# Patient Record
Sex: MALE | Race: WHITE | NOT HISPANIC OR LATINO | Employment: OTHER | URBAN - METROPOLITAN AREA
[De-identification: names, ages, dates, MRNs, and addresses within clinical notes are randomized per-mention and may not be internally consistent; named-entity substitution may affect disease eponyms.]

---

## 2017-01-12 ENCOUNTER — TELEPHONE (OUTPATIENT)
Dept: CARDIOLOGY | Facility: MEDICAL CENTER | Age: 67
End: 2017-01-12

## 2017-01-12 NOTE — TELEPHONE ENCOUNTER
Completed Zio Patch from 12/22/16 to 01/05/17 to Dr. Swan for review and advise.  Pt does have a follow up with Dr. Swan scheduled 3/21/7

## 2017-01-19 NOTE — TELEPHONE ENCOUNTER
Pt notified of Zio Patch results as reviewed by Dr. Swan.  +PAC, no sinus pauses no AV Block.  To discuss in follow up as planned 3/21/17.  Report to scan.

## 2017-01-24 ENCOUNTER — TELEPHONE (OUTPATIENT)
Dept: CARDIOLOGY | Facility: MEDICAL CENTER | Age: 67
End: 2017-01-24

## 2017-01-24 NOTE — TELEPHONE ENCOUNTER
----- Message from Milagros Encarnacion sent at 1/24/2017  1:50 PM PST -----  Regarding: patient returning call  RIRI/Aurea        Patient is returning your call from earlier. He can be reached at [phone]

## 2017-01-24 NOTE — TELEPHONE ENCOUNTER
Pt was called on 1/18 regarding his Zio Patch reviewed by Dr. Swan, no further messages have been left by me for patient.  Message left on his  to call

## 2017-01-24 NOTE — TELEPHONE ENCOUNTER
Spoke with patient and discussed Zio Patch results.  Informed of non-concerning results.  He is very much bothered by palpitations and would like to come in sooner than his scheduled 3/21 appointment with Dr. Swan.  Message to schedulers to put him on cancellation list as there are not sooner appointments outside of heart failure.  If he does not get in sooner and cannot tolerate the palpitations he will call back.

## 2017-03-21 ENCOUNTER — OFFICE VISIT (OUTPATIENT)
Dept: CARDIOLOGY | Facility: MEDICAL CENTER | Age: 67
End: 2017-03-21
Payer: MEDICARE

## 2017-03-21 VITALS
WEIGHT: 152 LBS | BODY MASS INDEX: 21.76 KG/M2 | HEART RATE: 80 BPM | HEIGHT: 70 IN | OXYGEN SATURATION: 92 % | SYSTOLIC BLOOD PRESSURE: 110 MMHG | DIASTOLIC BLOOD PRESSURE: 80 MMHG

## 2017-03-21 DIAGNOSIS — R00.2 PALPITATIONS: ICD-10-CM

## 2017-03-21 DIAGNOSIS — I49.3 PREMATURE VENTRICULAR COMPLEX: ICD-10-CM

## 2017-03-21 DIAGNOSIS — I49.1 ATRIAL CONTRACTIONS, PREMATURE: ICD-10-CM

## 2017-03-21 DIAGNOSIS — I10 ESSENTIAL HYPERTENSION: ICD-10-CM

## 2017-03-21 PROCEDURE — G8432 DEP SCR NOT DOC, RNG: HCPCS | Performed by: INTERNAL MEDICINE

## 2017-03-21 PROCEDURE — 4040F PNEUMOC VAC/ADMIN/RCVD: CPT | Mod: 8P | Performed by: INTERNAL MEDICINE

## 2017-03-21 PROCEDURE — 1036F TOBACCO NON-USER: CPT | Performed by: INTERNAL MEDICINE

## 2017-03-21 PROCEDURE — 3017F COLORECTAL CA SCREEN DOC REV: CPT | Performed by: INTERNAL MEDICINE

## 2017-03-21 PROCEDURE — 99214 OFFICE O/P EST MOD 30 MIN: CPT | Performed by: INTERNAL MEDICINE

## 2017-03-21 PROCEDURE — G8484 FLU IMMUNIZE NO ADMIN: HCPCS | Performed by: INTERNAL MEDICINE

## 2017-03-21 PROCEDURE — G8419 CALC BMI OUT NRM PARAM NOF/U: HCPCS | Performed by: INTERNAL MEDICINE

## 2017-03-21 PROCEDURE — 1101F PT FALLS ASSESS-DOCD LE1/YR: CPT | Mod: 8P | Performed by: INTERNAL MEDICINE

## 2017-03-21 ASSESSMENT — ENCOUNTER SYMPTOMS
SENSORY CHANGE: 0
FEVER: 0
ORTHOPNEA: 0
HEADACHES: 0
BLOOD IN STOOL: 0
VOMITING: 0
PND: 0
MYALGIAS: 0
PALPITATIONS: 1
HALLUCINATIONS: 0
COUGH: 0
BRUISES/BLEEDS EASILY: 0
SPEECH CHANGE: 0
BLURRED VISION: 0
WEIGHT LOSS: 0
CLAUDICATION: 0
EYE PAIN: 0
ABDOMINAL PAIN: 0
SHORTNESS OF BREATH: 0
DIZZINESS: 0
DEPRESSION: 0
FALLS: 0
EYE DISCHARGE: 0
NAUSEA: 0
DOUBLE VISION: 0
CHILLS: 0
LOSS OF CONSCIOUSNESS: 0

## 2017-03-21 NOTE — MR AVS SNAPSHOT
"        Zachary Mitch Moore   3/21/2017 1:15 PM   Office Visit   MRN: [id number]    Department:  [facility]   Dept Phone:  [phone]    Description:  Male : 1950   Provider:  Ericka Swan M.D.           Reason for Visit     Follow-Up           Allergies as of 3/21/2017     No Known Allergies      You were diagnosed with     Palpitations   [785.1.ICD-9-CM]       Premature ventricular complex   [255359]       Essential hypertension   [3395475]       Atrial contractions, premature   [867357]         Vital Signs     Blood Pressure Pulse Height Weight Body Mass Index Oxygen Saturation    110/80 mmHg 80 1.778 m (5' 10\") 68.947 kg (152 lb) 21.81 kg/m2 92%    Smoking Status                   Never Smoker            Basic Information     Date Of Birth Sex Race Ethnicity Preferred Language    1950 Male White Non- English      Problem List              ICD-10-CM Priority Class Noted - Resolved    Hypertension I10   2012 - Present    Bilateral knee pain M25.561, M25.562   2012 - Present    Arthritis M19.90   2012 - Present    Foraminal stenosis 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   2013 - Present    Vitamin D deficiency disease E55.9   10/22/2013 - Present    Elevated liver enzymes R74.8   10/22/2013 - Present    Spinal stenosis of lumbar region M48.06   2013 - Present    Degeneration of lumbar or lumbosacral intervertebral disc M51.37   2014 - Present      Health Maintenance        Date Due Completion Dates    IMM ZOSTER VACCINE 3/15/2009 ---    IMM PNEUMOCOCCAL 65+ (ADULT) LOW/MEDIUM RISK SERIES (1 of 2 - PCV13) 3/15/2014 ---    IMM INFLUENZA (1) 2016    IMM DTaP/Tdap/Td Vaccine (2 - Td) 2022    COLONOSCOPY 3/2/2026 3/2/2016            Current Immunizations     Influenza TIV (IM) 2014  5:49 AM    Tdap Vaccine 2012      Below and/or attached are the medications your provider expects you to take. Review all of your home medications and " newly ordered medications with your provider and/or pharmacist. Follow medication instructions as directed by your provider and/or pharmacist. Please keep your medication list with you and share with your provider. Update the information when medications are discontinued, doses are changed, or new medications (including over-the-counter products) are added; and carry medication information at all times in the event of emergency situations     Allergies:  No Known Allergies          Medications  Valid as of: March 21, 2017 -  1:16 PM    Generic Name Brand Name Tablet Size Instructions for use    Buprenorphine HCl (SL Tab) SUBUTEX 2 MG TAKE 1 TABLET BY MOUTH SUBLINGUALLY 3 TIMES A DAY.        Diclofenac Sodium (Tablet Delayed Response) VOLTAREN 75 MG 1 (ONE) TABLET DR, ORAL, TWO TIMES DAILY        Etodolac (Tab) LODINE 400 MG Take 1 Tab by mouth 2 times a day. Take with food        Hydrocodone-Acetaminophen (Tab) NORCO 5-325 MG Take 1 Tab by mouth every four hours as needed. Indications: not taking not taking        Hydrocodone-Acetaminophen (Tab) NORCO 5-325 MG Take 1-2 Tabs by mouth every four hours as needed (Pain).        Lisinopril (Tab) PRINIVIL 20 MG TAKE 1 TABLET BY MOUTH ONCE DAILY.        Methocarbamol (Tab) ROBAXIN 750 MG Take 1 Tab by mouth 3 times a day as needed (As needed for spasm).        Multiple Vitamin   Take  by mouth.        Oxycodone-Acetaminophen (Tab) PERCOCET 5-325 MG Take 1-2 Tabs by mouth every four hours as needed ((Pain Scale 4-6)).        TiZANidine HCl (Tab) ZANAFLEX 4 MG TAKE 1 TO 2 TABLETS BY MOUTH AT BEDTIME AS NEEDED        .                 Medicines prescribed today were sent to:     [facility] [address] [address] [address]    Phone: [phone] Fax: [phone]    Open 24 Hours?: No      Medication refill instructions:       If your prescription bottle indicates you have medication refills left, it is not necessary to call  your provider’s office. Please contact your pharmacy and they will refill your medication.    If your prescription bottle indicates you do not have any refills left, you may request refills at any time through one of the following ways: The online [employer] system (except Urgent Care), by calling your provider’s office, or by asking your pharmacy to contact your provider’s office with a refill request. Medication refills are processed only during regular business hours and may not be available until the next business day. Your provider may request additional information or to have a follow-up visit with you prior to refilling your medication.   *Please Note: Medication refills are assigned a new Rx number when refilled electronically. Your pharmacy may indicate that no refills were authorized even though a new prescription for the same medication is available at the pharmacy. Please request the medicine by name with the pharmacy before contacting your provider for a refill.        Your To Do List     Future Labs/Procedures Complete By Expires    COMP METABOLIC PANEL  As directed 3/22/2018         [employer] Access Code: Activation code not generated  Current [employer] Status: Active

## 2017-03-21 NOTE — Clinical Note
[facility]   [address], [address]  [place] [address]  Phone: [phone]  Fax: [phone]              Zachary Moore  1950    Encounter Date: 3/21/2017    Ericka Swan M.D.          PROGRESS NOTE:  Subjective:   Zachary Moore is a 67 y.o. male who presents today for cardiac care and evaluation of palpitations. At the last visit, I obtained a long-term event monitor which shows evidence of PACs. During prior stress test, patient also was found to have PVCs. His palpitation has improved. He feels well overall. No presyncope or syncope.    Past Medical History   Diagnosis Date   • Hypertension    • Arthritis      knees and spine   • Cancer (CMS-HCC)      skin cancer ongoing     Past Surgical History   Procedure Laterality Date   • Shoulder surgery       right-sided   • Knee arthroplasty total  2009     left knee--Dr. Snider   • Knee arthroplasty total  2013     Right knee-Dr. Boggs   • Lumbar fusion posterior  1/28/2014     Performed by Elder Chopra M.D. at SURGERY [facility] ORS     History reviewed. No pertinent family history.  History   Smoking status   • Never Smoker    Smokeless tobacco   • Never Used     No Known Allergies  Outpatient Encounter Prescriptions as of 3/21/2017   Medication Sig Dispense Refill   • buprenorphine (SUBUTEX) 2 MG SL Tab TAKE 1 TABLET BY MOUTH SUBLINGUALLY 3 TIMES A DAY.  2   • diclofenac EC (VOLTAREN) 75 MG Tablet Delayed Response 1 (ONE) TABLET DR, ORAL, TWO TIMES DAILY  2   • tizanidine (ZANAFLEX) 4 MG Tab TAKE 1 TO 2 TABLETS BY MOUTH AT BEDTIME AS NEEDED  1   • Multiple Vitamin (MULTI VITAMIN DAILY PO) Take  by mouth.     • lisinopril (PRINIVIL) 20 MG Tab TAKE 1 TABLET BY MOUTH ONCE DAILY. 30 Tab 0   • hydrocodone-acetaminophen (NORCO) 5-325 MG TABS per tablet Take 1 Tab by mouth every four hours as needed. Indications: not taking not taking     • hydrocodone-acetaminophen (NORCO) 5-325 MG TABS per tablet Take  "1-2 Tabs by mouth every four hours as needed (Pain). (Patient not taking: Reported on 3/21/2017) 60 Tab 0   • etodolac (LODINE) 400 MG tablet Take 1 Tab by mouth 2 times a day. Take with food (Patient not taking: Reported on 3/21/2017) 30 Tab 3   • methocarbamol (ROBAXIN) 750 MG TABS Take 1 Tab by mouth 3 times a day as needed (As needed for spasm). 90 Tab 0   • oxycodone-acetaminophen (PERCOCET) 5-325 MG TABS Take 1-2 Tabs by mouth every four hours as needed ((Pain Scale 4-6)). 120 Tab 0     No facility-administered encounter medications on file as of 3/21/2017.     Review of Systems   Constitutional: Negative for fever, chills, weight loss and malaise/fatigue.   HENT: Negative for ear discharge, ear pain, hearing loss and nosebleeds.    Eyes: Negative for blurred vision, double vision, pain and discharge.   Respiratory: Negative for cough and shortness of breath.    Cardiovascular: Positive for palpitations. Negative for chest pain, orthopnea, claudication, leg swelling and PND.   Gastrointestinal: Negative for nausea, vomiting, abdominal pain, blood in stool and melena.   Genitourinary: Negative for dysuria and hematuria.   Musculoskeletal: Negative for myalgias, joint pain and falls.   Skin: Negative for itching and rash.   Neurological: Negative for dizziness, sensory change, speech change, loss of consciousness and headaches.   Endo/Heme/Allergies: Negative for environmental allergies. Does not bruise/bleed easily.   Psychiatric/Behavioral: Negative for depression, suicidal ideas and hallucinations.        Objective:   /80 mmHg  Pulse 80  Ht 1.778 m (5' 10\")  Wt 68.947 kg (152 lb)  BMI 21.81 kg/m2  SpO2 92%    Physical Exam   Constitutional: He is oriented to person, place, and time. He appears well-developed and well-nourished.   HENT:   Head: Normocephalic and atraumatic.   Eyes: EOM are normal.   Neck: Normal range of motion. No JVD present.   Cardiovascular: Normal rate, regular rhythm, normal " heart sounds and intact distal pulses.  Exam reveals no gallop and no friction rub.    No murmur heard.  Bilateral femoral pulses are 2+, bilateral dorsalis pedis pulses are 2+, bilateral posterior tibialis pulses are 2+.   Pulmonary/Chest: No respiratory distress. He has no wheezes. He has no rales. He exhibits no tenderness.   Abdominal: Soft. Bowel sounds are normal. There is no tenderness. There is no rebound and no guarding.   The is no presence of abdominal bruits   Musculoskeletal: Normal range of motion.   Neurological: He is alert and oriented to person, place, and time.   Skin: Skin is warm and dry.   Psychiatric: He has a normal mood and affect.   Nursing note and vitals reviewed.      Assessment:     1. Palpitations  COMP METABOLIC PANEL    LIPID PANEL   2. Premature ventricular complex  COMP METABOLIC PANEL    LIPID PANEL   3. Essential hypertension  COMP METABOLIC PANEL    LIPID PANEL   4. Atrial contractions, premature  COMP METABOLIC PANEL    LIPID PANEL       Medical Decision Making:  Today's Assessment / Status / Plan:     No medical therapy at this time. Patient is doing relatively well. Blood pressure is well controlled.  Continue current medical Therapy without change.    I will see patient back in clinic with lab tests and studies results in 12 months.    I thank you Dr. Carrillo for referring patient to our Cardiology Clinic today.        Gerhard Carrillo M.D.  [address] [place] [id number]  VIA Facsimile: [phone]

## 2017-03-22 NOTE — PROGRESS NOTES
Subjective:   Zachary Moore is a 67 y.o. male who presents today for cardiac care and evaluation of palpitations. At the last visit, I obtained a long-term event monitor which shows evidence of PACs. During prior stress test, patient also was found to have PVCs. His palpitation has improved. He feels well overall. No presyncope or syncope.    Past Medical History   Diagnosis Date   • Hypertension    • Arthritis      knees and spine   • Cancer (CMS-[facility])      skin cancer ongoing     Past Surgical History   Procedure Laterality Date   • Shoulder surgery       right-sided   • Knee arthroplasty total  2009     left knee--Dr. Snider   • Knee arthroplasty total  2013     Right knee-Dr. Boggs   • Lumbar fusion posterior  1/28/2014     Performed by Elder Chopra M.D. at SURGERY [facility] ORS     History reviewed. No pertinent family history.  History   Smoking status   • Never Smoker    Smokeless tobacco   • Never Used     No Known Allergies  Outpatient Encounter Prescriptions as of 3/21/2017   Medication Sig Dispense Refill   • buprenorphine (SUBUTEX) 2 MG SL Tab TAKE 1 TABLET BY MOUTH SUBLINGUALLY 3 TIMES A DAY.  2   • diclofenac EC (VOLTAREN) 75 MG Tablet Delayed Response 1 (ONE) TABLET DR, ORAL, TWO TIMES DAILY  2   • tizanidine (ZANAFLEX) 4 MG Tab TAKE 1 TO 2 TABLETS BY MOUTH AT BEDTIME AS NEEDED  1   • Multiple Vitamin (MULTI VITAMIN DAILY PO) Take  by mouth.     • lisinopril (PRINIVIL) 20 MG Tab TAKE 1 TABLET BY MOUTH ONCE DAILY. 30 Tab 0   • hydrocodone-acetaminophen (NORCO) 5-325 MG TABS per tablet Take 1 Tab by mouth every four hours as needed. Indications: not taking not taking     • hydrocodone-acetaminophen (NORCO) 5-325 MG TABS per tablet Take 1-2 Tabs by mouth every four hours as needed (Pain). (Patient not taking: Reported on 3/21/2017) 60 Tab 0   • etodolac (LODINE) 400 MG tablet Take 1 Tab by mouth 2 times a day. Take with food (Patient not taking: Reported on 3/21/2017) 30 Tab 3   • methocarbamol  "(ROBAXIN) 750 MG TABS Take 1 Tab by mouth 3 times a day as needed (As needed for spasm). 90 Tab 0   • oxycodone-acetaminophen (PERCOCET) 5-325 MG TABS Take 1-2 Tabs by mouth every four hours as needed ((Pain Scale 4-6)). 120 Tab 0     No facility-administered encounter medications on file as of 3/21/2017.     Review of Systems   Constitutional: Negative for fever, chills, weight loss and malaise/fatigue.   HENT: Negative for ear discharge, ear pain, hearing loss and nosebleeds.    Eyes: Negative for blurred vision, double vision, pain and discharge.   Respiratory: Negative for cough and shortness of breath.    Cardiovascular: Positive for palpitations. Negative for chest pain, orthopnea, claudication, leg swelling and PND.   Gastrointestinal: Negative for nausea, vomiting, abdominal pain, blood in stool and melena.   Genitourinary: Negative for dysuria and hematuria.   Musculoskeletal: Negative for myalgias, joint pain and falls.   Skin: Negative for itching and rash.   Neurological: Negative for dizziness, sensory change, speech change, loss of consciousness and headaches.   Endo/Heme/Allergies: Negative for environmental allergies. Does not bruise/bleed easily.   Psychiatric/Behavioral: Negative for depression, suicidal ideas and hallucinations.        Objective:   /80 mmHg  Pulse 80  Ht 1.778 m (5' 10\")  Wt 68.947 kg (152 lb)  BMI 21.81 kg/m2  SpO2 92%    Physical Exam   Constitutional: He is oriented to person, place, and time. He appears well-developed and well-nourished.   HENT:   Head: Normocephalic and atraumatic.   Eyes: EOM are normal.   Neck: Normal range of motion. No JVD present.   Cardiovascular: Normal rate, regular rhythm, normal heart sounds and intact distal pulses.  Exam reveals no gallop and no friction rub.    No murmur heard.  Bilateral femoral pulses are 2+, bilateral dorsalis pedis pulses are 2+, bilateral posterior tibialis pulses are 2+.   Pulmonary/Chest: No respiratory " distress. He has no wheezes. He has no rales. He exhibits no tenderness.   Abdominal: Soft. Bowel sounds are normal. There is no tenderness. There is no rebound and no guarding.   The is no presence of abdominal bruits   Musculoskeletal: Normal range of motion.   Neurological: He is alert and oriented to person, place, and time.   Skin: Skin is warm and dry.   Psychiatric: He has a normal mood and affect.   Nursing note and vitals reviewed.      Assessment:     1. Palpitations  COMP METABOLIC PANEL    LIPID PANEL   2. Premature ventricular complex  COMP METABOLIC PANEL    LIPID PANEL   3. Essential hypertension  COMP METABOLIC PANEL    LIPID PANEL   4. Atrial contractions, premature  COMP METABOLIC PANEL    LIPID PANEL       Medical Decision Making:  Today's Assessment / Status / Plan:     No medical therapy at this time. Patient is doing relatively well. Blood pressure is well controlled.  Continue current medical Therapy without change.    I will see patient back in clinic with lab tests and studies results in 12 months.    I thank you Dr. Carrillo for referring patient to our Cardiology Clinic today.

## 2017-10-18 ENCOUNTER — APPOINTMENT (RX ONLY)
Dept: URBAN - METROPOLITAN AREA CLINIC 4 | Facility: CLINIC | Age: 67
Setting detail: DERMATOLOGY
End: 2017-10-18

## 2017-10-18 DIAGNOSIS — L57.0 ACTINIC KERATOSIS: ICD-10-CM

## 2017-10-18 DIAGNOSIS — Z85.828 PERSONAL HISTORY OF OTHER MALIGNANT NEOPLASM OF SKIN: ICD-10-CM

## 2017-10-18 DIAGNOSIS — L81.4 OTHER MELANIN HYPERPIGMENTATION: ICD-10-CM

## 2017-10-18 DIAGNOSIS — L82.1 OTHER SEBORRHEIC KERATOSIS: ICD-10-CM

## 2017-10-18 PROBLEM — D48.5 NEOPLASM OF UNCERTAIN BEHAVIOR OF SKIN: Status: ACTIVE | Noted: 2017-10-18

## 2017-10-18 PROCEDURE — ? LIQUID NITROGEN

## 2017-10-18 PROCEDURE — 99212 OFFICE O/P EST SF 10 MIN: CPT | Mod: 25

## 2017-10-18 PROCEDURE — 17000 DESTRUCT PREMALG LESION: CPT

## 2017-10-18 PROCEDURE — ? COUNSELING

## 2017-10-18 PROCEDURE — ? OBSERVATION

## 2017-10-18 PROCEDURE — 17003 DESTRUCT PREMALG LES 2-14: CPT

## 2017-10-18 PROCEDURE — ? BIOPSY BY SHAVE METHOD AND DESTRUCTION

## 2017-10-18 PROCEDURE — 11100: CPT | Mod: 59

## 2017-10-18 ASSESSMENT — LOCATION SIMPLE DESCRIPTION DERM
LOCATION SIMPLE: RIGHT SHOULDER
LOCATION SIMPLE: RIGHT ANTERIOR NECK
LOCATION SIMPLE: RIGHT UPPER BACK
LOCATION SIMPLE: RIGHT FOREHEAD
LOCATION SIMPLE: LEFT EAR
LOCATION SIMPLE: LEFT CHEEK
LOCATION SIMPLE: LEFT UPPER BACK
LOCATION SIMPLE: RIGHT FOREARM
LOCATION SIMPLE: CHEST
LOCATION SIMPLE: LEFT PRETIBIAL REGION
LOCATION SIMPLE: RIGHT CHEEK
LOCATION SIMPLE: RIGHT EYEBROW
LOCATION SIMPLE: LEFT SHOULDER
LOCATION SIMPLE: LEFT FOREARM
LOCATION SIMPLE: LEFT FOREHEAD
LOCATION SIMPLE: UPPER BACK
LOCATION SIMPLE: RIGHT CLAVICULAR SKIN

## 2017-10-18 ASSESSMENT — LOCATION DETAILED DESCRIPTION DERM
LOCATION DETAILED: RIGHT LATERAL UPPER BACK
LOCATION DETAILED: RIGHT CLAVICULAR SKIN
LOCATION DETAILED: RIGHT LATERAL SUPERIOR CHEST
LOCATION DETAILED: LEFT ANTERIOR SHOULDER
LOCATION DETAILED: LEFT LATERAL SUPERIOR CHEST
LOCATION DETAILED: SUPERIOR THORACIC SPINE
LOCATION DETAILED: LEFT SUPERIOR CRUS OF ANTIHELIX
LOCATION DETAILED: RIGHT POSTERIOR SHOULDER
LOCATION DETAILED: LEFT PROXIMAL DORSAL FOREARM
LOCATION DETAILED: LEFT SUPERIOR HELIX
LOCATION DETAILED: INFERIOR THORACIC SPINE
LOCATION DETAILED: LEFT INFERIOR UPPER BACK
LOCATION DETAILED: LEFT PROXIMAL PRETIBIAL REGION
LOCATION DETAILED: RIGHT DISTAL DORSAL FOREARM
LOCATION DETAILED: RIGHT INFERIOR LATERAL MALAR CHEEK
LOCATION DETAILED: LEFT INFERIOR CENTRAL MALAR CHEEK
LOCATION DETAILED: RIGHT SUPERIOR LATERAL NECK
LOCATION DETAILED: RIGHT CENTRAL EYEBROW
LOCATION DETAILED: LEFT LATERAL INFERIOR CHEST
LOCATION DETAILED: LEFT MEDIAL SUPERIOR CHEST
LOCATION DETAILED: RIGHT FOREHEAD
LOCATION DETAILED: LEFT FOREHEAD

## 2017-10-18 ASSESSMENT — LOCATION ZONE DERM
LOCATION ZONE: NECK
LOCATION ZONE: LEG
LOCATION ZONE: TRUNK
LOCATION ZONE: ARM
LOCATION ZONE: EAR
LOCATION ZONE: FACE

## 2017-10-18 NOTE — PROCEDURE: BIOPSY BY SHAVE METHOD AND DESTRUCTION
Biopsy Type: H and E
Anesthesia Volume In Cc: 3
Render Post-Care Instructions In Note?: no
Size Of Lesion After Curettage: 0
Hemostasis: Drysol
Billing Type: Third-Party Bill
Consent: Written consent was obtained and risks were reviewed including but not limited to scarring, infection, bleeding, scabbing, incomplete removal, nerve damage and allergy to anesthesia.
Bill As?: Biopsy by Shave Method
Post-Care Instructions: I reviewed with the patient in detail post-care instructions. Patient is to keep the biopsy site dry overnight, and then apply bacitracin twice daily until healed. Patient may apply hydrogen peroxide soaks to remove any crusting.\\n\\n\\nPt to treat lesion with 5fu if positive which he has at home. Will recheck in 3 months.
Lab Facility: 
Destruction Type: electrodesiccation
Lab: [id number]
Notification Instructions: Patient will be notified of biopsy results. However, patient instructed to call the office if not contacted within 2 weeks.
Detail Level: Detailed
Wound Care: Petrolatum
Anesthesia Type: 1% lidocaine with epinephrine and a 1:10 solution of 8.4% sodium bicarbonate

## 2017-10-18 NOTE — PROCEDURE: LIQUID NITROGEN
Render Post-Care Instructions In Note?: no
Post-Care Instructions: I reviewed with the patient in detail post-care instructions. Patient is to wear sunprotection, and avoid picking at any of the treated lesions. Pt may apply Vaseline to crusted or scabbing areas.
Consent: The patient's consent was obtained including but not limited to risks of crusting, scabbing, blistering, scarring, darker or lighter pigmentary change, recurrence, incomplete removal and infection.
Detail Level: Detailed
Duration Of Freeze Thaw-Cycle (Seconds): 5
Number Of Freeze-Thaw Cycles: 1 freeze-thaw cycle

## 2018-02-02 ENCOUNTER — TELEPHONE (OUTPATIENT)
Dept: CARDIOLOGY | Facility: MEDICAL CENTER | Age: 68
End: 2018-02-02

## 2018-02-02 NOTE — TELEPHONE ENCOUNTER
Called and spoke with patient to remind to have labs done prior to appt. Per patient will not have time to have done prior to appt.

## 2018-02-15 ENCOUNTER — APPOINTMENT (RX ONLY)
Dept: URBAN - METROPOLITAN AREA CLINIC 4 | Facility: CLINIC | Age: 68
Setting detail: DERMATOLOGY
End: 2018-02-15

## 2018-02-15 DIAGNOSIS — H61.03 CHONDRITIS OF EXTERNAL EAR: ICD-10-CM

## 2018-02-15 DIAGNOSIS — D18.0 HEMANGIOMA: ICD-10-CM

## 2018-02-15 DIAGNOSIS — Z85.828 PERSONAL HISTORY OF OTHER MALIGNANT NEOPLASM OF SKIN: ICD-10-CM

## 2018-02-15 DIAGNOSIS — L81.4 OTHER MELANIN HYPERPIGMENTATION: ICD-10-CM

## 2018-02-15 DIAGNOSIS — L57.0 ACTINIC KERATOSIS: ICD-10-CM

## 2018-02-15 DIAGNOSIS — L82.1 OTHER SEBORRHEIC KERATOSIS: ICD-10-CM

## 2018-02-15 PROBLEM — D18.01 HEMANGIOMA OF SKIN AND SUBCUTANEOUS TISSUE: Status: ACTIVE | Noted: 2018-02-15

## 2018-02-15 PROBLEM — D48.5 NEOPLASM OF UNCERTAIN BEHAVIOR OF SKIN: Status: ACTIVE | Noted: 2018-02-15

## 2018-02-15 PROCEDURE — 11100: CPT | Mod: 59

## 2018-02-15 PROCEDURE — ? COUNSELING

## 2018-02-15 PROCEDURE — ? BIOPSY BY SHAVE METHOD

## 2018-02-15 PROCEDURE — 99212 OFFICE O/P EST SF 10 MIN: CPT | Mod: 25

## 2018-02-15 PROCEDURE — 69100 BIOPSY OF EXTERNAL EAR: CPT | Mod: 59

## 2018-02-15 PROCEDURE — 17000 DESTRUCT PREMALG LESION: CPT

## 2018-02-15 PROCEDURE — ? LIQUID NITROGEN

## 2018-02-15 ASSESSMENT — LOCATION DETAILED DESCRIPTION DERM
LOCATION DETAILED: LEFT DISTAL DORSAL FOREARM
LOCATION DETAILED: LEFT CENTRAL TEMPLE
LOCATION DETAILED: LEFT INFERIOR FOREHEAD
LOCATION DETAILED: RIGHT INFERIOR MEDIAL UPPER BACK
LOCATION DETAILED: LEFT SUPERIOR CENTRAL MALAR CHEEK
LOCATION DETAILED: RIGHT SUPERIOR MEDIAL UPPER BACK
LOCATION DETAILED: LEFT SUPERIOR CRUS OF ANTIHELIX
LOCATION DETAILED: EPIGASTRIC SKIN
LOCATION DETAILED: LEFT CENTRAL MALAR CHEEK
LOCATION DETAILED: POSTERIOR MID-PARIETAL SCALP
LOCATION DETAILED: INFERIOR MID FOREHEAD
LOCATION DETAILED: RIGHT DISTAL DORSAL FOREARM
LOCATION DETAILED: PHILTRUM
LOCATION DETAILED: LEFT SUPERIOR PARIETAL SCALP

## 2018-02-15 ASSESSMENT — LOCATION SIMPLE DESCRIPTION DERM
LOCATION SIMPLE: LEFT CHEEK
LOCATION SIMPLE: ABDOMEN
LOCATION SIMPLE: UPPER LIP
LOCATION SIMPLE: RIGHT FOREARM
LOCATION SIMPLE: LEFT TEMPLE
LOCATION SIMPLE: SCALP
LOCATION SIMPLE: LEFT FOREHEAD
LOCATION SIMPLE: POSTERIOR SCALP
LOCATION SIMPLE: INFERIOR FOREHEAD
LOCATION SIMPLE: LEFT EAR
LOCATION SIMPLE: RIGHT UPPER BACK
LOCATION SIMPLE: LEFT FOREARM

## 2018-02-15 ASSESSMENT — LOCATION ZONE DERM
LOCATION ZONE: ARM
LOCATION ZONE: LIP
LOCATION ZONE: EAR
LOCATION ZONE: FACE
LOCATION ZONE: TRUNK
LOCATION ZONE: SCALP

## 2018-02-15 NOTE — PROCEDURE: BIOPSY BY SHAVE METHOD
X Size Of Lesion In Cm: 0
Type Of Destruction Used: Curettage
Notification Instructions: Patient will be notified of biopsy results. However, patient instructed to call the office if not contacted within 2 weeks.
Curettage Text: The wound bed was treated with curettage after the biopsy was performed.
Biopsy Type: H and E
Path Notes (To The Dermatopathologist): Rechk in 2 mos if +
Dressing: Band-Aid
Biopsy Method: Personna blade
Bill For Surgical Tray: no
Consent: Written consent was obtained and risks were reviewed including but not limited to scarring, infection, bleeding, scabbing, incomplete removal, nerve damage and allergy to anesthesia.
Hemostasis: Drysol and Electrocautery
Billing Type: Third-Party Bill
Electrodesiccation And Curettage Text: The wound bed was treated with electrodesiccation and curettage after the biopsy was performed.
Anesthesia Type: 1% lidocaine with epinephrine and a 1:10 solution of 8.4% sodium bicarbonate
Electrodesiccation Text: The wound bed was treated with electrodesiccation after the biopsy was performed.
Detail Level: Detailed
Lab: [id number]
Post-Care Instructions: I reviewed with the patient in detail post-care instructions. Patient is to keep the biopsy site dry overnight, and then apply vasaline twice daily until healed.
Wound Care: Vaseline
Lab Facility: 
Anesthesia Volume In Cc: 0.5
Render Post-Care Instructions In Note?: yes
Anticipated Plan (Based On Presumed Biopsy Results): Mohs if +

## 2018-02-27 ENCOUNTER — OFFICE VISIT (OUTPATIENT)
Dept: MEDICAL GROUP | Facility: PHYSICIAN GROUP | Age: 68
End: 2018-02-27
Payer: MEDICARE

## 2018-02-27 VITALS
RESPIRATION RATE: 16 BRPM | DIASTOLIC BLOOD PRESSURE: 72 MMHG | HEART RATE: 104 BPM | BODY MASS INDEX: 23.05 KG/M2 | TEMPERATURE: 98.2 F | WEIGHT: 161 LBS | SYSTOLIC BLOOD PRESSURE: 142 MMHG | OXYGEN SATURATION: 95 % | HEIGHT: 70 IN

## 2018-02-27 DIAGNOSIS — I10 ESSENTIAL HYPERTENSION: ICD-10-CM

## 2018-02-27 DIAGNOSIS — R11.2 NON-INTRACTABLE VOMITING WITH NAUSEA, UNSPECIFIED VOMITING TYPE: ICD-10-CM

## 2018-02-27 DIAGNOSIS — M51.37 DEGENERATION OF LUMBAR OR LUMBOSACRAL INTERVERTEBRAL DISC: ICD-10-CM

## 2018-02-27 DIAGNOSIS — M48.061 SPINAL STENOSIS OF LUMBAR REGION, UNSPECIFIED WHETHER NEUROGENIC CLAUDICATION PRESENT: ICD-10-CM

## 2018-02-27 PROCEDURE — 99213 OFFICE O/P EST LOW 20 MIN: CPT | Performed by: FAMILY MEDICINE

## 2018-02-27 RX ORDER — ONDANSETRON 4 MG/1
4 TABLET, FILM COATED ORAL EVERY 6 HOURS PRN
Qty: 30 TAB | Refills: 1 | Status: SHIPPED | OUTPATIENT
Start: 2018-02-27 | End: 2018-08-14 | Stop reason: SDUPTHER

## 2018-02-27 RX ORDER — BUPRENORPHINE 8 MG/1
TABLET SUBLINGUAL DAILY
COMMUNITY

## 2018-02-27 ASSESSMENT — PATIENT HEALTH QUESTIONNAIRE - PHQ9: CLINICAL INTERPRETATION OF PHQ2 SCORE: 0

## 2018-02-27 NOTE — PATIENT INSTRUCTIONS
Patient given written instructions regarding labs, imaging, medications, referrals, dietary and lifestyle management, and return visit.    Geo Woodruff MD

## 2018-02-27 NOTE — PROGRESS NOTES
Patient comes in stating that. Buprenorphine which he gets from his pain specialist Dr. Oneill sometimes causes him to get nauseated and vomit. It doesn't happen every day so it's unpredictable. He has no blood in his stool or black tarry stool. He does have some history of diarrhea as well. No one else is sick at home.  He has ongoing issues with low back pain and spinal stenosis.    I reviewed the following    Past Medical History:   Diagnosis Date   • Arthritis     knees and spine   • Cancer (CMS-[facility])     skin cancer ongoing   • Hypertension         Past Surgical History:   Procedure Laterality Date   • LUMBAR FUSION POSTERIOR  1/28/2014    Performed by Elder Chopra M.D. at SURGERY [facility]   • KNEE ARTHROPLASTY TOTAL  2013    Right knee-Dr. Boggs   • KNEE ARTHROPLASTY TOTAL  2009    left knee--Dr. Snider   • SHOULDER SURGERY      right-sided       No Known Allergies    Current Outpatient Prescriptions   Medication Sig Dispense Refill   • buprenorphine (SUBUTEX) 8 MG SL Tab Place  under tongue every day.     • ondansetron (ZOFRAN) 4 MG Tab tablet Take 1 Tab by mouth every 6 hours as needed for Nausea/Vomiting. 30 Tab 1   • diclofenac EC (VOLTAREN) 75 MG Tablet Delayed Response 1 (ONE) TABLET DR, ORAL, TWO TIMES DAILY  2   • tizanidine (ZANAFLEX) 4 MG Tab TAKE 1 TO 2 TABLETS BY MOUTH AT BEDTIME AS NEEDED  1   • Multiple Vitamin (MULTI VITAMIN DAILY PO) Take  by mouth.     • lisinopril (PRINIVIL) 20 MG Tab TAKE 1 TABLET BY MOUTH ONCE DAILY. 30 Tab 0   • buprenorphine (SUBUTEX) 2 MG SL Tab TAKE 1 TABLET BY MOUTH SUBLINGUALLY 3 TIMES A DAY.  2   • hydrocodone-acetaminophen (NORCO) 5-325 MG TABS per tablet Take 1-2 Tabs by mouth every four hours as needed (Pain). 60 Tab 0   • hydrocodone-acetaminophen (NORCO) 5-325 MG TABS per tablet Take 1 Tab by mouth every four hours as needed. Indications: not taking not taking     • etodolac (LODINE) 400 MG tablet Take 1 Tab by mouth 2 times a day. Take with food  (Patient not taking: Reported on 2/27/2018) 30 Tab 3   • methocarbamol (ROBAXIN) 750 MG TABS Take 1 Tab by mouth 3 times a day as needed (As needed for spasm). (Patient not taking: Reported on 2/27/2018) 90 Tab 0   • oxycodone-acetaminophen (PERCOCET) 5-325 MG TABS Take 1-2 Tabs by mouth every four hours as needed ((Pain Scale 4-6)). (Patient not taking: Reported on 2/27/2018) 120 Tab 0     No current facility-administered medications for this visit.         History reviewed. No pertinent family history.    Social History     Social History   • Marital status:      Spouse name: N/A   • Number of children: N/A   • Years of education: N/A     Occupational History   • Not on file.     Social History Main Topics   • Smoking status: Never Smoker   • Smokeless tobacco: Never Used   • Alcohol use Yes      Comment: daily   • Drug use: No   • Sexual activity: Yes     Partners: Female     Birth control/ protection: Post-Menopausal     Other Topics Concern   • Not on file     Social History Narrative   • No narrative on file      Physical Exam   Constitutional: He is oriented. He appears well-developed and well-nourished. No distress. He has a florid face   HENT:   Head: Normocephalic and atraumatic.   Right Ear: External ear normal. Ear canal and TM normal   Left Ear: External ear normal. Ear canal and TM normal   Nose: Nose normal.   Mouth/Throat: Oropharynx is clear and moist.   Eyes: Conjunctivae and extraocular motions are normal. Pupils are equal, round, and reactive to light.        Fundi benign bilaterally   Neck: No thyromegaly present.   Cardiovascular: Normal rate, regular rhythm, normal heart sounds and intact distal pulses.  Exam reveals no gallop.    No murmur heard.  Pulmonary/Chest: Effort normal and breath sounds normal. No respiratory distress. He has no wheezes. He has no rales.   Abdominal: Soft. Bowel sounds are normal. No hepatosplenomegaly. He exhibits no distension. No tenderness. He has no  rebound and no guarding.   Musculoskeletal: Normal range of motion. He exhibits no edema and no tenderness.   Lymphadenopathy:     He has no cervical adenopathy.  No supraclavicular adenopathy.   Neurological: He is alert and oriented. He has normal reflexes.        Babinskis downgoing bilaterally   Skin: Skin is warm and dry. No rash noted. No erythema.   Psychiatric: He has a normal mood and appropriate affect. His behavior is normal. Judgment and thought content normal.     1. Non-intractable vomiting with nausea, unspecified vomiting type  ondansetron (ZOFRAN) 4 MG Tab tablet--one by mouth every 6-8 hours when necessary    2. Degeneration of lumbar or lumbosacral intervertebral disc   Continue to see Dr. Oneill    3. Spinal stenosis of lumbar region, unspecified whether neurogenic claudication present   Continue to see Dr. Oneill    4. Essential hypertension   Controlled      Recheck when necessary    Please note that this dictation was created using voice recognition software. I have worked with consultants from the vendor as well as technical experts from [facility] to optimize the interface. I have made every reasonable attempt to correct obvious errors, but I expect that there are errors of grammar and possibly content that I did not discover before finalizing the note.

## 2018-03-28 ENCOUNTER — APPOINTMENT (RX ONLY)
Dept: URBAN - METROPOLITAN AREA CLINIC 4 | Facility: CLINIC | Age: 68
Setting detail: DERMATOLOGY
End: 2018-03-28

## 2018-03-28 DIAGNOSIS — H61.03 CHONDRITIS OF EXTERNAL EAR: ICD-10-CM

## 2018-03-28 PROBLEM — H61.032 CHONDRITIS OF LEFT EXTERNAL EAR: Status: ACTIVE | Noted: 2018-03-28

## 2018-03-28 PROCEDURE — ? PRESCRIPTION

## 2018-03-28 PROCEDURE — 99212 OFFICE O/P EST SF 10 MIN: CPT

## 2018-03-28 PROCEDURE — ? COUNSELING

## 2018-03-28 RX ORDER — TACROLIMUS 1 MG/G
OINTMENT TOPICAL
Qty: 1 | Refills: 3 | Status: ERX

## 2018-03-28 ASSESSMENT — LOCATION ZONE DERM: LOCATION ZONE: EAR

## 2018-03-28 ASSESSMENT — LOCATION DETAILED DESCRIPTION DERM: LOCATION DETAILED: LEFT SUPERIOR CRUS OF ANTIHELIX

## 2018-03-28 ASSESSMENT — LOCATION SIMPLE DESCRIPTION DERM: LOCATION SIMPLE: LEFT EAR

## 2018-03-28 NOTE — PROCEDURE: COUNSELING
Detail Level: Detailed
Patient Specific Counseling (Will Not Stick From Patient To Patient): Pt states slow to heal. RX sent in for Tacrolimus ointment to use BID.

## 2018-07-10 ENCOUNTER — APPOINTMENT (RX ONLY)
Dept: URBAN - METROPOLITAN AREA CLINIC 4 | Facility: CLINIC | Age: 68
Setting detail: DERMATOLOGY
End: 2018-07-10

## 2018-07-10 DIAGNOSIS — L57.0 ACTINIC KERATOSIS: ICD-10-CM

## 2018-07-10 PROBLEM — C44.629 SQUAMOUS CELL CARCINOMA OF SKIN OF LEFT UPPER LIMB, INCLUDING SHOULDER: Status: ACTIVE | Noted: 2018-07-10

## 2018-07-10 PROBLEM — D48.5 NEOPLASM OF UNCERTAIN BEHAVIOR OF SKIN: Status: ACTIVE | Noted: 2018-07-10

## 2018-07-10 PROCEDURE — ? OBSERVATION

## 2018-07-10 PROCEDURE — 99212 OFFICE O/P EST SF 10 MIN: CPT | Mod: 25

## 2018-07-10 PROCEDURE — 17000 DESTRUCT PREMALG LESION: CPT

## 2018-07-10 PROCEDURE — ? LIQUID NITROGEN

## 2018-07-10 PROCEDURE — ? BIOPSY BY SHAVE METHOD

## 2018-07-10 PROCEDURE — 11100: CPT | Mod: 59

## 2018-07-10 PROCEDURE — 17003 DESTRUCT PREMALG LES 2-14: CPT

## 2018-07-10 ASSESSMENT — LOCATION ZONE DERM
LOCATION ZONE: TRUNK
LOCATION ZONE: ARM

## 2018-07-10 ASSESSMENT — LOCATION DETAILED DESCRIPTION DERM
LOCATION DETAILED: LEFT SUPERIOR MEDIAL LOWER BACK
LOCATION DETAILED: LEFT POSTERIOR SHOULDER
LOCATION DETAILED: RIGHT CLAVICULAR SKIN
LOCATION DETAILED: RIGHT LATERAL SUPERIOR CHEST

## 2018-07-10 ASSESSMENT — LOCATION SIMPLE DESCRIPTION DERM
LOCATION SIMPLE: LEFT SHOULDER
LOCATION SIMPLE: CHEST
LOCATION SIMPLE: RIGHT CLAVICULAR SKIN
LOCATION SIMPLE: LEFT LOWER BACK

## 2018-07-10 NOTE — PROCEDURE: LIQUID NITROGEN
Render Post-Care Instructions In Note?: no
Duration Of Freeze Thaw-Cycle (Seconds): 5
Number Of Freeze-Thaw Cycles: 1 freeze-thaw cycle
Consent: The patient's consent was obtained including but not limited to risks of crusting, scabbing, blistering, scarring, darker or lighter pigmentary change, recurrence, incomplete removal and infection.
Post-Care Instructions: I reviewed with the patient in detail post-care instructions. Patient is to wear sunprotection, and avoid picking at any of the treated lesions. Pt may apply Vaseline to crusted or scabbing areas.
Detail Level: Detailed

## 2018-07-10 NOTE — PROCEDURE: BIOPSY BY SHAVE METHOD
Size Of Lesion In Cm: 0
Anticipated Plan (Based On Presumed Biopsy Results): C&D if +
Lab: [id number]
Notification Instructions: Patient will be notified of biopsy results. However, patient instructed to call the office if not contacted within 2 weeks.
Dressing: Band-Aid
Electrodesiccation Text: The wound bed was treated with electrodesiccation after the biopsy was performed.
Was A Bandage Applied: Yes
Lab Facility: 
Electrodesiccation And Curettage Text: The wound bed was treated with electrodesiccation and curettage after the biopsy was performed.
Detail Level: Detailed
Hemostasis: Drysol and Electrocautery
Wound Care: Vaseline
Bill For Surgical Tray: no
Billing Type: Third-Party Bill
Type Of Destruction Used: Curettage
Consent: Written consent was obtained and risks were reviewed including but not limited to scarring, infection, bleeding, scabbing, incomplete removal, nerve damage and allergy to anesthesia.
Depth Of Biopsy: dermis
Biopsy Method: Personna blade
Anesthesia Volume In Cc: 0.5
Biopsy Type: H and E
Post-Care Instructions: I reviewed with the patient in detail post-care instructions. Patient is to keep the biopsy site dry overnight, and then apply vasaline twice daily until healed.
Curettage Text: The wound bed was treated with curettage after the biopsy was performed.
Anesthesia Type: 1% lidocaine with epinephrine and a 1:10 solution of 8.4% sodium bicarbonate

## 2018-07-31 ENCOUNTER — APPOINTMENT (RX ONLY)
Dept: URBAN - METROPOLITAN AREA CLINIC 4 | Facility: CLINIC | Age: 68
Setting detail: DERMATOLOGY
End: 2018-07-31

## 2018-07-31 PROBLEM — C44.519 BASAL CELL CARCINOMA OF SKIN OF OTHER PART OF TRUNK: Status: ACTIVE | Noted: 2018-07-31

## 2018-07-31 PROCEDURE — ? CURETTAGE AND DESTRUCTION

## 2018-07-31 PROCEDURE — 17262 DSTRJ MAL LES T/A/L 1.1-2.0: CPT

## 2018-07-31 NOTE — PROCEDURE: CURETTAGE AND DESTRUCTION
Bill As A Line Item Or As Units: Line Item
Add Ability To Document Additional Intralesional Injection: No
What Was Performed First?: Curettage
Total Volume (Ccs): 1
Cautery Type: electrodesiccation
Additional Information: (Optional): The wound was cleaned, and a bandage was applied.  The patient received detailed post-op instructions.
Detail Level: Detailed
Size Of Lesion After Curettage: 1.1
Concentration (Mg/Ml Or Millions Of Plaque Forming Units/Cc): 0.01
Consent was obtained from the patient. The risks, benefits and alternatives to therapy were discussed in detail. Specifically, the risks of infection, scarring, bleeding, prolonged wound healing, nerve injury, incomplete removal, allergy to anesthesia and recurrence were addressed. Alternatives to ED&C, such as: surgical removal and XRT were also discussed.  Prior to the procedure, the treatment site was clearly identified and confirmed by the patient. All components of Universal Protocol/PAUSE Rule completed.
Anesthesia Type: 1% lidocaine with epinephrine and a 1:10 solution of 8.4% sodium bicarbonate
Post-Care Instructions: I reviewed with the patient in detail post-care instructions. Patient is to keep the area dry for 48 hours, and not to engage in any swimming until the area is healed. Should the patient develop any fevers, chills, bleeding, severe pain patient will contact the office immediately.
Number Of Curettages: 3
Size Of Lesion In Cm: 0.7

## 2018-08-14 DIAGNOSIS — R11.2 NON-INTRACTABLE VOMITING WITH NAUSEA, UNSPECIFIED VOMITING TYPE: ICD-10-CM

## 2018-08-14 RX ORDER — ONDANSETRON 4 MG/1
TABLET, FILM COATED ORAL
Qty: 30 TAB | Refills: 1 | Status: SHIPPED | OUTPATIENT
Start: 2018-08-14 | End: 2018-12-03 | Stop reason: SDUPTHER

## 2018-11-13 ENCOUNTER — APPOINTMENT (RX ONLY)
Dept: URBAN - METROPOLITAN AREA CLINIC 4 | Facility: CLINIC | Age: 68
Setting detail: DERMATOLOGY
End: 2018-11-13

## 2018-11-13 DIAGNOSIS — L81.4 OTHER MELANIN HYPERPIGMENTATION: ICD-10-CM

## 2018-11-13 DIAGNOSIS — Z85.828 PERSONAL HISTORY OF OTHER MALIGNANT NEOPLASM OF SKIN: ICD-10-CM

## 2018-11-13 DIAGNOSIS — L20.89 OTHER ATOPIC DERMATITIS: ICD-10-CM

## 2018-11-13 DIAGNOSIS — L57.0 ACTINIC KERATOSIS: ICD-10-CM

## 2018-11-13 DIAGNOSIS — L82.0 INFLAMED SEBORRHEIC KERATOSIS: ICD-10-CM

## 2018-11-13 DIAGNOSIS — L82.1 OTHER SEBORRHEIC KERATOSIS: ICD-10-CM

## 2018-11-13 DIAGNOSIS — D18.0 HEMANGIOMA: ICD-10-CM

## 2018-11-13 PROBLEM — D18.01 HEMANGIOMA OF SKIN AND SUBCUTANEOUS TISSUE: Status: ACTIVE | Noted: 2018-11-13

## 2018-11-13 PROCEDURE — ? LIQUID NITROGEN

## 2018-11-13 PROCEDURE — ? COUNSELING

## 2018-11-13 PROCEDURE — 99213 OFFICE O/P EST LOW 20 MIN: CPT | Mod: 25

## 2018-11-13 PROCEDURE — ? OBSERVATION

## 2018-11-13 PROCEDURE — 17000 DESTRUCT PREMALG LESION: CPT

## 2018-11-13 PROCEDURE — 17003 DESTRUCT PREMALG LES 2-14: CPT

## 2018-11-13 ASSESSMENT — LOCATION SIMPLE DESCRIPTION DERM
LOCATION SIMPLE: RIGHT CLAVICULAR SKIN
LOCATION SIMPLE: CHEST
LOCATION SIMPLE: RIGHT UPPER BACK
LOCATION SIMPLE: RIGHT PRETIBIAL REGION
LOCATION SIMPLE: LEFT ANKLE
LOCATION SIMPLE: LEFT UPPER BACK
LOCATION SIMPLE: LEFT ANTERIOR NECK
LOCATION SIMPLE: LEFT CHEEK
LOCATION SIMPLE: LEFT UPPER ARM
LOCATION SIMPLE: LEFT FOREARM
LOCATION SIMPLE: LEFT LOWER BACK
LOCATION SIMPLE: LEFT PRETIBIAL REGION
LOCATION SIMPLE: RIGHT UPPER ARM
LOCATION SIMPLE: RIGHT FOREARM

## 2018-11-13 ASSESSMENT — LOCATION DETAILED DESCRIPTION DERM
LOCATION DETAILED: RIGHT LATERAL SUPERIOR CHEST
LOCATION DETAILED: LEFT CLAVICULAR NECK
LOCATION DETAILED: LEFT SUPERIOR UPPER BACK
LOCATION DETAILED: LEFT SUPERIOR LATERAL MALAR CHEEK
LOCATION DETAILED: RIGHT CLAVICULAR SKIN
LOCATION DETAILED: RIGHT INFERIOR UPPER BACK
LOCATION DETAILED: RIGHT PROXIMAL DORSAL FOREARM
LOCATION DETAILED: RIGHT DISTAL POSTERIOR UPPER ARM
LOCATION DETAILED: LEFT PROXIMAL PRETIBIAL REGION
LOCATION DETAILED: LEFT ANKLE
LOCATION DETAILED: RIGHT MEDIAL DISTAL PRETIBIAL REGION
LOCATION DETAILED: LEFT SUPERIOR MEDIAL MIDBACK
LOCATION DETAILED: LEFT DISTAL POSTERIOR UPPER ARM
LOCATION DETAILED: RIGHT MID-UPPER BACK
LOCATION DETAILED: RIGHT PROXIMAL RADIAL DORSAL FOREARM
LOCATION DETAILED: LEFT PROXIMAL DORSAL FOREARM

## 2018-11-13 ASSESSMENT — LOCATION ZONE DERM
LOCATION ZONE: FACE
LOCATION ZONE: ARM
LOCATION ZONE: LEG
LOCATION ZONE: NECK
LOCATION ZONE: TRUNK

## 2018-12-03 DIAGNOSIS — R11.2 NON-INTRACTABLE VOMITING WITH NAUSEA, UNSPECIFIED VOMITING TYPE: ICD-10-CM

## 2018-12-03 RX ORDER — ONDANSETRON 4 MG/1
TABLET, FILM COATED ORAL
Qty: 30 TAB | Refills: 0 | Status: SHIPPED | OUTPATIENT
Start: 2018-12-03 | End: 2024-01-24

## 2019-02-28 ENCOUNTER — APPOINTMENT (RX ONLY)
Dept: URBAN - METROPOLITAN AREA CLINIC 4 | Facility: CLINIC | Age: 69
Setting detail: DERMATOLOGY
End: 2019-02-28

## 2019-02-28 DIAGNOSIS — L30.9 DERMATITIS, UNSPECIFIED: ICD-10-CM

## 2019-02-28 DIAGNOSIS — D485 NEOPLASM OF UNCERTAIN BEHAVIOR OF SKIN: ICD-10-CM

## 2019-02-28 PROBLEM — D48.5 NEOPLASM OF UNCERTAIN BEHAVIOR OF SKIN: Status: ACTIVE | Noted: 2019-02-28

## 2019-02-28 PROCEDURE — ? BIOPSY BY SHAVE METHOD

## 2019-02-28 PROCEDURE — 11102 TANGNTL BX SKIN SINGLE LES: CPT

## 2019-02-28 PROCEDURE — 99213 OFFICE O/P EST LOW 20 MIN: CPT | Mod: 25

## 2019-02-28 PROCEDURE — ? ADDITIONAL NOTES

## 2019-02-28 PROCEDURE — ? MEDICATION COUNSELING

## 2019-02-28 PROCEDURE — ? COUNSELING

## 2019-02-28 PROCEDURE — ? PRESCRIPTION

## 2019-02-28 PROCEDURE — 11103 TANGNTL BX SKIN EA SEP/ADDL: CPT

## 2019-02-28 RX ORDER — TRIAMCINOLONE ACETONIDE 1 MG/G
OINTMENT TOPICAL
Qty: 1 | Refills: 0 | Status: ERX

## 2019-02-28 ASSESSMENT — LOCATION DETAILED DESCRIPTION DERM
LOCATION DETAILED: RIGHT INFERIOR MEDIAL MIDBACK
LOCATION DETAILED: LEFT INFERIOR MEDIAL MIDBACK
LOCATION DETAILED: SUPERIOR THORACIC SPINE
LOCATION DETAILED: LEFT ANTERIOR PROXIMAL THIGH
LOCATION DETAILED: RIGHT SUPERIOR UPPER BACK
LOCATION DETAILED: STERNUM

## 2019-02-28 ASSESSMENT — LOCATION SIMPLE DESCRIPTION DERM
LOCATION SIMPLE: LEFT LOWER BACK
LOCATION SIMPLE: RIGHT LOWER BACK
LOCATION SIMPLE: CHEST
LOCATION SIMPLE: LEFT THIGH
LOCATION SIMPLE: UPPER BACK
LOCATION SIMPLE: RIGHT UPPER BACK

## 2019-02-28 ASSESSMENT — LOCATION ZONE DERM
LOCATION ZONE: TRUNK
LOCATION ZONE: LEG

## 2019-02-28 NOTE — PROCEDURE: ADDITIONAL NOTES
Detail Level: Simple
Additional Notes: Will do bx today to determine diagnosis. \\nDiscussed treatment and risks in detail with patient.  \\nRecommend gentle cleansers and moisturizers daily, like Cetaphil or CeraVe. Moisturize within 3 minutes of showering.  \\nWill prescribe TAC and will send Rx to pharmacy. \\nPatient will f/u in 3 weeks for the rash.

## 2019-02-28 NOTE — PROCEDURE: BIOPSY BY SHAVE METHOD
Hemostasis: Aluminum Chloride and Electrocautery
Silver Nitrate Text: The wound bed was treated with silver nitrate after the biopsy was performed.
Biopsy Method: 15 blade
Detail Level: Detailed
Dressing: Band-Aid
Was A Bandage Applied: Yes
Post-Care Instructions: I reviewed with the patient in detail post-care instructions. Patient is to keep the biopsy site dry overnight, and then apply bacitracin twice daily until healed. Patient may apply hydrogen peroxide soaks to remove any crusting.
Size Of Lesion In Cm: 1
Lab: [id number]
Destruction After The Procedure: No
Consent: Written consent was obtained and risks were reviewed including but not limited to scarring, infection, bleeding, scabbing, incomplete removal, nerve damage and allergy to anesthesia.
Curettage Text: The wound bed was treated with curettage after the biopsy was performed.
Notification Instructions: Patient will be notified of biopsy results. However, patient instructed to call the office if not contacted within 2 weeks.
Biopsy Type: H and E
X Size Of Lesion In Cm: 0
Cryotherapy Text: The wound bed was treated with cryotherapy after the biopsy was performed.
Wound Care: Aquaphor
Lab Facility: 
Anesthesia Type: 1% lidocaine with epinephrine
Depth Of Biopsy: dermis
Electrodesiccation And Curettage Text: The wound bed was treated with electrodesiccation and curettage after the biopsy was performed.
Billing Type: Third-Party Bill
Type Of Destruction Used: Curettage
Electrodesiccation Text: The wound bed was treated with electrodesiccation after the biopsy was performed.
Size Of Lesion In Cm: 1.5

## 2019-02-28 NOTE — PROCEDURE: MEDICATION COUNSELING
Azathioprine Pregnancy And Lactation Text: This medication is Pregnancy Category D and isn't considered safe during pregnancy. It is unknown if this medication is excreted in breast milk.
Doxycycline Pregnancy And Lactation Text: This medication is Pregnancy Category D and not consider safe during pregnancy. It is also excreted in breast milk but is considered safe for shorter treatment courses.
Itraconazole Pregnancy And Lactation Text: This medication is Pregnancy Category C and it isn't know if it is safe during pregnancy. It is also excreted in breast milk.
Zyclara Pregnancy And Lactation Text: This medication is Pregnancy Category C. It is unknown if this medication is excreted in breast milk.
Azithromycin Counseling:  I discussed with the patient the risks of azithromycin including but not limited to GI upset, allergic reaction, drug rash, diarrhea, and yeast infections.
Simponi Counseling:  I discussed with the patient the risks of golimumab including but not limited to myelosuppression, immunosuppression, autoimmune hepatitis, demyelinating diseases, lymphoma, and serious infections.  The patient understands that monitoring is required including a PPD at baseline and must alert us or the primary physician if symptoms of infection or other concerning signs are noted.
Dapsone Pregnancy And Lactation Text: This medication is Pregnancy Category C and is not considered safe during pregnancy or breast feeding.
Rifampin Pregnancy And Lactation Text: This medication is Pregnancy Category C and it isn't know if it is safe during pregnancy. It is also excreted in breast milk and should not be used if you are breast feeding.
Use Enhanced Medication Counseling?: No
Hydroxyzine Counseling: Patient advised that the medication is sedating and not to drive a car after taking this medication.  Patient informed of potential adverse effects including but not limited to dry mouth, urinary retention, and blurry vision.  The patient verbalized understanding of the proper use and possible adverse effects of hydroxyzine.  All of the patient's questions and concerns were addressed.
Prednisone Pregnancy And Lactation Text: This medication is Pregnancy Category C and it isn't know if it is safe during pregnancy. This medication is excreted in breast milk.
Minoxidil Counseling: Minoxidil is a topical medication which can increase blood flow where it is applied. It is uncertain how this medication increases hair growth. Side effects are uncommon and include stinging and allergic reactions.
High Dose Vitamin A Pregnancy And Lactation Text: High dose vitamin A therapy is contraindicated during pregnancy and breast feeding.
Niacinamide Pregnancy And Lactation Text: These medications are considered safe during pregnancy.
Tazorac Counseling:  Patient advised that medication is irritating and drying.  Patient may need to apply sparingly and wash off after an hour before eventually leaving it on overnight.  The patient verbalized understanding of the proper use and possible adverse effects of tazorac.  All of the patient's questions and concerns were addressed.
Xolair Counseling:  Patient informed of potential adverse effects including but not limited to fever, muscle aches, rash and allergic reactions.  The patient verbalized understanding of the proper use and possible adverse effects of Xolair.  All of the patient's questions and concerns were addressed.
Drysol Pregnancy And Lactation Text: This medication is considered safe during pregnancy and breast feeding.
Humira Pregnancy And Lactation Text: This medication is Pregnancy Category B and is considered safe during pregnancy. It is unknown if this medication is excreted in breast milk.
Simponi Pregnancy And Lactation Text: The risk during pregnancy and breastfeeding is uncertain with this medication.
Birth Control Pills Pregnancy And Lactation Text: This medication should be avoided if pregnant and for the first 30 days post-partum.
Erivedge Counseling- I discussed with the patient the risks of Erivedge including but not limited to nausea, vomiting, diarrhea, constipation, weight loss, changes in the sense of taste, decreased appetite, muscle spasms, and hair loss.  The patient verbalized understanding of the proper use and possible adverse effects of Erivedge.  All of the patient's questions and concerns were addressed.
Erythromycin Counseling:  I discussed with the patient the risks of erythromycin including but not limited to GI upset, allergic reaction, drug rash, diarrhea, increase in liver enzymes, and yeast infections.
Cimzia Counseling:  I discussed with the patient the risks of Cimzia including but not limited to immunosuppression, allergic reactions and infections.  The patient understands that monitoring is required including a PPD at baseline and must alert us or the primary physician if symptoms of infection or other concerning signs are noted.
Azithromycin Pregnancy And Lactation Text: This medication is considered safe during pregnancy and is also secreted in breast milk.
Ketoconazole Counseling:   Patient counseled regarding improving absorption with orange juice.  Adverse effects include but are not limited to breast enlargement, headache, diarrhea, nausea, upset stomach, liver function test abnormalities, taste disturbance, and stomach pain.  There is a rare possibility of liver failure that can occur when taking ketoconazole. The patient understands that monitoring of LFTs may be required, especially at baseline. The patient verbalized understanding of the proper use and possible adverse effects of ketoconazole.  All of the patient's questions and concerns were addressed.
Nsaids Counseling: NSAID Counseling: I discussed with the patient that NSAIDs should be taken with food. Prolonged use of NSAIDs can result in the development of stomach ulcers.  Patient advised to stop taking NSAIDs if abdominal pain occurs.  The patient verbalized understanding of the proper use and possible adverse effects of NSAIDs.  All of the patient's questions and concerns were addressed.
Cellcept Counseling:  I discussed with the patient the risks of mycophenolate mofetil including but not limited to infection/immunosuppression, GI upset, hypokalemia, hypercholesterolemia, bone marrow suppression, lymphoproliferative disorders, malignancy, GI ulceration/bleed/perforation, colitis, interstitial lung disease, kidney failure, progressive multifocal leukoencephalopathy, and birth defects.  The patient understands that monitoring is required including a baseline creatinine and regular CBC testing. In addition, patient must alert us immediately if symptoms of infection or other concerning signs are noted.
Tetracycline Counseling: Patient counseled regarding possible photosensitivity and increased risk for sunburn.  Patient instructed to avoid sunlight, if possible.  When exposed to sunlight, patients should wear protective clothing, sunglasses, and sunscreen.  The patient was instructed to call the office immediately if the following severe adverse effects occur:  hearing changes, easy bruising/bleeding, severe headache, or vision changes.  The patient verbalized understanding of the proper use and possible adverse effects of tetracycline.  All of the patient's questions and concerns were addressed. Patient understands to avoid pregnancy while on therapy due to potential birth defects.
Ilumya Counseling: I discussed with the patient the risks of tildrakizumab including but not limited to immunosuppression, malignancy, posterior leukoencephalopathy syndrome, and serious infections.  The patient understands that monitoring is required including a PPD at baseline and must alert us or the primary physician if symptoms of infection or other concerning signs are noted.
Elidel Counseling: Patient may experience a mild burning sensation during topical application. Elidel is not approved in children less than 2 years of age. There have been case reports of hematologic and skin malignancies in patients using topical calcineurin inhibitors although causality is questionable.
Hydroxyzine Pregnancy And Lactation Text: This medication is not safe during pregnancy and should not be taken. It is also excreted in breast milk and breast feeding isn't recommended.
Minoxidil Pregnancy And Lactation Text: This medication has not been assigned a Pregnancy Risk Category but animal studies failed to show danger with the topical medication. It is unknown if the medication is excreted in breast milk.
Stelara Counseling:  I discussed with the patient the risks of ustekinumab including but not limited to immunosuppression, malignancy, posterior leukoencephalopathy syndrome, and serious infections.  The patient understands that monitoring is required including a PPD at baseline and must alert us or the primary physician if symptoms of infection or other concerning signs are noted.
Erivedge Pregnancy And Lactation Text: This medication is Pregnancy Category X and is absolutely contraindicated during pregnancy. It is unknown if it is excreted in breast milk.
Erythromycin Pregnancy And Lactation Text: This medication is Pregnancy Category B and is considered safe during pregnancy. It is also excreted in breast milk.
Tazorac Pregnancy And Lactation Text: This medication is not safe during pregnancy. It is unknown if this medication is excreted in breast milk.
Nsaids Pregnancy And Lactation Text: These medications are considered safe up to 30 weeks gestation. It is excreted in breast milk.
Cimzia Pregnancy And Lactation Text: This medication crosses the placenta but can be considered safe in certain situations. Cimzia may be excreted in breast milk.
Arava Counseling:  Patient counseled regarding adverse effects of Arava including but not limited to nausea, vomiting, abnormalities in liver function tests. Patients may develop mouth sores, rash, diarrhea, and abnormalities in blood counts. The patient understands that monitoring is required including LFTs and blood counts.  There is a rare possibility of scarring of the liver and lung problems that can occur when taking methotrexate. Persistent nausea, loss of appetite, pale stools, dark urine, cough, and shortness of breath should be reported immediately. Patient advised to discontinue Arava treatment and consult with a physician prior to attempting conception. The patient will have to undergo a treatment to eliminate Arava from the body prior to conception.
Spironolactone Counseling: Patient advised regarding risks of diarrhea, abdominal pain, hyperkalemia, birth defects (for female patients), liver toxicity and renal toxicity. The patient may need blood work to monitor liver and kidney function and potassium levels while on therapy. The patient verbalized understanding of the proper use and possible adverse effects of spironolactone.  All of the patient's questions and concerns were addressed.
Bactrim Counseling:  I discussed with the patient the risks of sulfa antibiotics including but not limited to GI upset, allergic reaction, drug rash, diarrhea, dizziness, photosensitivity, and yeast infections.  Rarely, more serious reactions can occur including but not limited to aplastic anemia, agranulocytosis, methemoglobinemia, blood dyscrasias, liver or kidney failure, lung infiltrates or desquamative/blistering drug rashes.
Ketoconazole Pregnancy And Lactation Text: This medication is Pregnancy Category C and it isn't know if it is safe during pregnancy. It is also excreted in breast milk and breast feeding isn't recommended.
Tetracycline Pregnancy And Lactation Text: This medication is Pregnancy Category D and not consider safe during pregnancy. It is also excreted in breast milk.
Picato Counseling:  I discussed with the patient the risks of Picato including but not limited to erythema, scaling, itching, weeping, crusting, and pain.
Acitretin Counseling:  I discussed with the patient the risks of acitretin including but not limited to hair loss, dry lips/skin/eyes, liver damage, hyperlipidemia, depression/suicidal ideation, photosensitivity.  Serious rare side effects can include but are not limited to pancreatitis, pseudotumor cerebri, bony changes, clot formation/stroke/heart attack.  Patient understands that alcohol is contraindicated since it can result in liver toxicity and significantly prolong the elimination of the drug by many years.
Gabapentin Counseling: I discussed with the patient the risks of gabapentin including but not limited to dizziness, somnolence, fatigue and ataxia.
Topical Clindamycin Counseling: Patient counseled that this medication may cause skin irritation or allergic reactions.  In the event of skin irritation, the patient was advised to reduce the amount of the drug applied or use it less frequently.   The patient verbalized understanding of the proper use and possible adverse effects of clindamycin.  All of the patient's questions and concerns were addressed.
Cyclophosphamide Counseling:  I discussed with the patient the risks of cyclophosphamide including but not limited to hair loss, hormonal abnormalities, decreased fertility, abdominal pain, diarrhea, nausea and vomiting, bone marrow suppression and infection. The patient understands that monitoring is required while taking this medication.
Terbinafine Counseling: Patient counseling regarding adverse effects of terbinafine including but not limited to headache, diarrhea, rash, upset stomach, liver function test abnormalities, itching, taste/smell disturbance, nausea, abdominal pain, and flatulence.  There is a rare possibility of liver failure that can occur when taking terbinafine.  The patient understands that a baseline LFT and kidney function test may be required. The patient verbalized understanding of the proper use and possible adverse effects of terbinafine.  All of the patient's questions and concerns were addressed.
Metronidazole Counseling:  I discussed with the patient the risks of metronidazole including but not limited to seizures, nausea/vomiting, a metallic taste in the mouth, nausea/vomiting and severe allergy.
Spironolactone Pregnancy And Lactation Text: This medication can cause feminization of the male fetus and should be avoided during pregnancy. The active metabolite is also found in breast milk.
Bactrim Pregnancy And Lactation Text: This medication is Pregnancy Category D and is known to cause fetal risk.  It is also excreted in breast milk.
Albendazole Counseling:  I discussed with the patient the risks of albendazole including but not limited to cytopenia, kidney damage, nausea/vomiting and severe allergy.  The patient understands that this medication is being used in an off-label manner.
Acitretin Pregnancy And Lactation Text: This medication is Pregnancy Category X and should not be given to women who are pregnant or may become pregnant in the future. This medication is excreted in breast milk.
Benzoyl Peroxide Counseling: Patient counseled that medicine may cause skin irritation and bleach clothing.  In the event of skin irritation, the patient was advised to reduce the amount of the drug applied or use it less frequently.   The patient verbalized understanding of the proper use and possible adverse effects of benzoyl peroxide.  All of the patient's questions and concerns were addressed.
Cosentyx Counseling:  I discussed with the patient the risks of Cosentyx including but not limited to worsening of Crohn's disease, immunosuppression, allergic reactions and infections.  The patient understands that monitoring is required including a PPD at baseline and must alert us or the primary physician if symptoms of infection or other concerning signs are noted.
Odomzo Counseling- I discussed with the patient the risks of Odomzo including but not limited to nausea, vomiting, diarrhea, constipation, weight loss, changes in the sense of taste, decreased appetite, muscle spasms, and hair loss.  The patient verbalized understanding of the proper use and possible adverse effects of Odomzo.  All of the patient's questions and concerns were addressed.
Topical Clindamycin Pregnancy And Lactation Text: This medication is Pregnancy Category B and is considered safe during pregnancy. It is unknown if it is excreted in breast milk.
Eucrisa Counseling: Patient may experience a mild burning sensation during topical application. Eucrisa is not approved in children less than 2 years of age.
Infliximab Counseling:  I discussed with the patient the risks of infliximab including but not limited to myelosuppression, immunosuppression, autoimmune hepatitis, demyelinating diseases, lymphoma, and serious infections.  The patient understands that monitoring is required including a PPD at baseline and must alert us or the primary physician if symptoms of infection or other concerning signs are noted.
Clofazimine Counseling:  I discussed with the patient the risks of clofazimine including but not limited to skin and eye pigmentation, liver damage, nausea/vomiting, gastrointestinal bleeding and allergy.
SSKI Counseling:  I discussed with the patient the risks of SSKI including but not limited to thyroid abnormalities, metallic taste, GI upset, fever, headache, acne, arthralgias, paraesthesias, lymphadenopathy, easy bleeding, arrhythmias, and allergic reaction.
Cyclophosphamide Pregnancy And Lactation Text: This medication is Pregnancy Category D and it isn't considered safe during pregnancy. This medication is excreted in breast milk.
Detail Level: Zone
Gabapentin Pregnancy And Lactation Text: This medication is Pregnancy Category C and isn't considered safe during pregnancy. It is excreted in breast milk.
Taltz Counseling: I discussed with the patient the risks of ixekizumab including but not limited to immunosuppression, serious infections, worsening of inflammatory bowel disease and drug reactions.  The patient understands that monitoring is required including a PPD at baseline and must alert us or the primary physician if symptoms of infection or other concerning signs are noted.
Metronidazole Pregnancy And Lactation Text: This medication is Pregnancy Category B and considered safe during pregnancy.  It is also excreted in breast milk.
Protopic Counseling: Patient may experience a mild burning sensation during topical application. Protopic is not approved in children less than 2 years of age. There have been case reports of hematologic and skin malignancies in patients using topical calcineurin inhibitors although causality is questionable.
Cephalexin Counseling: I counseled the patient regarding use of cephalexin as an antibiotic for prophylactic and/or therapeutic purposes. Cephalexin (commonly prescribed under brand name Keflex) is a cephalosporin antibiotic which is active against numerous classes of bacteria, including most skin bacteria. Side effects may include nausea, diarrhea, gastrointestinal upset, rash, hives, yeast infections, and in rare cases, hepatitis, kidney disease, seizures, fever, confusion, neurologic symptoms, and others. Patients with severe allergies to penicillin medications are cautioned that there is about a 10% incidence of cross-reactivity with cephalosporins. When possible, patients with penicillin allergies should use alternatives to cephalosporins for antibiotic therapy.
Terbinafine Pregnancy And Lactation Text: This medication is Pregnancy Category B and is considered safe during pregnancy. It is also excreted in breast milk and breast feeding isn't recommended.
Benzoyl Peroxide Pregnancy And Lactation Text: This medication is Pregnancy Category C. It is unknown if benzoyl peroxide is excreted in breast milk.
Albendazole Pregnancy And Lactation Text: This medication is Pregnancy Category C and it isn't known if it is safe during pregnancy. It is also excreted in breast milk.
Bexarotene Counseling:  I discussed with the patient the risks of bexarotene including but not limited to hair loss, dry lips/skin/eyes, liver abnormalities, hyperlipidemia, pancreatitis, depression/suicidal ideation, photosensitivity, drug rash/allergic reactions, hypothyroidism, anemia, leukopenia, infection, cataracts, and teratogenicity.  Patient understands that they will need regular blood tests to check lipid profile, liver function tests, white blood cell count, thyroid function tests and pregnancy test if applicable.
Fluconazole Counseling:  Patient counseled regarding adverse effects of fluconazole including but not limited to headache, diarrhea, nausea, upset stomach, liver function test abnormalities, taste disturbance, and stomach pain.  There is a rare possibility of liver failure that can occur when taking fluconazole.  The patient understands that monitoring of LFTs and kidney function test may be required, especially at baseline. The patient verbalized understanding of the proper use and possible adverse effects of fluconazole.  All of the patient's questions and concerns were addressed.
Glycopyrrolate Counseling:  I discussed with the patient the risks of glycopyrrolate including but not limited to skin rash, drowsiness, dry mouth, difficulty urinating, and blurred vision.
Minocycline Counseling: Patient advised regarding possible photosensitivity and discoloration of the teeth, skin, lips, tongue and gums.  Patient instructed to avoid sunlight, if possible.  When exposed to sunlight, patients should wear protective clothing, sunglasses, and sunscreen.  The patient was instructed to call the office immediately if the following severe adverse effects occur:  hearing changes, easy bruising/bleeding, severe headache, or vision changes.  The patient verbalized understanding of the proper use and possible adverse effects of minocycline.  All of the patient's questions and concerns were addressed.
Topical Sulfur Applications Counseling: Topical Sulfur Counseling: Patient counseled that this medication may cause skin irritation or allergic reactions.  In the event of skin irritation, the patient was advised to reduce the amount of the drug applied or use it less frequently.   The patient verbalized understanding of the proper use and possible adverse effects of topical sulfur application.  All of the patient's questions and concerns were addressed.
Dupixent Counseling: I discussed with the patient the risks of dupilumab including but not limited to eye infection and irritation, cold sores, injection site reactions, worsening of asthma, allergic reactions and increased risk of parasitic infection.  Live vaccines should be avoided while taking dupilumab. Dupilumab will also interact with certain medications such as warfarin and cyclosporine. The patient understands that monitoring is required and they must alert us or the primary physician if symptoms of infection or other concerning signs are noted.
Otezla Counseling: The side effects of Otezla were discussed with the patient, including but not limited to worsening or new depression, weight loss, diarrhea, nausea, upper respiratory tract infection, and headache. Patient instructed to call the office should any adverse effect occur.  The patient verbalized understanding of the proper use and possible adverse effects of Otezla.  All the patient's questions and concerns were addressed.
Carac Counseling:  I discussed with the patient the risks of Carac including but not limited to erythema, scaling, itching, weeping, crusting, and pain.
Sski Pregnancy And Lactation Text: This medication is Pregnancy Category D and isn't considered safe during pregnancy. It is excreted in breast milk.
Cyclosporine Counseling:  I discussed with the patient the risks of cyclosporine including but not limited to hypertension, gingival hyperplasia,myelosuppression, immunosuppression, liver damage, kidney damage, neurotoxicity, lymphoma, and serious infections. The patient understands that monitoring is required including baseline blood pressure, CBC, CMP, lipid panel and uric acid, and then 1-2 times monthly CMP and blood pressure.
Hydroquinone Counseling:  Patient advised that medication may result in skin irritation, lightening (hypopigmentation), dryness, and burning.  In the event of skin irritation, the patient was advised to reduce the amount of the drug applied or use it less frequently.  Rarely, spots that are treated with hydroquinone can become darker (pseudoochronosis).  Should this occur, patient instructed to stop medication and call the office. The patient verbalized understanding of the proper use and possible adverse effects of hydroquinone.  All of the patient's questions and concerns were addressed.
Rituxan Counseling:  I discussed with the patient the risks of Rituxan infusions. Side effects can include infusion reactions, severe drug rashes including mucocutaneous reactions, reactivation of latent hepatitis and other infections and rarely progressive multifocal leukoencephalopathy.  All of the patient's questions and concerns were addressed.
Protopic Pregnancy And Lactation Text: This medication is Pregnancy Category C. It is unknown if this medication is excreted in breast milk when applied topically.
Ivermectin Counseling:  Patient instructed to take medication on an empty stomach with a full glass of water.  Patient informed of potential adverse effects including but not limited to nausea, diarrhea, dizziness, itching, and swelling of the extremities or lymph nodes.  The patient verbalized understanding of the proper use and possible adverse effects of ivermectin.  All of the patient's questions and concerns were addressed.
Glycopyrrolate Pregnancy And Lactation Text: This medication is Pregnancy Category B and is considered safe during pregnancy. It is unknown if it is excreted breast milk.
Bexarotene Pregnancy And Lactation Text: This medication is Pregnancy Category X and should not be given to women who are pregnant or may become pregnant. This medication should not be used if you are breast feeding.
Cephalexin Pregnancy And Lactation Text: This medication is Pregnancy Category B and considered safe during pregnancy.  It is also excreted in breast milk but can be used safely for shorter doses.
Topical Sulfur Applications Pregnancy And Lactation Text: This medication is Pregnancy Category C and has an unknown safety profile during pregnancy. It is unknown if this topical medication is excreted in breast milk.
Cimetidine Counseling:  I discussed with the patient the risks of Cimetidine including but not limited to gynecomastia, headache, diarrhea, nausea, drowsiness, arrhythmias, pancreatitis, skin rashes, psychosis, bone marrow suppression and kidney toxicity.
Colchicine Counseling:  Patient counseled regarding adverse effects including but not limited to stomach upset (nausea, vomiting, stomach pain, or diarrhea).  Patient instructed to limit alcohol consumption while taking this medication.  Colchicine may reduce blood counts especially with prolonged use.  The patient understands that monitoring of kidney function and blood counts may be required, especially at baseline. The patient verbalized understanding of the proper use and possible adverse effects of colchicine.  All of the patient's questions and concerns were addressed.
Thalidomide Counseling: I discussed with the patient the risks of thalidomide including but not limited to birth defects, anxiety, weakness, chest pain, dizziness, cough and severe allergy.
Tremfya Counseling: I discussed with the patient the risks of guselkumab including but not limited to immunosuppression, serious infections, worsening of inflammatory bowel disease and drug reactions.  The patient understands that monitoring is required including a PPD at baseline and must alert us or the primary physician if symptoms of infection or other concerning signs are noted.
Solaraze Counseling:  I discussed with the patient the risks of Solaraze including but not limited to erythema, scaling, itching, weeping, crusting, and pain.
Carac Pregnancy And Lactation Text: This medication is Pregnancy Category X and contraindicated in pregnancy and in women who may become pregnant. It is unknown if this medication is excreted in breast milk.
Dupixent Pregnancy And Lactation Text: This medication likely crosses the placenta but the risk for the fetus is uncertain. This medication is excreted in breast milk.
Otezla Pregnancy And Lactation Text: This medication is Pregnancy Category C and it isn't known if it is safe during pregnancy. It is unknown if it is excreted in breast milk.
Clindamycin Counseling: I counseled the patient regarding use of clindamycin as an antibiotic for prophylactic and/or therapeutic purposes. Clindamycin is active against numerous classes of bacteria, including skin bacteria. Side effects may include nausea, diarrhea, gastrointestinal upset, rash, hives, yeast infections, and in rare cases, colitis.
Xolair Pregnancy And Lactation Text: This medication is Pregnancy Category B and is considered safe during pregnancy. This medication is excreted in breast milk.
Wartpeel Counseling:  I discussed with the patient the risks of Wartpeel including but not limited to erythema, scaling, itching, weeping, crusting, and pain.
Griseofulvin Counseling:  I discussed with the patient the risks of griseofulvin including but not limited to photosensitivity, cytopenia, liver damage, nausea/vomiting and severe allergy.  The patient understands that this medication is best absorbed when taken with a fatty meal (e.g., ice cream or french fries).
Rituxan Pregnancy And Lactation Text: This medication is Pregnancy Category C and it isn't know if it is safe during pregnancy. It is unknown if this medication is excreted in breast milk but similar antibodies are known to be excreted.
Quinolones Counseling:  I discussed with the patient the risks of fluoroquinolones including but not limited to GI upset, allergic reaction, drug rash, diarrhea, dizziness, photosensitivity, yeast infections, liver function test abnormalities, tendonitis/tendon rupture.
Methotrexate Counseling:  Patient counseled regarding adverse effects of methotrexate including but not limited to nausea, vomiting, abnormalities in liver function tests. Patients may develop mouth sores, rash, diarrhea, and abnormalities in blood counts. The patient understands that monitoring is required including LFT's and blood counts.  There is a rare possibility of scarring of the liver and lung problems that can occur when taking methotrexate. Persistent nausea, loss of appetite, pale stools, dark urine, cough, and shortness of breath should be reported immediately. Patient advised to discontinue methotrexate treatment at least three months before attempting to become pregnant.  I discussed the need for folate supplements while taking methotrexate.  These supplements can decrease side effects during methotrexate treatment. The patient verbalized understanding of the proper use and possible adverse effects of methotrexate.  All of the patient's questions and concerns were addressed.
Hydroxychloroquine Counseling:  I discussed with the patient that a baseline ophthalmologic exam is needed at the start of therapy and every year thereafter while on therapy. A CBC may also be warranted for monitoring.  The side effects of this medication were discussed with the patient, including but not limited to agranulocytosis, aplastic anemia, seizures, rashes, retinopathy, and liver toxicity. Patient instructed to call the office should any adverse effect occur.  The patient verbalized understanding of the proper use and possible adverse effects of Plaquenil.  All the patient's questions and concerns were addressed.
Isotretinoin Counseling: Patient should get monthly blood tests, not donate blood, not drive at night if vision affected, not share medication, and not undergo elective surgery for 6 months after tx completed. Side effects reviewed, pt to contact office should one occur.
Solaraze Pregnancy And Lactation Text: This medication is Pregnancy Category B and is considered safe. There is some data to suggest avoiding during the third trimester. It is unknown if this medication is excreted in breast milk.
5-Fu Counseling: 5-Fluorouracil Counseling:  I discussed with the patient the risks of 5-fluorouracil including but not limited to erythema, scaling, itching, weeping, crusting, and pain.
Oxybutynin Counseling:  I discussed with the patient the risks of oxybutynin including but not limited to skin rash, drowsiness, dry mouth, difficulty urinating, and blurred vision.
Enbrel Counseling:  I discussed with the patient the risks of etanercept including but not limited to myelosuppression, immunosuppression, autoimmune hepatitis, demyelinating diseases, lymphoma, and infections.  The patient understands that monitoring is required including a PPD at baseline and must alert us or the primary physician if symptoms of infection or other concerning signs are noted.
Siliq Counseling:  I discussed with the patient the risks of Siliq including but not limited to new or worsening depression, suicidal thoughts and behavior, immunosuppression, malignancy, posterior leukoencephalopathy syndrome, and serious infections.  The patient understands that monitoring is required including a PPD at baseline and must alert us or the primary physician if symptoms of infection or other concerning signs are noted. There is also a special program designed to monitor depression which is required with Siliq.
Imiquimod Counseling:  I discussed with the patient the risks of imiquimod including but not limited to erythema, scaling, itching, weeping, crusting, and pain.  Patient understands that the inflammatory response to imiquimod is variable from person to person and was educated regarded proper titration schedule.  If flu-like symptoms develop, patient knows to discontinue the medication and contact us.
Clindamycin Pregnancy And Lactation Text: This medication can be used in pregnancy if certain situations. Clindamycin is also present in breast milk.
Isotretinoin Pregnancy And Lactation Text: This medication is Pregnancy Category X and is considered extremely dangerous during pregnancy. It is unknown if it is excreted in breast milk.
Griseofulvin Pregnancy And Lactation Text: This medication is Pregnancy Category X and is known to cause serious birth defects. It is unknown if this medication is excreted in breast milk but breast feeding should be avoided.
Hydroxychloroquine Pregnancy And Lactation Text: This medication has been shown to cause fetal harm but it isn't assigned a Pregnancy Risk Category. There are small amounts excreted in breast milk.
Methotrexate Pregnancy And Lactation Text: This medication is Pregnancy Category X and is known to cause fetal harm. This medication is excreted in breast milk.
Doxepin Counseling:  Patient advised that the medication is sedating and not to drive a car after taking this medication. Patient informed of potential adverse effects including but not limited to dry mouth, urinary retention, and blurry vision.  The patient verbalized understanding of the proper use and possible adverse effects of doxepin.  All of the patient's questions and concerns were addressed.
Valtrex Counseling: I discussed with the patient the risks of valacyclovir including but not limited to kidney damage, nausea, vomiting and severe allergy.  The patient understands that if the infection seems to be worsening or is not improving, they are to call.
Dapsone Counseling: I discussed with the patient the risks of dapsone including but not limited to hemolytic anemia, agranulocytosis, rashes, methemoglobinemia, kidney failure, peripheral neuropathy, headaches, GI upset, and liver toxicity.  Patients who start dapsone require monitoring including baseline LFTs and weekly CBCs for the first month, then every month thereafter.  The patient verbalized understanding of the proper use and possible adverse effects of dapsone.  All of the patient's questions and concerns were addressed.
Doxycycline Counseling:  Patient counseled regarding possible photosensitivity and increased risk for sunburn.  Patient instructed to avoid sunlight, if possible.  When exposed to sunlight, patients should wear protective clothing, sunglasses, and sunscreen.  The patient was instructed to call the office immediately if the following severe adverse effects occur:  hearing changes, easy bruising/bleeding, severe headache, or vision changes.  The patient verbalized understanding of the proper use and possible adverse effects of doxycycline.  All of the patient's questions and concerns were addressed.
Xeljanz Counseling: I discussed with the patient the risks of Xeljanz therapy including increased risk of infection, liver issues, headache, diarrhea, or cold symptoms. Live vaccines should be avoided. They were instructed to call if they have any problems.
Topical Retinoid counseling:  Patient advised to apply a pea-sized amount only at bedtime and wait 30 minutes after washing their face before applying.  If too drying, patient may add a non-comedogenic moisturizer. The patient verbalized understanding of the proper use and possible adverse effects of retinoids.  All of the patient's questions and concerns were addressed.
Niacinamide Counseling: I recommended taking niacin or niacinamide, also know as vitamin B3, twice daily. Recent evidence suggests that taking vitamin B3 (500 mg twice daily) can reduce the risk of actinic keratoses and non-melanoma skin cancers. Side effects of vitamin B3 include flushing and headache.
High Dose Vitamin A Counseling: Side effects reviewed, pt to contact office should one occur.
Zyclara Counseling:  I discussed with the patient the risks of imiquimod including but not limited to erythema, scaling, itching, weeping, crusting, and pain.  Patient understands that the inflammatory response to imiquimod is variable from person to person and was educated regarded proper titration schedule.  If flu-like symptoms develop, patient knows to discontinue the medication and contact us.
Azathioprine Counseling:  I discussed with the patient the risks of azathioprine including but not limited to myelosuppression, immunosuppression, hepatotoxicity, lymphoma, and infections.  The patient understands that monitoring is required including baseline LFTs, Creatinine, possible TPMP genotyping and weekly CBCs for the first month and then every 2 weeks thereafter.  The patient verbalized understanding of the proper use and possible adverse effects of azathioprine.  All of the patient's questions and concerns were addressed.
Itraconazole Counseling:  I discussed with the patient the risks of itraconazole including but not limited to liver damage, nausea/vomiting, neuropathy, and severe allergy.  The patient understands that this medication is best absorbed when taken with acidic beverages such as non-diet cola or ginger ale.  The patient understands that monitoring is required including baseline LFTs and repeat LFTs at intervals.  The patient understands that they are to contact us or the primary physician if concerning signs are noted.
Doxepin Pregnancy And Lactation Text: This medication is Pregnancy Category C and it isn't known if it is safe during pregnancy. It is also excreted in breast milk and breast feeding isn't recommended.
Birth Control Pills Counseling: Birth Control Pill Counseling: I discussed with the patient the potential side effects of OCPs including but not limited to increased risk of stroke, heart attack, thrombophlebitis, deep venous thrombosis, hepatic adenomas, breast changes, GI upset, headaches, and depression.  The patient verbalized understanding of the proper use and possible adverse effects of OCPs. All of the patient's questions and concerns were addressed.
Prednisone Counseling:  I discussed with the patient the risks of prolonged use of prednisone including but not limited to weight gain, insomnia, osteoporosis, mood changes, diabetes, susceptibility to infection, glaucoma and high blood pressure.  In cases where prednisone use is prolonged, patients should be monitored with blood pressure checks, serum glucose levels and an eye exam.  Additionally, the patient may need to be placed on GI prophylaxis, PCP prophylaxis, and calcium and vitamin D supplementation and/or a bisphosphonate.  The patient verbalized understanding of the proper use and the possible adverse effects of prednisone.  All of the patient's questions and concerns were addressed.
Rifampin Counseling: I discussed with the patient the risks of rifampin including but not limited to liver damage, kidney damage, red-orange body fluids, nausea/vomiting and severe allergy.
Valtrex Pregnancy And Lactation Text: this medication is Pregnancy Category B and is considered safe during pregnancy. This medication is not directly found in breast milk but it's metabolite acyclovir is present.
Xeltyz Pregnancy And Lactation Text: This medication is Pregnancy Category D and is not considered safe during pregnancy.  The risk during breast feeding is also uncertain.
Humira Counseling:  I discussed with the patient the risks of adalimumab including but not limited to myelosuppression, immunosuppression, autoimmune hepatitis, demyelinating diseases, lymphoma, and serious infections.  The patient understands that monitoring is required including a PPD at baseline and must alert us or the primary physician if symptoms of infection or other concerning signs are noted.
Drysol Counseling:  I discussed with the patient the risks of drysol/aluminum chloride including but not limited to skin rash, itching, irritation, burning.

## 2019-03-21 ENCOUNTER — APPOINTMENT (RX ONLY)
Dept: URBAN - METROPOLITAN AREA CLINIC 4 | Facility: CLINIC | Age: 69
Setting detail: DERMATOLOGY
End: 2019-03-21

## 2019-03-21 DIAGNOSIS — L30.9 DERMATITIS, UNSPECIFIED: ICD-10-CM

## 2019-03-21 PROCEDURE — ? ADDITIONAL NOTES

## 2019-03-21 PROCEDURE — ? COUNSELING

## 2019-03-21 PROCEDURE — 99213 OFFICE O/P EST LOW 20 MIN: CPT

## 2019-03-21 ASSESSMENT — LOCATION DETAILED DESCRIPTION DERM
LOCATION DETAILED: SUPERIOR THORACIC SPINE
LOCATION DETAILED: STERNUM
LOCATION DETAILED: LEFT ANTERIOR PROXIMAL THIGH
LOCATION DETAILED: RIGHT INFERIOR MEDIAL MIDBACK

## 2019-03-21 ASSESSMENT — LOCATION ZONE DERM
LOCATION ZONE: LEG
LOCATION ZONE: TRUNK

## 2019-03-21 ASSESSMENT — LOCATION SIMPLE DESCRIPTION DERM
LOCATION SIMPLE: LEFT THIGH
LOCATION SIMPLE: UPPER BACK
LOCATION SIMPLE: RIGHT LOWER BACK
LOCATION SIMPLE: CHEST

## 2019-03-21 NOTE — PROCEDURE: ADDITIONAL NOTES
Additional Notes: biopsy results said to consider dermatitis superimposed on psoriasis; medication eruption or pre-bullous stage of pemphigus vulgaris. Rec DIF if not completely cleared.  Consulted with Dr. Franks who said that patient needs to be off topical steroids for a few weeks before determining if DIF bx is needed. \\nDiscussed in detail with patient to not apply any ointment on left thigh  ( I will be doing  two punch  bx in this area for path and one for DIF), but can still use ointment on the rest of the body. \\nPatient will return in 6 weeks for evaluation and if DIF is needed will do 2 punch bx next visit (1 regular and 1 DIF) Lt. Thigh.
Detail Level: Simple

## 2019-06-17 ENCOUNTER — APPOINTMENT (RX ONLY)
Dept: URBAN - METROPOLITAN AREA CLINIC 4 | Facility: CLINIC | Age: 69
Setting detail: DERMATOLOGY
End: 2019-06-17

## 2019-06-17 DIAGNOSIS — L57.0 ACTINIC KERATOSIS: ICD-10-CM

## 2019-06-17 DIAGNOSIS — L82.0 INFLAMED SEBORRHEIC KERATOSIS: ICD-10-CM

## 2019-06-17 DIAGNOSIS — L82.1 OTHER SEBORRHEIC KERATOSIS: ICD-10-CM

## 2019-06-17 DIAGNOSIS — L81.4 OTHER MELANIN HYPERPIGMENTATION: ICD-10-CM

## 2019-06-17 DIAGNOSIS — Z85.828 PERSONAL HISTORY OF OTHER MALIGNANT NEOPLASM OF SKIN: ICD-10-CM

## 2019-06-17 DIAGNOSIS — L30.9 DERMATITIS, UNSPECIFIED: ICD-10-CM

## 2019-06-17 DIAGNOSIS — D18.0 HEMANGIOMA: ICD-10-CM

## 2019-06-17 PROBLEM — D18.01 HEMANGIOMA OF SKIN AND SUBCUTANEOUS TISSUE: Status: ACTIVE | Noted: 2019-06-17

## 2019-06-17 PROCEDURE — 17003 DESTRUCT PREMALG LES 2-14: CPT

## 2019-06-17 PROCEDURE — 17000 DESTRUCT PREMALG LESION: CPT

## 2019-06-17 PROCEDURE — ? COUNSELING

## 2019-06-17 PROCEDURE — ? PRESCRIPTION

## 2019-06-17 PROCEDURE — ? LIQUID NITROGEN (COSMETIC)

## 2019-06-17 PROCEDURE — ? TREATMENT REGIMEN

## 2019-06-17 PROCEDURE — ? LIQUID NITROGEN

## 2019-06-17 PROCEDURE — 99213 OFFICE O/P EST LOW 20 MIN: CPT | Mod: 25

## 2019-06-17 PROCEDURE — 96372 THER/PROPH/DIAG INJ SC/IM: CPT | Mod: 59

## 2019-06-17 PROCEDURE — ? INTRAMUSCULAR KENALOG

## 2019-06-17 RX ORDER — TRIAMCINOLONE ACETONIDE 1 MG/G
CREAM TOPICAL BID
Qty: 1 | Refills: 6 | Status: ERX | COMMUNITY
Start: 2019-06-17

## 2019-06-17 RX ADMIN — TRIAMCINOLONE ACETONIDE 1: 1 CREAM TOPICAL at 00:00

## 2019-06-17 ASSESSMENT — LOCATION SIMPLE DESCRIPTION DERM
LOCATION SIMPLE: LEFT UPPER BACK
LOCATION SIMPLE: LEFT FOREARM
LOCATION SIMPLE: LEFT CHEEK
LOCATION SIMPLE: RIGHT LOWER BACK
LOCATION SIMPLE: RIGHT UPPER BACK
LOCATION SIMPLE: RIGHT FOREARM
LOCATION SIMPLE: RIGHT BUTTOCK

## 2019-06-17 ASSESSMENT — LOCATION DETAILED DESCRIPTION DERM
LOCATION DETAILED: LEFT CENTRAL MALAR CHEEK
LOCATION DETAILED: RIGHT MID-UPPER BACK
LOCATION DETAILED: LEFT SUPERIOR UPPER BACK
LOCATION DETAILED: RIGHT BUTTOCK
LOCATION DETAILED: LEFT PROXIMAL DORSAL FOREARM
LOCATION DETAILED: RIGHT SUPERIOR MEDIAL MIDBACK
LOCATION DETAILED: RIGHT MEDIAL UPPER BACK
LOCATION DETAILED: LEFT INFERIOR CENTRAL MALAR CHEEK
LOCATION DETAILED: LEFT INFERIOR MEDIAL MALAR CHEEK
LOCATION DETAILED: LEFT DISTAL DORSAL FOREARM
LOCATION DETAILED: RIGHT PROXIMAL RADIAL DORSAL FOREARM

## 2019-06-17 ASSESSMENT — LOCATION ZONE DERM
LOCATION ZONE: TRUNK
LOCATION ZONE: FACE
LOCATION ZONE: ARM

## 2019-06-17 NOTE — PROCEDURE: INTRAMUSCULAR KENALOG
Lot # (Optional): [id number]
Treatment Number (Optional): 1
Concentration (Mg/Ml): 40.0
Detail Level: Detailed
Total Volume (Ccs): 1.5
Add Option For Additional Mediation: No
Concentration (Mg/Ml) Of Additional Medication: 2.5
Kenalog Preparation: kenalog
Expiration Date (Optional): 9/2020
Administered By (Optional): DANK Nazario
Consent: The risks of atrophy were reviewed with the patient.

## 2019-06-17 NOTE — PROCEDURE: TREATMENT REGIMEN
Detail Level: Detailed
Modify Regimen: TAC PRN for stubborn stops
Plan: It was discussed at the previous visit that a DIF bx is a possibility. \\nPt has only tried TAC and is using it once daily every day\\nPt denies having any blisters so aggressive tx is not required. We could try IM TAC first to see if there is any improvement with that.\\nPt does not feel strongly either way so we will try IM TAC first.\\nPt has high BP but is controlled with Lisinopril. Pt was advised to monitor BP at home.\\nPt denies any other contraindicated medical issues.\\nWe will FU in 1 mo to rechk unless rash is resolved, then pt will cxl appt and we will just see him at his next regularly scheduled OV.

## 2019-07-23 ENCOUNTER — APPOINTMENT (RX ONLY)
Dept: URBAN - METROPOLITAN AREA CLINIC 4 | Facility: CLINIC | Age: 69
Setting detail: DERMATOLOGY
End: 2019-07-23

## 2019-07-23 DIAGNOSIS — L82.1 OTHER SEBORRHEIC KERATOSIS: ICD-10-CM

## 2019-07-23 DIAGNOSIS — L30.9 DERMATITIS, UNSPECIFIED: ICD-10-CM

## 2019-07-23 DIAGNOSIS — L57.0 ACTINIC KERATOSIS: ICD-10-CM

## 2019-07-23 DIAGNOSIS — L81.4 OTHER MELANIN HYPERPIGMENTATION: ICD-10-CM

## 2019-07-23 DIAGNOSIS — D18.0 HEMANGIOMA: ICD-10-CM

## 2019-07-23 DIAGNOSIS — I87.2 VENOUS INSUFFICIENCY (CHRONIC) (PERIPHERAL): ICD-10-CM

## 2019-07-23 DIAGNOSIS — Z85.828 PERSONAL HISTORY OF OTHER MALIGNANT NEOPLASM OF SKIN: ICD-10-CM

## 2019-07-23 PROBLEM — D18.01 HEMANGIOMA OF SKIN AND SUBCUTANEOUS TISSUE: Status: ACTIVE | Noted: 2019-07-23

## 2019-07-23 PROBLEM — D48.5 NEOPLASM OF UNCERTAIN BEHAVIOR OF SKIN: Status: ACTIVE | Noted: 2019-07-23

## 2019-07-23 PROCEDURE — 17000 DESTRUCT PREMALG LESION: CPT | Mod: 59

## 2019-07-23 PROCEDURE — 99212 OFFICE O/P EST SF 10 MIN: CPT | Mod: 25

## 2019-07-23 PROCEDURE — ? COUNSELING

## 2019-07-23 PROCEDURE — ? BIOPSY BY SHAVE METHOD

## 2019-07-23 PROCEDURE — 17003 DESTRUCT PREMALG LES 2-14: CPT

## 2019-07-23 PROCEDURE — 11102 TANGNTL BX SKIN SINGLE LES: CPT

## 2019-07-23 PROCEDURE — ? LIQUID NITROGEN

## 2019-07-23 ASSESSMENT — LOCATION DETAILED DESCRIPTION DERM
LOCATION DETAILED: RIGHT MID-UPPER BACK
LOCATION DETAILED: LEFT PROXIMAL DORSAL FOREARM
LOCATION DETAILED: RIGHT DISTAL PRETIBIAL REGION
LOCATION DETAILED: RIGHT MEDIAL UPPER BACK
LOCATION DETAILED: RIGHT SUPERIOR MEDIAL MIDBACK
LOCATION DETAILED: LEFT SUPERIOR UPPER BACK
LOCATION DETAILED: LEFT SUPERIOR LATERAL MALAR CHEEK
LOCATION DETAILED: LEFT MEDIAL SUPERIOR CHEST
LOCATION DETAILED: LEFT PROXIMAL PRETIBIAL REGION
LOCATION DETAILED: LEFT CLAVICULAR SKIN
LOCATION DETAILED: LEFT MEDIAL INFERIOR CHEST
LOCATION DETAILED: RIGHT PROXIMAL RADIAL DORSAL FOREARM

## 2019-07-23 ASSESSMENT — LOCATION SIMPLE DESCRIPTION DERM
LOCATION SIMPLE: LEFT CLAVICULAR SKIN
LOCATION SIMPLE: RIGHT UPPER BACK
LOCATION SIMPLE: RIGHT LOWER BACK
LOCATION SIMPLE: LEFT FOREARM
LOCATION SIMPLE: CHEST
LOCATION SIMPLE: RIGHT FOREARM
LOCATION SIMPLE: LEFT UPPER BACK
LOCATION SIMPLE: RIGHT PRETIBIAL REGION
LOCATION SIMPLE: LEFT CHEEK
LOCATION SIMPLE: LEFT PRETIBIAL REGION

## 2019-07-23 ASSESSMENT — LOCATION ZONE DERM
LOCATION ZONE: TRUNK
LOCATION ZONE: FACE
LOCATION ZONE: LEG
LOCATION ZONE: ARM

## 2019-07-23 NOTE — PROCEDURE: BIOPSY BY SHAVE METHOD
Anesthesia Volume In Cc: 3
Dressing: Band-Aid
Type Of Destruction Used: Curettage
Consent: Written consent was obtained and risks were reviewed including but not limited to scarring, infection, bleeding, scabbing, incomplete removal, nerve damage and allergy to anesthesia.
Path Notes (To The Dermatopathologist): Follow up in 2 months.
X Size Of Lesion In Cm: 0
Depth Of Biopsy: dermis
Electrodesiccation And Curettage Text: The wound bed was treated with electrodesiccation and curettage after the biopsy was performed.
Biopsy Type: H and E
Was A Bandage Applied: Yes
Bill 59510 For Specimen Handling/Conveyance To Laboratory?: no
Hemostasis: Drysol and Electrocautery
Anticipated Plan (Based On Presumed Biopsy Results): Follow up in 2 months
Lab: [id number]
Curettage Text: The wound bed was treated with curettage after the biopsy was performed.
Anesthesia Type: 0.5% lidocaine with 1:200,000 epinephrine and a 1:10 solution of 8.4% sodium bicarbonate
Billing Type: Third-Party Bill
Biopsy Method: Personna blade
Detail Level: Detailed
Post-Care Instructions: I reviewed with the patient in detail post-care instructions. Patient is to keep the biopsy site dry overnight, and then apply vasaline twice daily until healed.
Lab Facility: 
Electrodesiccation Text: The wound bed was treated with electrodesiccation after the biopsy was performed.
Wound Care: Vaseline
Notification Instructions: Patient will be notified of biopsy results. However, patient instructed to call the office if not contacted within 2 weeks.

## 2019-07-23 NOTE — PROCEDURE: REASSURANCE
Detail Level: Detailed
Additional Note: Advised that he can try otc Compound W to areas that are bothersome on the legs.
Include Location In Plan?: No
Detail Level: Generalized
Detail Level: Zone

## 2019-07-23 NOTE — PROCEDURE: COUNSELING
Patient Specific Counseling (Will Not Stick From Patient To Patient): As previously discussed will hold doing a DIF biopsy as he has mostly cleared with the IM TAC. He will continue using the Triamcinolone as needed.
Detail Level: Generalized
Detail Level: Zone

## 2019-10-01 ENCOUNTER — APPOINTMENT (RX ONLY)
Dept: URBAN - METROPOLITAN AREA CLINIC 4 | Facility: CLINIC | Age: 69
Setting detail: DERMATOLOGY
End: 2019-10-01

## 2019-10-01 ENCOUNTER — RX ONLY (OUTPATIENT)
Age: 69
Setting detail: RX ONLY
End: 2019-10-01

## 2019-10-01 DIAGNOSIS — L30.9 DERMATITIS, UNSPECIFIED: ICD-10-CM

## 2019-10-01 DIAGNOSIS — L57.0 ACTINIC KERATOSIS: ICD-10-CM

## 2019-10-01 DIAGNOSIS — Z85.828 PERSONAL HISTORY OF OTHER MALIGNANT NEOPLASM OF SKIN: ICD-10-CM

## 2019-10-01 DIAGNOSIS — I87.2 VENOUS INSUFFICIENCY (CHRONIC) (PERIPHERAL): ICD-10-CM

## 2019-10-01 DIAGNOSIS — L81.4 OTHER MELANIN HYPERPIGMENTATION: ICD-10-CM

## 2019-10-01 DIAGNOSIS — D18.0 HEMANGIOMA: ICD-10-CM

## 2019-10-01 DIAGNOSIS — L82.1 OTHER SEBORRHEIC KERATOSIS: ICD-10-CM

## 2019-10-01 PROBLEM — D18.01 HEMANGIOMA OF SKIN AND SUBCUTANEOUS TISSUE: Status: ACTIVE | Noted: 2019-10-01

## 2019-10-01 PROBLEM — C44.619 BASAL CELL CARCINOMA OF SKIN OF LEFT UPPER LIMB, INCLUDING SHOULDER: Status: ACTIVE | Noted: 2019-10-01

## 2019-10-01 PROCEDURE — 99213 OFFICE O/P EST LOW 20 MIN: CPT | Mod: 25

## 2019-10-01 PROCEDURE — ? LIQUID NITROGEN

## 2019-10-01 PROCEDURE — 17003 DESTRUCT PREMALG LES 2-14: CPT

## 2019-10-01 PROCEDURE — ? COUNSELING

## 2019-10-01 PROCEDURE — ? ADDITIONAL NOTES

## 2019-10-01 PROCEDURE — 17000 DESTRUCT PREMALG LESION: CPT

## 2019-10-01 PROCEDURE — ? OBSERVATION

## 2019-10-01 RX ORDER — TRIAMCINOLONE ACETONIDE 1 MG/G
CREAM TOPICAL BID
Qty: 1 | Refills: 6 | Status: ERX

## 2019-10-01 ASSESSMENT — LOCATION SIMPLE DESCRIPTION DERM
LOCATION SIMPLE: NOSE
LOCATION SIMPLE: LEFT CHEEK
LOCATION SIMPLE: RIGHT LIP
LOCATION SIMPLE: LEFT UPPER BACK
LOCATION SIMPLE: RIGHT LOWER BACK
LOCATION SIMPLE: RIGHT PRETIBIAL REGION
LOCATION SIMPLE: LEFT UPPER ARM
LOCATION SIMPLE: RIGHT UPPER BACK
LOCATION SIMPLE: LEFT FOREARM
LOCATION SIMPLE: LEFT SHOULDER
LOCATION SIMPLE: RIGHT FOREARM
LOCATION SIMPLE: LEFT PRETIBIAL REGION

## 2019-10-01 ASSESSMENT — LOCATION DETAILED DESCRIPTION DERM
LOCATION DETAILED: RIGHT PROXIMAL RADIAL DORSAL FOREARM
LOCATION DETAILED: RIGHT MEDIAL UPPER BACK
LOCATION DETAILED: RIGHT MID-UPPER BACK
LOCATION DETAILED: NASAL DORSUM
LOCATION DETAILED: LEFT PROXIMAL DORSAL FOREARM
LOCATION DETAILED: LEFT SUPERIOR UPPER BACK
LOCATION DETAILED: RIGHT DISTAL PRETIBIAL REGION
LOCATION DETAILED: LEFT ANTERIOR SHOULDER
LOCATION DETAILED: LEFT PROXIMAL PRETIBIAL REGION
LOCATION DETAILED: LEFT ANTERIOR PROXIMAL UPPER ARM
LOCATION DETAILED: RIGHT UPPER CUTANEOUS LIP
LOCATION DETAILED: RIGHT SUPERIOR MEDIAL MIDBACK
LOCATION DETAILED: LEFT INFERIOR MEDIAL MALAR CHEEK

## 2019-10-01 ASSESSMENT — LOCATION ZONE DERM
LOCATION ZONE: LEG
LOCATION ZONE: TRUNK
LOCATION ZONE: FACE
LOCATION ZONE: ARM
LOCATION ZONE: LIP
LOCATION ZONE: NOSE

## 2019-10-01 NOTE — HPI: RASH
What Type Of Note Output Would You Prefer (Optional)?: Bullet Format
Is This A New Presentation, Or A Follow-Up?: Rash
Additional History: He was given a tetanus shot and also a prescription topical.

## 2019-10-01 NOTE — PROCEDURE: LIQUID NITROGEN
Render Note In Bullet Format When Appropriate: No
Detail Level: Detailed
Post-Care Instructions: I reviewed with the patient in detail post-care instructions. Patient is to wear sunprotection, and avoid picking at any of the treated lesions. Pt may apply Vaseline to crusted or scabbing areas.
Duration Of Freeze Thaw-Cycle (Seconds): 5
Consent: The patient's consent was obtained including but not limited to risks of crusting, scabbing, blistering, scarring, darker or lighter pigmentary change, recurrence, incomplete removal and infection.
Number Of Freeze-Thaw Cycles: 1 freeze-thaw cycle

## 2019-10-01 NOTE — PROCEDURE: REASSURANCE
Detail Level: Detailed
Hide Include Location In Plan Question?: No
Detail Level: Generalized
Additional Note: Advised that he can try otc Compound W to areas that are bothersome on the legs.
Detail Level: Zone

## 2019-10-01 NOTE — PROCEDURE: ADDITIONAL NOTES
Additional Notes: Advised to use compression stocking and will refill his triamcinolone today.
Detail Level: Detailed
Additional Notes: Will continue triamcinolone as needed for active areas on the thighs.

## 2020-03-22 NOTE — PROCEDURE: LIQUID NITROGEN
Number Of Freeze-Thaw Cycles: 1 freeze-thaw cycle
Duration Of Freeze Thaw-Cycle (Seconds): 5
Detail Level: Detailed
Post-Care Instructions: I reviewed with the patient in detail post-care instructions. Patient is to wear sunprotection, and avoid picking at any of the treated lesions. Pt may apply Vaseline to crusted or scabbing areas.
Consent: The patient's consent was obtained including but not limited to risks of crusting, scabbing, blistering, scarring, darker or lighter pigmentary change, recurrence, incomplete removal and infection.
Patient is currently asymptomatic
Render Post-Care Instructions In Note?: no

## 2020-10-21 ENCOUNTER — APPOINTMENT (RX ONLY)
Dept: URBAN - METROPOLITAN AREA CLINIC 4 | Facility: CLINIC | Age: 70
Setting detail: DERMATOLOGY
End: 2020-10-21

## 2020-10-21 DIAGNOSIS — Z85.828 PERSONAL HISTORY OF OTHER MALIGNANT NEOPLASM OF SKIN: ICD-10-CM

## 2020-10-21 DIAGNOSIS — L81.4 OTHER MELANIN HYPERPIGMENTATION: ICD-10-CM

## 2020-10-21 DIAGNOSIS — L57.0 ACTINIC KERATOSIS: ICD-10-CM

## 2020-10-21 DIAGNOSIS — D18.0 HEMANGIOMA: ICD-10-CM

## 2020-10-21 DIAGNOSIS — L82.1 OTHER SEBORRHEIC KERATOSIS: ICD-10-CM

## 2020-10-21 PROBLEM — D48.5 NEOPLASM OF UNCERTAIN BEHAVIOR OF SKIN: Status: ACTIVE | Noted: 2020-10-21

## 2020-10-21 PROBLEM — D18.01 HEMANGIOMA OF SKIN AND SUBCUTANEOUS TISSUE: Status: ACTIVE | Noted: 2020-10-21

## 2020-10-21 PROCEDURE — ? DIAGNOSIS COMMENT

## 2020-10-21 PROCEDURE — 17003 DESTRUCT PREMALG LES 2-14: CPT

## 2020-10-21 PROCEDURE — 17000 DESTRUCT PREMALG LESION: CPT | Mod: 59

## 2020-10-21 PROCEDURE — ? LIQUID NITROGEN

## 2020-10-21 PROCEDURE — 11102 TANGNTL BX SKIN SINGLE LES: CPT

## 2020-10-21 PROCEDURE — ? BIOPSY BY SHAVE METHOD

## 2020-10-21 PROCEDURE — 99213 OFFICE O/P EST LOW 20 MIN: CPT | Mod: 25

## 2020-10-21 ASSESSMENT — LOCATION SIMPLE DESCRIPTION DERM
LOCATION SIMPLE: LEFT UPPER BACK
LOCATION SIMPLE: LEFT FOREHEAD
LOCATION SIMPLE: LEFT TEMPLE
LOCATION SIMPLE: LEFT CHEEK
LOCATION SIMPLE: LEFT FOREARM
LOCATION SIMPLE: RIGHT UPPER BACK
LOCATION SIMPLE: RIGHT FOREARM
LOCATION SIMPLE: RIGHT LOWER BACK

## 2020-10-21 ASSESSMENT — LOCATION DETAILED DESCRIPTION DERM
LOCATION DETAILED: LEFT PROXIMAL DORSAL FOREARM
LOCATION DETAILED: LEFT FOREHEAD
LOCATION DETAILED: RIGHT MID-UPPER BACK
LOCATION DETAILED: LEFT SUPERIOR UPPER BACK
LOCATION DETAILED: LEFT CENTRAL TEMPLE
LOCATION DETAILED: LEFT VENTRAL PROXIMAL FOREARM
LOCATION DETAILED: LEFT INFERIOR CENTRAL MALAR CHEEK
LOCATION DETAILED: RIGHT SUPERIOR MEDIAL MIDBACK
LOCATION DETAILED: LEFT INFERIOR PREAURICULAR CHEEK
LOCATION DETAILED: RIGHT PROXIMAL RADIAL DORSAL FOREARM
LOCATION DETAILED: LEFT SUPERIOR LATERAL MALAR CHEEK

## 2020-10-21 ASSESSMENT — LOCATION ZONE DERM
LOCATION ZONE: FACE
LOCATION ZONE: ARM
LOCATION ZONE: TRUNK

## 2020-10-21 NOTE — PROCEDURE: BIOPSY BY SHAVE METHOD
Detail Level: Detailed
Depth Of Biopsy: dermis
Was A Bandage Applied: Yes
Size Of Lesion In Cm: 0
Anticipated Plan (Based On Presumed Biopsy Results): 2 months and scheduled today for 12/23/20
Biopsy Type: H and E
Biopsy Method: Personna blade
Anesthesia Type: 1% lidocaine with epinephrine and a 1:10 solution of 8.4% sodium bicarbonate
Anesthesia Volume In Cc: 2
Hemostasis: Drysol and Electrocautery
Wound Care: Vaseline
Dressing: Band-Aid
Type Of Destruction Used: Curettage
Curettage Text: The wound bed was treated with curettage after the biopsy was performed.
Electrodesiccation Text: The wound bed was treated with electrodesiccation after the biopsy was performed.
Electrodesiccation And Curettage Text: The wound bed was treated with electrodesiccation and curettage after the biopsy was performed.
Lab: [id number]
Lab Facility: 
Render Path Notes In Note?: No
Consent: Verbal consent was obtained and risks were reviewed including but not limited to scarring, infection, bleeding, scabbing, incomplete removal, nerve damage and allergy to anesthesia.
Post-Care Instructions: I reviewed with the patient in detail post-care instructions. Patient is to keep the biopsy site dry overnight, and then apply vasaline twice daily until healed.
Notification Instructions: Patient will be notified of biopsy results. However, patient instructed to call the office if not contacted within 2 weeks.
Billing Type: Third-Party Bill
Information: Selecting Yes will display possible errors in your note based on the variables you have selected. This validation is only offered as a suggestion for you. PLEASE NOTE THAT THE VALIDATION TEXT WILL BE REMOVED WHEN YOU FINALIZE YOUR NOTE. IF YOU WANT TO FAX A PRELIMINARY NOTE YOU WILL NEED TO TOGGLE THIS TO 'NO' IF YOU DO NOT WANT IT IN YOUR FAXED NOTE.

## 2020-12-01 ENCOUNTER — APPOINTMENT (RX ONLY)
Dept: URBAN - METROPOLITAN AREA CLINIC 4 | Facility: CLINIC | Age: 70
Setting detail: DERMATOLOGY
End: 2020-12-01

## 2020-12-01 DIAGNOSIS — D18.0 HEMANGIOMA: ICD-10-CM

## 2020-12-01 DIAGNOSIS — L81.4 OTHER MELANIN HYPERPIGMENTATION: ICD-10-CM

## 2020-12-01 DIAGNOSIS — L82.1 OTHER SEBORRHEIC KERATOSIS: ICD-10-CM

## 2020-12-01 DIAGNOSIS — L20.89 OTHER ATOPIC DERMATITIS: ICD-10-CM

## 2020-12-01 DIAGNOSIS — L57.0 ACTINIC KERATOSIS: ICD-10-CM

## 2020-12-01 DIAGNOSIS — Z85.828 PERSONAL HISTORY OF OTHER MALIGNANT NEOPLASM OF SKIN: ICD-10-CM

## 2020-12-01 PROBLEM — C44.622 SQUAMOUS CELL CARCINOMA OF SKIN OF RIGHT UPPER LIMB, INCLUDING SHOULDER: Status: ACTIVE | Noted: 2020-12-01

## 2020-12-01 PROBLEM — D18.01 HEMANGIOMA OF SKIN AND SUBCUTANEOUS TISSUE: Status: ACTIVE | Noted: 2020-12-01

## 2020-12-01 PROBLEM — L20.84 INTRINSIC (ALLERGIC) ECZEMA: Status: ACTIVE | Noted: 2020-12-01

## 2020-12-01 PROCEDURE — 17000 DESTRUCT PREMALG LESION: CPT

## 2020-12-01 PROCEDURE — ? OBSERVATION

## 2020-12-01 PROCEDURE — ? COUNSELING

## 2020-12-01 PROCEDURE — ? MEDICATION COUNSELING

## 2020-12-01 PROCEDURE — ? PRESCRIPTION

## 2020-12-01 PROCEDURE — 99213 OFFICE O/P EST LOW 20 MIN: CPT | Mod: 25

## 2020-12-01 PROCEDURE — ? LIQUID NITROGEN

## 2020-12-01 RX ORDER — FLUOROURACIL 5 MG/G
1 CREAM TOPICAL QD
Qty: 1 | Refills: 1 | Status: ERX | COMMUNITY
Start: 2020-12-01

## 2020-12-01 RX ORDER — TRIAMCINOLONE ACETONIDE 1 MG/G
1 CREAM TOPICAL BID
Qty: 1 | Refills: 6 | Status: ERX

## 2020-12-01 RX ADMIN — FLUOROURACIL 1: 5 CREAM TOPICAL at 00:00

## 2020-12-01 ASSESSMENT — LOCATION ZONE DERM
LOCATION ZONE: TRUNK
LOCATION ZONE: ARM
LOCATION ZONE: LEG

## 2020-12-01 ASSESSMENT — LOCATION DETAILED DESCRIPTION DERM
LOCATION DETAILED: PERIUMBILICAL SKIN
LOCATION DETAILED: RIGHT ANTERIOR PROXIMAL THIGH
LOCATION DETAILED: LEFT DISTAL POSTERIOR UPPER ARM
LOCATION DETAILED: LEFT PROXIMAL DORSAL FOREARM
LOCATION DETAILED: RIGHT PROXIMAL RADIAL DORSAL FOREARM
LOCATION DETAILED: RIGHT SUPERIOR MEDIAL MIDBACK
LOCATION DETAILED: LEFT ANTERIOR PROXIMAL THIGH
LOCATION DETAILED: LEFT SUPERIOR UPPER BACK
LOCATION DETAILED: RIGHT SUPERIOR MEDIAL UPPER BACK
LOCATION DETAILED: RIGHT MID-UPPER BACK

## 2020-12-01 ASSESSMENT — LOCATION SIMPLE DESCRIPTION DERM
LOCATION SIMPLE: LEFT UPPER ARM
LOCATION SIMPLE: RIGHT FOREARM
LOCATION SIMPLE: RIGHT LOWER BACK
LOCATION SIMPLE: LEFT FOREARM
LOCATION SIMPLE: LEFT UPPER BACK
LOCATION SIMPLE: RIGHT THIGH
LOCATION SIMPLE: ABDOMEN
LOCATION SIMPLE: LEFT THIGH
LOCATION SIMPLE: RIGHT UPPER BACK

## 2020-12-01 NOTE — PROCEDURE: LIQUID NITROGEN
Render Note In Bullet Format When Appropriate: No
Number Of Freeze-Thaw Cycles: 1 freeze-thaw cycle
Detail Level: Detailed
Post-Care Instructions: I reviewed with the patient in detail post-care instructions. Patient is to wear sunprotection, and avoid picking at any of the treated lesions. Pt may apply Vaseline to crusted or scabbing areas.
Duration Of Freeze Thaw-Cycle (Seconds): 5
Consent: The patient's consent was obtained including but not limited to risks of crusting, scabbing, blistering, scarring, darker or lighter pigmentary change, recurrence, incomplete removal and infection.

## 2020-12-01 NOTE — PROCEDURE: MEDICATION COUNSELING
SSKI Counseling:  I discussed with the patient the risks of SSKI including but not limited to thyroid abnormalities, metallic taste, GI upset, fever, headache, acne, arthralgias, paraesthesias, lymphadenopathy, easy bleeding, arrhythmias, and allergic reaction.
Ketoconazole Pregnancy And Lactation Text: This medication is Pregnancy Category C and it isn't know if it is safe during pregnancy. It is also excreted in breast milk and breast feeding isn't recommended.
Cimzia Counseling:  I discussed with the patient the risks of Cimzia including but not limited to immunosuppression, allergic reactions and infections.  The patient understands that monitoring is required including a PPD at baseline and must alert us or the primary physician if symptoms of infection or other concerning signs are noted.
Carac Pregnancy And Lactation Text: This medication is Pregnancy Category X and contraindicated in pregnancy and in women who may become pregnant. It is unknown if this medication is excreted in breast milk.
Methotrexate Counseling:  Patient counseled regarding adverse effects of methotrexate including but not limited to nausea, vomiting, abnormalities in liver function tests. Patients may develop mouth sores, rash, diarrhea, and abnormalities in blood counts. The patient understands that monitoring is required including LFT's and blood counts.  There is a rare possibility of scarring of the liver and lung problems that can occur when taking methotrexate. Persistent nausea, loss of appetite, pale stools, dark urine, cough, and shortness of breath should be reported immediately. Patient advised to discontinue methotrexate treatment at least three months before attempting to become pregnant.  I discussed the need for folate supplements while taking methotrexate.  These supplements can decrease side effects during methotrexate treatment. The patient verbalized understanding of the proper use and possible adverse effects of methotrexate.  All of the patient's questions and concerns were addressed.
Simponi Counseling:  I discussed with the patient the risks of golimumab including but not limited to myelosuppression, immunosuppression, autoimmune hepatitis, demyelinating diseases, lymphoma, and serious infections.  The patient understands that monitoring is required including a PPD at baseline and must alert us or the primary physician if symptoms of infection or other concerning signs are noted.
Zyclara Counseling:  I discussed with the patient the risks of imiquimod including but not limited to erythema, scaling, itching, weeping, crusting, and pain.  Patient understands that the inflammatory response to imiquimod is variable from person to person and was educated regarded proper titration schedule.  If flu-like symptoms develop, patient knows to discontinue the medication and contact us.
Sski Pregnancy And Lactation Text: This medication is Pregnancy Category D and isn't considered safe during pregnancy. It is excreted in breast milk.
Calcipotriene Counseling:  I discussed with the patient the risks of calcipotriene including but not limited to erythema, scaling, itching, and irritation.
Picato Counseling:  I discussed with the patient the risks of Picato including but not limited to erythema, scaling, itching, weeping, crusting, and pain.
Minocycline Counseling: Patient advised regarding possible photosensitivity and discoloration of the teeth, skin, lips, tongue and gums.  Patient instructed to avoid sunlight, if possible.  When exposed to sunlight, patients should wear protective clothing, sunglasses, and sunscreen.  The patient was instructed to call the office immediately if the following severe adverse effects occur:  hearing changes, easy bruising/bleeding, severe headache, or vision changes.  The patient verbalized understanding of the proper use and possible adverse effects of minocycline.  All of the patient's questions and concerns were addressed.
Gabapentin Counseling: I discussed with the patient the risks of gabapentin including but not limited to dizziness, somnolence, fatigue and ataxia.
Cimzia Pregnancy And Lactation Text: This medication crosses the placenta but can be considered safe in certain situations. Cimzia may be excreted in breast milk.
Methotrexate Pregnancy And Lactation Text: This medication is Pregnancy Category X and is known to cause fetal harm. This medication is excreted in breast milk.
Include Pregnancy/Lactation Warning?: No
Cyclophosphamide Counseling:  I discussed with the patient the risks of cyclophosphamide including but not limited to hair loss, hormonal abnormalities, decreased fertility, abdominal pain, diarrhea, nausea and vomiting, bone marrow suppression and infection. The patient understands that monitoring is required while taking this medication.
Simponi Pregnancy And Lactation Text: The risk during pregnancy and breastfeeding is uncertain with this medication.
Calcipotriene Pregnancy And Lactation Text: This medication has not been proven safe during pregnancy. It is unknown if this medication is excreted in breast milk.
Gabapentin Pregnancy And Lactation Text: This medication is Pregnancy Category C and isn't considered safe during pregnancy. It is excreted in breast milk.
Zyclara Pregnancy And Lactation Text: This medication is Pregnancy Category C. It is unknown if this medication is excreted in breast milk.
Minocycline Pregnancy And Lactation Text: This medication is Pregnancy Category D and not consider safe during pregnancy. It is also excreted in breast milk.
Terbinafine Counseling: Patient counseling regarding adverse effects of terbinafine including but not limited to headache, diarrhea, rash, upset stomach, liver function test abnormalities, itching, taste/smell disturbance, nausea, abdominal pain, and flatulence.  There is a rare possibility of liver failure that can occur when taking terbinafine.  The patient understands that a baseline LFT and kidney function test may be required. The patient verbalized understanding of the proper use and possible adverse effects of terbinafine.  All of the patient's questions and concerns were addressed.
Thalidomide Counseling: I discussed with the patient the risks of thalidomide including but not limited to birth defects, anxiety, weakness, chest pain, dizziness, cough and severe allergy.
Prednisone Counseling:  I discussed with the patient the risks of prolonged use of prednisone including but not limited to weight gain, insomnia, osteoporosis, mood changes, diabetes, susceptibility to infection, glaucoma and high blood pressure.  In cases where prednisone use is prolonged, patients should be monitored with blood pressure checks, serum glucose levels and an eye exam.  Additionally, the patient may need to be placed on GI prophylaxis, PCP prophylaxis, and calcium and vitamin D supplementation and/or a bisphosphonate.  The patient verbalized understanding of the proper use and the possible adverse effects of prednisone.  All of the patient's questions and concerns were addressed.
Quinolones Counseling:  I discussed with the patient the risks of fluoroquinolones including but not limited to GI upset, allergic reaction, drug rash, diarrhea, dizziness, photosensitivity, yeast infections, liver function test abnormalities, tendonitis/tendon rupture.
Terbinafine Pregnancy And Lactation Text: This medication is Pregnancy Category B and is considered safe during pregnancy. It is also excreted in breast milk and breast feeding isn't recommended.
Glycopyrrolate Counseling:  I discussed with the patient the risks of glycopyrrolate including but not limited to skin rash, drowsiness, dry mouth, difficulty urinating, and blurred vision.
Skyrizi Counseling: I discussed with the patient the risks of risankizumab-rzaa including but not limited to immunosuppression, and serious infections.  The patient understands that monitoring is required including a PPD at baseline and must alert us or the primary physician if symptoms of infection or other concerning signs are noted.
Cosentyx Counseling:  I discussed with the patient the risks of Cosentyx including but not limited to worsening of Crohn's disease, immunosuppression, allergic reactions and infections.  The patient understands that monitoring is required including a PPD at baseline and must alert us or the primary physician if symptoms of infection or other concerning signs are noted.
Protopic Counseling: Patient may experience a mild burning sensation during topical application. Protopic is not approved in children less than 2 years of age. There have been case reports of hematologic and skin malignancies in patients using topical calcineurin inhibitors although causality is questionable.
Thalidomide Pregnancy And Lactation Text: This medication is Pregnancy Category X and is absolutely contraindicated during pregnancy. It is unknown if it is excreted in breast milk.
Cosentyx Pregnancy And Lactation Text: This medication is Pregnancy Category B and is considered safe during pregnancy. It is unknown if this medication is excreted in breast milk.
Prednisone Pregnancy And Lactation Text: This medication is Pregnancy Category C and it isn't know if it is safe during pregnancy. This medication is excreted in breast milk.
5-Fu Counseling: 5-Fluorouracil Counseling:  I discussed with the patient the risks of 5-fluorouracil including but not limited to erythema, scaling, itching, weeping, crusting, and pain.
Glycopyrrolate Pregnancy And Lactation Text: This medication is Pregnancy Category B and is considered safe during pregnancy. It is unknown if it is excreted breast milk.
Opioid Counseling: I discussed with the patient the potential side effects of opioids including but not limited to addiction, altered mental status, and depression. I stressed avoiding alcohol, benzodiazepines, muscle relaxants and sleep aids unless specifically okayed by a physician. The patient verbalized understanding of the proper use and possible adverse effects of opioids. All of the patient's questions and concerns were addressed. They were instructed to flush the remaining pills down the toilet if they did not need them for pain.
Protopic Pregnancy And Lactation Text: This medication is Pregnancy Category C. It is unknown if this medication is excreted in breast milk when applied topically.
Tranexamic Acid Counseling:  Patient advised of the small risk of bleeding problems with tranexamic acid. They were also instructed to call if they developed any nausea, vomiting or diarrhea. All of the patient's questions and concerns were addressed.
Dupixent Counseling: I discussed with the patient the risks of dupilumab including but not limited to eye infection and irritation, cold sores, injection site reactions, worsening of asthma, allergic reactions and increased risk of parasitic infection.  Live vaccines should be avoided while taking dupilumab. Dupilumab will also interact with certain medications such as warfarin and cyclosporine. The patient understands that monitoring is required and they must alert us or the primary physician if symptoms of infection or other concerning signs are noted.
Stelara Counseling:  I discussed with the patient the risks of ustekinumab including but not limited to immunosuppression, malignancy, posterior leukoencephalopathy syndrome, and serious infections.  The patient understands that monitoring is required including a PPD at baseline and must alert us or the primary physician if symptoms of infection or other concerning signs are noted.
Quinolones Pregnancy And Lactation Text: This medication is Pregnancy Category C and it isn't know if it is safe during pregnancy. It is also excreted in breast milk.
Azithromycin Counseling:  I discussed with the patient the risks of azithromycin including but not limited to GI upset, allergic reaction, drug rash, diarrhea, and yeast infections.
Rifampin Counseling: I discussed with the patient the risks of rifampin including but not limited to liver damage, kidney damage, red-orange body fluids, nausea/vomiting and severe allergy.
Hydroxychloroquine Counseling:  I discussed with the patient that a baseline ophthalmologic exam is needed at the start of therapy and every year thereafter while on therapy. A CBC may also be warranted for monitoring.  The side effects of this medication were discussed with the patient, including but not limited to agranulocytosis, aplastic anemia, seizures, rashes, retinopathy, and liver toxicity. Patient instructed to call the office should any adverse effect occur.  The patient verbalized understanding of the proper use and possible adverse effects of Plaquenil.  All the patient's questions and concerns were addressed.
Cimetidine Counseling:  I discussed with the patient the risks of Cimetidine including but not limited to gynecomastia, headache, diarrhea, nausea, drowsiness, arrhythmias, pancreatitis, skin rashes, psychosis, bone marrow suppression and kidney toxicity.
Opioid Pregnancy And Lactation Text: These medications can lead to premature delivery and should be avoided during pregnancy. These medications are also present in breast milk in small amounts.
Rhofade Counseling: Rhofade is a topical medication which can decrease superficial blood flow where applied. Side effects are uncommon and include stinging, redness and allergic reactions.
Tranexamic Acid Pregnancy And Lactation Text: It is unknown if this medication is safe during pregnancy or breast feeding.
Nsaids Pregnancy And Lactation Text: These medications are considered safe up to 30 weeks gestation. It is excreted in breast milk.
Drysol Counseling:  I discussed with the patient the risks of drysol/aluminum chloride including but not limited to skin rash, itching, irritation, burning.
Acitretin Counseling:  I discussed with the patient the risks of acitretin including but not limited to hair loss, dry lips/skin/eyes, liver damage, hyperlipidemia, depression/suicidal ideation, photosensitivity.  Serious rare side effects can include but are not limited to pancreatitis, pseudotumor cerebri, bony changes, clot formation/stroke/heart attack.  Patient understands that alcohol is contraindicated since it can result in liver toxicity and significantly prolong the elimination of the drug by many years.
Hydroxychloroquine Pregnancy And Lactation Text: This medication has been shown to cause fetal harm but it isn't assigned a Pregnancy Risk Category. There are small amounts excreted in breast milk.
Dupixent Pregnancy And Lactation Text: This medication likely crosses the placenta but the risk for the fetus is uncertain. This medication is excreted in breast milk.
Rifampin Pregnancy And Lactation Text: This medication is Pregnancy Category C and it isn't know if it is safe during pregnancy. It is also excreted in breast milk and should not be used if you are breast feeding.
Rhofade Pregnancy And Lactation Text: This medication has not been assigned a Pregnancy Risk Category. It is unknown if the medication is excreted in breast milk.
Odomzo Counseling- I discussed with the patient the risks of Odomzo including but not limited to nausea, vomiting, diarrhea, constipation, weight loss, changes in the sense of taste, decreased appetite, muscle spasms, and hair loss.  The patient verbalized understanding of the proper use and possible adverse effects of Odomzo.  All of the patient's questions and concerns were addressed.
Drysol Pregnancy And Lactation Text: This medication is considered safe during pregnancy and breast feeding.
Doxepin Counseling:  Patient advised that the medication is sedating and not to drive a car after taking this medication. Patient informed of potential adverse effects including but not limited to dry mouth, urinary retention, and blurry vision.  The patient verbalized understanding of the proper use and possible adverse effects of doxepin.  All of the patient's questions and concerns were addressed.
Enbrel Counseling:  I discussed with the patient the risks of etanercept including but not limited to myelosuppression, immunosuppression, autoimmune hepatitis, demyelinating diseases, lymphoma, and infections.  The patient understands that monitoring is required including a PPD at baseline and must alert us or the primary physician if symptoms of infection or other concerning signs are noted.
Niacinamide Counseling: I recommended taking niacin or niacinamide, also know as vitamin B3, twice daily. Recent evidence suggests that taking vitamin B3 (500 mg twice daily) can reduce the risk of actinic keratoses and non-melanoma skin cancers. Side effects of vitamin B3 include flushing and headache.
Valtrex Counseling: I discussed with the patient the risks of valacyclovir including but not limited to kidney damage, nausea, vomiting and severe allergy.  The patient understands that if the infection seems to be worsening or is not improving, they are to call.
Solaraze Counseling:  I discussed with the patient the risks of Solaraze including but not limited to erythema, scaling, itching, weeping, crusting, and pain.
Azithromycin Pregnancy And Lactation Text: This medication is considered safe during pregnancy and is also secreted in breast milk.
Taltz Counseling: I discussed with the patient the risks of ixekizumab including but not limited to immunosuppression, serious infections, worsening of inflammatory bowel disease and drug reactions.  The patient understands that monitoring is required including a PPD at baseline and must alert us or the primary physician if symptoms of infection or other concerning signs are noted.
Acitretin Pregnancy And Lactation Text: This medication is Pregnancy Category X and should not be given to women who are pregnant or may become pregnant in the future. This medication is excreted in breast milk.
Bactrim Counseling:  I discussed with the patient the risks of sulfa antibiotics including but not limited to GI upset, allergic reaction, drug rash, diarrhea, dizziness, photosensitivity, and yeast infections.  Rarely, more serious reactions can occur including but not limited to aplastic anemia, agranulocytosis, methemoglobinemia, blood dyscrasias, liver or kidney failure, lung infiltrates or desquamative/blistering drug rashes.
Arava Counseling:  Patient counseled regarding adverse effects of Arava including but not limited to nausea, vomiting, abnormalities in liver function tests. Patients may develop mouth sores, rash, diarrhea, and abnormalities in blood counts. The patient understands that monitoring is required including LFTs and blood counts.  There is a rare possibility of scarring of the liver and lung problems that can occur when taking methotrexate. Persistent nausea, loss of appetite, pale stools, dark urine, cough, and shortness of breath should be reported immediately. Patient advised to discontinue Arava treatment and consult with a physician prior to attempting conception. The patient will have to undergo a treatment to eliminate Arava from the body prior to conception.
Sarecycline Counseling: Patient advised regarding possible photosensitivity and discoloration of the teeth, skin, lips, tongue and gums.  Patient instructed to avoid sunlight, if possible.  When exposed to sunlight, patients should wear protective clothing, sunglasses, and sunscreen.  The patient was instructed to call the office immediately if the following severe adverse effects occur:  hearing changes, easy bruising/bleeding, severe headache, or vision changes.  The patient verbalized understanding of the proper use and possible adverse effects of sarecycline.  All of the patient's questions and concerns were addressed.
Elidel Counseling: Patient may experience a mild burning sensation during topical application. Elidel is not approved in children less than 2 years of age. There have been case reports of hematologic and skin malignancies in patients using topical calcineurin inhibitors although causality is questionable.
Valtrex Pregnancy And Lactation Text: this medication is Pregnancy Category B and is considered safe during pregnancy. This medication is not directly found in breast milk but it's metabolite acyclovir is present.
Niacinamide Pregnancy And Lactation Text: These medications are considered safe during pregnancy.
Bexarotene Counseling:  I discussed with the patient the risks of bexarotene including but not limited to hair loss, dry lips/skin/eyes, liver abnormalities, hyperlipidemia, pancreatitis, depression/suicidal ideation, photosensitivity, drug rash/allergic reactions, hypothyroidism, anemia, leukopenia, infection, cataracts, and teratogenicity.  Patient understands that they will need regular blood tests to check lipid profile, liver function tests, white blood cell count, thyroid function tests and pregnancy test if applicable.
Doxepin Pregnancy And Lactation Text: This medication is Pregnancy Category C and it isn't known if it is safe during pregnancy. It is also excreted in breast milk and breast feeding isn't recommended.
Solaraze Pregnancy And Lactation Text: This medication is Pregnancy Category B and is considered safe. There is some data to suggest avoiding during the third trimester. It is unknown if this medication is excreted in breast milk.
Hydroxyzine Counseling: Patient advised that the medication is sedating and not to drive a car after taking this medication.  Patient informed of potential adverse effects including but not limited to dry mouth, urinary retention, and blurry vision.  The patient verbalized understanding of the proper use and possible adverse effects of hydroxyzine.  All of the patient's questions and concerns were addressed.
Otezla Counseling: The side effects of Otezla were discussed with the patient, including but not limited to worsening or new depression, weight loss, diarrhea, nausea, upper respiratory tract infection, and headache. Patient instructed to call the office should any adverse effect occur.  The patient verbalized understanding of the proper use and possible adverse effects of Otezla.  All the patient's questions and concerns were addressed.
Bexarotene Pregnancy And Lactation Text: This medication is Pregnancy Category X and should not be given to women who are pregnant or may become pregnant. This medication should not be used if you are breast feeding.
Nsaids Counseling: NSAID Counseling: I discussed with the patient that NSAIDs should be taken with food. Prolonged use of NSAIDs can result in the development of stomach ulcers.  Patient advised to stop taking NSAIDs if abdominal pain occurs.  The patient verbalized understanding of the proper use and possible adverse effects of NSAIDs.  All of the patient's questions and concerns were addressed.
Tremfya Counseling: I discussed with the patient the risks of guselkumab including but not limited to immunosuppression, serious infections, worsening of inflammatory bowel disease and drug reactions.  The patient understands that monitoring is required including a PPD at baseline and must alert us or the primary physician if symptoms of infection or other concerning signs are noted.
Bactrim Pregnancy And Lactation Text: This medication is Pregnancy Category D and is known to cause fetal risk.  It is also excreted in breast milk.
Clofazimine Counseling:  I discussed with the patient the risks of clofazimine including but not limited to skin and eye pigmentation, liver damage, nausea/vomiting, gastrointestinal bleeding and allergy.
Eucrisa Counseling: Patient may experience a mild burning sensation during topical application. Eucrisa is not approved in children less than 2 years of age.
Humira Counseling:  I discussed with the patient the risks of adalimumab including but not limited to myelosuppression, immunosuppression, autoimmune hepatitis, demyelinating diseases, lymphoma, and serious infections.  The patient understands that monitoring is required including a PPD at baseline and must alert us or the primary physician if symptoms of infection or other concerning signs are noted.
Otezla Pregnancy And Lactation Text: This medication is Pregnancy Category C and it isn't known if it is safe during pregnancy. It is unknown if it is excreted in breast milk.
Tetracycline Counseling: Patient counseled regarding possible photosensitivity and increased risk for sunburn.  Patient instructed to avoid sunlight, if possible.  When exposed to sunlight, patients should wear protective clothing, sunglasses, and sunscreen.  The patient was instructed to call the office immediately if the following severe adverse effects occur:  hearing changes, easy bruising/bleeding, severe headache, or vision changes.  The patient verbalized understanding of the proper use and possible adverse effects of tetracycline.  All of the patient's questions and concerns were addressed. Patient understands to avoid pregnancy while on therapy due to potential birth defects.
Hydroxyzine Pregnancy And Lactation Text: This medication is not safe during pregnancy and should not be taken. It is also excreted in breast milk and breast feeding isn't recommended.
Isotretinoin Counseling: Patient should get monthly blood tests, not donate blood, not drive at night if vision affected, not share medication, and not undergo elective surgery for 6 months after tx completed. Side effects reviewed, pt to contact office should one occur.
Topical Retinoid counseling:  Patient advised to apply a pea-sized amount only at bedtime and wait 30 minutes after washing their face before applying.  If too drying, patient may add a non-comedogenic moisturizer. The patient verbalized understanding of the proper use and possible adverse effects of retinoids.  All of the patient's questions and concerns were addressed.
Cephalexin Counseling: I counseled the patient regarding use of cephalexin as an antibiotic for prophylactic and/or therapeutic purposes. Cephalexin (commonly prescribed under brand name Keflex) is a cephalosporin antibiotic which is active against numerous classes of bacteria, including most skin bacteria. Side effects may include nausea, diarrhea, gastrointestinal upset, rash, hives, yeast infections, and in rare cases, hepatitis, kidney disease, seizures, fever, confusion, neurologic symptoms, and others. Patients with severe allergies to penicillin medications are cautioned that there is about a 10% incidence of cross-reactivity with cephalosporins. When possible, patients with penicillin allergies should use alternatives to cephalosporins for antibiotic therapy.
Cephalexin Pregnancy And Lactation Text: This medication is Pregnancy Category B and considered safe during pregnancy.  It is also excreted in breast milk but can be used safely for shorter doses.
Eucrisa Pregnancy And Lactation Text: This medication has not been assigned a Pregnancy Risk Category but animal studies failed to show danger with the topical medication. It is unknown if the medication is excreted in breast milk.
Oxybutynin Counseling:  I discussed with the patient the risks of oxybutynin including but not limited to skin rash, drowsiness, dry mouth, difficulty urinating, and blurred vision.
Isotretinoin Pregnancy And Lactation Text: This medication is Pregnancy Category X and is considered extremely dangerous during pregnancy. It is unknown if it is excreted in breast milk.
Xeljanz Counseling: I discussed with the patient the risks of Xeljanz therapy including increased risk of infection, liver issues, headache, diarrhea, or cold symptoms. Live vaccines should be avoided. They were instructed to call if they have any problems.
Ilumya Counseling: I discussed with the patient the risks of tildrakizumab including but not limited to immunosuppression, malignancy, posterior leukoencephalopathy syndrome, and serious infections.  The patient understands that monitoring is required including a PPD at baseline and must alert us or the primary physician if symptoms of infection or other concerning signs are noted.
Colchicine Counseling:  Patient counseled regarding adverse effects including but not limited to stomach upset (nausea, vomiting, stomach pain, or diarrhea).  Patient instructed to limit alcohol consumption while taking this medication.  Colchicine may reduce blood counts especially with prolonged use.  The patient understands that monitoring of kidney function and blood counts may be required, especially at baseline. The patient verbalized understanding of the proper use and possible adverse effects of colchicine.  All of the patient's questions and concerns were addressed.
Tazorac Counseling:  Patient advised that medication is irritating and drying.  Patient may need to apply sparingly and wash off after an hour before eventually leaving it on overnight.  The patient verbalized understanding of the proper use and possible adverse effects of tazorac.  All of the patient's questions and concerns were addressed.
High Dose Vitamin A Counseling: Side effects reviewed, pt to contact office should one occur.
Oxybutynin Pregnancy And Lactation Text: This medication is Pregnancy Category B and is considered safe during pregnancy. It is unknown if it is excreted in breast milk.
Hydroquinone Counseling:  Patient advised that medication may result in skin irritation, lightening (hypopigmentation), dryness, and burning.  In the event of skin irritation, the patient was advised to reduce the amount of the drug applied or use it less frequently.  Rarely, spots that are treated with hydroquinone can become darker (pseudoochronosis).  Should this occur, patient instructed to stop medication and call the office. The patient verbalized understanding of the proper use and possible adverse effects of hydroquinone.  All of the patient's questions and concerns were addressed.
Clindamycin Counseling: I counseled the patient regarding use of clindamycin as an antibiotic for prophylactic and/or therapeutic purposes. Clindamycin is active against numerous classes of bacteria, including skin bacteria. Side effects may include nausea, diarrhea, gastrointestinal upset, rash, hives, yeast infections, and in rare cases, colitis.
Albendazole Counseling:  I discussed with the patient the risks of albendazole including but not limited to cytopenia, kidney damage, nausea/vomiting and severe allergy.  The patient understands that this medication is being used in an off-label manner.
Xeltyz Pregnancy And Lactation Text: This medication is Pregnancy Category D and is not considered safe during pregnancy.  The risk during breast feeding is also uncertain.
Tazorac Pregnancy And Lactation Text: This medication is not safe during pregnancy. It is unknown if this medication is excreted in breast milk.
Xolair Counseling:  Patient informed of potential adverse effects including but not limited to fever, muscle aches, rash and allergic reactions.  The patient verbalized understanding of the proper use and possible adverse effects of Xolair.  All of the patient's questions and concerns were addressed.
Clindamycin Pregnancy And Lactation Text: This medication can be used in pregnancy if certain situations. Clindamycin is also present in breast milk.
Fluconazole Counseling:  Patient counseled regarding adverse effects of fluconazole including but not limited to headache, diarrhea, nausea, upset stomach, liver function test abnormalities, taste disturbance, and stomach pain.  There is a rare possibility of liver failure that can occur when taking fluconazole.  The patient understands that monitoring of LFTs and kidney function test may be required, especially at baseline. The patient verbalized understanding of the proper use and possible adverse effects of fluconazole.  All of the patient's questions and concerns were addressed.
Propranolol Counseling:  I discussed with the patient the risks of propranolol including but not limited to low heart rate, low blood pressure, low blood sugar, restlessness and increased cold sensitivity. They should call the office if they experience any of these side effects.
Doxycycline Counseling:  Patient counseled regarding possible photosensitivity and increased risk for sunburn.  Patient instructed to avoid sunlight, if possible.  When exposed to sunlight, patients should wear protective clothing, sunglasses, and sunscreen.  The patient was instructed to call the office immediately if the following severe adverse effects occur:  hearing changes, easy bruising/bleeding, severe headache, or vision changes.  The patient verbalized understanding of the proper use and possible adverse effects of doxycycline.  All of the patient's questions and concerns were addressed.
High Dose Vitamin A Pregnancy And Lactation Text: High dose vitamin A therapy is contraindicated during pregnancy and breast feeding.
Dapsone Counseling: I discussed with the patient the risks of dapsone including but not limited to hemolytic anemia, agranulocytosis, rashes, methemoglobinemia, kidney failure, peripheral neuropathy, headaches, GI upset, and liver toxicity.  Patients who start dapsone require monitoring including baseline LFTs and weekly CBCs for the first month, then every month thereafter.  The patient verbalized understanding of the proper use and possible adverse effects of dapsone.  All of the patient's questions and concerns were addressed.
Infliximab Counseling:  I discussed with the patient the risks of infliximab including but not limited to myelosuppression, immunosuppression, autoimmune hepatitis, demyelinating diseases, lymphoma, and serious infections.  The patient understands that monitoring is required including a PPD at baseline and must alert us or the primary physician if symptoms of infection or other concerning signs are noted.
Topical Clindamycin Counseling: Patient counseled that this medication may cause skin irritation or allergic reactions.  In the event of skin irritation, the patient was advised to reduce the amount of the drug applied or use it less frequently.   The patient verbalized understanding of the proper use and possible adverse effects of clindamycin.  All of the patient's questions and concerns were addressed.
Albendazole Pregnancy And Lactation Text: This medication is Pregnancy Category C and it isn't known if it is safe during pregnancy. It is also excreted in breast milk.
Imiquimod Counseling:  I discussed with the patient the risks of imiquimod including but not limited to erythema, scaling, itching, weeping, crusting, and pain.  Patient understands that the inflammatory response to imiquimod is variable from person to person and was educated regarded proper titration schedule.  If flu-like symptoms develop, patient knows to discontinue the medication and contact us.
Ivermectin Counseling:  Patient instructed to take medication on an empty stomach with a full glass of water.  Patient informed of potential adverse effects including but not limited to nausea, diarrhea, dizziness, itching, and swelling of the extremities or lymph nodes.  The patient verbalized understanding of the proper use and possible adverse effects of ivermectin.  All of the patient's questions and concerns were addressed.
Propranolol Pregnancy And Lactation Text: This medication is Pregnancy Category C and it isn't known if it is safe during pregnancy. It is excreted in breast milk.
Xolair Pregnancy And Lactation Text: This medication is Pregnancy Category B and is considered safe during pregnancy. This medication is excreted in breast milk.
Griseofulvin Counseling:  I discussed with the patient the risks of griseofulvin including but not limited to photosensitivity, cytopenia, liver damage, nausea/vomiting and severe allergy.  The patient understands that this medication is best absorbed when taken with a fatty meal (e.g., ice cream or french fries).
Dapsone Pregnancy And Lactation Text: This medication is Pregnancy Category C and is not considered safe during pregnancy or breast feeding.
Birth Control Pills Counseling: Birth Control Pill Counseling: I discussed with the patient the potential side effects of OCPs including but not limited to increased risk of stroke, heart attack, thrombophlebitis, deep venous thrombosis, hepatic adenomas, breast changes, GI upset, headaches, and depression.  The patient verbalized understanding of the proper use and possible adverse effects of OCPs. All of the patient's questions and concerns were addressed.
Rituxan Counseling:  I discussed with the patient the risks of Rituxan infusions. Side effects can include infusion reactions, severe drug rashes including mucocutaneous reactions, reactivation of latent hepatitis and other infections and rarely progressive multifocal leukoencephalopathy.  All of the patient's questions and concerns were addressed.
Topical Sulfur Applications Counseling: Topical Sulfur Counseling: Patient counseled that this medication may cause skin irritation or allergic reactions.  In the event of skin irritation, the patient was advised to reduce the amount of the drug applied or use it less frequently.   The patient verbalized understanding of the proper use and possible adverse effects of topical sulfur application.  All of the patient's questions and concerns were addressed.
Doxycycline Pregnancy And Lactation Text: This medication is Pregnancy Category D and not consider safe during pregnancy. It is also excreted in breast milk but is considered safe for shorter treatment courses.
Erythromycin Counseling:  I discussed with the patient the risks of erythromycin including but not limited to GI upset, allergic reaction, drug rash, diarrhea, increase in liver enzymes, and yeast infections.
Azathioprine Counseling:  I discussed with the patient the risks of azathioprine including but not limited to myelosuppression, immunosuppression, hepatotoxicity, lymphoma, and infections.  The patient understands that monitoring is required including baseline LFTs, Creatinine, possible TPMP genotyping and weekly CBCs for the first month and then every 2 weeks thereafter.  The patient verbalized understanding of the proper use and possible adverse effects of azathioprine.  All of the patient's questions and concerns were addressed.
Erivedge Counseling- I discussed with the patient the risks of Erivedge including but not limited to nausea, vomiting, diarrhea, constipation, weight loss, changes in the sense of taste, decreased appetite, muscle spasms, and hair loss.  The patient verbalized understanding of the proper use and possible adverse effects of Erivedge.  All of the patient's questions and concerns were addressed.
Griseofulvin Pregnancy And Lactation Text: This medication is Pregnancy Category X and is known to cause serious birth defects. It is unknown if this medication is excreted in breast milk but breast feeding should be avoided.
Minoxidil Counseling: Minoxidil is a topical medication which can increase blood flow where it is applied. It is uncertain how this medication increases hair growth. Side effects are uncommon and include stinging and allergic reactions.
Birth Control Pills Pregnancy And Lactation Text: This medication should be avoided if pregnant and for the first 30 days post-partum.
Itraconazole Counseling:  I discussed with the patient the risks of itraconazole including but not limited to liver damage, nausea/vomiting, neuropathy, and severe allergy.  The patient understands that this medication is best absorbed when taken with acidic beverages such as non-diet cola or ginger ale.  The patient understands that monitoring is required including baseline LFTs and repeat LFTs at intervals.  The patient understands that they are to contact us or the primary physician if concerning signs are noted.
Benzoyl Peroxide Counseling: Patient counseled that medicine may cause skin irritation and bleach clothing.  In the event of skin irritation, the patient was advised to reduce the amount of the drug applied or use it less frequently.   The patient verbalized understanding of the proper use and possible adverse effects of benzoyl peroxide.  All of the patient's questions and concerns were addressed.
Cyclophosphamide Pregnancy And Lactation Text: This medication is Pregnancy Category D and it isn't considered safe during pregnancy. This medication is excreted in breast milk.
Rituxan Pregnancy And Lactation Text: This medication is Pregnancy Category C and it isn't know if it is safe during pregnancy. It is unknown if this medication is excreted in breast milk but similar antibodies are known to be excreted.
Topical Sulfur Applications Pregnancy And Lactation Text: This medication is Pregnancy Category C and has an unknown safety profile during pregnancy. It is unknown if this topical medication is excreted in breast milk.
Spironolactone Counseling: Patient advised regarding risks of diarrhea, abdominal pain, hyperkalemia, birth defects (for female patients), liver toxicity and renal toxicity. The patient may need blood work to monitor liver and kidney function and potassium levels while on therapy. The patient verbalized understanding of the proper use and possible adverse effects of spironolactone.  All of the patient's questions and concerns were addressed.
Benzoyl Peroxide Pregnancy And Lactation Text: This medication is Pregnancy Category C. It is unknown if benzoyl peroxide is excreted in breast milk.
Azathioprine Pregnancy And Lactation Text: This medication is Pregnancy Category D and isn't considered safe during pregnancy. It is unknown if this medication is excreted in breast milk.
Erythromycin Pregnancy And Lactation Text: This medication is Pregnancy Category B and is considered safe during pregnancy. It is also excreted in breast milk.
Finasteride Male Counseling: Finasteride Counseling:  I discussed with the patient the risks of use of finasteride including but not limited to decreased libido, decreased ejaculate volume, gynecomastia, and depression. Women should not handle medication.  All of the patient's questions and concerns were addressed.
Cyclosporine Counseling:  I discussed with the patient the risks of cyclosporine including but not limited to hypertension, gingival hyperplasia,myelosuppression, immunosuppression, liver damage, kidney damage, neurotoxicity, lymphoma, and serious infections. The patient understands that monitoring is required including baseline blood pressure, CBC, CMP, lipid panel and uric acid, and then 1-2 times monthly CMP and blood pressure.
Siliq Counseling:  I discussed with the patient the risks of Siliq including but not limited to new or worsening depression, suicidal thoughts and behavior, immunosuppression, malignancy, posterior leukoencephalopathy syndrome, and serious infections.  The patient understands that monitoring is required including a PPD at baseline and must alert us or the primary physician if symptoms of infection or other concerning signs are noted. There is also a special program designed to monitor depression which is required with Siliq.
Mirvaso Counseling: Mirvaso is a topical medication which can decrease superficial blood flow where applied. Side effects are uncommon and include stinging, redness and allergic reactions.
Detail Level: Simple
Wartpeel Counseling:  I discussed with the patient the risks of Wartpeel including but not limited to erythema, scaling, itching, weeping, crusting, and pain.
Cellcept Counseling:  I discussed with the patient the risks of mycophenolate mofetil including but not limited to infection/immunosuppression, GI upset, hypokalemia, hypercholesterolemia, bone marrow suppression, lymphoproliferative disorders, malignancy, GI ulceration/bleed/perforation, colitis, interstitial lung disease, kidney failure, progressive multifocal leukoencephalopathy, and birth defects.  The patient understands that monitoring is required including a baseline creatinine and regular CBC testing. In addition, patient must alert us immediately if symptoms of infection or other concerning signs are noted.
Metronidazole Counseling:  I discussed with the patient the risks of metronidazole including but not limited to seizures, nausea/vomiting, a metallic taste in the mouth, nausea/vomiting and severe allergy.
Spironolactone Pregnancy And Lactation Text: This medication can cause feminization of the male fetus and should be avoided during pregnancy. The active metabolite is also found in breast milk.
Carac Counseling:  I discussed with the patient the risks of Carac including but not limited to erythema, scaling, itching, weeping, crusting, and pain.
Metronidazole Pregnancy And Lactation Text: This medication is Pregnancy Category B and considered safe during pregnancy.  It is also excreted in breast milk.
Finasteride Pregnancy And Lactation Text: This medication is absolutely contraindicated during pregnancy. It is unknown if it is excreted in breast milk.
Ketoconazole Counseling:   Patient counseled regarding improving absorption with orange juice.  Adverse effects include but are not limited to breast enlargement, headache, diarrhea, nausea, upset stomach, liver function test abnormalities, taste disturbance, and stomach pain.  There is a rare possibility of liver failure that can occur when taking ketoconazole. The patient understands that monitoring of LFTs may be required, especially at baseline. The patient verbalized understanding of the proper use and possible adverse effects of ketoconazole.  All of the patient's questions and concerns were addressed.

## 2020-12-01 NOTE — PROCEDURE: REASSURANCE
Detail Level: Detailed
Hide Include Location In Plan Question?: No
Detail Level: Generalized
Additional Note: Advised that he can try otc Compound W to areas that are bothersome on the legs.
Detail Level: Zone
Detail Level: Simple

## 2020-12-15 ENCOUNTER — APPOINTMENT (RX ONLY)
Dept: URBAN - METROPOLITAN AREA CLINIC 4 | Facility: CLINIC | Age: 70
Setting detail: DERMATOLOGY
End: 2020-12-15

## 2020-12-15 PROCEDURE — ? EXCISION

## 2020-12-15 NOTE — PROCEDURE: EXCISION
Surgeon Performing The Repair (Optional): Tien
Biopsy Photograph Reviewed: Yes
Previous Accession (Optional): [id number]
Size Of Lesion In Cm: 0.1
X Size Of Lesion In Cm (Optional): 0
Size Of Margin In Cm: 0.3
Excision Method: Fusiform
Anesthesia Volume In Cc: 6
Did You Provide Opioid Counseling: No
Repair Type: Intermediate
Suturegard Retention Suture: 2-0 Nylon
Retention Suture Bite Size: 3 mm
Length To Time In Minutes Device Was In Place: 10
Number Of Hemigard Strips Per Side: 1
Undermining Type: Entire Wound
Debridement Text: The wound edges were debrided prior to proceeding with the closure to facilitate wound healing.
Helical Rim Text: The closure involved the helical rim.
Vermilion Border Text: The closure involved the vermilion border.
Nostril Rim Text: The closure involved the nostril rim.
Retention Suture Text: Retention sutures were placed to support the closure and prevent dehiscence.
Suture Removal: 10 days
Lab: [id number]
Lab Facility: 
Graft Donor Site Bandage (Optional-Leave Blank If You Don't Want In Note): Steri-strips and a pressure bandage were applied to the donor site.
Epidermal Closure Graft Donor Site (Optional): simple interrupted
Billing Type: Third-Party Bill
Excision Depth: adipose tissue
Scalpel Size: 15 blade
Anesthesia Type: 1% lidocaine with 1:100,000 epinephrine and 408mcg clindamycin/ml and a 1:10 solution of 8.4% sodium bicarbonate
Hemostasis: Electrocautery
Estimated Blood Loss (Cc): minimal
Detail Level: Detailed
Anesthesia Type: 1% lidocaine with 1:100,000 epinephrine and a 1:10 solution of 8.4% sodium bicarbonate
Deep Sutures: 4-0 Polysorb
Epidermal Sutures: 4-0 Nylon
Epidermal Closure: running cuticular
Wound Care: Vaseline
Dressing: pressure dressing
Suturegard Intro: Intraoperative tissue expansion was performed, utilizing the SUTUREGARD device, in order to reduce wound tension.
Suturegard Body: The suture ends were repeatedly re-tightened and re-clamped to achieve the desired tissue expansion.
Hemigard Intro: Due to skin fragility and wound tension, it was decided to use HEMIGARD adhesive retention suture devices to permit a linear closure. The skin was cleaned and dried for a 6cm distance away from the wound. Excessive hair, if present, was removed to allow for adhesion.
Hemigard Postcare Instructions: The HEMIGARD strips are to remain completely dry for at least 5-7 days.
Positioning (Leave Blank If You Do Not Want): The patient was placed in a comfortable position exposing the surgical site.
Pre-Excision Curettage Text (Leave Blank If You Do Not Want): Prior to drawing the surgical margin the visible lesion was removed with electrodesiccation and curettage to clearly define the lesion size.
Complex Repair Preamble Text (Leave Blank If You Do Not Want): Extensive wide undermining was performed.
Intermediate Repair Preamble Text (Leave Blank If You Do Not Want): Undermining was performed with blunt dissection.
Curvilinear Excision Additional Text (Leave Blank If You Do Not Want): The margin was drawn around the clinically apparent lesion.  A curvilinear shape was then drawn on the skin incorporating the lesion and margins.  Incisions were then made along these lines to the appropriate tissue plane and the lesion was extirpated.
Fusiform Excision Additional Text (Leave Blank If You Do Not Want): The margin was drawn around the clinically apparent lesion.  A fusiform shape was then drawn on the skin incorporating the lesion and margins.  Incisions were then made along these lines to the appropriate tissue plane and the lesion was extirpated.
Elliptical Excision Additional Text (Leave Blank If You Do Not Want): The margin was drawn around the clinically apparent lesion.  An elliptical shape was then drawn on the skin incorporating the lesion and margins.  Incisions were then made along these lines to the appropriate tissue plane and the lesion was extirpated.
Saucerization Excision Additional Text (Leave Blank If You Do Not Want): The margin was drawn around the clinically apparent lesion.  Incisions were then made along these lines, in a tangential fashion, to the appropriate tissue plane and the lesion was extirpated.
Slit Excision Additional Text (Leave Blank If You Do Not Want): A linear line was drawn on the skin overlying the lesion. An incision was made slowly until the lesion was visualized.  Once visualized, the lesion was removed with blunt dissection.
Excisional Biopsy Additional Text (Leave Blank If You Do Not Want): The margin was drawn around the clinically apparent lesion. An elliptical shape was then drawn on the skin incorporating the lesion and margins.  Incisions were then made along these lines to the appropriate tissue plane and the lesion was extirpated.
Perilesional Excision Additional Text (Leave Blank If You Do Not Want): The margin was drawn around the clinically apparent lesion. Incisions were then made along these lines to the appropriate tissue plane and the lesion was extirpated.
Repair Performed By Another Provider Text (Leave Blank If You Do Not Want): After the tissue was excised the defect was repaired by another provider.
No Repair - Repaired With Adjacent Surgical Defect Text (Leave Blank If You Do Not Want): After the excision the defect was repaired concurrently with another surgical defect which was in close approximation.
Advancement Flap (Single) Text: The defect edges were debeveled with a #15 scalpel blade.  Given the location of the defect and the proximity to free margins a single advancement flap was deemed most appropriate.  Using a sterile surgical marker, an appropriate advancement flap was drawn incorporating the defect and placing the expected incisions within the relaxed skin tension lines where possible.    The area thus outlined was incised deep to adipose tissue with a #15 scalpel blade.  The skin margins were undermined to an appropriate distance in all directions utilizing iris scissors.
Advancement Flap (Double) Text: The defect edges were debeveled with a #15 scalpel blade.  Given the location of the defect and the proximity to free margins a double advancement flap was deemed most appropriate.  Using a sterile surgical marker, the appropriate advancement flaps were drawn incorporating the defect and placing the expected incisions within the relaxed skin tension lines where possible.    The area thus outlined was incised deep to adipose tissue with a #15 scalpel blade.  The skin margins were undermined to an appropriate distance in all directions utilizing iris scissors.
Burow's Advancement Flap Text: The defect edges were debeveled with a #15 scalpel blade.  Given the location of the defect and the proximity to free margins a Burow's advancement flap was deemed most appropriate.  Using a sterile surgical marker, the appropriate advancement flap was drawn incorporating the defect and placing the expected incisions within the relaxed skin tension lines where possible.    The area thus outlined was incised deep to adipose tissue with a #15 scalpel blade.  The skin margins were undermined to an appropriate distance in all directions utilizing iris scissors.
Chonodrocutaneous Helical Advancement Flap Text: The defect edges were debeveled with a #15 scalpel blade.  Given the location of the defect and the proximity to free margins a chondrocutaneous helical advancement flap was deemed most appropriate.  Using a sterile surgical marker, the appropriate advancement flap was drawn incorporating the defect and placing the expected incisions within the relaxed skin tension lines where possible.    The area thus outlined was incised deep to adipose tissue with a #15 scalpel blade.  The skin margins were undermined to an appropriate distance in all directions utilizing iris scissors.
Crescentic Advancement Flap Text: The defect edges were debeveled with a #15 scalpel blade.  Given the location of the defect and the proximity to free margins a crescentic advancement flap was deemed most appropriate.  Using a sterile surgical marker, the appropriate advancement flap was drawn incorporating the defect and placing the expected incisions within the relaxed skin tension lines where possible.    The area thus outlined was incised deep to adipose tissue with a #15 scalpel blade.  The skin margins were undermined to an appropriate distance in all directions utilizing iris scissors.
A-T Advancement Flap Text: The defect edges were debeveled with a #15 scalpel blade.  Given the location of the defect, shape of the defect and the proximity to free margins an A-T advancement flap was deemed most appropriate.  Using a sterile surgical marker, an appropriate advancement flap was drawn incorporating the defect and placing the expected incisions within the relaxed skin tension lines where possible.    The area thus outlined was incised deep to adipose tissue with a #15 scalpel blade.  The skin margins were undermined to an appropriate distance in all directions utilizing iris scissors.
O-T Advancement Flap Text: The defect edges were debeveled with a #15 scalpel blade.  Given the location of the defect, shape of the defect and the proximity to free margins an O-T advancement flap was deemed most appropriate.  Using a sterile surgical marker, an appropriate advancement flap was drawn incorporating the defect and placing the expected incisions within the relaxed skin tension lines where possible.    The area thus outlined was incised deep to adipose tissue with a #15 scalpel blade.  The skin margins were undermined to an appropriate distance in all directions utilizing iris scissors.
O-L Flap Text: The defect edges were debeveled with a #15 scalpel blade.  Given the location of the defect, shape of the defect and the proximity to free margins an O-L flap was deemed most appropriate.  Using a sterile surgical marker, an appropriate advancement flap was drawn incorporating the defect and placing the expected incisions within the relaxed skin tension lines where possible.    The area thus outlined was incised deep to adipose tissue with a #15 scalpel blade.  The skin margins were undermined to an appropriate distance in all directions utilizing iris scissors.
O-Z Flap Text: The defect edges were debeveled with a #15 scalpel blade.  Given the location of the defect, shape of the defect and the proximity to free margins an O-Z flap was deemed most appropriate.  Using a sterile surgical marker, an appropriate transposition flap was drawn incorporating the defect and placing the expected incisions within the relaxed skin tension lines where possible. The area thus outlined was incised deep to adipose tissue with a #15 scalpel blade.  The skin margins were undermined to an appropriate distance in all directions utilizing iris scissors.
Double O-Z Flap Text: The defect edges were debeveled with a #15 scalpel blade.  Given the location of the defect, shape of the defect and the proximity to free margins a Double O-Z flap was deemed most appropriate.  Using a sterile surgical marker, an appropriate transposition flap was drawn incorporating the defect and placing the expected incisions within the relaxed skin tension lines where possible. The area thus outlined was incised deep to adipose tissue with a #15 scalpel blade.  The skin margins were undermined to an appropriate distance in all directions utilizing iris scissors.
V-Y Flap Text: The defect edges were debeveled with a #15 scalpel blade.  Given the location of the defect, shape of the defect and the proximity to free margins a V-Y flap was deemed most appropriate.  Using a sterile surgical marker, an appropriate advancement flap was drawn incorporating the defect and placing the expected incisions within the relaxed skin tension lines where possible.    The area thus outlined was incised deep to adipose tissue with a #15 scalpel blade.  The skin margins were undermined to an appropriate distance in all directions utilizing iris scissors.
Advancement-Rotation Flap Text: The defect edges were debeveled with a #15 scalpel blade.  Given the location of the defect, shape of the defect and the proximity to free margins an advancement-rotation flap was deemed most appropriate.  Using a sterile surgical marker, an appropriate flap was drawn incorporating the defect and placing the expected incisions within the relaxed skin tension lines where possible. The area thus outlined was incised deep to adipose tissue with a #15 scalpel blade.  The skin margins were undermined to an appropriate distance in all directions utilizing iris scissors.
Mercedes Flap Text: The defect edges were debeveled with a #15 scalpel blade.  Given the location of the defect, shape of the defect and the proximity to free margins a Mercedes flap was deemed most appropriate.  Using a sterile surgical marker, an appropriate advancement flap was drawn incorporating the defect and placing the expected incisions within the relaxed skin tension lines where possible. The area thus outlined was incised deep to adipose tissue with a #15 scalpel blade.  The skin margins were undermined to an appropriate distance in all directions utilizing iris scissors.
Modified Advancement Flap Text: The defect edges were debeveled with a #15 scalpel blade.  Given the location of the defect, shape of the defect and the proximity to free margins a modified advancement flap was deemed most appropriate.  Using a sterile surgical marker, an appropriate advancement flap was drawn incorporating the defect and placing the expected incisions within the relaxed skin tension lines where possible.    The area thus outlined was incised deep to adipose tissue with a #15 scalpel blade.  The skin margins were undermined to an appropriate distance in all directions utilizing iris scissors.
Mucosal Advancement Flap Text: Given the location of the defect, shape of the defect and the proximity to free margins a mucosal advancement flap was deemed most appropriate. Incisions were made with a 15 blade scalpel in the appropriate fashion along the cutaneous vermilion border and the mucosal lip. The remaining actinically damaged mucosal tissue was excised.  The mucosal advancement flap was then elevated to the gingival sulcus with care taken to preserve the neurovascular structures and advanced into the primary defect. Care was taken to ensure that precise realignment of the vermilion border was achieved.
Peng Advancement Flap Text: The defect edges were debeveled with a #15 scalpel blade.  Given the location of the defect, shape of the defect and the proximity to free margins a Peng advancement flap was deemed most appropriate.  Using a sterile surgical marker, an appropriate advancement flap was drawn incorporating the defect and placing the expected incisions within the relaxed skin tension lines where possible. The area thus outlined was incised deep to adipose tissue with a #15 scalpel blade.  The skin margins were undermined to an appropriate distance in all directions utilizing iris scissors.
Hatchet Flap Text: The defect edges were debeveled with a #15 scalpel blade.  Given the location of the defect, shape of the defect and the proximity to free margins a hatchet flap was deemed most appropriate.  Using a sterile surgical marker, an appropriate hatchet flap was drawn incorporating the defect and placing the expected incisions within the relaxed skin tension lines where possible.    The area thus outlined was incised deep to adipose tissue with a #15 scalpel blade.  The skin margins were undermined to an appropriate distance in all directions utilizing iris scissors.
Rotation Flap Text: The defect edges were debeveled with a #15 scalpel blade.  Given the location of the defect, shape of the defect and the proximity to free margins a rotation flap was deemed most appropriate.  Using a sterile surgical marker, an appropriate rotation flap was drawn incorporating the defect and placing the expected incisions within the relaxed skin tension lines where possible.    The area thus outlined was incised deep to adipose tissue with a #15 scalpel blade.  The skin margins were undermined to an appropriate distance in all directions utilizing iris scissors.
Spiral Flap Text: The defect edges were debeveled with a #15 scalpel blade.  Given the location of the defect, shape of the defect and the proximity to free margins a spiral flap was deemed most appropriate.  Using a sterile surgical marker, an appropriate rotation flap was drawn incorporating the defect and placing the expected incisions within the relaxed skin tension lines where possible. The area thus outlined was incised deep to adipose tissue with a #15 scalpel blade.  The skin margins were undermined to an appropriate distance in all directions utilizing iris scissors.
Star Wedge Flap Text: The defect edges were debeveled with a #15 scalpel blade.  Given the location of the defect, shape of the defect and the proximity to free margins a star wedge flap was deemed most appropriate.  Using a sterile surgical marker, an appropriate rotation flap was drawn incorporating the defect and placing the expected incisions within the relaxed skin tension lines where possible. The area thus outlined was incised deep to adipose tissue with a #15 scalpel blade.  The skin margins were undermined to an appropriate distance in all directions utilizing iris scissors.
Transposition Flap Text: The defect edges were debeveled with a #15 scalpel blade.  Given the location of the defect and the proximity to free margins a transposition flap was deemed most appropriate.  Using a sterile surgical marker, an appropriate transposition flap was drawn incorporating the defect.    The area thus outlined was incised deep to adipose tissue with a #15 scalpel blade.  The skin margins were undermined to an appropriate distance in all directions utilizing iris scissors.
Muscle Hinge Flap Text: The defect edges were debeveled with a #15 scalpel blade.  Given the size, depth and location of the defect and the proximity to free margins a muscle hinge flap was deemed most appropriate.  Using a sterile surgical marker, an appropriate hinge flap was drawn incorporating the defect. The area thus outlined was incised with a #15 scalpel blade.  The skin margins were undermined to an appropriate distance in all directions utilizing iris scissors.
Nasal Turnover Hinge Flap Text: The defect edges were debeveled with a #15 scalpel blade.  Given the size, depth, location of the defect and the defect being full thickness a nasal turnover hinge flap was deemed most appropriate.  Using a sterile surgical marker, an appropriate hinge flap was drawn incorporating the defect. The area thus outlined was incised with a #15 scalpel blade. The flap was designed to recreate the nasal mucosal lining and the alar rim. The skin margins were undermined to an appropriate distance in all directions utilizing iris scissors.
Nasalis-Muscle-Based Myocutaneous Island Pedicle Flap Text: Using a #15 blade, an incision was made around the donor flap to the level of the nasalis muscle. Wide lateral undermining was then performed in both the subcutaneous plane above the nasalis muscle, and in a submuscular plane just above periosteum. This allowed the formation of a free nasalis muscle axial pedicle (based on the angular artery) which was still attached to the actual cutaneous flap, increasing its mobility and vascular viability. Hemostasis was obtained with pinpoint electrocoagulation. The flap was mobilized into position and the pivotal anchor points positioned and stabilized with buried interrupted sutures. Subcutaneous and dermal tissues were closed in a multilayered fashion with sutures. Tissue redundancies were excised, and the epidermal edges were apposed without significant tension and sutured with sutures.
Orbicularis Oris Muscle Flap Text: The defect edges were debeveled with a #15 scalpel blade.  Given that the defect affected the competency of the oral sphincter an obicularis oris muscle flap was deemed most appropriate to restore this competency and normal muscle function.  Using a sterile surgical marker, an appropriate flap was drawn incorporating the defect. The area thus outlined was incised with a #15 scalpel blade.
Melolabial Transposition Flap Text: The defect edges were debeveled with a #15 scalpel blade.  Given the location of the defect and the proximity to free margins a melolabial flap was deemed most appropriate.  Using a sterile surgical marker, an appropriate melolabial transposition flap was drawn incorporating the defect.    The area thus outlined was incised deep to adipose tissue with a #15 scalpel blade.  The skin margins were undermined to an appropriate distance in all directions utilizing iris scissors.
Rhombic Flap Text: The defect edges were debeveled with a #15 scalpel blade.  Given the location of the defect and the proximity to free margins a rhombic flap was deemed most appropriate.  Using a sterile surgical marker, an appropriate rhombic flap was drawn incorporating the defect.    The area thus outlined was incised deep to adipose tissue with a #15 scalpel blade.  The skin margins were undermined to an appropriate distance in all directions utilizing iris scissors.
Rhomboid Transposition Flap Text: The defect edges were debeveled with a #15 scalpel blade.  Given the location of the defect and the proximity to free margins a rhomboid transposition flap was deemed most appropriate.  Using a sterile surgical marker, an appropriate rhomboid flap was drawn incorporating the defect.    The area thus outlined was incised deep to adipose tissue with a #15 scalpel blade.  The skin margins were undermined to an appropriate distance in all directions utilizing iris scissors.
Bi-Rhombic Flap Text: The defect edges were debeveled with a #15 scalpel blade.  Given the location of the defect and the proximity to free margins a bi-rhombic flap was deemed most appropriate.  Using a sterile surgical marker, an appropriate rhombic flap was drawn incorporating the defect. The area thus outlined was incised deep to adipose tissue with a #15 scalpel blade.  The skin margins were undermined to an appropriate distance in all directions utilizing iris scissors.
Helical Rim Advancement Flap Text: The defect edges were debeveled with a #15 blade scalpel.  Given the location of the defect and the proximity to free margins (helical rim) a double helical rim advancement flap was deemed most appropriate.  Using a sterile surgical marker, the appropriate advancement flaps were drawn incorporating the defect and placing the expected incisions between the helical rim and antihelix where possible.  The area thus outlined was incised through and through with a #15 scalpel blade.  With a skin hook and iris scissors, the flaps were gently and sharply undermined and freed up.
Bilateral Helical Rim Advancement Flap Text: The defect edges were debeveled with a #15 blade scalpel.  Given the location of the defect and the proximity to free margins (helical rim) a bilateral helical rim advancement flap was deemed most appropriate.  Using a sterile surgical marker, the appropriate advancement flaps were drawn incorporating the defect and placing the expected incisions between the helical rim and antihelix where possible.  The area thus outlined was incised through and through with a #15 scalpel blade.  With a skin hook and iris scissors, the flaps were gently and sharply undermined and freed up.
Ear Star Wedge Flap Text: The defect edges were debeveled with a #15 blade scalpel.  Given the location of the defect and the proximity to free margins (helical rim) an ear star wedge flap was deemed most appropriate.  Using a sterile surgical marker, the appropriate flap was drawn incorporating the defect and placing the expected incisions between the helical rim and antihelix where possible.  The area thus outlined was incised through and through with a #15 scalpel blade.
Banner Transposition Flap Text: The defect edges were debeveled with a #15 scalpel blade.  Given the location of the defect and the proximity to free margins a Banner transposition flap was deemed most appropriate.  Using a sterile surgical marker, an appropriate flap drawn around the defect. The area thus outlined was incised deep to adipose tissue with a #15 scalpel blade.  The skin margins were undermined to an appropriate distance in all directions utilizing iris scissors.
Bilobed Flap Text: The defect edges were debeveled with a #15 scalpel blade.  Given the location of the defect and the proximity to free margins a bilobe flap was deemed most appropriate.  Using a sterile surgical marker, an appropriate bilobe flap drawn around the defect.    The area thus outlined was incised deep to adipose tissue with a #15 scalpel blade.  The skin margins were undermined to an appropriate distance in all directions utilizing iris scissors.
Bilobed Transposition Flap Text: The defect edges were debeveled with a #15 scalpel blade.  Given the location of the defect and the proximity to free margins a bilobed transposition flap was deemed most appropriate.  Using a sterile surgical marker, an appropriate bilobe flap drawn around the defect.    The area thus outlined was incised deep to adipose tissue with a #15 scalpel blade.  The skin margins were undermined to an appropriate distance in all directions utilizing iris scissors.
Trilobed Flap Text: The defect edges were debeveled with a #15 scalpel blade.  Given the location of the defect and the proximity to free margins a trilobed flap was deemed most appropriate.  Using a sterile surgical marker, an appropriate trilobed flap drawn around the defect.    The area thus outlined was incised deep to adipose tissue with a #15 scalpel blade.  The skin margins were undermined to an appropriate distance in all directions utilizing iris scissors.
Dorsal Nasal Flap Text: The defect edges were debeveled with a #15 scalpel blade.  Given the location of the defect and the proximity to free margins a dorsal nasal flap was deemed most appropriate.  Using a sterile surgical marker, an appropriate dorsal nasal flap was drawn around the defect.    The area thus outlined was incised deep to adipose tissue with a #15 scalpel blade.  The skin margins were undermined to an appropriate distance in all directions utilizing iris scissors.
Island Pedicle Flap Text: The defect edges were debeveled with a #15 scalpel blade.  Given the location of the defect, shape of the defect and the proximity to free margins an island pedicle advancement flap was deemed most appropriate.  Using a sterile surgical marker, an appropriate advancement flap was drawn incorporating the defect, outlining the appropriate donor tissue and placing the expected incisions within the relaxed skin tension lines where possible.    The area thus outlined was incised deep to adipose tissue with a #15 scalpel blade.  The skin margins were undermined to an appropriate distance in all directions around the primary defect and laterally outward around the island pedicle utilizing iris scissors.  There was minimal undermining beneath the pedicle flap.
Island Pedicle Flap With Canthal Suspension Text: The defect edges were debeveled with a #15 scalpel blade.  Given the location of the defect, shape of the defect and the proximity to free margins an island pedicle advancement flap was deemed most appropriate.  Using a sterile surgical marker, an appropriate advancement flap was drawn incorporating the defect, outlining the appropriate donor tissue and placing the expected incisions within the relaxed skin tension lines where possible. The area thus outlined was incised deep to adipose tissue with a #15 scalpel blade.  The skin margins were undermined to an appropriate distance in all directions around the primary defect and laterally outward around the island pedicle utilizing iris scissors.  There was minimal undermining beneath the pedicle flap. A suspension suture was placed in the canthal tendon to prevent tension and prevent ectropion.
Alar Island Pedicle Flap Text: The defect edges were debeveled with a #15 scalpel blade.  Given the location of the defect, shape of the defect and the proximity to the alar rim an island pedicle advancement flap was deemed most appropriate.  Using a sterile surgical marker, an appropriate advancement flap was drawn incorporating the defect, outlining the appropriate donor tissue and placing the expected incisions within the nasal ala running parallel to the alar rim. The area thus outlined was incised with a #15 scalpel blade.  The skin margins were undermined minimally to an appropriate distance in all directions around the primary defect and laterally outward around the island pedicle utilizing iris scissors.  There was minimal undermining beneath the pedicle flap.
Double Island Pedicle Flap Text: The defect edges were debeveled with a #15 scalpel blade.  Given the location of the defect, shape of the defect and the proximity to free margins a double island pedicle advancement flap was deemed most appropriate.  Using a sterile surgical marker, an appropriate advancement flap was drawn incorporating the defect, outlining the appropriate donor tissue and placing the expected incisions within the relaxed skin tension lines where possible.    The area thus outlined was incised deep to adipose tissue with a #15 scalpel blade.  The skin margins were undermined to an appropriate distance in all directions around the primary defect and laterally outward around the island pedicle utilizing iris scissors.  There was minimal undermining beneath the pedicle flap.
Island Pedicle Flap-Requiring Vessel Identification Text: The defect edges were debeveled with a #15 scalpel blade.  Given the location of the defect, shape of the defect and the proximity to free margins an island pedicle advancement flap was deemed most appropriate.  Using a sterile surgical marker, an appropriate advancement flap was drawn, based on the axial vessel mentioned above, incorporating the defect, outlining the appropriate donor tissue and placing the expected incisions within the relaxed skin tension lines where possible.    The area thus outlined was incised deep to adipose tissue with a #15 scalpel blade.  The skin margins were undermined to an appropriate distance in all directions around the primary defect and laterally outward around the island pedicle utilizing iris scissors.  There was minimal undermining beneath the pedicle flap.
Keystone Flap Text: The defect edges were debeveled with a #15 scalpel blade.  Given the location of the defect, shape of the defect a keystone flap was deemed most appropriate.  Using a sterile surgical marker, an appropriate keystone flap was drawn incorporating the defect, outlining the appropriate donor tissue and placing the expected incisions within the relaxed skin tension lines where possible. The area thus outlined was incised deep to adipose tissue with a #15 scalpel blade.  The skin margins were undermined to an appropriate distance in all directions around the primary defect and laterally outward around the flap utilizing iris scissors.
O-T Plasty Text: The defect edges were debeveled with a #15 scalpel blade.  Given the location of the defect, shape of the defect and the proximity to free margins an O-T plasty was deemed most appropriate.  Using a sterile surgical marker, an appropriate O-T plasty was drawn incorporating the defect and placing the expected incisions within the relaxed skin tension lines where possible.    The area thus outlined was incised deep to adipose tissue with a #15 scalpel blade.  The skin margins were undermined to an appropriate distance in all directions utilizing iris scissors.
O-Z Plasty Text: The defect edges were debeveled with a #15 scalpel blade.  Given the location of the defect, shape of the defect and the proximity to free margins an O-Z plasty (double transposition flap) was deemed most appropriate.  Using a sterile surgical marker, the appropriate transposition flaps were drawn incorporating the defect and placing the expected incisions within the relaxed skin tension lines where possible.    The area thus outlined was incised deep to adipose tissue with a #15 scalpel blade.  The skin margins were undermined to an appropriate distance in all directions utilizing iris scissors.  Hemostasis was achieved with electrocautery.  The flaps were then transposed into place, one clockwise and the other counterclockwise, and anchored with interrupted buried subcutaneous sutures.
Double O-Z Plasty Text: The defect edges were debeveled with a #15 scalpel blade.  Given the location of the defect, shape of the defect and the proximity to free margins a Double O-Z plasty (double transposition flap) was deemed most appropriate.  Using a sterile surgical marker, the appropriate transposition flaps were drawn incorporating the defect and placing the expected incisions within the relaxed skin tension lines where possible. The area thus outlined was incised deep to adipose tissue with a #15 scalpel blade.  The skin margins were undermined to an appropriate distance in all directions utilizing iris scissors.  Hemostasis was achieved with electrocautery.  The flaps were then transposed into place, one clockwise and the other counterclockwise, and anchored with interrupted buried subcutaneous sutures.
V-Y Plasty Text: The defect edges were debeveled with a #15 scalpel blade.  Given the location of the defect, shape of the defect and the proximity to free margins an V-Y advancement flap was deemed most appropriate.  Using a sterile surgical marker, an appropriate advancement flap was drawn incorporating the defect and placing the expected incisions within the relaxed skin tension lines where possible.    The area thus outlined was incised deep to adipose tissue with a #15 scalpel blade.  The skin margins were undermined to an appropriate distance in all directions utilizing iris scissors.
H Plasty Text: Given the location of the defect, shape of the defect and the proximity to free margins a H-plasty was deemed most appropriate for repair.  Using a sterile surgical marker, the appropriate advancement arms of the H-plasty were drawn incorporating the defect and placing the expected incisions within the relaxed skin tension lines where possible. The area thus outlined was incised deep to adipose tissue with a #15 scalpel blade. The skin margins were undermined to an appropriate distance in all directions utilizing iris scissors.  The opposing advancement arms were then advanced into place in opposite direction and anchored with interrupted buried subcutaneous sutures.
W Plasty Text: The lesion was extirpated to the level of the fat with a #15 scalpel blade.  Given the location of the defect, shape of the defect and the proximity to free margins a W-plasty was deemed most appropriate for repair.  Using a sterile surgical marker, the appropriate transposition arms of the W-plasty were drawn incorporating the defect and placing the expected incisions within the relaxed skin tension lines where possible.    The area thus outlined was incised deep to adipose tissue with a #15 scalpel blade.  The skin margins were undermined to an appropriate distance in all directions utilizing iris scissors.  The opposing transposition arms were then transposed into place in opposite direction and anchored with interrupted buried subcutaneous sutures.
Z Plasty Text: The lesion was extirpated to the level of the fat with a #15 scalpel blade.  Given the location of the defect, shape of the defect and the proximity to free margins a Z-plasty was deemed most appropriate for repair.  Using a sterile surgical marker, the appropriate transposition arms of the Z-plasty were drawn incorporating the defect and placing the expected incisions within the relaxed skin tension lines where possible.    The area thus outlined was incised deep to adipose tissue with a #15 scalpel blade.  The skin margins were undermined to an appropriate distance in all directions utilizing iris scissors.  The opposing transposition arms were then transposed into place in opposite direction and anchored with interrupted buried subcutaneous sutures.
Zygomaticofacial Flap Text: Given the location of the defect, shape of the defect and the proximity to free margins a zygomaticofacial flap was deemed most appropriate for repair.  Using a sterile surgical marker, the appropriate flap was drawn incorporating the defect and placing the expected incisions within the relaxed skin tension lines where possible. The area thus outlined was incised deep to adipose tissue with a #15 scalpel blade with preservation of a vascular pedicle.  The skin margins were undermined to an appropriate distance in all directions utilizing iris scissors.  The flap was then placed into the defect and anchored with interrupted buried subcutaneous sutures.
Cheek Interpolation Flap Text: A decision was made to reconstruct the defect utilizing an interpolation axial flap and a staged reconstruction.  A telfa template was made of the defect.  This telfa template was then used to outline the Cheek Interpolation flap.  The donor area for the pedicle flap was then injected with anesthesia.  The flap was excised through the skin and subcutaneous tissue down to the layer of the underlying musculature.  The interpolation flap was carefully excised within this deep plane to maintain its blood supply.  The edges of the donor site were undermined.   The donor site was closed in a primary fashion.  The pedicle was then rotated into position and sutured.  Once the tube was sutured into place, adequate blood supply was confirmed with blanching and refill.  The pedicle was then wrapped with xeroform gauze and dressed appropriately with a telfa and gauze bandage to ensure continued blood supply and protect the attached pedicle.
Cheek-To-Nose Interpolation Flap Text: A decision was made to reconstruct the defect utilizing an interpolation axial flap and a staged reconstruction.  A telfa template was made of the defect.  This telfa template was then used to outline the Cheek-To-Nose Interpolation flap.  The donor area for the pedicle flap was then injected with anesthesia.  The flap was excised through the skin and subcutaneous tissue down to the layer of the underlying musculature.  The interpolation flap was carefully excised within this deep plane to maintain its blood supply.  The edges of the donor site were undermined.   The donor site was closed in a primary fashion.  The pedicle was then rotated into position and sutured.  Once the tube was sutured into place, adequate blood supply was confirmed with blanching and refill.  The pedicle was then wrapped with xeroform gauze and dressed appropriately with a telfa and gauze bandage to ensure continued blood supply and protect the attached pedicle.
Interpolation Flap Text: A decision was made to reconstruct the defect utilizing an interpolation axial flap and a staged reconstruction.  A telfa template was made of the defect.  This telfa template was then used to outline the interpolation flap.  The donor area for the pedicle flap was then injected with anesthesia.  The flap was excised through the skin and subcutaneous tissue down to the layer of the underlying musculature.  The interpolation flap was carefully excised within this deep plane to maintain its blood supply.  The edges of the donor site were undermined.   The donor site was closed in a primary fashion.  The pedicle was then rotated into position and sutured.  Once the tube was sutured into place, adequate blood supply was confirmed with blanching and refill.  The pedicle was then wrapped with xeroform gauze and dressed appropriately with a telfa and gauze bandage to ensure continued blood supply and protect the attached pedicle.
Melolabial Interpolation Flap Text: A decision was made to reconstruct the defect utilizing an interpolation axial flap and a staged reconstruction.  A telfa template was made of the defect.  This telfa template was then used to outline the melolabial interpolation flap.  The donor area for the pedicle flap was then injected with anesthesia.  The flap was excised through the skin and subcutaneous tissue down to the layer of the underlying musculature.  The pedicle flap was carefully excised within this deep plane to maintain its blood supply.  The edges of the donor site were undermined.   The donor site was closed in a primary fashion.  The pedicle was then rotated into position and sutured.  Once the tube was sutured into place, adequate blood supply was confirmed with blanching and refill.  The pedicle was then wrapped with xeroform gauze and dressed appropriately with a telfa and gauze bandage to ensure continued blood supply and protect the attached pedicle.
Mastoid Interpolation Flap Text: A decision was made to reconstruct the defect utilizing an interpolation axial flap and a staged reconstruction.  A telfa template was made of the defect.  This telfa template was then used to outline the mastoid interpolation flap.  The donor area for the pedicle flap was then injected with anesthesia.  The flap was excised through the skin and subcutaneous tissue down to the layer of the underlying musculature.  The pedicle flap was carefully excised within this deep plane to maintain its blood supply.  The edges of the donor site were undermined.   The donor site was closed in a primary fashion.  The pedicle was then rotated into position and sutured.  Once the tube was sutured into place, adequate blood supply was confirmed with blanching and refill.  The pedicle was then wrapped with xeroform gauze and dressed appropriately with a telfa and gauze bandage to ensure continued blood supply and protect the attached pedicle.
Posterior Auricular Interpolation Flap Text: A decision was made to reconstruct the defect utilizing an interpolation axial flap and a staged reconstruction.  A telfa template was made of the defect.  This telfa template was then used to outline the posterior auricular interpolation flap.  The donor area for the pedicle flap was then injected with anesthesia.  The flap was excised through the skin and subcutaneous tissue down to the layer of the underlying musculature.  The pedicle flap was carefully excised within this deep plane to maintain its blood supply.  The edges of the donor site were undermined.   The donor site was closed in a primary fashion.  The pedicle was then rotated into position and sutured.  Once the tube was sutured into place, adequate blood supply was confirmed with blanching and refill.  The pedicle was then wrapped with xeroform gauze and dressed appropriately with a telfa and gauze bandage to ensure continued blood supply and protect the attached pedicle.
Paramedian Forehead Flap Text: A decision was made to reconstruct the defect utilizing an interpolation axial flap and a staged reconstruction.  A telfa template was made of the defect.  This telfa template was then used to outline the paramedian forehead pedicle flap.  The donor area for the pedicle flap was then injected with anesthesia.  The flap was excised through the skin and subcutaneous tissue down to the layer of the underlying musculature.  The pedicle flap was carefully excised within this deep plane to maintain its blood supply.  The edges of the donor site were undermined.   The donor site was closed in a primary fashion.  The pedicle was then rotated into position and sutured.  Once the tube was sutured into place, adequate blood supply was confirmed with blanching and refill.  The pedicle was then wrapped with xeroform gauze and dressed appropriately with a telfa and gauze bandage to ensure continued blood supply and protect the attached pedicle.
Lip Wedge Excision Repair Text: Given the location of the defect and the proximity to free margins a full thickness wedge repair was deemed most appropriate.  Using a sterile surgical marker, the appropriate repair was drawn incorporating the defect and placing the expected incisions perpendicular to the vermilion border.  The vermilion border was also meticulously outlined to ensure appropriate reapproximation during the repair.  The area thus outlined was incised through and through with a #15 scalpel blade.  The muscularis and dermis were reaproximated with deep sutures following hemostasis. Care was taken to realign the vermilion border before proceeding with the superficial closure.  Once the vermilion was realigned the superfical and mucosal closure was finished.
Ftsg Text: The defect edges were debeveled with a #15 scalpel blade.  Given the location of the defect, shape of the defect and the proximity to free margins a full thickness skin graft was deemed most appropriate.  Using a sterile surgical marker, the primary defect shape was transferred to the donor site. The area thus outlined was incised deep to adipose tissue with a #15 scalpel blade.  The harvested graft was then trimmed of adipose tissue until only dermis and epidermis was left.  The skin margins of the secondary defect were undermined to an appropriate distance in all directions utilizing iris scissors.  The secondary defect was closed with interrupted buried subcutaneous sutures.  The skin edges were then re-apposed with running  sutures.  The skin graft was then placed in the primary defect and oriented appropriately.
Split-Thickness Skin Graft Text: The defect edges were debeveled with a #15 scalpel blade.  Given the location of the defect, shape of the defect and the proximity to free margins a split thickness skin graft was deemed most appropriate.  Using a sterile surgical marker, the primary defect shape was transferred to the donor site. The split thickness graft was then harvested.  The skin graft was then placed in the primary defect and oriented appropriately.
Burow's Graft Text: The defect edges were debeveled with a #15 scalpel blade.  Given the location of the defect, shape of the defect, the proximity to free margins and the presence of a standing cone deformity a Burow's skin graft was deemed most appropriate. The standing cone was removed and this tissue was then trimmed to the shape of the primary defect. The adipose tissue was also removed until only dermis and epidermis were left.  The skin margins of the secondary defect were undermined to an appropriate distance in all directions utilizing iris scissors.  The secondary defect was closed with interrupted buried subcutaneous sutures.  The skin edges were then re-apposed with running  sutures.  The skin graft was then placed in the primary defect and oriented appropriately.
Cartilage Graft Text: The defect edges were debeveled with a #15 scalpel blade.  Given the location of the defect, shape of the defect, the fact the defect involved a full thickness cartilage defect a cartilage graft was deemed most appropriate.  An appropriate donor site was identified, cleansed, and anesthetized. The cartilage graft was then harvested and transferred to the recipient site, oriented appropriately and then sutured into place.  The secondary defect was then repaired using a primary closure.
Composite Graft Text: The defect edges were debeveled with a #15 scalpel blade.  Given the location of the defect, shape of the defect, the proximity to free margins and the fact the defect was full thickness a composite graft was deemed most appropriate.  The defect was outline and then transferred to the donor site.  A full thickness graft was then excised from the donor site. The graft was then placed in the primary defect, oriented appropriately and then sutured into place.  The secondary defect was then repaired using a primary closure.
Epidermal Autograft Text: The defect edges were debeveled with a #15 scalpel blade.  Given the location of the defect, shape of the defect and the proximity to free margins an epidermal autograft was deemed most appropriate.  Using a sterile surgical marker, the primary defect shape was transferred to the donor site. The epidermal graft was then harvested.  The skin graft was then placed in the primary defect and oriented appropriately.
Dermal Autograft Text: The defect edges were debeveled with a #15 scalpel blade.  Given the location of the defect, shape of the defect and the proximity to free margins a dermal autograft was deemed most appropriate.  Using a sterile surgical marker, the primary defect shape was transferred to the donor site. The area thus outlined was incised deep to adipose tissue with a #15 scalpel blade.  The harvested graft was then trimmed of adipose and epidermal tissue until only dermis was left.  The skin graft was then placed in the primary defect and oriented appropriately.
Skin Substitute Text: The defect edges were debeveled with a #15 scalpel blade.  Given the location of the defect, shape of the defect and the proximity to free margins a skin substitute graft was deemed most appropriate.  The graft material was trimmed to fit the size of the defect. The graft was then placed in the primary defect and oriented appropriately.
Tissue Cultured Epidermal Autograft Text: The defect edges were debeveled with a #15 scalpel blade.  Given the location of the defect, shape of the defect and the proximity to free margins a tissue cultured epidermal autograft was deemed most appropriate.  The graft was then trimmed to fit the size of the defect.  The graft was then placed in the primary defect and oriented appropriately.
Xenograft Text: The defect edges were debeveled with a #15 scalpel blade.  Given the location of the defect, shape of the defect and the proximity to free margins a xenograft was deemed most appropriate.  The graft was then trimmed to fit the size of the defect.  The graft was then placed in the primary defect and oriented appropriately.
Purse String (Intermediate) Text: Given the location of the defect and the characteristics of the surrounding skin a purse string intermediate closure was deemed most appropriate.  Undermining was performed circumfirentially around the surgical defect.  A purse string suture was then placed and tightened.
Purse String (Simple) Text: Given the location of the defect and the characteristics of the surrounding skin a purse string simple closure was deemed most appropriate.  Undermining was performed circumferentially around the surgical defect.  A purse string suture was then placed and tightened.
Partial Purse String (Intermediate) Text: Given the location of the defect and the characteristics of the surrounding skin an intermediate purse string closure was deemed most appropriate.  Undermining was performed circumferentially around the surgical defect.  A purse string suture was then placed and tightened. Wound tension of the circular defect prevented complete closure of the wound.
Partial Purse String (Simple) Text: Given the location of the defect and the characteristics of the surrounding skin a simple purse string closure was deemed most appropriate.  Undermining was performed circumferentially around the surgical defect.  A purse string suture was then placed and tightened. Wound tension of the circular defect prevented complete closure of the wound.
Complex Repair And Single Advancement Flap Text: The defect edges were debeveled with a #15 scalpel blade.  The primary defect was closed partially with a complex linear closure.  Given the location of the remaining defect, shape of the defect and the proximity to free margins a single advancement flap was deemed most appropriate for complete closure of the defect.  Using a sterile surgical marker, an appropriate advancement flap was drawn incorporating the defect and placing the expected incisions within the relaxed skin tension lines where possible.    The area thus outlined was incised deep to adipose tissue with a #15 scalpel blade.  The skin margins were undermined to an appropriate distance in all directions utilizing iris scissors.
Complex Repair And Double Advancement Flap Text: The defect edges were debeveled with a #15 scalpel blade.  The primary defect was closed partially with a complex linear closure.  Given the location of the remaining defect, shape of the defect and the proximity to free margins a double advancement flap was deemed most appropriate for complete closure of the defect.  Using a sterile surgical marker, an appropriate advancement flap was drawn incorporating the defect and placing the expected incisions within the relaxed skin tension lines where possible.    The area thus outlined was incised deep to adipose tissue with a #15 scalpel blade.  The skin margins were undermined to an appropriate distance in all directions utilizing iris scissors.
Complex Repair And Modified Advancement Flap Text: The defect edges were debeveled with a #15 scalpel blade.  The primary defect was closed partially with a complex linear closure.  Given the location of the remaining defect, shape of the defect and the proximity to free margins a modified advancement flap was deemed most appropriate for complete closure of the defect.  Using a sterile surgical marker, an appropriate advancement flap was drawn incorporating the defect and placing the expected incisions within the relaxed skin tension lines where possible.    The area thus outlined was incised deep to adipose tissue with a #15 scalpel blade.  The skin margins were undermined to an appropriate distance in all directions utilizing iris scissors.
Complex Repair And A-T Advancement Flap Text: The defect edges were debeveled with a #15 scalpel blade.  The primary defect was closed partially with a complex linear closure.  Given the location of the remaining defect, shape of the defect and the proximity to free margins an A-T advancement flap was deemed most appropriate for complete closure of the defect.  Using a sterile surgical marker, an appropriate advancement flap was drawn incorporating the defect and placing the expected incisions within the relaxed skin tension lines where possible.    The area thus outlined was incised deep to adipose tissue with a #15 scalpel blade.  The skin margins were undermined to an appropriate distance in all directions utilizing iris scissors.
Complex Repair And O-T Advancement Flap Text: The defect edges were debeveled with a #15 scalpel blade.  The primary defect was closed partially with a complex linear closure.  Given the location of the remaining defect, shape of the defect and the proximity to free margins an O-T advancement flap was deemed most appropriate for complete closure of the defect.  Using a sterile surgical marker, an appropriate advancement flap was drawn incorporating the defect and placing the expected incisions within the relaxed skin tension lines where possible.    The area thus outlined was incised deep to adipose tissue with a #15 scalpel blade.  The skin margins were undermined to an appropriate distance in all directions utilizing iris scissors.
Complex Repair And O-L Flap Text: The defect edges were debeveled with a #15 scalpel blade.  The primary defect was closed partially with a complex linear closure.  Given the location of the remaining defect, shape of the defect and the proximity to free margins an O-L flap was deemed most appropriate for complete closure of the defect.  Using a sterile surgical marker, an appropriate flap was drawn incorporating the defect and placing the expected incisions within the relaxed skin tension lines where possible.    The area thus outlined was incised deep to adipose tissue with a #15 scalpel blade.  The skin margins were undermined to an appropriate distance in all directions utilizing iris scissors.
Complex Repair And Bilobe Flap Text: The defect edges were debeveled with a #15 scalpel blade.  The primary defect was closed partially with a complex linear closure.  Given the location of the remaining defect, shape of the defect and the proximity to free margins a bilobe flap was deemed most appropriate for complete closure of the defect.  Using a sterile surgical marker, an appropriate advancement flap was drawn incorporating the defect and placing the expected incisions within the relaxed skin tension lines where possible.    The area thus outlined was incised deep to adipose tissue with a #15 scalpel blade.  The skin margins were undermined to an appropriate distance in all directions utilizing iris scissors.
Complex Repair And Melolabial Flap Text: The defect edges were debeveled with a #15 scalpel blade.  The primary defect was closed partially with a complex linear closure.  Given the location of the remaining defect, shape of the defect and the proximity to free margins a melolabial flap was deemed most appropriate for complete closure of the defect.  Using a sterile surgical marker, an appropriate advancement flap was drawn incorporating the defect and placing the expected incisions within the relaxed skin tension lines where possible.    The area thus outlined was incised deep to adipose tissue with a #15 scalpel blade.  The skin margins were undermined to an appropriate distance in all directions utilizing iris scissors.
Complex Repair And Rotation Flap Text: The defect edges were debeveled with a #15 scalpel blade.  The primary defect was closed partially with a complex linear closure.  Given the location of the remaining defect, shape of the defect and the proximity to free margins a rotation flap was deemed most appropriate for complete closure of the defect.  Using a sterile surgical marker, an appropriate advancement flap was drawn incorporating the defect and placing the expected incisions within the relaxed skin tension lines where possible.    The area thus outlined was incised deep to adipose tissue with a #15 scalpel blade.  The skin margins were undermined to an appropriate distance in all directions utilizing iris scissors.
Complex Repair And Rhombic Flap Text: The defect edges were debeveled with a #15 scalpel blade.  The primary defect was closed partially with a complex linear closure.  Given the location of the remaining defect, shape of the defect and the proximity to free margins a rhombic flap was deemed most appropriate for complete closure of the defect.  Using a sterile surgical marker, an appropriate advancement flap was drawn incorporating the defect and placing the expected incisions within the relaxed skin tension lines where possible.    The area thus outlined was incised deep to adipose tissue with a #15 scalpel blade.  The skin margins were undermined to an appropriate distance in all directions utilizing iris scissors.
Complex Repair And Transposition Flap Text: The defect edges were debeveled with a #15 scalpel blade.  The primary defect was closed partially with a complex linear closure.  Given the location of the remaining defect, shape of the defect and the proximity to free margins a transposition flap was deemed most appropriate for complete closure of the defect.  Using a sterile surgical marker, an appropriate advancement flap was drawn incorporating the defect and placing the expected incisions within the relaxed skin tension lines where possible.    The area thus outlined was incised deep to adipose tissue with a #15 scalpel blade.  The skin margins were undermined to an appropriate distance in all directions utilizing iris scissors.
Complex Repair And V-Y Plasty Text: The defect edges were debeveled with a #15 scalpel blade.  The primary defect was closed partially with a complex linear closure.  Given the location of the remaining defect, shape of the defect and the proximity to free margins a V-Y plasty was deemed most appropriate for complete closure of the defect.  Using a sterile surgical marker, an appropriate advancement flap was drawn incorporating the defect and placing the expected incisions within the relaxed skin tension lines where possible.    The area thus outlined was incised deep to adipose tissue with a #15 scalpel blade.  The skin margins were undermined to an appropriate distance in all directions utilizing iris scissors.
Complex Repair And M Plasty Text: The defect edges were debeveled with a #15 scalpel blade.  The primary defect was closed partially with a complex linear closure.  Given the location of the remaining defect, shape of the defect and the proximity to free margins an M plasty was deemed most appropriate for complete closure of the defect.  Using a sterile surgical marker, an appropriate advancement flap was drawn incorporating the defect and placing the expected incisions within the relaxed skin tension lines where possible.    The area thus outlined was incised deep to adipose tissue with a #15 scalpel blade.  The skin margins were undermined to an appropriate distance in all directions utilizing iris scissors.
Complex Repair And Double M Plasty Text: The defect edges were debeveled with a #15 scalpel blade.  The primary defect was closed partially with a complex linear closure.  Given the location of the remaining defect, shape of the defect and the proximity to free margins a double M plasty was deemed most appropriate for complete closure of the defect.  Using a sterile surgical marker, an appropriate advancement flap was drawn incorporating the defect and placing the expected incisions within the relaxed skin tension lines where possible.    The area thus outlined was incised deep to adipose tissue with a #15 scalpel blade.  The skin margins were undermined to an appropriate distance in all directions utilizing iris scissors.
Complex Repair And W Plasty Text: The defect edges were debeveled with a #15 scalpel blade.  The primary defect was closed partially with a complex linear closure.  Given the location of the remaining defect, shape of the defect and the proximity to free margins a W plasty was deemed most appropriate for complete closure of the defect.  Using a sterile surgical marker, an appropriate advancement flap was drawn incorporating the defect and placing the expected incisions within the relaxed skin tension lines where possible.    The area thus outlined was incised deep to adipose tissue with a #15 scalpel blade.  The skin margins were undermined to an appropriate distance in all directions utilizing iris scissors.
Complex Repair And Z Plasty Text: The defect edges were debeveled with a #15 scalpel blade.  The primary defect was closed partially with a complex linear closure.  Given the location of the remaining defect, shape of the defect and the proximity to free margins a Z plasty was deemed most appropriate for complete closure of the defect.  Using a sterile surgical marker, an appropriate advancement flap was drawn incorporating the defect and placing the expected incisions within the relaxed skin tension lines where possible.    The area thus outlined was incised deep to adipose tissue with a #15 scalpel blade.  The skin margins were undermined to an appropriate distance in all directions utilizing iris scissors.
Complex Repair And Dorsal Nasal Flap Text: The defect edges were debeveled with a #15 scalpel blade.  The primary defect was closed partially with a complex linear closure.  Given the location of the remaining defect, shape of the defect and the proximity to free margins a dorsal nasal flap was deemed most appropriate for complete closure of the defect.  Using a sterile surgical marker, an appropriate flap was drawn incorporating the defect and placing the expected incisions within the relaxed skin tension lines where possible.    The area thus outlined was incised deep to adipose tissue with a #15 scalpel blade.  The skin margins were undermined to an appropriate distance in all directions utilizing iris scissors.
Complex Repair And Ftsg Text: The defect edges were debeveled with a #15 scalpel blade.  The primary defect was closed partially with a complex linear closure.  Given the location of the defect, shape of the defect and the proximity to free margins a full thickness skin graft was deemed most appropriate to repair the remaining defect.  The graft was trimmed to fit the size of the remaining defect.  The graft was then placed in the primary defect, oriented appropriately, and sutured into place.
Complex Repair And Burow's Graft Text: The defect edges were debeveled with a #15 scalpel blade.  The primary defect was closed partially with a complex linear closure.  Given the location of the defect, shape of the defect, the proximity to free margins and the presence of a standing cone deformity a Burow's graft was deemed most appropriate to repair the remaining defect.  The graft was trimmed to fit the size of the remaining defect.  The graft was then placed in the primary defect, oriented appropriately, and sutured into place.
Complex Repair And Split-Thickness Skin Graft Text: The defect edges were debeveled with a #15 scalpel blade.  The primary defect was closed partially with a complex linear closure.  Given the location of the defect, shape of the defect and the proximity to free margins a split thickness skin graft was deemed most appropriate to repair the remaining defect.  The graft was trimmed to fit the size of the remaining defect.  The graft was then placed in the primary defect, oriented appropriately, and sutured into place.
Complex Repair And Epidermal Autograft Text: The defect edges were debeveled with a #15 scalpel blade.  The primary defect was closed partially with a complex linear closure.  Given the location of the defect, shape of the defect and the proximity to free margins an epidermal autograft was deemed most appropriate to repair the remaining defect.  The graft was trimmed to fit the size of the remaining defect.  The graft was then placed in the primary defect, oriented appropriately, and sutured into place.
Complex Repair And Dermal Autograft Text: The defect edges were debeveled with a #15 scalpel blade.  The primary defect was closed partially with a complex linear closure.  Given the location of the defect, shape of the defect and the proximity to free margins an dermal autograft was deemed most appropriate to repair the remaining defect.  The graft was trimmed to fit the size of the remaining defect.  The graft was then placed in the primary defect, oriented appropriately, and sutured into place.
Complex Repair And Tissue Cultured Epidermal Autograft Text: The defect edges were debeveled with a #15 scalpel blade.  The primary defect was closed partially with a complex linear closure.  Given the location of the defect, shape of the defect and the proximity to free margins an tissue cultured epidermal autograft was deemed most appropriate to repair the remaining defect.  The graft was trimmed to fit the size of the remaining defect.  The graft was then placed in the primary defect, oriented appropriately, and sutured into place.
Complex Repair And Xenograft Text: The defect edges were debeveled with a #15 scalpel blade.  The primary defect was closed partially with a complex linear closure.  Given the location of the defect, shape of the defect and the proximity to free margins a xenograft was deemed most appropriate to repair the remaining defect.  The graft was trimmed to fit the size of the remaining defect.  The graft was then placed in the primary defect, oriented appropriately, and sutured into place.
Complex Repair And Skin Substitute Graft Text: The defect edges were debeveled with a #15 scalpel blade.  The primary defect was closed partially with a complex linear closure.  Given the location of the remaining defect, shape of the defect and the proximity to free margins a skin substitute graft was deemed most appropriate to repair the remaining defect.  The graft was trimmed to fit the size of the remaining defect.  The graft was then placed in the primary defect, oriented appropriately, and sutured into place.
Path Notes (To The Dermatopathologist): Please check margins.
Consent: Verbal consent was obtained from the patient. The risks and benefits to therapy were discussed in detail. Specifically, the risks of infection, scarring, bleeding, prolonged wound healing, incomplete removal, allergy to anesthesia, nerve injury and recurrence were addressed. Prior to the procedure, the treatment site was clearly identified and confirmed by the patient. All components of Universal Protocol/PAUSE Rule completed.
Post-Care Instructions: I reviewed with the patient in detail post-care instructions. Patient is not to engage in any heavy lifting, exercise, or swimming for the next 14 days. Should the patient develop any fevers, chills, bleeding, severe pain patient will contact the office immediately.
Where Do You Want The Question To Include Opioid Counseling Located?: Case Summary Tab
Information: Selecting Yes will display possible errors in your note based on the variables you have selected. This validation is only offered as a suggestion for you. PLEASE NOTE THAT THE VALIDATION TEXT WILL BE REMOVED WHEN YOU FINALIZE YOUR NOTE. IF YOU WANT TO FAX A PRELIMINARY NOTE YOU WILL NEED TO TOGGLE THIS TO 'NO' IF YOU DO NOT WANT IT IN YOUR FAXED NOTE.

## 2021-01-11 ENCOUNTER — APPOINTMENT (RX ONLY)
Dept: URBAN - METROPOLITAN AREA CLINIC 4 | Facility: CLINIC | Age: 71
Setting detail: DERMATOLOGY
End: 2021-01-11

## 2021-01-11 PROBLEM — C44.629 SQUAMOUS CELL CARCINOMA OF SKIN OF LEFT UPPER LIMB, INCLUDING SHOULDER: Status: ACTIVE | Noted: 2021-01-11

## 2021-01-11 PROCEDURE — 12032 INTMD RPR S/A/T/EXT 2.6-7.5: CPT

## 2021-01-11 PROCEDURE — 11602 EXC TR-EXT MAL+MARG 1.1-2 CM: CPT

## 2021-01-11 PROCEDURE — ? EXCISION

## 2021-01-11 NOTE — PROCEDURE: EXCISION
Surgeon Performing The Repair (Optional): Tien
Biopsy Photograph Reviewed: Yes
Previous Accession (Optional): [id number]
Size Of Lesion In Cm: 0.6
X Size Of Lesion In Cm (Optional): 0
Size Of Margin In Cm: 0.3
Excision Method: Fusiform
Anesthesia Volume In Cc: 6
Did You Provide Opioid Counseling: No
Repair Type: Intermediate
Suturegard Retention Suture: 2-0 Nylon
Retention Suture Bite Size: 3 mm
Length To Time In Minutes Device Was In Place: 10
Number Of Hemigard Strips Per Side: 1
Intermediate / Complex Repair - Final Wound Length In Cm: 3.8
Undermining Type: Entire Wound
Debridement Text: The wound edges were debrided prior to proceeding with the closure to facilitate wound healing.
Helical Rim Text: The closure involved the helical rim.
Vermilion Border Text: The closure involved the vermilion border.
Nostril Rim Text: The closure involved the nostril rim.
Retention Suture Text: Retention sutures were placed to support the closure and prevent dehiscence.
Suture Removal: 10 days
Lab: [id number]
Lab Facility: 
Graft Donor Site Bandage (Optional-Leave Blank If You Don't Want In Note): Steri-strips and a pressure bandage were applied to the donor site.
Epidermal Closure Graft Donor Site (Optional): simple interrupted
Billing Type: Third-Party Bill
Excision Depth: adipose tissue
Scalpel Size: 15 blade
Anesthesia Type: 1% lidocaine with 1:100,000 epinephrine and a 1:10 solution of 8.4% sodium bicarbonate
Hemostasis: Electrocautery
Estimated Blood Loss (Cc): minimal
Detail Level: Detailed
Deep Sutures: 4-0 Polysorb
Number Of Deep Sutures (Optional): 2
Epidermal Sutures: 5-0 Nylon
Epidermal Closure: running cuticular
Wound Care: Vaseline
Dressing: pressure dressing
Suturegard Intro: Intraoperative tissue expansion was performed, utilizing the SUTUREGARD device, in order to reduce wound tension.
Suturegard Body: The suture ends were repeatedly re-tightened and re-clamped to achieve the desired tissue expansion.
Hemigard Intro: Due to skin fragility and wound tension, it was decided to use HEMIGARD adhesive retention suture devices to permit a linear closure. The skin was cleaned and dried for a 6cm distance away from the wound. Excessive hair, if present, was removed to allow for adhesion.
Hemigard Postcare Instructions: The HEMIGARD strips are to remain completely dry for at least 5-7 days.
Positioning (Leave Blank If You Do Not Want): The patient was placed in a comfortable position exposing the surgical site.
Pre-Excision Curettage Text (Leave Blank If You Do Not Want): Prior to drawing the surgical margin the visible lesion was removed with electrodesiccation and curettage to clearly define the lesion size.
Complex Repair Preamble Text (Leave Blank If You Do Not Want): Extensive wide undermining was performed.
Intermediate Repair Preamble Text (Leave Blank If You Do Not Want): Undermining was performed with blunt dissection.
Curvilinear Excision Additional Text (Leave Blank If You Do Not Want): The margin was drawn around the clinically apparent lesion.  A curvilinear shape was then drawn on the skin incorporating the lesion and margins.  Incisions were then made along these lines to the appropriate tissue plane and the lesion was extirpated.
Fusiform Excision Additional Text (Leave Blank If You Do Not Want): The margin was drawn around the clinically apparent lesion.  A fusiform shape was then drawn on the skin incorporating the lesion and margins.  Incisions were then made along these lines to the appropriate tissue plane and the lesion was extirpated.
Elliptical Excision Additional Text (Leave Blank If You Do Not Want): The margin was drawn around the clinically apparent lesion.  An elliptical shape was then drawn on the skin incorporating the lesion and margins.  Incisions were then made along these lines to the appropriate tissue plane and the lesion was extirpated.
Saucerization Excision Additional Text (Leave Blank If You Do Not Want): The margin was drawn around the clinically apparent lesion.  Incisions were then made along these lines, in a tangential fashion, to the appropriate tissue plane and the lesion was extirpated.
Slit Excision Additional Text (Leave Blank If You Do Not Want): A linear line was drawn on the skin overlying the lesion. An incision was made slowly until the lesion was visualized.  Once visualized, the lesion was removed with blunt dissection.
Excisional Biopsy Additional Text (Leave Blank If You Do Not Want): The margin was drawn around the clinically apparent lesion. An elliptical shape was then drawn on the skin incorporating the lesion and margins.  Incisions were then made along these lines to the appropriate tissue plane and the lesion was extirpated.
Perilesional Excision Additional Text (Leave Blank If You Do Not Want): The margin was drawn around the clinically apparent lesion. Incisions were then made along these lines to the appropriate tissue plane and the lesion was extirpated.
Repair Performed By Another Provider Text (Leave Blank If You Do Not Want): After the tissue was excised the defect was repaired by another provider.
No Repair - Repaired With Adjacent Surgical Defect Text (Leave Blank If You Do Not Want): After the excision the defect was repaired concurrently with another surgical defect which was in close approximation.
Advancement Flap (Single) Text: The defect edges were debeveled with a #15 scalpel blade.  Given the location of the defect and the proximity to free margins a single advancement flap was deemed most appropriate.  Using a sterile surgical marker, an appropriate advancement flap was drawn incorporating the defect and placing the expected incisions within the relaxed skin tension lines where possible.    The area thus outlined was incised deep to adipose tissue with a #15 scalpel blade.  The skin margins were undermined to an appropriate distance in all directions utilizing iris scissors.
Advancement Flap (Double) Text: The defect edges were debeveled with a #15 scalpel blade.  Given the location of the defect and the proximity to free margins a double advancement flap was deemed most appropriate.  Using a sterile surgical marker, the appropriate advancement flaps were drawn incorporating the defect and placing the expected incisions within the relaxed skin tension lines where possible.    The area thus outlined was incised deep to adipose tissue with a #15 scalpel blade.  The skin margins were undermined to an appropriate distance in all directions utilizing iris scissors.
Burow's Advancement Flap Text: The defect edges were debeveled with a #15 scalpel blade.  Given the location of the defect and the proximity to free margins a Burow's advancement flap was deemed most appropriate.  Using a sterile surgical marker, the appropriate advancement flap was drawn incorporating the defect and placing the expected incisions within the relaxed skin tension lines where possible.    The area thus outlined was incised deep to adipose tissue with a #15 scalpel blade.  The skin margins were undermined to an appropriate distance in all directions utilizing iris scissors.
Chonodrocutaneous Helical Advancement Flap Text: The defect edges were debeveled with a #15 scalpel blade.  Given the location of the defect and the proximity to free margins a chondrocutaneous helical advancement flap was deemed most appropriate.  Using a sterile surgical marker, the appropriate advancement flap was drawn incorporating the defect and placing the expected incisions within the relaxed skin tension lines where possible.    The area thus outlined was incised deep to adipose tissue with a #15 scalpel blade.  The skin margins were undermined to an appropriate distance in all directions utilizing iris scissors.
Crescentic Advancement Flap Text: The defect edges were debeveled with a #15 scalpel blade.  Given the location of the defect and the proximity to free margins a crescentic advancement flap was deemed most appropriate.  Using a sterile surgical marker, the appropriate advancement flap was drawn incorporating the defect and placing the expected incisions within the relaxed skin tension lines where possible.    The area thus outlined was incised deep to adipose tissue with a #15 scalpel blade.  The skin margins were undermined to an appropriate distance in all directions utilizing iris scissors.
A-T Advancement Flap Text: The defect edges were debeveled with a #15 scalpel blade.  Given the location of the defect, shape of the defect and the proximity to free margins an A-T advancement flap was deemed most appropriate.  Using a sterile surgical marker, an appropriate advancement flap was drawn incorporating the defect and placing the expected incisions within the relaxed skin tension lines where possible.    The area thus outlined was incised deep to adipose tissue with a #15 scalpel blade.  The skin margins were undermined to an appropriate distance in all directions utilizing iris scissors.
O-T Advancement Flap Text: The defect edges were debeveled with a #15 scalpel blade.  Given the location of the defect, shape of the defect and the proximity to free margins an O-T advancement flap was deemed most appropriate.  Using a sterile surgical marker, an appropriate advancement flap was drawn incorporating the defect and placing the expected incisions within the relaxed skin tension lines where possible.    The area thus outlined was incised deep to adipose tissue with a #15 scalpel blade.  The skin margins were undermined to an appropriate distance in all directions utilizing iris scissors.
O-L Flap Text: The defect edges were debeveled with a #15 scalpel blade.  Given the location of the defect, shape of the defect and the proximity to free margins an O-L flap was deemed most appropriate.  Using a sterile surgical marker, an appropriate advancement flap was drawn incorporating the defect and placing the expected incisions within the relaxed skin tension lines where possible.    The area thus outlined was incised deep to adipose tissue with a #15 scalpel blade.  The skin margins were undermined to an appropriate distance in all directions utilizing iris scissors.
O-Z Flap Text: The defect edges were debeveled with a #15 scalpel blade.  Given the location of the defect, shape of the defect and the proximity to free margins an O-Z flap was deemed most appropriate.  Using a sterile surgical marker, an appropriate transposition flap was drawn incorporating the defect and placing the expected incisions within the relaxed skin tension lines where possible. The area thus outlined was incised deep to adipose tissue with a #15 scalpel blade.  The skin margins were undermined to an appropriate distance in all directions utilizing iris scissors.
Double O-Z Flap Text: The defect edges were debeveled with a #15 scalpel blade.  Given the location of the defect, shape of the defect and the proximity to free margins a Double O-Z flap was deemed most appropriate.  Using a sterile surgical marker, an appropriate transposition flap was drawn incorporating the defect and placing the expected incisions within the relaxed skin tension lines where possible. The area thus outlined was incised deep to adipose tissue with a #15 scalpel blade.  The skin margins were undermined to an appropriate distance in all directions utilizing iris scissors.
V-Y Flap Text: The defect edges were debeveled with a #15 scalpel blade.  Given the location of the defect, shape of the defect and the proximity to free margins a V-Y flap was deemed most appropriate.  Using a sterile surgical marker, an appropriate advancement flap was drawn incorporating the defect and placing the expected incisions within the relaxed skin tension lines where possible.    The area thus outlined was incised deep to adipose tissue with a #15 scalpel blade.  The skin margins were undermined to an appropriate distance in all directions utilizing iris scissors.
Advancement-Rotation Flap Text: The defect edges were debeveled with a #15 scalpel blade.  Given the location of the defect, shape of the defect and the proximity to free margins an advancement-rotation flap was deemed most appropriate.  Using a sterile surgical marker, an appropriate flap was drawn incorporating the defect and placing the expected incisions within the relaxed skin tension lines where possible. The area thus outlined was incised deep to adipose tissue with a #15 scalpel blade.  The skin margins were undermined to an appropriate distance in all directions utilizing iris scissors.
Mercedes Flap Text: The defect edges were debeveled with a #15 scalpel blade.  Given the location of the defect, shape of the defect and the proximity to free margins a Mercedes flap was deemed most appropriate.  Using a sterile surgical marker, an appropriate advancement flap was drawn incorporating the defect and placing the expected incisions within the relaxed skin tension lines where possible. The area thus outlined was incised deep to adipose tissue with a #15 scalpel blade.  The skin margins were undermined to an appropriate distance in all directions utilizing iris scissors.
Modified Advancement Flap Text: The defect edges were debeveled with a #15 scalpel blade.  Given the location of the defect, shape of the defect and the proximity to free margins a modified advancement flap was deemed most appropriate.  Using a sterile surgical marker, an appropriate advancement flap was drawn incorporating the defect and placing the expected incisions within the relaxed skin tension lines where possible.    The area thus outlined was incised deep to adipose tissue with a #15 scalpel blade.  The skin margins were undermined to an appropriate distance in all directions utilizing iris scissors.
Mucosal Advancement Flap Text: Given the location of the defect, shape of the defect and the proximity to free margins a mucosal advancement flap was deemed most appropriate. Incisions were made with a 15 blade scalpel in the appropriate fashion along the cutaneous vermilion border and the mucosal lip. The remaining actinically damaged mucosal tissue was excised.  The mucosal advancement flap was then elevated to the gingival sulcus with care taken to preserve the neurovascular structures and advanced into the primary defect. Care was taken to ensure that precise realignment of the vermilion border was achieved.
Peng Advancement Flap Text: The defect edges were debeveled with a #15 scalpel blade.  Given the location of the defect, shape of the defect and the proximity to free margins a Peng advancement flap was deemed most appropriate.  Using a sterile surgical marker, an appropriate advancement flap was drawn incorporating the defect and placing the expected incisions within the relaxed skin tension lines where possible. The area thus outlined was incised deep to adipose tissue with a #15 scalpel blade.  The skin margins were undermined to an appropriate distance in all directions utilizing iris scissors.
Hatchet Flap Text: The defect edges were debeveled with a #15 scalpel blade.  Given the location of the defect, shape of the defect and the proximity to free margins a hatchet flap was deemed most appropriate.  Using a sterile surgical marker, an appropriate hatchet flap was drawn incorporating the defect and placing the expected incisions within the relaxed skin tension lines where possible.    The area thus outlined was incised deep to adipose tissue with a #15 scalpel blade.  The skin margins were undermined to an appropriate distance in all directions utilizing iris scissors.
Rotation Flap Text: The defect edges were debeveled with a #15 scalpel blade.  Given the location of the defect, shape of the defect and the proximity to free margins a rotation flap was deemed most appropriate.  Using a sterile surgical marker, an appropriate rotation flap was drawn incorporating the defect and placing the expected incisions within the relaxed skin tension lines where possible.    The area thus outlined was incised deep to adipose tissue with a #15 scalpel blade.  The skin margins were undermined to an appropriate distance in all directions utilizing iris scissors.
Spiral Flap Text: The defect edges were debeveled with a #15 scalpel blade.  Given the location of the defect, shape of the defect and the proximity to free margins a spiral flap was deemed most appropriate.  Using a sterile surgical marker, an appropriate rotation flap was drawn incorporating the defect and placing the expected incisions within the relaxed skin tension lines where possible. The area thus outlined was incised deep to adipose tissue with a #15 scalpel blade.  The skin margins were undermined to an appropriate distance in all directions utilizing iris scissors.
Star Wedge Flap Text: The defect edges were debeveled with a #15 scalpel blade.  Given the location of the defect, shape of the defect and the proximity to free margins a star wedge flap was deemed most appropriate.  Using a sterile surgical marker, an appropriate rotation flap was drawn incorporating the defect and placing the expected incisions within the relaxed skin tension lines where possible. The area thus outlined was incised deep to adipose tissue with a #15 scalpel blade.  The skin margins were undermined to an appropriate distance in all directions utilizing iris scissors.
Transposition Flap Text: The defect edges were debeveled with a #15 scalpel blade.  Given the location of the defect and the proximity to free margins a transposition flap was deemed most appropriate.  Using a sterile surgical marker, an appropriate transposition flap was drawn incorporating the defect.    The area thus outlined was incised deep to adipose tissue with a #15 scalpel blade.  The skin margins were undermined to an appropriate distance in all directions utilizing iris scissors.
Muscle Hinge Flap Text: The defect edges were debeveled with a #15 scalpel blade.  Given the size, depth and location of the defect and the proximity to free margins a muscle hinge flap was deemed most appropriate.  Using a sterile surgical marker, an appropriate hinge flap was drawn incorporating the defect. The area thus outlined was incised with a #15 scalpel blade.  The skin margins were undermined to an appropriate distance in all directions utilizing iris scissors.
Nasal Turnover Hinge Flap Text: The defect edges were debeveled with a #15 scalpel blade.  Given the size, depth, location of the defect and the defect being full thickness a nasal turnover hinge flap was deemed most appropriate.  Using a sterile surgical marker, an appropriate hinge flap was drawn incorporating the defect. The area thus outlined was incised with a #15 scalpel blade. The flap was designed to recreate the nasal mucosal lining and the alar rim. The skin margins were undermined to an appropriate distance in all directions utilizing iris scissors.
Nasalis-Muscle-Based Myocutaneous Island Pedicle Flap Text: Using a #15 blade, an incision was made around the donor flap to the level of the nasalis muscle. Wide lateral undermining was then performed in both the subcutaneous plane above the nasalis muscle, and in a submuscular plane just above periosteum. This allowed the formation of a free nasalis muscle axial pedicle (based on the angular artery) which was still attached to the actual cutaneous flap, increasing its mobility and vascular viability. Hemostasis was obtained with pinpoint electrocoagulation. The flap was mobilized into position and the pivotal anchor points positioned and stabilized with buried interrupted sutures. Subcutaneous and dermal tissues were closed in a multilayered fashion with sutures. Tissue redundancies were excised, and the epidermal edges were apposed without significant tension and sutured with sutures.
Orbicularis Oris Muscle Flap Text: The defect edges were debeveled with a #15 scalpel blade.  Given that the defect affected the competency of the oral sphincter an obicularis oris muscle flap was deemed most appropriate to restore this competency and normal muscle function.  Using a sterile surgical marker, an appropriate flap was drawn incorporating the defect. The area thus outlined was incised with a #15 scalpel blade.
Melolabial Transposition Flap Text: The defect edges were debeveled with a #15 scalpel blade.  Given the location of the defect and the proximity to free margins a melolabial flap was deemed most appropriate.  Using a sterile surgical marker, an appropriate melolabial transposition flap was drawn incorporating the defect.    The area thus outlined was incised deep to adipose tissue with a #15 scalpel blade.  The skin margins were undermined to an appropriate distance in all directions utilizing iris scissors.
Rhombic Flap Text: The defect edges were debeveled with a #15 scalpel blade.  Given the location of the defect and the proximity to free margins a rhombic flap was deemed most appropriate.  Using a sterile surgical marker, an appropriate rhombic flap was drawn incorporating the defect.    The area thus outlined was incised deep to adipose tissue with a #15 scalpel blade.  The skin margins were undermined to an appropriate distance in all directions utilizing iris scissors.
Rhomboid Transposition Flap Text: The defect edges were debeveled with a #15 scalpel blade.  Given the location of the defect and the proximity to free margins a rhomboid transposition flap was deemed most appropriate.  Using a sterile surgical marker, an appropriate rhomboid flap was drawn incorporating the defect.    The area thus outlined was incised deep to adipose tissue with a #15 scalpel blade.  The skin margins were undermined to an appropriate distance in all directions utilizing iris scissors.
Bi-Rhombic Flap Text: The defect edges were debeveled with a #15 scalpel blade.  Given the location of the defect and the proximity to free margins a bi-rhombic flap was deemed most appropriate.  Using a sterile surgical marker, an appropriate rhombic flap was drawn incorporating the defect. The area thus outlined was incised deep to adipose tissue with a #15 scalpel blade.  The skin margins were undermined to an appropriate distance in all directions utilizing iris scissors.
Helical Rim Advancement Flap Text: The defect edges were debeveled with a #15 blade scalpel.  Given the location of the defect and the proximity to free margins (helical rim) a double helical rim advancement flap was deemed most appropriate.  Using a sterile surgical marker, the appropriate advancement flaps were drawn incorporating the defect and placing the expected incisions between the helical rim and antihelix where possible.  The area thus outlined was incised through and through with a #15 scalpel blade.  With a skin hook and iris scissors, the flaps were gently and sharply undermined and freed up.
Bilateral Helical Rim Advancement Flap Text: The defect edges were debeveled with a #15 blade scalpel.  Given the location of the defect and the proximity to free margins (helical rim) a bilateral helical rim advancement flap was deemed most appropriate.  Using a sterile surgical marker, the appropriate advancement flaps were drawn incorporating the defect and placing the expected incisions between the helical rim and antihelix where possible.  The area thus outlined was incised through and through with a #15 scalpel blade.  With a skin hook and iris scissors, the flaps were gently and sharply undermined and freed up.
Ear Star Wedge Flap Text: The defect edges were debeveled with a #15 blade scalpel.  Given the location of the defect and the proximity to free margins (helical rim) an ear star wedge flap was deemed most appropriate.  Using a sterile surgical marker, the appropriate flap was drawn incorporating the defect and placing the expected incisions between the helical rim and antihelix where possible.  The area thus outlined was incised through and through with a #15 scalpel blade.
Banner Transposition Flap Text: The defect edges were debeveled with a #15 scalpel blade.  Given the location of the defect and the proximity to free margins a Banner transposition flap was deemed most appropriate.  Using a sterile surgical marker, an appropriate flap drawn around the defect. The area thus outlined was incised deep to adipose tissue with a #15 scalpel blade.  The skin margins were undermined to an appropriate distance in all directions utilizing iris scissors.
Bilobed Flap Text: The defect edges were debeveled with a #15 scalpel blade.  Given the location of the defect and the proximity to free margins a bilobe flap was deemed most appropriate.  Using a sterile surgical marker, an appropriate bilobe flap drawn around the defect.    The area thus outlined was incised deep to adipose tissue with a #15 scalpel blade.  The skin margins were undermined to an appropriate distance in all directions utilizing iris scissors.
Bilobed Transposition Flap Text: The defect edges were debeveled with a #15 scalpel blade.  Given the location of the defect and the proximity to free margins a bilobed transposition flap was deemed most appropriate.  Using a sterile surgical marker, an appropriate bilobe flap drawn around the defect.    The area thus outlined was incised deep to adipose tissue with a #15 scalpel blade.  The skin margins were undermined to an appropriate distance in all directions utilizing iris scissors.
Trilobed Flap Text: The defect edges were debeveled with a #15 scalpel blade.  Given the location of the defect and the proximity to free margins a trilobed flap was deemed most appropriate.  Using a sterile surgical marker, an appropriate trilobed flap drawn around the defect.    The area thus outlined was incised deep to adipose tissue with a #15 scalpel blade.  The skin margins were undermined to an appropriate distance in all directions utilizing iris scissors.
Dorsal Nasal Flap Text: The defect edges were debeveled with a #15 scalpel blade.  Given the location of the defect and the proximity to free margins a dorsal nasal flap was deemed most appropriate.  Using a sterile surgical marker, an appropriate dorsal nasal flap was drawn around the defect.    The area thus outlined was incised deep to adipose tissue with a #15 scalpel blade.  The skin margins were undermined to an appropriate distance in all directions utilizing iris scissors.
Island Pedicle Flap Text: The defect edges were debeveled with a #15 scalpel blade.  Given the location of the defect, shape of the defect and the proximity to free margins an island pedicle advancement flap was deemed most appropriate.  Using a sterile surgical marker, an appropriate advancement flap was drawn incorporating the defect, outlining the appropriate donor tissue and placing the expected incisions within the relaxed skin tension lines where possible.    The area thus outlined was incised deep to adipose tissue with a #15 scalpel blade.  The skin margins were undermined to an appropriate distance in all directions around the primary defect and laterally outward around the island pedicle utilizing iris scissors.  There was minimal undermining beneath the pedicle flap.
Island Pedicle Flap With Canthal Suspension Text: The defect edges were debeveled with a #15 scalpel blade.  Given the location of the defect, shape of the defect and the proximity to free margins an island pedicle advancement flap was deemed most appropriate.  Using a sterile surgical marker, an appropriate advancement flap was drawn incorporating the defect, outlining the appropriate donor tissue and placing the expected incisions within the relaxed skin tension lines where possible. The area thus outlined was incised deep to adipose tissue with a #15 scalpel blade.  The skin margins were undermined to an appropriate distance in all directions around the primary defect and laterally outward around the island pedicle utilizing iris scissors.  There was minimal undermining beneath the pedicle flap. A suspension suture was placed in the canthal tendon to prevent tension and prevent ectropion.
Alar Island Pedicle Flap Text: The defect edges were debeveled with a #15 scalpel blade.  Given the location of the defect, shape of the defect and the proximity to the alar rim an island pedicle advancement flap was deemed most appropriate.  Using a sterile surgical marker, an appropriate advancement flap was drawn incorporating the defect, outlining the appropriate donor tissue and placing the expected incisions within the nasal ala running parallel to the alar rim. The area thus outlined was incised with a #15 scalpel blade.  The skin margins were undermined minimally to an appropriate distance in all directions around the primary defect and laterally outward around the island pedicle utilizing iris scissors.  There was minimal undermining beneath the pedicle flap.
Double Island Pedicle Flap Text: The defect edges were debeveled with a #15 scalpel blade.  Given the location of the defect, shape of the defect and the proximity to free margins a double island pedicle advancement flap was deemed most appropriate.  Using a sterile surgical marker, an appropriate advancement flap was drawn incorporating the defect, outlining the appropriate donor tissue and placing the expected incisions within the relaxed skin tension lines where possible.    The area thus outlined was incised deep to adipose tissue with a #15 scalpel blade.  The skin margins were undermined to an appropriate distance in all directions around the primary defect and laterally outward around the island pedicle utilizing iris scissors.  There was minimal undermining beneath the pedicle flap.
Island Pedicle Flap-Requiring Vessel Identification Text: The defect edges were debeveled with a #15 scalpel blade.  Given the location of the defect, shape of the defect and the proximity to free margins an island pedicle advancement flap was deemed most appropriate.  Using a sterile surgical marker, an appropriate advancement flap was drawn, based on the axial vessel mentioned above, incorporating the defect, outlining the appropriate donor tissue and placing the expected incisions within the relaxed skin tension lines where possible.    The area thus outlined was incised deep to adipose tissue with a #15 scalpel blade.  The skin margins were undermined to an appropriate distance in all directions around the primary defect and laterally outward around the island pedicle utilizing iris scissors.  There was minimal undermining beneath the pedicle flap.
Keystone Flap Text: The defect edges were debeveled with a #15 scalpel blade.  Given the location of the defect, shape of the defect a keystone flap was deemed most appropriate.  Using a sterile surgical marker, an appropriate keystone flap was drawn incorporating the defect, outlining the appropriate donor tissue and placing the expected incisions within the relaxed skin tension lines where possible. The area thus outlined was incised deep to adipose tissue with a #15 scalpel blade.  The skin margins were undermined to an appropriate distance in all directions around the primary defect and laterally outward around the flap utilizing iris scissors.
O-T Plasty Text: The defect edges were debeveled with a #15 scalpel blade.  Given the location of the defect, shape of the defect and the proximity to free margins an O-T plasty was deemed most appropriate.  Using a sterile surgical marker, an appropriate O-T plasty was drawn incorporating the defect and placing the expected incisions within the relaxed skin tension lines where possible.    The area thus outlined was incised deep to adipose tissue with a #15 scalpel blade.  The skin margins were undermined to an appropriate distance in all directions utilizing iris scissors.
O-Z Plasty Text: The defect edges were debeveled with a #15 scalpel blade.  Given the location of the defect, shape of the defect and the proximity to free margins an O-Z plasty (double transposition flap) was deemed most appropriate.  Using a sterile surgical marker, the appropriate transposition flaps were drawn incorporating the defect and placing the expected incisions within the relaxed skin tension lines where possible.    The area thus outlined was incised deep to adipose tissue with a #15 scalpel blade.  The skin margins were undermined to an appropriate distance in all directions utilizing iris scissors.  Hemostasis was achieved with electrocautery.  The flaps were then transposed into place, one clockwise and the other counterclockwise, and anchored with interrupted buried subcutaneous sutures.
Double O-Z Plasty Text: The defect edges were debeveled with a #15 scalpel blade.  Given the location of the defect, shape of the defect and the proximity to free margins a Double O-Z plasty (double transposition flap) was deemed most appropriate.  Using a sterile surgical marker, the appropriate transposition flaps were drawn incorporating the defect and placing the expected incisions within the relaxed skin tension lines where possible. The area thus outlined was incised deep to adipose tissue with a #15 scalpel blade.  The skin margins were undermined to an appropriate distance in all directions utilizing iris scissors.  Hemostasis was achieved with electrocautery.  The flaps were then transposed into place, one clockwise and the other counterclockwise, and anchored with interrupted buried subcutaneous sutures.
V-Y Plasty Text: The defect edges were debeveled with a #15 scalpel blade.  Given the location of the defect, shape of the defect and the proximity to free margins an V-Y advancement flap was deemed most appropriate.  Using a sterile surgical marker, an appropriate advancement flap was drawn incorporating the defect and placing the expected incisions within the relaxed skin tension lines where possible.    The area thus outlined was incised deep to adipose tissue with a #15 scalpel blade.  The skin margins were undermined to an appropriate distance in all directions utilizing iris scissors.
H Plasty Text: Given the location of the defect, shape of the defect and the proximity to free margins a H-plasty was deemed most appropriate for repair.  Using a sterile surgical marker, the appropriate advancement arms of the H-plasty were drawn incorporating the defect and placing the expected incisions within the relaxed skin tension lines where possible. The area thus outlined was incised deep to adipose tissue with a #15 scalpel blade. The skin margins were undermined to an appropriate distance in all directions utilizing iris scissors.  The opposing advancement arms were then advanced into place in opposite direction and anchored with interrupted buried subcutaneous sutures.
W Plasty Text: The lesion was extirpated to the level of the fat with a #15 scalpel blade.  Given the location of the defect, shape of the defect and the proximity to free margins a W-plasty was deemed most appropriate for repair.  Using a sterile surgical marker, the appropriate transposition arms of the W-plasty were drawn incorporating the defect and placing the expected incisions within the relaxed skin tension lines where possible.    The area thus outlined was incised deep to adipose tissue with a #15 scalpel blade.  The skin margins were undermined to an appropriate distance in all directions utilizing iris scissors.  The opposing transposition arms were then transposed into place in opposite direction and anchored with interrupted buried subcutaneous sutures.
Z Plasty Text: The lesion was extirpated to the level of the fat with a #15 scalpel blade.  Given the location of the defect, shape of the defect and the proximity to free margins a Z-plasty was deemed most appropriate for repair.  Using a sterile surgical marker, the appropriate transposition arms of the Z-plasty were drawn incorporating the defect and placing the expected incisions within the relaxed skin tension lines where possible.    The area thus outlined was incised deep to adipose tissue with a #15 scalpel blade.  The skin margins were undermined to an appropriate distance in all directions utilizing iris scissors.  The opposing transposition arms were then transposed into place in opposite direction and anchored with interrupted buried subcutaneous sutures.
Zygomaticofacial Flap Text: Given the location of the defect, shape of the defect and the proximity to free margins a zygomaticofacial flap was deemed most appropriate for repair.  Using a sterile surgical marker, the appropriate flap was drawn incorporating the defect and placing the expected incisions within the relaxed skin tension lines where possible. The area thus outlined was incised deep to adipose tissue with a #15 scalpel blade with preservation of a vascular pedicle.  The skin margins were undermined to an appropriate distance in all directions utilizing iris scissors.  The flap was then placed into the defect and anchored with interrupted buried subcutaneous sutures.
Cheek Interpolation Flap Text: A decision was made to reconstruct the defect utilizing an interpolation axial flap and a staged reconstruction.  A telfa template was made of the defect.  This telfa template was then used to outline the Cheek Interpolation flap.  The donor area for the pedicle flap was then injected with anesthesia.  The flap was excised through the skin and subcutaneous tissue down to the layer of the underlying musculature.  The interpolation flap was carefully excised within this deep plane to maintain its blood supply.  The edges of the donor site were undermined.   The donor site was closed in a primary fashion.  The pedicle was then rotated into position and sutured.  Once the tube was sutured into place, adequate blood supply was confirmed with blanching and refill.  The pedicle was then wrapped with xeroform gauze and dressed appropriately with a telfa and gauze bandage to ensure continued blood supply and protect the attached pedicle.
Cheek-To-Nose Interpolation Flap Text: A decision was made to reconstruct the defect utilizing an interpolation axial flap and a staged reconstruction.  A telfa template was made of the defect.  This telfa template was then used to outline the Cheek-To-Nose Interpolation flap.  The donor area for the pedicle flap was then injected with anesthesia.  The flap was excised through the skin and subcutaneous tissue down to the layer of the underlying musculature.  The interpolation flap was carefully excised within this deep plane to maintain its blood supply.  The edges of the donor site were undermined.   The donor site was closed in a primary fashion.  The pedicle was then rotated into position and sutured.  Once the tube was sutured into place, adequate blood supply was confirmed with blanching and refill.  The pedicle was then wrapped with xeroform gauze and dressed appropriately with a telfa and gauze bandage to ensure continued blood supply and protect the attached pedicle.
Interpolation Flap Text: A decision was made to reconstruct the defect utilizing an interpolation axial flap and a staged reconstruction.  A telfa template was made of the defect.  This telfa template was then used to outline the interpolation flap.  The donor area for the pedicle flap was then injected with anesthesia.  The flap was excised through the skin and subcutaneous tissue down to the layer of the underlying musculature.  The interpolation flap was carefully excised within this deep plane to maintain its blood supply.  The edges of the donor site were undermined.   The donor site was closed in a primary fashion.  The pedicle was then rotated into position and sutured.  Once the tube was sutured into place, adequate blood supply was confirmed with blanching and refill.  The pedicle was then wrapped with xeroform gauze and dressed appropriately with a telfa and gauze bandage to ensure continued blood supply and protect the attached pedicle.
Melolabial Interpolation Flap Text: A decision was made to reconstruct the defect utilizing an interpolation axial flap and a staged reconstruction.  A telfa template was made of the defect.  This telfa template was then used to outline the melolabial interpolation flap.  The donor area for the pedicle flap was then injected with anesthesia.  The flap was excised through the skin and subcutaneous tissue down to the layer of the underlying musculature.  The pedicle flap was carefully excised within this deep plane to maintain its blood supply.  The edges of the donor site were undermined.   The donor site was closed in a primary fashion.  The pedicle was then rotated into position and sutured.  Once the tube was sutured into place, adequate blood supply was confirmed with blanching and refill.  The pedicle was then wrapped with xeroform gauze and dressed appropriately with a telfa and gauze bandage to ensure continued blood supply and protect the attached pedicle.
Mastoid Interpolation Flap Text: A decision was made to reconstruct the defect utilizing an interpolation axial flap and a staged reconstruction.  A telfa template was made of the defect.  This telfa template was then used to outline the mastoid interpolation flap.  The donor area for the pedicle flap was then injected with anesthesia.  The flap was excised through the skin and subcutaneous tissue down to the layer of the underlying musculature.  The pedicle flap was carefully excised within this deep plane to maintain its blood supply.  The edges of the donor site were undermined.   The donor site was closed in a primary fashion.  The pedicle was then rotated into position and sutured.  Once the tube was sutured into place, adequate blood supply was confirmed with blanching and refill.  The pedicle was then wrapped with xeroform gauze and dressed appropriately with a telfa and gauze bandage to ensure continued blood supply and protect the attached pedicle.
Posterior Auricular Interpolation Flap Text: A decision was made to reconstruct the defect utilizing an interpolation axial flap and a staged reconstruction.  A telfa template was made of the defect.  This telfa template was then used to outline the posterior auricular interpolation flap.  The donor area for the pedicle flap was then injected with anesthesia.  The flap was excised through the skin and subcutaneous tissue down to the layer of the underlying musculature.  The pedicle flap was carefully excised within this deep plane to maintain its blood supply.  The edges of the donor site were undermined.   The donor site was closed in a primary fashion.  The pedicle was then rotated into position and sutured.  Once the tube was sutured into place, adequate blood supply was confirmed with blanching and refill.  The pedicle was then wrapped with xeroform gauze and dressed appropriately with a telfa and gauze bandage to ensure continued blood supply and protect the attached pedicle.
Paramedian Forehead Flap Text: A decision was made to reconstruct the defect utilizing an interpolation axial flap and a staged reconstruction.  A telfa template was made of the defect.  This telfa template was then used to outline the paramedian forehead pedicle flap.  The donor area for the pedicle flap was then injected with anesthesia.  The flap was excised through the skin and subcutaneous tissue down to the layer of the underlying musculature.  The pedicle flap was carefully excised within this deep plane to maintain its blood supply.  The edges of the donor site were undermined.   The donor site was closed in a primary fashion.  The pedicle was then rotated into position and sutured.  Once the tube was sutured into place, adequate blood supply was confirmed with blanching and refill.  The pedicle was then wrapped with xeroform gauze and dressed appropriately with a telfa and gauze bandage to ensure continued blood supply and protect the attached pedicle.
Lip Wedge Excision Repair Text: Given the location of the defect and the proximity to free margins a full thickness wedge repair was deemed most appropriate.  Using a sterile surgical marker, the appropriate repair was drawn incorporating the defect and placing the expected incisions perpendicular to the vermilion border.  The vermilion border was also meticulously outlined to ensure appropriate reapproximation during the repair.  The area thus outlined was incised through and through with a #15 scalpel blade.  The muscularis and dermis were reaproximated with deep sutures following hemostasis. Care was taken to realign the vermilion border before proceeding with the superficial closure.  Once the vermilion was realigned the superfical and mucosal closure was finished.
Ftsg Text: The defect edges were debeveled with a #15 scalpel blade.  Given the location of the defect, shape of the defect and the proximity to free margins a full thickness skin graft was deemed most appropriate.  Using a sterile surgical marker, the primary defect shape was transferred to the donor site. The area thus outlined was incised deep to adipose tissue with a #15 scalpel blade.  The harvested graft was then trimmed of adipose tissue until only dermis and epidermis was left.  The skin margins of the secondary defect were undermined to an appropriate distance in all directions utilizing iris scissors.  The secondary defect was closed with interrupted buried subcutaneous sutures.  The skin edges were then re-apposed with running  sutures.  The skin graft was then placed in the primary defect and oriented appropriately.
Split-Thickness Skin Graft Text: The defect edges were debeveled with a #15 scalpel blade.  Given the location of the defect, shape of the defect and the proximity to free margins a split thickness skin graft was deemed most appropriate.  Using a sterile surgical marker, the primary defect shape was transferred to the donor site. The split thickness graft was then harvested.  The skin graft was then placed in the primary defect and oriented appropriately.
Burow's Graft Text: The defect edges were debeveled with a #15 scalpel blade.  Given the location of the defect, shape of the defect, the proximity to free margins and the presence of a standing cone deformity a Burow's skin graft was deemed most appropriate. The standing cone was removed and this tissue was then trimmed to the shape of the primary defect. The adipose tissue was also removed until only dermis and epidermis were left.  The skin margins of the secondary defect were undermined to an appropriate distance in all directions utilizing iris scissors.  The secondary defect was closed with interrupted buried subcutaneous sutures.  The skin edges were then re-apposed with running  sutures.  The skin graft was then placed in the primary defect and oriented appropriately.
Cartilage Graft Text: The defect edges were debeveled with a #15 scalpel blade.  Given the location of the defect, shape of the defect, the fact the defect involved a full thickness cartilage defect a cartilage graft was deemed most appropriate.  An appropriate donor site was identified, cleansed, and anesthetized. The cartilage graft was then harvested and transferred to the recipient site, oriented appropriately and then sutured into place.  The secondary defect was then repaired using a primary closure.
Composite Graft Text: The defect edges were debeveled with a #15 scalpel blade.  Given the location of the defect, shape of the defect, the proximity to free margins and the fact the defect was full thickness a composite graft was deemed most appropriate.  The defect was outline and then transferred to the donor site.  A full thickness graft was then excised from the donor site. The graft was then placed in the primary defect, oriented appropriately and then sutured into place.  The secondary defect was then repaired using a primary closure.
Epidermal Autograft Text: The defect edges were debeveled with a #15 scalpel blade.  Given the location of the defect, shape of the defect and the proximity to free margins an epidermal autograft was deemed most appropriate.  Using a sterile surgical marker, the primary defect shape was transferred to the donor site. The epidermal graft was then harvested.  The skin graft was then placed in the primary defect and oriented appropriately.
Dermal Autograft Text: The defect edges were debeveled with a #15 scalpel blade.  Given the location of the defect, shape of the defect and the proximity to free margins a dermal autograft was deemed most appropriate.  Using a sterile surgical marker, the primary defect shape was transferred to the donor site. The area thus outlined was incised deep to adipose tissue with a #15 scalpel blade.  The harvested graft was then trimmed of adipose and epidermal tissue until only dermis was left.  The skin graft was then placed in the primary defect and oriented appropriately.
Skin Substitute Text: The defect edges were debeveled with a #15 scalpel blade.  Given the location of the defect, shape of the defect and the proximity to free margins a skin substitute graft was deemed most appropriate.  The graft material was trimmed to fit the size of the defect. The graft was then placed in the primary defect and oriented appropriately.
Tissue Cultured Epidermal Autograft Text: The defect edges were debeveled with a #15 scalpel blade.  Given the location of the defect, shape of the defect and the proximity to free margins a tissue cultured epidermal autograft was deemed most appropriate.  The graft was then trimmed to fit the size of the defect.  The graft was then placed in the primary defect and oriented appropriately.
Xenograft Text: The defect edges were debeveled with a #15 scalpel blade.  Given the location of the defect, shape of the defect and the proximity to free margins a xenograft was deemed most appropriate.  The graft was then trimmed to fit the size of the defect.  The graft was then placed in the primary defect and oriented appropriately.
Purse String (Intermediate) Text: Given the location of the defect and the characteristics of the surrounding skin a purse string intermediate closure was deemed most appropriate.  Undermining was performed circumfirentially around the surgical defect.  A purse string suture was then placed and tightened.
Purse String (Simple) Text: Given the location of the defect and the characteristics of the surrounding skin a purse string simple closure was deemed most appropriate.  Undermining was performed circumferentially around the surgical defect.  A purse string suture was then placed and tightened.
Partial Purse String (Intermediate) Text: Given the location of the defect and the characteristics of the surrounding skin an intermediate purse string closure was deemed most appropriate.  Undermining was performed circumferentially around the surgical defect.  A purse string suture was then placed and tightened. Wound tension of the circular defect prevented complete closure of the wound.
Partial Purse String (Simple) Text: Given the location of the defect and the characteristics of the surrounding skin a simple purse string closure was deemed most appropriate.  Undermining was performed circumferentially around the surgical defect.  A purse string suture was then placed and tightened. Wound tension of the circular defect prevented complete closure of the wound.
Complex Repair And Single Advancement Flap Text: The defect edges were debeveled with a #15 scalpel blade.  The primary defect was closed partially with a complex linear closure.  Given the location of the remaining defect, shape of the defect and the proximity to free margins a single advancement flap was deemed most appropriate for complete closure of the defect.  Using a sterile surgical marker, an appropriate advancement flap was drawn incorporating the defect and placing the expected incisions within the relaxed skin tension lines where possible.    The area thus outlined was incised deep to adipose tissue with a #15 scalpel blade.  The skin margins were undermined to an appropriate distance in all directions utilizing iris scissors.
Complex Repair And Double Advancement Flap Text: The defect edges were debeveled with a #15 scalpel blade.  The primary defect was closed partially with a complex linear closure.  Given the location of the remaining defect, shape of the defect and the proximity to free margins a double advancement flap was deemed most appropriate for complete closure of the defect.  Using a sterile surgical marker, an appropriate advancement flap was drawn incorporating the defect and placing the expected incisions within the relaxed skin tension lines where possible.    The area thus outlined was incised deep to adipose tissue with a #15 scalpel blade.  The skin margins were undermined to an appropriate distance in all directions utilizing iris scissors.
Complex Repair And Modified Advancement Flap Text: The defect edges were debeveled with a #15 scalpel blade.  The primary defect was closed partially with a complex linear closure.  Given the location of the remaining defect, shape of the defect and the proximity to free margins a modified advancement flap was deemed most appropriate for complete closure of the defect.  Using a sterile surgical marker, an appropriate advancement flap was drawn incorporating the defect and placing the expected incisions within the relaxed skin tension lines where possible.    The area thus outlined was incised deep to adipose tissue with a #15 scalpel blade.  The skin margins were undermined to an appropriate distance in all directions utilizing iris scissors.
Complex Repair And A-T Advancement Flap Text: The defect edges were debeveled with a #15 scalpel blade.  The primary defect was closed partially with a complex linear closure.  Given the location of the remaining defect, shape of the defect and the proximity to free margins an A-T advancement flap was deemed most appropriate for complete closure of the defect.  Using a sterile surgical marker, an appropriate advancement flap was drawn incorporating the defect and placing the expected incisions within the relaxed skin tension lines where possible.    The area thus outlined was incised deep to adipose tissue with a #15 scalpel blade.  The skin margins were undermined to an appropriate distance in all directions utilizing iris scissors.
Complex Repair And O-T Advancement Flap Text: The defect edges were debeveled with a #15 scalpel blade.  The primary defect was closed partially with a complex linear closure.  Given the location of the remaining defect, shape of the defect and the proximity to free margins an O-T advancement flap was deemed most appropriate for complete closure of the defect.  Using a sterile surgical marker, an appropriate advancement flap was drawn incorporating the defect and placing the expected incisions within the relaxed skin tension lines where possible.    The area thus outlined was incised deep to adipose tissue with a #15 scalpel blade.  The skin margins were undermined to an appropriate distance in all directions utilizing iris scissors.
Complex Repair And O-L Flap Text: The defect edges were debeveled with a #15 scalpel blade.  The primary defect was closed partially with a complex linear closure.  Given the location of the remaining defect, shape of the defect and the proximity to free margins an O-L flap was deemed most appropriate for complete closure of the defect.  Using a sterile surgical marker, an appropriate flap was drawn incorporating the defect and placing the expected incisions within the relaxed skin tension lines where possible.    The area thus outlined was incised deep to adipose tissue with a #15 scalpel blade.  The skin margins were undermined to an appropriate distance in all directions utilizing iris scissors.
Complex Repair And Bilobe Flap Text: The defect edges were debeveled with a #15 scalpel blade.  The primary defect was closed partially with a complex linear closure.  Given the location of the remaining defect, shape of the defect and the proximity to free margins a bilobe flap was deemed most appropriate for complete closure of the defect.  Using a sterile surgical marker, an appropriate advancement flap was drawn incorporating the defect and placing the expected incisions within the relaxed skin tension lines where possible.    The area thus outlined was incised deep to adipose tissue with a #15 scalpel blade.  The skin margins were undermined to an appropriate distance in all directions utilizing iris scissors.
Complex Repair And Melolabial Flap Text: The defect edges were debeveled with a #15 scalpel blade.  The primary defect was closed partially with a complex linear closure.  Given the location of the remaining defect, shape of the defect and the proximity to free margins a melolabial flap was deemed most appropriate for complete closure of the defect.  Using a sterile surgical marker, an appropriate advancement flap was drawn incorporating the defect and placing the expected incisions within the relaxed skin tension lines where possible.    The area thus outlined was incised deep to adipose tissue with a #15 scalpel blade.  The skin margins were undermined to an appropriate distance in all directions utilizing iris scissors.
Complex Repair And Rotation Flap Text: The defect edges were debeveled with a #15 scalpel blade.  The primary defect was closed partially with a complex linear closure.  Given the location of the remaining defect, shape of the defect and the proximity to free margins a rotation flap was deemed most appropriate for complete closure of the defect.  Using a sterile surgical marker, an appropriate advancement flap was drawn incorporating the defect and placing the expected incisions within the relaxed skin tension lines where possible.    The area thus outlined was incised deep to adipose tissue with a #15 scalpel blade.  The skin margins were undermined to an appropriate distance in all directions utilizing iris scissors.
Complex Repair And Rhombic Flap Text: The defect edges were debeveled with a #15 scalpel blade.  The primary defect was closed partially with a complex linear closure.  Given the location of the remaining defect, shape of the defect and the proximity to free margins a rhombic flap was deemed most appropriate for complete closure of the defect.  Using a sterile surgical marker, an appropriate advancement flap was drawn incorporating the defect and placing the expected incisions within the relaxed skin tension lines where possible.    The area thus outlined was incised deep to adipose tissue with a #15 scalpel blade.  The skin margins were undermined to an appropriate distance in all directions utilizing iris scissors.
Complex Repair And Transposition Flap Text: The defect edges were debeveled with a #15 scalpel blade.  The primary defect was closed partially with a complex linear closure.  Given the location of the remaining defect, shape of the defect and the proximity to free margins a transposition flap was deemed most appropriate for complete closure of the defect.  Using a sterile surgical marker, an appropriate advancement flap was drawn incorporating the defect and placing the expected incisions within the relaxed skin tension lines where possible.    The area thus outlined was incised deep to adipose tissue with a #15 scalpel blade.  The skin margins were undermined to an appropriate distance in all directions utilizing iris scissors.
Complex Repair And V-Y Plasty Text: The defect edges were debeveled with a #15 scalpel blade.  The primary defect was closed partially with a complex linear closure.  Given the location of the remaining defect, shape of the defect and the proximity to free margins a V-Y plasty was deemed most appropriate for complete closure of the defect.  Using a sterile surgical marker, an appropriate advancement flap was drawn incorporating the defect and placing the expected incisions within the relaxed skin tension lines where possible.    The area thus outlined was incised deep to adipose tissue with a #15 scalpel blade.  The skin margins were undermined to an appropriate distance in all directions utilizing iris scissors.
Complex Repair And M Plasty Text: The defect edges were debeveled with a #15 scalpel blade.  The primary defect was closed partially with a complex linear closure.  Given the location of the remaining defect, shape of the defect and the proximity to free margins an M plasty was deemed most appropriate for complete closure of the defect.  Using a sterile surgical marker, an appropriate advancement flap was drawn incorporating the defect and placing the expected incisions within the relaxed skin tension lines where possible.    The area thus outlined was incised deep to adipose tissue with a #15 scalpel blade.  The skin margins were undermined to an appropriate distance in all directions utilizing iris scissors.
Complex Repair And Double M Plasty Text: The defect edges were debeveled with a #15 scalpel blade.  The primary defect was closed partially with a complex linear closure.  Given the location of the remaining defect, shape of the defect and the proximity to free margins a double M plasty was deemed most appropriate for complete closure of the defect.  Using a sterile surgical marker, an appropriate advancement flap was drawn incorporating the defect and placing the expected incisions within the relaxed skin tension lines where possible.    The area thus outlined was incised deep to adipose tissue with a #15 scalpel blade.  The skin margins were undermined to an appropriate distance in all directions utilizing iris scissors.
Complex Repair And W Plasty Text: The defect edges were debeveled with a #15 scalpel blade.  The primary defect was closed partially with a complex linear closure.  Given the location of the remaining defect, shape of the defect and the proximity to free margins a W plasty was deemed most appropriate for complete closure of the defect.  Using a sterile surgical marker, an appropriate advancement flap was drawn incorporating the defect and placing the expected incisions within the relaxed skin tension lines where possible.    The area thus outlined was incised deep to adipose tissue with a #15 scalpel blade.  The skin margins were undermined to an appropriate distance in all directions utilizing iris scissors.
Complex Repair And Z Plasty Text: The defect edges were debeveled with a #15 scalpel blade.  The primary defect was closed partially with a complex linear closure.  Given the location of the remaining defect, shape of the defect and the proximity to free margins a Z plasty was deemed most appropriate for complete closure of the defect.  Using a sterile surgical marker, an appropriate advancement flap was drawn incorporating the defect and placing the expected incisions within the relaxed skin tension lines where possible.    The area thus outlined was incised deep to adipose tissue with a #15 scalpel blade.  The skin margins were undermined to an appropriate distance in all directions utilizing iris scissors.
Complex Repair And Dorsal Nasal Flap Text: The defect edges were debeveled with a #15 scalpel blade.  The primary defect was closed partially with a complex linear closure.  Given the location of the remaining defect, shape of the defect and the proximity to free margins a dorsal nasal flap was deemed most appropriate for complete closure of the defect.  Using a sterile surgical marker, an appropriate flap was drawn incorporating the defect and placing the expected incisions within the relaxed skin tension lines where possible.    The area thus outlined was incised deep to adipose tissue with a #15 scalpel blade.  The skin margins were undermined to an appropriate distance in all directions utilizing iris scissors.
Complex Repair And Ftsg Text: The defect edges were debeveled with a #15 scalpel blade.  The primary defect was closed partially with a complex linear closure.  Given the location of the defect, shape of the defect and the proximity to free margins a full thickness skin graft was deemed most appropriate to repair the remaining defect.  The graft was trimmed to fit the size of the remaining defect.  The graft was then placed in the primary defect, oriented appropriately, and sutured into place.
Complex Repair And Burow's Graft Text: The defect edges were debeveled with a #15 scalpel blade.  The primary defect was closed partially with a complex linear closure.  Given the location of the defect, shape of the defect, the proximity to free margins and the presence of a standing cone deformity a Burow's graft was deemed most appropriate to repair the remaining defect.  The graft was trimmed to fit the size of the remaining defect.  The graft was then placed in the primary defect, oriented appropriately, and sutured into place.
Complex Repair And Split-Thickness Skin Graft Text: The defect edges were debeveled with a #15 scalpel blade.  The primary defect was closed partially with a complex linear closure.  Given the location of the defect, shape of the defect and the proximity to free margins a split thickness skin graft was deemed most appropriate to repair the remaining defect.  The graft was trimmed to fit the size of the remaining defect.  The graft was then placed in the primary defect, oriented appropriately, and sutured into place.
Complex Repair And Epidermal Autograft Text: The defect edges were debeveled with a #15 scalpel blade.  The primary defect was closed partially with a complex linear closure.  Given the location of the defect, shape of the defect and the proximity to free margins an epidermal autograft was deemed most appropriate to repair the remaining defect.  The graft was trimmed to fit the size of the remaining defect.  The graft was then placed in the primary defect, oriented appropriately, and sutured into place.
Complex Repair And Dermal Autograft Text: The defect edges were debeveled with a #15 scalpel blade.  The primary defect was closed partially with a complex linear closure.  Given the location of the defect, shape of the defect and the proximity to free margins an dermal autograft was deemed most appropriate to repair the remaining defect.  The graft was trimmed to fit the size of the remaining defect.  The graft was then placed in the primary defect, oriented appropriately, and sutured into place.
Complex Repair And Tissue Cultured Epidermal Autograft Text: The defect edges were debeveled with a #15 scalpel blade.  The primary defect was closed partially with a complex linear closure.  Given the location of the defect, shape of the defect and the proximity to free margins an tissue cultured epidermal autograft was deemed most appropriate to repair the remaining defect.  The graft was trimmed to fit the size of the remaining defect.  The graft was then placed in the primary defect, oriented appropriately, and sutured into place.
Complex Repair And Xenograft Text: The defect edges were debeveled with a #15 scalpel blade.  The primary defect was closed partially with a complex linear closure.  Given the location of the defect, shape of the defect and the proximity to free margins a xenograft was deemed most appropriate to repair the remaining defect.  The graft was trimmed to fit the size of the remaining defect.  The graft was then placed in the primary defect, oriented appropriately, and sutured into place.
Complex Repair And Skin Substitute Graft Text: The defect edges were debeveled with a #15 scalpel blade.  The primary defect was closed partially with a complex linear closure.  Given the location of the remaining defect, shape of the defect and the proximity to free margins a skin substitute graft was deemed most appropriate to repair the remaining defect.  The graft was trimmed to fit the size of the remaining defect.  The graft was then placed in the primary defect, oriented appropriately, and sutured into place.
Path Notes (To The Dermatopathologist): Please check margins.
Consent: Verbal consent was obtained from the patient. The risks and benefits to therapy were discussed in detail. Specifically, the risks of infection, scarring, bleeding, prolonged wound healing, incomplete removal, allergy to anesthesia, nerve injury and recurrence were addressed. Prior to the procedure, the treatment site was clearly identified and confirmed by the patient. All components of Universal Protocol/PAUSE Rule completed.
Post-Care Instructions: I reviewed with the patient in detail post-care instructions. Patient is not to engage in any heavy lifting, exercise, or swimming for the next 14 days. Should the patient develop any fevers, chills, bleeding, severe pain patient will contact the office immediately.
Where Do You Want The Question To Include Opioid Counseling Located?: Case Summary Tab
Information: Selecting Yes will display possible errors in your note based on the variables you have selected. This validation is only offered as a suggestion for you. PLEASE NOTE THAT THE VALIDATION TEXT WILL BE REMOVED WHEN YOU FINALIZE YOUR NOTE. IF YOU WANT TO FAX A PRELIMINARY NOTE YOU WILL NEED TO TOGGLE THIS TO 'NO' IF YOU DO NOT WANT IT IN YOUR FAXED NOTE.

## 2021-01-20 ENCOUNTER — APPOINTMENT (RX ONLY)
Dept: URBAN - METROPOLITAN AREA CLINIC 4 | Facility: CLINIC | Age: 71
Setting detail: DERMATOLOGY
End: 2021-01-20

## 2021-01-20 DIAGNOSIS — Z48.02 ENCOUNTER FOR REMOVAL OF SUTURES: ICD-10-CM

## 2021-01-20 PROCEDURE — 99024 POSTOP FOLLOW-UP VISIT: CPT

## 2021-01-20 PROCEDURE — ? SUTURE REMOVAL (GLOBAL PERIOD)

## 2021-01-20 ASSESSMENT — LOCATION SIMPLE DESCRIPTION DERM: LOCATION SIMPLE: LEFT FOREARM

## 2021-01-20 ASSESSMENT — LOCATION ZONE DERM: LOCATION ZONE: ARM

## 2021-01-20 ASSESSMENT — LOCATION DETAILED DESCRIPTION DERM: LOCATION DETAILED: LEFT DISTAL RADIAL DORSAL FOREARM

## 2021-01-20 NOTE — PROCEDURE: SUTURE REMOVAL (GLOBAL PERIOD)
Detail Level: Detailed
Add 15658 Cpt? (Important Note: In 2017 The Use Of 98217 Is Being Tracked By Cms To Determine Future Global Period Reimbursement For Global Periods): yes

## 2021-03-09 ENCOUNTER — APPOINTMENT (RX ONLY)
Dept: URBAN - METROPOLITAN AREA CLINIC 4 | Facility: CLINIC | Age: 71
Setting detail: DERMATOLOGY
End: 2021-03-09

## 2021-03-09 DIAGNOSIS — L82.1 OTHER SEBORRHEIC KERATOSIS: ICD-10-CM

## 2021-03-09 PROBLEM — D48.5 NEOPLASM OF UNCERTAIN BEHAVIOR OF SKIN: Status: ACTIVE | Noted: 2021-03-09

## 2021-03-09 PROCEDURE — ? BIOPSY BY SHAVE METHOD

## 2021-03-09 PROCEDURE — 11102 TANGNTL BX SKIN SINGLE LES: CPT

## 2021-03-09 PROCEDURE — 99212 OFFICE O/P EST SF 10 MIN: CPT | Mod: 25

## 2021-03-09 ASSESSMENT — LOCATION ZONE DERM: LOCATION ZONE: ARM

## 2021-03-09 ASSESSMENT — LOCATION SIMPLE DESCRIPTION DERM: LOCATION SIMPLE: LEFT FOREARM

## 2021-03-09 ASSESSMENT — LOCATION DETAILED DESCRIPTION DERM: LOCATION DETAILED: LEFT PROXIMAL DORSAL FOREARM

## 2021-03-11 ENCOUNTER — APPOINTMENT (RX ONLY)
Dept: URBAN - METROPOLITAN AREA CLINIC 4 | Facility: CLINIC | Age: 71
Setting detail: DERMATOLOGY
End: 2021-03-11

## 2021-03-11 DIAGNOSIS — Z48.817 ENCOUNTER FOR SURGICAL AFTERCARE FOLLOWING SURGERY ON THE SKIN AND SUBCUTANEOUS TISSUE: ICD-10-CM

## 2021-03-11 PROCEDURE — 99024 POSTOP FOLLOW-UP VISIT: CPT

## 2021-03-11 PROCEDURE — ? POST-OP WOUND CHECK

## 2021-03-11 ASSESSMENT — LOCATION DETAILED DESCRIPTION DERM: LOCATION DETAILED: LEFT PROXIMAL DORSAL FOREARM

## 2021-03-11 ASSESSMENT — LOCATION SIMPLE DESCRIPTION DERM: LOCATION SIMPLE: LEFT FOREARM

## 2021-03-11 ASSESSMENT — LOCATION ZONE DERM: LOCATION ZONE: ARM

## 2021-03-11 NOTE — PROCEDURE: POST-OP WOUND CHECK
Detail Level: Detailed
Add 29139 Cpt? (Important Note: In 2017 The Use Of 73189 Is Being Tracked By Cms To Determine Future Global Period Reimbursement For Global Periods): yes
Wound Evaluated By: Gerri Myles PA-C
Additional Comments: Wound cleaned with chlorahexadine. Area was anesthetized .  Electrocautery, drysol and wound seal applied.  A pressure bandage applied and we had him wait 10 minutes.  Hemostasis achieved. Clean pressure bandage applied.

## 2021-05-17 ENCOUNTER — APPOINTMENT (RX ONLY)
Dept: URBAN - METROPOLITAN AREA CLINIC 4 | Facility: CLINIC | Age: 71
Setting detail: DERMATOLOGY
End: 2021-05-17

## 2021-05-17 DIAGNOSIS — L82.1 OTHER SEBORRHEIC KERATOSIS: ICD-10-CM

## 2021-05-17 DIAGNOSIS — L81.4 OTHER MELANIN HYPERPIGMENTATION: ICD-10-CM

## 2021-05-17 DIAGNOSIS — Z85.828 PERSONAL HISTORY OF OTHER MALIGNANT NEOPLASM OF SKIN: ICD-10-CM

## 2021-05-17 DIAGNOSIS — D18.0 HEMANGIOMA: ICD-10-CM

## 2021-05-17 DIAGNOSIS — L57.0 ACTINIC KERATOSIS: ICD-10-CM

## 2021-05-17 PROBLEM — C44.629 SQUAMOUS CELL CARCINOMA OF SKIN OF LEFT UPPER LIMB, INCLUDING SHOULDER: Status: ACTIVE | Noted: 2021-05-17

## 2021-05-17 PROBLEM — D18.01 HEMANGIOMA OF SKIN AND SUBCUTANEOUS TISSUE: Status: ACTIVE | Noted: 2021-05-17

## 2021-05-17 PROCEDURE — ? COUNSELING

## 2021-05-17 PROCEDURE — 99213 OFFICE O/P EST LOW 20 MIN: CPT | Mod: 25

## 2021-05-17 PROCEDURE — 17000 DESTRUCT PREMALG LESION: CPT

## 2021-05-17 PROCEDURE — ? LIQUID NITROGEN

## 2021-05-17 PROCEDURE — ? OBSERVATION

## 2021-05-17 PROCEDURE — 17003 DESTRUCT PREMALG LES 2-14: CPT

## 2021-05-17 ASSESSMENT — LOCATION DETAILED DESCRIPTION DERM
LOCATION DETAILED: STERNUM
LOCATION DETAILED: LEFT CENTRAL BUCCAL CHEEK
LOCATION DETAILED: LEFT PROXIMAL POSTERIOR UPPER ARM
LOCATION DETAILED: RIGHT SUPERIOR MEDIAL UPPER BACK
LOCATION DETAILED: RIGHT SUPERIOR UPPER BACK
LOCATION DETAILED: LEFT INFERIOR FOREHEAD
LOCATION DETAILED: EPIGASTRIC SKIN
LOCATION DETAILED: LEFT CENTRAL MALAR CHEEK
LOCATION DETAILED: LEFT INFERIOR MEDIAL MALAR CHEEK
LOCATION DETAILED: RIGHT ANTERIOR PROXIMAL UPPER ARM
LOCATION DETAILED: RIGHT CENTRAL EYEBROW
LOCATION DETAILED: LEFT DISTAL DORSAL FOREARM
LOCATION DETAILED: RIGHT FOREHEAD
LOCATION DETAILED: LEFT SUPERIOR LATERAL NECK
LOCATION DETAILED: RIGHT PROXIMAL POSTERIOR UPPER ARM
LOCATION DETAILED: LEFT PROXIMAL RADIAL DORSAL FOREARM
LOCATION DETAILED: LEFT ANTERIOR PROXIMAL UPPER ARM

## 2021-05-17 ASSESSMENT — LOCATION SIMPLE DESCRIPTION DERM
LOCATION SIMPLE: RIGHT EYEBROW
LOCATION SIMPLE: LEFT ANTERIOR NECK
LOCATION SIMPLE: RIGHT UPPER ARM
LOCATION SIMPLE: RIGHT FOREHEAD
LOCATION SIMPLE: RIGHT UPPER BACK
LOCATION SIMPLE: LEFT FOREHEAD
LOCATION SIMPLE: LEFT FOREARM
LOCATION SIMPLE: LEFT UPPER ARM
LOCATION SIMPLE: CHEST
LOCATION SIMPLE: LEFT CHEEK
LOCATION SIMPLE: ABDOMEN

## 2021-05-17 ASSESSMENT — LOCATION ZONE DERM
LOCATION ZONE: ARM
LOCATION ZONE: TRUNK
LOCATION ZONE: FACE
LOCATION ZONE: NECK

## 2021-05-17 NOTE — PROCEDURE: LIQUID NITROGEN
Render Note In Bullet Format When Appropriate: No
Detail Level: Detailed
Number Of Freeze-Thaw Cycles: 1 freeze-thaw cycle
Post-Care Instructions: I reviewed with the patient in detail post-care instructions. Patient is to wear sunprotection, and avoid picking at any of the treated lesions. Pt may apply Vaseline to crusted or scabbing areas.
Consent: The patient's consent was obtained including but not limited to risks of crusting, scabbing, blistering, scarring, darker or lighter pigmentary change, recurrence, incomplete removal and infection.
Duration Of Freeze Thaw-Cycle (Seconds): 5

## 2021-05-17 NOTE — PROCEDURE: REASSURANCE
Detail Level: Zone
Hide Include Location In Plan Question?: No
Include Location In Plan?: Yes
Detail Level: Detailed
Detail Level: Simple

## 2021-06-09 ENCOUNTER — APPOINTMENT (RX ONLY)
Dept: URBAN - METROPOLITAN AREA CLINIC 4 | Facility: CLINIC | Age: 71
Setting detail: DERMATOLOGY
End: 2021-06-09

## 2021-06-09 DIAGNOSIS — L259 CONTACT DERMATITIS AND OTHER ECZEMA, UNSPECIFIED CAUSE: ICD-10-CM

## 2021-06-09 DIAGNOSIS — Z85.828 PERSONAL HISTORY OF OTHER MALIGNANT NEOPLASM OF SKIN: ICD-10-CM

## 2021-06-09 PROBLEM — L23.9 ALLERGIC CONTACT DERMATITIS, UNSPECIFIED CAUSE: Status: ACTIVE | Noted: 2021-06-09

## 2021-06-09 PROBLEM — D48.5 NEOPLASM OF UNCERTAIN BEHAVIOR OF SKIN: Status: ACTIVE | Noted: 2021-06-09

## 2021-06-09 PROCEDURE — ? PRESCRIPTION

## 2021-06-09 PROCEDURE — ? BIOPSY BY SHAVE METHOD

## 2021-06-09 PROCEDURE — 11102 TANGNTL BX SKIN SINGLE LES: CPT

## 2021-06-09 PROCEDURE — ? MEDICATION COUNSELING

## 2021-06-09 PROCEDURE — 99213 OFFICE O/P EST LOW 20 MIN: CPT | Mod: 25

## 2021-06-09 PROCEDURE — ? OBSERVATION

## 2021-06-09 PROCEDURE — ? COUNSELING

## 2021-06-09 RX ORDER — BETAMETHASONE DIPROPIONATE 0.5 MG/G
1 CREAM, AUGMENTED TOPICAL BID
Qty: 1 | Refills: 2 | Status: ERX | COMMUNITY
Start: 2021-06-09

## 2021-06-09 RX ADMIN — BETAMETHASONE DIPROPIONATE 1: 0.5 CREAM, AUGMENTED TOPICAL at 00:00

## 2021-06-09 ASSESSMENT — LOCATION SIMPLE DESCRIPTION DERM
LOCATION SIMPLE: LEFT GREAT TOE
LOCATION SIMPLE: LEFT FOREARM
LOCATION SIMPLE: RIGHT FOOT
LOCATION SIMPLE: RIGHT UPPER BACK

## 2021-06-09 ASSESSMENT — LOCATION DETAILED DESCRIPTION DERM
LOCATION DETAILED: RIGHT SUPERIOR MEDIAL UPPER BACK
LOCATION DETAILED: LEFT PROXIMAL DORSAL FOREARM
LOCATION DETAILED: LEFT DORSAL GREAT TOE
LOCATION DETAILED: RIGHT DORSAL FOOT

## 2021-06-09 ASSESSMENT — LOCATION ZONE DERM
LOCATION ZONE: FEET
LOCATION ZONE: TRUNK
LOCATION ZONE: TOE
LOCATION ZONE: ARM

## 2021-06-09 NOTE — PROCEDURE: BIOPSY BY SHAVE METHOD
Detail Level: Detailed
Depth Of Biopsy: dermis
Was A Bandage Applied: Yes
Size Of Lesion In Cm: 0
Anticipated Plan (Based On Presumed Biopsy Results): Excision
Biopsy Type: H and E
Biopsy Method: Personna blade
Anesthesia Type: 1% lidocaine with epinephrine and a 1:10 solution of 8.4% sodium bicarbonate
Anesthesia Volume In Cc: 0.5
Hemostasis: Drysol and Electrocautery
Wound Care: Vaseline
Dressing: Band-Aid
Destruction After The Procedure: No
Type Of Destruction Used: Curettage
Curettage Text: The wound bed was treated with curettage after the biopsy was performed.
Electrodesiccation Text: The wound bed was treated with electrodesiccation after the biopsy was performed.
Electrodesiccation And Curettage Text: The wound bed was treated with electrodesiccation and curettage after the biopsy was performed.
Lab: [id number]
Lab Facility: 
Consent: Verbal consent was obtained and risks were reviewed including but not limited to scarring, infection, bleeding, scabbing, incomplete removal, nerve damage and allergy to anesthesia.
Post-Care Instructions: I reviewed with the patient in detail post-care instructions. Patient is to keep the biopsy site dry overnight, and then apply vasaline twice daily until healed.
Notification Instructions: Patient will be notified of biopsy results. However, patient instructed to call the office if not contacted within 2 weeks.
Billing Type: Third-Party Bill
Information: Selecting Yes will display possible errors in your note based on the variables you have selected. This validation is only offered as a suggestion for you. PLEASE NOTE THAT THE VALIDATION TEXT WILL BE REMOVED WHEN YOU FINALIZE YOUR NOTE. IF YOU WANT TO FAX A PRELIMINARY NOTE YOU WILL NEED TO TOGGLE THIS TO 'NO' IF YOU DO NOT WANT IT IN YOUR FAXED NOTE.

## 2021-06-09 NOTE — PROCEDURE: MEDICATION COUNSELING
Eucrisa Pregnancy And Lactation Text: This medication has not been assigned a Pregnancy Risk Category but animal studies failed to show danger with the topical medication. It is unknown if the medication is excreted in breast milk.
Opioid Pregnancy And Lactation Text: These medications can lead to premature delivery and should be avoided during pregnancy. These medications are also present in breast milk in small amounts.
Azathioprine Pregnancy And Lactation Text: This medication is Pregnancy Category D and isn't considered safe during pregnancy. It is unknown if this medication is excreted in breast milk.
Birth Control Pills Pregnancy And Lactation Text: This medication should be avoided if pregnant and for the first 30 days post-partum.
Hydroxychloroquine Counseling:  I discussed with the patient that a baseline ophthalmologic exam is needed at the start of therapy and every year thereafter while on therapy. A CBC may also be warranted for monitoring.  The side effects of this medication were discussed with the patient, including but not limited to agranulocytosis, aplastic anemia, seizures, rashes, retinopathy, and liver toxicity. Patient instructed to call the office should any adverse effect occur.  The patient verbalized understanding of the proper use and possible adverse effects of Plaquenil.  All the patient's questions and concerns were addressed.
Topical Retinoid Pregnancy And Lactation Text: This medication is Pregnancy Category C. It is unknown if this medication is excreted in breast milk.
Erythromycin Counseling:  I discussed with the patient the risks of erythromycin including but not limited to GI upset, allergic reaction, drug rash, diarrhea, increase in liver enzymes, and yeast infections.
Topical Retinoid counseling:  Patient advised to apply a pea-sized amount only at bedtime and wait 30 minutes after washing their face before applying.  If too drying, patient may add a non-comedogenic moisturizer. The patient verbalized understanding of the proper use and possible adverse effects of retinoids.  All of the patient's questions and concerns were addressed.
Siliq Counseling:  I discussed with the patient the risks of Siliq including but not limited to new or worsening depression, suicidal thoughts and behavior, immunosuppression, malignancy, posterior leukoencephalopathy syndrome, and serious infections.  The patient understands that monitoring is required including a PPD at baseline and must alert us or the primary physician if symptoms of infection or other concerning signs are noted. There is also a special program designed to monitor depression which is required with Siliq.
Minocycline Pregnancy And Lactation Text: This medication is Pregnancy Category D and not consider safe during pregnancy. It is also excreted in breast milk.
Isotretinoin Pregnancy And Lactation Text: This medication is Pregnancy Category X and is considered extremely dangerous during pregnancy. It is unknown if it is excreted in breast milk.
Cimzia Counseling:  I discussed with the patient the risks of Cimzia including but not limited to immunosuppression, allergic reactions and infections.  The patient understands that monitoring is required including a PPD at baseline and must alert us or the primary physician if symptoms of infection or other concerning signs are noted.
Spironolactone Counseling: Patient advised regarding risks of diarrhea, abdominal pain, hyperkalemia, birth defects (for female patients), liver toxicity and renal toxicity. The patient may need blood work to monitor liver and kidney function and potassium levels while on therapy. The patient verbalized understanding of the proper use and possible adverse effects of spironolactone.  All of the patient's questions and concerns were addressed.
Hydroxychloroquine Pregnancy And Lactation Text: This medication has been shown to cause fetal harm but it isn't assigned a Pregnancy Risk Category. There are small amounts excreted in breast milk.
Cellcept Counseling:  I discussed with the patient the risks of mycophenolate mofetil including but not limited to infection/immunosuppression, GI upset, hypokalemia, hypercholesterolemia, bone marrow suppression, lymphoproliferative disorders, malignancy, GI ulceration/bleed/perforation, colitis, interstitial lung disease, kidney failure, progressive multifocal leukoencephalopathy, and birth defects.  The patient understands that monitoring is required including a baseline creatinine and regular CBC testing. In addition, patient must alert us immediately if symptoms of infection or other concerning signs are noted.
Hydroquinone Counseling:  Patient advised that medication may result in skin irritation, lightening (hypopigmentation), dryness, and burning.  In the event of skin irritation, the patient was advised to reduce the amount of the drug applied or use it less frequently.  Rarely, spots that are treated with hydroquinone can become darker (pseudoochronosis).  Should this occur, patient instructed to stop medication and call the office. The patient verbalized understanding of the proper use and possible adverse effects of hydroquinone.  All of the patient's questions and concerns were addressed.
Tazorac Counseling:  Patient advised that medication is irritating and drying.  Patient may need to apply sparingly and wash off after an hour before eventually leaving it on overnight.  The patient verbalized understanding of the proper use and possible adverse effects of tazorac.  All of the patient's questions and concerns were addressed.
Ketoconazole Pregnancy And Lactation Text: This medication is Pregnancy Category C and it isn't know if it is safe during pregnancy. It is also excreted in breast milk and breast feeding isn't recommended.
Terbinafine Counseling: Patient counseling regarding adverse effects of terbinafine including but not limited to headache, diarrhea, rash, upset stomach, liver function test abnormalities, itching, taste/smell disturbance, nausea, abdominal pain, and flatulence.  There is a rare possibility of liver failure that can occur when taking terbinafine.  The patient understands that a baseline LFT and kidney function test may be required. The patient verbalized understanding of the proper use and possible adverse effects of terbinafine.  All of the patient's questions and concerns were addressed.
Quinolones Counseling:  I discussed with the patient the risks of fluoroquinolones including but not limited to GI upset, allergic reaction, drug rash, diarrhea, dizziness, photosensitivity, yeast infections, liver function test abnormalities, tendonitis/tendon rupture.
Siliq Pregnancy And Lactation Text: The risk during pregnancy and breastfeeding is uncertain with this medication.
High Dose Vitamin A Counseling: Side effects reviewed, pt to contact office should one occur.
Detail Level: Simple
Cimzia Pregnancy And Lactation Text: This medication crosses the placenta but can be considered safe in certain situations. Cimzia may be excreted in breast milk.
Libtayo Counseling- I discussed with the patient the risks of Libtayo including but not limited to nausea, vomiting, diarrhea, and bone or muscle pain.  The patient verbalized understanding of the proper use and possible adverse effects of Libtayo.  All of the patient's questions and concerns were addressed.
High Dose Vitamin A Pregnancy And Lactation Text: High dose vitamin A therapy is contraindicated during pregnancy and breast feeding.
Arava Counseling:  Patient counseled regarding adverse effects of Arava including but not limited to nausea, vomiting, abnormalities in liver function tests. Patients may develop mouth sores, rash, diarrhea, and abnormalities in blood counts. The patient understands that monitoring is required including LFTs and blood counts.  There is a rare possibility of scarring of the liver and lung problems that can occur when taking methotrexate. Persistent nausea, loss of appetite, pale stools, dark urine, cough, and shortness of breath should be reported immediately. Patient advised to discontinue Arava treatment and consult with a physician prior to attempting conception. The patient will have to undergo a treatment to eliminate Arava from the body prior to conception.
Terbinafine Pregnancy And Lactation Text: This medication is Pregnancy Category B and is considered safe during pregnancy. It is also excreted in breast milk and breast feeding isn't recommended.
Quinolones Pregnancy And Lactation Text: This medication is Pregnancy Category C and it isn't know if it is safe during pregnancy. It is also excreted in breast milk.
Simponi Counseling:  I discussed with the patient the risks of golimumab including but not limited to myelosuppression, immunosuppression, autoimmune hepatitis, demyelinating diseases, lymphoma, and serious infections.  The patient understands that monitoring is required including a PPD at baseline and must alert us or the primary physician if symptoms of infection or other concerning signs are noted.
Spironolactone Pregnancy And Lactation Text: This medication can cause feminization of the male fetus and should be avoided during pregnancy. The active metabolite is also found in breast milk.
Cosentyx Counseling:  I discussed with the patient the risks of Cosentyx including but not limited to worsening of Crohn's disease, immunosuppression, allergic reactions and infections.  The patient understands that monitoring is required including a PPD at baseline and must alert us or the primary physician if symptoms of infection or other concerning signs are noted.
Libtayo Pregnancy And Lactation Text: This medication is contraindicated in pregnancy and when breast feeding.
Clofazimine Counseling:  I discussed with the patient the risks of clofazimine including but not limited to skin and eye pigmentation, liver damage, nausea/vomiting, gastrointestinal bleeding and allergy.
Arava Pregnancy And Lactation Text: This medication is Pregnancy Category X and is absolutely contraindicated during pregnancy. It is unknown if it is excreted in breast milk.
Cyclophosphamide Counseling:  I discussed with the patient the risks of cyclophosphamide including but not limited to hair loss, hormonal abnormalities, decreased fertility, abdominal pain, diarrhea, nausea and vomiting, bone marrow suppression and infection. The patient understands that monitoring is required while taking this medication.
SSKI Counseling:  I discussed with the patient the risks of SSKI including but not limited to thyroid abnormalities, metallic taste, GI upset, fever, headache, acne, arthralgias, paraesthesias, lymphadenopathy, easy bleeding, arrhythmias, and allergic reaction.
Imiquimod Counseling:  I discussed with the patient the risks of imiquimod including but not limited to erythema, scaling, itching, weeping, crusting, and pain.  Patient understands that the inflammatory response to imiquimod is variable from person to person and was educated regarded proper titration schedule.  If flu-like symptoms develop, patient knows to discontinue the medication and contact us.
Include Pregnancy/Lactation Warning?: No
Rifampin Counseling: I discussed with the patient the risks of rifampin including but not limited to liver damage, kidney damage, red-orange body fluids, nausea/vomiting and severe allergy.
Cyclophosphamide Pregnancy And Lactation Text: This medication is Pregnancy Category D and it isn't considered safe during pregnancy. This medication is excreted in breast milk.
Rifampin Pregnancy And Lactation Text: This medication is Pregnancy Category C and it isn't know if it is safe during pregnancy. It is also excreted in breast milk and should not be used if you are breast feeding.
Benzoyl Peroxide Counseling: Patient counseled that medicine may cause skin irritation and bleach clothing.  In the event of skin irritation, the patient was advised to reduce the amount of the drug applied or use it less frequently.   The patient verbalized understanding of the proper use and possible adverse effects of benzoyl peroxide.  All of the patient's questions and concerns were addressed.
Skyrizi Counseling: I discussed with the patient the risks of risankizumab-rzaa including but not limited to immunosuppression, and serious infections.  The patient understands that monitoring is required including a PPD at baseline and must alert us or the primary physician if symptoms of infection or other concerning signs are noted.
Cimetidine Counseling:  I discussed with the patient the risks of Cimetidine including but not limited to gynecomastia, headache, diarrhea, nausea, drowsiness, arrhythmias, pancreatitis, skin rashes, psychosis, bone marrow suppression and kidney toxicity.
Sski Pregnancy And Lactation Text: This medication is Pregnancy Category D and isn't considered safe during pregnancy. It is excreted in breast milk.
Cosentyx Pregnancy And Lactation Text: This medication is Pregnancy Category B and is considered safe during pregnancy. It is unknown if this medication is excreted in breast milk.
Niacinamide Counseling: I recommended taking niacin or niacinamide, also know as vitamin B3, twice daily. Recent evidence suggests that taking vitamin B3 (500 mg twice daily) can reduce the risk of actinic keratoses and non-melanoma skin cancers. Side effects of vitamin B3 include flushing and headache.
Benzoyl Peroxide Pregnancy And Lactation Text: This medication is Pregnancy Category C. It is unknown if benzoyl peroxide is excreted in breast milk.
Minoxidil Counseling: Minoxidil is a topical medication which can increase blood flow where it is applied. It is uncertain how this medication increases hair growth. Side effects are uncommon and include stinging and allergic reactions.
Sarecycline Counseling: Patient advised regarding possible photosensitivity and discoloration of the teeth, skin, lips, tongue and gums.  Patient instructed to avoid sunlight, if possible.  When exposed to sunlight, patients should wear protective clothing, sunglasses, and sunscreen.  The patient was instructed to call the office immediately if the following severe adverse effects occur:  hearing changes, easy bruising/bleeding, severe headache, or vision changes.  The patient verbalized understanding of the proper use and possible adverse effects of sarecycline.  All of the patient's questions and concerns were addressed.
Clofazimine Pregnancy And Lactation Text: This medication is Pregnancy Category C and isn't considered safe during pregnancy. It is excreted in breast milk.
Dupixent Counseling: I discussed with the patient the risks of dupilumab including but not limited to eye infection and irritation, cold sores, injection site reactions, worsening of asthma, allergic reactions and increased risk of parasitic infection.  Live vaccines should be avoided while taking dupilumab. Dupilumab will also interact with certain medications such as warfarin and cyclosporine. The patient understands that monitoring is required and they must alert us or the primary physician if symptoms of infection or other concerning signs are noted.
Thalidomide Counseling: I discussed with the patient the risks of thalidomide including but not limited to birth defects, anxiety, weakness, chest pain, dizziness, cough and severe allergy.
Niacinamide Pregnancy And Lactation Text: These medications are considered safe during pregnancy.
Cyclosporine Counseling:  I discussed with the patient the risks of cyclosporine including but not limited to hypertension, gingival hyperplasia,myelosuppression, immunosuppression, liver damage, kidney damage, neurotoxicity, lymphoma, and serious infections. The patient understands that monitoring is required including baseline blood pressure, CBC, CMP, lipid panel and uric acid, and then 1-2 times monthly CMP and blood pressure.
Doxepin Counseling:  Patient advised that the medication is sedating and not to drive a car after taking this medication. Patient informed of potential adverse effects including but not limited to dry mouth, urinary retention, and blurry vision.  The patient verbalized understanding of the proper use and possible adverse effects of doxepin.  All of the patient's questions and concerns were addressed.
Nsaids Counseling: NSAID Counseling: I discussed with the patient that NSAIDs should be taken with food. Prolonged use of NSAIDs can result in the development of stomach ulcers.  Patient advised to stop taking NSAIDs if abdominal pain occurs.  The patient verbalized understanding of the proper use and possible adverse effects of NSAIDs.  All of the patient's questions and concerns were addressed.
Carac Counseling:  I discussed with the patient the risks of Carac including but not limited to erythema, scaling, itching, weeping, crusting, and pain.
Tazorac Pregnancy And Lactation Text: This medication is not safe during pregnancy. It is unknown if this medication is excreted in breast milk.
Methotrexate Counseling:  Patient counseled regarding adverse effects of methotrexate including but not limited to nausea, vomiting, abnormalities in liver function tests. Patients may develop mouth sores, rash, diarrhea, and abnormalities in blood counts. The patient understands that monitoring is required including LFT's and blood counts.  There is a rare possibility of scarring of the liver and lung problems that can occur when taking methotrexate. Persistent nausea, loss of appetite, pale stools, dark urine, cough, and shortness of breath should be reported immediately. Patient advised to discontinue methotrexate treatment at least three months before attempting to become pregnant.  I discussed the need for folate supplements while taking methotrexate.  These supplements can decrease side effects during methotrexate treatment. The patient verbalized understanding of the proper use and possible adverse effects of methotrexate.  All of the patient's questions and concerns were addressed.
Stelara Counseling:  I discussed with the patient the risks of ustekinumab including but not limited to immunosuppression, malignancy, posterior leukoencephalopathy syndrome, and serious infections.  The patient understands that monitoring is required including a PPD at baseline and must alert us or the primary physician if symptoms of infection or other concerning signs are noted.
Colchicine Counseling:  Patient counseled regarding adverse effects including but not limited to stomach upset (nausea, vomiting, stomach pain, or diarrhea).  Patient instructed to limit alcohol consumption while taking this medication.  Colchicine may reduce blood counts especially with prolonged use.  The patient understands that monitoring of kidney function and blood counts may be required, especially at baseline. The patient verbalized understanding of the proper use and possible adverse effects of colchicine.  All of the patient's questions and concerns were addressed.
Cyclosporine Pregnancy And Lactation Text: This medication is Pregnancy Category C and it isn't know if it is safe during pregnancy. This medication is excreted in breast milk.
Dupixent Pregnancy And Lactation Text: This medication likely crosses the placenta but the risk for the fetus is uncertain. This medication is excreted in breast milk.
Dapsone Counseling: I discussed with the patient the risks of dapsone including but not limited to hemolytic anemia, agranulocytosis, rashes, methemoglobinemia, kidney failure, peripheral neuropathy, headaches, GI upset, and liver toxicity.  Patients who start dapsone require monitoring including baseline LFTs and weekly CBCs for the first month, then every month thereafter.  The patient verbalized understanding of the proper use and possible adverse effects of dapsone.  All of the patient's questions and concerns were addressed.
Taltz Counseling: I discussed with the patient the risks of ixekizumab including but not limited to immunosuppression, serious infections, worsening of inflammatory bowel disease and drug reactions.  The patient understands that monitoring is required including a PPD at baseline and must alert us or the primary physician if symptoms of infection or other concerning signs are noted.
Doxepin Pregnancy And Lactation Text: This medication is Pregnancy Category C and it isn't known if it is safe during pregnancy. It is also excreted in breast milk and breast feeding isn't recommended.
Mirvaso Pregnancy And Lactation Text: This medication has not been assigned a Pregnancy Risk Category. It is unknown if the medication is excreted in breast milk.
Azithromycin Pregnancy And Lactation Text: This medication is considered safe during pregnancy and is also secreted in breast milk.
Tetracycline Counseling: Patient counseled regarding possible photosensitivity and increased risk for sunburn.  Patient instructed to avoid sunlight, if possible.  When exposed to sunlight, patients should wear protective clothing, sunglasses, and sunscreen.  The patient was instructed to call the office immediately if the following severe adverse effects occur:  hearing changes, easy bruising/bleeding, severe headache, or vision changes.  The patient verbalized understanding of the proper use and possible adverse effects of tetracycline.  All of the patient's questions and concerns were addressed. Patient understands to avoid pregnancy while on therapy due to potential birth defects.
Mirvaso Counseling: Mirvaso is a topical medication which can decrease superficial blood flow where applied. Side effects are uncommon and include stinging, redness and allergic reactions.
Azithromycin Counseling:  I discussed with the patient the risks of azithromycin including but not limited to GI upset, allergic reaction, drug rash, diarrhea, and yeast infections.
Tranexamic Acid Counseling:  Patient advised of the small risk of bleeding problems with tranexamic acid. They were also instructed to call if they developed any nausea, vomiting or diarrhea. All of the patient's questions and concerns were addressed.
Enbrel Counseling:  I discussed with the patient the risks of etanercept including but not limited to myelosuppression, immunosuppression, autoimmune hepatitis, demyelinating diseases, lymphoma, and infections.  The patient understands that monitoring is required including a PPD at baseline and must alert us or the primary physician if symptoms of infection or other concerning signs are noted.
Nsaids Pregnancy And Lactation Text: These medications are considered safe up to 30 weeks gestation. It is excreted in breast milk.
Carac Pregnancy And Lactation Text: This medication is Pregnancy Category X and contraindicated in pregnancy and in women who may become pregnant. It is unknown if this medication is excreted in breast milk.
Hydroxyzine Counseling: Patient advised that the medication is sedating and not to drive a car after taking this medication.  Patient informed of potential adverse effects including but not limited to dry mouth, urinary retention, and blurry vision.  The patient verbalized understanding of the proper use and possible adverse effects of hydroxyzine.  All of the patient's questions and concerns were addressed.
Dapsone Pregnancy And Lactation Text: This medication is Pregnancy Category C and is not considered safe during pregnancy or breast feeding.
Bactrim Counseling:  I discussed with the patient the risks of sulfa antibiotics including but not limited to GI upset, allergic reaction, drug rash, diarrhea, dizziness, photosensitivity, and yeast infections.  Rarely, more serious reactions can occur including but not limited to aplastic anemia, agranulocytosis, methemoglobinemia, blood dyscrasias, liver or kidney failure, lung infiltrates or desquamative/blistering drug rashes.
Calcipotriene Counseling:  I discussed with the patient the risks of calcipotriene including but not limited to erythema, scaling, itching, and irritation.
Topical Clindamycin Pregnancy And Lactation Text: This medication is Pregnancy Category B and is considered safe during pregnancy. It is unknown if it is excreted in breast milk.
Picato Counseling:  I discussed with the patient the risks of Picato including but not limited to erythema, scaling, itching, weeping, crusting, and pain.
Humira Counseling:  I discussed with the patient the risks of adalimumab including but not limited to myelosuppression, immunosuppression, autoimmune hepatitis, demyelinating diseases, lymphoma, and serious infections.  The patient understands that monitoring is required including a PPD at baseline and must alert us or the primary physician if symptoms of infection or other concerning signs are noted.
Topical Clindamycin Counseling: Patient counseled that this medication may cause skin irritation or allergic reactions.  In the event of skin irritation, the patient was advised to reduce the amount of the drug applied or use it less frequently.   The patient verbalized understanding of the proper use and possible adverse effects of clindamycin.  All of the patient's questions and concerns were addressed.
Tranexamic Acid Pregnancy And Lactation Text: It is unknown if this medication is safe during pregnancy or breast feeding.
Odomzo Counseling- I discussed with the patient the risks of Odomzo including but not limited to nausea, vomiting, diarrhea, constipation, weight loss, changes in the sense of taste, decreased appetite, muscle spasms, and hair loss.  The patient verbalized understanding of the proper use and possible adverse effects of Odomzo.  All of the patient's questions and concerns were addressed.
Methotrexate Pregnancy And Lactation Text: This medication is Pregnancy Category X and is known to cause fetal harm. This medication is excreted in breast milk.
Calcipotriene Pregnancy And Lactation Text: This medication has not been proven safe during pregnancy. It is unknown if this medication is excreted in breast milk.
Erivedge Counseling- I discussed with the patient the risks of Erivedge including but not limited to nausea, vomiting, diarrhea, constipation, weight loss, changes in the sense of taste, decreased appetite, muscle spasms, and hair loss.  The patient verbalized understanding of the proper use and possible adverse effects of Erivedge.  All of the patient's questions and concerns were addressed.
Bactrim Pregnancy And Lactation Text: This medication is Pregnancy Category D and is known to cause fetal risk.  It is also excreted in breast milk.
Hydroxyzine Pregnancy And Lactation Text: This medication is not safe during pregnancy and should not be taken. It is also excreted in breast milk and breast feeding isn't recommended.
Topical Sulfur Applications Counseling: Topical Sulfur Counseling: Patient counseled that this medication may cause skin irritation or allergic reactions.  In the event of skin irritation, the patient was advised to reduce the amount of the drug applied or use it less frequently.   The patient verbalized understanding of the proper use and possible adverse effects of topical sulfur application.  All of the patient's questions and concerns were addressed.
Valtrex Pregnancy And Lactation Text: this medication is Pregnancy Category B and is considered safe during pregnancy. This medication is not directly found in breast milk but it's metabolite acyclovir is present.
Prednisone Counseling:  I discussed with the patient the risks of prolonged use of prednisone including but not limited to weight gain, insomnia, osteoporosis, mood changes, diabetes, susceptibility to infection, glaucoma and high blood pressure.  In cases where prednisone use is prolonged, patients should be monitored with blood pressure checks, serum glucose levels and an eye exam.  Additionally, the patient may need to be placed on GI prophylaxis, PCP prophylaxis, and calcium and vitamin D supplementation and/or a bisphosphonate.  The patient verbalized understanding of the proper use and the possible adverse effects of prednisone.  All of the patient's questions and concerns were addressed.
Valtrex Counseling: I discussed with the patient the risks of valacyclovir including but not limited to kidney damage, nausea, vomiting and severe allergy.  The patient understands that if the infection seems to be worsening or is not improving, they are to call.
Tremfya Counseling: I discussed with the patient the risks of guselkumab including but not limited to immunosuppression, serious infections, worsening of inflammatory bowel disease and drug reactions.  The patient understands that monitoring is required including a PPD at baseline and must alert us or the primary physician if symptoms of infection or other concerning signs are noted.
Fluconazole Counseling:  Patient counseled regarding adverse effects of fluconazole including but not limited to headache, diarrhea, nausea, upset stomach, liver function test abnormalities, taste disturbance, and stomach pain.  There is a rare possibility of liver failure that can occur when taking fluconazole.  The patient understands that monitoring of LFTs and kidney function test may be required, especially at baseline. The patient verbalized understanding of the proper use and possible adverse effects of fluconazole.  All of the patient's questions and concerns were addressed.
Cephalexin Counseling: I counseled the patient regarding use of cephalexin as an antibiotic for prophylactic and/or therapeutic purposes. Cephalexin (commonly prescribed under brand name Keflex) is a cephalosporin antibiotic which is active against numerous classes of bacteria, including most skin bacteria. Side effects may include nausea, diarrhea, gastrointestinal upset, rash, hives, yeast infections, and in rare cases, hepatitis, kidney disease, seizures, fever, confusion, neurologic symptoms, and others. Patients with severe allergies to penicillin medications are cautioned that there is about a 10% incidence of cross-reactivity with cephalosporins. When possible, patients with penicillin allergies should use alternatives to cephalosporins for antibiotic therapy.
Ilumya Counseling: I discussed with the patient the risks of tildrakizumab including but not limited to immunosuppression, malignancy, posterior leukoencephalopathy syndrome, and serious infections.  The patient understands that monitoring is required including a PPD at baseline and must alert us or the primary physician if symptoms of infection or other concerning signs are noted.
Albendazole Counseling:  I discussed with the patient the risks of albendazole including but not limited to cytopenia, kidney damage, nausea/vomiting and severe allergy.  The patient understands that this medication is being used in an off-label manner.
Otezla Counseling: The side effects of Otezla were discussed with the patient, including but not limited to worsening or new depression, weight loss, diarrhea, nausea, upper respiratory tract infection, and headache. Patient instructed to call the office should any adverse effect occur.  The patient verbalized understanding of the proper use and possible adverse effects of Otezla.  All the patient's questions and concerns were addressed.
5-Fu Counseling: 5-Fluorouracil Counseling:  I discussed with the patient the risks of 5-fluorouracil including but not limited to erythema, scaling, itching, weeping, crusting, and pain.
Protopic Counseling: Patient may experience a mild burning sensation during topical application. Protopic is not approved in children less than 2 years of age. There have been case reports of hematologic and skin malignancies in patients using topical calcineurin inhibitors although causality is questionable.
Topical Sulfur Applications Pregnancy And Lactation Text: This medication is Pregnancy Category C and has an unknown safety profile during pregnancy. It is unknown if this topical medication is excreted in breast milk.
Protopic Pregnancy And Lactation Text: This medication is Pregnancy Category C. It is unknown if this medication is excreted in breast milk when applied topically.
Cephalexin Pregnancy And Lactation Text: This medication is Pregnancy Category B and considered safe during pregnancy.  It is also excreted in breast milk but can be used safely for shorter doses.
Oxybutynin Counseling:  I discussed with the patient the risks of oxybutynin including but not limited to skin rash, drowsiness, dry mouth, difficulty urinating, and blurred vision.
Acitretin Counseling:  I discussed with the patient the risks of acitretin including but not limited to hair loss, dry lips/skin/eyes, liver damage, hyperlipidemia, depression/suicidal ideation, photosensitivity.  Serious rare side effects can include but are not limited to pancreatitis, pseudotumor cerebri, bony changes, clot formation/stroke/heart attack.  Patient understands that alcohol is contraindicated since it can result in liver toxicity and significantly prolong the elimination of the drug by many years.
Drysol Counseling:  I discussed with the patient the risks of drysol/aluminum chloride including but not limited to skin rash, itching, irritation, burning.
Otezla Pregnancy And Lactation Text: This medication is Pregnancy Category C and it isn't known if it is safe during pregnancy. It is unknown if it is excreted in breast milk.
Wartpeel Counseling:  I discussed with the patient the risks of Wartpeel including but not limited to erythema, scaling, itching, weeping, crusting, and pain.
Finasteride Male Counseling: Finasteride Counseling:  I discussed with the patient the risks of use of finasteride including but not limited to decreased libido, decreased ejaculate volume, gynecomastia, and depression. Women should not handle medication.  All of the patient's questions and concerns were addressed.
Xeljanz Counseling: I discussed with the patient the risks of Xeljanz therapy including increased risk of infection, liver issues, headache, diarrhea, or cold symptoms. Live vaccines should be avoided. They were instructed to call if they have any problems.
Infliximab Counseling:  I discussed with the patient the risks of infliximab including but not limited to myelosuppression, immunosuppression, autoimmune hepatitis, demyelinating diseases, lymphoma, and serious infections.  The patient understands that monitoring is required including a PPD at baseline and must alert us or the primary physician if symptoms of infection or other concerning signs are noted.
Acitretin Pregnancy And Lactation Text: This medication is Pregnancy Category X and should not be given to women who are pregnant or may become pregnant in the future. This medication is excreted in breast milk.
Albendazole Pregnancy And Lactation Text: This medication is Pregnancy Category C and it isn't known if it is safe during pregnancy. It is also excreted in breast milk.
Zyclara Counseling:  I discussed with the patient the risks of imiquimod including but not limited to erythema, scaling, itching, weeping, crusting, and pain.  Patient understands that the inflammatory response to imiquimod is variable from person to person and was educated regarded proper titration schedule.  If flu-like symptoms develop, patient knows to discontinue the medication and contact us.
Ivermectin Counseling:  Patient instructed to take medication on an empty stomach with a full glass of water.  Patient informed of potential adverse effects including but not limited to nausea, diarrhea, dizziness, itching, and swelling of the extremities or lymph nodes.  The patient verbalized understanding of the proper use and possible adverse effects of ivermectin.  All of the patient's questions and concerns were addressed.
Erythromycin Pregnancy And Lactation Text: This medication is Pregnancy Category B and is considered safe during pregnancy. It is also excreted in breast milk.
Finasteride Pregnancy And Lactation Text: This medication is absolutely contraindicated during pregnancy. It is unknown if it is excreted in breast milk.
Xeltyz Pregnancy And Lactation Text: This medication is Pregnancy Category D and is not considered safe during pregnancy.  The risk during breast feeding is also uncertain.
Clindamycin Counseling: I counseled the patient regarding use of clindamycin as an antibiotic for prophylactic and/or therapeutic purposes. Clindamycin is active against numerous classes of bacteria, including skin bacteria. Side effects may include nausea, diarrhea, gastrointestinal upset, rash, hives, yeast infections, and in rare cases, colitis.
Rhofade Counseling: Rhofade is a topical medication which can decrease superficial blood flow where applied. Side effects are uncommon and include stinging, redness and allergic reactions.
Griseofulvin Counseling:  I discussed with the patient the risks of griseofulvin including but not limited to photosensitivity, cytopenia, liver damage, nausea/vomiting and severe allergy.  The patient understands that this medication is best absorbed when taken with a fatty meal (e.g., ice cream or french fries).
Bexarotene Counseling:  I discussed with the patient the risks of bexarotene including but not limited to hair loss, dry lips/skin/eyes, liver abnormalities, hyperlipidemia, pancreatitis, depression/suicidal ideation, photosensitivity, drug rash/allergic reactions, hypothyroidism, anemia, leukopenia, infection, cataracts, and teratogenicity.  Patient understands that they will need regular blood tests to check lipid profile, liver function tests, white blood cell count, thyroid function tests and pregnancy test if applicable.
Griseofulvin Pregnancy And Lactation Text: This medication is Pregnancy Category X and is known to cause serious birth defects. It is unknown if this medication is excreted in breast milk but breast feeding should be avoided.
Xolair Pregnancy And Lactation Text: This medication is Pregnancy Category B and is considered safe during pregnancy. This medication is excreted in breast milk.
Propranolol Counseling:  I discussed with the patient the risks of propranolol including but not limited to low heart rate, low blood pressure, low blood sugar, restlessness and increased cold sensitivity. They should call the office if they experience any of these side effects.
Clindamycin Pregnancy And Lactation Text: This medication can be used in pregnancy if certain situations. Clindamycin is also present in breast milk.
Gabapentin Counseling: I discussed with the patient the risks of gabapentin including but not limited to dizziness, somnolence, fatigue and ataxia.
Xolair Counseling:  Patient informed of potential adverse effects including but not limited to fever, muscle aches, rash and allergic reactions.  The patient verbalized understanding of the proper use and possible adverse effects of Xolair.  All of the patient's questions and concerns were addressed.
Drysol Pregnancy And Lactation Text: This medication is considered safe during pregnancy and breast feeding.
Propranolol Pregnancy And Lactation Text: This medication is Pregnancy Category C and it isn't known if it is safe during pregnancy. It is excreted in breast milk.
Rituxan Counseling:  I discussed with the patient the risks of Rituxan infusions. Side effects can include infusion reactions, severe drug rashes including mucocutaneous reactions, reactivation of latent hepatitis and other infections and rarely progressive multifocal leukoencephalopathy.  All of the patient's questions and concerns were addressed.
Itraconazole Counseling:  I discussed with the patient the risks of itraconazole including but not limited to liver damage, nausea/vomiting, neuropathy, and severe allergy.  The patient understands that this medication is best absorbed when taken with acidic beverages such as non-diet cola or ginger ale.  The patient understands that monitoring is required including baseline LFTs and repeat LFTs at intervals.  The patient understands that they are to contact us or the primary physician if concerning signs are noted.
Metronidazole Counseling:  I discussed with the patient the risks of metronidazole including but not limited to seizures, nausea/vomiting, a metallic taste in the mouth, nausea/vomiting and severe allergy.
Solaraze Pregnancy And Lactation Text: This medication is Pregnancy Category B and is considered safe. There is some data to suggest avoiding during the third trimester. It is unknown if this medication is excreted in breast milk.
Metronidazole Pregnancy And Lactation Text: This medication is Pregnancy Category B and considered safe during pregnancy.  It is also excreted in breast milk.
Elidel Counseling: Patient may experience a mild burning sensation during topical application. Elidel is not approved in children less than 2 years of age. There have been case reports of hematologic and skin malignancies in patients using topical calcineurin inhibitors although causality is questionable.
Solaraze Counseling:  I discussed with the patient the risks of Solaraze including but not limited to erythema, scaling, itching, weeping, crusting, and pain.
Doxycycline Counseling:  Patient counseled regarding possible photosensitivity and increased risk for sunburn.  Patient instructed to avoid sunlight, if possible.  When exposed to sunlight, patients should wear protective clothing, sunglasses, and sunscreen.  The patient was instructed to call the office immediately if the following severe adverse effects occur:  hearing changes, easy bruising/bleeding, severe headache, or vision changes.  The patient verbalized understanding of the proper use and possible adverse effects of doxycycline.  All of the patient's questions and concerns were addressed.
Bexarotene Pregnancy And Lactation Text: This medication is Pregnancy Category X and should not be given to women who are pregnant or may become pregnant. This medication should not be used if you are breast feeding.
Rituxan Pregnancy And Lactation Text: This medication is Pregnancy Category C and it isn't know if it is safe during pregnancy. It is unknown if this medication is excreted in breast milk but similar antibodies are known to be excreted.
Birth Control Pills Counseling: Birth Control Pill Counseling: I discussed with the patient the potential side effects of OCPs including but not limited to increased risk of stroke, heart attack, thrombophlebitis, deep venous thrombosis, hepatic adenomas, breast changes, GI upset, headaches, and depression.  The patient verbalized understanding of the proper use and possible adverse effects of OCPs. All of the patient's questions and concerns were addressed.
Eucrisa Counseling: Patient may experience a mild burning sensation during topical application. Eucrisa is not approved in children less than 2 years of age.
Opioid Counseling: I discussed with the patient the potential side effects of opioids including but not limited to addiction, altered mental status, and depression. I stressed avoiding alcohol, benzodiazepines, muscle relaxants and sleep aids unless specifically okayed by a physician. The patient verbalized understanding of the proper use and possible adverse effects of opioids. All of the patient's questions and concerns were addressed. They were instructed to flush the remaining pills down the toilet if they did not need them for pain.
Glycopyrrolate Pregnancy And Lactation Text: This medication is Pregnancy Category B and is considered safe during pregnancy. It is unknown if it is excreted breast milk.
Azathioprine Counseling:  I discussed with the patient the risks of azathioprine including but not limited to myelosuppression, immunosuppression, hepatotoxicity, lymphoma, and infections.  The patient understands that monitoring is required including baseline LFTs, Creatinine, possible TPMP genotyping and weekly CBCs for the first month and then every 2 weeks thereafter.  The patient verbalized understanding of the proper use and possible adverse effects of azathioprine.  All of the patient's questions and concerns were addressed.
Glycopyrrolate Counseling:  I discussed with the patient the risks of glycopyrrolate including but not limited to skin rash, drowsiness, dry mouth, difficulty urinating, and blurred vision.
Ketoconazole Counseling:   Patient counseled regarding improving absorption with orange juice.  Adverse effects include but are not limited to breast enlargement, headache, diarrhea, nausea, upset stomach, liver function test abnormalities, taste disturbance, and stomach pain.  There is a rare possibility of liver failure that can occur when taking ketoconazole. The patient understands that monitoring of LFTs may be required, especially at baseline. The patient verbalized understanding of the proper use and possible adverse effects of ketoconazole.  All of the patient's questions and concerns were addressed.
Isotretinoin Counseling: Patient should get monthly blood tests, not donate blood, not drive at night if vision affected, not share medication, and not undergo elective surgery for 6 months after tx completed. Side effects reviewed, pt to contact office should one occur.
Minocycline Counseling: Patient advised regarding possible photosensitivity and discoloration of the teeth, skin, lips, tongue and gums.  Patient instructed to avoid sunlight, if possible.  When exposed to sunlight, patients should wear protective clothing, sunglasses, and sunscreen.  The patient was instructed to call the office immediately if the following severe adverse effects occur:  hearing changes, easy bruising/bleeding, severe headache, or vision changes.  The patient verbalized understanding of the proper use and possible adverse effects of minocycline.  All of the patient's questions and concerns were addressed.
Doxycycline Pregnancy And Lactation Text: This medication is Pregnancy Category D and not consider safe during pregnancy. It is also excreted in breast milk but is considered safe for shorter treatment courses.

## 2021-07-07 ENCOUNTER — APPOINTMENT (RX ONLY)
Dept: URBAN - METROPOLITAN AREA CLINIC 4 | Facility: CLINIC | Age: 71
Setting detail: DERMATOLOGY
End: 2021-07-07

## 2021-07-07 PROBLEM — D48.5 NEOPLASM OF UNCERTAIN BEHAVIOR OF SKIN: Status: ACTIVE | Noted: 2021-07-07

## 2021-07-07 PROBLEM — C44.41 BASAL CELL CARCINOMA OF SKIN OF SCALP AND NECK: Status: ACTIVE | Noted: 2021-07-07

## 2021-07-07 PROCEDURE — 12042 INTMD RPR N-HF/GENIT2.6-7.5: CPT | Mod: 59

## 2021-07-07 PROCEDURE — ? EXCISION

## 2021-07-07 PROCEDURE — 11102 TANGNTL BX SKIN SINGLE LES: CPT | Mod: 59

## 2021-07-07 PROCEDURE — 11622 EXC S/N/H/F/G MAL+MRG 1.1-2: CPT

## 2021-07-07 PROCEDURE — ? BIOPSY BY SHAVE METHOD

## 2021-07-07 NOTE — PROCEDURE: EXCISION
Surgeon Performing The Repair (Optional): Tien
Biopsy Photograph Reviewed: Yes
Previous Accession (Optional): [id number]
Size Of Lesion In Cm: 1.1
X Size Of Lesion In Cm (Optional): 0
Size Of Margin In Cm: 0.3
Anesthesia Volume In Cc: 6
Was An Eye Clamp Used?: No
Eye Clamp Note Details: An eye clamp was used during the procedure.
Excision Method: Fusiform
Repair Type: Intermediate
Suturegard Retention Suture: 2-0 Nylon
Retention Suture Bite Size: 3 mm
Length To Time In Minutes Device Was In Place: 10
Number Of Hemigard Strips Per Side: 1
Intermediate / Complex Repair - Final Wound Length In Cm: 5.2
Undermining Type: Entire Wound
Debridement Text: The wound edges were debrided prior to proceeding with the closure to facilitate wound healing.
Helical Rim Text: The closure involved the helical rim.
Vermilion Border Text: The closure involved the vermilion border.
Nostril Rim Text: The closure involved the nostril rim.
Retention Suture Text: Retention sutures were placed to support the closure and prevent dehiscence.
Suture Removal: 14 days
Lab: [id number]
Lab Facility: 
Graft Donor Site Bandage (Optional-Leave Blank If You Don't Want In Note): Steri-strips and a pressure bandage were applied to the donor site.
Epidermal Closure Graft Donor Site (Optional): simple interrupted
Billing Type: Third-Party Bill
Excision Depth: adipose tissue
Scalpel Size: 15 blade
Anesthesia Type: 1% lidocaine with 1:100,000 epinephrine and a 1:12 solution of 8.4% sodium bicarbonate
Hemostasis: Electrocautery
Estimated Blood Loss (Cc): minimal
Detail Level: Detailed
Anesthesia Type: 1% lidocaine with 1:100,000 epinephrine and a 1:10 solution of 8.4% sodium bicarbonate
Deep Sutures: 4-0 Polysorb
Number Of Deep Sutures (Optional): 3
Epidermal Sutures: 4-0 Nylon
Epidermal Closure: running cuticular
Wound Care: Vaseline
Dressing: pressure dressing
Suturegard Intro: Intraoperative tissue expansion was performed, utilizing the SUTUREGARD device, in order to reduce wound tension.
Suturegard Body: The suture ends were repeatedly re-tightened and re-clamped to achieve the desired tissue expansion.
Hemigard Intro: Due to skin fragility and wound tension, it was decided to use HEMIGARD adhesive retention suture devices to permit a linear closure. The skin was cleaned and dried for a 6cm distance away from the wound. Excessive hair, if present, was removed to allow for adhesion.
Hemigard Postcare Instructions: The HEMIGARD strips are to remain completely dry for at least 5-7 days.
Positioning (Leave Blank If You Do Not Want): The patient was placed in a comfortable position exposing the surgical site.
Pre-Excision Curettage Text (Leave Blank If You Do Not Want): Prior to drawing the surgical margin the visible lesion was removed with electrodesiccation and curettage to clearly define the lesion size.
Complex Repair Preamble Text (Leave Blank If You Do Not Want): Extensive wide undermining was performed.
Intermediate Repair Preamble Text (Leave Blank If You Do Not Want): Undermining was performed with blunt dissection.
Curvilinear Excision Additional Text (Leave Blank If You Do Not Want): The margin was drawn around the clinically apparent lesion.  A curvilinear shape was then drawn on the skin incorporating the lesion and margins.  Incisions were then made along these lines to the appropriate tissue plane and the lesion was extirpated.
Fusiform Excision Additional Text (Leave Blank If You Do Not Want): The margin was drawn around the clinically apparent lesion.  A fusiform shape was then drawn on the skin incorporating the lesion and margins.  Incisions were then made along these lines to the appropriate tissue plane and the lesion was extirpated.
Elliptical Excision Additional Text (Leave Blank If You Do Not Want): The margin was drawn around the clinically apparent lesion.  An elliptical shape was then drawn on the skin incorporating the lesion and margins.  Incisions were then made along these lines to the appropriate tissue plane and the lesion was extirpated.
Saucerization Excision Additional Text (Leave Blank If You Do Not Want): The margin was drawn around the clinically apparent lesion.  Incisions were then made along these lines, in a tangential fashion, to the appropriate tissue plane and the lesion was extirpated.
Slit Excision Additional Text (Leave Blank If You Do Not Want): A linear line was drawn on the skin overlying the lesion. An incision was made slowly until the lesion was visualized.  Once visualized, the lesion was removed with blunt dissection.
Excisional Biopsy Additional Text (Leave Blank If You Do Not Want): The margin was drawn around the clinically apparent lesion. An elliptical shape was then drawn on the skin incorporating the lesion and margins.  Incisions were then made along these lines to the appropriate tissue plane and the lesion was extirpated.
Perilesional Excision Additional Text (Leave Blank If You Do Not Want): The margin was drawn around the clinically apparent lesion. Incisions were then made along these lines to the appropriate tissue plane and the lesion was extirpated.
Repair Performed By Another Provider Text (Leave Blank If You Do Not Want): After the tissue was excised the defect was repaired by another provider.
No Repair - Repaired With Adjacent Surgical Defect Text (Leave Blank If You Do Not Want): After the excision the defect was repaired concurrently with another surgical defect which was in close approximation.
Advancement Flap (Single) Text: The defect edges were debeveled with a #15 scalpel blade.  Given the location of the defect and the proximity to free margins a single advancement flap was deemed most appropriate.  Using a sterile surgical marker, an appropriate advancement flap was drawn incorporating the defect and placing the expected incisions within the relaxed skin tension lines where possible.    The area thus outlined was incised deep to adipose tissue with a #15 scalpel blade.  The skin margins were undermined to an appropriate distance in all directions utilizing iris scissors.
Advancement Flap (Double) Text: The defect edges were debeveled with a #15 scalpel blade.  Given the location of the defect and the proximity to free margins a double advancement flap was deemed most appropriate.  Using a sterile surgical marker, the appropriate advancement flaps were drawn incorporating the defect and placing the expected incisions within the relaxed skin tension lines where possible.    The area thus outlined was incised deep to adipose tissue with a #15 scalpel blade.  The skin margins were undermined to an appropriate distance in all directions utilizing iris scissors.
Burow's Advancement Flap Text: The defect edges were debeveled with a #15 scalpel blade.  Given the location of the defect and the proximity to free margins a Burow's advancement flap was deemed most appropriate.  Using a sterile surgical marker, the appropriate advancement flap was drawn incorporating the defect and placing the expected incisions within the relaxed skin tension lines where possible.    The area thus outlined was incised deep to adipose tissue with a #15 scalpel blade.  The skin margins were undermined to an appropriate distance in all directions utilizing iris scissors.
Chonodrocutaneous Helical Advancement Flap Text: The defect edges were debeveled with a #15 scalpel blade.  Given the location of the defect and the proximity to free margins a chondrocutaneous helical advancement flap was deemed most appropriate.  Using a sterile surgical marker, the appropriate advancement flap was drawn incorporating the defect and placing the expected incisions within the relaxed skin tension lines where possible.    The area thus outlined was incised deep to adipose tissue with a #15 scalpel blade.  The skin margins were undermined to an appropriate distance in all directions utilizing iris scissors.
Crescentic Advancement Flap Text: The defect edges were debeveled with a #15 scalpel blade.  Given the location of the defect and the proximity to free margins a crescentic advancement flap was deemed most appropriate.  Using a sterile surgical marker, the appropriate advancement flap was drawn incorporating the defect and placing the expected incisions within the relaxed skin tension lines where possible.    The area thus outlined was incised deep to adipose tissue with a #15 scalpel blade.  The skin margins were undermined to an appropriate distance in all directions utilizing iris scissors.
A-T Advancement Flap Text: The defect edges were debeveled with a #15 scalpel blade.  Given the location of the defect, shape of the defect and the proximity to free margins an A-T advancement flap was deemed most appropriate.  Using a sterile surgical marker, an appropriate advancement flap was drawn incorporating the defect and placing the expected incisions within the relaxed skin tension lines where possible.    The area thus outlined was incised deep to adipose tissue with a #15 scalpel blade.  The skin margins were undermined to an appropriate distance in all directions utilizing iris scissors.
O-T Advancement Flap Text: The defect edges were debeveled with a #15 scalpel blade.  Given the location of the defect, shape of the defect and the proximity to free margins an O-T advancement flap was deemed most appropriate.  Using a sterile surgical marker, an appropriate advancement flap was drawn incorporating the defect and placing the expected incisions within the relaxed skin tension lines where possible.    The area thus outlined was incised deep to adipose tissue with a #15 scalpel blade.  The skin margins were undermined to an appropriate distance in all directions utilizing iris scissors.
O-L Flap Text: The defect edges were debeveled with a #15 scalpel blade.  Given the location of the defect, shape of the defect and the proximity to free margins an O-L flap was deemed most appropriate.  Using a sterile surgical marker, an appropriate advancement flap was drawn incorporating the defect and placing the expected incisions within the relaxed skin tension lines where possible.    The area thus outlined was incised deep to adipose tissue with a #15 scalpel blade.  The skin margins were undermined to an appropriate distance in all directions utilizing iris scissors.
O-Z Flap Text: The defect edges were debeveled with a #15 scalpel blade.  Given the location of the defect, shape of the defect and the proximity to free margins an O-Z flap was deemed most appropriate.  Using a sterile surgical marker, an appropriate transposition flap was drawn incorporating the defect and placing the expected incisions within the relaxed skin tension lines where possible. The area thus outlined was incised deep to adipose tissue with a #15 scalpel blade.  The skin margins were undermined to an appropriate distance in all directions utilizing iris scissors.
Double O-Z Flap Text: The defect edges were debeveled with a #15 scalpel blade.  Given the location of the defect, shape of the defect and the proximity to free margins a Double O-Z flap was deemed most appropriate.  Using a sterile surgical marker, an appropriate transposition flap was drawn incorporating the defect and placing the expected incisions within the relaxed skin tension lines where possible. The area thus outlined was incised deep to adipose tissue with a #15 scalpel blade.  The skin margins were undermined to an appropriate distance in all directions utilizing iris scissors.
V-Y Flap Text: The defect edges were debeveled with a #15 scalpel blade.  Given the location of the defect, shape of the defect and the proximity to free margins a V-Y flap was deemed most appropriate.  Using a sterile surgical marker, an appropriate advancement flap was drawn incorporating the defect and placing the expected incisions within the relaxed skin tension lines where possible.    The area thus outlined was incised deep to adipose tissue with a #15 scalpel blade.  The skin margins were undermined to an appropriate distance in all directions utilizing iris scissors.
Advancement-Rotation Flap Text: The defect edges were debeveled with a #15 scalpel blade.  Given the location of the defect, shape of the defect and the proximity to free margins an advancement-rotation flap was deemed most appropriate.  Using a sterile surgical marker, an appropriate flap was drawn incorporating the defect and placing the expected incisions within the relaxed skin tension lines where possible. The area thus outlined was incised deep to adipose tissue with a #15 scalpel blade.  The skin margins were undermined to an appropriate distance in all directions utilizing iris scissors.
Mercedes Flap Text: The defect edges were debeveled with a #15 scalpel blade.  Given the location of the defect, shape of the defect and the proximity to free margins a Mercedes flap was deemed most appropriate.  Using a sterile surgical marker, an appropriate advancement flap was drawn incorporating the defect and placing the expected incisions within the relaxed skin tension lines where possible. The area thus outlined was incised deep to adipose tissue with a #15 scalpel blade.  The skin margins were undermined to an appropriate distance in all directions utilizing iris scissors.
Modified Advancement Flap Text: The defect edges were debeveled with a #15 scalpel blade.  Given the location of the defect, shape of the defect and the proximity to free margins a modified advancement flap was deemed most appropriate.  Using a sterile surgical marker, an appropriate advancement flap was drawn incorporating the defect and placing the expected incisions within the relaxed skin tension lines where possible.    The area thus outlined was incised deep to adipose tissue with a #15 scalpel blade.  The skin margins were undermined to an appropriate distance in all directions utilizing iris scissors.
Mucosal Advancement Flap Text: Given the location of the defect, shape of the defect and the proximity to free margins a mucosal advancement flap was deemed most appropriate. Incisions were made with a 15 blade scalpel in the appropriate fashion along the cutaneous vermilion border and the mucosal lip. The remaining actinically damaged mucosal tissue was excised.  The mucosal advancement flap was then elevated to the gingival sulcus with care taken to preserve the neurovascular structures and advanced into the primary defect. Care was taken to ensure that precise realignment of the vermilion border was achieved.
Peng Advancement Flap Text: The defect edges were debeveled with a #15 scalpel blade.  Given the location of the defect, shape of the defect and the proximity to free margins a Peng advancement flap was deemed most appropriate.  Using a sterile surgical marker, an appropriate advancement flap was drawn incorporating the defect and placing the expected incisions within the relaxed skin tension lines where possible. The area thus outlined was incised deep to adipose tissue with a #15 scalpel blade.  The skin margins were undermined to an appropriate distance in all directions utilizing iris scissors.
Hatchet Flap Text: The defect edges were debeveled with a #15 scalpel blade.  Given the location of the defect, shape of the defect and the proximity to free margins a hatchet flap was deemed most appropriate.  Using a sterile surgical marker, an appropriate hatchet flap was drawn incorporating the defect and placing the expected incisions within the relaxed skin tension lines where possible.    The area thus outlined was incised deep to adipose tissue with a #15 scalpel blade.  The skin margins were undermined to an appropriate distance in all directions utilizing iris scissors.
Rotation Flap Text: The defect edges were debeveled with a #15 scalpel blade.  Given the location of the defect, shape of the defect and the proximity to free margins a rotation flap was deemed most appropriate.  Using a sterile surgical marker, an appropriate rotation flap was drawn incorporating the defect and placing the expected incisions within the relaxed skin tension lines where possible.    The area thus outlined was incised deep to adipose tissue with a #15 scalpel blade.  The skin margins were undermined to an appropriate distance in all directions utilizing iris scissors.
Spiral Flap Text: The defect edges were debeveled with a #15 scalpel blade.  Given the location of the defect, shape of the defect and the proximity to free margins a spiral flap was deemed most appropriate.  Using a sterile surgical marker, an appropriate rotation flap was drawn incorporating the defect and placing the expected incisions within the relaxed skin tension lines where possible. The area thus outlined was incised deep to adipose tissue with a #15 scalpel blade.  The skin margins were undermined to an appropriate distance in all directions utilizing iris scissors.
Star Wedge Flap Text: The defect edges were debeveled with a #15 scalpel blade.  Given the location of the defect, shape of the defect and the proximity to free margins a star wedge flap was deemed most appropriate.  Using a sterile surgical marker, an appropriate rotation flap was drawn incorporating the defect and placing the expected incisions within the relaxed skin tension lines where possible. The area thus outlined was incised deep to adipose tissue with a #15 scalpel blade.  The skin margins were undermined to an appropriate distance in all directions utilizing iris scissors.
Transposition Flap Text: The defect edges were debeveled with a #15 scalpel blade.  Given the location of the defect and the proximity to free margins a transposition flap was deemed most appropriate.  Using a sterile surgical marker, an appropriate transposition flap was drawn incorporating the defect.    The area thus outlined was incised deep to adipose tissue with a #15 scalpel blade.  The skin margins were undermined to an appropriate distance in all directions utilizing iris scissors.
Muscle Hinge Flap Text: The defect edges were debeveled with a #15 scalpel blade.  Given the size, depth and location of the defect and the proximity to free margins a muscle hinge flap was deemed most appropriate.  Using a sterile surgical marker, an appropriate hinge flap was drawn incorporating the defect. The area thus outlined was incised with a #15 scalpel blade.  The skin margins were undermined to an appropriate distance in all directions utilizing iris scissors.
Nasal Turnover Hinge Flap Text: The defect edges were debeveled with a #15 scalpel blade.  Given the size, depth, location of the defect and the defect being full thickness a nasal turnover hinge flap was deemed most appropriate.  Using a sterile surgical marker, an appropriate hinge flap was drawn incorporating the defect. The area thus outlined was incised with a #15 scalpel blade. The flap was designed to recreate the nasal mucosal lining and the alar rim. The skin margins were undermined to an appropriate distance in all directions utilizing iris scissors.
Nasalis-Muscle-Based Myocutaneous Island Pedicle Flap Text: Using a #15 blade, an incision was made around the donor flap to the level of the nasalis muscle. Wide lateral undermining was then performed in both the subcutaneous plane above the nasalis muscle, and in a submuscular plane just above periosteum. This allowed the formation of a free nasalis muscle axial pedicle (based on the angular artery) which was still attached to the actual cutaneous flap, increasing its mobility and vascular viability. Hemostasis was obtained with pinpoint electrocoagulation. The flap was mobilized into position and the pivotal anchor points positioned and stabilized with buried interrupted sutures. Subcutaneous and dermal tissues were closed in a multilayered fashion with sutures. Tissue redundancies were excised, and the epidermal edges were apposed without significant tension and sutured with sutures.
Orbicularis Oris Muscle Flap Text: The defect edges were debeveled with a #15 scalpel blade.  Given that the defect affected the competency of the oral sphincter an orbicularis oris muscle flap was deemed most appropriate to restore this competency and normal muscle function.  Using a sterile surgical marker, an appropriate flap was drawn incorporating the defect. The area thus outlined was incised with a #15 scalpel blade.
Melolabial Transposition Flap Text: The defect edges were debeveled with a #15 scalpel blade.  Given the location of the defect and the proximity to free margins a melolabial flap was deemed most appropriate.  Using a sterile surgical marker, an appropriate melolabial transposition flap was drawn incorporating the defect.    The area thus outlined was incised deep to adipose tissue with a #15 scalpel blade.  The skin margins were undermined to an appropriate distance in all directions utilizing iris scissors.
Rhombic Flap Text: The defect edges were debeveled with a #15 scalpel blade.  Given the location of the defect and the proximity to free margins a rhombic flap was deemed most appropriate.  Using a sterile surgical marker, an appropriate rhombic flap was drawn incorporating the defect.    The area thus outlined was incised deep to adipose tissue with a #15 scalpel blade.  The skin margins were undermined to an appropriate distance in all directions utilizing iris scissors.
Rhomboid Transposition Flap Text: The defect edges were debeveled with a #15 scalpel blade.  Given the location of the defect and the proximity to free margins a rhomboid transposition flap was deemed most appropriate.  Using a sterile surgical marker, an appropriate rhomboid flap was drawn incorporating the defect.    The area thus outlined was incised deep to adipose tissue with a #15 scalpel blade.  The skin margins were undermined to an appropriate distance in all directions utilizing iris scissors.
Bi-Rhombic Flap Text: The defect edges were debeveled with a #15 scalpel blade.  Given the location of the defect and the proximity to free margins a bi-rhombic flap was deemed most appropriate.  Using a sterile surgical marker, an appropriate rhombic flap was drawn incorporating the defect. The area thus outlined was incised deep to adipose tissue with a #15 scalpel blade.  The skin margins were undermined to an appropriate distance in all directions utilizing iris scissors.
Helical Rim Advancement Flap Text: The defect edges were debeveled with a #15 blade scalpel.  Given the location of the defect and the proximity to free margins (helical rim) a double helical rim advancement flap was deemed most appropriate.  Using a sterile surgical marker, the appropriate advancement flaps were drawn incorporating the defect and placing the expected incisions between the helical rim and antihelix where possible.  The area thus outlined was incised through and through with a #15 scalpel blade.  With a skin hook and iris scissors, the flaps were gently and sharply undermined and freed up.
Bilateral Helical Rim Advancement Flap Text: The defect edges were debeveled with a #15 blade scalpel.  Given the location of the defect and the proximity to free margins (helical rim) a bilateral helical rim advancement flap was deemed most appropriate.  Using a sterile surgical marker, the appropriate advancement flaps were drawn incorporating the defect and placing the expected incisions between the helical rim and antihelix where possible.  The area thus outlined was incised through and through with a #15 scalpel blade.  With a skin hook and iris scissors, the flaps were gently and sharply undermined and freed up.
Ear Star Wedge Flap Text: The defect edges were debeveled with a #15 blade scalpel.  Given the location of the defect and the proximity to free margins (helical rim) an ear star wedge flap was deemed most appropriate.  Using a sterile surgical marker, the appropriate flap was drawn incorporating the defect and placing the expected incisions between the helical rim and antihelix where possible.  The area thus outlined was incised through and through with a #15 scalpel blade.
Banner Transposition Flap Text: The defect edges were debeveled with a #15 scalpel blade.  Given the location of the defect and the proximity to free margins a Banner transposition flap was deemed most appropriate.  Using a sterile surgical marker, an appropriate flap drawn around the defect. The area thus outlined was incised deep to adipose tissue with a #15 scalpel blade.  The skin margins were undermined to an appropriate distance in all directions utilizing iris scissors.
Bilobed Flap Text: The defect edges were debeveled with a #15 scalpel blade.  Given the location of the defect and the proximity to free margins a bilobe flap was deemed most appropriate.  Using a sterile surgical marker, an appropriate bilobe flap drawn around the defect.    The area thus outlined was incised deep to adipose tissue with a #15 scalpel blade.  The skin margins were undermined to an appropriate distance in all directions utilizing iris scissors.
Bilobed Transposition Flap Text: The defect edges were debeveled with a #15 scalpel blade.  Given the location of the defect and the proximity to free margins a bilobed transposition flap was deemed most appropriate.  Using a sterile surgical marker, an appropriate bilobe flap drawn around the defect.    The area thus outlined was incised deep to adipose tissue with a #15 scalpel blade.  The skin margins were undermined to an appropriate distance in all directions utilizing iris scissors.
Trilobed Flap Text: The defect edges were debeveled with a #15 scalpel blade.  Given the location of the defect and the proximity to free margins a trilobed flap was deemed most appropriate.  Using a sterile surgical marker, an appropriate trilobed flap drawn around the defect.    The area thus outlined was incised deep to adipose tissue with a #15 scalpel blade.  The skin margins were undermined to an appropriate distance in all directions utilizing iris scissors.
Dorsal Nasal Flap Text: The defect edges were debeveled with a #15 scalpel blade.  Given the location of the defect and the proximity to free margins a dorsal nasal flap was deemed most appropriate.  Using a sterile surgical marker, an appropriate dorsal nasal flap was drawn around the defect.    The area thus outlined was incised deep to adipose tissue with a #15 scalpel blade.  The skin margins were undermined to an appropriate distance in all directions utilizing iris scissors.
Island Pedicle Flap Text: The defect edges were debeveled with a #15 scalpel blade.  Given the location of the defect, shape of the defect and the proximity to free margins an island pedicle advancement flap was deemed most appropriate.  Using a sterile surgical marker, an appropriate advancement flap was drawn incorporating the defect, outlining the appropriate donor tissue and placing the expected incisions within the relaxed skin tension lines where possible.    The area thus outlined was incised deep to adipose tissue with a #15 scalpel blade.  The skin margins were undermined to an appropriate distance in all directions around the primary defect and laterally outward around the island pedicle utilizing iris scissors.  There was minimal undermining beneath the pedicle flap.
Island Pedicle Flap With Canthal Suspension Text: The defect edges were debeveled with a #15 scalpel blade.  Given the location of the defect, shape of the defect and the proximity to free margins an island pedicle advancement flap was deemed most appropriate.  Using a sterile surgical marker, an appropriate advancement flap was drawn incorporating the defect, outlining the appropriate donor tissue and placing the expected incisions within the relaxed skin tension lines where possible. The area thus outlined was incised deep to adipose tissue with a #15 scalpel blade.  The skin margins were undermined to an appropriate distance in all directions around the primary defect and laterally outward around the island pedicle utilizing iris scissors.  There was minimal undermining beneath the pedicle flap. A suspension suture was placed in the canthal tendon to prevent tension and prevent ectropion.
Alar Island Pedicle Flap Text: The defect edges were debeveled with a #15 scalpel blade.  Given the location of the defect, shape of the defect and the proximity to the alar rim an island pedicle advancement flap was deemed most appropriate.  Using a sterile surgical marker, an appropriate advancement flap was drawn incorporating the defect, outlining the appropriate donor tissue and placing the expected incisions within the nasal ala running parallel to the alar rim. The area thus outlined was incised with a #15 scalpel blade.  The skin margins were undermined minimally to an appropriate distance in all directions around the primary defect and laterally outward around the island pedicle utilizing iris scissors.  There was minimal undermining beneath the pedicle flap.
Double Island Pedicle Flap Text: The defect edges were debeveled with a #15 scalpel blade.  Given the location of the defect, shape of the defect and the proximity to free margins a double island pedicle advancement flap was deemed most appropriate.  Using a sterile surgical marker, an appropriate advancement flap was drawn incorporating the defect, outlining the appropriate donor tissue and placing the expected incisions within the relaxed skin tension lines where possible.    The area thus outlined was incised deep to adipose tissue with a #15 scalpel blade.  The skin margins were undermined to an appropriate distance in all directions around the primary defect and laterally outward around the island pedicle utilizing iris scissors.  There was minimal undermining beneath the pedicle flap.
Island Pedicle Flap-Requiring Vessel Identification Text: The defect edges were debeveled with a #15 scalpel blade.  Given the location of the defect, shape of the defect and the proximity to free margins an island pedicle advancement flap was deemed most appropriate.  Using a sterile surgical marker, an appropriate advancement flap was drawn, based on the axial vessel mentioned above, incorporating the defect, outlining the appropriate donor tissue and placing the expected incisions within the relaxed skin tension lines where possible.    The area thus outlined was incised deep to adipose tissue with a #15 scalpel blade.  The skin margins were undermined to an appropriate distance in all directions around the primary defect and laterally outward around the island pedicle utilizing iris scissors.  There was minimal undermining beneath the pedicle flap.
Keystone Flap Text: The defect edges were debeveled with a #15 scalpel blade.  Given the location of the defect, shape of the defect a keystone flap was deemed most appropriate.  Using a sterile surgical marker, an appropriate keystone flap was drawn incorporating the defect, outlining the appropriate donor tissue and placing the expected incisions within the relaxed skin tension lines where possible. The area thus outlined was incised deep to adipose tissue with a #15 scalpel blade.  The skin margins were undermined to an appropriate distance in all directions around the primary defect and laterally outward around the flap utilizing iris scissors.
O-T Plasty Text: The defect edges were debeveled with a #15 scalpel blade.  Given the location of the defect, shape of the defect and the proximity to free margins an O-T plasty was deemed most appropriate.  Using a sterile surgical marker, an appropriate O-T plasty was drawn incorporating the defect and placing the expected incisions within the relaxed skin tension lines where possible.    The area thus outlined was incised deep to adipose tissue with a #15 scalpel blade.  The skin margins were undermined to an appropriate distance in all directions utilizing iris scissors.
O-Z Plasty Text: The defect edges were debeveled with a #15 scalpel blade.  Given the location of the defect, shape of the defect and the proximity to free margins an O-Z plasty (double transposition flap) was deemed most appropriate.  Using a sterile surgical marker, the appropriate transposition flaps were drawn incorporating the defect and placing the expected incisions within the relaxed skin tension lines where possible.    The area thus outlined was incised deep to adipose tissue with a #15 scalpel blade.  The skin margins were undermined to an appropriate distance in all directions utilizing iris scissors.  Hemostasis was achieved with electrocautery.  The flaps were then transposed into place, one clockwise and the other counterclockwise, and anchored with interrupted buried subcutaneous sutures.
Double O-Z Plasty Text: The defect edges were debeveled with a #15 scalpel blade.  Given the location of the defect, shape of the defect and the proximity to free margins a Double O-Z plasty (double transposition flap) was deemed most appropriate.  Using a sterile surgical marker, the appropriate transposition flaps were drawn incorporating the defect and placing the expected incisions within the relaxed skin tension lines where possible. The area thus outlined was incised deep to adipose tissue with a #15 scalpel blade.  The skin margins were undermined to an appropriate distance in all directions utilizing iris scissors.  Hemostasis was achieved with electrocautery.  The flaps were then transposed into place, one clockwise and the other counterclockwise, and anchored with interrupted buried subcutaneous sutures.
V-Y Plasty Text: The defect edges were debeveled with a #15 scalpel blade.  Given the location of the defect, shape of the defect and the proximity to free margins an V-Y advancement flap was deemed most appropriate.  Using a sterile surgical marker, an appropriate advancement flap was drawn incorporating the defect and placing the expected incisions within the relaxed skin tension lines where possible.    The area thus outlined was incised deep to adipose tissue with a #15 scalpel blade.  The skin margins were undermined to an appropriate distance in all directions utilizing iris scissors.
H Plasty Text: Given the location of the defect, shape of the defect and the proximity to free margins a H-plasty was deemed most appropriate for repair.  Using a sterile surgical marker, the appropriate advancement arms of the H-plasty were drawn incorporating the defect and placing the expected incisions within the relaxed skin tension lines where possible. The area thus outlined was incised deep to adipose tissue with a #15 scalpel blade. The skin margins were undermined to an appropriate distance in all directions utilizing iris scissors.  The opposing advancement arms were then advanced into place in opposite direction and anchored with interrupted buried subcutaneous sutures.
W Plasty Text: The lesion was extirpated to the level of the fat with a #15 scalpel blade.  Given the location of the defect, shape of the defect and the proximity to free margins a W-plasty was deemed most appropriate for repair.  Using a sterile surgical marker, the appropriate transposition arms of the W-plasty were drawn incorporating the defect and placing the expected incisions within the relaxed skin tension lines where possible.    The area thus outlined was incised deep to adipose tissue with a #15 scalpel blade.  The skin margins were undermined to an appropriate distance in all directions utilizing iris scissors.  The opposing transposition arms were then transposed into place in opposite direction and anchored with interrupted buried subcutaneous sutures.
Z Plasty Text: The lesion was extirpated to the level of the fat with a #15 scalpel blade.  Given the location of the defect, shape of the defect and the proximity to free margins a Z-plasty was deemed most appropriate for repair.  Using a sterile surgical marker, the appropriate transposition arms of the Z-plasty were drawn incorporating the defect and placing the expected incisions within the relaxed skin tension lines where possible.    The area thus outlined was incised deep to adipose tissue with a #15 scalpel blade.  The skin margins were undermined to an appropriate distance in all directions utilizing iris scissors.  The opposing transposition arms were then transposed into place in opposite direction and anchored with interrupted buried subcutaneous sutures.
Zygomaticofacial Flap Text: Given the location of the defect, shape of the defect and the proximity to free margins a zygomaticofacial flap was deemed most appropriate for repair.  Using a sterile surgical marker, the appropriate flap was drawn incorporating the defect and placing the expected incisions within the relaxed skin tension lines where possible. The area thus outlined was incised deep to adipose tissue with a #15 scalpel blade with preservation of a vascular pedicle.  The skin margins were undermined to an appropriate distance in all directions utilizing iris scissors.  The flap was then placed into the defect and anchored with interrupted buried subcutaneous sutures.
Cheek Interpolation Flap Text: A decision was made to reconstruct the defect utilizing an interpolation axial flap and a staged reconstruction.  A telfa template was made of the defect.  This telfa template was then used to outline the Cheek Interpolation flap.  The donor area for the pedicle flap was then injected with anesthesia.  The flap was excised through the skin and subcutaneous tissue down to the layer of the underlying musculature.  The interpolation flap was carefully excised within this deep plane to maintain its blood supply.  The edges of the donor site were undermined.   The donor site was closed in a primary fashion.  The pedicle was then rotated into position and sutured.  Once the tube was sutured into place, adequate blood supply was confirmed with blanching and refill.  The pedicle was then wrapped with xeroform gauze and dressed appropriately with a telfa and gauze bandage to ensure continued blood supply and protect the attached pedicle.
Cheek-To-Nose Interpolation Flap Text: A decision was made to reconstruct the defect utilizing an interpolation axial flap and a staged reconstruction.  A telfa template was made of the defect.  This telfa template was then used to outline the Cheek-To-Nose Interpolation flap.  The donor area for the pedicle flap was then injected with anesthesia.  The flap was excised through the skin and subcutaneous tissue down to the layer of the underlying musculature.  The interpolation flap was carefully excised within this deep plane to maintain its blood supply.  The edges of the donor site were undermined.   The donor site was closed in a primary fashion.  The pedicle was then rotated into position and sutured.  Once the tube was sutured into place, adequate blood supply was confirmed with blanching and refill.  The pedicle was then wrapped with xeroform gauze and dressed appropriately with a telfa and gauze bandage to ensure continued blood supply and protect the attached pedicle.
Interpolation Flap Text: A decision was made to reconstruct the defect utilizing an interpolation axial flap and a staged reconstruction.  A telfa template was made of the defect.  This telfa template was then used to outline the interpolation flap.  The donor area for the pedicle flap was then injected with anesthesia.  The flap was excised through the skin and subcutaneous tissue down to the layer of the underlying musculature.  The interpolation flap was carefully excised within this deep plane to maintain its blood supply.  The edges of the donor site were undermined.   The donor site was closed in a primary fashion.  The pedicle was then rotated into position and sutured.  Once the tube was sutured into place, adequate blood supply was confirmed with blanching and refill.  The pedicle was then wrapped with xeroform gauze and dressed appropriately with a telfa and gauze bandage to ensure continued blood supply and protect the attached pedicle.
Melolabial Interpolation Flap Text: A decision was made to reconstruct the defect utilizing an interpolation axial flap and a staged reconstruction.  A telfa template was made of the defect.  This telfa template was then used to outline the melolabial interpolation flap.  The donor area for the pedicle flap was then injected with anesthesia.  The flap was excised through the skin and subcutaneous tissue down to the layer of the underlying musculature.  The pedicle flap was carefully excised within this deep plane to maintain its blood supply.  The edges of the donor site were undermined.   The donor site was closed in a primary fashion.  The pedicle was then rotated into position and sutured.  Once the tube was sutured into place, adequate blood supply was confirmed with blanching and refill.  The pedicle was then wrapped with xeroform gauze and dressed appropriately with a telfa and gauze bandage to ensure continued blood supply and protect the attached pedicle.
Mastoid Interpolation Flap Text: A decision was made to reconstruct the defect utilizing an interpolation axial flap and a staged reconstruction.  A telfa template was made of the defect.  This telfa template was then used to outline the mastoid interpolation flap.  The donor area for the pedicle flap was then injected with anesthesia.  The flap was excised through the skin and subcutaneous tissue down to the layer of the underlying musculature.  The pedicle flap was carefully excised within this deep plane to maintain its blood supply.  The edges of the donor site were undermined.   The donor site was closed in a primary fashion.  The pedicle was then rotated into position and sutured.  Once the tube was sutured into place, adequate blood supply was confirmed with blanching and refill.  The pedicle was then wrapped with xeroform gauze and dressed appropriately with a telfa and gauze bandage to ensure continued blood supply and protect the attached pedicle.
Posterior Auricular Interpolation Flap Text: A decision was made to reconstruct the defect utilizing an interpolation axial flap and a staged reconstruction.  A telfa template was made of the defect.  This telfa template was then used to outline the posterior auricular interpolation flap.  The donor area for the pedicle flap was then injected with anesthesia.  The flap was excised through the skin and subcutaneous tissue down to the layer of the underlying musculature.  The pedicle flap was carefully excised within this deep plane to maintain its blood supply.  The edges of the donor site were undermined.   The donor site was closed in a primary fashion.  The pedicle was then rotated into position and sutured.  Once the tube was sutured into place, adequate blood supply was confirmed with blanching and refill.  The pedicle was then wrapped with xeroform gauze and dressed appropriately with a telfa and gauze bandage to ensure continued blood supply and protect the attached pedicle.
Paramedian Forehead Flap Text: A decision was made to reconstruct the defect utilizing an interpolation axial flap and a staged reconstruction.  A telfa template was made of the defect.  This telfa template was then used to outline the paramedian forehead pedicle flap.  The donor area for the pedicle flap was then injected with anesthesia.  The flap was excised through the skin and subcutaneous tissue down to the layer of the underlying musculature.  The pedicle flap was carefully excised within this deep plane to maintain its blood supply.  The edges of the donor site were undermined.   The donor site was closed in a primary fashion.  The pedicle was then rotated into position and sutured.  Once the tube was sutured into place, adequate blood supply was confirmed with blanching and refill.  The pedicle was then wrapped with xeroform gauze and dressed appropriately with a telfa and gauze bandage to ensure continued blood supply and protect the attached pedicle.
Lip Wedge Excision Repair Text: Given the location of the defect and the proximity to free margins a full thickness wedge repair was deemed most appropriate.  Using a sterile surgical marker, the appropriate repair was drawn incorporating the defect and placing the expected incisions perpendicular to the vermilion border.  The vermilion border was also meticulously outlined to ensure appropriate reapproximation during the repair.  The area thus outlined was incised through and through with a #15 scalpel blade.  The muscularis and dermis were reaproximated with deep sutures following hemostasis. Care was taken to realign the vermilion border before proceeding with the superficial closure.  Once the vermilion was realigned the superfical and mucosal closure was finished.
Ftsg Text: The defect edges were debeveled with a #15 scalpel blade.  Given the location of the defect, shape of the defect and the proximity to free margins a full thickness skin graft was deemed most appropriate.  Using a sterile surgical marker, the primary defect shape was transferred to the donor site. The area thus outlined was incised deep to adipose tissue with a #15 scalpel blade.  The harvested graft was then trimmed of adipose tissue until only dermis and epidermis was left.  The skin margins of the secondary defect were undermined to an appropriate distance in all directions utilizing iris scissors.  The secondary defect was closed with interrupted buried subcutaneous sutures.  The skin edges were then re-apposed with running  sutures.  The skin graft was then placed in the primary defect and oriented appropriately.
Split-Thickness Skin Graft Text: The defect edges were debeveled with a #15 scalpel blade.  Given the location of the defect, shape of the defect and the proximity to free margins a split thickness skin graft was deemed most appropriate.  Using a sterile surgical marker, the primary defect shape was transferred to the donor site. The split thickness graft was then harvested.  The skin graft was then placed in the primary defect and oriented appropriately.
Burow's Graft Text: The defect edges were debeveled with a #15 scalpel blade.  Given the location of the defect, shape of the defect, the proximity to free margins and the presence of a standing cone deformity a Burow's skin graft was deemed most appropriate. The standing cone was removed and this tissue was then trimmed to the shape of the primary defect. The adipose tissue was also removed until only dermis and epidermis were left.  The skin margins of the secondary defect were undermined to an appropriate distance in all directions utilizing iris scissors.  The secondary defect was closed with interrupted buried subcutaneous sutures.  The skin edges were then re-apposed with running  sutures.  The skin graft was then placed in the primary defect and oriented appropriately.
Cartilage Graft Text: The defect edges were debeveled with a #15 scalpel blade.  Given the location of the defect, shape of the defect, the fact the defect involved a full thickness cartilage defect a cartilage graft was deemed most appropriate.  An appropriate donor site was identified, cleansed, and anesthetized. The cartilage graft was then harvested and transferred to the recipient site, oriented appropriately and then sutured into place.  The secondary defect was then repaired using a primary closure.
Composite Graft Text: The defect edges were debeveled with a #15 scalpel blade.  Given the location of the defect, shape of the defect, the proximity to free margins and the fact the defect was full thickness a composite graft was deemed most appropriate.  The defect was outline and then transferred to the donor site.  A full thickness graft was then excised from the donor site. The graft was then placed in the primary defect, oriented appropriately and then sutured into place.  The secondary defect was then repaired using a primary closure.
Epidermal Autograft Text: The defect edges were debeveled with a #15 scalpel blade.  Given the location of the defect, shape of the defect and the proximity to free margins an epidermal autograft was deemed most appropriate.  Using a sterile surgical marker, the primary defect shape was transferred to the donor site. The epidermal graft was then harvested.  The skin graft was then placed in the primary defect and oriented appropriately.
Dermal Autograft Text: The defect edges were debeveled with a #15 scalpel blade.  Given the location of the defect, shape of the defect and the proximity to free margins a dermal autograft was deemed most appropriate.  Using a sterile surgical marker, the primary defect shape was transferred to the donor site. The area thus outlined was incised deep to adipose tissue with a #15 scalpel blade.  The harvested graft was then trimmed of adipose and epidermal tissue until only dermis was left.  The skin graft was then placed in the primary defect and oriented appropriately.
Skin Substitute Text: The defect edges were debeveled with a #15 scalpel blade.  Given the location of the defect, shape of the defect and the proximity to free margins a skin substitute graft was deemed most appropriate.  The graft material was trimmed to fit the size of the defect. The graft was then placed in the primary defect and oriented appropriately.
Tissue Cultured Epidermal Autograft Text: The defect edges were debeveled with a #15 scalpel blade.  Given the location of the defect, shape of the defect and the proximity to free margins a tissue cultured epidermal autograft was deemed most appropriate.  The graft was then trimmed to fit the size of the defect.  The graft was then placed in the primary defect and oriented appropriately.
Xenograft Text: The defect edges were debeveled with a #15 scalpel blade.  Given the location of the defect, shape of the defect and the proximity to free margins a xenograft was deemed most appropriate.  The graft was then trimmed to fit the size of the defect.  The graft was then placed in the primary defect and oriented appropriately.
Purse String (Intermediate) Text: Given the location of the defect and the characteristics of the surrounding skin a purse string intermediate closure was deemed most appropriate.  Undermining was performed circumfirentially around the surgical defect.  A purse string suture was then placed and tightened.
Purse String (Simple) Text: Given the location of the defect and the characteristics of the surrounding skin a purse string simple closure was deemed most appropriate.  Undermining was performed circumferentially around the surgical defect.  A purse string suture was then placed and tightened.
Partial Purse String (Intermediate) Text: Given the location of the defect and the characteristics of the surrounding skin an intermediate purse string closure was deemed most appropriate.  Undermining was performed circumferentially around the surgical defect.  A purse string suture was then placed and tightened. Wound tension of the circular defect prevented complete closure of the wound.
Partial Purse String (Simple) Text: Given the location of the defect and the characteristics of the surrounding skin a simple purse string closure was deemed most appropriate.  Undermining was performed circumferentially around the surgical defect.  A purse string suture was then placed and tightened. Wound tension of the circular defect prevented complete closure of the wound.
Complex Repair And Single Advancement Flap Text: The defect edges were debeveled with a #15 scalpel blade.  The primary defect was closed partially with a complex linear closure.  Given the location of the remaining defect, shape of the defect and the proximity to free margins a single advancement flap was deemed most appropriate for complete closure of the defect.  Using a sterile surgical marker, an appropriate advancement flap was drawn incorporating the defect and placing the expected incisions within the relaxed skin tension lines where possible.    The area thus outlined was incised deep to adipose tissue with a #15 scalpel blade.  The skin margins were undermined to an appropriate distance in all directions utilizing iris scissors.
Complex Repair And Double Advancement Flap Text: The defect edges were debeveled with a #15 scalpel blade.  The primary defect was closed partially with a complex linear closure.  Given the location of the remaining defect, shape of the defect and the proximity to free margins a double advancement flap was deemed most appropriate for complete closure of the defect.  Using a sterile surgical marker, an appropriate advancement flap was drawn incorporating the defect and placing the expected incisions within the relaxed skin tension lines where possible.    The area thus outlined was incised deep to adipose tissue with a #15 scalpel blade.  The skin margins were undermined to an appropriate distance in all directions utilizing iris scissors.
Complex Repair And Modified Advancement Flap Text: The defect edges were debeveled with a #15 scalpel blade.  The primary defect was closed partially with a complex linear closure.  Given the location of the remaining defect, shape of the defect and the proximity to free margins a modified advancement flap was deemed most appropriate for complete closure of the defect.  Using a sterile surgical marker, an appropriate advancement flap was drawn incorporating the defect and placing the expected incisions within the relaxed skin tension lines where possible.    The area thus outlined was incised deep to adipose tissue with a #15 scalpel blade.  The skin margins were undermined to an appropriate distance in all directions utilizing iris scissors.
Complex Repair And A-T Advancement Flap Text: The defect edges were debeveled with a #15 scalpel blade.  The primary defect was closed partially with a complex linear closure.  Given the location of the remaining defect, shape of the defect and the proximity to free margins an A-T advancement flap was deemed most appropriate for complete closure of the defect.  Using a sterile surgical marker, an appropriate advancement flap was drawn incorporating the defect and placing the expected incisions within the relaxed skin tension lines where possible.    The area thus outlined was incised deep to adipose tissue with a #15 scalpel blade.  The skin margins were undermined to an appropriate distance in all directions utilizing iris scissors.
Complex Repair And O-T Advancement Flap Text: The defect edges were debeveled with a #15 scalpel blade.  The primary defect was closed partially with a complex linear closure.  Given the location of the remaining defect, shape of the defect and the proximity to free margins an O-T advancement flap was deemed most appropriate for complete closure of the defect.  Using a sterile surgical marker, an appropriate advancement flap was drawn incorporating the defect and placing the expected incisions within the relaxed skin tension lines where possible.    The area thus outlined was incised deep to adipose tissue with a #15 scalpel blade.  The skin margins were undermined to an appropriate distance in all directions utilizing iris scissors.
Complex Repair And O-L Flap Text: The defect edges were debeveled with a #15 scalpel blade.  The primary defect was closed partially with a complex linear closure.  Given the location of the remaining defect, shape of the defect and the proximity to free margins an O-L flap was deemed most appropriate for complete closure of the defect.  Using a sterile surgical marker, an appropriate flap was drawn incorporating the defect and placing the expected incisions within the relaxed skin tension lines where possible.    The area thus outlined was incised deep to adipose tissue with a #15 scalpel blade.  The skin margins were undermined to an appropriate distance in all directions utilizing iris scissors.
Complex Repair And Bilobe Flap Text: The defect edges were debeveled with a #15 scalpel blade.  The primary defect was closed partially with a complex linear closure.  Given the location of the remaining defect, shape of the defect and the proximity to free margins a bilobe flap was deemed most appropriate for complete closure of the defect.  Using a sterile surgical marker, an appropriate advancement flap was drawn incorporating the defect and placing the expected incisions within the relaxed skin tension lines where possible.    The area thus outlined was incised deep to adipose tissue with a #15 scalpel blade.  The skin margins were undermined to an appropriate distance in all directions utilizing iris scissors.
Complex Repair And Melolabial Flap Text: The defect edges were debeveled with a #15 scalpel blade.  The primary defect was closed partially with a complex linear closure.  Given the location of the remaining defect, shape of the defect and the proximity to free margins a melolabial flap was deemed most appropriate for complete closure of the defect.  Using a sterile surgical marker, an appropriate advancement flap was drawn incorporating the defect and placing the expected incisions within the relaxed skin tension lines where possible.    The area thus outlined was incised deep to adipose tissue with a #15 scalpel blade.  The skin margins were undermined to an appropriate distance in all directions utilizing iris scissors.
Complex Repair And Rotation Flap Text: The defect edges were debeveled with a #15 scalpel blade.  The primary defect was closed partially with a complex linear closure.  Given the location of the remaining defect, shape of the defect and the proximity to free margins a rotation flap was deemed most appropriate for complete closure of the defect.  Using a sterile surgical marker, an appropriate advancement flap was drawn incorporating the defect and placing the expected incisions within the relaxed skin tension lines where possible.    The area thus outlined was incised deep to adipose tissue with a #15 scalpel blade.  The skin margins were undermined to an appropriate distance in all directions utilizing iris scissors.
Complex Repair And Rhombic Flap Text: The defect edges were debeveled with a #15 scalpel blade.  The primary defect was closed partially with a complex linear closure.  Given the location of the remaining defect, shape of the defect and the proximity to free margins a rhombic flap was deemed most appropriate for complete closure of the defect.  Using a sterile surgical marker, an appropriate advancement flap was drawn incorporating the defect and placing the expected incisions within the relaxed skin tension lines where possible.    The area thus outlined was incised deep to adipose tissue with a #15 scalpel blade.  The skin margins were undermined to an appropriate distance in all directions utilizing iris scissors.
Complex Repair And Transposition Flap Text: The defect edges were debeveled with a #15 scalpel blade.  The primary defect was closed partially with a complex linear closure.  Given the location of the remaining defect, shape of the defect and the proximity to free margins a transposition flap was deemed most appropriate for complete closure of the defect.  Using a sterile surgical marker, an appropriate advancement flap was drawn incorporating the defect and placing the expected incisions within the relaxed skin tension lines where possible.    The area thus outlined was incised deep to adipose tissue with a #15 scalpel blade.  The skin margins were undermined to an appropriate distance in all directions utilizing iris scissors.
Complex Repair And V-Y Plasty Text: The defect edges were debeveled with a #15 scalpel blade.  The primary defect was closed partially with a complex linear closure.  Given the location of the remaining defect, shape of the defect and the proximity to free margins a V-Y plasty was deemed most appropriate for complete closure of the defect.  Using a sterile surgical marker, an appropriate advancement flap was drawn incorporating the defect and placing the expected incisions within the relaxed skin tension lines where possible.    The area thus outlined was incised deep to adipose tissue with a #15 scalpel blade.  The skin margins were undermined to an appropriate distance in all directions utilizing iris scissors.
Complex Repair And M Plasty Text: The defect edges were debeveled with a #15 scalpel blade.  The primary defect was closed partially with a complex linear closure.  Given the location of the remaining defect, shape of the defect and the proximity to free margins an M plasty was deemed most appropriate for complete closure of the defect.  Using a sterile surgical marker, an appropriate advancement flap was drawn incorporating the defect and placing the expected incisions within the relaxed skin tension lines where possible.    The area thus outlined was incised deep to adipose tissue with a #15 scalpel blade.  The skin margins were undermined to an appropriate distance in all directions utilizing iris scissors.
Complex Repair And Double M Plasty Text: The defect edges were debeveled with a #15 scalpel blade.  The primary defect was closed partially with a complex linear closure.  Given the location of the remaining defect, shape of the defect and the proximity to free margins a double M plasty was deemed most appropriate for complete closure of the defect.  Using a sterile surgical marker, an appropriate advancement flap was drawn incorporating the defect and placing the expected incisions within the relaxed skin tension lines where possible.    The area thus outlined was incised deep to adipose tissue with a #15 scalpel blade.  The skin margins were undermined to an appropriate distance in all directions utilizing iris scissors.
Complex Repair And W Plasty Text: The defect edges were debeveled with a #15 scalpel blade.  The primary defect was closed partially with a complex linear closure.  Given the location of the remaining defect, shape of the defect and the proximity to free margins a W plasty was deemed most appropriate for complete closure of the defect.  Using a sterile surgical marker, an appropriate advancement flap was drawn incorporating the defect and placing the expected incisions within the relaxed skin tension lines where possible.    The area thus outlined was incised deep to adipose tissue with a #15 scalpel blade.  The skin margins were undermined to an appropriate distance in all directions utilizing iris scissors.
Complex Repair And Z Plasty Text: The defect edges were debeveled with a #15 scalpel blade.  The primary defect was closed partially with a complex linear closure.  Given the location of the remaining defect, shape of the defect and the proximity to free margins a Z plasty was deemed most appropriate for complete closure of the defect.  Using a sterile surgical marker, an appropriate advancement flap was drawn incorporating the defect and placing the expected incisions within the relaxed skin tension lines where possible.    The area thus outlined was incised deep to adipose tissue with a #15 scalpel blade.  The skin margins were undermined to an appropriate distance in all directions utilizing iris scissors.
Complex Repair And Dorsal Nasal Flap Text: The defect edges were debeveled with a #15 scalpel blade.  The primary defect was closed partially with a complex linear closure.  Given the location of the remaining defect, shape of the defect and the proximity to free margins a dorsal nasal flap was deemed most appropriate for complete closure of the defect.  Using a sterile surgical marker, an appropriate flap was drawn incorporating the defect and placing the expected incisions within the relaxed skin tension lines where possible.    The area thus outlined was incised deep to adipose tissue with a #15 scalpel blade.  The skin margins were undermined to an appropriate distance in all directions utilizing iris scissors.
Complex Repair And Ftsg Text: The defect edges were debeveled with a #15 scalpel blade.  The primary defect was closed partially with a complex linear closure.  Given the location of the defect, shape of the defect and the proximity to free margins a full thickness skin graft was deemed most appropriate to repair the remaining defect.  The graft was trimmed to fit the size of the remaining defect.  The graft was then placed in the primary defect, oriented appropriately, and sutured into place.
Complex Repair And Burow's Graft Text: The defect edges were debeveled with a #15 scalpel blade.  The primary defect was closed partially with a complex linear closure.  Given the location of the defect, shape of the defect, the proximity to free margins and the presence of a standing cone deformity a Burow's graft was deemed most appropriate to repair the remaining defect.  The graft was trimmed to fit the size of the remaining defect.  The graft was then placed in the primary defect, oriented appropriately, and sutured into place.
Complex Repair And Split-Thickness Skin Graft Text: The defect edges were debeveled with a #15 scalpel blade.  The primary defect was closed partially with a complex linear closure.  Given the location of the defect, shape of the defect and the proximity to free margins a split thickness skin graft was deemed most appropriate to repair the remaining defect.  The graft was trimmed to fit the size of the remaining defect.  The graft was then placed in the primary defect, oriented appropriately, and sutured into place.
Complex Repair And Epidermal Autograft Text: The defect edges were debeveled with a #15 scalpel blade.  The primary defect was closed partially with a complex linear closure.  Given the location of the defect, shape of the defect and the proximity to free margins an epidermal autograft was deemed most appropriate to repair the remaining defect.  The graft was trimmed to fit the size of the remaining defect.  The graft was then placed in the primary defect, oriented appropriately, and sutured into place.
Complex Repair And Dermal Autograft Text: The defect edges were debeveled with a #15 scalpel blade.  The primary defect was closed partially with a complex linear closure.  Given the location of the defect, shape of the defect and the proximity to free margins an dermal autograft was deemed most appropriate to repair the remaining defect.  The graft was trimmed to fit the size of the remaining defect.  The graft was then placed in the primary defect, oriented appropriately, and sutured into place.
Complex Repair And Tissue Cultured Epidermal Autograft Text: The defect edges were debeveled with a #15 scalpel blade.  The primary defect was closed partially with a complex linear closure.  Given the location of the defect, shape of the defect and the proximity to free margins an tissue cultured epidermal autograft was deemed most appropriate to repair the remaining defect.  The graft was trimmed to fit the size of the remaining defect.  The graft was then placed in the primary defect, oriented appropriately, and sutured into place.
Complex Repair And Xenograft Text: The defect edges were debeveled with a #15 scalpel blade.  The primary defect was closed partially with a complex linear closure.  Given the location of the defect, shape of the defect and the proximity to free margins a xenograft was deemed most appropriate to repair the remaining defect.  The graft was trimmed to fit the size of the remaining defect.  The graft was then placed in the primary defect, oriented appropriately, and sutured into place.
Complex Repair And Skin Substitute Graft Text: The defect edges were debeveled with a #15 scalpel blade.  The primary defect was closed partially with a complex linear closure.  Given the location of the remaining defect, shape of the defect and the proximity to free margins a skin substitute graft was deemed most appropriate to repair the remaining defect.  The graft was trimmed to fit the size of the remaining defect.  The graft was then placed in the primary defect, oriented appropriately, and sutured into place.
Path Notes (To The Dermatopathologist): Please check margins.
Consent: Verbal consent was obtained from the patient. The risks and benefits to therapy were discussed in detail. Specifically, the risks of infection, scarring, bleeding, prolonged wound healing, incomplete removal, allergy to anesthesia, nerve injury and recurrence were addressed. Prior to the procedure, the treatment site was clearly identified and confirmed by the patient. All components of Universal Protocol/PAUSE Rule completed.
Post-Care Instructions: I reviewed with the patient in detail post-care instructions. Patient is not to engage in any heavy lifting, exercise, or swimming for the next 14 days. Should the patient develop any fevers, chills, bleeding, severe pain patient will contact the office immediately.
Where Do You Want The Question To Include Opioid Counseling Located?: Case Summary Tab
Information: Selecting Yes will display possible errors in your note based on the variables you have selected. This validation is only offered as a suggestion for you. PLEASE NOTE THAT THE VALIDATION TEXT WILL BE REMOVED WHEN YOU FINALIZE YOUR NOTE. IF YOU WANT TO FAX A PRELIMINARY NOTE YOU WILL NEED TO TOGGLE THIS TO 'NO' IF YOU DO NOT WANT IT IN YOUR FAXED NOTE.

## 2021-07-13 ENCOUNTER — RX ONLY (OUTPATIENT)
Age: 71
Setting detail: RX ONLY
End: 2021-07-13

## 2021-07-13 RX ORDER — DOXYCYCLINE HYCLATE 100 MG/1
TABLET, COATED ORAL
Qty: 14 | Refills: 0 | Status: ERX

## 2021-07-13 RX ORDER — DOXYCYCLINE HYCLATE 100 MG/1
TABLET, COATED ORAL
Qty: 14 | Refills: 0 | Status: ERX | COMMUNITY
Start: 2021-07-13

## 2021-07-21 ENCOUNTER — APPOINTMENT (RX ONLY)
Dept: URBAN - METROPOLITAN AREA CLINIC 4 | Facility: CLINIC | Age: 71
Setting detail: DERMATOLOGY
End: 2021-07-21

## 2021-07-21 DIAGNOSIS — Z48.02 ENCOUNTER FOR REMOVAL OF SUTURES: ICD-10-CM

## 2021-07-21 PROCEDURE — ? SUTURE REMOVAL (NO GLOBAL PERIOD)

## 2021-07-21 ASSESSMENT — LOCATION ZONE DERM: LOCATION ZONE: NECK

## 2021-07-21 ASSESSMENT — LOCATION SIMPLE DESCRIPTION DERM: LOCATION SIMPLE: LEFT ANTERIOR NECK

## 2021-07-21 ASSESSMENT — LOCATION DETAILED DESCRIPTION DERM: LOCATION DETAILED: LEFT CLAVICULAR NECK

## 2021-08-09 ENCOUNTER — APPOINTMENT (RX ONLY)
Dept: URBAN - METROPOLITAN AREA CLINIC 36 | Facility: CLINIC | Age: 71
Setting detail: DERMATOLOGY
End: 2021-08-09

## 2021-08-09 PROBLEM — C44.41 BASAL CELL CARCINOMA OF SKIN OF SCALP AND NECK: Status: ACTIVE | Noted: 2021-08-09

## 2021-08-09 PROCEDURE — 13132 CMPLX RPR F/C/C/M/N/AX/G/H/F: CPT

## 2021-08-09 PROCEDURE — 17311 MOHS 1 STAGE H/N/HF/G: CPT

## 2021-08-09 PROCEDURE — ? MOHS SURGERY

## 2021-08-09 NOTE — PROCEDURE: MOHS SURGERY
Mohs Case Number: 
Previous Accession (Optional): [id number]
Biopsy Photograph Reviewed: Yes
Referring Physician (Optional): Tien
Consent Type: Consent 1 (Standard)
Eye Shield Used: No
Surgeon Performing Repair (Optional): Jose
Initial Size Of Lesion: 4
X Size Of Lesion In Cm (Optional): 1
Primary Defect Length In Cm (Final Defect Size - Required For Flaps/Grafts): 5.2
Primary Defect Width In Cm (Final Defect Size - Required For Flaps/Grafts): 2
Repair Type: Complex Repair
Oculoplastic Surgeon (A): Sravan
Oculoplastic Surgeon Procedure Text (A): After obtaining clear surgical margins the patient was sent to oculoplastics for surgical repair.  The patient understands they will receive post-surgical care and follow-up from the referring physician's office.
Otolaryngologist Procedure Text (A): After obtaining clear surgical margins the patient was sent to otolaryngology for surgical repair.  The patient understands they will receive post-surgical care and follow-up from the referring physician's office.
Plastic Surgeon Procedure Text (A): After obtaining clear surgical margins the patient was sent to plastics for surgical repair.  The patient understands they will receive post-surgical care and follow-up from the referring physician's office.
Mid-Level Procedure Text (A): After obtaining clear surgical margins the patient was sent to a mid-level provider for surgical repair.  The patient understands they will receive post-surgical care and follow-up from the mid-level provider.
Provider Procedure Text (A): After obtaining clear surgical margins the defect was repaired by another provider.
Asc Procedure Text (A): After obtaining clear surgical margins the patient was sent to an ASC for surgical repair.  The patient understands they will receive post-surgical care and follow-up from the ASC physician.
Suturegard Retention Suture: 2-0 Nylon
Retention Suture Bite Size: 3 mm
Length To Time In Minutes Device Was In Place: 10
Simple / Intermediate / Complex Repair - Final Wound Length In Cm: 6
Distance Of Undermining In Cm (Required): 2.1
Undermining Type: Entire Wound
Debridement Text: The wound edges were debrided prior to proceeding with the closure to facilitate wound healing.
Helical Rim Text: The closure involved the helical rim.
Vermilion Border Text: The closure involved the vermilion border.
Nostril Rim Text: The closure involved the nostril rim.
Retention Suture Text: Retention sutures were placed to support the closure and prevent dehiscence.
Secondary Defect Length In Cm (Required For Flaps): 0
Area H Indication Text: Tumors in this location are included in Area H (eyelids, eyebrows, nose, lips, chin, ear, pre-auricular, post-auricular, temple, genitalia, hands, feet, ankles and areola).  Tissue conservation is critical in these anatomic locations.
Area M Indication Text: Tumors in this location are included in Area M (cheek, forehead, scalp, neck, jawline and pretibial skin).  Mohs surgery is indicated for tumors in these anatomic locations.
Area L Indication Text: Tumors in this location are included in Area L (trunk and extremities).  Mohs surgery is indicated for larger tumors, or tumors with aggressive histologic features, in these anatomic locations.
Surgical Defect Width In Cm (Optional): 2.0
Special Stains Stage 1 - Results: Base On Clearance Noted Above
Stage 2: Additional Anesthesia Type: 1% lidocaine with 1:100,000 epinephrine and 408mcg clindamycin/ml and a 1:10 solution of 8.4% sodium bicarbonate
Stage 4: Additional Anesthesia Type: 1% lidocaine with epinephrine
Include Tumor Staging In Mohs Note?: Please Select the Appropriate Response
Staging Info: By selecting yes to the question above you will include information on AJCC 8 tumor staging in your Mohs note. Information on tumor staging will be automatically added for SCCs on the head and neck. AJCC 8 includes tumor size, tumor depth, perineural involvement and bone invasion.
Tumor Depth: Less than 6mm from granular layer and no invasion beyond the subcutaneous fat
Medical Necessity Statement: Based on my medical judgement, Mohs surgery is the most appropriate treatment for this cancer compared to other treatments.
Alternatives Discussed Intro (Do Not Add Period): I discussed alternative treatments to Mohs surgery and specifically discussed the risks and benefits of
Consent 1/Introductory Paragraph: The rationale for Mohs was explained to the patient and consent was obtained. The risks, benefits and alternatives to therapy were discussed in detail. Specifically, the risks of infection, scarring, bleeding, prolonged wound healing, incomplete removal, allergy to anesthesia, nerve injury and recurrence were addressed. Prior to the procedure, the treatment site was clearly identified and confirmed by the patient. All components of Universal Protocol/PAUSE Rule completed.
Consent 2/Introductory Paragraph: Mohs surgery was explained to the patient and consent was obtained. The risks, benefits and alternatives to therapy were discussed in detail. Specifically, the risks of infection, scarring, bleeding, prolonged wound healing, incomplete removal, allergy to anesthesia, nerve injury and recurrence were addressed. Prior to the procedure, the treatment site was clearly identified and confirmed by the patient. All components of Universal Protocol/PAUSE Rule completed.
Consent 3/Introductory Paragraph: I gave the patient a chance to ask questions they had about the procedure.  Following this I explained the Mohs procedure and consent was obtained. The risks, benefits and alternatives to therapy were discussed in detail. Specifically, the risks of infection, scarring, bleeding, prolonged wound healing, incomplete removal, allergy to anesthesia, nerve injury and recurrence were addressed. Prior to the procedure, the treatment site was clearly identified and confirmed by the patient. All components of Universal Protocol/PAUSE Rule completed.
Consent (Temporal Branch)/Introductory Paragraph: The rationale for Mohs was explained to the patient and consent was obtained. The risks, benefits and alternatives to therapy were discussed in detail. Specifically, the risks of damage to the temporal branch of the facial nerve, infection, scarring, bleeding, prolonged wound healing, incomplete removal, allergy to anesthesia, and recurrence were addressed. Prior to the procedure, the treatment site was clearly identified and confirmed by the patient. All components of Universal Protocol/PAUSE Rule completed.
Consent (Marginal Mandibular)/Introductory Paragraph: The rationale for Mohs was explained to the patient and consent was obtained. The risks, benefits and alternatives to therapy were discussed in detail. Specifically, the risks of damage to the marginal mandibular branch of the facial nerve, infection, scarring, bleeding, prolonged wound healing, incomplete removal, allergy to anesthesia, and recurrence were addressed. Prior to the procedure, the treatment site was clearly identified and confirmed by the patient. All components of Universal Protocol/PAUSE Rule completed.
Consent (Spinal Accessory)/Introductory Paragraph: The rationale for Mohs was explained to the patient and consent was obtained. The risks, benefits and alternatives to therapy were discussed in detail. Specifically, the risks of damage to the spinal accessory nerve, infection, scarring, bleeding, prolonged wound healing, incomplete removal, allergy to anesthesia, and recurrence were addressed. Prior to the procedure, the treatment site was clearly identified and confirmed by the patient. All components of Universal Protocol/PAUSE Rule completed.
Consent (Near Eyelid Margin)/Introductory Paragraph: The rationale for Mohs was explained to the patient and consent was obtained. The risks, benefits and alternatives to therapy were discussed in detail. Specifically, the risks of ectropion or eyelid deformity, infection, scarring, bleeding, prolonged wound healing, incomplete removal, allergy to anesthesia, nerve injury and recurrence were addressed. Prior to the procedure, the treatment site was clearly identified and confirmed by the patient. All components of Universal Protocol/PAUSE Rule completed.
Consent (Ear)/Introductory Paragraph: The rationale for Mohs was explained to the patient and consent was obtained. The risks, benefits and alternatives to therapy were discussed in detail. Specifically, the risks of ear deformity, infection, scarring, bleeding, prolonged wound healing, incomplete removal, allergy to anesthesia, nerve injury and recurrence were addressed. Prior to the procedure, the treatment site was clearly identified and confirmed by the patient. All components of Universal Protocol/PAUSE Rule completed.
Consent (Nose)/Introductory Paragraph: The rationale for Mohs was explained to the patient and consent was obtained. The risks, benefits and alternatives to therapy were discussed in detail. Specifically, the risks of nasal deformity, changes in the flow of air through the nose, infection, scarring, bleeding, prolonged wound healing, incomplete removal, allergy to anesthesia, nerve injury and recurrence were addressed. Prior to the procedure, the treatment site was clearly identified and confirmed by the patient. All components of Universal Protocol/PAUSE Rule completed.
Consent (Lip)/Introductory Paragraph: The rationale for Mohs was explained to the patient and consent was obtained. The risks, benefits and alternatives to therapy were discussed in detail. Specifically, the risks of lip deformity, changes in the oral aperture, infection, scarring, bleeding, prolonged wound healing, incomplete removal, allergy to anesthesia, nerve injury and recurrence were addressed. Prior to the procedure, the treatment site was clearly identified and confirmed by the patient. All components of Universal Protocol/PAUSE Rule completed.
Consent (Scalp)/Introductory Paragraph: The rationale for Mohs was explained to the patient and consent was obtained. The risks, benefits and alternatives to therapy were discussed in detail. Specifically, the risks of changes in hair growth pattern secondary to repair, infection, scarring, bleeding, prolonged wound healing, incomplete removal, allergy to anesthesia, nerve injury and recurrence were addressed. Prior to the procedure, the treatment site was clearly identified and confirmed by the patient. All components of Universal Protocol/PAUSE Rule completed.
Detail Level: Detailed
Postop Diagnosis: same
Anesthesia Type: 0.5% lidocaine with 1:200,000 epinephrine and a 1:10 solution of 8.4% sodium bicarbonate and 408mcg clindamycin/ml
Hemostasis: Electrocautery
Estimated Blood Loss (Cc): less than 5 cc
Repair Anesthesia Method: local infiltration
Brow Lift Text: A midfrontal incision was made medially to the defect to allow access to the tissues just superior to the left eyebrow. Following careful dissection inferiorly in a supraperiosteal plane to the level of the left eyebrow, several 3-0 monocryl sutures were used to resuspend the eyebrow orbicularis oculi muscular unit to the superior frontal bone periosteum. This resulted in an appropriate reapproximation of static eyebrow symmetry and correction of the left brow ptosis.
Deep Sutures: 4-0 Maxon
Epidermal Closure: running subcuticular
Suturegard Intro: Intraoperative tissue expansion was performed, utilizing the SUTUREGARD device, in order to reduce wound tension.
Suturegard Body: The suture ends were repeatedly re-tightened and re-clamped to achieve the desired tissue expansion.
Hemigard Intro: Due to skin fragility and wound tension, it was decided to use HEMIGARD adhesive retention suture devices to permit a linear closure. The skin was cleaned and dried for a 6cm distance away from the wound. Excessive hair, if present, was removed to allow for adhesion.
Hemigard Postcare Instructions: The HEMIGARD strips are to remain completely dry for at least 5-7 days.
Donor Site Anesthesia Type: same as repair anesthesia
Graft Basting Suture (Optional): 5-0 Fast Absorbing Gut
Graft Donor Site Epidermal Sutures (Optional): 5-0 Ethibond
Epidermal Closure Graft Donor Site (Optional): simple interrupted
Graft Donor Site Bandage (Optional-Leave Blank If You Don't Want In Note): Aquaphor and telefa placed on wound. Pressure dressing applied to donor site
Closure 2 Information: This tab is for additional flaps and grafts, including complex repair and grafts and complex repair and flaps. You can also specify a different location for the additional defect, if the location is the same you do not need to select a new one. We will insert the automated text for the repair you select below just as we do for solitary flaps and grafts. Please note that at this time if you select a location with a different insurance zone you will need to override the ICD10 and CPT if appropriate.
Closure 3 Information: This tab is for additional flaps and grafts above and beyond our usual structured repairs.  Please note if you enter information here it will not currently bill and you will need to add the billing information manually.
Wound Care: No ointment
Dressing: steri-strips
Wound Care (No Sutures): Petrolatum
Dressing (No Sutures): dry sterile dressing
Suture Removal: 7 days
Unna Boot Text: An Unna boot was placed to help immobilize the limb and facilitate more rapid healing.
Home Suture Removal Text: Patient was provided instructions on removing sutures and will remove their sutures at home.  If they have any questions or difficulties they will call the office.
Post-Care Instructions: I reviewed with the patient in detail post-care instructions. Patient is not to engage in any heavy lifting, exercise, or swimming for the next 14 days. Should the patient develop any fevers, chills, bleeding, severe pain patient will contact the office immediately.
Pain Refusal Text: I offered to prescribe pain medication but the patient refused to take this medication.
Mauc Instructions: By selecting yes to the question below the MAUC number will be added into the note.  This will be calculated automatically based on the diagnosis chosen, the size entered, the body zone selected (H,M,L) and the specific indications you chose. You will also have the option to override the Mohs AUC if you disagree with the automatically calculated number and this option is found in the Case Summary tab.
Where Do You Want The Question To Include Opioid Counseling Located?: Case Summary Tab
Eye Protection Verbiage: Before proceeding with the stage, a plastic scleral shield was inserted. The globe was anesthetized with a few drops of 1% lidocaine with 1:100,000 epinephrine. Then, an appropriate sized scleral shield was chosen and coated with lacrilube ointment. The shield was gently inserted and left in place for the duration of each stage. After the stage was completed, the shield was gently removed.
Mohs Method Verbiage: An incision at a 45 degree angle following the standard Mohs approach was done and the specimen was harvested as a microscopic controlled layer.
Surgeon/Pathologist Verbiage (Will Incorporate Name Of Surgeon From Intro If Not Blank): operated in two distinct and integrated capacities as the surgeon and pathologist.
Mohs Histo Method Verbiage: Each section was then chromacoded and processed in the Mohs lab using the Mohs protocol and submitted for frozen section.
Subsequent Stages Histo Method Verbiage: Using a similar technique to that described above, a thin layer of tissue was removed from all areas where tumor was visible on the previous stage.  The tissue was again oriented, mapped, dyed, and processed as above.
Mohs Rapid Report Verbiage: The area of clinically evident tumor was marked with skin marking ink and appropriately hatched.  The initial incision was made following the Mohs approach through the skin.  The specimen was taken to the lab, divided into the necessary number of pieces, chromacoded and processed according to the Mohs protocol.  This was repeated in successive stages until a tumor free defect was achieved.
Complex Repair Preamble Text (Leave Blank If You Do Not Want): Extensive wide undermining was performed at least 2 cm in all directions.
Intermediate Repair Preamble Text (Leave Blank If You Do Not Want): Undermining was performed with blunt dissection.
M-Plasty Complex Repair Preamble Text (Leave Blank If You Do Not Want): Extensive wide undermining was performed.
Non-Graft Cartilage Fenestration Text: The cartilage was fenestrated with a 2mm punch biopsy to help facilitate healing.
Graft Cartilage Fenestration Text: The cartilage was fenestrated with a 2mm punch biopsy to help facilitate graft survival and healing.
Secondary Intention Text (Leave Blank If You Do Not Want): The defect will heal with secondary intention.
No Repair - Repaired With Adjacent Surgical Defect Text (Leave Blank If You Do Not Want): After obtaining clear surgical margins the defect was repaired concurrently with another surgical defect which was in close approximation.
Advancement Flap (Single) Text: The defect edges were debeveled with a #15 scalpel blade.  Given the location of the defect and the proximity to free margins a single advancement flap was deemed most appropriate.  Using a sterile surgical marker, an appropriate advancement flap was drawn incorporating the defect and placing the expected incisions within the relaxed skin tension lines where possible.    The area thus outlined was incised deep to adipose tissue with a #15 scalpel blade.  The skin margins were undermined to an appropriate distance in all directions utilizing iris scissors.
Advancement Flap (Double) Text: The defect edges were debeveled with a #15 scalpel blade.  Given the location of the defect and the proximity to free margins a double advancement flap was deemed most appropriate.  Using a sterile surgical marker, the appropriate advancement flaps were drawn incorporating the defect and placing the expected incisions within the relaxed skin tension lines where possible.    The area thus outlined was incised deep to adipose tissue with a #15 scalpel blade.  The skin margins were undermined to an appropriate distance in all directions utilizing iris scissors.
Burow's Advancement Flap Text: The defect edges were debeveled with a #15 scalpel blade.  Given the location of the defect and the proximity to free margins a Burow's advancement flap was deemed most appropriate.  Using a sterile surgical marker, the appropriate advancement flap was drawn incorporating the defect and placing the expected incisions within the relaxed skin tension lines where possible.    The area thus outlined was incised deep to adipose tissue with a #15 scalpel blade.  The skin margins were undermined to an appropriate distance in all directions utilizing iris scissors.
Chonodrocutaneous Helical Advancement Flap Text: The defect edges were debeveled with a #15 scalpel blade.  Given the location of the defect and the proximity to free margins a chondrocutaneous helical advancement flap was deemed most appropriate.  Using a sterile surgical marker, the appropriate advancement flap was drawn incorporating the defect and placing the expected incisions within the relaxed skin tension lines where possible.    The area thus outlined was incised deep to adipose tissue with a #15 scalpel blade.  The skin margins were undermined to an appropriate distance in all directions utilizing iris scissors.
Crescentic Advancement Flap Text: The defect edges were debeveled with a #15 scalpel blade.  Given the location of the defect and the proximity to free margins a crescentic advancement flap was deemed most appropriate.  Using a sterile surgical marker, the appropriate advancement flap was drawn incorporating the defect and placing the expected incisions within the relaxed skin tension lines where possible.    The area thus outlined was incised deep to adipose tissue with a #15 scalpel blade.  The skin margins were undermined to an appropriate distance in all directions utilizing iris scissors.
A-T Advancement Flap Text: The defect edges were debeveled with a #15 scalpel blade.  Given the location of the defect, shape of the defect and the proximity to free margins an A-T advancement flap was deemed most appropriate.  Using a sterile surgical marker, an appropriate advancement flap was drawn incorporating the defect and placing the expected incisions within the relaxed skin tension lines where possible.    The area thus outlined was incised deep to adipose tissue with a #15 scalpel blade.  The skin margins were undermined to an appropriate distance in all directions utilizing iris scissors.
O-T Advancement Flap Text: The defect edges were debeveled with a #15 scalpel blade.  Given the location of the defect, shape of the defect and the proximity to free margins an O-T advancement flap was deemed most appropriate.  Using a sterile surgical marker, an appropriate advancement flap was drawn incorporating the defect and placing the expected incisions within the relaxed skin tension lines where possible.    The area thus outlined was incised deep to adipose tissue with a #15 scalpel blade.  The skin margins were undermined to an appropriate distance in all directions utilizing iris scissors.
O-L Flap Text: The defect edges were debeveled with a #15 scalpel blade.  Given the location of the defect, shape of the defect and the proximity to free margins an O-L flap was deemed most appropriate.  Using a sterile surgical marker, an appropriate advancement flap was drawn incorporating the defect and placing the expected incisions within the relaxed skin tension lines where possible.    The area thus outlined was incised deep to adipose tissue with a #15 scalpel blade.  The skin margins were undermined to an appropriate distance in all directions utilizing iris scissors.
O-Z Flap Text: The defect edges were debeveled with a #15 scalpel blade.  Given the location of the defect, shape of the defect and the proximity to free margins an O-Z flap was deemed most appropriate.  Using a sterile surgical marker, an appropriate transposition flap was drawn incorporating the defect and placing the expected incisions within the relaxed skin tension lines where possible. The area thus outlined was incised deep to adipose tissue with a #15 scalpel blade.  The skin margins were undermined to an appropriate distance in all directions utilizing iris scissors.
Double O-Z Flap Text: The defect edges were debeveled with a #15 scalpel blade.  Given the location of the defect, shape of the defect and the proximity to free margins a Double O-Z flap was deemed most appropriate.  Using a sterile surgical marker, an appropriate transposition flap was drawn incorporating the defect and placing the expected incisions within the relaxed skin tension lines where possible. The area thus outlined was incised deep to adipose tissue with a #15 scalpel blade.  The skin margins were undermined to an appropriate distance in all directions utilizing iris scissors.
V-Y Flap Text: The defect edges were debeveled with a #15 scalpel blade.  Given the location of the defect, shape of the defect and the proximity to free margins a V-Y flap was deemed most appropriate.  Using a sterile surgical marker, an appropriate advancement flap was drawn incorporating the defect and placing the expected incisions within the relaxed skin tension lines where possible.    The area thus outlined was incised deep to adipose tissue with a #15 scalpel blade.  The skin margins were undermined to an appropriate distance in all directions utilizing iris scissors.
Advancement-Rotation Flap Text: The defect edges were debeveled with a #15 scalpel blade.  Given the location of the defect, shape of the defect and the proximity to free margins an advancement-rotation flap was deemed most appropriate.  Using a sterile surgical marker, an appropriate flap was drawn incorporating the defect and placing the expected incisions within the relaxed skin tension lines where possible. The area thus outlined was incised deep to adipose tissue with a #15 scalpel blade.  The skin margins were undermined to an appropriate distance in all directions utilizing iris scissors.
Mercedes Flap Text: The defect edges were debeveled with a #15 scalpel blade.  Given the location of the defect, shape of the defect and the proximity to free margins a Mercedes flap was deemed most appropriate.  Using a sterile surgical marker, an appropriate advancement flap was drawn incorporating the defect and placing the expected incisions within the relaxed skin tension lines where possible. The area thus outlined was incised deep to adipose tissue with a #15 scalpel blade.  The skin margins were undermined to an appropriate distance in all directions utilizing iris scissors.
Modified Advancement Flap Text: The defect edges were debeveled with a #15 scalpel blade.  Given the location of the defect, shape of the defect and the proximity to free margins a modified advancement flap was deemed most appropriate.  Using a sterile surgical marker, an appropriate advancement flap was drawn incorporating the defect and placing the expected incisions within the relaxed skin tension lines where possible.    The area thus outlined was incised deep to adipose tissue with a #15 scalpel blade.  The skin margins were undermined to an appropriate distance in all directions utilizing iris scissors.
Mucosal Advancement Flap Text: Given the location of the defect, shape of the defect and the proximity to free margins a mucosal advancement flap was deemed most appropriate. Incisions were made with a 15 blade scalpel in the appropriate fashion along the cutaneous vermilion border and the mucosal lip. The remaining actinically damaged mucosal tissue was excised.  The mucosal advancement flap was then elevated to the gingival sulcus with care taken to preserve the neurovascular structures and advanced into the primary defect. Care was taken to ensure that precise realignment of the vermilion border was achieved.
Peng Advancement Flap Text: The defect edges were debeveled with a #15 scalpel blade.  Given the location of the defect, shape of the defect and the proximity to free margins a Peng advancement flap was deemed most appropriate.  Using a sterile surgical marker, an appropriate advancement flap was drawn incorporating the defect and placing the expected incisions within the relaxed skin tension lines where possible. The area thus outlined was incised deep to adipose tissue with a #15 scalpel blade.  The skin margins were undermined to an appropriate distance in all directions utilizing iris scissors.
Hatchet Flap Text: The defect edges were debeveled with a #15 scalpel blade.  Given the location of the defect, shape of the defect and the proximity to free margins a hatchet flap based from the glabella was deemed most appropriate.  Using a sterile surgical marker, an appropriate glabellar hatchet flap was drawn incorporating the defect and placing the expected incisions within the relaxed skin tension lines where possible.    The area thus outlined was incised deep to adipose tissue with a #15 scalpel blade.  The skin margins were undermined to an appropriate distance in all directions utilizing iris scissors.
Rotation Flap Text: The defect edges were debeveled with a #15 scalpel blade.  Given the location of the defect, shape of the defect and the proximity to free margins a rotation flap was deemed most appropriate.  Using a sterile surgical marker, an appropriate rotation flap was drawn incorporating the defect and placing the expected incisions within the relaxed skin tension lines where possible.    The area thus outlined was incised deep to adipose tissue with a #15 scalpel blade.  The skin margins were undermined to an appropriate distance in all directions utilizing iris scissors.
Spiral Flap Text: The defect edges were debeveled with a #15 scalpel blade.  Given the location of the defect, shape of the defect and the proximity to free margins a spiral flap was deemed most appropriate.  Using a sterile surgical marker, an appropriate rotation flap was drawn incorporating the defect and placing the expected incisions within the relaxed skin tension lines where possible. The area thus outlined was incised deep to adipose tissue with a #15 scalpel blade.  The skin margins were undermined to an appropriate distance in all directions utilizing iris scissors.
Staged Advancement Flap Text: The defect edges were debeveled with a #15 scalpel blade.  Given the location of the defect, shape of the defect and the proximity to free margins a staged advancement flap was deemed most appropriate.  Using a sterile surgical marker, an appropriate advancement flap was drawn incorporating the defect and placing the expected incisions within the relaxed skin tension lines where possible. The area thus outlined was incised deep to adipose tissue with a #15 scalpel blade.  The skin margins were undermined to an appropriate distance in all directions utilizing iris scissors.
Star Wedge Flap Text: The defect edges were debeveled with a #15 scalpel blade.  Given the location of the defect, shape of the defect and the proximity to free margins a star wedge flap was deemed most appropriate.  Using a sterile surgical marker, an appropriate rotation flap was drawn incorporating the defect and placing the expected incisions within the relaxed skin tension lines where possible. The area thus outlined was incised deep to adipose tissue with a #15 scalpel blade.  The skin margins were undermined to an appropriate distance in all directions utilizing iris scissors.
Transposition Flap Text: The defect edges were debeveled with a #15 scalpel blade.  Given the location of the defect and the proximity to free margins a transposition flap was deemed most appropriate.  Using a sterile surgical marker, an appropriate transposition flap was drawn incorporating the defect.    The area thus outlined was incised deep to adipose tissue with a #15 scalpel blade.  The skin margins were undermined to an appropriate distance in all directions utilizing iris scissors.
Muscle Hinge Flap Text: The defect edges were debeveled with a #15 scalpel blade.  Given the size, depth and location of the defect and the proximity to free margins a muscle hinge flap was deemed most appropriate.  Using a sterile surgical marker, an appropriate hinge flap was drawn incorporating the defect. The area thus outlined was incised with a #15 scalpel blade.  The skin margins were undermined to an appropriate distance in all directions utilizing iris scissors.
Nasal Turnover Hinge Flap Text: The defect edges were debeveled with a #15 scalpel blade.  Given the size, depth, location of the defect and the defect being full thickness a nasal turnover hinge flap was deemed most appropriate.  Using a sterile surgical marker, an appropriate hinge flap was drawn incorporating the defect. The area thus outlined was incised with a #15 scalpel blade. The flap was designed to recreate the nasal mucosal lining and the alar rim. The skin margins were undermined to an appropriate distance in all directions utilizing iris scissors.
Nasalis-Muscle-Based Myocutaneous Island Pedicle Flap Text: Using a #15 blade, an incision was made around the donor flap to the level of the nasalis muscle. Wide lateral undermining was then performed in both the subcutaneous plane above the nasalis muscle, and in a submuscular plane just above periosteum. This allowed the formation of a free nasalis muscle axial pedicle (based on the angular artery) which was still attached to the actual cutaneous flap, increasing its mobility and vascular viability. Hemostasis was obtained with pinpoint electrocoagulation. The flap was mobilized into position and the pivotal anchor points positioned and stabilized with buried interrupted sutures. Subcutaneous and dermal tissues were closed in a multilayered fashion with sutures. Tissue redundancies were excised, and the epidermal edges were apposed without significant tension and sutured with sutures.
Orbicularis Oris Muscle Flap Text: The defect edges were debeveled with a #15 scalpel blade.  Given that the defect affected the competency of the oral sphincter an orbicularis oris muscle flap was deemed most appropriate to restore this competency and normal muscle function.  Using a sterile surgical marker, an appropriate flap was drawn incorporating the defect. The area thus outlined was incised with a #15 scalpel blade.
Melolabial Transposition Flap Text: The defect edges were debeveled with a #15 scalpel blade.  Given the location of the defect and the proximity to free margins a melolabial flap was deemed most appropriate.  Using a sterile surgical marker, an appropriate melolabial transposition flap was drawn incorporating the defect.    The area thus outlined was incised deep to adipose tissue with a #15 scalpel blade.  The skin margins were undermined to an appropriate distance in all directions utilizing iris scissors.
Rhombic Flap Text: The defect edges were debeveled with a #15 scalpel blade.  Given the location of the defect and the proximity to free margins a rhombic flap was deemed most appropriate.  Using a sterile surgical marker, an appropriate rhombic flap was drawn incorporating the defect.    The area thus outlined was incised deep to adipose tissue with a #15 scalpel blade.  The skin margins were undermined to an appropriate distance in all directions utilizing iris scissors.
Rhomboid Transposition Flap Text: The defect edges were debeveled with a #15 scalpel blade.  Given the location of the defect and the proximity to free margins a rhomboid transposition flap was deemed most appropriate.  Using a sterile surgical marker, an appropriate rhomboid flap was drawn incorporating the defect.    The area thus outlined was incised deep to adipose tissue with a #15 scalpel blade.  The skin margins were undermined to an appropriate distance in all directions utilizing iris scissors.
Bi-Rhombic Flap Text: The defect edges were debeveled with a #15 scalpel blade.  Given the location of the defect and the proximity to free margins a bi-rhombic flap was deemed most appropriate.  Using a sterile surgical marker, an appropriate rhombic flap was drawn incorporating the defect. The area thus outlined was incised deep to adipose tissue with a #15 scalpel blade.  The skin margins were undermined to an appropriate distance in all directions utilizing iris scissors.
Helical Rim Advancement Flap Text: The defect edges were debeveled with a #15 blade scalpel.  Given the location of the defect and the proximity to free margins (helical rim) a double helical rim advancement flap was deemed most appropriate.  Using a sterile surgical marker, the appropriate advancement flaps were drawn incorporating the defect and placing the expected incisions between the helical rim and antihelix where possible.  The area thus outlined was incised through and through with a #15 scalpel blade.  With a skin hook and iris scissors, the flaps were gently and sharply undermined and freed up.
Bilateral Helical Rim Advancement Flap Text: The defect edges were debeveled with a #15 blade scalpel.  Given the location of the defect and the proximity to free margins (helical rim) a bilateral helical rim advancement flap was deemed most appropriate.  Using a sterile surgical marker, the appropriate advancement flaps were drawn incorporating the defect and placing the expected incisions between the helical rim and antihelix where possible.  The area thus outlined was incised through and through with a #15 scalpel blade.  With a skin hook and iris scissors, the flaps were gently and sharply undermined and freed up.
Ear Star Wedge Flap Text: The defect edges were debeveled with a #15 blade scalpel.  Given the location of the defect and the proximity to free margins (helical rim) an ear star wedge flap was deemed most appropriate.  Using a sterile surgical marker, the appropriate flap was drawn incorporating the defect and placing the expected incisions between the helical rim and antihelix where possible.  The area thus outlined was incised through and through with a #15 scalpel blade.
Banner Transposition Flap Text: The defect edges were debeveled with a #15 scalpel blade.  Given the location of the defect and the proximity to free margins a Banner transposition flap was deemed most appropriate.  Using a sterile surgical marker, an appropriate flap drawn around the defect. The area thus outlined was incised deep to adipose tissue with a #15 scalpel blade.  The skin margins were undermined to an appropriate distance in all directions utilizing iris scissors.
Bilobed Flap Text: The defect edges were debeveled with a #15 scalpel blade.  Given the location of the defect and the proximity to free margins a bilobe flap was deemed most appropriate.  Using a sterile surgical marker, an appropriate bilobe flap drawn around the defect.    The area thus outlined was incised deep to adipose tissue with a #15 scalpel blade.  The skin margins were undermined to an appropriate distance in all directions utilizing iris scissors.
Bilobed Transposition Flap Text: The defect edges were debeveled with a #15 scalpel blade.  Given the location of the defect and the proximity to free margins a bilobed transposition flap was deemed most appropriate.  Using a sterile surgical marker, an appropriate bilobe flap drawn around the defect.    The area thus outlined was incised deep to adipose tissue with a #15 scalpel blade.  The skin margins were undermined to an appropriate distance in all directions utilizing iris scissors.
Trilobed Flap Text: The defect edges were debeveled with a #15 scalpel blade.  Given the location of the defect and the proximity to free margins a trilobed flap was deemed most appropriate.  Using a sterile surgical marker, an appropriate trilobed flap drawn around the defect.    The area thus outlined was incised deep to adipose tissue with a #15 scalpel blade.  The skin margins were undermined to an appropriate distance in all directions utilizing iris scissors.
Dorsal Nasal Flap Text: The defect edges were debeveled with a #15 scalpel blade.  Given the location of the defect and the proximity to free margins a dorsal nasal flap,based upon the glabellar folds, was deemed most appropriate.  Using a sterile surgical marker, an appropriate dorsal nasal flap was drawn around the defect.    The area thus outlined was incised deep to adipose tissue with a #15 scalpel blade.  The skin margins were undermined to an appropriate distance in all directions utilizing iris scissors.
Island Pedicle Flap Text: The defect edges were debeveled with a #15 scalpel blade.  Given the location of the defect, shape of the defect and the proximity to free margins an island pedicle advancement flap was deemed most appropriate.  Using a sterile surgical marker, an appropriate advancement flap was drawn incorporating the defect, outlining the appropriate donor tissue and placing the expected incisions within the relaxed skin tension lines where possible.    The area thus outlined was incised deep to adipose tissue with a #15 scalpel blade.  The skin margins were undermined to an appropriate distance in all directions around the primary defect and laterally outward around the island pedicle utilizing iris scissors.  There was minimal undermining beneath the pedicle flap.
Island Pedicle Flap With Canthal Suspension Text: The defect edges were debeveled with a #15 scalpel blade.  Given the location of the defect, shape of the defect and the proximity to free margins an island pedicle advancement flap was deemed most appropriate.  Using a sterile surgical marker, an appropriate advancement flap was drawn incorporating the defect, outlining the appropriate donor tissue and placing the expected incisions within the relaxed skin tension lines where possible. The area thus outlined was incised deep to adipose tissue with a #15 scalpel blade.  The skin margins were undermined to an appropriate distance in all directions around the primary defect and laterally outward around the island pedicle utilizing iris scissors.  There was minimal undermining beneath the pedicle flap. A suspension suture was placed in the canthal tendon to prevent tension and prevent ectropion.
Alar Island Pedicle Flap Text: The defect edges were debeveled with a #15 scalpel blade.  Given the location of the defect, shape of the defect and the proximity to the alar rim an island pedicle advancement flap was deemed most appropriate.  Using a sterile surgical marker, an appropriate advancement flap was drawn incorporating the defect, outlining the appropriate donor tissue and placing the expected incisions within the nasal ala running parallel to the alar rim. The area thus outlined was incised with a #15 scalpel blade.  The skin margins were undermined minimally to an appropriate distance in all directions around the primary defect and laterally outward around the island pedicle utilizing iris scissors.  There was minimal undermining beneath the pedicle flap.
Double Island Pedicle Flap Text: The defect edges were debeveled with a #15 scalpel blade.  Given the location of the defect, shape of the defect and the proximity to free margins a double island pedicle advancement flap was deemed most appropriate.  Using a sterile surgical marker, an appropriate advancement flap was drawn incorporating the defect, outlining the appropriate donor tissue and placing the expected incisions within the relaxed skin tension lines where possible.    The area thus outlined was incised deep to adipose tissue with a #15 scalpel blade.  The skin margins were undermined to an appropriate distance in all directions around the primary defect and laterally outward around the island pedicle utilizing iris scissors.  There was minimal undermining beneath the pedicle flap.
Island Pedicle Flap-Requiring Vessel Identification Text: The defect edges were debeveled with a #15 scalpel blade.  Given the location of the defect, shape of the defect and the proximity to free margins an island pedicle advancement flap was deemed most appropriate.  Using a sterile surgical marker, an appropriate advancement flap was drawn, based on the axial vessel mentioned above, incorporating the defect, outlining the appropriate donor tissue and placing the expected incisions within the relaxed skin tension lines where possible.    The area thus outlined was incised deep to adipose tissue with a #15 scalpel blade.  The skin margins were undermined to an appropriate distance in all directions around the primary defect and laterally outward around the island pedicle utilizing iris scissors.  There was minimal undermining beneath the pedicle flap.
Keystone Flap Text: The defect edges were debeveled with a #15 scalpel blade.  Given the location of the defect, shape of the defect a keystone flap was deemed most appropriate.  Using a sterile surgical marker, an appropriate keystone flap was drawn incorporating the defect, outlining the appropriate donor tissue and placing the expected incisions within the relaxed skin tension lines where possible. The area thus outlined was incised deep to adipose tissue with a #15 scalpel blade.  The skin margins were undermined to an appropriate distance in all directions around the primary defect and laterally outward around the flap utilizing iris scissors.
O-T Plasty Text: The defect edges were debeveled with a #15 scalpel blade.  Given the location of the defect, shape of the defect and the proximity to free margins an O-T plasty was deemed most appropriate.  Using a sterile surgical marker, an appropriate O-T plasty was drawn incorporating the defect and placing the expected incisions within the relaxed skin tension lines where possible.    The area thus outlined was incised deep to adipose tissue with a #15 scalpel blade.  The skin margins were undermined to an appropriate distance in all directions utilizing iris scissors.
O-Z Plasty Text: The defect edges were debeveled with a #15 scalpel blade.  Given the location of the defect, shape of the defect and the proximity to free margins an O-Z plasty (double transposition flap) was deemed most appropriate.  Using a sterile surgical marker, the appropriate transposition flaps were drawn incorporating the defect and placing the expected incisions within the relaxed skin tension lines where possible.    The area thus outlined was incised deep to adipose tissue with a #15 scalpel blade.  The skin margins were undermined to an appropriate distance in all directions utilizing iris scissors.  Hemostasis was achieved with electrocautery.  The flaps were then transposed into place, one clockwise and the other counterclockwise, and anchored with interrupted buried subcutaneous sutures.
Double O-Z Plasty Text: The defect edges were debeveled with a #15 scalpel blade.  Given the location of the defect, shape of the defect and the proximity to free margins a Double O-Z plasty (double transposition flap) was deemed most appropriate.  Using a sterile surgical marker, the appropriate transposition flaps were drawn incorporating the defect and placing the expected incisions within the relaxed skin tension lines where possible. The area thus outlined was incised deep to adipose tissue with a #15 scalpel blade.  The skin margins were undermined to an appropriate distance in all directions utilizing iris scissors.  Hemostasis was achieved with electrocautery.  The flaps were then transposed into place, one clockwise and the other counterclockwise, and anchored with interrupted buried subcutaneous sutures.
V-Y Plasty Text: The defect edges were debeveled with a #15 scalpel blade.  Given the location of the defect, shape of the defect and the proximity to free margins an V-Y advancement flap was deemed most appropriate.  Using a sterile surgical marker, an appropriate advancement flap was drawn incorporating the defect and placing the expected incisions within the relaxed skin tension lines where possible.    The area thus outlined was incised deep to adipose tissue with a #15 scalpel blade.  The skin margins were undermined to an appropriate distance in all directions utilizing iris scissors.
H Plasty Text: Given the location of the defect, shape of the defect and the proximity to free margins a H-plasty was deemed most appropriate for repair.  Using a sterile surgical marker, the appropriate advancement arms of the H-plasty were drawn incorporating the defect and placing the expected incisions within the relaxed skin tension lines where possible. The area thus outlined was incised deep to adipose tissue with a #15 scalpel blade. The skin margins were undermined to an appropriate distance in all directions utilizing iris scissors.  The opposing advancement arms were then advanced into place in opposite direction and anchored with interrupted buried subcutaneous sutures.
W Plasty Text: The lesion was extirpated to the level of the fat with a #15 scalpel blade.  Given the location of the defect, shape of the defect and the proximity to free margins a W-plasty was deemed most appropriate for repair.  Using a sterile surgical marker, the appropriate transposition arms of the W-plasty were drawn incorporating the defect and placing the expected incisions within the relaxed skin tension lines where possible.    The area thus outlined was incised deep to adipose tissue with a #15 scalpel blade.  The skin margins were undermined to an appropriate distance in all directions utilizing iris scissors.  The opposing transposition arms were then transposed into place in opposite direction and anchored with interrupted buried subcutaneous sutures.
Z Plasty Text: The lesion was extirpated to the level of the fat with a #15 scalpel blade.  Given the location of the defect, shape of the defect and the proximity to free margins a Z-plasty was deemed most appropriate for repair.  Using a sterile surgical marker, the appropriate transposition arms of the Z-plasty were drawn incorporating the defect and placing the expected incisions within the relaxed skin tension lines where possible.    The area thus outlined was incised deep to adipose tissue with a #15 scalpel blade.  The skin margins were undermined to an appropriate distance in all directions utilizing iris scissors.  The opposing transposition arms were then transposed into place in opposite direction and anchored with interrupted buried subcutaneous sutures.
Zygomaticofacial Flap Text: Given the location of the defect, shape of the defect and the proximity to free margins a zygomaticofacial flap was deemed most appropriate for repair.  Using a sterile surgical marker, the appropriate flap was drawn incorporating the defect and placing the expected incisions within the relaxed skin tension lines where possible. The area thus outlined was incised deep to adipose tissue with a #15 scalpel blade with preservation of a vascular pedicle.  The skin margins were undermined to an appropriate distance in all directions utilizing iris scissors.  The flap was then placed into the defect and anchored with interrupted buried subcutaneous sutures.
Cheek Interpolation Flap Text: A decision was made to reconstruct the defect utilizing an interpolation axial flap and a staged reconstruction.  A telfa template was made of the defect.  This telfa template was then used to outline the Cheek Interpolation flap.  The donor area for the pedicle flap was then injected with anesthesia.  The flap was excised through the skin and subcutaneous tissue down to the layer of the underlying musculature.  The interpolation flap was carefully excised within this deep plane to maintain its blood supply.  The edges of the donor site were undermined.   The donor site was closed in a primary fashion.  The pedicle was then rotated into position and sutured.  Once the tube was sutured into place, adequate blood supply was confirmed with blanching and refill.  The pedicle was then wrapped with xeroform gauze and dressed appropriately with a telfa and gauze bandage to ensure continued blood supply and protect the attached pedicle.
Cheek-To-Nose Interpolation Flap Text: A decision was made to reconstruct the defect utilizing an interpolation axial flap and a staged reconstruction.  A telfa template was made of the defect.  This telfa template was then used to outline the Cheek-To-Nose Interpolation flap.  The donor area for the pedicle flap was then injected with anesthesia.  The flap was excised through the skin and subcutaneous tissue down to the layer of the underlying musculature.  The interpolation flap was carefully excised within this deep plane to maintain its blood supply.  The edges of the donor site were undermined.   The donor site was closed in a primary fashion.  The pedicle was then rotated into position and sutured.  Once the tube was sutured into place, adequate blood supply was confirmed with blanching and refill.  The pedicle was then wrapped with xeroform gauze and dressed appropriately with a telfa and gauze bandage to ensure continued blood supply and protect the attached pedicle.
Interpolation Flap Text: A decision was made to reconstruct the defect utilizing an interpolation axial flap and a staged reconstruction.  A telfa template was made of the defect.  This telfa template was then used to outline the interpolation flap.  The donor area for the pedicle flap was then injected with anesthesia.  The flap was excised through the skin and subcutaneous tissue down to the layer of the underlying musculature.  The interpolation flap was carefully excised within this deep plane to maintain its blood supply.  The edges of the donor site were undermined.   The donor site was closed in a primary fashion.  The pedicle was then rotated into position and sutured.  Once the tube was sutured into place, adequate blood supply was confirmed with blanching and refill.  The pedicle was then wrapped with xeroform gauze and dressed appropriately with a telfa and gauze bandage to ensure continued blood supply and protect the attached pedicle.
Melolabial Interpolation Flap Text: A decision was made to reconstruct the defect utilizing an interpolation axial flap and a staged reconstruction.  A telfa template was made of the defect.  This telfa template was then used to outline the melolabial interpolation flap.  The donor area for the pedicle flap was then injected with anesthesia.  The flap was excised through the skin and subcutaneous tissue down to the layer of the underlying musculature.  The pedicle flap was carefully excised within this deep plane to maintain its blood supply.  The edges of the donor site were undermined.   The donor site was closed in a primary fashion.  The pedicle was then rotated into position and sutured.  Once the tube was sutured into place, adequate blood supply was confirmed with blanching and refill.  The pedicle was then wrapped with xeroform gauze and dressed appropriately with a telfa and gauze bandage to ensure continued blood supply and protect the attached pedicle.
Mastoid Interpolation Flap Text: A decision was made to reconstruct the defect utilizing an interpolation axial flap and a staged reconstruction.  A telfa template was made of the defect.  This telfa template was then used to outline the mastoid interpolation flap.  The donor area for the pedicle flap was then injected with anesthesia.  The flap was excised through the skin and subcutaneous tissue down to the layer of the underlying musculature.  The pedicle flap was carefully excised within this deep plane to maintain its blood supply.  The edges of the donor site were undermined.   The donor site was closed in a primary fashion.  The pedicle was then rotated into position and sutured.  Once the tube was sutured into place, adequate blood supply was confirmed with blanching and refill.  The pedicle was then wrapped with xeroform gauze and dressed appropriately with a telfa and gauze bandage to ensure continued blood supply and protect the attached pedicle.
Posterior Auricular Interpolation Flap Text: A decision was made to reconstruct the defect utilizing an interpolation axial flap and a staged reconstruction.  A telfa template was made of the defect.  This telfa template was then used to outline the posterior auricular interpolation flap.  The donor area for the pedicle flap was then injected with anesthesia.  The flap was excised through the skin and subcutaneous tissue down to the layer of the underlying musculature.  The pedicle flap was carefully excised within this deep plane to maintain its blood supply.  The edges of the donor site were undermined.   The donor site was closed in a primary fashion.  The pedicle was then rotated into position and sutured.  Once the tube was sutured into place, adequate blood supply was confirmed with blanching and refill.  The pedicle was then wrapped with xeroform gauze and dressed appropriately with a telfa and gauze bandage to ensure continued blood supply and protect the attached pedicle.
Paramedian Forehead Flap Text: A decision was made to reconstruct the defect utilizing an interpolation axial flap and a staged reconstruction.  A telfa template was made of the defect.  This telfa template was then used to outline the paramedian forehead pedicle flap.  The donor area for the pedicle flap was then injected with anesthesia.  The flap was excised through the skin and subcutaneous tissue down to the layer of the underlying musculature.  The pedicle flap was carefully excised within this deep plane to maintain its blood supply.  The edges of the donor site were undermined.   The donor site was closed in a primary fashion.  The pedicle was then rotated into position and sutured.  Once the tube was sutured into place, adequate blood supply was confirmed with blanching and refill.  The pedicle was then wrapped with xeroform gauze and dressed appropriately with a telfa and gauze bandage to ensure continued blood supply and protect the attached pedicle.
Cheiloplasty (Less Than 50%) Text: A decision was made to reconstruct the defect with a  cheiloplasty.  The defect was undermined extensively.  Additional obicularis oris muscle was excised with a 15 blade scalpel.  The defect was converted into a full thickness wedge, of less than 50% of the vertical height of the lip, to facilite a better cosmetic result.  Small vessels were then tied off with 5-0 monocyrl. The obicularis oris, superficial fascia, adipose and dermis were then reapproximated.  After the deeper layers were approximated the epidermis was reapproximated with particular care given to realign the vermilion border.
Cheiloplasty (Complex) Text: A decision was made to reconstruct the defect with a  cheiloplasty.  The defect was undermined extensively.  Additional obicularis oris muscle was excised with a 15 blade scalpel.  The defect was converted into a full thickness wedge to facilite a better cosmetic result.  Small vessels were then tied off with 5-0 monocyrl. The obicularis oris, superficial fascia, adipose and dermis were then reapproximated.  After the deeper layers were approximated the epidermis was reapproximated with particular care given to realign the vermilion border.
Ear Wedge Repair Text: A wedge excision was completed by carrying down an excision through the full thickness of the ear and cartilage with an inward facing Burow's triangle. The wound was then closed in a layered fashion.
Full Thickness Lip Wedge Repair (Flap) Text: Given the location of the defect and the proximity to free margins a full thickness wedge repair was deemed most appropriate.  Using a sterile surgical marker, the appropriate repair was drawn incorporating the defect and placing the expected incisions perpendicular to the vermilion border.  The vermilion border was also meticulously outlined to ensure appropriate reapproximation during the repair.  The area thus outlined was incised through and through with a #15 scalpel blade.  The muscularis and dermis were reaproximated with deep sutures following hemostasis. Care was taken to realign the vermilion border before proceeding with the superficial closure.  Once the vermilion was realigned the superfical and mucosal closure was finished.
Ftsg Text: The defect edges were debeveled with a #15 scalpel blade.  Given the location of the defect, shape of the defect and the proximity to free margins a full thickness skin graft was deemed most appropriate.  Using a sterile surgical marker, the primary defect shape was transferred to the donor site. The area thus outlined was incised deep to adipose tissue with a #15 scalpel blade.  The harvested graft was then trimmed of adipose tissue until only dermis and epidermis was left.  The skin margins of the secondary defect were undermined to an appropriate distance in all directions utilizing iris scissors.  The secondary defect was closed with interrupted buried subcutaneous sutures.  The skin edges were then re-apposed with running  sutures.  The skin graft was then placed in the primary defect and oriented appropriately.
Split-Thickness Skin Graft Text: The defect edges were debeveled with a #15 scalpel blade.  Given the location of the defect, shape of the defect and the proximity to free margins a split thickness skin graft was deemed most appropriate.  Using a sterile surgical marker, the primary defect shape was transferred to the donor site. The split thickness graft was then harvested.  The skin graft was then placed in the primary defect and oriented appropriately.
Burow's Graft Text: The defect edges were debeveled with a #15 scalpel blade.  Given the location of the defect, shape of the defect, the proximity to free margins and the presence of a standing cone deformity a Burow's skin graft was deemed most appropriate. The standing cone was removed and this tissue was then trimmed to the shape of the primary defect. The adipose tissue was also removed until only dermis and epidermis were left.  The skin margins of the secondary defect were undermined to an appropriate distance in all directions utilizing iris scissors.  The secondary defect was closed with interrupted buried subcutaneous sutures.  The skin edges were then re-apposed with running  sutures.  The skin graft was then placed in the primary defect and oriented appropriately.
Cartilage Graft Text: The defect edges were debeveled with a #15 scalpel blade.  Given the location of the defect, shape of the defect, the fact the defect involved a full thickness cartilage defect a cartilage graft was deemed most appropriate.  An appropriate donor site was identified, cleansed, and anesthetized. The cartilage graft was then harvested and transferred to the recipient site, oriented appropriately and then sutured into place.  The secondary defect was then repaired using a primary closure.
Composite Graft Text: The defect edges were debeveled with a #15 scalpel blade.  Given the location of the defect, shape of the defect, the proximity to free margins and the fact the defect was full thickness a composite graft was deemed most appropriate.  The defect was outline and then transferred to the donor site.  A full thickness graft was then excised from the donor site. The graft was then placed in the primary defect, oriented appropriately and then sutured into place.  The secondary defect was then repaired using a primary closure.
Epidermal Autograft Text: The defect edges were debeveled with a #15 scalpel blade.  Given the location of the defect, shape of the defect and the proximity to free margins an epidermal autograft was deemed most appropriate.  Using a sterile surgical marker, the primary defect shape was transferred to the donor site. The epidermal graft was then harvested.  The skin graft was then placed in the primary defect and oriented appropriately.
Dermal Autograft Text: The defect edges were debeveled with a #15 scalpel blade.  Given the location of the defect, shape of the defect and the proximity to free margins a dermal autograft was deemed most appropriate.  Using a sterile surgical marker, the primary defect shape was transferred to the donor site. The area thus outlined was incised deep to adipose tissue with a #15 scalpel blade.  The harvested graft was then trimmed of adipose and epidermal tissue until only dermis was left.  The skin graft was then placed in the primary defect and oriented appropriately.
Skin Substitute Text: The defect edges were debeveled with a #15 scalpel blade.  Given the location of the defect, shape of the defect and the proximity to free margins a skin substitute graft was deemed most appropriate.  The graft material was trimmed to fit the size of the defect. The graft was then placed in the primary defect and oriented appropriately.
Tissue Cultured Epidermal Autograft Text: The defect edges were debeveled with a #15 scalpel blade.  Given the location of the defect, shape of the defect and the proximity to free margins a tissue cultured epidermal autograft was deemed most appropriate.  The graft was then trimmed to fit the size of the defect.  The graft was then placed in the primary defect and oriented appropriately.
Xenograft Text: The defect edges were debeveled with a #15 scalpel blade.  Given the location of the defect, shape of the defect and the proximity to free margins a xenograft was deemed most appropriate.  The graft was then trimmed to fit the size of the defect.  The graft was then placed in the primary defect and oriented appropriately.
Purse String (Simple) Text: Given the location of the defect and the characteristics of the surrounding skin a purse string closure was deemed most appropriate.  Undermining was performed circumfirentially around the surgical defect.  A purse string suture was then placed and tightened.
Purse String (Intermediate) Text: Given the location of the defect and the characteristics of the surrounding skin a purse string intermediate closure was deemed most appropriate.  Undermining was performed circumfirentially around the surgical defect.  A purse string suture was then placed and tightened.
Partial Purse String (Simple) Text: Given the location of the defect and the characteristics of the surrounding skin a simple purse string closure was deemed most appropriate.  Undermining was performed circumfirentially around the surgical defect.  A purse string suture was then placed and tightened. Wound tension only allowed a partial closure of the circular defect.
Partial Purse String (Intermediate) Text: Given the location of the defect and the characteristics of the surrounding skin an intermediate purse string closure was deemed most appropriate.  Undermining was performed circumfirentially around the surgical defect.  A purse string suture was then placed and tightened. Wound tension only allowed a partial closure of the circular defect.
Localized Dermabrasion With Wire Brush Text: The patient was draped in routine manner.  Localized dermabrasion using 3 x 17 mm wire brush was performed in routine manner to papillary dermis. This spot dermabrasion is being performed to complete skin cancer reconstruction. It also will eliminate the other sun damaged precancerous cells that are known to be part of the regional effect of a lifetime's worth of sun exposure. This localized dermabrasion is therapeutic and should not be considered cosmetic in any regard.
Tarsorrhaphy Text: A tarsorrhaphy was performed using Frost sutures.
Complex Repair And Flap Additional Text (Will Appearing After The Standard Complex Repair Text): The complex repair was not sufficient to completely close the primary defect. The remaining additional defect was repaired with the flap mentioned below.
Complex Repair And Graft Additional Text (Will Appearing After The Standard Complex Repair Text): The complex repair was not sufficient to completely close the primary defect. The remaining additional defect was repaired with the graft mentioned below.
Unique Flap 1 Name: Myocutaneous Island pedicle Flap
Unique Flap 2 Name: Peng Flap
Unique Flap 3 Name: Mercedes Flap
Unique Flap 4 Name: Banner Flap
Unique Flap 5 Name: tunneled myocutaneous flap
Unique Flap 6 Name: Cordelia-B?ch flap
Unique Flap 7 Name: Mustarde flap
Unique Flap 8 Name: East to West Flap
Unique Flap 1 Text: A decision was made to reconstruct the defect utilizing a myocutaneous Island pedicle Flap based on the levator labii superioris muscle.  A telfa template was made of the defect.  This telfa template was then used to outline the myocutaneous flap, based along the meilolabial fold.  The donor area for the pedicle flap was then injected with anesthesia.  The flap was excised through the skin and subcutaneous tissue down to the layer of the underlying musculature.  The myocutaneous flap was carefully excised within this deep plane to maintain its blood supply. Based on the muscle. The edges of the donor site were undermined.   The donor site was closed in a primary fashion to the point of transposition.  The pedicle was then transposed into position and sutured.  Once the flap was sutured into place, adequate blood supply was confirmed with blanching and refill.
Unique Flap 2 Text: A decision was made to reconstruct the defect utilizing a Peng Flap (Bilateral Advancement Rotation Flap). Given the location of the defect and the proximity to free margins, this flap was deemed most appropriate.  Using a sterile surgical marker, the appropriate rotation flaps were drawn incorporating the defect and placing the expected incisions within the relaxed skin tension lines where possible.    The area thus outlined was incised deep to adipose tissue with a #15 scalpel blade.  The skin margins were undermined to an appropriate distance in all directions utilizing iris scissors.
Unique Flap 3 Text: The defect edges were debeveled with a #15 scalpel blade.  Given the location of the defect, shape of the defect and the proximity to free margins a Mercedes (double advancement flap) was deemed most appropriate.  Using a sterile surgical marker, the appropriate transposition flaps were drawn incorporating the defect and placing the expected incisions within the relaxed skin tension lines where possible.    The area thus outlined was incised deep to adipose tissue with a #15 scalpel blade.  The skin margins were undermined to an appropriate distance in all directions utilizing iris scissors.  Hemostasis was achieved with electrocautery.  The flaps were then advanced into the defect and anchored with interrupted buried subcutaneous sutures.
Unique Flap 4 Text: The defect edges were debeveled with a #15 scalpel blade.  Given the location of the defect and the proximity to free margins a Banner transposition flap was deemed most appropriate.  Using a sterile surgical marker, an appropriate Banner transposition flap was drawn incorporating the defect.    The area thus outlined was incised deep to adipose tissue with a #15 scalpel blade.  The skin margins were undermined to an appropriate distance in all directions utilizing iris scissors.
Unique Flap 5 Text: A decision was made to reconstruct the defect utilizing a tunneled myocutaneous Island pedicle Flap based on the anterior auricularis muscle.  A telfa template was made of the defect.  This telfa template was then used to outline the myocutaneous flap, based along the preauricular fold.  The donor area for the pedicle flap was then injected with anesthesia.  The flap was excised through the skin and subcutaneous tissue down to the layer of the underlying musculature.  The myocutaneous flap was carefully excised within this deep plane to maintain its blood supply based on the muscle. The edges of the donor site were undermined.   The donor site was closed in a primary fashion to the point of transposition.  The pedicle was then transposed through a tunnel into position and sutured.  Once the flap was sutured into place, adequate blood supply was confirmed with blanching and refill.
Unique Flap 6 Text: A decision was made to reconstruct the defect utilizing an Anti-aging-B?ch Flap (Bilateral helical Advancement Rotation Flap). Given the location of the defect and the proximity to free margins, this flap was deemed most appropriate.  Using a sterile surgical marker, the appropriate flaps were drawn incorporating the defect and placing the expected incisions within the relaxed skin tension lines where possible.  The area thus outlined was incised deep to adipose tissue with a #15 scalpel blade.  The skin margins were undermined to an appropriate distance in all directions utilizing iris scissors. Cartilage was incorporated into the flap arms to maintain helical anatomy.
Unique Flap 7 Text: A decision was made to reconstruct the defect utilizing a Mustarde Flap (Advancement Rotation Flap). Given the location of the defect and the proximity to free margins, this flap was deemed most appropriate.  Using a sterile surgical marker, the appropriate rotation flap was drawn incorporating the defect and placing the expected incisions within the relaxed skin tension lines where possible.    The area thus outlined was incised deep to adipose tissue with a #15 scalpel blade.  The skin margins were undermined to an appropriate distance in all directions utilizing iris scissors. The flap was advanced and rotated under the eyelid with a sling created laterally to keep ectropion minimal.
Unique Flap 8 Text: A decision was made to reconstruct the defect utilizing an East to West Flap (Modified Burows Advancement Flap). Given the location of the defect and the proximity to free margins, this flap was deemed most appropriate.  Using a sterile surgical marker, the appropriate advancement flaps were drawn incorporating the defect and placing the expected incisions within the relaxed skin tension lines where possible.    The area thus outlined was incised deep to adipose tissue with a #15 scalpel blade.  The skin margins were undermined to an appropriate distance in all directions utilizing iris scissors. Minimal alar distortion was created with flap approximation.
Manual Repair Warning Statement: We plan on removing the manually selected variable below in favor of our much easier automatic structured text blocks found in the previous tab. We decided to do this to help make the flow better and give you the full power of structured data. Manual selection is never going to be ideal in our platform and I would encourage you to avoid using manual selection from this point on, especially since I will be sunsetting this feature. It is important that you do one of two things with the customized text below. First, you can save all of the text in a word file so you can have it for future reference. Second, transfer the text to the appropriate area in the Library tab. Lastly, if there is a flap or graft type which we do not have you need to let us know right away so I can add it in before the variable is hidden. No need to panic, we plan to give you roughly 6 months to make the change.
Same Histology In Subsequent Stages Text: The pattern and morphology of the tumor is as described in the first stage.
No Residual Tumor Seen Histology Text: There were no malignant cells seen in the sections examined.
Inflammation Suggestive Of Cancer Camouflage Histology Text: There was a dense lymphocytic infiltrate which prevented adequate histologic evaluation of adjacent structures.
Bcc Histology Text: There were numerous aggregates of basaloid cells.
Bcc Infiltrative Histology Text: There were numerous aggregates of basaloid cells demonstrating an infiltrative pattern.
Mart-1 - Positive Histology Text: MART-1 staining demonstrates areas of higher density and clustering of melanocytes with Pagetoid spread upwards within the epidermis. The surgical margins are positive for tumor cells.
Mart-1 - Negative Histology Text: MART-1 staining demonstrates a normal density and pattern of melanocytes along the dermal-epidermal junction. The surgical margins are negative for tumor cells.
Information: Selecting Yes will display possible errors in your note based on the variables you have selected. This validation is only offered as a suggestion for you. PLEASE NOTE THAT THE VALIDATION TEXT WILL BE REMOVED WHEN YOU FINALIZE YOUR NOTE. IF YOU WANT TO FAX A PRELIMINARY NOTE YOU WILL NEED TO TOGGLE THIS TO 'NO' IF YOU DO NOT WANT IT IN YOUR FAXED NOTE.

## 2021-09-29 ENCOUNTER — APPOINTMENT (RX ONLY)
Dept: URBAN - METROPOLITAN AREA CLINIC 4 | Facility: CLINIC | Age: 71
Setting detail: DERMATOLOGY
End: 2021-09-29

## 2021-09-29 DIAGNOSIS — L82.1 OTHER SEBORRHEIC KERATOSIS: ICD-10-CM

## 2021-09-29 DIAGNOSIS — D18.0 HEMANGIOMA: ICD-10-CM

## 2021-09-29 DIAGNOSIS — L81.4 OTHER MELANIN HYPERPIGMENTATION: ICD-10-CM

## 2021-09-29 PROBLEM — C44.41 BASAL CELL CARCINOMA OF SKIN OF SCALP AND NECK: Status: ACTIVE | Noted: 2021-09-29

## 2021-09-29 PROBLEM — C44.629 SQUAMOUS CELL CARCINOMA OF SKIN OF LEFT UPPER LIMB, INCLUDING SHOULDER: Status: ACTIVE | Noted: 2021-09-29

## 2021-09-29 PROBLEM — D18.01 HEMANGIOMA OF SKIN AND SUBCUTANEOUS TISSUE: Status: ACTIVE | Noted: 2021-09-29

## 2021-09-29 PROBLEM — D48.5 NEOPLASM OF UNCERTAIN BEHAVIOR OF SKIN: Status: ACTIVE | Noted: 2021-09-29

## 2021-09-29 PROCEDURE — ? DIAGNOSIS COMMENT

## 2021-09-29 PROCEDURE — ? OBSERVATION

## 2021-09-29 PROCEDURE — 11103 TANGNTL BX SKIN EA SEP/ADDL: CPT

## 2021-09-29 PROCEDURE — 99213 OFFICE O/P EST LOW 20 MIN: CPT | Mod: 25

## 2021-09-29 PROCEDURE — 11102 TANGNTL BX SKIN SINGLE LES: CPT

## 2021-09-29 PROCEDURE — ? COUNSELING

## 2021-09-29 PROCEDURE — ? BIOPSY BY SHAVE METHOD

## 2021-09-29 ASSESSMENT — LOCATION DETAILED DESCRIPTION DERM
LOCATION DETAILED: LEFT CENTRAL MALAR CHEEK
LOCATION DETAILED: STERNUM
LOCATION DETAILED: LEFT PROXIMAL POSTERIOR UPPER ARM
LOCATION DETAILED: RIGHT SUPERIOR UPPER BACK
LOCATION DETAILED: RIGHT PROXIMAL POSTERIOR UPPER ARM
LOCATION DETAILED: LEFT ANTERIOR PROXIMAL UPPER ARM
LOCATION DETAILED: EPIGASTRIC SKIN
LOCATION DETAILED: RIGHT ANTERIOR PROXIMAL UPPER ARM

## 2021-09-29 ASSESSMENT — LOCATION ZONE DERM
LOCATION ZONE: FACE
LOCATION ZONE: ARM
LOCATION ZONE: TRUNK

## 2021-09-29 ASSESSMENT — LOCATION SIMPLE DESCRIPTION DERM
LOCATION SIMPLE: ABDOMEN
LOCATION SIMPLE: LEFT CHEEK
LOCATION SIMPLE: RIGHT UPPER ARM
LOCATION SIMPLE: RIGHT UPPER BACK
LOCATION SIMPLE: LEFT UPPER ARM
LOCATION SIMPLE: CHEST

## 2021-09-29 NOTE — PROCEDURE: BIOPSY BY SHAVE METHOD
Detail Level: Detailed
Depth Of Biopsy: dermis
Was A Bandage Applied: Yes
Size Of Lesion In Cm: 0
Anticipated Plan (Based On Presumed Biopsy Results): Follow up in 2 months.  Scheduled for 11/29/21.
Biopsy Type: H and E
Biopsy Method: Personna blade
Anesthesia Type: 1% lidocaine with epinephrine and a 1:10 solution of 8.4% sodium bicarbonate
Anesthesia Volume In Cc: 3
Hemostasis: Drysol and Electrocautery
Wound Care: Vaseline
Dressing: Band-Aid
Type Of Destruction Used: Curettage
Curettage Text: The wound bed was treated with curettage after the biopsy was performed.
Electrodesiccation Text: The wound bed was treated with electrodesiccation after the biopsy was performed.
Electrodesiccation And Curettage Text: The wound bed was treated with electrodesiccation and curettage after the biopsy was performed.
Lab: [id number]
Lab Facility: 
Render Path Notes In Note?: No
Consent: Verbal consent was obtained and risks were reviewed including but not limited to scarring, infection, bleeding, scabbing, incomplete removal, nerve damage and allergy to anesthesia.
Post-Care Instructions: I reviewed with the patient in detail post-care instructions. Patient is to keep the biopsy site dry overnight, and then apply vasaline twice daily until healed.
Notification Instructions: Patient will be notified of biopsy results. However, patient instructed to call the office if not contacted within 2 weeks.
Billing Type: Third-Party Bill
Information: Selecting Yes will display possible errors in your note based on the variables you have selected. This validation is only offered as a suggestion for you. PLEASE NOTE THAT THE VALIDATION TEXT WILL BE REMOVED WHEN YOU FINALIZE YOUR NOTE. IF YOU WANT TO FAX A PRELIMINARY NOTE YOU WILL NEED TO TOGGLE THIS TO 'NO' IF YOU DO NOT WANT IT IN YOUR FAXED NOTE.

## 2021-11-02 ENCOUNTER — APPOINTMENT (OUTPATIENT)
Dept: RADIOLOGY | Facility: MEDICAL CENTER | Age: 71
DRG: 682 | End: 2021-11-02
Attending: STUDENT IN AN ORGANIZED HEALTH CARE EDUCATION/TRAINING PROGRAM
Payer: MEDICARE

## 2021-11-02 ENCOUNTER — HOSPITAL ENCOUNTER (INPATIENT)
Facility: MEDICAL CENTER | Age: 71
LOS: 15 days | DRG: 682 | End: 2021-11-17
Attending: STUDENT IN AN ORGANIZED HEALTH CARE EDUCATION/TRAINING PROGRAM | Admitting: STUDENT IN AN ORGANIZED HEALTH CARE EDUCATION/TRAINING PROGRAM
Payer: MEDICARE

## 2021-11-02 DIAGNOSIS — N17.0 SEPSIS DUE TO ESCHERICHIA COLI WITH ACUTE RENAL FAILURE AND TUBULAR NECROSIS WITHOUT SEPTIC SHOCK (HCC): ICD-10-CM

## 2021-11-02 DIAGNOSIS — G93.40 ENCEPHALOPATHY ACUTE: ICD-10-CM

## 2021-11-02 DIAGNOSIS — I10 PRIMARY HYPERTENSION: ICD-10-CM

## 2021-11-02 DIAGNOSIS — R65.20 SEPSIS DUE TO ESCHERICHIA COLI WITH ACUTE RENAL FAILURE AND TUBULAR NECROSIS WITHOUT SEPTIC SHOCK (HCC): ICD-10-CM

## 2021-11-02 DIAGNOSIS — A41.51 SEPSIS DUE TO ESCHERICHIA COLI WITH ACUTE RENAL FAILURE AND TUBULAR NECROSIS WITHOUT SEPTIC SHOCK (HCC): ICD-10-CM

## 2021-11-02 DIAGNOSIS — K30 INDIGESTION: ICD-10-CM

## 2021-11-02 DIAGNOSIS — R05.9 COUGH: ICD-10-CM

## 2021-11-02 DIAGNOSIS — F10.20 ALCOHOL USE DISORDER, MODERATE, DEPENDENCE (HCC): ICD-10-CM

## 2021-11-02 DIAGNOSIS — Z96.649 PERI-PROSTHETIC FEMORAL SHAFT FRACTURE: ICD-10-CM

## 2021-11-02 DIAGNOSIS — M97.8XXA PERI-PROSTHETIC FEMORAL SHAFT FRACTURE: ICD-10-CM

## 2021-11-02 DIAGNOSIS — K59.00 CONSTIPATION, UNSPECIFIED CONSTIPATION TYPE: ICD-10-CM

## 2021-11-02 PROBLEM — N17.9 SEPSIS DUE TO ESCHERICHIA COLI WITH ACUTE RENAL FAILURE WITHOUT SEPTIC SHOCK (HCC): Status: ACTIVE | Noted: 2021-11-02

## 2021-11-02 PROBLEM — N39.0 URINARY TRACT INFECTION ASSOCIATED WITH INDWELLING URETHRAL CATHETER (HCC): Status: ACTIVE | Noted: 2021-10-27

## 2021-11-02 PROBLEM — N17.9 AKI (ACUTE KIDNEY INJURY) (HCC): Status: ACTIVE | Noted: 2021-11-02

## 2021-11-02 PROBLEM — E87.1 ACUTE HYPONATREMIA: Status: ACTIVE | Noted: 2021-11-02

## 2021-11-02 PROBLEM — G62.9 PERIPHERAL NEUROPATHY: Status: ACTIVE | Noted: 2021-10-12

## 2021-11-02 PROBLEM — N32.89 MASS OF URINARY BLADDER: Status: ACTIVE | Noted: 2021-11-02

## 2021-11-02 PROBLEM — G89.29 CHRONIC PAIN: Status: ACTIVE | Noted: 2021-10-12

## 2021-11-02 PROBLEM — N30.01 ACUTE CYSTITIS WITH HEMATURIA: Status: ACTIVE | Noted: 2021-11-02

## 2021-11-02 PROBLEM — T83.511A URINARY TRACT INFECTION ASSOCIATED WITH INDWELLING URETHRAL CATHETER (HCC): Status: ACTIVE | Noted: 2021-10-27

## 2021-11-02 LAB — ETHANOL BLD-MCNC: <10.1 MG/DL (ref 0–10)

## 2021-11-02 PROCEDURE — 84145 PROCALCITONIN (PCT): CPT

## 2021-11-02 PROCEDURE — 86140 C-REACTIVE PROTEIN: CPT

## 2021-11-02 PROCEDURE — 770020 HCHG ROOM/CARE - TELE (206)

## 2021-11-02 PROCEDURE — 83605 ASSAY OF LACTIC ACID: CPT

## 2021-11-02 PROCEDURE — 82077 ASSAY SPEC XCP UR&BREATH IA: CPT

## 2021-11-02 PROCEDURE — 84100 ASSAY OF PHOSPHORUS: CPT

## 2021-11-02 PROCEDURE — 83735 ASSAY OF MAGNESIUM: CPT

## 2021-11-02 PROCEDURE — 99223 1ST HOSP IP/OBS HIGH 75: CPT | Mod: AI | Performed by: STUDENT IN AN ORGANIZED HEALTH CARE EDUCATION/TRAINING PROGRAM

## 2021-11-02 PROCEDURE — 84550 ASSAY OF BLOOD/URIC ACID: CPT

## 2021-11-02 PROCEDURE — 85610 PROTHROMBIN TIME: CPT

## 2021-11-02 PROCEDURE — 74176 CT ABD & PELVIS W/O CONTRAST: CPT | Mod: MG

## 2021-11-02 PROCEDURE — 70450 CT HEAD/BRAIN W/O DYE: CPT | Mod: ME

## 2021-11-02 PROCEDURE — 80053 COMPREHEN METABOLIC PANEL: CPT

## 2021-11-02 RX ORDER — BUPRENORPHINE 2 MG/1
2 TABLET SUBLINGUAL 3 TIMES DAILY
Status: DISCONTINUED | OUTPATIENT
Start: 2021-11-02 | End: 2021-11-17 | Stop reason: HOSPADM

## 2021-11-02 RX ORDER — ONDANSETRON 4 MG/1
4 TABLET, ORALLY DISINTEGRATING ORAL EVERY 4 HOURS PRN
Status: DISCONTINUED | OUTPATIENT
Start: 2021-11-02 | End: 2021-11-17 | Stop reason: HOSPADM

## 2021-11-02 RX ORDER — BISACODYL 10 MG
10 SUPPOSITORY, RECTAL RECTAL
Status: DISCONTINUED | OUTPATIENT
Start: 2021-11-02 | End: 2021-11-03

## 2021-11-02 RX ORDER — HYDROCODONE BITARTRATE AND ACETAMINOPHEN 5; 325 MG/1; MG/1
1 TABLET ORAL EVERY 4 HOURS PRN
Status: DISCONTINUED | OUTPATIENT
Start: 2021-11-02 | End: 2021-11-03

## 2021-11-02 RX ORDER — LISINOPRIL 20 MG/1
20 TABLET ORAL
Status: DISCONTINUED | OUTPATIENT
Start: 2021-11-03 | End: 2021-11-02

## 2021-11-02 RX ORDER — POLYETHYLENE GLYCOL 3350 17 G/17G
1 POWDER, FOR SOLUTION ORAL
Status: DISCONTINUED | OUTPATIENT
Start: 2021-11-02 | End: 2021-11-03

## 2021-11-02 RX ORDER — ONDANSETRON 2 MG/ML
4 INJECTION INTRAMUSCULAR; INTRAVENOUS EVERY 4 HOURS PRN
Status: DISCONTINUED | OUTPATIENT
Start: 2021-11-02 | End: 2021-11-17 | Stop reason: HOSPADM

## 2021-11-02 RX ORDER — SODIUM CHLORIDE 9 MG/ML
INJECTION, SOLUTION INTRAVENOUS CONTINUOUS
Status: ACTIVE | OUTPATIENT
Start: 2021-11-02 | End: 2021-11-03

## 2021-11-02 RX ORDER — AMOXICILLIN 250 MG
2 CAPSULE ORAL 2 TIMES DAILY
Status: DISCONTINUED | OUTPATIENT
Start: 2021-11-02 | End: 2021-11-03

## 2021-11-02 RX ORDER — HEPARIN SODIUM 5000 [USP'U]/ML
5000 INJECTION, SOLUTION INTRAVENOUS; SUBCUTANEOUS EVERY 8 HOURS
Status: DISCONTINUED | OUTPATIENT
Start: 2021-11-03 | End: 2021-11-03

## 2021-11-02 RX ORDER — LABETALOL HYDROCHLORIDE 5 MG/ML
10 INJECTION, SOLUTION INTRAVENOUS EVERY 4 HOURS PRN
Status: DISCONTINUED | OUTPATIENT
Start: 2021-11-02 | End: 2021-11-17 | Stop reason: HOSPADM

## 2021-11-02 RX ORDER — ACETAMINOPHEN 325 MG/1
650 TABLET ORAL EVERY 6 HOURS PRN
Status: DISCONTINUED | OUTPATIENT
Start: 2021-11-02 | End: 2021-11-03

## 2021-11-02 ASSESSMENT — ENCOUNTER SYMPTOMS
PALPITATIONS: 0
EYE PAIN: 0
WEIGHT LOSS: 0
SHORTNESS OF BREATH: 0
TINGLING: 0
MYALGIAS: 0
TREMORS: 0
COUGH: 0
HEARTBURN: 0
NERVOUS/ANXIOUS: 0
DOUBLE VISION: 0
FEVER: 0
SPUTUM PRODUCTION: 0
VOMITING: 0
BLURRED VISION: 0
HEMOPTYSIS: 0
DIAPHORESIS: 0
SEIZURES: 0
ABDOMINAL PAIN: 0
CHILLS: 0
NAUSEA: 0
SORE THROAT: 0

## 2021-11-02 ASSESSMENT — LIFESTYLE VARIABLES: SUBSTANCE_ABUSE: 0

## 2021-11-02 NOTE — PROGRESS NOTES
[facility]  Transferring facility: [facility]  Transferring physician: Dr. Leroy Heller   Transferring facility/physician contact number: [phone]  Chief complaint: acute renal failure; poor UO  Pertinent history & patient course:     72 y/o M admitted to this outside facility for acute renal failure. Pt had a surgery for femur fracture at [facility]. Hospital course at that time was complicated by sepsis and EtOH withdrawal. He was eventually DC to rehab. Pt was noted to have acute renal failure with elevated BUN/Cr, declining eGFR (85>16>12) and hyponatremia (Na 115 > 118). Pt was provided 3L of IVF since admission. AOx3 but can be confused to situation. US Renal done grossly unremarkable except questionable clot or tumor at the base of bladder. His urine output with penn catheter continued to be poor (~300cc in last 48hrs). Given worsening renal function, transfer request was made for higher level of care.     Pertinent imaging & lab results:  Elevated BUN/Cr, declining eGFR (85>16>12) and hyponatremia (Na 115 > 118)  Covid (-)  US Renal done grossly unremarkable except questionable clot or tumor at the base of bladder  Code Status: FULL per transferring provider, I personally verified with the transferring provider patient's code status and the transferring provider has confirmed this with the patient.  Further work up or recommendations per triage officer prior to transfer: N/A  Consultants called prior to transfer and pertinent input from consultants: N/A  Patient accepted for transfer: Yes  Consultants to be called upon arrival: Nephrology and get CT abd/pelvis     Admission status: Inpatient.   Floor requested: Telemetry   If ICU transfer, name of intensivist case discussed with and pertinent input from critical care: N/A    Please inform the triage officer upon arrival of the patient to [facility] for  assignment of a hospitalist to perform admission.     For any question or concerns regarding the care of this patient, please reach out to the assigned hospitalist.

## 2021-11-03 ENCOUNTER — APPOINTMENT (OUTPATIENT)
Dept: RADIOLOGY | Facility: MEDICAL CENTER | Age: 71
DRG: 682 | End: 2021-11-03
Attending: STUDENT IN AN ORGANIZED HEALTH CARE EDUCATION/TRAINING PROGRAM
Payer: MEDICARE

## 2021-11-03 ENCOUNTER — APPOINTMENT (OUTPATIENT)
Dept: CARDIOLOGY | Facility: MEDICAL CENTER | Age: 71
DRG: 682 | End: 2021-11-03
Attending: STUDENT IN AN ORGANIZED HEALTH CARE EDUCATION/TRAINING PROGRAM
Payer: MEDICARE

## 2021-11-03 ENCOUNTER — APPOINTMENT (OUTPATIENT)
Dept: RADIOLOGY | Facility: MEDICAL CENTER | Age: 71
DRG: 682 | End: 2021-11-03
Attending: NURSE PRACTITIONER
Payer: MEDICARE

## 2021-11-03 PROBLEM — E86.0 DEHYDRATION: Status: ACTIVE | Noted: 2021-11-03

## 2021-11-03 PROBLEM — A41.9 SEPSIS (HCC): Status: ACTIVE | Noted: 2021-11-03

## 2021-11-03 LAB
ALBUMIN SERPL BCP-MCNC: 2.5 G/DL (ref 3.2–4.9)
ALBUMIN SERPL BCP-MCNC: 2.7 G/DL (ref 3.2–4.9)
ALBUMIN/GLOB SERPL: 0.9 G/DL
ALBUMIN/GLOB SERPL: 1 G/DL
ALP SERPL-CCNC: 110 U/L (ref 30–99)
ALP SERPL-CCNC: 110 U/L (ref 30–99)
ALT SERPL-CCNC: 26 U/L (ref 2–50)
ALT SERPL-CCNC: 28 U/L (ref 2–50)
ANION GAP SERPL CALC-SCNC: 16 MMOL/L (ref 7–16)
ANION GAP SERPL CALC-SCNC: 17 MMOL/L (ref 7–16)
ANION GAP SERPL CALC-SCNC: 18 MMOL/L (ref 7–16)
ANION GAP SERPL CALC-SCNC: 19 MMOL/L (ref 7–16)
ANION GAP SERPL CALC-SCNC: 20 MMOL/L (ref 7–16)
APPEARANCE UR: ABNORMAL
AST SERPL-CCNC: 21 U/L (ref 12–45)
AST SERPL-CCNC: 23 U/L (ref 12–45)
BACTERIA #/AREA URNS HPF: NEGATIVE /HPF
BASE EXCESS BLDA CALC-SCNC: -15 MMOL/L (ref -4–3)
BASOPHILS # BLD AUTO: 0.2 % (ref 0–1.8)
BASOPHILS # BLD: 0.02 K/UL (ref 0–0.12)
BILIRUB SERPL-MCNC: 1.1 MG/DL (ref 0.1–1.5)
BILIRUB SERPL-MCNC: 1.2 MG/DL (ref 0.1–1.5)
BILIRUB UR QL STRIP.AUTO: NEGATIVE
BODY TEMPERATURE: ABNORMAL DEGREES
BUN SERPL-MCNC: 64 MG/DL (ref 8–22)
BUN SERPL-MCNC: 66 MG/DL (ref 8–22)
BUN SERPL-MCNC: 69 MG/DL (ref 8–22)
BUN SERPL-MCNC: 70 MG/DL (ref 8–22)
BUN SERPL-MCNC: 71 MG/DL (ref 8–22)
C DIFF DNA SPEC QL NAA+PROBE: NEGATIVE
C DIFF TOX GENS STL QL NAA+PROBE: NEGATIVE
CALCIUM SERPL-MCNC: 7 MG/DL (ref 8.5–10.5)
CALCIUM SERPL-MCNC: 7.1 MG/DL (ref 8.5–10.5)
CALCIUM SERPL-MCNC: 7.1 MG/DL (ref 8.5–10.5)
CALCIUM SERPL-MCNC: 7.2 MG/DL (ref 8.5–10.5)
CALCIUM SERPL-MCNC: 7.2 MG/DL (ref 8.5–10.5)
CHLORIDE SERPL-SCNC: 85 MMOL/L (ref 96–112)
CHLORIDE SERPL-SCNC: 86 MMOL/L (ref 96–112)
CHLORIDE SERPL-SCNC: 87 MMOL/L (ref 96–112)
CHLORIDE SERPL-SCNC: 87 MMOL/L (ref 96–112)
CHLORIDE SERPL-SCNC: 88 MMOL/L (ref 96–112)
CHOLEST SERPL-MCNC: 91 MG/DL (ref 100–199)
CO2 BLDA-SCNC: <10 MMOL/L (ref 20–33)
CO2 SERPL-SCNC: 10 MMOL/L (ref 20–33)
CO2 SERPL-SCNC: 11 MMOL/L (ref 20–33)
CO2 SERPL-SCNC: 12 MMOL/L (ref 20–33)
COLOR UR: ABNORMAL
CREAT SERPL-MCNC: 6 MG/DL (ref 0.5–1.4)
CREAT SERPL-MCNC: 6.1 MG/DL (ref 0.5–1.4)
CREAT SERPL-MCNC: 6.28 MG/DL (ref 0.5–1.4)
CREAT SERPL-MCNC: 6.36 MG/DL (ref 0.5–1.4)
CREAT SERPL-MCNC: 6.47 MG/DL (ref 0.5–1.4)
CREAT UR-MCNC: 4.57 MG/DL
CRP SERPL HS-MCNC: 2.72 MG/DL (ref 0–0.75)
DELSYS IDSYS: ABNORMAL
EKG IMPRESSION: NORMAL
EKG IMPRESSION: NORMAL
EOSINOPHIL # BLD AUTO: 0.02 K/UL (ref 0–0.51)
EOSINOPHIL NFR BLD: 0.2 % (ref 0–6.9)
EPI CELLS #/AREA URNS HPF: ABNORMAL /HPF
ERYTHROCYTE [DISTWIDTH] IN BLOOD BY AUTOMATED COUNT: 45.5 FL (ref 35.9–50)
ERYTHROCYTE [DISTWIDTH] IN BLOOD BY AUTOMATED COUNT: 46.9 FL (ref 35.9–50)
ERYTHROCYTE [SEDIMENTATION RATE] IN BLOOD BY WESTERGREN METHOD: 53 MM/HOUR (ref 0–20)
GLOBULIN SER CALC-MCNC: 2.7 G/DL (ref 1.9–3.5)
GLOBULIN SER CALC-MCNC: 2.7 G/DL (ref 1.9–3.5)
GLUCOSE SERPL-MCNC: 102 MG/DL (ref 65–99)
GLUCOSE SERPL-MCNC: 143 MG/DL (ref 65–99)
GLUCOSE SERPL-MCNC: 188 MG/DL (ref 65–99)
GLUCOSE SERPL-MCNC: 97 MG/DL (ref 65–99)
GLUCOSE SERPL-MCNC: 98 MG/DL (ref 65–99)
GLUCOSE UR STRIP.AUTO-MCNC: NEGATIVE MG/DL
HCO3 BLDA-SCNC: 8.8 MMOL/L (ref 17–25)
HCT VFR BLD AUTO: 24.2 % (ref 42–52)
HCT VFR BLD AUTO: 25.3 % (ref 42–52)
HDLC SERPL-MCNC: 42 MG/DL
HGB BLD-MCNC: 8.4 G/DL (ref 14–18)
HGB BLD-MCNC: 8.7 G/DL (ref 14–18)
HYALINE CASTS #/AREA URNS LPF: ABNORMAL /LPF
IMM GRANULOCYTES # BLD AUTO: 0.14 K/UL (ref 0–0.11)
IMM GRANULOCYTES NFR BLD AUTO: 1.1 % (ref 0–0.9)
INR PPP: 1.35 (ref 0.87–1.13)
KETONES UR STRIP.AUTO-MCNC: NEGATIVE MG/DL
LACTATE BLD-SCNC: 1 MMOL/L (ref 0.5–2)
LACTATE BLD-SCNC: 1.1 MMOL/L (ref 0.5–2)
LDLC SERPL CALC-MCNC: 36 MG/DL
LEUKOCYTE ESTERASE UR QL STRIP.AUTO: ABNORMAL
LPM ILPM: 0 LPM
LV EJECT FRACT  99904: 60
LV EJECT FRACT MOD 2C 99903: 52.31
LV EJECT FRACT MOD 4C 99902: 66.48
LV EJECT FRACT MOD BP 99901: 61.23
LYMPHOCYTES # BLD AUTO: 0.98 K/UL (ref 1–4.8)
LYMPHOCYTES NFR BLD: 7.9 % (ref 22–41)
MAGNESIUM SERPL-MCNC: 1.8 MG/DL (ref 1.5–2.5)
MCH RBC QN AUTO: 30.5 PG (ref 27–33)
MCH RBC QN AUTO: 30.9 PG (ref 27–33)
MCHC RBC AUTO-ENTMCNC: 34.4 G/DL (ref 33.7–35.3)
MCHC RBC AUTO-ENTMCNC: 34.7 G/DL (ref 33.7–35.3)
MCV RBC AUTO: 88 FL (ref 81.4–97.8)
MCV RBC AUTO: 89.7 FL (ref 81.4–97.8)
MICRO URNS: ABNORMAL
MONOCYTES # BLD AUTO: 0.99 K/UL (ref 0–0.85)
MONOCYTES NFR BLD AUTO: 8 % (ref 0–13.4)
NEUTROPHILS # BLD AUTO: 10.23 K/UL (ref 1.82–7.42)
NEUTROPHILS NFR BLD: 82.6 % (ref 44–72)
NITRITE UR QL STRIP.AUTO: NEGATIVE
NRBC # BLD AUTO: 0 K/UL
NRBC BLD-RTO: 0 /100 WBC
NT-PROBNP SERPL IA-MCNC: 3268 PG/ML (ref 0–125)
OSMOLALITY UR: 274 MOSM/KG H2O (ref 300–900)
PCO2 BLDA: 16.6 MMHG (ref 26–37)
PH BLDA: 7.33 [PH] (ref 7.4–7.5)
PH UR STRIP.AUTO: 7 [PH] (ref 5–8)
PHOSPHATE SERPL-MCNC: 7.5 MG/DL (ref 2.5–4.5)
PLATELET # BLD AUTO: 391 K/UL (ref 164–446)
PLATELET # BLD AUTO: 392 K/UL (ref 164–446)
PMV BLD AUTO: 9.4 FL (ref 9–12.9)
PMV BLD AUTO: 9.6 FL (ref 9–12.9)
PO2 BLDA: 77 MMHG (ref 64–87)
POTASSIUM SERPL-SCNC: 4.5 MMOL/L (ref 3.6–5.5)
POTASSIUM SERPL-SCNC: 4.9 MMOL/L (ref 3.6–5.5)
POTASSIUM SERPL-SCNC: 4.9 MMOL/L (ref 3.6–5.5)
POTASSIUM SERPL-SCNC: 5.1 MMOL/L (ref 3.6–5.5)
POTASSIUM SERPL-SCNC: 5.2 MMOL/L (ref 3.6–5.5)
PROCALCITONIN SERPL-MCNC: 1.57 NG/ML
PROT SERPL-MCNC: 5.2 G/DL (ref 6–8.2)
PROT SERPL-MCNC: 5.4 G/DL (ref 6–8.2)
PROT UR QL STRIP: 300 MG/DL
PROTHROMBIN TIME: 16.3 SEC (ref 12–14.6)
RBC # BLD AUTO: 2.75 M/UL (ref 4.7–6.1)
RBC # BLD AUTO: 2.82 M/UL (ref 4.7–6.1)
RBC # URNS HPF: >150 /HPF
RBC UR QL AUTO: ABNORMAL
SAO2 % BLDA: 95 % (ref 93–99)
SODIUM SERPL-SCNC: 112 MMOL/L (ref 135–145)
SODIUM SERPL-SCNC: 114 MMOL/L (ref 135–145)
SODIUM SERPL-SCNC: 115 MMOL/L (ref 135–145)
SODIUM SERPL-SCNC: 117 MMOL/L (ref 135–145)
SODIUM SERPL-SCNC: 118 MMOL/L (ref 135–145)
SODIUM SERPL-SCNC: 122 MMOL/L (ref 135–145)
SODIUM UR-SCNC: 134 MMOL/L
SP GR UR STRIP.AUTO: 1.01
SPECIMEN DRAWN FROM PATIENT: ABNORMAL
TRIGL SERPL-MCNC: 64 MG/DL (ref 0–149)
TROPONIN T SERPL-MCNC: 43 NG/L (ref 6–19)
URATE SERPL-MCNC: 7.9 MG/DL (ref 2.5–8.3)
UROBILINOGEN UR STRIP.AUTO-MCNC: 0.2 MG/DL
WBC # BLD AUTO: 12.4 K/UL (ref 4.8–10.8)
WBC # BLD AUTO: 12.8 K/UL (ref 4.8–10.8)
WBC #/AREA URNS HPF: ABNORMAL /HPF

## 2021-11-03 PROCEDURE — 84295 ASSAY OF SERUM SODIUM: CPT

## 2021-11-03 PROCEDURE — 93005 ELECTROCARDIOGRAM TRACING: CPT | Performed by: STUDENT IN AN ORGANIZED HEALTH CARE EDUCATION/TRAINING PROGRAM

## 2021-11-03 PROCEDURE — 93306 TTE W/DOPPLER COMPLETE: CPT

## 2021-11-03 PROCEDURE — A9270 NON-COVERED ITEM OR SERVICE: HCPCS | Performed by: NURSE PRACTITIONER

## 2021-11-03 PROCEDURE — 93010 ELECTROCARDIOGRAM REPORT: CPT | Performed by: INTERNAL MEDICINE

## 2021-11-03 PROCEDURE — 80061 LIPID PANEL: CPT

## 2021-11-03 PROCEDURE — 700102 HCHG RX REV CODE 250 W/ 637 OVERRIDE(OP): Performed by: INTERNAL MEDICINE

## 2021-11-03 PROCEDURE — 85027 COMPLETE CBC AUTOMATED: CPT

## 2021-11-03 PROCEDURE — 770022 HCHG ROOM/CARE - ICU (200)

## 2021-11-03 PROCEDURE — 700111 HCHG RX REV CODE 636 W/ 250 OVERRIDE (IP): Performed by: STUDENT IN AN ORGANIZED HEALTH CARE EDUCATION/TRAINING PROGRAM

## 2021-11-03 PROCEDURE — 83605 ASSAY OF LACTIC ACID: CPT

## 2021-11-03 PROCEDURE — 700105 HCHG RX REV CODE 258: Performed by: STUDENT IN AN ORGANIZED HEALTH CARE EDUCATION/TRAINING PROGRAM

## 2021-11-03 PROCEDURE — 82803 BLOOD GASES ANY COMBINATION: CPT

## 2021-11-03 PROCEDURE — 93005 ELECTROCARDIOGRAM TRACING: CPT | Performed by: INTERNAL MEDICINE

## 2021-11-03 PROCEDURE — 84484 ASSAY OF TROPONIN QUANT: CPT

## 2021-11-03 PROCEDURE — 84300 ASSAY OF URINE SODIUM: CPT

## 2021-11-03 PROCEDURE — A9270 NON-COVERED ITEM OR SERVICE: HCPCS | Performed by: INTERNAL MEDICINE

## 2021-11-03 PROCEDURE — 99292 CRITICAL CARE ADDL 30 MIN: CPT | Performed by: INTERNAL MEDICINE

## 2021-11-03 PROCEDURE — 99291 CRITICAL CARE FIRST HOUR: CPT | Performed by: NURSE PRACTITIONER

## 2021-11-03 PROCEDURE — 36600 WITHDRAWAL OF ARTERIAL BLOOD: CPT

## 2021-11-03 PROCEDURE — 87086 URINE CULTURE/COLONY COUNT: CPT

## 2021-11-03 PROCEDURE — 80053 COMPREHEN METABOLIC PANEL: CPT

## 2021-11-03 PROCEDURE — 76775 US EXAM ABDO BACK WALL LIM: CPT

## 2021-11-03 PROCEDURE — 82570 ASSAY OF URINE CREATININE: CPT

## 2021-11-03 PROCEDURE — A9270 NON-COVERED ITEM OR SERVICE: HCPCS | Performed by: STUDENT IN AN ORGANIZED HEALTH CARE EDUCATION/TRAINING PROGRAM

## 2021-11-03 PROCEDURE — 83880 ASSAY OF NATRIURETIC PEPTIDE: CPT

## 2021-11-03 PROCEDURE — 83935 ASSAY OF URINE OSMOLALITY: CPT

## 2021-11-03 PROCEDURE — 81001 URINALYSIS AUTO W/SCOPE: CPT

## 2021-11-03 PROCEDURE — 85025 COMPLETE CBC W/AUTO DIFF WBC: CPT

## 2021-11-03 PROCEDURE — 700102 HCHG RX REV CODE 250 W/ 637 OVERRIDE(OP): Performed by: NURSE PRACTITIONER

## 2021-11-03 PROCEDURE — 93306 TTE W/DOPPLER COMPLETE: CPT | Mod: 26 | Performed by: INTERNAL MEDICINE

## 2021-11-03 PROCEDURE — 85652 RBC SED RATE AUTOMATED: CPT

## 2021-11-03 PROCEDURE — 700102 HCHG RX REV CODE 250 W/ 637 OVERRIDE(OP): Performed by: STUDENT IN AN ORGANIZED HEALTH CARE EDUCATION/TRAINING PROGRAM

## 2021-11-03 PROCEDURE — 700111 HCHG RX REV CODE 636 W/ 250 OVERRIDE (IP): Performed by: INTERNAL MEDICINE

## 2021-11-03 PROCEDURE — 71045 X-RAY EXAM CHEST 1 VIEW: CPT

## 2021-11-03 PROCEDURE — 80048 BASIC METABOLIC PNL TOTAL CA: CPT

## 2021-11-03 PROCEDURE — 87040 BLOOD CULTURE FOR BACTERIA: CPT

## 2021-11-03 PROCEDURE — 87493 C DIFF AMPLIFIED PROBE: CPT

## 2021-11-03 PROCEDURE — 99223 1ST HOSP IP/OBS HIGH 75: CPT | Performed by: INTERNAL MEDICINE

## 2021-11-03 RX ORDER — SODIUM CHLORIDE 9 MG/ML
1000 INJECTION, SOLUTION INTRAVENOUS ONCE
Status: COMPLETED | OUTPATIENT
Start: 2021-11-03 | End: 2021-11-03

## 2021-11-03 RX ORDER — HYDRALAZINE HYDROCHLORIDE 20 MG/ML
10 INJECTION INTRAMUSCULAR; INTRAVENOUS EVERY 6 HOURS PRN
Status: DISCONTINUED | OUTPATIENT
Start: 2021-11-03 | End: 2021-11-17 | Stop reason: HOSPADM

## 2021-11-03 RX ORDER — SODIUM BICARBONATE 650 MG/1
1300 TABLET ORAL 4 TIMES DAILY
Status: DISCONTINUED | OUTPATIENT
Start: 2021-11-03 | End: 2021-11-17 | Stop reason: HOSPADM

## 2021-11-03 RX ORDER — OMEPRAZOLE 20 MG/1
20 CAPSULE, DELAYED RELEASE ORAL 2 TIMES DAILY
Status: DISCONTINUED | OUTPATIENT
Start: 2021-11-03 | End: 2021-11-17 | Stop reason: HOSPADM

## 2021-11-03 RX ORDER — ALUMINA, MAGNESIA, AND SIMETHICONE 2400; 2400; 240 MG/30ML; MG/30ML; MG/30ML
10 SUSPENSION ORAL 4 TIMES DAILY PRN
Status: DISCONTINUED | OUTPATIENT
Start: 2021-11-03 | End: 2021-11-17 | Stop reason: HOSPADM

## 2021-11-03 RX ORDER — ACETAMINOPHEN 325 MG/1
650 TABLET ORAL EVERY 6 HOURS PRN
Status: DISCONTINUED | OUTPATIENT
Start: 2021-11-03 | End: 2021-11-09

## 2021-11-03 RX ADMIN — BUPRENORPHINE HCL 2 MG: 2 TABLET SUBLINGUAL at 14:13

## 2021-11-03 RX ADMIN — PIPERACILLIN AND TAZOBACTAM 4.5 G: 4; .5 INJECTION, POWDER, LYOPHILIZED, FOR SOLUTION INTRAVENOUS; PARENTERAL at 02:20

## 2021-11-03 RX ADMIN — GUAIFENESIN 200 MG: 100 SOLUTION ORAL at 08:21

## 2021-11-03 RX ADMIN — SODIUM BICARBONATE 1300 MG: 650 TABLET ORAL at 18:42

## 2021-11-03 RX ADMIN — GUAIFENESIN 200 MG: 100 SOLUTION ORAL at 04:17

## 2021-11-03 RX ADMIN — GUAIFENESIN 200 MG: 100 SOLUTION ORAL at 14:13

## 2021-11-03 RX ADMIN — HEPARIN SODIUM 5000 UNITS: 5000 INJECTION, SOLUTION INTRAVENOUS; SUBCUTANEOUS at 05:37

## 2021-11-03 RX ADMIN — SODIUM CHLORIDE 1000 ML: 9 INJECTION, SOLUTION INTRAVENOUS at 01:00

## 2021-11-03 RX ADMIN — ALUMINUM HYDROXIDE, MAGNESIUM HYDROXIDE, AND DIMETHICONE 10 ML: 400; 400; 40 SUSPENSION ORAL at 15:02

## 2021-11-03 RX ADMIN — OMEPRAZOLE 20 MG: 20 CAPSULE, DELAYED RELEASE ORAL at 18:42

## 2021-11-03 RX ADMIN — PIPERACILLIN AND TAZOBACTAM 4.5 G: 4; .5 INJECTION, POWDER, LYOPHILIZED, FOR SOLUTION INTRAVENOUS; PARENTERAL at 04:10

## 2021-11-03 RX ADMIN — FENTANYL CITRATE 50 MCG: 50 INJECTION, SOLUTION INTRAMUSCULAR; INTRAVENOUS at 14:31

## 2021-11-03 RX ADMIN — SODIUM BICARBONATE 1300 MG: 650 TABLET ORAL at 21:02

## 2021-11-03 RX ADMIN — BUPRENORPHINE HCL 2 MG: 2 TABLET SUBLINGUAL at 10:03

## 2021-11-03 RX ADMIN — SODIUM BICARBONATE 50 MEQ: 84 INJECTION, SOLUTION INTRAVENOUS at 05:37

## 2021-11-03 RX ADMIN — HEPARIN SODIUM 5000 UNITS: 5000 INJECTION, SOLUTION INTRAVENOUS; SUBCUTANEOUS at 14:13

## 2021-11-03 RX ADMIN — SODIUM CHLORIDE: 9 INJECTION, SOLUTION INTRAVENOUS at 02:03

## 2021-11-03 RX ADMIN — BUPRENORPHINE HCL 2 MG: 2 TABLET SUBLINGUAL at 21:02

## 2021-11-03 ASSESSMENT — ENCOUNTER SYMPTOMS
COUGH: 1
GASTROINTESTINAL NEGATIVE: 1
PSYCHIATRIC NEGATIVE: 1
ROS SKIN COMMENTS: DRY
CONSTITUTIONAL NEGATIVE: 1
NEUROLOGICAL NEGATIVE: 1
CARDIOVASCULAR NEGATIVE: 1
EYES NEGATIVE: 1

## 2021-11-03 ASSESSMENT — PAIN DESCRIPTION - PAIN TYPE
TYPE: ACUTE PAIN

## 2021-11-03 ASSESSMENT — PATIENT HEALTH QUESTIONNAIRE - PHQ9
1. LITTLE INTEREST OR PLEASURE IN DOING THINGS: NOT AT ALL
2. FEELING DOWN, DEPRESSED, IRRITABLE, OR HOPELESS: NOT AT ALL
SUM OF ALL RESPONSES TO PHQ9 QUESTIONS 1 AND 2: 0

## 2021-11-03 NOTE — ASSESSMENT & PLAN NOTE
"S/p ORIF by Dr. Tello  Pain control  Brace present  PT/OT consults\"    Weight bearing per ortho recommendation    "

## 2021-11-03 NOTE — PROGRESS NOTES
Spouse Dannie Moore notified of pts transfer to the ICU, spouse provided with room number and number to contact [facility].

## 2021-11-03 NOTE — ASSESSMENT & PLAN NOTE
"Treated for sepsis 1 week ago for Bacteremia & UTI treated with zosyn & Unasyn and d/c on cipro 750mg BID for 9 more doses and Flagyl 500mg TID x 16 days growing Resistant Ecoli.  Urine cultures here are neg  US at [facility] showing possible mass however CT Abd/Plvs and renal US both neg for mass here  Cont to monitor off Abx's  Catheter change w/in 48hrs of admission\"  Repeat UA + for UTI but he is on chronic Guy catheter  Monitor closely  We will hold off on antibiotic at this point      "

## 2021-11-03 NOTE — ASSESSMENT & PLAN NOTE
This is Sepsis Present on admission  SIRS criteria identified on my evaluation include: Leukocytosis, with WBC greater than 12,000  Source is UTI   Sepsis protocol initiated  Fluid resuscitation ordered per protocol  IV antibiotics as appropriate for source of sepsis. Currently on Zosyn   While organ dysfunction may be noted elsewhere in this problem list or in the chart, degree of organ dysfunction does not meet CMS criteria for severe sepsis  Elavated CRP  Obtain pan culture  Obtain chest xray to evaluate for pneumonia

## 2021-11-03 NOTE — PROGRESS NOTES
MD Mock at bedside, orders received for 1000 mL NS Bolus, STAT labs and EKG as pt was stating chest pain.     At 0103 ICU nurses at bedside to transfer pt to T637, report given to new RN, pt transferred with all belongings.

## 2021-11-03 NOTE — ASSESSMENT & PLAN NOTE
Monitor renal function and UO  Strict I/O  Likely dehydration/volume loss, consider gentle fluid resuscitation  1 liter NS  Obtain nephrology consult in am for possible RRT  Renal US result reviewed   Avoid nephrotoxins  Monitor electrolytes and replete as needed   Repeat renal US pending  Consider obstructive nephropathy  Obtain Urology consult   Elevated pro BNP - consider echo to evaluate LVEF

## 2021-11-03 NOTE — ASSESSMENT & PLAN NOTE
"Creatinine 1.37 today  Improving  Maybe he is in recovery phase at this point  No need for hemodialysis at this point\"    [facility] planned  Nephrology signed off.        "

## 2021-11-03 NOTE — ASSESSMENT & PLAN NOTE
History of alcohol  Use  Last alcohol drink was 3 weeks ago  Monitor for latent signs of ETOH withdrawal  Precedex as needed for agiation

## 2021-11-03 NOTE — ASSESSMENT & PLAN NOTE
Alcohol level ordered  Last drink reported 5 days prior by patient  Monitor for signs of withdrawal

## 2021-11-03 NOTE — ASSESSMENT & PLAN NOTE
Admit to ICU. status post upgrade for higher level of care  BMP/sodium series every 4 hours  Avoid over correction, should not raise more than 12 in first 24 hours  -hold diuretics, ACE ARBS,   -monitor neurological status. Seizure precaution  -obtain lipid panel and glucose ,cortisol  -obtain nephrology consult   -give 3% sodium chloride in 100 cc/hr x2 for seizure for symptomatic, do not raise > 4  -Water deficit calculation Na+ (kg x 0.6) x (desired Na-serum Na)   -Hypertonic saline (513 meq/L of Nacl,  meq/L,  meq/L  -Obtain TSG abd throid function if hypothyroidism is suspected  -DDAVP for fast sodium

## 2021-11-03 NOTE — PROGRESS NOTES
4 Eyes Skin Assessment Completed byALDEN Ding and ALDEN Tijerina.    Head WDL  Ears WDL  Nose WDL  MouthWDL  NeckWDL  Breast/ChestSjin tear on right upper chest.  Shoulder Blades WDL  Spine WDL  (R) Arm/Elbow/Hand WDL  (L) Arm/Elbow/Hand WDL  Abdomen WDL  Groin Right femoral bruise  Scrotum/Coccyx/Buttocks Stage 2 pressure ulcer  (R) Leg Right leg bruise  (R) Heel/Foot/Toe wdl  (L) Heel/Foot/Toe WDL          Devices In Places BP,PULSE OXIMETER,fULL CARDIAC MONITOR      Interventions In Place IV Fluid ongoing    Possible Skin Injury yes  Pictures Uploaded Into Epic yes  Wound Consult Placed yes  RN Wound Prevention Protocol Ordered

## 2021-11-03 NOTE — PROGRESS NOTES
Pt arrived to floor as A&Ox4 but pts status changed to A&Ox2. NIH score found to be 4 with let upper ataxia and slight tremors. Visual changes also noted on the left side. Pupils equal and reacting bilaterally, no drift on extremities. MD Bosch notified. Stat CT ordered along with labs. Waiting on results.

## 2021-11-03 NOTE — PROGRESS NOTES
Lab called with critical result of Na 114 at 0010. Critical lab result read back.   Dr. Bosch notified of critical lab result at 0012.

## 2021-11-03 NOTE — PROGRESS NOTES
Assumed care of patient at 1930. Patient arrived to floor with EMS. patient stable, A&OX4 and MD Chauhan paged

## 2021-11-03 NOTE — ASSESSMENT & PLAN NOTE
Based on imaging from [facility]  Repeat CT Abd/Plvs and renal US do not show any mass

## 2021-11-03 NOTE — PROGRESS NOTES
"Day intensivist note    Reason for admission was reviewed.     70yo male admitted for worsening UTE cr 6 and hyponatremia 115 from [facility] in the setting of recent prolonged hospitalization for right hip ORIF with complicated postop course of urosepsis at [place]     Per H&P note, there was concern of 5.7cm hypoechoic area in the kidney ultrasound at OSH    Started on piptazo, on NS 150cc/hr  Repeat sodium was 115 and quickly went up to 122 this am.   Pt also has liquid watery diarrhea.     This am, c/o chest pain. Located in epigastric area. Not radiating. No change in BP or HR. ECG stat with no change in ST-T wave changes.     /65   Pulse 76   Temp 36.6 °C (97.8 °F) (Temporal)   Resp 18   Ht 1.778 m (5' 10\")   Wt 73 kg (160 lb 15 oz)   SpO2 96%   BMI 23.09 kg/m²     Labs and imagings were reviewed  Creatinine 6.28, BUN 66  Sodium 115 hypochloremic  HCO3 10, NAGMA  Urine sodium 134  Urine osm 274  UA with very mild pyuria, +RBC  Procal 1.57    Last creatinine in 10/26/2021 was 1.0  Throughout the day,  UOP has been minimal     Assessment:  Hyponatremia - hypervolumic likely due to UTE  UTE (creatinine 6.0-6.5) with oliguric to anuric range  Chest pain - non cardiac. Suspect epigastric    Plan:   Hold fluids as sodium went up too quickly. Last sodium 122  Serial sodiums. Goal is only up to 8-10meq for 24 hours.   Monitor UOP closely, strict I/Os  Fentanyl PRN pain  Maalox prn chest pain  Nephrology consulted (d/w Dr. Hutchinson)  US kidney pending  TTE pending    The patient remains critically ill. Critical care time = 60 mins in directly providing and coordinating critical care and extensive data review. No time overlap and excludes procedures.     Diaz Olea D.O.    "

## 2021-11-03 NOTE — CONSULTS
"Critical Care Consultation    Date of consult: 11/3/2021    Referring Physician  Jacobo Bosch MD    Reason for Consultation  Hyponatremia    History of Presenting Illness  71 y.o. male who presented 11/2/2021 with past medical history of Arthritis, hypertension, skin cancer and alcohol use, was a direct admit from [facility] on 11/2/21 for worsening renal failure function and hyponatremia was initially admitted at tele unit. Patient reports he had a ground level fall (stepping on stair)  on October 12 and sustained a right RLE femur fracture. Per records patient was admitted at [facility] and underwent right ORIF for a comminuted fracture proximal to his right knee prosthesis, which was complicated later by urosepsis and alcohol withdrawal placed on empiric abx therapy with Zosyn and Unasyn.  Patient states his last drink of alcohol was 3 weeks ago. Patient was discharged to rehab about a week ago with oral antibiotics - Cipro and Flagyl and antihypertensive medications.  Dr Bosch of hospitalist medicine team was contacted by nursing staff that patient was having left upper extremity \"ataxia/tremors and seemed confused\" this evening. CT head was performed and shows no acute findings, but did show severe white matter hypodensity and moderate to severe parenchymal volume loss. Kidney US on 11/1/21 shows hypoechoic area with posterior right side of he bladder 5.7 could be malignant tumor versus adherent hematoma and no hydronephrosis. CT of abdomen is pending to be done. Per limited outside records serum sodium on 11/1/21 116, 118 om 11/02/21. K 5.0, C02 11, cl 91, Anion gap 16.2, creatinine 4.34, Bun 54, eGFR 12.1 and  calcium 7.1 WBC 10.2, HH 8.0/23.3. Latest laboratory findings via Epic are significant for Na 112, K 5.1, AG 15, C02 10, creatininw 6.0, bun 69, gfr 9 calcium 7.2. pro BNP 3268. Coags are wnl. Procal 1.57. CRp 2.72. WBC 12.4. HH 8.4/24.2. plt 394.  Patient is upgraded to ICU level of " care in managing hyponatremia, UTE and sepsis, and considering nephrology and urology consultations. Patient seen and examined at the bedside in T637. He is awake, alert and oriented. He denied headache, chest pain, nausea, vomiting, diarrhea fever and chills.       Code Status  Full Code    Review of Systems  Review of Systems   Constitutional: Negative.    HENT: Negative.    Eyes: Negative.    Respiratory: Positive for cough.    Cardiovascular: Negative.    Gastrointestinal: Negative.    Genitourinary: Negative.    Musculoskeletal: Positive for joint pain (RLE. Hx of arthritis).   Skin: Positive for rash.        dry   Neurological: Negative.    Endo/Heme/Allergies: Negative.    Psychiatric/Behavioral: Negative.        Past Medical History   has a past medical history of Arthritis, Cancer (CMS-HCC) (HCC), and Hypertension.    Surgical History   has a past surgical history that includes shoulder surgery; knee arthroplasty total (2009); knee arthroplasty total (2013); and lumbar fusion posterior (1/28/2014).    Family History  family history is not on file.    Social History   reports that he has never smoked. He has never used smokeless tobacco. He reports current alcohol use. He reports that he does not use drugs.    Medications  Home Medications    **Home medications have not yet been reviewed for this encounter**       Current Facility-Administered Medications   Medication Dose Route Frequency Provider Last Rate Last Admin   • acetaminophen (Tylenol) tablet 650 mg  650 mg Oral Q6HRS PRN Sivan Cortes, A.P.R.N.       • hydrALAZINE (APRESOLINE) injection 10 mg  10 mg Intravenous Q6HRS PRN Sivan Cortes, A.P.R.N.       • guaiFENesin (ROBITUSSIN) 100 MG/5ML solution 200 mg  10 mL Oral Q4HRS PRN Sivan Cortes, A.P.R.N.       • buprenorphine (Subutex) sublingual tablet 2 mg  2 mg Sublingual TID Jacobo Bosch M.D.       • senna-docusate (PERICOLACE or SENOKOT S) 8.6-50 MG per tablet 2 Tablet  2 Tablet Oral BID  Jacobo Bosch M.D.        And   • polyethylene glycol/lytes (MIRALAX) PACKET 1 Packet  1 Packet Oral QDAY PRN Jacobo Bosch M.D.        And   • bisacodyl (DULCOLAX) suppository 10 mg  10 mg Rectal QDAY PRN Jacobo Bosch M.D.       • Pharmacy Consult Request - to monitor for nephrotoxic agents  1 Each Other PHARMACY TO DOSE Jacobo Bosch M.D.       • NS infusion   Intravenous Continuous Caitlin Mock M.D. 150 mL/hr at 11/03/21 0224 Rate Change at 11/03/21 0224   • heparin injection 5,000 Units  5,000 Units Subcutaneous Q8HRS Jacobo Bosch M.D.       • labetalol (NORMODYNE/TRANDATE) injection 10 mg  10 mg Intravenous Q4HRS PRN Jacobo Bosch M.D.       • ondansetron (ZOFRAN) syringe/vial injection 4 mg  4 mg Intravenous Q4HRS PRN Jacobo Bosch M.D.       • ondansetron (ZOFRAN ODT) dispertab 4 mg  4 mg Oral Q4HRS PRN Jacobo Bosch M.D.       • piperacillin-tazobactam (ZOSYN) 4.5 g in  mL IVPB  4.5 g Intravenous Q12HRS Jacobo Bosch M.D. 25 mL/hr at 11/03/21 0410 4.5 g at 11/03/21 0410       Allergies  No Known Allergies    Vital Signs last 24 hours  Temp:  [36.6 °C (97.8 °F)-36.9 °C (98.4 °F)] 36.6 °C (97.8 °F)  Pulse:  [70-90] 90  Resp:  [16-21] 18  BP: (142-162)/(61-77) 157/77  SpO2:  [94 %-98 %] 96 %    Physical Exam  Physical Exam  HENT:      Head: Normocephalic and atraumatic.      Nose: Nose normal.      Mouth/Throat:      Mouth: Mucous membranes are dry.   Eyes:      Extraocular Movements: Extraocular movements intact.      Pupils: Pupils are equal, round, and reactive to light.   Cardiovascular:      Rate and Rhythm: Normal rate.      Pulses: Normal pulses.      Comments: 1st Degree avb  Pulmonary:      Breath sounds: Rales present.   Abdominal:      Palpations: Abdomen is soft.   Genitourinary:     Penis: Normal.    Musculoskeletal:      Cervical back: Normal range of motion and neck supple.      Right lower leg: Edema present.   Skin:     General: Skin is dry.       Capillary Refill: Capillary refill takes less than 2 seconds.      Findings: Erythema present.   Neurological:      General: No focal deficit present.      Mental Status: He is alert.   Psychiatric:         Mood and Affect: Mood normal.         Fluids  No intake or output data in the 24 hours ending 11/03/21 0412    Laboratory  Recent Results (from the past 48 hour(s))   CRP Quantitive (Non-Cardiac)    Collection Time: 11/02/21 10:57 PM   Result Value Ref Range    Stat C-Reactive Protein 2.72 (H) 0.00 - 0.75 mg/dL   Phosphorus    Collection Time: 11/02/21 10:57 PM   Result Value Ref Range    Phosphorus 7.5 (H) 2.5 - 4.5 mg/dL   Uric Acid    Collection Time: 11/02/21 10:57 PM   Result Value Ref Range    Uric Acid 7.9 2.5 - 8.3 mg/dL   Magnesium    Collection Time: 11/02/21 10:57 PM   Result Value Ref Range    Magnesium 1.8 1.5 - 2.5 mg/dL   Lactic Acid Every four hours after STAT order    Collection Time: 11/02/21 10:57 PM   Result Value Ref Range    Lactic Acid 1.1 0.5 - 2.0 mmol/L   PROCALCITONIN    Collection Time: 11/02/21 10:57 PM   Result Value Ref Range    Procalcitonin 1.57 (H) <0.25 ng/mL   Prothrombin Time    Collection Time: 11/02/21 10:57 PM   Result Value Ref Range    PT 16.3 (H) 12.0 - 14.6 sec    INR 1.35 (H) 0.87 - 1.13   Comp Metabolic Panel    Collection Time: 11/02/21 10:57 PM   Result Value Ref Range    Sodium 114 (LL) 135 - 145 mmol/L    Potassium 5.2 3.6 - 5.5 mmol/L    Chloride 86 (L) 96 - 112 mmol/L    Co2 12 (L) 20 - 33 mmol/L    Anion Gap 16.0 7.0 - 16.0    Glucose 97 65 - 99 mg/dL    Bun 64 (H) 8 - 22 mg/dL    Creatinine 6.10 (HH) 0.50 - 1.40 mg/dL    Calcium 7.1 (L) 8.5 - 10.5 mg/dL    AST(SGOT) 23 12 - 45 U/L    ALT(SGPT) 26 2 - 50 U/L    Alkaline Phosphatase 110 (H) 30 - 99 U/L    Total Bilirubin 1.1 0.1 - 1.5 mg/dL    Albumin 2.5 (L) 3.2 - 4.9 g/dL    Total Protein 5.2 (L) 6.0 - 8.2 g/dL    Globulin 2.7 1.9 - 3.5 g/dL    A-G Ratio 0.9 g/dL   DIAGNOSTIC ALCOHOL    Collection Time:  11/02/21 10:57 PM   Result Value Ref Range    Diagnostic Alcohol <10.1 0.0 - 10.0 mg/dL   ESTIMATED GFR    Collection Time: 11/02/21 10:57 PM   Result Value Ref Range    GFR If  11 (A) >60 mL/min/1.73 m 2    GFR If Non African American 9 (A) >60 mL/min/1.73 m 2   Sed Rate    Collection Time: 11/03/21 12:49 AM   Result Value Ref Range    Sed Rate Westergren 53 (H) 0 - 20 mm/hour   CBC WITH DIFFERENTIAL    Collection Time: 11/03/21 12:49 AM   Result Value Ref Range    WBC 12.4 (H) 4.8 - 10.8 K/uL    RBC 2.75 (L) 4.70 - 6.10 M/uL    Hemoglobin 8.4 (L) 14.0 - 18.0 g/dL    Hematocrit 24.2 (L) 42.0 - 52.0 %    MCV 88.0 81.4 - 97.8 fL    MCH 30.5 27.0 - 33.0 pg    MCHC 34.7 33.7 - 35.3 g/dL    RDW 45.5 35.9 - 50.0 fL    Platelet Count 391 164 - 446 K/uL    MPV 9.6 9.0 - 12.9 fL    Neutrophils-Polys 82.60 (H) 44.00 - 72.00 %    Lymphocytes 7.90 (L) 22.00 - 41.00 %    Monocytes 8.00 0.00 - 13.40 %    Eosinophils 0.20 0.00 - 6.90 %    Basophils 0.20 0.00 - 1.80 %    Immature Granulocytes 1.10 (H) 0.00 - 0.90 %    Nucleated RBC 0.00 /100 WBC    Neutrophils (Absolute) 10.23 (H) 1.82 - 7.42 K/uL    Lymphs (Absolute) 0.98 (L) 1.00 - 4.80 K/uL    Monos (Absolute) 0.99 (H) 0.00 - 0.85 K/uL    Eos (Absolute) 0.02 0.00 - 0.51 K/uL    Baso (Absolute) 0.02 0.00 - 0.12 K/uL    Immature Granulocytes (abs) 0.14 (H) 0.00 - 0.11 K/uL    NRBC (Absolute) 0.00 K/uL   Comp Metabolic Panel (CMP)    Collection Time: 11/03/21 12:49 AM   Result Value Ref Range    Sodium 112 (LL) 135 - 145 mmol/L    Potassium 5.1 3.6 - 5.5 mmol/L    Chloride 85 (L) 96 - 112 mmol/L    Co2 10 (L) 20 - 33 mmol/L    Anion Gap 17.0 (H) 7.0 - 16.0    Glucose 98 65 - 99 mg/dL    Bun 69 (H) 8 - 22 mg/dL    Creatinine 6.00 (HH) 0.50 - 1.40 mg/dL    Calcium 7.2 (L) 8.5 - 10.5 mg/dL    AST(SGOT) 21 12 - 45 U/L    ALT(SGPT) 28 2 - 50 U/L    Alkaline Phosphatase 110 (H) 30 - 99 U/L    Total Bilirubin 1.2 0.1 - 1.5 mg/dL    Albumin 2.7 (L) 3.2 - 4.9 g/dL    Total  Protein 5.4 (L) 6.0 - 8.2 g/dL    Globulin 2.7 1.9 - 3.5 g/dL    A-G Ratio 1.0 g/dL   Lipid Profile (Lipid Panel) Fasting    Collection Time: 21 12:49 AM   Result Value Ref Range    Cholesterol,Tot 91 (L) 100 - 199 mg/dL    Triglycerides 64 0 - 149 mg/dL    HDL 42 >=40 mg/dL    LDL 36 <100 mg/dL   TROPONIN    Collection Time: 21 12:49 AM   Result Value Ref Range    Troponin T 43 (H) 6 - 19 ng/L   proBrain Natriuretic Peptide, NT    Collection Time: 21 12:49 AM   Result Value Ref Range    NT-proBNP 3268 (H) 0 - 125 pg/mL   ESTIMATED GFR    Collection Time: 21 12:49 AM   Result Value Ref Range    GFR If  11 (A) >60 mL/min/1.73 m 2    GFR If Non African American 9 (A) >60 mL/min/1.73 m 2   EKG    Collection Time: 21  1:50 AM   Result Value Ref Range    Report       [facility]    Test Date:  2021  Pt Name:    RAMA ROUSE                   Department: 183  MRN:        [id number]                      Room:       T637  Gender:     Male                         Technician: TEJA  :        [id number]                   Requested By:ROSE MARIE MOTTA  Order #:    [id number]                    Reading MD:    Measurements  Intervals                                Axis  Rate:       76                           P:          56  WV:         236                          QRS:        -37  QRSD:       96                           T:          47  QT:         412  QTc:        464    Interpretive Statements  SINUS RHYTHM  FIRST DEGREE AV BLOCK  LEFT AXIS DEVIATION  LOW VOLTAGE IN FRONTAL LEADS  Compared to ECG 2016 10:09:25  First degree AV block now present  Left-axis deviation now present     URINE SODIUM RANDOM    Collection Time: 21  3:20 AM   Result Value Ref Range    Sodium, Urine -per volume 134 mmol/L   URINE CREATININE RANDOM    Collection Time: 21  3:20 AM   Result Value Ref Range    Creatinine, Random Urine 4.57 mg/dL   URINALYSIS    Collection Time: 21   3:23 AM    Specimen: Urine, Cath   Result Value Ref Range    Color Orange (A)     Character Cloudy (A)     Specific Gravity 1.010 <1.035    Ph 7.0 5.0 - 8.0    Glucose Negative Negative mg/dL    Ketones Negative Negative mg/dL    Protein 300 (A) Negative mg/dL    Bilirubin Negative Negative    Urobilinogen, Urine 0.2 Negative    Nitrite Negative Negative    Leukocyte Esterase Trace (A) Negative    Occult Blood Large (A) Negative    Micro Urine Req Microscopic    URINE MICROSCOPIC (W/UA)    Collection Time: 11/03/21  3:23 AM   Result Value Ref Range    WBC 5-10 (A) /hpf    RBC >150 (A) /hpf    Bacteria Negative None /hpf    Epithelial Cells Few /hpf    Hyaline Cast 0-2 /lpf       Imaging  CT-HEAD W/O   Final Result      No noncontrast CT evidence of acute intracranial hemorrhage.      Severe white matter hypodensity is present.  This is a nonspecific finding which usually is found to represent chronic microvascular disease in patient's of this demographic.  Demyelination, age indeterminant ischemia and gliosis are also common    possibilities.      Moderate to severe parenchymal volume loss         US-FOREIGN FILM ULTRASOUND   Final Result      CT-ABDOMEN-PELVIS W/O    (Results Pending)   DX-CHEST-PORTABLE (1 VIEW)    (Results Pending)   EC-ECHOCARDIOGRAM COMPLETE W/O CONT    (Results Pending)   US-RENAL    (Results Pending)       Assessment/Plan  Acute hyponatremia- (present on admission)  Assessment & Plan  Admit to ICU. status post upgrade for higher level of care  BMP/sodium series every 4 hours  Avoid over correction, should not raise more than 12 in first 24 hours  -hold diuretics, ACE ARBS,   -monitor neurological status. Seizure precaution  -obtain lipid panel and glucose ,cortisol  -obtain nephrology consult   -give 3% sodium chloride in 100 cc/hr x2 for seizure for symptomatic, do not raise > 4  -Water deficit calculation Na+ (kg x 0.6) x (desired Na-serum Na)   -Hypertonic saline (513 meq/L of Nacl, NS  154 meq/L,  meq/L  -Obtain TSG abd throid function if hypothyroidism is suspected  -DDAVP for fast sodium    Sepsis (HCC)  Assessment & Plan  This is Sepsis Present on admission  SIRS criteria identified on my evaluation include: Leukocytosis, with WBC greater than 12,000  Source is UTI   Sepsis protocol initiated  Fluid resuscitation ordered per protocol  IV antibiotics as appropriate for source of sepsis. Currently on Zosyn   While organ dysfunction may be noted elsewhere in this problem list or in the chart, degree of organ dysfunction does not meet CMS criteria for severe sepsis  Elavated CRP  Obtain pan culture  Obtain chest xray to evaluate for pneumonia          UTE (acute kidney injury) (HCC)- (present on admission)  Assessment & Plan  Monitor renal function and UO  Strict I/O  Likely dehydration/volume loss, consider gentle fluid resuscitation  1 liter NS  Obtain nephrology consult in am for possible RRT  Renal US result reviewed   Avoid nephrotoxins  Monitor electrolytes and replete as needed   Repeat renal US pending  Consider obstructive nephropathy  Obtain Urology consult   Elevated pro BNP - consider echo to evaluate LVEF     Alcohol use disorder, moderate, dependence (HCC)- (present on admission)  Assessment & Plan  History of alcohol  Use  Last alcohol drink was 3 weeks ago  Monitor for latent signs of ETOH withdrawal  Precedex as needed for agiation    Hypertension- (present on admission)  Assessment & Plan  PRN hydralazine for systolic > 165      Discussed patient condition and risk of morbidity and/or mortality with RN, Pharmacy and Patient. Discussed with CLAIRE Handy MD further recommendations to follow    The patient remains critically ill.  Critical care time = 40 minutes in directly providing and coordinating critical care and extensive data review.  No time overlap and excludes procedures.

## 2021-11-03 NOTE — H&P
Hospital Medicine History & Physical Note    Date of Service  11/3/2021    Primary Care Physician  Geo Woodruff M.D.    Consultants  Consult Nephrology in AM    Code Status  Full Code    Chief Complaint  No chief complaint on file.    History of Presenting Illness  71M tsfer from [facility] rehab for acute renal failure in setting of an episode of hypotension which resolved by discontinuing his antihypertensives initially but showed continued decline in renal function. I contacted the on-call physician at [facility] to obtain further history beyond the triage note as there was no paperwork besides labs and transfer paper bedside. According to their records, patient was in rehab at [facility] following a convoluted course at [facility] after a fall where in his RLE femur underwent an ORIF for a comminuted fracture proximal to his prosthetic which was complicated later by urosepsis and alcohol withdrawal. He was treated there with Zosyn and Unasyn allegedly, given cholestyramine and discharged on multiple antihypertensives and cipro 750mg BID for 9 more doses and Flagyl 500mg TID x 16 remaining days. 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 At bedside, patient denied pain    Patient will be admitted for UTE for nephrology consult and urology consult for possible cystoscopy.    At 10:09pm, was contacted by nursing that patient was having left upper extremity ataxia/tremors and seemed confused. Ordered CT Head stat, labs had just been drawn previously which included ammonia and alcohol but his last drink was reported as having been 5 days prior.    I discussed the plan of care with patient.    Review of Systems  Review of Systems   Constitutional: Negative for chills, diaphoresis, fever and weight loss.   HENT: Negative for ear discharge, ear pain and sore throat.    Eyes: Negative for blurred vision,  double vision and pain.   Respiratory: Negative for cough, hemoptysis, sputum production and shortness of breath.    Cardiovascular: Negative for chest pain and palpitations.   Gastrointestinal: Negative for abdominal pain, heartburn, nausea and vomiting.   Musculoskeletal: Negative for myalgias.   Skin: Negative for rash.   Neurological: Negative for tingling, tremors and seizures.   Psychiatric/Behavioral: Negative for substance abuse. The patient is not nervous/anxious.    All other systems reviewed and are negative.      Past Medical History   has a past medical history of Arthritis, Cancer (CMS-HCC) (HCC), and Hypertension.    Surgical History   has a past surgical history that includes shoulder surgery; knee arthroplasty total (); knee arthroplasty total (); and lumbar fusion posterior (2014).     Family History  Family history reviewed with patient. There is no family history that is pertinent to the chief complaint.     Social History   reports that he has never smoked. He has never used smokeless tobacco. He reports current alcohol use. He reports that he does not use drugs.    Allergies  No Known Allergies    Medications  Prior to Admission Medications   Prescriptions Last Dose Informant Patient Reported? Taking?   Multiple Vitamin (MULTI VITAMIN DAILY PO)   Yes No   Sig: Take  by mouth.   buprenorphine (SUBUTEX) 2 MG SL Tab   Yes No   Sig: TAKE 1 TABLET BY MOUTH SUBLINGUALLY 3 TIMES A DAY.   buprenorphine (SUBUTEX) 8 MG SL Tab   Yes No   Sig: Place  under tongue every day.   diclofenac EC (VOLTAREN) 75 MG Tablet Delayed Response   Yes No   Si (ONE) TABLET DR, ORAL, TWO TIMES DAILY   etodolac (LODINE) 400 MG tablet   No No   Sig: Take 1 Tab by mouth 2 times a day. Take with food   Patient not taking: Reported on 2018   hydrocodone-acetaminophen (NORCO) 5-325 MG TABS per tablet   Yes No   Sig: Take 1 Tab by mouth every four hours as needed. Indications: not taking not taking    hydrocodone-acetaminophen (NORCO) 5-325 MG TABS per tablet   No No   Sig: Take 1-2 Tabs by mouth every four hours as needed (Pain).   lisinopril (PRINIVIL) 20 MG Tab   No No   Sig: TAKE 1 TABLET BY MOUTH ONCE DAILY.   methocarbamol (ROBAXIN) 750 MG TABS   No No   Sig: Take 1 Tab by mouth 3 times a day as needed (As needed for spasm).   Patient not taking: Reported on 2/27/2018   ondansetron (ZOFRAN) 4 MG Tab tablet   No No   Sig: TAKE 1 TABLET BY MOUTH EVERY 6 HOURS IF NEEDED FOR NAUSEA AND VOMITING.   oxycodone-acetaminophen (PERCOCET) 5-325 MG TABS   No No   Sig: Take 1-2 Tabs by mouth every four hours as needed ((Pain Scale 4-6)).   Patient not taking: Reported on 2/27/2018   tizanidine (ZANAFLEX) 4 MG Tab   Yes No   Sig: TAKE 1 TO 2 TABLETS BY MOUTH AT BEDTIME AS NEEDED      Facility-Administered Medications: None       Physical Exam  Temp:  [36.9 °C (98.4 °F)] 36.9 °C (98.4 °F)  Pulse:  [70-76] 76  Resp:  [16] 16  BP: (142)/(66) 142/66  SpO2:  [95 %] 95 %  Blood Pressure : 142/66   Temperature: 36.9 °C (98.4 °F)   Pulse: 70   Respiration: 16   Pulse Oximetry: 95 %       Physical Exam  Constitutional:       Appearance: Normal appearance.   HENT:      Head: Normocephalic and atraumatic.      Right Ear: External ear normal.      Left Ear: External ear normal.      Nose: Nose normal.      Mouth/Throat:      Mouth: Mucous membranes are moist.      Pharynx: No oropharyngeal exudate or posterior oropharyngeal erythema.   Eyes:      Extraocular Movements: Extraocular movements intact.      Pupils: Pupils are equal, round, and reactive to light.   Cardiovascular:      Rate and Rhythm: Normal rate and regular rhythm.      Pulses: Normal pulses.   Pulmonary:      Effort: Pulmonary effort is normal.      Breath sounds: Normal breath sounds.   Abdominal:      General: Abdomen is flat.      Palpations: Abdomen is soft.   Genitourinary:     Comments: Guy in place from transfer facility  Musculoskeletal:         General:  Normal range of motion.      Cervical back: Normal range of motion and neck supple.      Comments: Hammertoes bilateral.    RLE: Brace in place minimal LE swelling b/l  LLE vericose changes of foot.    Skin:     General: Skin is warm and dry.      Capillary Refill: Capillary refill takes less than 2 seconds.   Neurological:      Mental Status: He is alert and oriented to person, place, and time.      Coordination: Coordination normal.      Gait: Gait normal.   Psychiatric:         Mood and Affect: Mood normal.         Behavior: Behavior normal.         Thought Content: Thought content normal.         Judgment: Judgment normal.         Laboratory:      Recent Labs     11/02/21  2257   SODIUM 114*   POTASSIUM 5.2   CHLORIDE 86*   CO2 12*   GLUCOSE 97   BUN 64*   CREATININE 6.10*   CALCIUM 7.1*     Recent Labs     11/02/21  2257   ALTSGPT 26   ASTSGOT 23   ALKPHOSPHAT 110*   TBILIRUBIN 1.1   GLUCOSE 97         No results for input(s): NTPROBNP in the last 72 hours.      No results for input(s): TROPONINT in the last 72 hours.    Imaging:  CT-HEAD W/O   Final Result      No noncontrast CT evidence of acute intracranial hemorrhage.      Severe white matter hypodensity is present.  This is a nonspecific finding which usually is found to represent chronic microvascular disease in patient's of this demographic.  Demyelination, age indeterminant ischemia and gliosis are also common    possibilities.      Moderate to severe parenchymal volume loss         US-FOREIGN FILM ULTRASOUND   Final Result      CT-ABDOMEN-PELVIS W/O    (Results Pending)       X-Ray:  I have personally reviewed the images and compared with prior images.    Assessment/Plan:  I anticipate this patient will require at least two midnights for appropriate medical management, necessitating inpatient admission.    Encephalopathy acute- (present on admission)  Assessment & Plan  2/2 UTE vs. Urosepsis vs. Acute hyponatremia  Ensure adequate pain control  Ammonia  level  Treat underlying UTE/UTI  CT Head  Consider further metabolic workup    Sepsis due to Escherichia coli with acute renal failure without septic shock (HCC)- (present on admission)  Assessment & Plan  This is Sepsis Present on admission  SIRS criteria identified on my evaluation include: Tachycardia, with heart rate greater than 90 BPM and Tachypnea, with respirations greater than 20 per minute  Source is UTI  Sepsis protocol initiated  Fluid resuscitation ordered per protocol  IV antibiotics as appropriate for source of sepsis  While organ dysfunction may be noted elsewhere in this problem list or in the chart, degree of organ dysfunction does not meet CMS criteria for severe sepsis          Mass of urinary bladder- (present on admission)  Assessment & Plan  Urology consult in AM for possible cystoscopy  Treat UTI  Gyu in place draining w/o retention  CTAP w/o contrast  UTE    Jeana-prosthetic femoral shaft fracture- (present on admission)  Assessment & Plan  S/p ORIF  Pain control  Brace present      Alcohol use disorder, moderate, dependence (HCC)- (present on admission)  Assessment & Plan  Alcohol level ordered  Last drink reported 5 days prior by patient    Acute cystitis with hematuria- (present on admission)  Assessment & Plan  Treated for sepsis 1 week ago for Bacteremia & UTI treated with zosyn & Unasyn and d/c on cipro 750mg BID for 9 more doses and Flagyl 500mg TID x 16 days growing Resistant Ecoli.  Urine cultures  Ultrasound found bladder mass which may be hosting colonization: consult urology for possible cystoscopy/washout/biopsy  Urine culture  Sepsis protocol  CTAP w/o ordered  Renal ultrasound performed previously  Zosyn  Catheter change w/in 48hrs of admission    UTE (acute kidney injury) (HCC)- (present on admission)  Assessment & Plan  - Consider NS  - F/u Urine Na and Urine Cr  - Monitor I/Os  - Renal US  - Monitor repeat labs      Acute hyponatremia- (present on admission)  Assessment &  Plan  In setting of acute renal failure s/p sepsis  Cont to treat underlying infection, maintain adequate renal perfusion  Urine/electrolyte studies  Fluid restricted diet  Consider NS    Elevated liver enzymes- (present on admission)  Assessment & Plan  Trend  Appears chronic  Monitor  Consider d/c buprenorphine      VTE prophylaxis: SCDs/TEDs and heparin ppx

## 2021-11-04 ENCOUNTER — APPOINTMENT (OUTPATIENT)
Dept: RADIOLOGY | Facility: MEDICAL CENTER | Age: 71
DRG: 682 | End: 2021-11-04
Attending: INTERNAL MEDICINE
Payer: MEDICARE

## 2021-11-04 LAB
ANION GAP SERPL CALC-SCNC: 18 MMOL/L (ref 7–16)
ANION GAP SERPL CALC-SCNC: 19 MMOL/L (ref 7–16)
BUN SERPL-MCNC: 69 MG/DL (ref 8–22)
BUN SERPL-MCNC: 74 MG/DL (ref 8–22)
BUN SERPL-MCNC: 77 MG/DL (ref 8–22)
BUN SERPL-MCNC: 78 MG/DL (ref 8–22)
C3 SERPL-MCNC: 70 MG/DL (ref 87–200)
C4 SERPL-MCNC: 14.8 MG/DL (ref 19–52)
CALCIUM SERPL-MCNC: 6.8 MG/DL (ref 8.5–10.5)
CALCIUM SERPL-MCNC: 7 MG/DL (ref 8.5–10.5)
CALCIUM SERPL-MCNC: 7.2 MG/DL (ref 8.5–10.5)
CALCIUM SERPL-MCNC: 7.2 MG/DL (ref 8.5–10.5)
CHLORIDE SERPL-SCNC: 88 MMOL/L (ref 96–112)
CHLORIDE SERPL-SCNC: 90 MMOL/L (ref 96–112)
CHLORIDE SERPL-SCNC: 91 MMOL/L (ref 96–112)
CHLORIDE SERPL-SCNC: 91 MMOL/L (ref 96–112)
CO2 SERPL-SCNC: 10 MMOL/L (ref 20–33)
CO2 SERPL-SCNC: 11 MMOL/L (ref 20–33)
CREAT SERPL-MCNC: 6.24 MG/DL (ref 0.5–1.4)
CREAT SERPL-MCNC: 6.63 MG/DL (ref 0.5–1.4)
CREAT SERPL-MCNC: 7.09 MG/DL (ref 0.5–1.4)
CREAT SERPL-MCNC: 7.24 MG/DL (ref 0.5–1.4)
ERYTHROCYTE [DISTWIDTH] IN BLOOD BY AUTOMATED COUNT: 46.6 FL (ref 35.9–50)
GLUCOSE SERPL-MCNC: 107 MG/DL (ref 65–99)
GLUCOSE SERPL-MCNC: 153 MG/DL (ref 65–99)
GLUCOSE SERPL-MCNC: 90 MG/DL (ref 65–99)
GLUCOSE SERPL-MCNC: 97 MG/DL (ref 65–99)
HAV IGM SERPL QL IA: NORMAL
HBV CORE IGM SER QL: NORMAL
HBV SURFACE AG SER QL: NORMAL
HCT VFR BLD AUTO: 24.1 % (ref 42–52)
HCV AB SER QL: NORMAL
HGB BLD-MCNC: 8.3 G/DL (ref 14–18)
INR PPP: 1.37 (ref 0.87–1.13)
MCH RBC QN AUTO: 30.6 PG (ref 27–33)
MCHC RBC AUTO-ENTMCNC: 34.4 G/DL (ref 33.7–35.3)
MCV RBC AUTO: 88.9 FL (ref 81.4–97.8)
PATHOLOGY CONSULT NOTE: NORMAL
PLATELET # BLD AUTO: 431 K/UL (ref 164–446)
PMV BLD AUTO: 9.7 FL (ref 9–12.9)
POTASSIUM SERPL-SCNC: 4.9 MMOL/L (ref 3.6–5.5)
POTASSIUM SERPL-SCNC: 5.1 MMOL/L (ref 3.6–5.5)
POTASSIUM SERPL-SCNC: 5.3 MMOL/L (ref 3.6–5.5)
PROTHROMBIN TIME: 16.5 SEC (ref 12–14.6)
RBC # BLD AUTO: 2.71 M/UL (ref 4.7–6.1)
SODIUM SERPL-SCNC: 118 MMOL/L (ref 135–145)
SODIUM SERPL-SCNC: 119 MMOL/L (ref 135–145)
SODIUM SERPL-SCNC: 120 MMOL/L (ref 135–145)
SODIUM SERPL-SCNC: 121 MMOL/L (ref 135–145)
WBC # BLD AUTO: 15.4 K/UL (ref 4.8–10.8)

## 2021-11-04 PROCEDURE — 0TB03ZX EXCISION OF RIGHT KIDNEY, PERCUTANEOUS APPROACH, DIAGNOSTIC: ICD-10-PCS | Performed by: RADIOLOGY

## 2021-11-04 PROCEDURE — 85610 PROTHROMBIN TIME: CPT

## 2021-11-04 PROCEDURE — 88313 SPECIAL STAINS GROUP 2: CPT | Mod: 91

## 2021-11-04 PROCEDURE — 83516 IMMUNOASSAY NONANTIBODY: CPT | Mod: 91

## 2021-11-04 PROCEDURE — 700111 HCHG RX REV CODE 636 W/ 250 OVERRIDE (IP)

## 2021-11-04 PROCEDURE — 700102 HCHG RX REV CODE 250 W/ 637 OVERRIDE(OP): Performed by: INTERNAL MEDICINE

## 2021-11-04 PROCEDURE — 99233 SBSQ HOSP IP/OBS HIGH 50: CPT | Performed by: HOSPITALIST

## 2021-11-04 PROCEDURE — 80048 BASIC METABOLIC PNL TOTAL CA: CPT | Mod: 91

## 2021-11-04 PROCEDURE — 88348 ELECTRON MICROSCOPY DX: CPT

## 2021-11-04 PROCEDURE — A9270 NON-COVERED ITEM OR SERVICE: HCPCS | Performed by: HOSPITALIST

## 2021-11-04 PROCEDURE — 88300 SURGICAL PATH GROSS: CPT

## 2021-11-04 PROCEDURE — 770001 HCHG ROOM/CARE - MED/SURG/GYN PRIV*

## 2021-11-04 PROCEDURE — 700102 HCHG RX REV CODE 250 W/ 637 OVERRIDE(OP): Performed by: STUDENT IN AN ORGANIZED HEALTH CARE EDUCATION/TRAINING PROGRAM

## 2021-11-04 PROCEDURE — 700102 HCHG RX REV CODE 250 W/ 637 OVERRIDE(OP): Performed by: HOSPITALIST

## 2021-11-04 PROCEDURE — 85027 COMPLETE CBC AUTOMATED: CPT

## 2021-11-04 PROCEDURE — 86038 ANTINUCLEAR ANTIBODIES: CPT

## 2021-11-04 PROCEDURE — 80074 ACUTE HEPATITIS PANEL: CPT

## 2021-11-04 PROCEDURE — A9270 NON-COVERED ITEM OR SERVICE: HCPCS | Performed by: STUDENT IN AN ORGANIZED HEALTH CARE EDUCATION/TRAINING PROGRAM

## 2021-11-04 PROCEDURE — 36415 COLL VENOUS BLD VENIPUNCTURE: CPT

## 2021-11-04 PROCEDURE — 87426 SARSCOV CORONAVIRUS AG IA: CPT

## 2021-11-04 PROCEDURE — 99152 MOD SED SAME PHYS/QHP 5/>YRS: CPT

## 2021-11-04 PROCEDURE — 700111 HCHG RX REV CODE 636 W/ 250 OVERRIDE (IP): Performed by: INTERNAL MEDICINE

## 2021-11-04 PROCEDURE — 88350 IMFLUOR EA ADDL 1ANTB STN PX: CPT | Mod: 91

## 2021-11-04 PROCEDURE — 97166 OT EVAL MOD COMPLEX 45 MIN: CPT

## 2021-11-04 PROCEDURE — A9270 NON-COVERED ITEM OR SERVICE: HCPCS | Performed by: INTERNAL MEDICINE

## 2021-11-04 PROCEDURE — 97163 PT EVAL HIGH COMPLEX 45 MIN: CPT

## 2021-11-04 PROCEDURE — 88305 TISSUE EXAM BY PATHOLOGIST: CPT

## 2021-11-04 PROCEDURE — 86160 COMPLEMENT ANTIGEN: CPT | Mod: 91

## 2021-11-04 PROCEDURE — 99233 SBSQ HOSP IP/OBS HIGH 50: CPT | Performed by: INTERNAL MEDICINE

## 2021-11-04 PROCEDURE — 88346 IMFLUOR 1ST 1ANTB STAIN PX: CPT

## 2021-11-04 RX ORDER — FUROSEMIDE 10 MG/ML
40 INJECTION INTRAMUSCULAR; INTRAVENOUS
Status: DISCONTINUED | OUTPATIENT
Start: 2021-11-04 | End: 2021-11-07

## 2021-11-04 RX ORDER — MICONAZOLE NITRATE 20 MG/G
CREAM TOPICAL 2 TIMES DAILY
Status: DISPENSED | OUTPATIENT
Start: 2021-11-04 | End: 2021-11-11

## 2021-11-04 RX ORDER — MIDAZOLAM HYDROCHLORIDE 1 MG/ML
INJECTION INTRAMUSCULAR; INTRAVENOUS
Status: COMPLETED
Start: 2021-11-04 | End: 2021-11-04

## 2021-11-04 RX ORDER — SODIUM CHLORIDE 9 MG/ML
500 INJECTION, SOLUTION INTRAVENOUS
Status: DISCONTINUED | OUTPATIENT
Start: 2021-11-04 | End: 2021-11-04 | Stop reason: HOSPADM

## 2021-11-04 RX ORDER — MIDAZOLAM HYDROCHLORIDE 1 MG/ML
.5-2 INJECTION INTRAMUSCULAR; INTRAVENOUS PRN
Status: DISCONTINUED | OUTPATIENT
Start: 2021-11-04 | End: 2021-11-04 | Stop reason: HOSPADM

## 2021-11-04 RX ORDER — ONDANSETRON 2 MG/ML
4 INJECTION INTRAMUSCULAR; INTRAVENOUS PRN
Status: DISCONTINUED | OUTPATIENT
Start: 2021-11-04 | End: 2021-11-04 | Stop reason: HOSPADM

## 2021-11-04 RX ORDER — AMLODIPINE BESYLATE 5 MG/1
5 TABLET ORAL
Status: DISCONTINUED | OUTPATIENT
Start: 2021-11-04 | End: 2021-11-07

## 2021-11-04 RX ADMIN — OMEPRAZOLE 20 MG: 20 CAPSULE, DELAYED RELEASE ORAL at 05:58

## 2021-11-04 RX ADMIN — SODIUM BICARBONATE 1300 MG: 650 TABLET ORAL at 13:47

## 2021-11-04 RX ADMIN — BUPRENORPHINE HCL 2 MG: 2 TABLET SUBLINGUAL at 21:30

## 2021-11-04 RX ADMIN — MICONAZOLE NITRATE: 20 CREAM TOPICAL at 11:54

## 2021-11-04 RX ADMIN — SODIUM BICARBONATE 1300 MG: 650 TABLET ORAL at 21:30

## 2021-11-04 RX ADMIN — MICONAZOLE NITRATE: 20 CREAM TOPICAL at 21:35

## 2021-11-04 RX ADMIN — MIDAZOLAM HYDROCHLORIDE 1 MG: 1 INJECTION INTRAMUSCULAR; INTRAVENOUS at 15:10

## 2021-11-04 RX ADMIN — OMEPRAZOLE 20 MG: 20 CAPSULE, DELAYED RELEASE ORAL at 21:30

## 2021-11-04 RX ADMIN — MIDAZOLAM HYDROCHLORIDE 1 MG: 1 INJECTION, SOLUTION INTRAMUSCULAR; INTRAVENOUS at 15:10

## 2021-11-04 RX ADMIN — FENTANYL CITRATE 50 MCG: 50 INJECTION, SOLUTION INTRAMUSCULAR; INTRAVENOUS at 15:10

## 2021-11-04 RX ADMIN — BUPRENORPHINE HCL 2 MG: 2 TABLET SUBLINGUAL at 13:47

## 2021-11-04 RX ADMIN — SODIUM BICARBONATE 1300 MG: 650 TABLET ORAL at 09:38

## 2021-11-04 RX ADMIN — FUROSEMIDE 40 MG: 10 INJECTION, SOLUTION INTRAMUSCULAR; INTRAVENOUS at 21:31

## 2021-11-04 RX ADMIN — SODIUM BICARBONATE 1300 MG: 650 TABLET ORAL at 16:38

## 2021-11-04 RX ADMIN — AMLODIPINE BESYLATE 5 MG: 10 TABLET ORAL at 11:54

## 2021-11-04 ASSESSMENT — ENCOUNTER SYMPTOMS
VOMITING: 0
NAUSEA: 0
FEVER: 0
SINUS PAIN: 0
HEMOPTYSIS: 0
COUGH: 0
ABDOMINAL PAIN: 0
DIARRHEA: 0
BACK PAIN: 0
BLURRED VISION: 0
WHEEZING: 0
WEIGHT LOSS: 0
SORE THROAT: 0
DIARRHEA: 1
DOUBLE VISION: 0
EYES NEGATIVE: 1
LOSS OF CONSCIOUSNESS: 0
PALPITATIONS: 0
CHILLS: 0
DIZZINESS: 0
SHORTNESS OF BREATH: 0
ORTHOPNEA: 0
HEADACHES: 0

## 2021-11-04 ASSESSMENT — COGNITIVE AND FUNCTIONAL STATUS - GENERAL
EATING MEALS: A LITTLE
MOVING FROM LYING ON BACK TO SITTING ON SIDE OF FLAT BED: A LOT
TURNING FROM BACK TO SIDE WHILE IN FLAT BAD: A LOT
SUGGESTED CMS G CODE MODIFIER MOBILITY: CM
DRESSING REGULAR UPPER BODY CLOTHING: A LITTLE
STANDING UP FROM CHAIR USING ARMS: TOTAL
WALKING IN HOSPITAL ROOM: TOTAL
CLIMB 3 TO 5 STEPS WITH RAILING: TOTAL
SUGGESTED CMS G CODE MODIFIER DAILY ACTIVITY: CK
MOBILITY SCORE: 9
PERSONAL GROOMING: A LITTLE
DAILY ACTIVITIY SCORE: 14
TOILETING: TOTAL
DRESSING REGULAR LOWER BODY CLOTHING: A LOT
HELP NEEDED FOR BATHING: A LOT
MOVING TO AND FROM BED TO CHAIR: A LOT

## 2021-11-04 ASSESSMENT — PAIN DESCRIPTION - PAIN TYPE
TYPE: ACUTE PAIN
TYPE: ACUTE PAIN

## 2021-11-04 ASSESSMENT — ACTIVITIES OF DAILY LIVING (ADL): TOILETING: INDEPENDENT

## 2021-11-04 ASSESSMENT — FIBROSIS 4 INDEX: FIB4 SCORE: 0.66

## 2021-11-04 ASSESSMENT — GAIT ASSESSMENTS: GAIT LEVEL OF ASSIST: UNABLE TO PARTICIPATE

## 2021-11-04 NOTE — DISCHARGE PLANNING
[facility]itional [facility] Coordination      Diagnosis unilateral hip fracture.   Ongoing medical management as well as therapy need.  Anticipate post acute services to facilitate a successful transition to community.  Discharge support spouse.  Please consider a PMR referral to assist with plan of care.  Voalte message to Dr. Magaña.

## 2021-11-04 NOTE — THERAPY
Physical Therapy   Initial Evaluation     Patient Name: Zachary Moore  Age:  71 y.o., Sex:  male  Medical Record #: [id number]  Today's Date: 11/4/2021     Precautions  Precautions: Fall Risk;Toe Touch Weight Bearing Right Lower Extremity;Other (See Comments) (Rectal tube.)  Comments: 10/13: R femur ORIF done at [facility], was in hospital or at SNF since then    Assessment  Patient is 71 y.o. male with abnormal laboratory results, acute kidney injury, severe hyponatremia,  CP .  Additional factors influencing patient status / progress: Today, pt was supervised to come to EOB, but unable to safely stand despite max assist due to bearing weight through R LE despite education about TTWB. Pt is unsafe to attempt standing at present, will need to attempt seated slide board transfers to progress. PT to follow..      Plan    Recommend Physical Therapy 3 times per week until therapy goals are met for the following treatments:  Bed Mobility, Neuro Re-Education / Balance and Therapeutic Activities    DC Equipment Recommendations: Unable to determine at this time  Discharge Recommendations: Recommend post-acute placement for additional physical therapy services prior to discharge home        Objective       11/04/21 1139   Total Time Spent   Total Time Spent (Mins) 35   Charge Group   PT Evaluation PT Evaluation High   Initial Contact Note    Initial Contact Note Order Received and Verified, Physical Therapy Evaluation in Progress with Full Report to Follow.   Precautions   Precautions Fall Risk;Toe Touch Weight Bearing Right Lower Extremity;Other (See Comments)  (Rectal tube.)   Comments 10/13: R femur ORIF done at [facility], was in hospital or at SNF since then   Vitals   O2 Delivery Device None - Room Air   Pain 0 - 10 Group   Therapist Pain Assessment During Activity;Nurse Notified  (R foot, not rated. )   Prior Living Situation   Prior Services Continuous (24 Hour) Care Giving Per Service  (was at SNF after femur ORIF)    Housing / Facility 1 Story House   Steps Into Home 2   Steps In Home 0   Rail None   Equipment Owned Front-Wheel Walker;Single Point Cane   Lives with - Patient's Self Care Capacity Spouse  (home and can help at needed. )   Comments normally lives in 1 story house, has not been home since hip surgery.    Prior Level of Functional Mobility   Bed Mobility Required Assist   Transfer Status Required Assist   Ambulation Required Assist   Distance Ambulation (Feet)   (little to none at SNF)   History of Falls   History of Falls Yes   Date of Last Fall   (fall in october, thus femur ORIF)   Cognition    Level of Consciousness Alert   Comments confused at times, then seems clear, asking about when his biopsy is.    Active ROM Lower Body    Active ROM Lower Body  X   Comments R LE limited by pain   Strength Lower Body   Lower Body Strength  X   Comments L LE not strong enough for sit to stand given TTWB R LE   Balance Assessment   Sitting Balance (Static) Fair -   Sitting Balance (Dynamic) Fair -   Standing Balance (Static) Trace +   Weight Shift Sitting Fair  (seated scoot along EOB)   Weight Shift Standing Absent   Gait Analysis   Gait Level Of Assist Unable to Participate   Bed Mobility    Supine to Sit Supervised   Sit to Supine Minimal Assist   Scooting Minimal Assist   Comments seated scoot along EOB with mod A, struggles   Functional Mobility   Sit to Stand Maximal Assist   Bed, Chair, Wheelchair Transfer Unable to Participate   Comments pt is bearing weight through R LE despite education about TTWB, pt not able to comply with TTWB    How much difficulty does the patient currently have...   Turning over in bed (including adjusting bedclothes, sheets and blankets)? 2   Sitting down on and standing up from a chair with arms (e.g., wheelchair, bedside commode, etc.) 2   Moving from lying on back to sitting on the side of the bed? 2   How much help from another person does the patient currently need...   Moving to and  from a bed to a chair (including a wheelchair)? 1   Need to walk in a hospital room? 1   Climbing 3-5 steps with a railing? 1   6 clicks Mobility Score 9   Activity Tolerance   Sitting Edge of Bed 10 min   Edema / Skin Assessment   Edema / Skin  X   Comments B LE edema   Short Term Goals    Short Term Goal # 1 Pt will perform bed mobility with mod indep in 6 vistis.    Short Term Goal # 2 pt will transfer bed to  via seated slide board with min A in 6 visits to improve functional indep.    Education Group   Education Provided Role of Physical Therapist   Role of Physical Therapist Patient Response Patient;Acceptance;Explanation;Verbal Demonstration;Reinforcement Needed   Problem List    Problems Functional Strength Deficit;Impaired Balance;Decreased Activity Tolerance;Impaired Transfers;Impaired Bed Mobility;Impaired Ambulation   Anticipated Discharge Equipment and Recommendations   DC Equipment Recommendations Unable to determine at this time   Discharge Recommendations Recommend post-acute placement for additional physical therapy services prior to discharge home   Interdisciplinary Plan of Care Collaboration   IDT Collaboration with  Nursing   Patient Position at End of Therapy In Bed;Call Light within Reach;Tray Table within Reach;Phone within Reach   Collaboration Comments nsg updated.    Session Information   Date / Session Number  11/4-1 (1/3, 11/10)

## 2021-11-04 NOTE — ASSESSMENT & PLAN NOTE
"On lisinopril 10 as an outpt which was stopped due to hypotension and UTE  Start amlodipine 5mg    Vitals:    11/16/21 1620   BP: 141/59   Pulse: 95   Resp: 17   Temp: 36.6 °C (97.9 °F)   SpO2: 96%     Add hydralazine  Continue amlodipine  ACEI held bec of elevated Cr-\"    Improved.  "

## 2021-11-04 NOTE — PROGRESS NOTES
Hospital Medicine Daily Progress Note    Date of Service  11/4/2021    Chief Complaint  Zachary Moore is a 71 y.o. male admitted 11/2/2021 with UTE    Hospital Course  This is a 71-year-old gentleman who originally suffered a hip fracture and was treated at [facility].  During that hospitalization he was also treated for alcohol withdrawal and urosepsis.  From there he was discharged to rehab facility, and has since been transferred to us.  His previous medical history is also significant for hypertension, alcohol abuse, stage II chronic kidney disease..  Patient was sent to us from rehab facility in [place] after he was found to be hyponatremic and with acute kidney injury.  On arrival here, his sodium was 114, potassium 5.2, chloride 86, CO2 12, BUN 64, creatinine 6.10.  He has a previous diagnosis of stage II chronic kidney disease with a baseline creatinine around 1.0.  Patient was admitted to the hospital with a diagnosis of likely hypovolemic hyponatremia.  He was initially treated with fluid resuscitation, and corrected rapidly, however, he remained with very poor urine output and therefore nephrology has been consulted.    Interval Problem Update  Pt c/o of being sleepy this am but no other specific complaints    AFebrile  Sinus 70-80s  -160s    I have personally seen and examined the patient at bedside. I discussed the plan of care with patient, bedside RN and pulmonary.    Consultants/Specialty  nephrology    Code Status  Full Code    Disposition  Patient is not medically cleared.   Anticipate discharge to to skilled nursing facility.  I have placed the appropriate orders for post-discharge needs.    Review of Systems  Review of Systems   Constitutional: Positive for malaise/fatigue. Negative for chills and fever.   HENT: Negative for nosebleeds and sore throat.    Eyes: Negative for blurred vision and double vision.   Respiratory: Negative for cough and shortness of breath.     Cardiovascular: Positive for leg swelling. Negative for chest pain and palpitations.   Gastrointestinal: Negative for abdominal pain, diarrhea, nausea and vomiting.   Genitourinary: Negative for dysuria and urgency.   Musculoskeletal: Negative for back pain.   Skin: Negative for rash.   Neurological: Negative for dizziness, loss of consciousness and headaches.        Physical Exam  Temp:  [36.3 °C (97.3 °F)-36.8 °C (98.3 °F)] 36.6 °C (97.9 °F)  Pulse:  [71-92] 74  Resp:  [12-24] 16  BP: (118-172)/(57-88) 150/78  SpO2:  [93 %-99 %] 95 %    Physical Exam  Constitutional:       General: He is not in acute distress.     Appearance: Normal appearance. He is well-developed. He is not diaphoretic.   HENT:      Head: Normocephalic and atraumatic.   Eyes:      Conjunctiva/sclera: Conjunctivae normal.   Neck:      Vascular: No JVD.   Cardiovascular:      Rate and Rhythm: Normal rate.      Heart sounds: No murmur heard.   No gallop.    Pulmonary:      Effort: Pulmonary effort is normal. No respiratory distress.      Breath sounds: No stridor. No wheezing or rales.   Abdominal:      Palpations: Abdomen is soft.      Tenderness: There is no abdominal tenderness. There is no guarding or rebound.   Musculoskeletal:      Right lower leg: Edema present.      Left lower leg: Edema present.      Comments: 1+ edema B slightly more on the R    Skin:     General: Skin is warm and dry.      Findings: No rash.   Neurological:      General: No focal deficit present.      Mental Status: He is alert and oriented to person, place, and time.   Psychiatric:         Mood and Affect: Mood normal.         Thought Content: Thought content normal.         Fluids    Intake/Output Summary (Last 24 hours) at 11/4/2021 0851  Last data filed at 11/4/2021 0800  Gross per 24 hour   Intake 1042.5 ml   Output 770 ml   Net 272.5 ml       Laboratory  Recent Labs     11/03/21  0049 11/03/21  0500 11/04/21  0605   WBC 12.4* 12.8* 15.4*   RBC 2.75* 2.82* 2.71*    HEMOGLOBIN 8.4* 8.7* 8.3*   HEMATOCRIT 24.2* 25.3* 24.1*   MCV 88.0 89.7 88.9   MCH 30.5 30.9 30.6   MCHC 34.7 34.4 34.4   RDW 45.5 46.9 46.6   PLATELETCT 391 392 431   MPV 9.6 9.4 9.7     Recent Labs     11/03/21  1726 11/04/21  0028 11/04/21  0605   SODIUM 117* 118* 120*   POTASSIUM 4.5 5.1 4.9   CHLORIDE 87* 88* 90*   CO2 10* 11* 11*   GLUCOSE 188* 153* 107*   BUN 71* 74* 69*   CREATININE 6.36* 6.24* 6.63*   CALCIUM 7.1* 6.8* 7.0*     Recent Labs     11/02/21  2257 11/04/21  0605   INR 1.35* 1.37*         Recent Labs     11/03/21  0049   TRIGLYCERIDE 64   HDL 42   LDL 36       Imaging  US-RENAL   Final Result      1.  BILATERAL pleural effusions   2.  Small volume of ascites   3.  Cholelithiasis and gallbladder sludge with gallbladder wall thickening incidentally noted. Cholecystitis is a consideration.      EC-ECHOCARDIOGRAM COMPLETE W/O CONT   Final Result      DX-CHEST-PORTABLE (1 VIEW)   Final Result      Mild basilar and apical opacity compatible with atelectasis or scarring although consolidation is not excluded      Effusions on recent CT are not detected radiographically      CT-ABDOMEN-PELVIS W/O   Final Result      Diffuse urinary bladder wall thickening with Guy catheter in place. This is suspicious for cystitis but the finding is nonspecific      No hydronephrosis or urolithiasis      Cholelithiasis      Impending diarrhea      Small to medium right, small left pleural effusions      5 mm mean diameter left lower lobe pulmonary nodule. Follow-up recommendations as below   Low Risk: No routine follow-up      High Risk: Optional CT at 12 months      Comments: Nodules less than 6 mm do not require routine follow-up, but certain patients at high risk with suspicious nodule morphology, upper lobe location, or both may warrant 12-month follow-up.      Low Risk - Minimal or absent history of smoking and of other known risk factors.      High Risk - History of smoking or of other known risk factors.       Note: These recommendations do not apply to lung cancer screening, patients with immunosuppression, or patients with known primary cancer.      Fleischner Society 2017 Guidelines for Management of Incidentally Detected Pulmonary Nodules in Adults         CT-HEAD W/O   Final Result      No noncontrast CT evidence of acute intracranial hemorrhage.      Severe white matter hypodensity is present.  This is a nonspecific finding which usually is found to represent chronic microvascular disease in patient's of this demographic.  Demyelination, age indeterminant ischemia and gliosis are also common    possibilities.      Moderate to severe parenchymal volume loss         US-FOREIGN FILM ULTRASOUND   Final Result      CT-NEEDLE BX-RENAL    (Results Pending)        Assessment/Plan  Dehydration  Assessment & Plan  Has been fluid resuscitated  Secondary to diarrhea      Encephalopathy acute- (present on admission)  Assessment & Plan  2/2 UTE vs. Urosepsis vs. Acute hyponatremia  Improving  CT head neg for acute findings    Sepsis due to Escherichia coli with acute renal failure without septic shock (HCC)- (present on admission)  Assessment & Plan  NOT sepsis  Cultures neg  Only given one dose of ABx's on presentation          Mass of urinary bladder- (present on admission)  Assessment & Plan  Based on imaging from [facility]  Repeat CT Abd/Plvs and renal US do not show any mass    Jeana-prosthetic femoral shaft fracture- (present on admission)  Assessment & Plan  S/p ORIF  Pain control  Brace present  PT/OT consults      Alcohol use disorder, moderate, dependence (HCC)- (present on admission)  Assessment & Plan  Alcohol level ordered  Last drink reported 5 days prior by patient  Monitor for signs of withdrawal    Acute cystitis with hematuria- (present on admission)  Assessment & Plan  Treated for sepsis 1 week ago for Bacteremia & UTI treated with zosyn & Unasyn and d/c on cipro 750mg BID for 9 more doses and Flagyl 500mg TID x 16  days growing Resistant Ecoli.  Urine cultures here are neg  US at [facility] showing possible mass however CT Abd/Plvs and renal US both neg for mass here  Cont to monitor off Abx's  Catheter change w/in 48hrs of admission    UTE (acute kidney injury) (HCC)- (present on admission)  Assessment & Plan  Was clearly dehydrated on presentation however renal function has not significantly improved with resuscitation  Nephrology following  Plan renal Bx today  UOP has improved today  Renally dose medications as appropriate  Follow daily BMP and UOP      Acute hyponatremia- (present on admission)  Assessment & Plan  A component of hypovolemia complicated by UTE  Has corrected with NS to 120  Cont q6 monitoring; goal upper 120s over next 36hrs  Nephrology following    Elevated liver enzymes- (present on admission)  Assessment & Plan  Trend  Appears chronic  Monitor      Hypertension- (present on admission)  Assessment & Plan  On lisinopril 10 as an outpt which was stopped due to hypotension and UTE  Start amlodipine 5mg       VTE prophylaxis: heparin ppx    I have performed a physical exam and reviewed and updated ROS and Plan today (11/4/2021). In review of yesterday's note (11/3/2021), there are no changes except as documented above.

## 2021-11-04 NOTE — PROGRESS NOTES
Nephrology Daily Progress Note    Date of Service  11/4/2021    Chief Complaint  Zachary Moore is a 71 y.o. male with CKD II, recently hospitalized with RLE femur fx, urosepsis, admitted 11/2/2021 with UTE, hyponatremia    Interval Problem Update  11/4 -no acute events, no new complaints  UTE -creat at 6's  oliguric  Low C3 C4 -scheduled kidney biopsy  ABDULAZIZ, ANCA -pending      Code Status  Full Code      Review of Systems  Review of Systems   Constitutional: Negative for chills, fever, malaise/fatigue and weight loss.   HENT: Negative for congestion, hearing loss and sinus pain.    Eyes: Negative.    Respiratory: Negative for cough, hemoptysis, shortness of breath and wheezing.    Cardiovascular: Negative for chest pain, palpitations, orthopnea and leg swelling.   Gastrointestinal: Positive for diarrhea. Negative for abdominal pain, nausea and vomiting.   Genitourinary:        Guy catheter   Skin: Negative.         Physical Exam  Temp:  [36.3 °C (97.3 °F)-36.9 °C (98.4 °F)] 36.9 °C (98.4 °F)  Pulse:  [71-87] 81  Resp:  [12-22] 16  BP: (118-169)/(57-88) 144/70  SpO2:  [93 %-98 %] 95 %    Physical Exam  Vitals and nursing note reviewed.   Constitutional:       General: He is not in acute distress.     Appearance: Normal appearance. He is well-developed. He is not diaphoretic.   HENT:      Head: Normocephalic and atraumatic.      Nose: Nose normal.      Mouth/Throat:      Mouth: Mucous membranes are moist.      Pharynx: Oropharynx is clear.   Eyes:      Extraocular Movements: Extraocular movements intact.      Conjunctiva/sclera: Conjunctivae normal.      Pupils: Pupils are equal, round, and reactive to light.   Cardiovascular:      Rate and Rhythm: Normal rate and regular rhythm.      Pulses: Normal pulses.      Heart sounds: Normal heart sounds.   Pulmonary:      Effort: Pulmonary effort is normal. No respiratory distress.      Breath sounds: Normal breath sounds. No wheezing, rhonchi or rales.   Abdominal:       General: Bowel sounds are normal. There is no distension.      Palpations: There is no mass.      Tenderness: There is no abdominal tenderness.   Musculoskeletal:      Cervical back: Normal range of motion and neck supple.      Right lower leg: Edema present.      Left lower leg: Edema present.   Skin:     General: Skin is warm.   Neurological:      General: No focal deficit present.      Mental Status: He is alert and oriented to person, place, and time.      Cranial Nerves: No cranial nerve deficit.      Coordination: Coordination normal.   Psychiatric:         Mood and Affect: Mood normal.         Behavior: Behavior normal.         Thought Content: Thought content normal.         Judgment: Judgment normal.         Fluids    Intake/Output Summary (Last 24 hours) at 11/4/2021 1447  Last data filed at 11/4/2021 1200  Gross per 24 hour   Intake 700 ml   Output 785 ml   Net -85 ml       Laboratory  Recent Labs     11/03/21  0049 11/03/21  0500 11/04/21  0605   WBC 12.4* 12.8* 15.4*   RBC 2.75* 2.82* 2.71*   HEMOGLOBIN 8.4* 8.7* 8.3*   HEMATOCRIT 24.2* 25.3* 24.1*   MCV 88.0 89.7 88.9   MCH 30.5 30.9 30.6   MCHC 34.7 34.4 34.4   RDW 45.5 46.9 46.6   PLATELETCT 391 392 431   MPV 9.6 9.4 9.7     Recent Labs     11/03/21  1726 11/04/21  0028 11/04/21  0605   SODIUM 117* 118* 120*   POTASSIUM 4.5 5.1 4.9   CHLORIDE 87* 88* 90*   CO2 10* 11* 11*   GLUCOSE 188* 153* 107*   BUN 71* 74* 69*   CREATININE 6.36* 6.24* 6.63*   CALCIUM 7.1* 6.8* 7.0*     Recent Labs     11/02/21  2257 11/04/21  0605   INR 1.35* 1.37*         Recent Labs     11/03/21  0049   TRIGLYCERIDE 64   HDL 42   LDL 36       Imaging  reviewed    Assessment/Plan  1.UTE/CKD II/oliguric -of unclear etiology scheduled for diagnostic kidney biopsy   2.HTN: BP well controlled  3.Electrolytes: hyponatremia -slowly improving -to monitor  4.Anemia: Hb to monitor  5.Metabolic acidosis: continue Na HCO3  6.Proteinuria/hematuria - f/u diagnostic kidney biopsy results      F/u ABDULAZIZ, ANCA    Recs: kidney biopsy             May need to start RRT soon if no improvement in kidney function/UOP             Daily labs             Will follow             D/w Dr Olea

## 2021-11-04 NOTE — THERAPY
Occupational Therapy   Initial Evaluation     Patient Name: Zachary Moore  Age:  71 y.o., Sex:  male  Medical Record #: [id number]  Today's Date: 11/4/2021     Precautions  Precautions: Fall Risk, Toe Touch Weight Bearing Right Lower Extremity, Other (See Comments) (Rectal tube.)  Comments: 10/13: R femur ORIF done at [facility], was in hospital or at SNF since then    Assessment  Patient is 71 y.o. male with a diagnosis of UTE, pt admitted from SNF where he was undergoing rehab at SNF following R femur ORIF, TTWB. Pt currently requires min a for bed mobility, supervision after s/u for UB ADLs seated, LB ADLs max a, attempted STS w/ max a of 2 unable to maintain and sequence for maintaining TTWB. Pt will benefit from acute and post acute services while in house.     Plan    Recommend Occupational Therapy 3 times per week until therapy goals are met for the following treatments:  Adaptive Equipment, Cognitive Skill Development, Manual Therapy Techniques, Neuro Re-Education / Balance, Self Care/Activities of Daily Living, Therapeutic Activities and Therapeutic Exercises.    DC Equipment Recommendations: Unable to determine at this time  Discharge Recommendations: Recommend post-acute placement for additional occupational therapy services prior to discharge home      Objective       11/04/21 1118   Prior Living Situation   Prior Services Continuous (24 Hour) Care Giving Per Service   Housing / Facility 1 Story House   Steps Into Home 2   Bathroom Set up Walk In Shower  (getting gb's installed)   Equipment Owned Single Point Cane;Front-Wheel Walker   Lives with - Patient's Self Care Capacity Spouse   Comments Pt reports was at [facility] for hip fx and undergoing rehab at SNF. Has not been home yet. Was I prior to hip fx   Prior Level of ADL Function   Self Feeding Independent   Grooming / Hygiene Independent   Bathing Independent   Dressing Independent   Toileting Independent   Comments prior to hip fx   Prior Level of  IADL Function   Medication Management Independent   Laundry Independent   Kitchen Mobility Independent   Finances Independent   Home Management Independent   Shopping Independent   Prior Level Of Mobility Independent Without Device in Community   Driving / Transportation Driving Independent   Comments prior to hip fx   Cognition    Cognition / Consciousness X   Level of Consciousness Alert   Safety Awareness Impaired   Sequencing Impaired   Comments unable to sequence and follow through w/ TTWB, intermittent confusion noted [facility]    Balance Assessment   Sitting Balance (Static) Fair -   Sitting Balance (Dynamic) Fair -   Standing Balance (Static) Trace +   Weight Shift Sitting Fair   Weight Shift Standing Absent   Comments unable to maintain TTWB   Bed Mobility    Supine to Sit Minimal Assist   Sit to Supine Minimal Assist   Scooting Minimal Assist   Rolling   (sit pivot)   Comments cues required for sequencing and techniques to maintain precautions   ADL Assessment   Eating Supervision   Grooming Supervision;Seated  (after s/u)   Upper Body Dressing Supervision   Lower Body Dressing Maximal Assist   Toileting Maximal Assist  (Rectal and penn tube in)   Functional Mobility   Sit to Stand Maximal Assist   Bed, Chair, Wheelchair Transfer Unable to Participate   Toilet Transfers Unable to Participate   Mobility bed mobility, seated lateral scotting eob, STS eob unable to maintain TTWB   Comments w/ HHA of 2 people   Short Term Goals   Short Term Goal # 1 Pt will perform functional t/f's with min a while adhering to TTWB precautions   Short Term Goal # 2 Pt will perform LB dressing with min a using AE    Short Term Goal # 3 Pt will perform toileting task with min a   Anticipated Discharge Equipment and Recommendations   DC Equipment Recommendations Unable to determine at this time

## 2021-11-04 NOTE — CARE PLAN
Problem: Nutritional:  Goal: Achieve adequate nutritional intake  Description: Patient will consume >50% of meals with >75% supplements once diet advanced.  Outcome: Not Progressing  Pt remains NPO. See RD note.

## 2021-11-04 NOTE — DISCHARGE PLANNING
Care Transition Team Discharge Planning    Anticipated Discharge Disposition: TBD     Action: Per chart review, pt no longer needs ICU level of care, and will transfer from [facility] to hospitalist then to a lower level of care unit.      Barriers to Discharge: no longer critical, but still acute level care needs    Plan: [facility] d/c team will continue to follow, and assist with d/c planning.

## 2021-11-04 NOTE — DIETARY
"Nutrition services: Day 2 of admit.  Zachary Moore is a 71 y.o. male with admitting DX of Acute renal failure.    Consult received for malnutrition admit screen score 2 for wt loss and poor PO intake; pt sleepy s/p kidney biopsy. RD met w/ pt and pt's wife at bedside. Pt's wife reports pt @ [facility] x 2 weeks and ate <50% of normal due to difficulty swallowing. Once discharged to rehab, she reports he continued to eat poorly and drank ~50% of the Boost supplements sent TID with meals. Reports UBW prior to recent admits in past month 156-157 lbs. No measured home wt PTA d/t pt unable to stand w/ recent hip surgery per pt's wife.    Assessment:  Height: 177.8 cm (5' 10\")  Weight: 78.9 kg (173 lb 15.1 oz)  Body mass index is 24.96 kg/m²., BMI classification: Normal  Diet/Intake: Strict NPO; supplement order for Boost Plus BID in place to be sent once diet advances. Prior PO doc: 0-<25% x 3 meals.    Evaluation:   1. PMHx includes arthritis, cancer, HTN.  2. Hospital problems include acute cystitis w/ hematuria, acute hyponatremia, UTE, EtOH moderate use, dehydration, acute encephalopathy, urinary bladder mass, recent femoral shaft fracture s/p ORIF at [facility], sepsis d/t E. coli with ARF w/o septic shock.  3. S/p kidney biopsy today  4. Labs: Na 119, BUN 78, Crea 7.09, GFR 8, Ca 7.2. CRP (11/2) 2.72  5. MAR: mylanta DS, versed, prilosec, NaBicarb  6. No staged wounds. Leg swelling per chart notes.  7. Admit wt 73 kg (160.9 lbs), unknown source/scale method  8. No overt signs of fat wasting or muscle wasting on physical exam.    Malnutrition Risk: Unable to meet criteria at this time. At risk w/ reported PO intake <50% of normal over past 3 weeks per pt's wife. Current admit wt elevated from reported UBW.    Recommendations/Plan:  1. Recommend SLP consult for swallow evaluation; communicated to RN.  2. Once PO diet initiated, document intake of all PO as % taken in ADL's to provide interdisciplinary communication " across all shifts.   3. Monitor weight.  4. Nutrition rep will continue to see patient for ongoing meal and snack preferences.     RD following.

## 2021-11-04 NOTE — PROGRESS NOTES
Brief intensivist note.     Pt is hemodynamically stable  No hypotension, no tachycardia, no fver.   Creatinine remains in 6 with oliguria.   Sodium improving to 120  Serial sodiums  Nephrology is planning for renal biopsy by IR.  Appreciate help.   Given no ICU needs, will hand over service to Dr. Magaña    D/w Dr. Magaña.   Please call with quesitons    Diaz Olea D.O.

## 2021-11-04 NOTE — CONSULTS
DATE OF SERVICE:  11/03/2021     NEPHROLOGY CONSULTATION     REQUESTING PHYSICIAN:  Diaz Olea DO     REASON FOR CONSULTATION:  Evaluate patient with acute kidney injury,   hyponatremia.     HISTORY OF PRESENT ILLNESS:  The patient is a 71-year-old male with chronic   kidney disease stage II, baseline creatinine level of 1.0, who was recently   hospitalized in [facility] with right lower extremity femur fracture,   status post surgical repair.  Hospital course complicated with urosepsis,   alcohol withdrawal. At that time, he was treated with Zosyn and Unasyn.    Discharged to rehabilitation center from where the patient was transferred to   [facility] with abnormal laboratory results, acute kidney injury,   severe hyponatremia.  Of note, patient was treated with nonsteroidal   medications like diclofenac and etodolac, had some hypotensive episodes in   nursing facility.  Upon admission, his potassium was 6.1, sodium 114, BUN 64,   CO2 of 12.  Over past several hours, sodium improved to 118, however, no   improvement in urine output.  The patient remains anuric with BUN now 17,   creatinine level 6.47, currently doing reasonably well, complaining of chest   discomfort. No shortness of breath.  No nausea or vomiting.     REVIEW OF SYSTEMS:    GENERAL:  Positive for fatigue.  No fever or chills.  HEENT:  No nosebleeds, no sinus pain, no sore throat.  Eyes:  No double or   blurry vision, no eye pain.  NECK:  No pain, no stiffness.  RESPIRATORY:  No shortness of breath, no cough, no hemoptysis.  CARDIOVASCULAR:  Positive for chest pain. No palpitations, no dyspnea.  GASTROINTESTINAL:  No nausea, vomiting  or diarrhea.  GENITOURINARY:  With Guy catheter. No flank pain.  MUSCULOSKELETAL:  Right lower extremity pain post surgery.     All other systems reviewed negative.     PAST MEDICAL HISTORY:  Arthritis, cancer, hypertension.     PAST SURGICAL HISTORY:  Shoulder surgery, knee arthroplasty, lumbar  fusion,   posterior recent right lower extremity femur fracture surgery.     FAMILY HISTORY:  The patient does not recall any history of kidney disease.     SOCIAL HISTORY:  No tobacco, positive for alcohol use, no drug use.     ALLERGIES:  No known drug allergies.     OUTPATIENT MEDICATIONS:  Multivitamin, Subutex, diclofenac, etodolac, Norco,   lisinopril, Robaxin, Zofran, Zanaflex.     PHYSICAL EXAMINATION:    VITAL SIGNS:  Blood pressure 159/74, heart rate 86, temperature 36.4 Celsius.  GENERAL APPEARANCE:  Well-developed, well-nourished male in no acute distress.  HEENT:  Normocephalic, atraumatic.  Pupils equal, round, reactive to light.    Extraocular movement intact.  Nares patent.  Oropharynx clear, moist mucosa,   no erythema or exudate.  NECK:  Supple.  No lymphadenopathy, no thyromegaly appreciated.  LUNGS:  Clear to auscultation bilaterally.  No rales, wheezes, no rhonchi.  HEART:  Regular rhythm, no rub or gallop.  ABDOMEN:  Soft, slightly distended, nontender.  Bowel sounds present.  No   palpable mass.  EXTREMITIES:  Trace pedal edema bilaterally.  No cyanosis.  NEUROLOGIC:  The patient is alert, oriented, following commands, moving all   extremities.  SKIN:  Warm, no rash, no pruritus, no ulcerations.     LABORATORY DATA:  White blood count 12.8, hemoglobin 8.7, platelets 392.    Sodium 118, potassium 4.9, CO2 of 11, BUN 17, creatinine level 6.47.     Urinalysis positive for protein, large white blood cells more than 150.     CT scan revealed no hydronephrosis.  Unremarkable exam.     Urine chemistry, spot sodium 134.  Osmolality 274.     Brain natriuretic peptide 3268.     ASSESSMENT AND PLAN:  The patient is a 71 years old male with chronic kidney   disease stage II, baseline creatinine level of 1.0, [place], urosepsis and alcohol withdrawal, brought   from the rehab facility with acute kidney injury and hyponatremia.  1.  Acute kidney injury of  unclear etiology with worsening urine output.  To   schedule diagnostic kidney biopsy in the morning.  Hold heparin.  Keep n.p.o.    Check INR.  Possibility to start dialysis soon if no improvement.  2.  Electrolytes.  Hyponatremia, improving, to monitor. Stop IV fluids.  Avoid   free water.  3.  Hypertension.  Hypotension improved.  Now blood pressure elevated. To   avoid Ace inhibitor or ARB at present time.  May control with calcium channel   blocker, beta blocker, hydralazine.  4.  Anemia, drop in hemoglobin level, to monitor.  5.  Volume.  Stop IV fluids.  6.  Proteinuria.  To complete serology with C3, C4 ABDULAZIZ, ANCA, protein to   creatinine ratio, kidney biopsy.    7.  Metabolic acidosis secondary to kidney failure. To start bicarbonate.     RECOMMENDATIONS:  1.  Avoid nephrotoxic agents.  2.  Sodium bicarbonate 1300 mg every 6 hours.  N.p.o. after midnight for   biopsy. To complete C3, C4, ABDULAZIZ, ANCA, protein to creatinine ratio, hepatitis   panel.  2.  Avoid nephrotoxic agents.  3.  Avoid ACE or ARB medication groups at the present time.  4.  To provide renal diet.  5.  Avoid free water.     Discussed with the patient possibility to start dialysis if no improvement.     We will follow the patient closely.        ______________________________  MD KHUSHI JENSEN/SHELTON/AYAN    DD:  11/03/2021 18:06  DT:  11/03/2021 20:26    Job#:  [id number]

## 2021-11-05 ENCOUNTER — APPOINTMENT (OUTPATIENT)
Dept: RADIOLOGY | Facility: MEDICAL CENTER | Age: 71
DRG: 682 | End: 2021-11-05
Attending: INTERNAL MEDICINE
Payer: MEDICARE

## 2021-11-05 LAB
ANION GAP SERPL CALC-SCNC: 17 MMOL/L (ref 7–16)
ANION GAP SERPL CALC-SCNC: 18 MMOL/L (ref 7–16)
ANION GAP SERPL CALC-SCNC: 18 MMOL/L (ref 7–16)
BACTERIA UR CULT: NORMAL
BUN SERPL-MCNC: 78 MG/DL (ref 8–22)
BUN SERPL-MCNC: 80 MG/DL (ref 8–22)
BUN SERPL-MCNC: 81 MG/DL (ref 8–22)
CALCIUM SERPL-MCNC: 7 MG/DL (ref 8.5–10.5)
CALCIUM SERPL-MCNC: 7 MG/DL (ref 8.5–10.5)
CALCIUM SERPL-MCNC: 7.1 MG/DL (ref 8.5–10.5)
CHLORIDE SERPL-SCNC: 92 MMOL/L (ref 96–112)
CO2 SERPL-SCNC: 12 MMOL/L (ref 20–33)
CO2 SERPL-SCNC: 12 MMOL/L (ref 20–33)
CO2 SERPL-SCNC: 13 MMOL/L (ref 20–33)
CREAT SERPL-MCNC: 7.31 MG/DL (ref 0.5–1.4)
CREAT SERPL-MCNC: 7.75 MG/DL (ref 0.5–1.4)
CREAT SERPL-MCNC: 7.87 MG/DL (ref 0.5–1.4)
GLUCOSE SERPL-MCNC: 83 MG/DL (ref 65–99)
GLUCOSE SERPL-MCNC: 83 MG/DL (ref 65–99)
GLUCOSE SERPL-MCNC: 87 MG/DL (ref 65–99)
HAV IGM SERPL QL IA: NORMAL
HBV CORE IGM SER QL: NORMAL
HBV SURFACE AB SERPL IA-ACNC: <3.5 MIU/ML (ref 0–10)
HBV SURFACE AG SER QL: NORMAL
HCV AB SER QL: NORMAL
NT-PROBNP SERPL IA-MCNC: 4498 PG/ML (ref 0–125)
PHOSPHATE SERPL-MCNC: 8.1 MG/DL (ref 2.5–4.5)
POTASSIUM SERPL-SCNC: 4.9 MMOL/L (ref 3.6–5.5)
POTASSIUM SERPL-SCNC: 5.2 MMOL/L (ref 3.6–5.5)
POTASSIUM SERPL-SCNC: 5.3 MMOL/L (ref 3.6–5.5)
SARS-COV+SARS-COV-2 AG RESP QL IA.RAPID: NOTDETECTED
SIGNIFICANT IND 70042: NORMAL
SITE SITE: NORMAL
SODIUM SERPL-SCNC: 122 MMOL/L (ref 135–145)
SOURCE SOURCE: NORMAL
SPECIMEN SOURCE: NORMAL

## 2021-11-05 PROCEDURE — 99233 SBSQ HOSP IP/OBS HIGH 50: CPT | Performed by: INTERNAL MEDICINE

## 2021-11-05 PROCEDURE — 92610 EVALUATE SWALLOWING FUNCTION: CPT

## 2021-11-05 PROCEDURE — 700111 HCHG RX REV CODE 636 W/ 250 OVERRIDE (IP): Performed by: INTERNAL MEDICINE

## 2021-11-05 PROCEDURE — 700102 HCHG RX REV CODE 250 W/ 637 OVERRIDE(OP): Performed by: HOSPITALIST

## 2021-11-05 PROCEDURE — 86706 HEP B SURFACE ANTIBODY: CPT

## 2021-11-05 PROCEDURE — 700102 HCHG RX REV CODE 250 W/ 637 OVERRIDE(OP): Performed by: STUDENT IN AN ORGANIZED HEALTH CARE EDUCATION/TRAINING PROGRAM

## 2021-11-05 PROCEDURE — 02HV33Z INSERTION OF INFUSION DEVICE INTO SUPERIOR VENA CAVA, PERCUTANEOUS APPROACH: ICD-10-PCS | Performed by: RADIOLOGY

## 2021-11-05 PROCEDURE — 5A1D70Z PERFORMANCE OF URINARY FILTRATION, INTERMITTENT, LESS THAN 6 HOURS PER DAY: ICD-10-PCS | Performed by: INTERNAL MEDICINE

## 2021-11-05 PROCEDURE — 84100 ASSAY OF PHOSPHORUS: CPT

## 2021-11-05 PROCEDURE — 92526 ORAL FUNCTION THERAPY: CPT

## 2021-11-05 PROCEDURE — B548ZZA ULTRASONOGRAPHY OF SUPERIOR VENA CAVA, GUIDANCE: ICD-10-PCS | Performed by: RADIOLOGY

## 2021-11-05 PROCEDURE — 80048 BASIC METABOLIC PNL TOTAL CA: CPT

## 2021-11-05 PROCEDURE — 36415 COLL VENOUS BLD VENIPUNCTURE: CPT

## 2021-11-05 PROCEDURE — A9270 NON-COVERED ITEM OR SERVICE: HCPCS | Performed by: STUDENT IN AN ORGANIZED HEALTH CARE EDUCATION/TRAINING PROGRAM

## 2021-11-05 PROCEDURE — 76937 US GUIDE VASCULAR ACCESS: CPT

## 2021-11-05 PROCEDURE — C1750 CATH, HEMODIALYSIS,LONG-TERM: HCPCS

## 2021-11-05 PROCEDURE — 90935 HEMODIALYSIS ONE EVALUATION: CPT

## 2021-11-05 PROCEDURE — 83880 ASSAY OF NATRIURETIC PEPTIDE: CPT

## 2021-11-05 PROCEDURE — 770001 HCHG ROOM/CARE - MED/SURG/GYN PRIV*

## 2021-11-05 PROCEDURE — 700111 HCHG RX REV CODE 636 W/ 250 OVERRIDE (IP)

## 2021-11-05 PROCEDURE — A9270 NON-COVERED ITEM OR SERVICE: HCPCS | Performed by: HOSPITALIST

## 2021-11-05 PROCEDURE — 700102 HCHG RX REV CODE 250 W/ 637 OVERRIDE(OP): Performed by: INTERNAL MEDICINE

## 2021-11-05 PROCEDURE — 90935 HEMODIALYSIS ONE EVALUATION: CPT | Performed by: INTERNAL MEDICINE

## 2021-11-05 PROCEDURE — 77001 FLUOROGUIDE FOR VEIN DEVICE: CPT

## 2021-11-05 PROCEDURE — 80074 ACUTE HEPATITIS PANEL: CPT

## 2021-11-05 PROCEDURE — 700111 HCHG RX REV CODE 636 W/ 250 OVERRIDE (IP): Performed by: RADIOLOGY

## 2021-11-05 PROCEDURE — A9270 NON-COVERED ITEM OR SERVICE: HCPCS | Performed by: INTERNAL MEDICINE

## 2021-11-05 RX ORDER — MIDAZOLAM HYDROCHLORIDE 1 MG/ML
INJECTION INTRAMUSCULAR; INTRAVENOUS
Status: COMPLETED
Start: 2021-11-05 | End: 2021-11-05

## 2021-11-05 RX ORDER — SODIUM CHLORIDE 9 MG/ML
500 INJECTION, SOLUTION INTRAVENOUS
Status: ACTIVE | OUTPATIENT
Start: 2021-11-05 | End: 2021-11-05

## 2021-11-05 RX ORDER — ONDANSETRON 2 MG/ML
4 INJECTION INTRAMUSCULAR; INTRAVENOUS PRN
Status: ACTIVE | OUTPATIENT
Start: 2021-11-05 | End: 2021-11-05

## 2021-11-05 RX ORDER — MIDAZOLAM HYDROCHLORIDE 1 MG/ML
.5-2 INJECTION INTRAMUSCULAR; INTRAVENOUS PRN
Status: ACTIVE | OUTPATIENT
Start: 2021-11-05 | End: 2021-11-05

## 2021-11-05 RX ORDER — HEPARIN SODIUM (PORCINE) LOCK FLUSH IV SOLN 100 UNIT/ML 100 UNIT/ML
300-500 SOLUTION INTRAVENOUS PRN
Status: DISCONTINUED | OUTPATIENT
Start: 2021-11-05 | End: 2021-11-17 | Stop reason: HOSPADM

## 2021-11-05 RX ORDER — HEPARIN SODIUM (PORCINE) LOCK FLUSH IV SOLN 100 UNIT/ML 100 UNIT/ML
SOLUTION INTRAVENOUS
Status: COMPLETED
Start: 2021-11-05 | End: 2021-11-05

## 2021-11-05 RX ORDER — HEPARIN SODIUM 1000 [USP'U]/ML
3700 INJECTION, SOLUTION INTRAVENOUS; SUBCUTANEOUS
Status: DISCONTINUED | OUTPATIENT
Start: 2021-11-05 | End: 2021-11-17 | Stop reason: HOSPADM

## 2021-11-05 RX ORDER — LIDOCAINE HYDROCHLORIDE AND EPINEPHRINE 10; 10 MG/ML; UG/ML
INJECTION, SOLUTION INFILTRATION; PERINEURAL
Status: DISPENSED
Start: 2021-11-05 | End: 2021-11-06

## 2021-11-05 RX ORDER — HEPARIN SODIUM 1000 [USP'U]/ML
INJECTION, SOLUTION INTRAVENOUS; SUBCUTANEOUS
Status: COMPLETED
Start: 2021-11-05 | End: 2021-11-05

## 2021-11-05 RX ADMIN — OMEPRAZOLE 20 MG: 20 CAPSULE, DELAYED RELEASE ORAL at 04:30

## 2021-11-05 RX ADMIN — HEPARIN SODIUM (PORCINE) LOCK FLUSH IV SOLN 100 UNIT/ML 500 UNITS: 100 SOLUTION at 15:57

## 2021-11-05 RX ADMIN — MICONAZOLE NITRATE: 20 CREAM TOPICAL at 22:29

## 2021-11-05 RX ADMIN — MIDAZOLAM HYDROCHLORIDE 0.5 MG: 1 INJECTION, SOLUTION INTRAMUSCULAR; INTRAVENOUS at 15:44

## 2021-11-05 RX ADMIN — FENTANYL CITRATE 50 MCG: 50 INJECTION, SOLUTION INTRAMUSCULAR; INTRAVENOUS at 15:48

## 2021-11-05 RX ADMIN — MIDAZOLAM HYDROCHLORIDE 0.5 MG: 1 INJECTION, SOLUTION INTRAMUSCULAR; INTRAVENOUS at 15:48

## 2021-11-05 RX ADMIN — FENTANYL CITRATE 50 MCG: 50 INJECTION, SOLUTION INTRAMUSCULAR; INTRAVENOUS at 15:44

## 2021-11-05 RX ADMIN — BUPRENORPHINE HCL 2 MG: 2 TABLET SUBLINGUAL at 10:12

## 2021-11-05 RX ADMIN — HEPARIN SODIUM 3700 UNITS: 1000 INJECTION, SOLUTION INTRAVENOUS; SUBCUTANEOUS at 20:34

## 2021-11-05 RX ADMIN — MICONAZOLE NITRATE: 20 CREAM TOPICAL at 04:32

## 2021-11-05 RX ADMIN — AMLODIPINE BESYLATE 5 MG: 10 TABLET ORAL at 04:30

## 2021-11-05 RX ADMIN — HEPARIN 500 UNITS: 100 SYRINGE at 15:57

## 2021-11-05 RX ADMIN — FUROSEMIDE 40 MG: 10 INJECTION, SOLUTION INTRAMUSCULAR; INTRAVENOUS at 04:22

## 2021-11-05 RX ADMIN — HEPARIN SODIUM 3700 UNITS: 1000 INJECTION INTRAVENOUS; SUBCUTANEOUS at 20:34

## 2021-11-05 ASSESSMENT — ENCOUNTER SYMPTOMS
SHORTNESS OF BREATH: 0
DIZZINESS: 0
VOMITING: 0
HEADACHES: 0
ABDOMINAL PAIN: 0
COUGH: 0
SORE THROAT: 0
BLURRED VISION: 0
BACK PAIN: 0
DOUBLE VISION: 0
LOSS OF CONSCIOUSNESS: 0
NAUSEA: 0
CHILLS: 0
DIARRHEA: 0
FEVER: 0
PALPITATIONS: 0

## 2021-11-05 ASSESSMENT — FIBROSIS 4 INDEX: FIB4 SCORE: 0.66

## 2021-11-05 ASSESSMENT — PAIN DESCRIPTION - PAIN TYPE
TYPE: ACUTE PAIN
TYPE: NEUROPATHIC PAIN

## 2021-11-05 NOTE — PROGRESS NOTES
Covid 19 surge in effect:    Pt is A&Ox2, disoriented to time and event. Pt denies any pain at this time. Scheduled Subutex given per MAR. Pt has penn and rectal tube in place. Pt NPO since 0000 for HD cath placement this afternoon. Bed alarm on for safety.

## 2021-11-05 NOTE — THERAPY
"Speech Language Pathology   Clinical Swallow Evaluation     Patient Name: Zachary Moore  AGE:  71 y.o., SEX:  male  Medical Record #: [id number]  Today's Date: 11/5/2021     Precautions  Precautions: (P) Fall Risk, Toe Touch Weight Bearing Right Lower Extremity, Other (See Comments)  Comments: (P) 10/13 R femur ORIF done [facility], was in hospital or at SNF since then.    Assessment    Patient is 71 y.o. male with a diagnosis of acute renal failure. Please see EMR for complete hist. Pt recently hospitalized at [facility] for hip fx. Pt also with hist of urosepsis and ETOH. No prior recent SLP services at [facility]. 11/3 chest xray \"mild basilar and apical opacity compatible with atelectasis or scarring.\"    Pt awakened with verbal and tactile stimulation. Pt demonstrating slow processing and requiring repetition. Pt also with possible confusion and or memory deficit with pt unable to state place of residence stating \"that is a good question.\" Pt assessed with single ice chips and 1/2 tsp of applesauce. Pt with facial grimacing, hand to chest, and rating pain at mid chest area at \"8\" or \"10\" with each ice chips and small amount of applesauce. Pt stating it is a \"fireball\" and \"burning.\" No s/s of mid chest pain at rest with pt stating the pain occurs when he eats, that it has been progressive, and that he has had pain for approx 1 year. No further trials r/t pt's significant pain and also pt to be NPO for HD placement. Nurs informed that pt was given approx 5 single ice chips and one 1/2 tsp amount of applesauce. SLP communicated with the nurs and MD regarding pt demonstrating significant mid chest pain with intake. MD requesting pt be reassessed on Saturday.     Additional factors influencing patient status/progress: pt stating hist of esophageal pain for approx 1 year and recently increasing. Pt also with confusion so ? accuracy of personal information.     Plan  NPO, NG, reassess on Saturday.   Recommend Speech " "Therapy 3 times per week until therapy goals are met for the following treatments:  Dysphagia Training.    Discharge Recommendations:  (dependent on POC and pt status)       11/05/21 0911   Oral Motor Eval    Is Patient Able to Complete Oral Motor Eval Yes, Within Normal Limits   Laryngeal Function   Voice Quality Within Functional Limits   Volutional Cough Within Functional Limits   Excursion Upon Swallow Complete   Max Phonation Time (Seconds) greater than 5 seconds   Oral Food Presentation   Ice Chips   (moderate to severe mid chest pain)   Liquidised (3)   (severe pain mid chest ? esophageal issues)   Tracheostomy   Tracheostomy  No   Dysphagia Strategies / Recommendations   Strategies / Interventions Recommended (Yes / No) No  (to be determined)   Dysphagia Rating   Nutritional Liquid Intake Rating Scale Nothing by mouth   Nutritional Food Intake Rating Scale Nothing by mouth   Patient / Family Goals   Patient / Family Goal #1 \"I can't eat. It hurts.\"   Short Term Goals   Short Term Goal # 1 Pt will consume PO trials during reassessment of swallow with min to no s/s or complaints of esophageal difficulty   Goal Outcome # 1 Goal not met         "

## 2021-11-05 NOTE — DOCUMENTATION QUERY
[facility]                                                                       Query Response Note      PATIENT:               RAMA ROUSE  ACCT #:                  [id number]  MRN:                     [id number]  :                      1950  ADMIT DATE:       2021 7:29 PM  DISCH DATE:          RESPONDING  PROVIDER #:        [id number]           QUERY TEXT:    Acute cystitis with hematuria- Urine cultures neg, Not sepsis is documented in the MD PN.  Please clarify status of this condition:    NOTE:  If an appropriate response is not listed below, please respond with a new note.       The patient's Clinical Indicators include:  21 MD PN:   -Acute cystitis with hematuria- Urine cultures neg  -Not sepsis, cultures neg, only 1 dose of abx given    Treatment: UA & cultures, Guy catheter, Hold antibiotics  Risk Factors: Advanced age, Recent UTI, Bladder mass, UTE    Thank you,  Tanna Headley RN, BSN  Clinical   Connect via [email or web address]  .  Options provided:   -- Acute cystitis with hematuria is ruled in   -- Acute cystitis with hematuria is ruled out   -- Unable to determine      Query created by: Tanna Headley on 2021 10:40 AM    RESPONSE TEXT:    Unable to determine          Electronically signed by:  DL WALTERS MD 2021 4:25 PM

## 2021-11-05 NOTE — PROGRESS NOTES
Hospital Medicine Daily Progress Note    Date of Service  11/5/2021    Chief Complaint  Zachary Moore is a 71 y.o. male admitted 11/2/2021 with UTE    Hospital Course  This is a 71-year-old gentleman who originally suffered a hip fracture and was treated at [facility].  During that hospitalization he was also treated for alcohol withdrawal and urosepsis.  From there he was discharged to rehab facility, and has since been transferred to us.  His previous medical history is also significant for hypertension, alcohol abuse, stage II chronic kidney disease..  Patient was sent to us from rehab facility in [place] after he was found to be hyponatremic and with acute kidney injury.  On arrival here, his sodium was 114, potassium 5.2, chloride 86, CO2 12, BUN 64, creatinine 6.10.  He has a previous diagnosis of stage II chronic kidney disease with a baseline creatinine around 1.0.  Patient was admitted to the hospital with a diagnosis of likely hypovolemic hyponatremia.  He was initially treated with fluid resuscitation, and corrected rapidly, however, he remained with very poor urine output and therefore nephrology has been consulted.    Interval Problem Update  Pt c/o of being sleepy this am but no other specific complaints    AFebrile  Sinus 70-80s  -160s    11/5. Sleepy but arousable. SLP couldn't do assessment because he complained of odynophagia. I clarified with him, He can still take PO and his odynophagia has been going on for 2 years on and off. I told SLP to see if we can reevaluate his swallowing again. CUrrently he is NPO anyway for catheter placement.     I have personally seen and examined the patient at bedside. I discussed the plan of care with patient, bedside RN and pulmonary.    Consultants/Specialty  nephrology    Code Status  Full Code    Disposition  Patient is not medically cleared.   Anticipate discharge to to skilled nursing facility.  I have placed the appropriate orders  "for post-discharge needs.    Review of Systems  Review of Systems   Constitutional: Positive for malaise/fatigue. Negative for chills and fever.   HENT: Negative for nosebleeds and sore throat.    Eyes: Negative for blurred vision and double vision.   Respiratory: Negative for cough and shortness of breath.    Cardiovascular: Positive for leg swelling. Negative for chest pain and palpitations.   Gastrointestinal: Negative for abdominal pain, diarrhea, nausea and vomiting.   Genitourinary: Negative for dysuria and urgency.   Musculoskeletal: Negative for back pain.   Skin: Negative for rash.   Neurological: Negative for dizziness, loss of consciousness and headaches.        Physical Exam  Temp:  [36.1 °C (96.9 °F)-37.1 °C (98.7 °F)] 37.1 °C (98.7 °F)  Pulse:  [74-92] 92  Resp:  [12-19] 16  BP: (134-167)/(50-80) 140/59  SpO2:  [94 %-99 %] 99 %    Physical Exam  Constitutional:       General: He is not in acute distress.     Appearance: Normal appearance. He is well-developed. He is not diaphoretic.   HENT:      Head: Normocephalic and atraumatic.      Mouth/Throat:      Comments: COmplains of \"odynophagia\" but seems out of proportion when I asked himto demonstrate swallowing at the bedside.  Eyes:      Conjunctiva/sclera: Conjunctivae normal.   Neck:      Vascular: No JVD.   Cardiovascular:      Rate and Rhythm: Normal rate.      Heart sounds: No murmur heard.   No gallop.    Pulmonary:      Effort: Pulmonary effort is normal. No respiratory distress.      Breath sounds: No stridor. No wheezing or rales.   Abdominal:      Palpations: Abdomen is soft.      Tenderness: There is no abdominal tenderness. There is no guarding or rebound.   Musculoskeletal:      Right lower leg: Edema present.      Left lower leg: Edema present.      Comments: 1+ edema B slightly more on the R    Skin:     General: Skin is warm and dry.      Findings: No rash.   Neurological:      General: No focal deficit present.      Mental Status: He is " alert and oriented to person, place, and time.      Comments: Sleepy   Psychiatric:         Mood and Affect: Mood normal.         Thought Content: Thought content normal.         Fluids    Intake/Output Summary (Last 24 hours) at 11/5/2021 0748  Last data filed at 11/4/2021 2130  Gross per 24 hour   Intake 240 ml   Output 60 ml   Net 180 ml       Laboratory  Recent Labs     11/03/21  0049 11/03/21  0500 11/04/21  0605   WBC 12.4* 12.8* 15.4*   RBC 2.75* 2.82* 2.71*   HEMOGLOBIN 8.4* 8.7* 8.3*   HEMATOCRIT 24.2* 25.3* 24.1*   MCV 88.0 89.7 88.9   MCH 30.5 30.9 30.6   MCHC 34.7 34.4 34.4   RDW 45.5 46.9 46.6   PLATELETCT 391 392 431   MPV 9.6 9.4 9.7     Recent Labs     11/04/21  1852 11/05/21  0028 11/05/21  0550   SODIUM 121* 122* 122*   POTASSIUM 5.3 5.3 4.9   CHLORIDE 91* 92* 92*   CO2 11* 12* 12*   GLUCOSE 90 87 83   BUN 77* 81* 80*   CREATININE 7.24* 7.31* 7.75*   CALCIUM 7.2* 7.1* 7.0*     Recent Labs     11/02/21  2257 11/04/21  0605   INR 1.35* 1.37*         Recent Labs     11/03/21  0049   TRIGLYCERIDE 64   HDL 42   LDL 36       Imaging  CT-NEEDLE BX-RENAL   Final Result      CT-guided core biopsy of the right kidney.      US-RENAL   Final Result      1.  BILATERAL pleural effusions   2.  Small volume of ascites   3.  Cholelithiasis and gallbladder sludge with gallbladder wall thickening incidentally noted. Cholecystitis is a consideration.      EC-ECHOCARDIOGRAM COMPLETE W/O CONT   Final Result      DX-CHEST-PORTABLE (1 VIEW)   Final Result      Mild basilar and apical opacity compatible with atelectasis or scarring although consolidation is not excluded      Effusions on recent CT are not detected radiographically      CT-ABDOMEN-PELVIS W/O   Final Result      Diffuse urinary bladder wall thickening with Guy catheter in place. This is suspicious for cystitis but the finding is nonspecific      No hydronephrosis or urolithiasis      Cholelithiasis      Impending diarrhea      Small to medium right, small  "left pleural effusions      5 mm mean diameter left lower lobe pulmonary nodule. Follow-up recommendations as below   Low Risk: No routine follow-up      High Risk: Optional CT at 12 months      Comments: Nodules less than 6 mm do not require routine follow-up, but certain patients at high risk with suspicious nodule morphology, upper lobe location, or both may warrant 12-month follow-up.      Low Risk - Minimal or absent history of smoking and of other known risk factors.      High Risk - History of smoking or of other known risk factors.      Note: These recommendations do not apply to lung cancer screening, patients with immunosuppression, or patients with known primary cancer.      Fleischner Society 2017 Guidelines for Management of Incidentally Detected Pulmonary Nodules in Adults         CT-HEAD W/O   Final Result      No noncontrast CT evidence of acute intracranial hemorrhage.      Severe white matter hypodensity is present.  This is a nonspecific finding which usually is found to represent chronic microvascular disease in patient's of this demographic.  Demyelination, age indeterminant ischemia and gliosis are also common    possibilities.      Moderate to severe parenchymal volume loss         US-FOREIGN FILM ULTRASOUND   Final Result      IR-GUERRIER,GROSHONG PLACEMENT >5    (Results Pending)        Assessment/Plan  * UTE (acute kidney injury), hypotension, hyponatremia, s/p femoral ORIF (HCC)- (present on admission)  Assessment & Plan  Was clearly dehydrated on presentation however renal function has not significantly improved with resuscitation  Nephrology following  Plan renal Bx today  UOP has improved today  Renally dose medications as appropriate  Follow daily BMP and UOP\"    Na improving but will need it to be 125 or above before dischagre  Nephrology following  From rehab, ordered PT/OT, may need skilled. SW also to arrange dialysis once we get skilled.      Dehydration  Assessment & Plan  Has " "been fluid resuscitated  Secondary to diarrhea      Encephalopathy acute- (present on admission)  Assessment & Plan  2/2 UTE vs. Urosepsis vs. Acute hyponatremia  Improving  CT head neg for acute findings    Sepsis due to Escherichia coli with acute renal failure without septic shock (HCC)  Assessment & Plan  NOT sepsis  Cultures neg  Only given one dose of ABx's on presentation          Mass of urinary bladder- (present on admission)  Assessment & Plan  Based on imaging from [facility]  Repeat CT Abd/Plvs and renal US do not show any mass    Jeana-prosthetic femoral shaft fracture- (present on admission)  Assessment & Plan  S/p ORIF  Pain control  Brace present  PT/OT consults      Alcohol use disorder, moderate, dependence (HCC)- (present on admission)  Assessment & Plan  Alcohol level ordered  Last drink reported 5 days prior by patient  Monitor for signs of withdrawal    Acute cystitis with hematuria- (present on admission)  Assessment & Plan  Treated for sepsis 1 week ago for Bacteremia & UTI treated with zosyn & Unasyn and d/c on cipro 750mg BID for 9 more doses and Flagyl 500mg TID x 16 days growing Resistant Ecoli.  Urine cultures here are neg  US at [facility] showing possible mass however CT Abd/Plvs and renal US both neg for mass here  Cont to monitor off Abx's  Catheter change w/in 48hrs of admission    Acute hyponatremia- (present on admission)  Assessment & Plan  A component of hypovolemia complicated by UTE  Has corrected with NS to 120  Cont q6 monitoring; goal upper 120s over next 36hrs  Nephrology following\"    Improving slowly.         Elevated liver enzymes- (present on admission)  Assessment & Plan  Trend  Appears chronic  Monitor      Hypertension- (present on admission)  Assessment & Plan  On lisinopril 10 as an outpt which was stopped due to hypotension and UTE  Start amlodipine 5mg    Vitals:    11/05/21 1558   BP: 144/65   Pulse: 89   Resp: 14   Temp:    SpO2: 96%     Stable.       VTE prophylaxis: \" " "heparin ppx\" He is not on heparin ppx. Only SCDs. On chart review this was discontinued on 11/3. Will investigate with Pharmacy    I have performed a physical exam and reviewed and updated ROS and Plan today (11/5/2021). In review of yesterday's note (11/4/2021), there are no changes except as documented above.      "

## 2021-11-05 NOTE — OR SURGEON
Immediate Post- Operative Note        Findings: renal insufficiency      Procedure(s): TDC ok to use      Estimated Blood Loss: Less than 5 ml        Complications: None            11/5/2021     3:58 PM     Haider Tavera M.D.

## 2021-11-05 NOTE — WOUND TEAM
[facility]  Inpatient Services  Initial Wound and Skin Care Evaluation    Admission Date: 11/2/2021     Last order of IP CONSULT TO WOUND CARE was found on 11/3/2021 from Hospital Encounter on 11/2/2021     HPI, PMH, SH: Reviewed    Past Surgical History:   Procedure Laterality Date   • LUMBAR FUSION POSTERIOR  1/28/2014    Performed by Elder Chopra M.D. at SURGERY [facility]   • KNEE ARTHROPLASTY TOTAL  2013    Right knee-Dr. Boggs   • KNEE ARTHROPLASTY TOTAL  2009    left knee--Dr. Snider   • SHOULDER SURGERY      right-sided     Social History     Tobacco Use   • Smoking status: Never Smoker   • Smokeless tobacco: Never Used   Substance Use Topics   • Alcohol use: Yes     Comment: daily     No chief complaint on file.    Diagnosis: Acute renal failure (ARF) (HCC) [N17.9]    Unit where seen by Wound Team: T637/00     WOUND CONSULT/FOLLOW UP RELATED TO:  Right posterior thigh      WOUND HISTORY:  This is a 71-year-old gentleman who originally suffered a hip fracture and was treated at [facility].  During that hospitalization he was also treated for alcohol withdrawal and urosepsis.  From there he was discharged to rehab facility, and has since been transferred to us.  His previous medical history is also significant for hypertension, alcohol abuse, stage II chronic kidney disease.  Patient was sent to us from rehab facility in [place] after he was found to be hyponatremic and with acute kidney injury.      WOUND ASSESSMENT/LDA  Wound 11/03/21 Partial Thickness Wound Buttocks;Jaw;Thigh Medial Right MASD/edema  (Active)   Wound Image    11/03/21 0800   Site Assessment Purple;Red;Edema    Periwound Assessment Edema    Margins Attached edges    Closure None    Drainage Amount Small    Drainage Description Serosanguineous    Treatments Cleansed;Offloading;Site care    Wound Cleansing Soap and Water    Periwound Protectant Barrier Paste;Viscopaste    Dressing Cleansing/Solutions  Normal Saline    Dressing Options Viscopaste;Absorbent Abdominal Pad    Dressing Changed New    Dressing Status Clean;Dry;Intact    Dressing Change/Treatment Frequency Daily, and As Needed    NEXT Dressing Change/Treatment Date 11/04/21    NEXT Weekly Photo (Inpatient Only) 11/09/21    Non-staged Wound Description Partial thickness    WOUND NURSE ONLY - Time Spent with Patient (mins) 45    Number of days: 1      Vascular:    SHA:   No results found.    Lab Values:    Lab Results   Component Value Date/Time    WBC 15.4 (H) 11/04/2021 06:05 AM    RBC 2.71 (L) 11/04/2021 06:05 AM    HEMOGLOBIN 8.3 (L) 11/04/2021 06:05 AM    HEMATOCRIT 24.1 (L) 11/04/2021 06:05 AM    CREACTPROT 2.72 (H) 11/02/2021 10:57 PM    SEDRATEWES 53 (H) 11/03/2021 12:49 AM      Culture Results show:  No results found for this or any previous visit (from the past 720 hour(s)).    Pain Level/Medicated:  No complaints of pain        INTERVENTIONS BY WOUND TEAM:  Chart and images reviewed. Discussed with bedside RN. All areas of concern (based on picture review, LDA review and discussion with bedside RN) have been thoroughly assessed. Documentation of areas based on significant findings. This RN in to assess patient. Performed standard wound care which includes appropriate positioning, dressing removal and non-selective debridement. Pictures and measurements obtained weekly if/when required.  Preparation for Dressing removal: NA wound JESSICA   Non-selectively Debrided with:  NS and gauze.  Sharp debridement: NA  Jeana wound: Cleansed with NS   Primary Dressing: stoma powder/crusting/Nystin ordered   Secondary (Outer) Dressing: viscopaste ordered     Interdisciplinary consultation: Patient, Bedside RN     EVALUATION / RATIONALE FOR TREATMENT:  Most Recent Date:  11/04/21: patient on the way to a procedure.  Severely swollen.  Wounds likely related to incontinence edema and friction.        Goals: Steady decrease in wound area and depth weekly.    WOUND  TEAM PLAN OF CARE ([X] for frequency of wound follow up,):   Nursing to follow orders written for wound care. Contact wound team if area fails to progress, deteriorates or with any questions/concerns  Dressing changes by wound team:                   Follow up 3 times weekly:                NPWT change 3 times weekly:     Follow up 1-2 times weekly:      Follow up Bi-Monthly:                   Follow up as needed:     Other (explain):     NURSING PLAN OF CARE ORDERS (X):  Dressing changes: See Dressing Care orders:   Skin care: See Skin Care orders: X  RN Prevention Protocol: X  Rectal tube care: See Rectal Tube Care orders:   Other orders:    RSKIN:   CURRENTLY IN PLACE (X), APPLIED THIS VISIT (A), ORDERED (O):   Q shift Evan:  X  Q shift pressure point assessments:  X    Surface/Positioning   Pressure redistribution mattress            Low Airloss        ICU bed   Bariatric foam      Bariatric GAYATRI     Waffle cushion        Waffle Overlay          Reposition q 2 hours    X  TAPs Turning system     Z Kevin Pillow     Offloading/Redistribution   Sacral Mepilex (Silicone dressing)     Heel Mepilex (Silicone dressing)         Heel float boots (Prevalon boot)             Float Heels off Bed with Pillows           Respiratory   Silicone O2 tubing         Gray Foam Ear protectors     Cannula fixation Device (Tender )          High flow offloading Clip    Elastic head band offloading device      Anchorfast                                                         Trach with Optifoam split foam             Containment/Moisture Prevention     Rectal tube or BMS  X  Purwick/Condom Cath        Guy Catheter  X  Barrier wipes           Barrier paste       Antifungal tx      Interdry        Mobilization       Up to chair        Ambulate      PT/OT      Nutrition       Dietician        Diabetes Education      PO   X  TF     TPN     NPO   # days     Other        Anticipated discharge plans: TBA   LTACH:        SNF/Rehab:                   Home Health Care:           Outpatient Wound Center:            Self/Family Care:        Other:                  Vac Discharge Needs:   Not Applicable Pt not on a wound vac:     X  Regular Vac while inpatient, alternative dressing at DC:        Regular Vac in use and continued at DC:            Reg. Vac w/ Skin Sub/Biologic in use. Will need to be changed 2x wkly:      Veraflo Vac while inpatient, ok to transition to Regular Vac on Discharge:           Veraflo Vac while inpatient, will need to remain on Veraflo Vac upon discharge:

## 2021-11-05 NOTE — CARE PLAN
The patient is Stable - Low risk of patient condition declining or worsening    Shift Goals  Clinical Goals: Maintain skin integrity      Progress made toward(s) clinical / shift goals:  Bottom/right thigh wound cleansed, luciano cream applied, dry. Left ear small wound noted, cleansed, wound consult placed.    Patient is not progressing towards the following goals:      Problem: Urinary - Renal Perfusion  Goal: Ability to achieve and maintain adequate renal perfusion and functioning will improve  Outcome: Not Progressing

## 2021-11-05 NOTE — PROGRESS NOTES
0100: Pt pulled rectal tube when repositioning self, pt assisted in repositioning, linen change and incontinence care provided.    Creatine at 0028 7.31, on call MD notified. To monitor for signs of distress.

## 2021-11-05 NOTE — PROGRESS NOTES
Pt presents to IR via this RN. Pt was consented by MD at bedside, confirmed by this RN and consent at bedside. Pt transferred to IR table in supine position. Pt placed on monitor, prepped and draped in a sterile fashion. Resting comfortably at this time.     Bard HemoSplit Long term Hemodialysis cathether    REF# [id number]  LOT# [id number]  Exp: 03/31/2023

## 2021-11-06 PROBLEM — D64.9 ANEMIA: Status: ACTIVE | Noted: 2021-11-06

## 2021-11-06 LAB
ALBUMIN SERPL BCP-MCNC: 2.9 G/DL (ref 3.2–4.9)
ANION GAP SERPL CALC-SCNC: 16 MMOL/L (ref 7–16)
BUN SERPL-MCNC: 21 MG/DL (ref 8–22)
BUN SERPL-MCNC: 49 MG/DL (ref 8–22)
CALCIUM SERPL-MCNC: 7.4 MG/DL (ref 8.5–10.5)
CALCIUM SERPL-MCNC: 8.1 MG/DL (ref 8.5–10.5)
CHLORIDE SERPL-SCNC: 92 MMOL/L (ref 96–112)
CHLORIDE SERPL-SCNC: 92 MMOL/L (ref 96–112)
CO2 SERPL-SCNC: 19 MMOL/L (ref 20–33)
CO2 SERPL-SCNC: 23 MMOL/L (ref 20–33)
CORTIS SERPL-MCNC: 21.4 UG/DL (ref 0–23)
CREAT SERPL-MCNC: 2.94 MG/DL (ref 0.5–1.4)
CREAT SERPL-MCNC: 5.79 MG/DL (ref 0.5–1.4)
CREAT UR-MCNC: 40.34 MG/DL
ERYTHROCYTE [DISTWIDTH] IN BLOOD BY AUTOMATED COUNT: 47.6 FL (ref 35.9–50)
GLUCOSE SERPL-MCNC: 76 MG/DL (ref 65–99)
GLUCOSE SERPL-MCNC: 82 MG/DL (ref 65–99)
HCT VFR BLD AUTO: 23 % (ref 42–52)
HEMOCCULT STL QL: POSITIVE
HGB BLD-MCNC: 7.8 G/DL (ref 14–18)
IRON SATN MFR SERPL: 15 % (ref 15–55)
IRON SERPL-MCNC: 27 UG/DL (ref 50–180)
MCH RBC QN AUTO: 30.2 PG (ref 27–33)
MCHC RBC AUTO-ENTMCNC: 33.9 G/DL (ref 33.7–35.3)
MCV RBC AUTO: 89.1 FL (ref 81.4–97.8)
MYELOPEROXIDASE AB SER-ACNC: 0 AU/ML (ref 0–19)
NUCLEAR IGG SER QL IA: NORMAL
PHOSPHATE SERPL-MCNC: 6.7 MG/DL (ref 2.5–4.5)
PLATELET # BLD AUTO: 281 K/UL (ref 164–446)
PMV BLD AUTO: 9.5 FL (ref 9–12.9)
POTASSIUM SERPL-SCNC: 3.7 MMOL/L (ref 3.6–5.5)
POTASSIUM SERPL-SCNC: 4.1 MMOL/L (ref 3.6–5.5)
PROT UR-MCNC: 29 MG/DL (ref 0–15)
PROT/CREAT UR: 719 MG/G (ref 15–68)
PROTEINASE3 AB SER-ACNC: 2 AU/ML (ref 0–19)
RBC # BLD AUTO: 2.58 M/UL (ref 4.7–6.1)
SODIUM SERPL-SCNC: 127 MMOL/L (ref 135–145)
SODIUM SERPL-SCNC: 128 MMOL/L (ref 135–145)
SODIUM SERPL-SCNC: 131 MMOL/L (ref 135–145)
TIBC SERPL-MCNC: 176 UG/DL (ref 250–450)
UIBC SERPL-MCNC: 149 UG/DL (ref 110–370)
WBC # BLD AUTO: 8.3 K/UL (ref 4.8–10.8)

## 2021-11-06 PROCEDURE — 99233 SBSQ HOSP IP/OBS HIGH 50: CPT | Performed by: INTERNAL MEDICINE

## 2021-11-06 PROCEDURE — 82272 OCCULT BLD FECES 1-3 TESTS: CPT

## 2021-11-06 PROCEDURE — 700102 HCHG RX REV CODE 250 W/ 637 OVERRIDE(OP): Performed by: INTERNAL MEDICINE

## 2021-11-06 PROCEDURE — 84156 ASSAY OF PROTEIN URINE: CPT

## 2021-11-06 PROCEDURE — 84295 ASSAY OF SERUM SODIUM: CPT

## 2021-11-06 PROCEDURE — 92526 ORAL FUNCTION THERAPY: CPT

## 2021-11-06 PROCEDURE — A9270 NON-COVERED ITEM OR SERVICE: HCPCS | Performed by: INTERNAL MEDICINE

## 2021-11-06 PROCEDURE — 700102 HCHG RX REV CODE 250 W/ 637 OVERRIDE(OP): Performed by: STUDENT IN AN ORGANIZED HEALTH CARE EDUCATION/TRAINING PROGRAM

## 2021-11-06 PROCEDURE — 82533 TOTAL CORTISOL: CPT

## 2021-11-06 PROCEDURE — 83540 ASSAY OF IRON: CPT

## 2021-11-06 PROCEDURE — 82570 ASSAY OF URINE CREATININE: CPT

## 2021-11-06 PROCEDURE — 700111 HCHG RX REV CODE 636 W/ 250 OVERRIDE (IP): Performed by: INTERNAL MEDICINE

## 2021-11-06 PROCEDURE — 80048 BASIC METABOLIC PNL TOTAL CA: CPT

## 2021-11-06 PROCEDURE — 770006 HCHG ROOM/CARE - MED/SURG/GYN SEMI*

## 2021-11-06 PROCEDURE — 90935 HEMODIALYSIS ONE EVALUATION: CPT | Performed by: INTERNAL MEDICINE

## 2021-11-06 PROCEDURE — 83550 IRON BINDING TEST: CPT

## 2021-11-06 PROCEDURE — 85027 COMPLETE CBC AUTOMATED: CPT

## 2021-11-06 PROCEDURE — 80069 RENAL FUNCTION PANEL: CPT

## 2021-11-06 PROCEDURE — 90935 HEMODIALYSIS ONE EVALUATION: CPT

## 2021-11-06 PROCEDURE — 36415 COLL VENOUS BLD VENIPUNCTURE: CPT

## 2021-11-06 PROCEDURE — 700111 HCHG RX REV CODE 636 W/ 250 OVERRIDE (IP)

## 2021-11-06 PROCEDURE — A9270 NON-COVERED ITEM OR SERVICE: HCPCS | Performed by: STUDENT IN AN ORGANIZED HEALTH CARE EDUCATION/TRAINING PROGRAM

## 2021-11-06 RX ORDER — HEPARIN SODIUM 5000 [USP'U]/ML
5000 INJECTION, SOLUTION INTRAVENOUS; SUBCUTANEOUS EVERY 8 HOURS
Status: DISCONTINUED | OUTPATIENT
Start: 2021-11-06 | End: 2021-11-17 | Stop reason: HOSPADM

## 2021-11-06 RX ORDER — HEPARIN SODIUM 1000 [USP'U]/ML
INJECTION, SOLUTION INTRAVENOUS; SUBCUTANEOUS
Status: COMPLETED
Start: 2021-11-06 | End: 2021-11-06

## 2021-11-06 RX ADMIN — FUROSEMIDE 40 MG: 10 INJECTION, SOLUTION INTRAMUSCULAR; INTRAVENOUS at 15:34

## 2021-11-06 RX ADMIN — NYSTATIN 500000 UNITS: 100000 SUSPENSION ORAL at 15:34

## 2021-11-06 RX ADMIN — BUPRENORPHINE HCL 2 MG: 2 TABLET SUBLINGUAL at 20:54

## 2021-11-06 RX ADMIN — OMEPRAZOLE 20 MG: 20 CAPSULE, DELAYED RELEASE ORAL at 18:02

## 2021-11-06 RX ADMIN — HEPARIN SODIUM 5000 UNITS: 5000 INJECTION, SOLUTION INTRAVENOUS; SUBCUTANEOUS at 20:54

## 2021-11-06 RX ADMIN — SODIUM BICARBONATE 1300 MG: 650 TABLET ORAL at 15:35

## 2021-11-06 RX ADMIN — BUPRENORPHINE HCL 2 MG: 2 TABLET SUBLINGUAL at 15:34

## 2021-11-06 RX ADMIN — BUPRENORPHINE HCL 2 MG: 2 TABLET SUBLINGUAL at 08:38

## 2021-11-06 RX ADMIN — SODIUM BICARBONATE 1300 MG: 650 TABLET ORAL at 20:54

## 2021-11-06 RX ADMIN — NYSTATIN 500000 UNITS: 100000 SUSPENSION ORAL at 18:03

## 2021-11-06 RX ADMIN — HEPARIN SODIUM 3700 UNITS: 1000 INJECTION, SOLUTION INTRAVENOUS; SUBCUTANEOUS at 14:30

## 2021-11-06 RX ADMIN — BENZOCAINE AND MENTHOL 1 LOZENGE: 15; 3.6 LOZENGE ORAL at 08:38

## 2021-11-06 RX ADMIN — NYSTATIN 500000 UNITS: 100000 SUSPENSION ORAL at 08:38

## 2021-11-06 RX ADMIN — NYSTATIN 500000 UNITS: 100000 SUSPENSION ORAL at 20:54

## 2021-11-06 RX ADMIN — MICONAZOLE NITRATE: 20 CREAM TOPICAL at 06:00

## 2021-11-06 RX ADMIN — MICONAZOLE NITRATE: 20 CREAM TOPICAL at 18:00

## 2021-11-06 RX ADMIN — HEPARIN SODIUM 5000 UNITS: 5000 INJECTION, SOLUTION INTRAVENOUS; SUBCUTANEOUS at 15:35

## 2021-11-06 ASSESSMENT — COGNITIVE AND FUNCTIONAL STATUS - GENERAL
EATING MEALS: A LITTLE
STANDING UP FROM CHAIR USING ARMS: TOTAL
DRESSING REGULAR UPPER BODY CLOTHING: A LITTLE
MOVING FROM LYING ON BACK TO SITTING ON SIDE OF FLAT BED: A LOT
WALKING IN HOSPITAL ROOM: TOTAL
CLIMB 3 TO 5 STEPS WITH RAILING: TOTAL
DAILY ACTIVITIY SCORE: 14
SUGGESTED CMS G CODE MODIFIER MOBILITY: CM
MOVING TO AND FROM BED TO CHAIR: A LOT
PERSONAL GROOMING: A LITTLE
DRESSING REGULAR LOWER BODY CLOTHING: A LOT
SUGGESTED CMS G CODE MODIFIER DAILY ACTIVITY: CK
TURNING FROM BACK TO SIDE WHILE IN FLAT BAD: A LOT
MOBILITY SCORE: 9
HELP NEEDED FOR BATHING: A LOT
TOILETING: TOTAL

## 2021-11-06 ASSESSMENT — ENCOUNTER SYMPTOMS
BACK PAIN: 0
DIZZINESS: 0
DIARRHEA: 0
NAUSEA: 0
LOSS OF CONSCIOUSNESS: 0
SORE THROAT: 0
FEVER: 0
PALPITATIONS: 0
HEADACHES: 0
VOMITING: 0
DOUBLE VISION: 0
CHILLS: 0
ABDOMINAL PAIN: 0
BLURRED VISION: 0
COUGH: 0
SHORTNESS OF BREATH: 0

## 2021-11-06 NOTE — PROGRESS NOTES
Received report from NOC RN, pt care assumed, VS stable on 2L NC. Call light within reach, hourly rounding initiated. No signs of acute distress. Pt is AAOx4, however seems forgetful at times. Wife is at the bedside. Denies having pain at this time.

## 2021-11-06 NOTE — PROGRESS NOTES
HD treatment # 2 today.Treatment tolerated well.Patient slept mostly.Net UF removed 2000 ml.Initial cvc dressing changed and locked with heparin.Report given to primary Rn.

## 2021-11-06 NOTE — CARE PLAN
The patient is Watcher - Medium risk of patient condition declining or worsening    Shift Goals  Clinical Goals: Maintain skin integrity  Patient Goals: pain mgmt  Family Goals: NAVID    Progress made toward(s) clinical / shift goals:  Progressing     Problem: Knowledge Deficit - Standard  Goal: Patient and family/care givers will demonstrate understanding of plan of care, disease process/condition, diagnostic tests and medications  Outcome: Progressing  Note: Pt updated on POC. No questions at this time      Problem: Pain - Standard  Goal: Alleviation of pain or a reduction in pain to the patient’s comfort goal  Outcome: Progressing  Note: Patient encouraged to voice feelings of pain. Pt medicated per MAR.

## 2021-11-06 NOTE — PROGRESS NOTES
[facility] Services Progress Note    Hemodialysis treatment ordered today per Dr. Hutchinson x 2.5 hours. Treatment initiated at 1816 and ended at 2046.    Pt tolerated treatment, Pt upset about not able to eat and drink at this time, NPO diet still in place.primary RN aware.,; see paper flow sheets for details.    Net UF 1,500 mL    Post tx, CVC flushed with saline then locked with heparin 1000 units/mL per designated amount in each wing then clamped and capped. Aspirate heparin prior to next CVC use. HD ports initial Dsg. Observed, intact covered with gauze with small amount of bloody drainage noted, no active bleeding observed, primary RN notified.    Report given to Primary RN.

## 2021-11-06 NOTE — PROCEDURES
Nephrology/Hemodialysis note    Patient with UTE of unclear etiology, s/p kidney biopsy started HD  Seen and examined during first dialysis treatment  tolerates well  VS stable  Lab results reviewed  UF 1-2 L  Please see dialysis flow sheet for details

## 2021-11-06 NOTE — PROGRESS NOTES
Hospital Medicine Daily Progress Note    Date of Service  11/6/2021    Chief Complaint  Zachary Moore is a 71 y.o. male admitted 11/2/2021 with UTE    Hospital Course  This is a 71-year-old gentleman who originally suffered a hip fracture and was treated at [facility].  During that hospitalization he was also treated for alcohol withdrawal and urosepsis.  From there he was discharged to rehab facility, and has since been transferred to us.  His previous medical history is also significant for hypertension, alcohol abuse, stage II chronic kidney disease..  Patient was sent to us from rehab facility in [place] after he was found to be hyponatremic and with acute kidney injury.  On arrival here, his sodium was 114, potassium 5.2, chloride 86, CO2 12, BUN 64, creatinine 6.10.  He has a previous diagnosis of stage II chronic kidney disease with a baseline creatinine around 1.0.  Patient was admitted to the hospital with a diagnosis of likely hypovolemic hyponatremia.  He was initially treated with fluid resuscitation, and corrected rapidly, however, he remained with very poor urine output and therefore nephrology has been consulted.    Interval Problem Update  Pt c/o of being sleepy this am but no other specific complaints    AFebrile  Sinus 70-80s  -160s    11/5. Sleepy but arousable. SLP couldn't do assessment because he complained of odynophagia. I clarified with him, He can still take PO and his odynophagia has been going on for 2 years on and off. I told SLP to see if we can reevaluate his swallowing again. CUrrently he is NPO anyway for catheter placement.  11/6. SLP recommended NPO but will reassess Saturday.  Family at bedside. Discussed with Speech. He did pass his test he can do thin liquids. I added supoplements. I ordered dietitian. Patient planned for dialysis cathter placement today.    I have personally seen and examined the patient at bedside. I discussed the plan of care with  patient, bedside RN and pulmonary. And SLP    Consultants/Specialty  nephrology  critical care    Code Status  Full Code    Disposition  Patient is not medically cleared.   Anticipate discharge to to skilled nursing facility.  I have placed the appropriate orders for post-discharge needs.    Review of Systems  Review of Systems   Constitutional: Positive for malaise/fatigue. Negative for chills and fever.   HENT: Negative for nosebleeds and sore throat.    Eyes: Negative for blurred vision and double vision.   Respiratory: Negative for cough and shortness of breath.    Cardiovascular: Positive for leg swelling. Negative for chest pain and palpitations.   Gastrointestinal: Negative for abdominal pain, diarrhea, nausea and vomiting.   Genitourinary: Negative for dysuria and urgency.   Musculoskeletal: Negative for back pain.   Skin: Negative for rash.   Neurological: Negative for dizziness, loss of consciousness and headaches.        Physical Exam  Temp:  [36.1 °C (97 °F)] 36.1 °C (97 °F)  Pulse:  [57-95] 95  Resp:  [11-18] 18  BP: (135-156)/(65-78) 135/67  SpO2:  [87 %-100 %] 98 %    Physical Exam  Constitutional:       General: He is not in acute distress.     Appearance: Normal appearance. He is well-developed. He is not diaphoretic.   HENT:      Head: Normocephalic and atraumatic.      Mouth/Throat:      Comments: NO odynophagia, out of proportion to exam.  Eyes:      Conjunctiva/sclera: Conjunctivae normal.   Neck:      Vascular: No JVD.   Cardiovascular:      Rate and Rhythm: Normal rate.      Heart sounds: No murmur heard.   No gallop.    Pulmonary:      Effort: Pulmonary effort is normal. No respiratory distress.      Breath sounds: No stridor. No wheezing or rales.   Abdominal:      Palpations: Abdomen is soft.      Tenderness: There is no abdominal tenderness. There is no guarding or rebound.   Musculoskeletal:      Right lower leg: Edema present.      Left lower leg: Edema present.      Comments: 1+ edema B  slightly more on the R    Skin:     General: Skin is warm and dry.      Findings: No rash.   Neurological:      General: No focal deficit present.      Mental Status: He is alert and oriented to person, place, and time.      Comments: Sleepy   Psychiatric:         Mood and Affect: Mood normal.         Thought Content: Thought content normal.         Fluids    Intake/Output Summary (Last 24 hours) at 11/6/2021 0804  Last data filed at 11/5/2021 2100  Gross per 24 hour   Intake 500 ml   Output 2000 ml   Net -1500 ml       Laboratory  Recent Labs     11/04/21  0605 11/06/21  0417   WBC 15.4* 8.3   RBC 2.71* 2.58*   HEMOGLOBIN 8.3* 7.8*   HEMATOCRIT 24.1* 23.0*   MCV 88.9 89.1   MCH 30.6 30.2   MCHC 34.4 33.9   RDW 46.6 47.6   PLATELETCT 431 281   MPV 9.7 9.5     Recent Labs     11/05/21  0550 11/05/21  1156 11/06/21  0417   SODIUM 122* 122* 127*   POTASSIUM 4.9 5.2 4.1   CHLORIDE 92* 92* 92*   CO2 12* 13* 19*   GLUCOSE 83 83 76   BUN 80* 78* 49*   CREATININE 7.75* 7.87* 5.79*   CALCIUM 7.0* 7.0* 7.4*     Recent Labs     11/04/21  0605   INR 1.37*               Imaging  IR-GUERRIER,GROSHONG PLACEMENT >5   Final Result      Successful image guided tunneled dialysis catheter placement.      Plan: The catheter is available for immediate use.            CT-NEEDLE BX-RENAL   Final Result      CT-guided core biopsy of the right kidney.      US-RENAL   Final Result      1.  BILATERAL pleural effusions   2.  Small volume of ascites   3.  Cholelithiasis and gallbladder sludge with gallbladder wall thickening incidentally noted. Cholecystitis is a consideration.      EC-ECHOCARDIOGRAM COMPLETE W/O CONT   Final Result      DX-CHEST-PORTABLE (1 VIEW)   Final Result      Mild basilar and apical opacity compatible with atelectasis or scarring although consolidation is not excluded      Effusions on recent CT are not detected radiographically      CT-ABDOMEN-PELVIS W/O   Final Result      Diffuse urinary bladder wall thickening with  "Guy catheter in place. This is suspicious for cystitis but the finding is nonspecific      No hydronephrosis or urolithiasis      Cholelithiasis      Impending diarrhea      Small to medium right, small left pleural effusions      5 mm mean diameter left lower lobe pulmonary nodule. Follow-up recommendations as below   Low Risk: No routine follow-up      High Risk: Optional CT at 12 months      Comments: Nodules less than 6 mm do not require routine follow-up, but certain patients at high risk with suspicious nodule morphology, upper lobe location, or both may warrant 12-month follow-up.      Low Risk - Minimal or absent history of smoking and of other known risk factors.      High Risk - History of smoking or of other known risk factors.      Note: These recommendations do not apply to lung cancer screening, patients with immunosuppression, or patients with known primary cancer.      Fleischner Society 2017 Guidelines for Management of Incidentally Detected Pulmonary Nodules in Adults         CT-HEAD W/O   Final Result      No noncontrast CT evidence of acute intracranial hemorrhage.      Severe white matter hypodensity is present.  This is a nonspecific finding which usually is found to represent chronic microvascular disease in patient's of this demographic.  Demyelination, age indeterminant ischemia and gliosis are also common    possibilities.      Moderate to severe parenchymal volume loss         US-FOREIGN FILM ULTRASOUND   Final Result           Assessment/Plan  * UTE (acute kidney injury), hypotension, hyponatremia, s/p femoral ORIF, anemia (HCC)- (present on admission)  Assessment & Plan  Was clearly dehydrated on presentation however renal function has not significantly improved with resuscitation  Nephrology following  Plan renal Bx today  UOP has improved today  Renally dose medications as appropriate  Follow daily BMP and UOP\"    Na improving but will need it to be 125 or above before dischagre. Now " "improved.  Nephrology following  Dysphagia diet.  From rehab, ordered PT/OT, may need skilled. SW also to arrange dialysis once we get skilled.      Anemia  Assessment & Plan  Ordered occult stool  Ordered iron studies  Currently no active bleeding.    Dehydration  Assessment & Plan  Has been fluid resuscitated  Secondary to diarrhea      Encephalopathy acute- (present on admission)  Assessment & Plan  2/2 UTE vs. Urosepsis vs. Acute hyponatremia  Improving  CT head neg for acute findings    Sepsis due to Escherichia coli with acute renal failure without septic shock (HCC)  Assessment & Plan  NOT sepsis  Cultures neg  Only given one dose of ABx's on presentation          Mass of urinary bladder- (present on admission)  Assessment & Plan  Based on imaging from [facility]  Repeat CT Abd/Plvs and renal US do not show any mass    Jeana-prosthetic femoral shaft fracture- (present on admission)  Assessment & Plan  S/p ORIF  Pain control  Brace present  PT/OT consults      Alcohol use disorder, moderate, dependence (HCC)- (present on admission)  Assessment & Plan  Alcohol level ordered  Last drink reported 5 days prior by patient  Monitor for signs of withdrawal    Acute cystitis with hematuria- (present on admission)  Assessment & Plan  Treated for sepsis 1 week ago for Bacteremia & UTI treated with zosyn & Unasyn and d/c on cipro 750mg BID for 9 more doses and Flagyl 500mg TID x 16 days growing Resistant Ecoli.  Urine cultures here are neg  US at [facility] showing possible mass however CT Abd/Plvs and renal US both neg for mass here  Cont to monitor off Abx's  Catheter change w/in 48hrs of admission    Acute hyponatremia- (present on admission)  Assessment & Plan  A component of hypovolemia complicated by UTE  Has corrected with NS to 120  Cont q6 monitoring; goal upper 120s over next 36hrs  Nephrology following\"    Improving slowly.         Elevated liver enzymes- (present on admission)  Assessment & Plan  Trend  Appears " "chronic  Monitor      Hypertension- (present on admission)  Assessment & Plan  On lisinopril 10 as an outpt which was stopped due to hypotension and UET  Start amlodipine 5mg    Vitals:    11/06/21 0901   BP: 148/78   Pulse: 99   Resp: 18   Temp: 37.1 °C (98.8 °F)   SpO2: 99%     Stable.       VTE prophylaxis: \" heparin ppx\" He is not on heparin ppx. Only SCDs. On chart review this was discontinued on 11/3. Will investigate with Pharmacy    I have performed a physical exam and reviewed and updated ROS and Plan today (11/6/2021). In review of yesterday's note (11/5/2021), there are no changes except as documented above.      "

## 2021-11-06 NOTE — PROCEDURES
Nephrology/Hemodialysis note    Patient with UTE/CKD II/oliguric -on HD, diagnostic renal biopsy results pending,   Seen and examined during second dialysis treatment -tolerates well  VS stable  Lab results reviewed  Hyponatremia improving  UF goal 2-3 L  Please see dialysis flow sheet for details

## 2021-11-06 NOTE — ASSESSMENT & PLAN NOTE
Ordered occult stool  Ordered iron studies  Currently no active bleeding.  Occult positive  At this time patient prefers GI work-up outpatient  May need epoietin, updated Nephrology  11/8. Consulted Dr. Khoury, GI for odynophagia/poor PO intake, thus will also evaluate UGI/anemia.  Recommended follow up outpatient

## 2021-11-07 PROBLEM — R19.5 OCCULT BLOOD POSITIVE STOOL: Status: ACTIVE | Noted: 2021-11-07

## 2021-11-07 LAB
ALBUMIN SERPL BCP-MCNC: 2.7 G/DL (ref 3.2–4.9)
ANION GAP SERPL CALC-SCNC: 17 MMOL/L (ref 7–16)
BUN SERPL-MCNC: 29 MG/DL (ref 8–22)
BUN SERPL-MCNC: 30 MG/DL (ref 8–22)
CALCIUM SERPL-MCNC: 7.8 MG/DL (ref 8.5–10.5)
CALCIUM SERPL-MCNC: 8 MG/DL (ref 8.5–10.5)
CHLORIDE SERPL-SCNC: 93 MMOL/L (ref 96–112)
CHLORIDE SERPL-SCNC: 94 MMOL/L (ref 96–112)
CO2 SERPL-SCNC: 19 MMOL/L (ref 20–33)
CO2 SERPL-SCNC: 23 MMOL/L (ref 20–33)
CREAT SERPL-MCNC: 4.26 MG/DL (ref 0.5–1.4)
CREAT SERPL-MCNC: 4.43 MG/DL (ref 0.5–1.4)
ERYTHROCYTE [DISTWIDTH] IN BLOOD BY AUTOMATED COUNT: 51.8 FL (ref 35.9–50)
GLUCOSE SERPL-MCNC: 95 MG/DL (ref 65–99)
GLUCOSE SERPL-MCNC: 99 MG/DL (ref 65–99)
HCT VFR BLD AUTO: 26.2 % (ref 42–52)
HGB BLD-MCNC: 8.3 G/DL (ref 14–18)
MCH RBC QN AUTO: 30.1 PG (ref 27–33)
MCHC RBC AUTO-ENTMCNC: 31.7 G/DL (ref 33.7–35.3)
MCV RBC AUTO: 94.9 FL (ref 81.4–97.8)
PHOSPHATE SERPL-MCNC: 6 MG/DL (ref 2.5–4.5)
PLATELET # BLD AUTO: 152 K/UL (ref 164–446)
PMV BLD AUTO: 12.1 FL (ref 9–12.9)
POTASSIUM SERPL-SCNC: 3.8 MMOL/L (ref 3.6–5.5)
POTASSIUM SERPL-SCNC: 3.9 MMOL/L (ref 3.6–5.5)
RBC # BLD AUTO: 2.76 M/UL (ref 4.7–6.1)
SODIUM SERPL-SCNC: 133 MMOL/L (ref 135–145)
SODIUM SERPL-SCNC: 134 MMOL/L (ref 135–145)
WBC # BLD AUTO: 7.9 K/UL (ref 4.8–10.8)

## 2021-11-07 PROCEDURE — 80048 BASIC METABOLIC PNL TOTAL CA: CPT

## 2021-11-07 PROCEDURE — A9270 NON-COVERED ITEM OR SERVICE: HCPCS | Performed by: INTERNAL MEDICINE

## 2021-11-07 PROCEDURE — A9270 NON-COVERED ITEM OR SERVICE: HCPCS | Performed by: NURSE PRACTITIONER

## 2021-11-07 PROCEDURE — A9270 NON-COVERED ITEM OR SERVICE: HCPCS | Performed by: STUDENT IN AN ORGANIZED HEALTH CARE EDUCATION/TRAINING PROGRAM

## 2021-11-07 PROCEDURE — 99233 SBSQ HOSP IP/OBS HIGH 50: CPT | Performed by: INTERNAL MEDICINE

## 2021-11-07 PROCEDURE — 700102 HCHG RX REV CODE 250 W/ 637 OVERRIDE(OP): Performed by: NURSE PRACTITIONER

## 2021-11-07 PROCEDURE — 700102 HCHG RX REV CODE 250 W/ 637 OVERRIDE(OP): Performed by: HOSPITALIST

## 2021-11-07 PROCEDURE — 36415 COLL VENOUS BLD VENIPUNCTURE: CPT

## 2021-11-07 PROCEDURE — 700111 HCHG RX REV CODE 636 W/ 250 OVERRIDE (IP): Performed by: INTERNAL MEDICINE

## 2021-11-07 PROCEDURE — A9270 NON-COVERED ITEM OR SERVICE: HCPCS | Performed by: HOSPITALIST

## 2021-11-07 PROCEDURE — 80069 RENAL FUNCTION PANEL: CPT

## 2021-11-07 PROCEDURE — 700102 HCHG RX REV CODE 250 W/ 637 OVERRIDE(OP): Performed by: INTERNAL MEDICINE

## 2021-11-07 PROCEDURE — 700102 HCHG RX REV CODE 250 W/ 637 OVERRIDE(OP): Performed by: STUDENT IN AN ORGANIZED HEALTH CARE EDUCATION/TRAINING PROGRAM

## 2021-11-07 PROCEDURE — 770006 HCHG ROOM/CARE - MED/SURG/GYN SEMI*

## 2021-11-07 PROCEDURE — 85027 COMPLETE CBC AUTOMATED: CPT

## 2021-11-07 PROCEDURE — 700111 HCHG RX REV CODE 636 W/ 250 OVERRIDE (IP): Performed by: NURSE PRACTITIONER

## 2021-11-07 RX ORDER — FUROSEMIDE 40 MG/1
40 TABLET ORAL
Status: DISCONTINUED | OUTPATIENT
Start: 2021-11-08 | End: 2021-11-07

## 2021-11-07 RX ORDER — AMLODIPINE BESYLATE 10 MG/1
10 TABLET ORAL
Status: DISCONTINUED | OUTPATIENT
Start: 2021-11-08 | End: 2021-11-17 | Stop reason: HOSPADM

## 2021-11-07 RX ADMIN — HEPARIN SODIUM 5000 UNITS: 5000 INJECTION, SOLUTION INTRAVENOUS; SUBCUTANEOUS at 20:46

## 2021-11-07 RX ADMIN — NYSTATIN 500000 UNITS: 100000 SUSPENSION ORAL at 14:23

## 2021-11-07 RX ADMIN — NYSTATIN 500000 UNITS: 100000 SUSPENSION ORAL at 20:40

## 2021-11-07 RX ADMIN — OMEPRAZOLE 20 MG: 20 CAPSULE, DELAYED RELEASE ORAL at 06:12

## 2021-11-07 RX ADMIN — MICONAZOLE NITRATE: 20 CREAM TOPICAL at 06:00

## 2021-11-07 RX ADMIN — SODIUM BICARBONATE 1300 MG: 650 TABLET ORAL at 14:23

## 2021-11-07 RX ADMIN — FUROSEMIDE 40 MG: 10 INJECTION, SOLUTION INTRAMUSCULAR; INTRAVENOUS at 06:13

## 2021-11-07 RX ADMIN — NYSTATIN 500000 UNITS: 100000 SUSPENSION ORAL at 08:53

## 2021-11-07 RX ADMIN — BUPRENORPHINE HCL 2 MG: 2 TABLET SUBLINGUAL at 16:26

## 2021-11-07 RX ADMIN — AMLODIPINE BESYLATE 5 MG: 10 TABLET ORAL at 06:13

## 2021-11-07 RX ADMIN — MICONAZOLE NITRATE: 20 CREAM TOPICAL at 16:26

## 2021-11-07 RX ADMIN — SODIUM BICARBONATE 1300 MG: 650 TABLET ORAL at 16:25

## 2021-11-07 RX ADMIN — HYDRALAZINE HYDROCHLORIDE 10 MG: 20 INJECTION INTRAMUSCULAR; INTRAVENOUS at 16:49

## 2021-11-07 RX ADMIN — BUPRENORPHINE HCL 2 MG: 2 TABLET SUBLINGUAL at 08:53

## 2021-11-07 RX ADMIN — SODIUM BICARBONATE 1300 MG: 650 TABLET ORAL at 08:49

## 2021-11-07 RX ADMIN — HEPARIN SODIUM 5000 UNITS: 5000 INJECTION, SOLUTION INTRAVENOUS; SUBCUTANEOUS at 14:23

## 2021-11-07 RX ADMIN — SODIUM BICARBONATE 1300 MG: 650 TABLET ORAL at 20:43

## 2021-11-07 RX ADMIN — OMEPRAZOLE 20 MG: 20 CAPSULE, DELAYED RELEASE ORAL at 16:26

## 2021-11-07 RX ADMIN — HEPARIN SODIUM 5000 UNITS: 5000 INJECTION, SOLUTION INTRAVENOUS; SUBCUTANEOUS at 06:13

## 2021-11-07 RX ADMIN — NYSTATIN 500000 UNITS: 100000 SUSPENSION ORAL at 16:26

## 2021-11-07 RX ADMIN — GUAIFENESIN 200 MG: 100 SOLUTION ORAL at 20:42

## 2021-11-07 RX ADMIN — BUPRENORPHINE HCL 2 MG: 2 TABLET SUBLINGUAL at 20:38

## 2021-11-07 ASSESSMENT — ENCOUNTER SYMPTOMS
VOMITING: 0
SORE THROAT: 0
ABDOMINAL PAIN: 0
BLURRED VISION: 0
PALPITATIONS: 0
HEADACHES: 0
FEVER: 0
BACK PAIN: 0
ORTHOPNEA: 0
HEMOPTYSIS: 0
CHILLS: 0
EYES NEGATIVE: 1
COUGH: 0
DIARRHEA: 1
DIARRHEA: 0
NAUSEA: 0
DIZZINESS: 0
WEIGHT LOSS: 0
WHEEZING: 0
LOSS OF CONSCIOUSNESS: 0
SHORTNESS OF BREATH: 0
DOUBLE VISION: 0
SINUS PAIN: 0

## 2021-11-07 ASSESSMENT — PAIN DESCRIPTION - PAIN TYPE
TYPE: ACUTE PAIN
TYPE: ACUTE PAIN

## 2021-11-07 NOTE — CARE PLAN
The patient is Stable - Low risk of patient condition declining or worsening    Shift Goals  Clinical Goals: Pt will remain free of injury  Patient Goals: pain mgmt  Family Goals: NAVID    Progress made toward(s) clinical / shift goals:  Bed alarm in place.  Education provided on the use of the call light.     Patient is not progressing towards the following goals:  N/A

## 2021-11-07 NOTE — PROGRESS NOTES
Nephrology Daily Progress Note    Date of Service  11/7/2021    Chief Complaint  Zachary Moore is a 71 y.o. male with CKD II, recently hospitalized with RLE femur fx, urosepsis, admitted 11/2/2021 with UTE, hyponatremia    Interval Problem Update  11/4 -no acute events, no new complaints  UTE -creat at 6's  oliguric  Low C3 C4 -scheduled kidney biopsy  ABDULAZIZ, ANCA -pending  11/7 -doing well, no complaints  S/p HD x 2 tolerated well  S/p diagnostic kidney biopsy -results pending  UOP slightly better    Code Status  Full Code      Review of Systems  Review of Systems   Constitutional: Negative for chills, fever, malaise/fatigue and weight loss.   HENT: Negative for congestion, hearing loss and sinus pain.    Eyes: Negative.    Respiratory: Negative for cough, hemoptysis, shortness of breath and wheezing.    Cardiovascular: Negative for chest pain, palpitations, orthopnea and leg swelling.   Gastrointestinal: Positive for diarrhea. Negative for nausea and vomiting.   Genitourinary:        Guy catheter   Skin: Negative.    All other systems reviewed and are negative.       Physical Exam  Temp:  [36.3 °C (97.4 °F)-36.9 °C (98.5 °F)] 36.9 °C (98.4 °F)  Pulse:  [89-96] 95  Resp:  [14-18] 16  BP: (123-149)/(70-83) 147/75  SpO2:  [95 %-100 %] 100 %    Physical Exam  Vitals and nursing note reviewed.   Constitutional:       General: He is not in acute distress.     Appearance: Normal appearance. He is well-developed. He is not diaphoretic.   HENT:      Head: Normocephalic and atraumatic.      Nose: Nose normal.      Mouth/Throat:      Mouth: Mucous membranes are moist.      Pharynx: Oropharynx is clear.   Eyes:      General: No scleral icterus.     Extraocular Movements: Extraocular movements intact.      Conjunctiva/sclera: Conjunctivae normal.      Pupils: Pupils are equal, round, and reactive to light.   Cardiovascular:      Rate and Rhythm: Normal rate and regular rhythm.      Pulses: Normal pulses.      Heart sounds:  Normal heart sounds. No friction rub. No gallop.    Pulmonary:      Effort: Pulmonary effort is normal. No respiratory distress.      Breath sounds: Normal breath sounds. No wheezing, rhonchi or rales.   Abdominal:      General: Bowel sounds are normal. There is no distension.      Palpations: Abdomen is soft. There is no mass.      Tenderness: There is no abdominal tenderness. There is no right CVA tenderness, left CVA tenderness or guarding.   Musculoskeletal:      Cervical back: Normal range of motion and neck supple.      Comments: Trace pedal edema   Skin:     General: Skin is warm.      Findings: No erythema or rash.   Neurological:      General: No focal deficit present.      Mental Status: He is alert.      Cranial Nerves: No cranial nerve deficit.      Coordination: Coordination normal.   Psychiatric:         Mood and Affect: Mood normal.         Behavior: Behavior normal.         Thought Content: Thought content normal.         Judgment: Judgment normal.         Fluids    Intake/Output Summary (Last 24 hours) at 11/7/2021 1359  Last data filed at 11/6/2021 1430  Gross per 24 hour   Intake --   Output 2000 ml   Net -2000 ml       Laboratory  Recent Labs     11/06/21  0417   WBC 8.3   RBC 2.58*   HEMOGLOBIN 7.8*   HEMATOCRIT 23.0*   MCV 89.1   MCH 30.2   MCHC 33.9   RDW 47.6   PLATELETCT 281   MPV 9.5     Recent Labs     11/06/21  0417 11/06/21  0417 11/06/21  1120 11/06/21  1525 11/07/21  0842   SODIUM 127*   < > 128* 131* 134*   POTASSIUM 4.1  --   --  3.7 3.8   CHLORIDE 92*  --   --  92* 94*   CO2 19*  --   --  23 23   GLUCOSE 76  --   --  82 99   BUN 49*  --   --  21 29*   CREATININE 5.79*  --   --  2.94* 4.26*   CALCIUM 7.4*  --   --  8.1* 7.8*    < > = values in this interval not displayed.                   Imaging  reviewed    Assessment/Plan  1.UTE/CKD II/oliguric -on HD      S/p diagnostic kidney biopsy  -results pending to follow  2.HTN: BP well controlled  3.Electrolytes: hyponatremia - improving  -to monitor  4.Anemia: Hb to monitor  5.Metabolic acidosis: corrected  6.Proteinuria/hematuria - f/u diagnostic kidney biopsy results     ANCA negative    Recs: f/u kidney biopsy results             HD MWF             Daily labs             Renal diet             Avoid nephrotoxic agents             Will follow

## 2021-11-07 NOTE — PROGRESS NOTES
Received report from NOC RN, pt care assumed. VS stable on 3L NC. Call light within reach, hourly rounding initiated. No signs of acute distress. Pt is AAOx3.

## 2021-11-07 NOTE — DIETARY
"Nutrition services: Day 5 of admit.  Zachary Moore is a 71 y.o. male with admitting DX of acute renal failure.    Consult received for calorie count.   RD visited with pt and wife at bedside 11/4.  Ongoing poor PO intake noted per wife.   Poor PO intake continues. Pt is currently on a L3/L0 diet.   Spoke with pt and wife at bedside.  Pt reports a fair appetite, states PO intake is limited by pain in esophagus when eating.   SLP has evaluated swallow 11/5 and 11/6. Current diet order reflects recommendations.  Supplement order in place. RD will adjust oral nutrition supplements to Boost Very High Calorie to optimize intake. Each supplement contains 530 kcals and 22 grams of protein. Encouraged pt to consume TID, which would provide ~85% of estimated nutrition needs.  Discussed adequate nutrition to promote healing. Pt and wife verbalized understanding. Pt may be open to Cortrak with tube feeding per discussion at bedside. Would recommend if nutrition interventions noted above are not able to be met.    Assessment:  Height: 177.8 cm (5' 10\")  Weight: 76.5 kg (168 lb 10.4 oz)  Body mass index is 24.2 kg/m²., BMI classification: normal  Diet/Intake: L3/L0 (liquidised meals with thin liquids); PO 0%-75%    Malnutrition Risk: from RD note 11/5 Unable to meet criteria at this time. At risk w/ reported PO intake <50% of normal over past 3 weeks per pt's wife. Current admit wt elevated from reported UBW.    Recommendations/Plan:  1. Boost VHC TID.  2. Encourage intake of meals and supplements.  3. Document intake of all meals and supplements as % taken in ADLs to provide interdisciplinary communication across all shifts.   4. Monitor weight.  5. Nutrition rep will continue to see patient for ongoing meal and snack preferences.   6. GI consult per hospitalist.     RD following.          "

## 2021-11-07 NOTE — PROGRESS NOTES
[facility] Medicine Daily Progress Note    Date of Service  11/7/2021    Chief Complaint  Zachary Moore is a 71 y.o. male admitted 11/2/2021 with UTE    Hospital Course  This is a 71-year-old gentleman who originally suffered a hip fracture and was treated at [facility].  During that hospitalization he was also treated for alcohol withdrawal and urosepsis.  From there he was discharged to rehab facility, and has since been transferred to us.  His previous medical history is also significant for hypertension, alcohol abuse, stage II chronic kidney disease..  Patient was sent to us from rehab facility in [place] after he was found to be hyponatremic and with acute kidney injury.  On arrival here, his sodium was 114, potassium 5.2, chloride 86, CO2 12, BUN 64, creatinine 6.10.  He has a previous diagnosis of stage II chronic kidney disease with a baseline creatinine around 1.0.  Patient was admitted to the hospital with a diagnosis of likely hypovolemic hyponatremia.  He was initially treated with fluid resuscitation, and corrected rapidly, however, he remained with very poor urine output and therefore nephrology has been consulted.    Interval Problem Update  Pt c/o of being sleepy this am but no other specific complaints    AFebrile  Sinus 70-80s  -160s    11/5. Sleepy but arousable. SLP couldn't do assessment because he complained of odynophagia. I clarified with him, He can still take PO and his odynophagia has been going on for 2 years on and off. I told SLP to see if we can reevaluate his swallowing again. CUrrently he is NPO anyway for catheter placement.  11/6. SLP recommended NPO but will reassess Saturday.  Family at bedside. Discussed with Speech. He did pass his test he can do thin liquids. I added supoplements. I ordered dietitian. Patient planned for dialysis cathter placement today.  11/7. Arousable. His Hb 7.8. He has occult positive stool. No active bleeding. I discussed with him  if he wants an GI eval/ endoscopy or colonoscopy but at this time he declines and would prefer to do it outpatient. I did say if his Hb drops <7 or if he has copious melena or bright red blood then we will have GI consulted. He agreed with the plan. I encouraged PO and supplements.    I have personally seen and examined the patient at bedside. I discussed the plan of care with patient, bedside RN and pulmonary. And SLP    Consultants/Specialty  nephrology  critical care    Code Status  Full Code    Disposition  Patient is not medically cleared.   Anticipate discharge to to skilled nursing facility.  I have placed the appropriate orders for post-discharge needs.    Review of Systems  Review of Systems   Constitutional: Positive for malaise/fatigue. Negative for chills and fever.   HENT: Negative for nosebleeds and sore throat.    Eyes: Negative for blurred vision and double vision.   Respiratory: Negative for cough and shortness of breath.    Cardiovascular: Positive for leg swelling. Negative for chest pain and palpitations.   Gastrointestinal: Negative for abdominal pain, diarrhea, nausea and vomiting.   Genitourinary: Negative for dysuria and urgency.   Musculoskeletal: Negative for back pain.   Skin: Negative for rash.   Neurological: Negative for dizziness, loss of consciousness and headaches.        Physical Exam  Temp:  [36.3 °C (97.4 °F)-37.1 °C (98.8 °F)] 36.3 °C (97.4 °F)  Pulse:  [89-99] 89  Resp:  [14-18] 18  BP: (123-149)/(70-83) 149/81  SpO2:  [95 %-99 %] 97 %    Physical Exam  Constitutional:       General: He is not in acute distress.     Appearance: Normal appearance. He is well-developed. He is not diaphoretic.   HENT:      Head: Normocephalic and atraumatic.      Mouth/Throat:      Comments: NO odynophagia, out of proportion to exam.  Eyes:      Conjunctiva/sclera: Conjunctivae normal.   Neck:      Vascular: No JVD.   Cardiovascular:      Rate and Rhythm: Normal rate.      Heart sounds: No murmur  heard.  No gallop.    Pulmonary:      Effort: Pulmonary effort is normal. No respiratory distress.      Breath sounds: No stridor. No wheezing or rales.   Abdominal:      Palpations: Abdomen is soft.      Tenderness: There is no abdominal tenderness. There is no guarding or rebound.   Musculoskeletal:      Right lower leg: Edema present.      Left lower leg: Edema present.      Comments: 1+ edema B slightly more on the R    Skin:     General: Skin is warm and dry.      Coloration: Skin is pale.      Findings: No rash.   Neurological:      General: No focal deficit present.      Mental Status: He is alert and oriented to person, place, and time.      Comments: Less sleepy, more awake   Psychiatric:         Mood and Affect: Mood normal.         Thought Content: Thought content normal.         Fluids    Intake/Output Summary (Last 24 hours) at 11/7/2021 0753  Last data filed at 11/6/2021 1430  Gross per 24 hour   Intake --   Output 3900 ml   Net -3900 ml       Laboratory  Recent Labs     11/06/21  0417   WBC 8.3   RBC 2.58*   HEMOGLOBIN 7.8*   HEMATOCRIT 23.0*   MCV 89.1   MCH 30.2   MCHC 33.9   RDW 47.6   PLATELETCT 281   MPV 9.5     Recent Labs     11/05/21  1156 11/05/21  1156 11/06/21  0417 11/06/21  1120 11/06/21  1525   SODIUM 122*   < > 127* 128* 131*   POTASSIUM 5.2  --  4.1  --  3.7   CHLORIDE 92*  --  92*  --  92*   CO2 13*  --  19*  --  23   GLUCOSE 83  --  76  --  82   BUN 78*  --  49*  --  21   CREATININE 7.87*  --  5.79*  --  2.94*   CALCIUM 7.0*  --  7.4*  --  8.1*    < > = values in this interval not displayed.                   Imaging  IR-SOPHIA GUERRIER PLACEMENT >5   Final Result      Successful image guided tunneled dialysis catheter placement.      Plan: The catheter is available for immediate use.            CT-NEEDLE BX-RENAL   Final Result      CT-guided core biopsy of the right kidney.      US-RENAL   Final Result      1.  BILATERAL pleural effusions   2.  Small volume of ascites   3.   Cholelithiasis and gallbladder sludge with gallbladder wall thickening incidentally noted. Cholecystitis is a consideration.      EC-ECHOCARDIOGRAM COMPLETE W/O CONT   Final Result      DX-CHEST-PORTABLE (1 VIEW)   Final Result      Mild basilar and apical opacity compatible with atelectasis or scarring although consolidation is not excluded      Effusions on recent CT are not detected radiographically      CT-ABDOMEN-PELVIS W/O   Final Result      Diffuse urinary bladder wall thickening with Guy catheter in place. This is suspicious for cystitis but the finding is nonspecific      No hydronephrosis or urolithiasis      Cholelithiasis      Impending diarrhea      Small to medium right, small left pleural effusions      5 mm mean diameter left lower lobe pulmonary nodule. Follow-up recommendations as below   Low Risk: No routine follow-up      High Risk: Optional CT at 12 months      Comments: Nodules less than 6 mm do not require routine follow-up, but certain patients at high risk with suspicious nodule morphology, upper lobe location, or both may warrant 12-month follow-up.      Low Risk - Minimal or absent history of smoking and of other known risk factors.      High Risk - History of smoking or of other known risk factors.      Note: These recommendations do not apply to lung cancer screening, patients with immunosuppression, or patients with known primary cancer.      Fleischner Society 2017 Guidelines for Management of Incidentally Detected Pulmonary Nodules in Adults         CT-HEAD W/O   Final Result      No noncontrast CT evidence of acute intracranial hemorrhage.      Severe white matter hypodensity is present.  This is a nonspecific finding which usually is found to represent chronic microvascular disease in patient's of this demographic.  Demyelination, age indeterminant ischemia and gliosis are also common    possibilities.      Moderate to severe parenchymal volume loss         US-FOREIGN FILM  "ULTRASOUND   Final Result           Assessment/Plan  * UTE (acute kidney injury), hypotension, hyponatremia, s/p femoral ORIF, anemia (HCC)- (present on admission)  Assessment & Plan  Was clearly dehydrated on presentation however renal function has not significantly improved with resuscitation  Nephrology following  Plan renal Bx today  UOP has improved today  Renally dose medications as appropriate  Follow daily BMP and UOP\"    Na improving but will need it to be 125 or above before dischagre. Now improved.  Nephrology following  Dysphagia diet.  From rehab, ordered PT/OT, may need skilled. SW also to arrange dialysis once we get skilled.      Occult blood positive stool  Assessment & Plan  He has seen [facility] in the past for routine colonoscopy severalyears ago.  At this time he does not want endoscopy/colonoscopy inpatient but will revisit if he has actual BRBPR or melena,or Hb drops to <7.  Updated Nephrology    Anemia  Assessment & Plan  Ordered occult stool  Ordered iron studies  Currently no active bleeding.  Occult positive  At this time patient prefers GI work-up outpatient  May need epoietin, updated Nephrology    Dehydration  Assessment & Plan  Has been fluid resuscitated  Secondary to diarrhea      Encephalopathy acute- (present on admission)  Assessment & Plan  2/2 UTE vs. Urosepsis vs. Acute hyponatremia  Improving  CT head neg for acute findings    Sepsis due to Escherichia coli with acute renal failure without septic shock (HCC)  Assessment & Plan  NOT sepsis  Cultures neg  Only given one dose of ABx's on presentation          Mass of urinary bladder- (present on admission)  Assessment & Plan  Based on imaging from [facility]  Repeat CT Abd/Plvs and renal US do not show any mass    Jeana-prosthetic femoral shaft fracture- (present on admission)  Assessment & Plan  S/p ORIF  Pain control  Brace present  PT/OT consults      Alcohol use disorder, moderate, dependence (HCC)- (present on admission)  Assessment & " "Plan  Alcohol level ordered  Last drink reported 5 days prior by patient  Monitor for signs of withdrawal    Acute cystitis with hematuria- (present on admission)  Assessment & Plan  Treated for sepsis 1 week ago for Bacteremia & UTI treated with zosyn & Unasyn and d/c on cipro 750mg BID for 9 more doses and Flagyl 500mg TID x 16 days growing Resistant Ecoli.  Urine cultures here are neg  US at [facility] showing possible mass however CT Abd/Plvs and renal US both neg for mass here  Cont to monitor off Abx's  Catheter change w/in 48hrs of admission    Acute hyponatremia- (present on admission)  Assessment & Plan  A component of hypovolemia complicated by UTE  Has corrected with NS to 120  Cont q6 monitoring; goal upper 120s over next 36hrs  Nephrology following\"    Improving slowly.         Elevated liver enzymes- (present on admission)  Assessment & Plan  Trend  Appears chronic  Monitor      Hypertension- (present on admission)  Assessment & Plan  On lisinopril 10 as an outpt which was stopped due to hypotension and UTE  Start amlodipine 5mg    Vitals:    11/07/21 0752   BP: 147/75   Pulse: 95   Resp: 16   Temp: 36.9 °C (98.4 °F)   SpO2: 100%     Stable.       VTE prophylaxis: heparin ppx    I have performed a physical exam and reviewed and updated ROS and Plan today (11/7/2021). In review of yesterday's note (11/6/2021), there are no changes except as documented above.      "

## 2021-11-07 NOTE — ASSESSMENT & PLAN NOTE
He has seen [facility] in the past for routine colonoscopy severalyears ago.  At this time he does not want endoscopy/colonoscopy inpatient but will revisit if he has actual BRBPR or melena,or Hb drops to <7.  Updated Nephrology  11/8. Because of poor PO intake/odynophagia  GI recommended follow up

## 2021-11-08 ENCOUNTER — APPOINTMENT (OUTPATIENT)
Dept: RADIOLOGY | Facility: MEDICAL CENTER | Age: 71
DRG: 682 | End: 2021-11-08
Attending: INTERNAL MEDICINE
Payer: MEDICARE

## 2021-11-08 LAB
ALBUMIN SERPL BCP-MCNC: 2.7 G/DL (ref 3.2–4.9)
BACTERIA BLD CULT: NORMAL
BUN SERPL-MCNC: 35 MG/DL (ref 8–22)
CALCIUM SERPL-MCNC: 7.9 MG/DL (ref 8.5–10.5)
CHLORIDE SERPL-SCNC: 92 MMOL/L (ref 96–112)
CO2 SERPL-SCNC: 28 MMOL/L (ref 20–33)
CREAT SERPL-MCNC: 4.46 MG/DL (ref 0.5–1.4)
ERYTHROCYTE [DISTWIDTH] IN BLOOD BY AUTOMATED COUNT: 50.3 FL (ref 35.9–50)
GLUCOSE SERPL-MCNC: 122 MG/DL (ref 65–99)
HCT VFR BLD AUTO: 24.8 % (ref 42–52)
HGB BLD-MCNC: 8 G/DL (ref 14–18)
LACTATE BLD-SCNC: 1.5 MMOL/L (ref 0.5–2)
MCH RBC QN AUTO: 29.9 PG (ref 27–33)
MCHC RBC AUTO-ENTMCNC: 32.3 G/DL (ref 33.7–35.3)
MCV RBC AUTO: 92.5 FL (ref 81.4–97.8)
PHOSPHATE SERPL-MCNC: 5.5 MG/DL (ref 2.5–4.5)
PLATELET # BLD AUTO: 206 K/UL (ref 164–446)
PMV BLD AUTO: 9.3 FL (ref 9–12.9)
POTASSIUM SERPL-SCNC: 3.5 MMOL/L (ref 3.6–5.5)
RBC # BLD AUTO: 2.68 M/UL (ref 4.7–6.1)
SIGNIFICANT IND 70042: NORMAL
SITE SITE: NORMAL
SODIUM SERPL-SCNC: 132 MMOL/L (ref 135–145)
SOURCE SOURCE: NORMAL
WBC # BLD AUTO: 11 K/UL (ref 4.8–10.8)

## 2021-11-08 PROCEDURE — A9270 NON-COVERED ITEM OR SERVICE: HCPCS | Performed by: NURSE PRACTITIONER

## 2021-11-08 PROCEDURE — 71045 X-RAY EXAM CHEST 1 VIEW: CPT

## 2021-11-08 PROCEDURE — 700111 HCHG RX REV CODE 636 W/ 250 OVERRIDE (IP): Performed by: STUDENT IN AN ORGANIZED HEALTH CARE EDUCATION/TRAINING PROGRAM

## 2021-11-08 PROCEDURE — A9270 NON-COVERED ITEM OR SERVICE: HCPCS | Performed by: STUDENT IN AN ORGANIZED HEALTH CARE EDUCATION/TRAINING PROGRAM

## 2021-11-08 PROCEDURE — A9270 NON-COVERED ITEM OR SERVICE: HCPCS | Performed by: INTERNAL MEDICINE

## 2021-11-08 PROCEDURE — 90935 HEMODIALYSIS ONE EVALUATION: CPT | Performed by: INTERNAL MEDICINE

## 2021-11-08 PROCEDURE — 700102 HCHG RX REV CODE 250 W/ 637 OVERRIDE(OP): Performed by: STUDENT IN AN ORGANIZED HEALTH CARE EDUCATION/TRAINING PROGRAM

## 2021-11-08 PROCEDURE — 700111 HCHG RX REV CODE 636 W/ 250 OVERRIDE (IP): Performed by: INTERNAL MEDICINE

## 2021-11-08 PROCEDURE — 85027 COMPLETE CBC AUTOMATED: CPT

## 2021-11-08 PROCEDURE — 700102 HCHG RX REV CODE 250 W/ 637 OVERRIDE(OP): Performed by: INTERNAL MEDICINE

## 2021-11-08 PROCEDURE — 36415 COLL VENOUS BLD VENIPUNCTURE: CPT

## 2021-11-08 PROCEDURE — 90935 HEMODIALYSIS ONE EVALUATION: CPT

## 2021-11-08 PROCEDURE — 700102 HCHG RX REV CODE 250 W/ 637 OVERRIDE(OP): Performed by: NURSE PRACTITIONER

## 2021-11-08 PROCEDURE — 770006 HCHG ROOM/CARE - MED/SURG/GYN SEMI*

## 2021-11-08 PROCEDURE — 83605 ASSAY OF LACTIC ACID: CPT

## 2021-11-08 PROCEDURE — 80069 RENAL FUNCTION PANEL: CPT

## 2021-11-08 PROCEDURE — 700111 HCHG RX REV CODE 636 W/ 250 OVERRIDE (IP)

## 2021-11-08 PROCEDURE — 99233 SBSQ HOSP IP/OBS HIGH 50: CPT | Performed by: INTERNAL MEDICINE

## 2021-11-08 RX ORDER — SODIUM CHLORIDE, SODIUM LACTATE, POTASSIUM CHLORIDE, AND CALCIUM CHLORIDE .6; .31; .03; .02 G/100ML; G/100ML; G/100ML; G/100ML
500 INJECTION, SOLUTION INTRAVENOUS
Status: DISCONTINUED | OUTPATIENT
Start: 2021-11-08 | End: 2021-11-17 | Stop reason: HOSPADM

## 2021-11-08 RX ORDER — HYDRALAZINE HYDROCHLORIDE 50 MG/1
25 TABLET, FILM COATED ORAL EVERY 8 HOURS
Status: DISCONTINUED | OUTPATIENT
Start: 2021-11-08 | End: 2021-11-17 | Stop reason: HOSPADM

## 2021-11-08 RX ORDER — HEPARIN SODIUM 1000 [USP'U]/ML
INJECTION, SOLUTION INTRAVENOUS; SUBCUTANEOUS
Status: COMPLETED
Start: 2021-11-08 | End: 2021-11-08

## 2021-11-08 RX ORDER — MORPHINE SULFATE 4 MG/ML
4 INJECTION, SOLUTION INTRAMUSCULAR; INTRAVENOUS ONCE
Status: COMPLETED | OUTPATIENT
Start: 2021-11-08 | End: 2021-11-08

## 2021-11-08 RX ADMIN — AMLODIPINE BESYLATE 10 MG: 10 TABLET ORAL at 05:45

## 2021-11-08 RX ADMIN — HEPARIN SODIUM 3700 UNITS: 1000 INJECTION, SOLUTION INTRAVENOUS; SUBCUTANEOUS at 15:04

## 2021-11-08 RX ADMIN — HEPARIN SODIUM 5000 UNITS: 5000 INJECTION, SOLUTION INTRAVENOUS; SUBCUTANEOUS at 05:45

## 2021-11-08 RX ADMIN — NYSTATIN 500000 UNITS: 100000 SUSPENSION ORAL at 19:48

## 2021-11-08 RX ADMIN — NYSTATIN 500000 UNITS: 100000 SUSPENSION ORAL at 09:53

## 2021-11-08 RX ADMIN — HEPARIN SODIUM 3700 UNITS: 1000 INJECTION INTRAVENOUS; SUBCUTANEOUS at 15:04

## 2021-11-08 RX ADMIN — HEPARIN SODIUM 5000 UNITS: 5000 INJECTION, SOLUTION INTRAVENOUS; SUBCUTANEOUS at 22:00

## 2021-11-08 RX ADMIN — OMEPRAZOLE 20 MG: 20 CAPSULE, DELAYED RELEASE ORAL at 05:45

## 2021-11-08 RX ADMIN — BUPRENORPHINE HCL 2 MG: 2 TABLET SUBLINGUAL at 20:10

## 2021-11-08 RX ADMIN — ACETAMINOPHEN 650 MG: 325 TABLET ORAL at 13:19

## 2021-11-08 RX ADMIN — HYDRALAZINE HYDROCHLORIDE 25 MG: 50 TABLET ORAL at 22:00

## 2021-11-08 RX ADMIN — MICONAZOLE NITRATE: 20 CREAM TOPICAL at 05:45

## 2021-11-08 RX ADMIN — ACETAMINOPHEN 650 MG: 325 TABLET ORAL at 19:47

## 2021-11-08 RX ADMIN — BUPRENORPHINE HCL 2 MG: 2 TABLET SUBLINGUAL at 09:53

## 2021-11-08 RX ADMIN — NYSTATIN 500000 UNITS: 100000 SUSPENSION ORAL at 17:23

## 2021-11-08 RX ADMIN — SODIUM BICARBONATE 1300 MG: 650 TABLET ORAL at 19:49

## 2021-11-08 RX ADMIN — MICONAZOLE NITRATE: 20 CREAM TOPICAL at 18:00

## 2021-11-08 RX ADMIN — SODIUM BICARBONATE 1300 MG: 650 TABLET ORAL at 17:23

## 2021-11-08 RX ADMIN — SODIUM BICARBONATE 1300 MG: 650 TABLET ORAL at 09:53

## 2021-11-08 RX ADMIN — MORPHINE SULFATE 4 MG: 4 INJECTION INTRAVENOUS at 21:51

## 2021-11-08 RX ADMIN — OMEPRAZOLE 20 MG: 20 CAPSULE, DELAYED RELEASE ORAL at 17:23

## 2021-11-08 ASSESSMENT — ENCOUNTER SYMPTOMS
PALPITATIONS: 0
FEVER: 0
VOMITING: 0
NAUSEA: 0
WEAKNESS: 1
LOSS OF CONSCIOUSNESS: 0
DOUBLE VISION: 0
SORE THROAT: 0
CHILLS: 0
DIARRHEA: 0
ABDOMINAL PAIN: 0
SHORTNESS OF BREATH: 0
DIZZINESS: 0
BACK PAIN: 0
HEADACHES: 0
BLURRED VISION: 0
COUGH: 0

## 2021-11-08 ASSESSMENT — PAIN DESCRIPTION - PAIN TYPE
TYPE: ACUTE PAIN
TYPE: ACUTE PAIN

## 2021-11-08 NOTE — DISCHARGE PLANNING
[facility]    Referral from:  Dr. Sesay  Insurance Provider on Facesheet:  Medicare/[employer]  Potential Rehab Diagnosis:     Chart review indicates patient has on going medical management and therapy needs to possibly meet inpatient rehab facility criteria with the goal of returning to community.    D/C support:     Physiatry to consult.     Last Covid test:       Thank you for the referral.

## 2021-11-08 NOTE — CONSULTS
Physical Medicine and Rehabilitation Consultation              Date of initial consultation: 11/8/2021  Requested by: Dr. Nick Sesay MD  Consulting physician: Melissa Dodson D.O.  Reason for consultation: assessment of rehabilitation needs  LOS: 6 Day(s)    Chief complaint: impaired ADL and mobility, right wrist pain    This history was prepared after interviewing the patient and spouse, who was also present, and reviewing the patient's chart at [facility].    HPI: The patient is a 71 y.o. male with a past medical history of  Arthritis, hypertension, skin cancer and alcohol use;  who presented on 11/2/2021  7:29 PM as a direct admit from [facility] on 11/2/21 for worsening renal failure function and hyponatremia. Patient reports he had a ground level fall (stepping on stair)  on October 12 and sustained a right RLE femur fracture, admitted at [facility] and underwent right ORIF for a comminuted fracture proximal to his right knee prosthesis, which was complicated later by urosepsis and alcohol withdrawal placed on empiric abx therapy with Zosyn and Unasyn.    Initially admitted on the floor but then transferred to the ICU to manage his hyponatremia, UTE and sepsis. Now s/p hemodialysis x2 and diagnostic kidney biopsy (11/4/2021). Current plan is hemodialysis MWF on renal diet. There is concern for GI bleed as occult stool positive with hemoglobin 7.8 - patient opted for outpatient management at this time.    The patient underwent the following imaging and found to have:  CT head showing Severe white matter hypodensity is present.  Moderate to severe parenchymal volume loss  Small to medium right, small left pleural effusions    Physiatry was consulted to assess the patient's rehabilitation needs    Today, patient reports: went to SNF to stay closer to home. Would prefer to have more therapies to return home sooner. Wife will be available at home. Has ramp through garage for  wheelchair access. Patient and wife understand that goal is minimum assistance at wheelchair level on discharge from IRF.     Goals for rehabilitation include: minimum assistance at wheelchair level on discharge from IRF to return home    Social and functional history:  Prior to this hospitalization, patient was independent with ADLS and mobility with an assistive device. Patient lives in one story home with spouse with 2 steps to enter. Immediately prior to this admission he was at SNF for his fracture recovery.  Alcohol: daily, last three weeks ago    Current function during therapy include:  Restrictions: Toe Touch Weight Bearing Right Lower Extremity;Other (See Comments)  (Rectal tube.)  PT: Functional mobility   Cognition    Level of Consciousness Alert   Comments confused at times, then seems clear, asking about when his biopsy is.    Active ROM Lower Body    Active ROM Lower Body  X   Comments R LE limited by pain   Strength Lower Body   Lower Body Strength  X   Comments L LE not strong enough for sit to stand given TTWB R LE   Balance Assessment   Sitting Balance (Static) Fair -   Sitting Balance (Dynamic) Fair -   Standing Balance (Static) Trace +   Weight Shift Sitting Fair  (seated scoot along EOB)   Weight Shift Standing Absent   Gait Analysis   Gait Level Of Assist Unable to Participate   Bed Mobility    Supine to Sit Supervised   Sit to Supine Minimal Assist   Scooting Minimal Assist   Comments seated scoot along EOB with mod A, struggles   Functional Mobility   Sit to Stand Maximal Assist   Bed, Chair, Wheelchair Transfer Unable to Participate   Comments pt is bearing weight through R LE despite education about TTWB, pt not able to comply with TTWB    How much difficulty does the patient currently have...   Turning over in bed (including adjusting bedclothes, sheets and blankets)? 2   Sitting down on and standing up from a chair with arms (e.g., wheelchair, bedside commode, etc.) 2   Moving from lying  on back to sitting on the side of the bed? 2   How much help from another person does the patient currently need...   Moving to and from a bed to a chair (including a wheelchair)? 1   Need to walk in a hospital room? 1   Climbing 3-5 steps with a railing? 1   6 clicks Mobility Score 9   Activity Tolerance   Sitting Edge of Bed 10 min         OT: Activities of daily living  Cognition    Cognition / Consciousness X   Level of Consciousness Alert   Safety Awareness Impaired   Sequencing Impaired   Comments unable to sequence and follow through w/ TTWB, [facility]    Balance Assessment   Sitting Balance (Static) Fair -   Sitting Balance (Dynamic) Fair -   Standing Balance (Static) Trace +   Weight Shift Sitting Fair   Weight Shift Standing Absent   Comments unable to maintain TTWB   Bed Mobility    Supine to Sit Minimal Assist   Sit to Supine Minimal Assist   Scooting Minimal Assist   Rolling    (sit pivot)   Comments cues required for sequencing and techniques to maintain precautions   ADL Assessment   Eating Supervision   Grooming Supervision;Seated  (after s/u)   Upper Body Dressing Supervision   Lower Body Dressing Maximal Assist   Toileting Maximal Assist  (Rectal and penn tube in)   Functional Mobility   Sit to Stand Maximal Assist   Bed, Chair, Wheelchair Transfer Unable to Participate   Toilet Transfers Unable to Participate   Mobility bed mobility, seated lateral scotting eob, STS eob unable to maintain TTWB   Comments w/ HHA of 2 people       SLP: Cognition/swallow/speech  Patient seen this date for re-assessment of swallow function. Patient NPO per SLP recommendations from previous assessment. Patient lethargic with wife present at bedside. Patient able to rouse with significant effort from staff. Able to participate in simple conversational speech only. He consumed ice chips, thin liquids, mildly thick liquids, liquidized textures and puree textures with 1:1 assistance from SLP.  "Observed delayed swallow initiation across all textures with mild oral holding observed with large cup sip of thin liquids, requiring verbal/tactile cues to swallow. No overt s/s of aspiration. However, intermittent throat clearing observed with/without PO and mild cough observed without PO intake. Wife endorsed patient has post nasal drip in AMs with phlegm and expectoration at baseline.   Patient endorsed chest pain with increased PO intake, most significantly following trials of puree textures (endorsing level 5/10 pain). Confirmed less pain with thin liquids and liquidized textures. Wife stated that patient has been prescribed Omeprazole by GI x2 a day, but he has not been taking this during this admission (to her knowledge). Patient nodded \"Yes\" when asked if his pain felt like reflux.   At this time, recommend initiate conservative PO diet of thin liquids, SINGLE SIPS, with liquidized textures. 1:1 feeding assistance. PO only when awake/alert. No independent eating at bedside.  Recommend crushed medication in applesauce.   SLP spoke with MD regarding diet recommendations and patient's history with Omeprazole.         Plan  1) Advance diet to thin liquids, liquidized textures, 1:1 feeding assist  2) Medication crushed in liquidized textures  3) SLP to continue to follow    PMH:  Past Medical History:   Diagnosis Date   • Arthritis     knees and spine   • Cancer (CMS-HCC) (HCC)     skin cancer ongoing   • Hypertension        PSH:  Past Surgical History:   Procedure Laterality Date   • LUMBAR FUSION POSTERIOR  1/28/2014    Performed by Elder Chopra M.D. at SUR[facility]ERY [facility]   • KNEE ARTHROPLASTY TOTAL  2013    Right knee-Dr. Boggs   • KNEE ARTHROPLASTY TOTAL  2009    left knee--Dr. Snider   • SHOULDER SURGERY      right-sided       FHX:  No family history on file.    Medications:  Current Facility-Administered Medications   Medication Dose   • amLODIPine (NORVASC) tablet 10 mg  10 mg   • " benzocaine-menthol (CEPACOL) lozenge 1 Lozenge  1 Lozenge   • sore throat spray (CHLORASEPTIC) 1 Spray  1 Spray   • nystatin (MYCOSTATIN) 919122 UNIT/ML suspension 500,000 Units  5 mL   • heparin injection 5,000 Units  5,000 Units   • heparin lock flush 100 unit/mL injection 300-500 Units  300-500 Units   • heparin injection 3,700 Units  3,700 Units   • miconazole 2%-zinc oxide (Bert) topical cream     • acetaminophen (Tylenol) tablet 650 mg  650 mg   • hydrALAZINE (APRESOLINE) injection 10 mg  10 mg   • guaiFENesin (ROBITUSSIN) 100 MG/5ML solution 200 mg  10 mL   • Pharmacy Consult Request  1 Each   • fentaNYL (SUBLIMAZE) injection 50 mcg  50 mcg   • mag hydrox-al hydrox-simeth (MAALOX PLUS ES or MYLANTA DS) suspension 10 mL  10 mL   • sodium bicarbonate tablet 1,300 mg  1,300 mg   • omeprazole (PRILOSEC) capsule 20 mg  20 mg   • buprenorphine (Subutex) sublingual tablet 2 mg  2 mg   • Pharmacy Consult Request - to monitor for nephrotoxic agents  1 Each   • labetalol (NORMODYNE/TRANDATE) injection 10 mg  10 mg   • ondansetron (ZOFRAN) syringe/vial injection 4 mg  4 mg   • ondansetron (ZOFRAN ODT) dispertab 4 mg  4 mg       Allergies:  No Known Allergies      Review of systems:  Constitutional: denies fevers and chills  Eyes: denies change in vision  Ears, nose, mouth, throat: denies sore throat  Cardiovascular: denies palpitations, reports dizziness with therapies  Respiratory: denies respiratory discomfort  Gastrointestinal: admits to incontinence of bowels or irregular bowel movements  Genitourinary: has been using indwelling penn catheter  Musculoskeletal: admits to pain at surgical site  Integumentary: denies pressure ulcer, has incision site from surgery  Neurological: denies spasms, denies burning or shooting pain, denies headaches, admits to weakness in arms / legs  Psychiatric: denies history of depression or anxiety, denies change in mood  Endocrine: denies diabetes mellitus  Hematologic/lymphatic: denies  "history of blood disorders, did not have blood transfusion during acute stay  Allergic/immunologic: has no history of allergy to any known medications    Physical Exam:  Vitals: BP (!) 171/75   Pulse (!) 106   Temp 36.4 °C (97.6 °F) (Temporal)   Resp 16   Ht 1.778 m (5' 10\")   Wt 76.5 kg (168 lb 10.4 oz)   SpO2 94%   General: well-groomed sitting up in no acute distress, spouse at bedside  Eyes: no scleral icterus or conjunctival injection  Ears, nose, mouth and throat: moist oral mucosa  Cardiovascular: regular rate, good peripheral perfusion, negative Romulo sign bilaterally  Respiratory: breathing room air comfortably without use of accessory muscles  Gastrointestinal: soft, nontender, nondistended  Musculoskeletal: good symmetry in bilateral shoulders, right foot arch collapsed with claw toes, right knee with limited extension and can't reach neutral  Skin: no wounds seen on exposed skin    Neurologic:  Mental status:  A&Ox4 (person, place, date, situation) answers questions appropriately follows commands  Speech: fluent, no aphasia or dysarthria  Motor:    Upper Extremity  Myotome R L   Shoulder flexion C5 4/5 4/5   Elbow flexion C5 4/5 4/5   Wrist extension C6 2/5 4/5   Elbow extension C7 4/5 2/5   Finger flexion C8 2/5 4/5   Finger abduction T1 4/5 4/5     Right wrist/finger movement limited by pain  Legs difficult to test strength due to limited range of right knee  Left leg at least antigravity in hip flexion, knee extension  4/5 strength bilateral ankle plantarflexion  4/5 in left ankle dorsiflexion  2/5 in right ankle dorsiflexion, limited by limited range of ankle    No clonus at bilateral ankles  Negative Roach b/l     Tone: no spasticity noted  Psychiatric: appropriate affect    Labs: Reviewed and significant for   Recent Labs     11/06/21  0417 11/06/21  1120 11/07/21  0842 11/08/21  0702   RBC 2.58*  --  2.76* 2.68*   HEMOGLOBIN 7.8*  --  8.3* 8.0*   HEMATOCRIT 23.0*  --  26.2* 24.8* "   PLATELETCT 281  --  152* 206   IRON  --  27*  --   --    TOTIRONBC  --  176*  --   --      Recent Labs     11/06/21  0417 11/06/21  0417 11/06/21  1120 11/06/21  1525 11/07/21  0842   SODIUM 127*   < > 128* 131* 133*  134*   POTASSIUM 4.1  --   --  3.7 3.9  3.8   CHLORIDE 92*  --   --  92* 93*  94*   CO2 19*  --   --  23 19*  23   GLUCOSE 76  --   --  82 95  99   BUN 49*  --   --  21 30*  29*   CREATININE 5.79*  --   --  2.94* 4.43*  4.26*   CALCIUM 7.4*  --   --  8.1* 8.0*  7.8*    < > = values in this interval not displayed.     Recent Results (from the past 24 hour(s))   Basic Metabolic Panel    Collection Time: 11/07/21  8:42 AM   Result Value Ref Range    Sodium 134 (L) 135 - 145 mmol/L    Potassium 3.8 3.6 - 5.5 mmol/L    Chloride 94 (L) 96 - 112 mmol/L    Co2 23 20 - 33 mmol/L    Glucose 99 65 - 99 mg/dL    Bun 29 (H) 8 - 22 mg/dL    Creatinine 4.26 (H) 0.50 - 1.40 mg/dL    Calcium 7.8 (L) 8.5 - 10.5 mg/dL    Anion Gap 17.0 (H) 7.0 - 16.0   Renal Function Panel    Collection Time: 11/07/21  8:42 AM   Result Value Ref Range    Sodium 133 (L) 135 - 145 mmol/L    Potassium 3.9 3.6 - 5.5 mmol/L    Chloride 93 (L) 96 - 112 mmol/L    Co2 19 (L) 20 - 33 mmol/L    Glucose 95 65 - 99 mg/dL    Creatinine 4.43 (H) 0.50 - 1.40 mg/dL    Bun 30 (H) 8 - 22 mg/dL    Calcium 8.0 (L) 8.5 - 10.5 mg/dL    Phosphorus 6.0 (H) 2.5 - 4.5 mg/dL    Albumin 2.7 (L) 3.2 - 4.9 g/dL   CBC WITHOUT DIFFERENTIAL    Collection Time: 11/07/21  8:42 AM   Result Value Ref Range    WBC 7.9 4.8 - 10.8 K/uL    RBC 2.76 (L) 4.70 - 6.10 M/uL    Hemoglobin 8.3 (L) 14.0 - 18.0 g/dL    Hematocrit 26.2 (L) 42.0 - 52.0 %    MCV 94.9 81.4 - 97.8 fL    MCH 30.1 27.0 - 33.0 pg    MCHC 31.7 (L) 33.7 - 35.3 g/dL    RDW 51.8 (H) 35.9 - 50.0 fL    Platelet Count 152 (L) 164 - 446 K/uL    MPV 12.1 9.0 - 12.9 fL   ESTIMATED GFR    Collection Time: 11/07/21  8:42 AM   Result Value Ref Range    GFR If  17 (A) >60 mL/min/1.73 m 2    GFR If  Non  14 (A) >60 mL/min/1.73 m 2   ESTIMATED GFR    Collection Time: 11/07/21  8:42 AM   Result Value Ref Range    GFR If  16 (A) >60 mL/min/1.73 m 2    GFR If Non  13 (A) >60 mL/min/1.73 m 2   CBC WITHOUT DIFFERENTIAL    Collection Time: 11/08/21  7:02 AM   Result Value Ref Range    WBC 11.0 (H) 4.8 - 10.8 K/uL    RBC 2.68 (L) 4.70 - 6.10 M/uL    Hemoglobin 8.0 (L) 14.0 - 18.0 g/dL    Hematocrit 24.8 (L) 42.0 - 52.0 %    MCV 92.5 81.4 - 97.8 fL    MCH 29.9 27.0 - 33.0 pg    MCHC 32.3 (L) 33.7 - 35.3 g/dL    RDW 50.3 (H) 35.9 - 50.0 fL    Platelet Count 206 164 - 446 K/uL    MPV 9.3 9.0 - 12.9 fL         ASSESSMENT:  IMPRESSION: he patient is a 71 y.o. male with a past medical history of  Arthritis, hypertension, skin cancer and alcohol use;  who presented on 11/2/2021  7:29 PM as a direct admit from [facility] on 11/2/21 for worsening renal failure function and hyponatremia. Patient reports he had a ground level fall (stepping on stair)  on October 12 and sustained a right RLE femur fracture, admitted at [facility] and underwent right ORIF for a comminuted fracture proximal to his right knee prosthesis, which was complicated later by urosepsis and alcohol resulting in debility    [facility] Code: 0016 - Debility (Non-Cardiac, Non-Pulmonary)  -With acute secondary complications of: impaired ADLs and mobility,   -with chronic conditions of: Arthritis, hypertension, skin cancer and alcohol use, bilateral feet neuropathy, bilateral knee replacement (left from traumatic injury), right femur fracture 2021  -and:     Medical Complexity:  Acute renal failure with oliguria on temporary hemodialysis MWF  Anemia, suspect slow GI bleed component, Hb 8 today  Hyponatremia  Hyperglycemia  Dyscoagulopathy    RECOMMENDATIONS:    ##MSK  #Impaired ADLs and mobility: Agree with continuing OT/PT while admitted here. No new therapy notes in several days so placed a new order  Patient is  a good candidate for acute inpatient rehabilitation provided that: patient is medically stable, bed becomes available, insurance authorization is obtained. Notably, patient chose SNF after right femur fracture due to location (near home) but is now willing to stay in [place] to get intensive therapies so he can get stronger and go home. Wife is willing to provide cares that he may need when he goes home. Wife and patient are agreeable to going home at wheelchair level, as long as he can improve his independence with ADLs and mobility. Patient is motivated with therapies and eager to participate.     PLEASE clarify plan for hemodialysis and have patient's right femur evaluated for possibly increasing weight bearing status. It has been a month since his surgery.    See below for details    #Posttraumatic pain, acute, controlled:  #Postsurgical pain, acute, controlled:   #Neuropathic pain, acute, controlled: :   Agree with current management    ##NEURO  #Altered mentation, improving  SLP    ##GI  #Dysphagia, likely secondary to altered mentation, improving  SLP     ##/renal  #Acute renal failure  PLEASE clarify plan for hemodialysis for purposes of planning inpatient rehabilitation and outpatient services    ##SKIN  Recommend turning at least Q2hrs while in bed to prevent skin injury    ---  Patient is a good candidate for acute inpatient rehabilitation due to the patient's complex medical condition with multidisciplinary rehabilitation goals. The patient can tolerate 3 hours of therapy per day and will make significant progress in a reasonable amount of time. Patient´s medical stability, medical acuity, degree of functional deficit, and ability to participate in an intensive therapy program appropriate for inpatient rehabilitation facility level of care. Patient prescribed a comprehensive inpatient rehabilitation program involving therapy services a minimum of 3 hours/day / 15 hours/week, overseen by a physiatrist and  involving members of the multidisciplinary rehabilitation team as specified below.      #- PT 1-2 hrs/day, 5-6 days/wk, through duration of stay as indicated for deficits in lower extremity ROM, strength, conditioning; mobility, balance, gait; mobility equipment needs.  #- OT 1-2 hrs/day, 5-6 days/wk, through duration of stay as indicated for deficits in ADLs; upper extremity ROM, strength, conditioning; functional transfers/mobility; cognition; adaptive equipment needs.  #- SLP 1-2 hrs/day, 5-6 days/wk, through duration of stay as indicated for deficits in  - swallow: oral-motor strengthening/coordination; swallowing strategies to prevent aspiration; diet upgrade or downgrade as indicated.  #- Recreation therapy 1-3 days/wk through duration of stay as indicated for assistance with maintaining and improving physical, social, and emotional well-being; develop and provide training regarding safe community mobility options and safe leisure/recreational activity options that are appropriate and accessible for the patient's level of ability.  #- Rehab psychology 1-3 days/wk through duration of stay as indicated for assessment and treatment of cognition, mood, adjustment to disability and/or medical condition, and supportive counseling.  #- Registered dietician to monitor nutritional intake and weights; optimize nutritional status; provide counseling / education, as indicated; coordinate meals to address nutritional needs and preferences.  #- Respiratory therapy as frequently as indicated through length of stay for evaluation and treatment of breathing function; education and assistance with respiratory strengthening exercises and/or prescribed medications as indicated.    #- Rehabilitation nursing for continuous monitoring through entire stay of patient´s neurologic and medical status; conferring with physician regarding patient condition; administration of medications; incision / wound care; monitoring and protection of  skin integrity; supporting patient comfort and safety; supporting good sleep hygiene; assisting with optimization of bowel function; assisting with optimization of bladder function; safely assisting patient with ADL activities, mobility, and swallow per direction of PT, OT, and SLP; providing patient and caregiver education.    DVT chemoprophlaxis: none noted  GI prophylaxis: omeprazole 20mg BID  Estimated length of stay: 14-18 days  Anticipated discharge destination: home with spouse  Prognosis: good  Code: full resuscitation      Thank you for allowing us to participate in the care of this patient. Physiatry will continue to follow and provide recommendations, as needed.    Patient was seen for 85 minutes on unit/floor of which > 50% of time was spent on counseling and coordination of care regarding the above, including prognosis, risk reduction, benefits of treatment, and options for next stage of care.    Melissa Dodson D.O.   Physical Medicine and Rehabilitation   Spinal Cord Injury Medicine    Please note that this dictation was created using voice recognition software. I have made every reasonable attempt to correct obvious errors, but there may be errors of grammar and possibly content that I did not discover before finalizing the note.

## 2021-11-08 NOTE — CARE PLAN
Problem: Nutritional:  Goal: Achieve adequate nutritional intake  Description: Patient will consume >50% of meals with >75% supplements once diet advanced.  Outcome: Not Met   PO avg ~25% of meals.

## 2021-11-08 NOTE — DOCUMENTATION QUERY
[facility]                                                                       Query Response Note      PATIENT:               RAMA ROUSE  ACCT #:                  [id number]  MRN:                     [id number]  :                      1950  ADMIT DATE:       2021 7:29 PM  DISCH DATE:          RESPONDING  PROVIDER #:        [id number]           QUERY TEXT:    Acute cystitis with hematuria- Urine cultures neg, Not sepsis is documented in the MD PN.  Please clarify status of this condition:    NOTE:  If an appropriate response is not listed below, please respond with a new note.       The patient's Clinical Indicators include:  21 MD PN:   -Acute cystitis with hematuria- Urine cultures neg  -Not sepsis, cultures neg, only 1 dose of abx given    Treatment: UA & cultures, Guy catheter, Hold antibiotics  Risk Factors: Advanced age, Recent UTI, Bladder mass, UTE    Thank you,  Tanna Headley RN, BSN  Clinical   Connect via [employer]  .  Options provided:   -- Acute cystitis with hematuria is ruled in   -- Acute cystitis with hematuria is ruled out   -- Unable to determine      Query created by: Tanna Headley on 2021 6:32 AM    RESPONSE TEXT:    Acute cystitis with hematuria is ruled out          Electronically signed by:  JULIET TORRES DO 2021 7:45 AM

## 2021-11-08 NOTE — CARE PLAN
The patient is Stable - Low risk of patient condition declining or worsening    Shift Goals  Clinical Goals: Pt will allow for dressing change during this RN shift  Patient Goals: pain mgmt  Family Goals: NAVID    Progress made toward(s) clinical / shift goals:  Education provided to pt on the importance of changing dressing qshift.   Pt verbalizes understanding.  Will change dressing in AM.     Patient is not progressing towards the following goals:  N/A

## 2021-11-08 NOTE — PROGRESS NOTES
MD Frankie made aware of increased BP.  MD advised this RN to hold PRN labetalol as pt is scheduled for HD today.

## 2021-11-08 NOTE — PROGRESS NOTES
[facility] Medicine Daily Progress Note    Date of Service  11/8/2021    Chief Complaint  Zachary Moore is a 71 y.o. male admitted 11/2/2021 with UTE    Hospital Course  This is a 71-year-old gentleman who originally suffered a hip fracture and was treated at [facility].  During that hospitalization he was also treated for alcohol withdrawal and urosepsis.  From there he was discharged to rehab facility, and has since been transferred to us.  His previous medical history is also significant for hypertension, alcohol abuse, stage II chronic kidney disease..  Patient was sent to us from rehab facility in [place] after he was found to be hyponatremic and with acute kidney injury.  On arrival here, his sodium was 114, potassium 5.2, chloride 86, CO2 12, BUN 64, creatinine 6.10.  He has a previous diagnosis of stage II chronic kidney disease with a baseline creatinine around 1.0.  Patient was admitted to the hospital with a diagnosis of likely hypovolemic hyponatremia.  He was initially treated with fluid resuscitation, and corrected rapidly, however, he remained with very poor urine output and therefore nephrology has been consulted.    Interval Problem Update  Pt c/o of being sleepy this am but no other specific complaints    AFebrile  Sinus 70-80s  -160s    11/5. Sleepy but arousable. SLP couldn't do assessment because he complained of odynophagia. I clarified with him, He can still take PO and his odynophagia has been going on for 2 years on and off. I told SLP to see if we can reevaluate his swallowing again. CUrrently he is NPO anyway for catheter placement.  11/6. SLP recommended NPO but will reassess Saturday.  Family at bedside. Discussed with Speech. He did pass his test he can do thin liquids. I added supoplements. I ordered dietitian. Patient planned for dialysis cathter placement today.  11/7. Arousable. His Hb 7.8. He has occult positive stool. No active bleeding. I discussed with him  if he wants an GI eval/ endoscopy or colonoscopy but at this time he declines and would prefer to do it outpatient. I did say if his Hb drops <7 or if he has copious melena or bright red blood then we will have GI consulted. He agreed with the plan. I encouraged PO and supplements.    I have personally seen and examined the patient at bedside. I discussed the plan of care with patient, bedside RN and pulmonary. And SLP    Consultants/Specialty  nephrology  critical care    Code Status  Full Code    Disposition  Patient is not medically cleared. SNF pending, waiting for renal biopsy results and Nephrology clearance.  Anticipate discharge to to skilled nursing facility.  I have placed the appropriate orders for post-discharge needs.    Review of Systems  Review of Systems   Constitutional: Positive for malaise/fatigue. Negative for chills and fever.   HENT: Negative for nosebleeds and sore throat.    Eyes: Negative for blurred vision and double vision.   Respiratory: Negative for cough and shortness of breath.    Cardiovascular: Positive for leg swelling. Negative for chest pain and palpitations.   Gastrointestinal: Negative for abdominal pain, diarrhea, nausea and vomiting.   Genitourinary: Negative for dysuria and urgency.   Musculoskeletal: Negative for back pain.   Skin: Negative for rash.   Neurological: Negative for dizziness, loss of consciousness and headaches.        Physical Exam  Temp:  [36.4 °C (97.6 °F)-36.9 °C (98.4 °F)] 36.4 °C (97.6 °F)  Pulse:  [] 106  Resp:  [14-16] 16  BP: (147-171)/(66-81) 171/75  SpO2:  [94 %-100 %] 94 %    Physical Exam  Constitutional:       General: He is not in acute distress.     Appearance: Normal appearance. He is well-developed. He is not diaphoretic.   HENT:      Head: Normocephalic and atraumatic.      Mouth/Throat:      Comments: NO odynophagia, out of proportion to exam.  Eyes:      Conjunctiva/sclera: Conjunctivae normal.   Neck:      Vascular: No JVD.    Cardiovascular:      Rate and Rhythm: Normal rate.      Heart sounds: No murmur heard.  No gallop.    Pulmonary:      Effort: Pulmonary effort is normal. No respiratory distress.      Breath sounds: No stridor. No wheezing or rales.   Abdominal:      Palpations: Abdomen is soft.      Tenderness: There is no abdominal tenderness. There is no guarding or rebound.   Musculoskeletal:      Right lower leg: Edema present.      Left lower leg: Edema present.      Comments: 1+ edema B slightly more on the R    Skin:     General: Skin is warm and dry.      Coloration: Skin is pale.      Findings: No rash.   Neurological:      General: No focal deficit present.      Mental Status: He is alert and oriented to person, place, and time.      Comments: Less sleepy, more awake   Psychiatric:         Mood and Affect: Mood normal.         Thought Content: Thought content normal.         Fluids    Intake/Output Summary (Last 24 hours) at 11/8/2021 0738  Last data filed at 11/8/2021 0315  Gross per 24 hour   Intake --   Output 1600 ml   Net -1600 ml       Laboratory  Recent Labs     11/06/21  0417 11/07/21  0842 11/08/21  0702   WBC 8.3 7.9 11.0*   RBC 2.58* 2.76* 2.68*   HEMOGLOBIN 7.8* 8.3* 8.0*   HEMATOCRIT 23.0* 26.2* 24.8*   MCV 89.1 94.9 92.5   MCH 30.2 30.1 29.9   MCHC 33.9 31.7* 32.3*   RDW 47.6 51.8* 50.3*   PLATELETCT 281 152* 206   MPV 9.5 12.1 9.3     Recent Labs     11/06/21  0417 11/06/21  0417 11/06/21  1120 11/06/21  1525 11/07/21  0842   SODIUM 127*   < > 128* 131* 133*  134*   POTASSIUM 4.1  --   --  3.7 3.9  3.8   CHLORIDE 92*  --   --  92* 93*  94*   CO2 19*  --   --  23 19*  23   GLUCOSE 76  --   --  82 95  99   BUN 49*  --   --  21 30*  29*   CREATININE 5.79*  --   --  2.94* 4.43*  4.26*   CALCIUM 7.4*  --   --  8.1* 8.0*  7.8*    < > = values in this interval not displayed.                   Imaging  IR-GUERRIER,GROSHONG PLACEMENT >5   Final Result      Successful image guided tunneled dialysis catheter  placement.      Plan: The catheter is available for immediate use.            CT-NEEDLE BX-RENAL   Final Result      CT-guided core biopsy of the right kidney.      US-RENAL   Final Result      1.  BILATERAL pleural effusions   2.  Small volume of ascites   3.  Cholelithiasis and gallbladder sludge with gallbladder wall thickening incidentally noted. Cholecystitis is a consideration.      EC-ECHOCARDIOGRAM COMPLETE W/O CONT   Final Result      DX-CHEST-PORTABLE (1 VIEW)   Final Result      Mild basilar and apical opacity compatible with atelectasis or scarring although consolidation is not excluded      Effusions on recent CT are not detected radiographically      CT-ABDOMEN-PELVIS W/O   Final Result      Diffuse urinary bladder wall thickening with Guy catheter in place. This is suspicious for cystitis but the finding is nonspecific      No hydronephrosis or urolithiasis      Cholelithiasis      Impending diarrhea      Small to medium right, small left pleural effusions      5 mm mean diameter left lower lobe pulmonary nodule. Follow-up recommendations as below   Low Risk: No routine follow-up      High Risk: Optional CT at 12 months      Comments: Nodules less than 6 mm do not require routine follow-up, but certain patients at high risk with suspicious nodule morphology, upper lobe location, or both may warrant 12-month follow-up.      Low Risk - Minimal or absent history of smoking and of other known risk factors.      High Risk - History of smoking or of other known risk factors.      Note: These recommendations do not apply to lung cancer screening, patients with immunosuppression, or patients with known primary cancer.      Fleischner Society 2017 Guidelines for Management of Incidentally Detected Pulmonary Nodules in Adults         CT-HEAD W/O   Final Result      No noncontrast CT evidence of acute intracranial hemorrhage.      Severe white matter hypodensity is present.  This is a nonspecific finding which  "usually is found to represent chronic microvascular disease in patient's of this demographic.  Demyelination, age indeterminant ischemia and gliosis are also common    possibilities.      Moderate to severe parenchymal volume loss         US-FOREIGN FILM ULTRASOUND   Final Result           Assessment/Plan  * UTE (acute kidney injury)/hypotension, hyponatremia, s/p femoral ORIF, anemia/GI bleed/odynophagia (HCC)- (present on admission)  Assessment & Plan  Was clearly dehydrated on presentation however renal function has not significantly improved with resuscitation  Nephrology following  Plan renal Bx today  UOP has improved today  Renally dose medications as appropriate  Follow daily BMP and UOP\"    Na improving but will need it to be 125 or above before dischagre. Now improved.  Nephrology following waiting for renal biopsy results  Dysphagia diet.  Dietitian says not eating enough  Consulted Dr. Khoury GI for odynophagia/poor PO intake and anemia/occult positive blood  SNF planned.        Occult blood positive stool  Assessment & Plan  He has seen [facility] in the past for routine colonoscopy severalyears ago.  At this time he does not want endoscopy/colonoscopy inpatient but will revisit if he has actual BRBPR or melena,or Hb drops to <7.  Updated Nephrology  11/8. Because of poor PO intake/odynophagia will consult GI anyway. Dr. Khoury, GI consulted.    Anemia  Assessment & Plan  Ordered occult stool  Ordered iron studies  Currently no active bleeding.  Occult positive  At this time patient prefers GI work-up outpatient  May need epoietin, updated Nephrology  11/8. Consulted Dr. Khoury, GI for odynophagia/poor PO intake, thus will also evaluate UGI/anemia.    Dehydration  Assessment & Plan  Has been fluid resuscitated  Secondary to diarrhea      Encephalopathy acute- (present on admission)  Assessment & Plan  2/2 UTE vs. Urosepsis vs. Acute hyponatremia  Improving  CT head neg for acute findings    Sepsis due " "to Escherichia coli with acute renal failure without septic shock (HCC)  Assessment & Plan  NOT sepsis  Cultures neg  Only given one dose of ABx's on presentation          Mass of urinary bladder- (present on admission)  Assessment & Plan  Based on imaging from [facility]  Repeat CT Abd/Plvs and renal US do not show any mass    Jeana-prosthetic femoral shaft fracture- (present on admission)  Assessment & Plan  S/p ORIF  Pain control  Brace present  PT/OT consults\"    F/u Orthopedics      Alcohol use disorder, moderate, dependence (HCC)- (present on admission)  Assessment & Plan  Alcohol level ordered  Last drink reported 5 days prior by patient  Monitor for signs of withdrawal    Acute cystitis with hematuria- (present on admission)  Assessment & Plan  Treated for sepsis 1 week ago for Bacteremia & UTI treated with zosyn & Unasyn and d/c on cipro 750mg BID for 9 more doses and Flagyl 500mg TID x 16 days growing Resistant Ecoli.  Urine cultures here are neg  US at [facility] showing possible mass however CT Abd/Plvs and renal US both neg for mass here  Cont to monitor off Abx's  Catheter change w/in 48hrs of admission\"    11/8. Leukocytosis, mild. None yesterday. Order urinalysis, blood cultures, CXR, procalcitonin.    Acute hyponatremia- (present on admission)  Assessment & Plan  A component of hypovolemia complicated by UTE  Has corrected with NS to 120  Cont q6 monitoring; goal upper 120s over next 36hrs  Nephrology following\"    Improving slowly.  Normalizing         Elevated liver enzymes- (present on admission)  Assessment & Plan  Trend  Appears chronic  Monitor      Hypertension- (present on admission)  Assessment & Plan  On lisinopril 10 as an outpt which was stopped due to hypotension and UTE  Start amlodipine 5mg    Vitals:    11/08/21 0843   BP: 153/81   Pulse: 100   Resp: 14   Temp: 36.9 °C (98.4 °F)   SpO2: 93%     Add hydralazine  Continue amlodipine  ACEI held bec of elevated Cr-       VTE prophylaxis: heparin " ppx    I have performed a physical exam and reviewed and updated ROS and Plan today (11/8/2021). In review of yesterday's note (11/7/2021), there are no changes except as documented above.

## 2021-11-08 NOTE — WOUND TEAM
Wound team to see patient for consult for left ear.  Patient is in dialysis and not in room.  Will try to round again with patient later in the day or tomorrow.

## 2021-11-09 ENCOUNTER — APPOINTMENT (OUTPATIENT)
Dept: RADIOLOGY | Facility: MEDICAL CENTER | Age: 71
DRG: 682 | End: 2021-11-09
Attending: PHYSICAL MEDICINE & REHABILITATION
Payer: MEDICARE

## 2021-11-09 LAB
ALBUMIN SERPL BCP-MCNC: 2.8 G/DL (ref 3.2–4.9)
ANION GAP SERPL CALC-SCNC: 10 MMOL/L (ref 7–16)
APPEARANCE UR: ABNORMAL
BACTERIA #/AREA URNS HPF: ABNORMAL /HPF
BILIRUB UR QL STRIP.AUTO: NEGATIVE
BUN SERPL-MCNC: 23 MG/DL (ref 8–22)
CALCIUM SERPL-MCNC: 8 MG/DL (ref 8.5–10.5)
CHLORIDE SERPL-SCNC: 90 MMOL/L (ref 96–112)
CO2 SERPL-SCNC: 30 MMOL/L (ref 20–33)
COLOR UR: YELLOW
CREAT SERPL-MCNC: 2.52 MG/DL (ref 0.5–1.4)
EPI CELLS #/AREA URNS HPF: ABNORMAL /HPF
ERYTHROCYTE [DISTWIDTH] IN BLOOD BY AUTOMATED COUNT: 48.9 FL (ref 35.9–50)
GLUCOSE SERPL-MCNC: 118 MG/DL (ref 65–99)
GLUCOSE UR STRIP.AUTO-MCNC: NEGATIVE MG/DL
HCT VFR BLD AUTO: 24.2 % (ref 42–52)
HGB BLD-MCNC: 7.7 G/DL (ref 14–18)
HYALINE CASTS #/AREA URNS LPF: ABNORMAL /LPF
KETONES UR STRIP.AUTO-MCNC: NEGATIVE MG/DL
LEUKOCYTE ESTERASE UR QL STRIP.AUTO: ABNORMAL
MCH RBC QN AUTO: 29.3 PG (ref 27–33)
MCHC RBC AUTO-ENTMCNC: 31.8 G/DL (ref 33.7–35.3)
MCV RBC AUTO: 92 FL (ref 81.4–97.8)
MICRO URNS: ABNORMAL
NITRITE UR QL STRIP.AUTO: NEGATIVE
PH UR STRIP.AUTO: 7.5 [PH] (ref 5–8)
PHOSPHATE SERPL-MCNC: 3.8 MG/DL (ref 2.5–4.5)
PLATELET # BLD AUTO: 166 K/UL (ref 164–446)
PMV BLD AUTO: 10 FL (ref 9–12.9)
POTASSIUM SERPL-SCNC: 3.4 MMOL/L (ref 3.6–5.5)
PROT UR QL STRIP: 30 MG/DL
RBC # BLD AUTO: 2.63 M/UL (ref 4.7–6.1)
RBC # URNS HPF: ABNORMAL /HPF
RBC UR QL AUTO: ABNORMAL
SODIUM SERPL-SCNC: 130 MMOL/L (ref 135–145)
SP GR UR STRIP.AUTO: 1.01
UROBILINOGEN UR STRIP.AUTO-MCNC: 0.2 MG/DL
WBC # BLD AUTO: 10.5 K/UL (ref 4.8–10.8)
WBC #/AREA URNS HPF: ABNORMAL /HPF
YEAST BUDDING URNS QL: PRESENT /HPF

## 2021-11-09 PROCEDURE — 770006 HCHG ROOM/CARE - MED/SURG/GYN SEMI*

## 2021-11-09 PROCEDURE — A9270 NON-COVERED ITEM OR SERVICE: HCPCS | Performed by: INTERNAL MEDICINE

## 2021-11-09 PROCEDURE — 97535 SELF CARE MNGMENT TRAINING: CPT

## 2021-11-09 PROCEDURE — 700102 HCHG RX REV CODE 250 W/ 637 OVERRIDE(OP): Performed by: NURSE PRACTITIONER

## 2021-11-09 PROCEDURE — 85027 COMPLETE CBC AUTOMATED: CPT

## 2021-11-09 PROCEDURE — A9270 NON-COVERED ITEM OR SERVICE: HCPCS | Performed by: NURSE PRACTITIONER

## 2021-11-09 PROCEDURE — 80069 RENAL FUNCTION PANEL: CPT

## 2021-11-09 PROCEDURE — 99223 1ST HOSP IP/OBS HIGH 75: CPT | Performed by: PHYSICAL MEDICINE & REHABILITATION

## 2021-11-09 PROCEDURE — 90935 HEMODIALYSIS ONE EVALUATION: CPT

## 2021-11-09 PROCEDURE — 81001 URINALYSIS AUTO W/SCOPE: CPT

## 2021-11-09 PROCEDURE — 97530 THERAPEUTIC ACTIVITIES: CPT

## 2021-11-09 PROCEDURE — A9270 NON-COVERED ITEM OR SERVICE: HCPCS | Performed by: STUDENT IN AN ORGANIZED HEALTH CARE EDUCATION/TRAINING PROGRAM

## 2021-11-09 PROCEDURE — 700102 HCHG RX REV CODE 250 W/ 637 OVERRIDE(OP): Performed by: INTERNAL MEDICINE

## 2021-11-09 PROCEDURE — 99232 SBSQ HOSP IP/OBS MODERATE 35: CPT | Performed by: INTERNAL MEDICINE

## 2021-11-09 PROCEDURE — 700111 HCHG RX REV CODE 636 W/ 250 OVERRIDE (IP)

## 2021-11-09 PROCEDURE — 97602 WOUND(S) CARE NON-SELECTIVE: CPT

## 2021-11-09 PROCEDURE — 36415 COLL VENOUS BLD VENIPUNCTURE: CPT

## 2021-11-09 PROCEDURE — 73110 X-RAY EXAM OF WRIST: CPT | Mod: RT

## 2021-11-09 PROCEDURE — 700102 HCHG RX REV CODE 250 W/ 637 OVERRIDE(OP): Performed by: STUDENT IN AN ORGANIZED HEALTH CARE EDUCATION/TRAINING PROGRAM

## 2021-11-09 PROCEDURE — 700111 HCHG RX REV CODE 636 W/ 250 OVERRIDE (IP): Performed by: INTERNAL MEDICINE

## 2021-11-09 PROCEDURE — 90935 HEMODIALYSIS ONE EVALUATION: CPT | Performed by: INTERNAL MEDICINE

## 2021-11-09 RX ORDER — ACETAMINOPHEN 325 MG/1
325 TABLET ORAL ONCE
Status: COMPLETED | OUTPATIENT
Start: 2021-11-09 | End: 2021-11-09

## 2021-11-09 RX ORDER — HEPARIN SODIUM 1000 [USP'U]/ML
INJECTION, SOLUTION INTRAVENOUS; SUBCUTANEOUS
Status: COMPLETED
Start: 2021-11-09 | End: 2021-11-09

## 2021-11-09 RX ORDER — ACETAMINOPHEN 500 MG
1000 TABLET ORAL EVERY 8 HOURS PRN
Status: DISCONTINUED | OUTPATIENT
Start: 2021-11-09 | End: 2021-11-17 | Stop reason: HOSPADM

## 2021-11-09 RX ADMIN — SODIUM BICARBONATE 1300 MG: 650 TABLET ORAL at 16:46

## 2021-11-09 RX ADMIN — HEPARIN SODIUM 5000 UNITS: 5000 INJECTION, SOLUTION INTRAVENOUS; SUBCUTANEOUS at 16:47

## 2021-11-09 RX ADMIN — AMLODIPINE BESYLATE 10 MG: 10 TABLET ORAL at 04:23

## 2021-11-09 RX ADMIN — MICONAZOLE NITRATE: 20 CREAM TOPICAL at 18:00

## 2021-11-09 RX ADMIN — NYSTATIN 500000 UNITS: 100000 SUSPENSION ORAL at 12:44

## 2021-11-09 RX ADMIN — NYSTATIN 500000 UNITS: 100000 SUSPENSION ORAL at 16:47

## 2021-11-09 RX ADMIN — ACETAMINOPHEN 650 MG: 325 TABLET ORAL at 20:35

## 2021-11-09 RX ADMIN — HYDRALAZINE HYDROCHLORIDE 25 MG: 50 TABLET ORAL at 16:46

## 2021-11-09 RX ADMIN — NYSTATIN 500000 UNITS: 100000 SUSPENSION ORAL at 20:36

## 2021-11-09 RX ADMIN — HEPARIN SODIUM 3700 UNITS: 1000 INJECTION, SOLUTION INTRAVENOUS; SUBCUTANEOUS at 10:23

## 2021-11-09 RX ADMIN — ACETAMINOPHEN 325 MG: 325 TABLET ORAL at 23:34

## 2021-11-09 RX ADMIN — MICONAZOLE NITRATE: 20 CREAM TOPICAL at 04:22

## 2021-11-09 RX ADMIN — HYDRALAZINE HYDROCHLORIDE 25 MG: 50 TABLET ORAL at 04:23

## 2021-11-09 RX ADMIN — SODIUM BICARBONATE 1300 MG: 650 TABLET ORAL at 20:36

## 2021-11-09 RX ADMIN — BUPRENORPHINE HCL 2 MG: 2 TABLET SUBLINGUAL at 11:55

## 2021-11-09 RX ADMIN — HEPARIN SODIUM 5000 UNITS: 5000 INJECTION, SOLUTION INTRAVENOUS; SUBCUTANEOUS at 04:22

## 2021-11-09 RX ADMIN — ACETAMINOPHEN 650 MG: 325 TABLET ORAL at 02:28

## 2021-11-09 RX ADMIN — SODIUM BICARBONATE 1300 MG: 650 TABLET ORAL at 11:54

## 2021-11-09 RX ADMIN — HYDRALAZINE HYDROCHLORIDE 25 MG: 50 TABLET ORAL at 20:35

## 2021-11-09 RX ADMIN — OMEPRAZOLE 20 MG: 20 CAPSULE, DELAYED RELEASE ORAL at 04:22

## 2021-11-09 RX ADMIN — HEPARIN SODIUM 5000 UNITS: 5000 INJECTION, SOLUTION INTRAVENOUS; SUBCUTANEOUS at 20:37

## 2021-11-09 RX ADMIN — BUPRENORPHINE HCL 2 MG: 2 TABLET SUBLINGUAL at 20:35

## 2021-11-09 ASSESSMENT — COGNITIVE AND FUNCTIONAL STATUS - GENERAL
DRESSING REGULAR LOWER BODY CLOTHING: A LOT
DAILY ACTIVITIY SCORE: 14
STANDING UP FROM CHAIR USING ARMS: TOTAL
SUGGESTED CMS G CODE MODIFIER MOBILITY: CM
MOVING FROM LYING ON BACK TO SITTING ON SIDE OF FLAT BED: A LOT
PERSONAL GROOMING: A LITTLE
EATING MEALS: A LITTLE
MOVING TO AND FROM BED TO CHAIR: A LOT
HELP NEEDED FOR BATHING: A LOT
CLIMB 3 TO 5 STEPS WITH RAILING: TOTAL
DRESSING REGULAR UPPER BODY CLOTHING: A LITTLE
TOILETING: TOTAL
SUGGESTED CMS G CODE MODIFIER DAILY ACTIVITY: CK
MOBILITY SCORE: 9
WALKING IN HOSPITAL ROOM: TOTAL
TURNING FROM BACK TO SIDE WHILE IN FLAT BAD: A LOT

## 2021-11-09 ASSESSMENT — ENCOUNTER SYMPTOMS
SHORTNESS OF BREATH: 0
ABDOMINAL PAIN: 0
SORE THROAT: 0
BACK PAIN: 0
PALPITATIONS: 0
VOMITING: 0
COUGH: 0
DOUBLE VISION: 0
FEVER: 0
DIARRHEA: 0
NAUSEA: 0
CHILLS: 0
HEADACHES: 0

## 2021-11-09 ASSESSMENT — PAIN DESCRIPTION - PAIN TYPE
TYPE: ACUTE PAIN
TYPE: ACUTE PAIN

## 2021-11-09 ASSESSMENT — GAIT ASSESSMENTS: GAIT LEVEL OF ASSIST: UNABLE TO PARTICIPATE

## 2021-11-09 NOTE — PROGRESS NOTES
COVID-19 surge in effect    Received report from ALDEN Sorto. Pt is A&O x2. Pt on , disoriented to time and situation, no signs of acute distress. Pt complains of 10/10 pain to bilateral wrists. Medicated with Tylenol per MAR. This nurse contacted the MD, Shantanu Mccormick, to provide more pain management options. Jace ordered morphine. Morphine was given per MAR. Pt presents with a DTI to right ear. Rectal tube in place, no leaking seen. Guy site is CDI. POC discussed with patient. Safety precautions in place, bed in lowest position, and call light and personal belongings within reach. Hourly rounding in place.

## 2021-11-09 NOTE — PROGRESS NOTES
Hemodialysis ordered by Dr. Najjar. Treatment started at 1203 and ended at 1503. Pt stable, vss, no c/o post tx. Net UF 1.5 L d/t hypotension during tx. Dr. Najjar notified and aware. Reported to CLAIRE Lisa RN.

## 2021-11-09 NOTE — PROGRESS NOTES
Nephrology Daily Progress Note    Date of Service  11/9/2021    Chief Complaint  Zachary Moore is a 71 y.o. male with CKD II, recently hospitalized with RLE femur fx, urosepsis, admitted 11/2/2021 with UTE, hyponatremia    Interval Problem Update  11/4 -no acute events, no new complaints  UTE -creat at 6's  oliguric  Low C3 C4 -scheduled kidney biopsy  ABDULAZIZ, ANCA -pending  11/7 -doing well, no complaints  S/p HD x 2 tolerated well  S/p diagnostic kidney biopsy -results pending  UOP slightly better  11/8 patient is doing slightly better, still very weak  Seen and examined while getting HD.  Kidney biopsy showed ATN  11/9 patient is doing better today, no chest pain or shortness of breath  Seen and examined while getting HD.    Code Status  Full Code      Review of Systems  Review of Systems   Constitutional: Positive for malaise/fatigue. Negative for chills and fever.   Respiratory: Negative for cough and shortness of breath.    Cardiovascular: Negative for chest pain and leg swelling.   Gastrointestinal: Negative for nausea and vomiting.   Genitourinary: Negative for dysuria, frequency and urgency.   All other systems reviewed and are negative.       Physical Exam  Temp:  [36.1 °C (96.9 °F)-37.3 °C (99.2 °F)] 37.1 °C (98.7 °F)  Pulse:  [] 95  Resp:  [16-18] 16  BP: (142-153)/(49-76) 153/49  SpO2:  [91 %-99 %] 93 %    Physical Exam  Vitals and nursing note reviewed.   Constitutional:       General: He is awake.      Appearance: He is ill-appearing.   HENT:      Head: Normocephalic and atraumatic.      Right Ear: External ear normal.      Left Ear: External ear normal.      Nose: Nose normal.      Mouth/Throat:      Pharynx: No oropharyngeal exudate or posterior oropharyngeal erythema.   Eyes:      General:         Right eye: No discharge.         Left eye: No discharge.      Conjunctiva/sclera: Conjunctivae normal.   Cardiovascular:      Rate and Rhythm: Normal rate and regular rhythm.   Pulmonary:       Effort: Pulmonary effort is normal. No respiratory distress.      Breath sounds: Normal breath sounds. No wheezing.   Abdominal:      General: Abdomen is flat. Bowel sounds are normal.   Musculoskeletal:         General: No tenderness.      Cervical back: No rigidity. No muscular tenderness.      Right lower leg: Edema present.      Left lower leg: Edema present.   Skin:     General: Skin is warm and dry.      Coloration: Skin is not jaundiced.      Findings: No lesion or rash.   Neurological:      General: No focal deficit present.      Mental Status: He is alert and oriented to person, place, and time. Mental status is at baseline.   Psychiatric:         Mood and Affect: Mood normal.         Behavior: Behavior normal.         Thought Content: Thought content normal.         Fluids    Intake/Output Summary (Last 24 hours) at 11/9/2021 1228  Last data filed at 11/9/2021 0915  Gross per 24 hour   Intake 900 ml   Output 3000 ml   Net -2100 ml       Laboratory  Recent Labs     11/07/21  0842 11/08/21  0702 11/09/21  0438   WBC 7.9 11.0* 10.5   RBC 2.76* 2.68* 2.63*   HEMOGLOBIN 8.3* 8.0* 7.7*   HEMATOCRIT 26.2* 24.8* 24.2*   MCV 94.9 92.5 92.0   MCH 30.1 29.9 29.3   MCHC 31.7* 32.3* 31.8*   RDW 51.8* 50.3* 48.9   PLATELETCT 152* 206 166   MPV 12.1 9.3 10.0     Recent Labs     11/07/21  0842 11/08/21  0702 11/09/21  0438   SODIUM 133*  134* 132* 130*   POTASSIUM 3.9  3.8 3.5* 3.4*   CHLORIDE 93*  94* 92* 90*   CO2 19*  23 28 30   GLUCOSE 95  99 122* 118*   BUN 30*  29* 35* 23*   CREATININE 4.43*  4.26* 4.46* 2.52*   CALCIUM 8.0*  7.8* 7.9* 8.0*                   Imaging  DX-CHEST-PORTABLE (1 VIEW)   Final Result      1.  Possible minimal right pleural effusion.      2.  No focal pulmonary consolidation.      3.  New right IJV multilumen dialysis catheter in place.      IR-SOPHIA GUERRIER PLACEMENT >5   Final Result      Successful image guided tunneled dialysis catheter placement.      Plan: The catheter is  available for immediate use.            CT-NEEDLE BX-RENAL   Final Result      CT-guided core biopsy of the right kidney.      US-RENAL   Final Result      1.  BILATERAL pleural effusions   2.  Small volume of ascites   3.  Cholelithiasis and gallbladder sludge with gallbladder wall thickening incidentally noted. Cholecystitis is a consideration.      EC-ECHOCARDIOGRAM COMPLETE W/O CONT   Final Result      DX-CHEST-PORTABLE (1 VIEW)   Final Result      Mild basilar and apical opacity compatible with atelectasis or scarring although consolidation is not excluded      Effusions on recent CT are not detected radiographically      CT-ABDOMEN-PELVIS W/O   Final Result      Diffuse urinary bladder wall thickening with Guy catheter in place. This is suspicious for cystitis but the finding is nonspecific      No hydronephrosis or urolithiasis      Cholelithiasis      Impending diarrhea      Small to medium right, small left pleural effusions      5 mm mean diameter left lower lobe pulmonary nodule. Follow-up recommendations as below   Low Risk: No routine follow-up      High Risk: Optional CT at 12 months      Comments: Nodules less than 6 mm do not require routine follow-up, but certain patients at high risk with suspicious nodule morphology, upper lobe location, or both may warrant 12-month follow-up.      Low Risk - Minimal or absent history of smoking and of other known risk factors.      High Risk - History of smoking or of other known risk factors.      Note: These recommendations do not apply to lung cancer screening, patients with immunosuppression, or patients with known primary cancer.      Fleischner Society 2017 Guidelines for Management of Incidentally Detected Pulmonary Nodules in Adults         CT-HEAD W/O   Final Result      No noncontrast CT evidence of acute intracranial hemorrhage.      Severe white matter hypodensity is present.  This is a nonspecific finding which usually is found to represent chronic  microvascular disease in patient's of this demographic.  Demyelination, age indeterminant ischemia and gliosis are also common    possibilities.      Moderate to severe parenchymal volume loss         US-FOREIGN FILM ULTRASOUND   Final Result            Assessment/Plan  1.UTE/CKD : Secondary to ATN  2.HTN: BP well controlled  3.hyponatremia  4.Anemia: Hb to monitor  5.Metabolic acidosis: Better  6. Volume overload   7 hypokalemia  Continue to evaluate daily for the need of dialysis  Renal diet  Daily labs  Renal dose all meds  Avoid nephrotoxins  Prognosis guarded.

## 2021-11-09 NOTE — PROGRESS NOTES
[facility] Medicine Daily Progress Note    Date of Service  11/9/2021    Chief Complaint  Zachary Moore is a 71 y.o. male admitted 11/2/2021 with UTE    Hospital Course  This is a 71-year-old gentleman who originally suffered a hip fracture and was treated at [facility].  During that hospitalization he was also treated for alcohol withdrawal and urosepsis.  From there he was discharged to rehab facility, and has since been transferred to us.  His previous medical history is also significant for hypertension, alcohol abuse, stage II chronic kidney disease..  Patient was sent to us from rehab facility in [place] after he was found to be hyponatremic and with acute kidney injury.  On arrival here, his sodium was 114, potassium 5.2, chloride 86, CO2 12, BUN 64, creatinine 6.10.  He has a previous diagnosis of stage II chronic kidney disease with a baseline creatinine around 1.0.  Patient was admitted to the hospital with a diagnosis of likely hypovolemic hyponatremia.  He was initially treated with fluid resuscitation, and corrected rapidly, however, he remained with very poor urine output and therefore nephrology has been consulted.    Interval Problem Update  Pt c/o of being sleepy this am but no other specific complaints    AFebrile  Sinus 70-80s  -160s    11/5. Sleepy but arousable. SLP couldn't do assessment because he complained of odynophagia. I clarified with him, He can still take PO and his odynophagia has been going on for 2 years on and off. I told SLP to see if we can reevaluate his swallowing again. CUrrently he is NPO anyway for catheter placement.  11/6. SLP recommended NPO but will reassess Saturday.  Family at bedside. Discussed with Speech. He did pass his test he can do thin liquids. I added supoplements. I ordered dietitian. Patient planned for dialysis cathter placement today.  11/7. Arousable. His Hb 7.8. He has occult positive stool. No active bleeding. I discussed with him  if he wants an GI eval/ endoscopy or colonoscopy but at this time he declines and would prefer to do it outpatient. I did say if his Hb drops <7 or if he has copious melena or bright red blood then we will have GI consulted. He agreed with the plan. I encouraged PO and supplements.      11/9.  Patient is getting dialysis today.  No acute issue overnight. we will get updated PT OT evaluation today.    I have personally seen and examined the patient at bedside. I discussed the plan of care with patient, bedside RN and pulmonary. And SLP    Consultants/Specialty  nephrology  critical care    Code Status  Full Code    Disposition  Patient is not medically cleared. SNF pending, waiting for renal biopsy results and Nephrology clearance.  Anticipate discharge to to skilled nursing facility.  I have placed the appropriate orders for post-discharge needs.    Review of Systems  Review of Systems   Constitutional: Positive for malaise/fatigue. Negative for chills.   HENT: Negative for nosebleeds and sore throat.    Eyes: Negative for double vision.   Respiratory: Negative for cough and shortness of breath.    Cardiovascular: Positive for leg swelling. Negative for chest pain and palpitations.   Gastrointestinal: Negative for abdominal pain, diarrhea, nausea and vomiting.   Genitourinary: Negative for dysuria.   Musculoskeletal: Negative for back pain.   Skin: Negative for rash.   Neurological: Negative for headaches.        Physical Exam  Temp:  [36.1 °C (96.9 °F)-37.3 °C (99.2 °F)] 36.3 °C (97.3 °F)  Pulse:  [] 87  Resp:  [14-18] 16  BP: (142-171)/(62-91) 146/76  SpO2:  [91 %-99 %] 99 %    Physical Exam  Constitutional:       General: He is not in acute distress.     Appearance: Normal appearance. He is well-developed.   HENT:      Head: Normocephalic and atraumatic.      Mouth/Throat:      Comments: NO odynophagia, out of proportion to exam.  Eyes:      Conjunctiva/sclera: Conjunctivae normal.   Neck:      Vascular: No  JVD.   Cardiovascular:      Rate and Rhythm: Normal rate.      Heart sounds: No murmur heard.      Pulmonary:      Effort: Pulmonary effort is normal. No respiratory distress.      Breath sounds: No stridor. No wheezing.   Abdominal:      Palpations: Abdomen is soft.      Tenderness: There is no abdominal tenderness. There is no guarding.   Musculoskeletal:      Right lower leg: Edema present.      Left lower leg: Edema present.      Comments: 1+ edema B slightly more on the R    Skin:     General: Skin is warm and dry.      Coloration: Skin is pale.      Findings: No rash.   Neurological:      General: No focal deficit present.      Mental Status: He is alert and oriented to person, place, and time.      Comments: Less sleepy, more awake         Fluids    Intake/Output Summary (Last 24 hours) at 11/9/2021 0747  Last data filed at 11/9/2021 0332  Gross per 24 hour   Intake 500 ml   Output 3000 ml   Net -2500 ml       Laboratory  Recent Labs     11/07/21  0842 11/08/21  0702 11/09/21  0438   WBC 7.9 11.0* 10.5   RBC 2.76* 2.68* 2.63*   HEMOGLOBIN 8.3* 8.0* 7.7*   HEMATOCRIT 26.2* 24.8* 24.2*   MCV 94.9 92.5 92.0   MCH 30.1 29.9 29.3   MCHC 31.7* 32.3* 31.8*   RDW 51.8* 50.3* 48.9   PLATELETCT 152* 206 166   MPV 12.1 9.3 10.0     Recent Labs     11/07/21  0842 11/08/21  0702 11/09/21  0438   SODIUM 133*  134* 132* 130*   POTASSIUM 3.9  3.8 3.5* 3.4*   CHLORIDE 93*  94* 92* 90*   CO2 19*  23 28 30   GLUCOSE 95  99 122* 118*   BUN 30*  29* 35* 23*   CREATININE 4.43*  4.26* 4.46* 2.52*   CALCIUM 8.0*  7.8* 7.9* 8.0*                   Imaging  DX-CHEST-PORTABLE (1 VIEW)   Final Result      1.  Possible minimal right pleural effusion.      2.  No focal pulmonary consolidation.      3.  New right IJV multilumen dialysis catheter in place.      IR-SOPHIA GUERRIER PLACEMENT >5   Final Result      Successful image guided tunneled dialysis catheter placement.      Plan: The catheter is available for immediate use.             CT-NEEDLE BX-RENAL   Final Result      CT-guided core biopsy of the right kidney.      US-RENAL   Final Result      1.  BILATERAL pleural effusions   2.  Small volume of ascites   3.  Cholelithiasis and gallbladder sludge with gallbladder wall thickening incidentally noted. Cholecystitis is a consideration.      EC-ECHOCARDIOGRAM COMPLETE W/O CONT   Final Result      DX-CHEST-PORTABLE (1 VIEW)   Final Result      Mild basilar and apical opacity compatible with atelectasis or scarring although consolidation is not excluded      Effusions on recent CT are not detected radiographically      CT-ABDOMEN-PELVIS W/O   Final Result      Diffuse urinary bladder wall thickening with Guy catheter in place. This is suspicious for cystitis but the finding is nonspecific      No hydronephrosis or urolithiasis      Cholelithiasis      Impending diarrhea      Small to medium right, small left pleural effusions      5 mm mean diameter left lower lobe pulmonary nodule. Follow-up recommendations as below   Low Risk: No routine follow-up      High Risk: Optional CT at 12 months      Comments: Nodules less than 6 mm do not require routine follow-up, but certain patients at high risk with suspicious nodule morphology, upper lobe location, or both may warrant 12-month follow-up.      Low Risk - Minimal or absent history of smoking and of other known risk factors.      High Risk - History of smoking or of other known risk factors.      Note: These recommendations do not apply to lung cancer screening, patients with immunosuppression, or patients with known primary cancer.      Fleischner Society 2017 Guidelines for Management of Incidentally Detected Pulmonary Nodules in Adults         CT-HEAD W/O   Final Result      No noncontrast CT evidence of acute intracranial hemorrhage.      Severe white matter hypodensity is present.  This is a nonspecific finding which usually is found to represent chronic microvascular disease in  "patient's of this demographic.  Demyelination, age indeterminant ischemia and gliosis are also common    possibilities.      Moderate to severe parenchymal volume loss         US-FOREIGN FILM ULTRASOUND   Final Result           Assessment/Plan  * UTE (acute kidney injury)/hypotension, hyponatremia, s/p femoral ORIF, anemia/GI bleed/odynophagia (HCC)- (present on admission)  Assessment & Plan  Was clearly dehydrated on presentation however renal function has not significantly improved with resuscitation  Nephrology following  Plan renal Bx today  UOP has improved today  Renally dose medications as appropriate  Follow daily BMP and UOP\"    Na improving but will need it to be 125 or above before dischagre. Now improved.  Nephrology following waiting for renal biopsy results  Dysphagia diet.  Dietitian says not eating enough  Consulted Dr. Khoury GI for odynophagia/poor PO intake and anemia/occult positive blood  SNF planned.        Occult blood positive stool  Assessment & Plan  He has seen [facility] in the past for routine colonoscopy severalyears ago.  At this time he does not want endoscopy/colonoscopy inpatient but will revisit if he has actual BRBPR or melena,or Hb drops to <7.  Updated Nephrology  11/8. Because of poor PO intake/odynophagia  GI recommended follow up    Anemia  Assessment & Plan  Ordered occult stool  Ordered iron studies  Currently no active bleeding.  Occult positive  At this time patient prefers GI work-up outpatient  May need epoietin, updated Nephrology  11/8. Consulted Dr. Khoury, GI for odynophagia/poor PO intake, thus will also evaluate UGI/anemia.  Recommended follow up outpatient    Dehydration  Assessment & Plan  Has been fluid resuscitated  Secondary to diarrhea      Encephalopathy acute- (present on admission)  Assessment & Plan  2/2 UTE vs. Urosepsis vs. Acute hyponatremia  Improving  CT head neg for acute findings    Sepsis due to Escherichia coli with acute renal failure without " "septic shock (HCC)  Assessment & Plan  NOT sepsis  Cultures neg  Only given one dose of ABx's on presentation          Mass of urinary bladder- (present on admission)  Assessment & Plan  Based on imaging from [facility]  Repeat CT Abd/Plvs and renal US do not show any mass    Jeana-prosthetic femoral shaft fracture- (present on admission)  Assessment & Plan  S/p ORIF  Pain control  Brace present  PT/OT consults\"    F/u Orthopedics      Alcohol use disorder, moderate, dependence (HCC)- (present on admission)  Assessment & Plan  Alcohol level ordered  Last drink reported 5 days prior by patient  Monitor for signs of withdrawal    Acute cystitis with hematuria- (present on admission)  Assessment & Plan  Treated for sepsis 1 week ago for Bacteremia & UTI treated with zosyn & Unasyn and d/c on cipro 750mg BID for 9 more doses and Flagyl 500mg TID x 16 days growing Resistant Ecoli.  Urine cultures here are neg  US at [facility] showing possible mass however CT Abd/Plvs and renal US both neg for mass here  Cont to monitor off Abx's  Catheter change w/in 48hrs of admission\"  Repeat UA + for UTI  Monitor closely  If symptoms get worse, will consult id        Acute hyponatremia- (present on admission)  Assessment & Plan  A component of hypovolemia complicated by UTE  Has corrected with NS to 120  Cont q6 monitoring; goal upper 120s over next 36hrs  Nephrology following\"    Improving slowly.  Normalizing         Elevated liver enzymes- (present on admission)  Assessment & Plan  Trend  Appears chronic  Monitor      Hypertension- (present on admission)  Assessment & Plan  On lisinopril 10 as an outpt which was stopped due to hypotension and UTE  Start amlodipine 5mg    Vitals:    11/08/21 0843   BP: 153/81   Pulse: 100   Resp: 14   Temp: 36.9 °C (98.4 °F)   SpO2: 93%     Add hydralazine  Continue amlodipine  ACEI held bec of elevated Cr-       VTE prophylaxis: heparin ppx    I have performed a physical exam and reviewed and updated ROS and " Plan today (11/9/2021). In review of yesterday's note (11/8/2021), there are no changes except as documented above.

## 2021-11-09 NOTE — CARE PLAN
The patient is Stable - Low risk of patient condition declining or worsening    Shift Goals  Clinical Goals: Maintain skin integrity  Patient Goals: Rest  Family Goals: NAVID    Progress made toward(s) clinical / shift goals:  pt on [id number]. Pt remaining clean and dry. Pt educated on the purpose of [id number].     Patient is not progressing towards the following goals:

## 2021-11-09 NOTE — WOUND TEAM
[facility]  Inpatient Services  Wound and Skin Care Brief Evaluation    Admission Date: 11/2/2021     Last order of IP CONSULT TO WOUND CARE was found on 11/5/2021 from Hospital Encounter on 11/2/2021     HPI, PMH, SH: Reviewed    No chief complaint on file.    Diagnosis: Acute renal failure (ARF) (HCC) [N17.9]    Unit where seen by Wound Team: S614/02     Wound consult placed regarding left ear. Chart and images reviewed. This discussed with bedside RN, Marcos. This RN in to assess patient. Pt pleasant and agreeable. Non-selectively debrided with NS/wound cleanser and gauze OR moist warm washcloth and no rinse foam soap. No pressure injuries or advanced wound care needs identified. Wound consult completed. No further follow up unless indicated and consulted.   Left ear     Right ear                      RSKIN:   CURRENTLY IN PLACE (X), APPLIED THIS VISIT (A), ORDERED (O):   Q shift Evan:  X  Q shift pressure point assessments:  X    Surface/Positioning   Pressure redistribution mattress  x          Low Airloss          Bariatric foam      Bariatric GAYATRI     Waffle cushion        Waffle Overlay          Reposition q 2 hours      TAPs Turning system     Z Kevin Pillow     Offloading/Redistribution   Sacral Mepilex (Silicone dressing)     Heel Mepilex (Silicone dressing)         Heel float boots (Prevalon boot)             Float Heels off Bed with Pillows      x     Respiratory   Silicone O2 tubing  x       Gray Foam Ear protectors   A  Cannula fixation Device (Tender )          High flow offloading Clip    Elastic head band offloading device      Anchorfast                                                         Trach with Optifoam split foam             Containment/Moisture Prevention     Rectal tube or BMS  x  Purwick/Condom Cath        Guy Catheter  x  Barrier wipes           Barrier paste     x  Antifungal tx      Interdry        Mobilization NA      Up to chair        Ambulate      PT/OT       Nutrition NA      Dietician        Diabetes Education      PO     TF     TPN     NPO   # days     Other

## 2021-11-09 NOTE — DISCHARGE PLANNING
Per physiatry patient is a good candidate for acute rehab pending clarification of the plan for hemodialysis and having patient's right femur evaluated for possibly increasing weight bearing status. It has been a month since his surgery. Will also need updated therapy evals.  Notified ip cm. TCC to continue to follow.

## 2021-11-09 NOTE — PROGRESS NOTES
Nephrology Daily Progress Note    Date of Service  11/8/2021    Chief Complaint  Zachary Moore is a 71 y.o. male with CKD II, recently hospitalized with RLE femur fx, urosepsis, admitted 11/2/2021 with UTE, hyponatremia    Interval Problem Update  11/4 -no acute events, no new complaints  UTE -creat at 6's  oliguric  Low C3 C4 -scheduled kidney biopsy  ABDULAZIZ, ANCA -pending  11/7 -doing well, no complaints  S/p HD x 2 tolerated well  S/p diagnostic kidney biopsy -results pending  UOP slightly better  11/8 patient is doing slightly better, still very weak  Seen and examined while getting HD.  Kidney biopsy showed ATN  Code Status  Full Code      Review of Systems  Review of Systems   Constitutional: Positive for malaise/fatigue. Negative for chills and fever.   Respiratory: Negative for cough and shortness of breath.    Cardiovascular: Negative for chest pain and leg swelling.   Gastrointestinal: Negative for nausea and vomiting.   Genitourinary: Negative for dysuria, frequency and urgency.   Neurological: Positive for weakness.   All other systems reviewed and are negative.       Physical Exam  Temp:  [36.4 °C (97.6 °F)-36.9 °C (98.4 °F)] 36.7 °C (98.1 °F)  Pulse:  [100-116] 100  Resp:  [14-16] 16  BP: (142-171)/(62-81) 142/62  SpO2:  [91 %-95 %] 91 %    Physical Exam  Vitals and nursing note reviewed.   Constitutional:       General: He is awake.      Appearance: He is ill-appearing.   HENT:      Head: Normocephalic and atraumatic.      Right Ear: External ear normal.      Left Ear: External ear normal.      Nose: Nose normal.      Mouth/Throat:      Pharynx: No oropharyngeal exudate or posterior oropharyngeal erythema.   Eyes:      General:         Right eye: No discharge.         Left eye: No discharge.      Conjunctiva/sclera: Conjunctivae normal.   Cardiovascular:      Rate and Rhythm: Normal rate and regular rhythm.   Pulmonary:      Effort: Pulmonary effort is normal. No respiratory distress.      Breath  sounds: Normal breath sounds. No wheezing.   Abdominal:      General: Abdomen is flat. Bowel sounds are normal.   Musculoskeletal:         General: No tenderness.      Cervical back: No rigidity. No muscular tenderness.      Right lower leg: Edema present.      Left lower leg: Edema present.      Comments: Right IJ tunneled dialysis catheter   Skin:     General: Skin is warm and dry.      Coloration: Skin is not jaundiced.      Findings: No lesion or rash.   Neurological:      General: No focal deficit present.      Mental Status: He is alert and oriented to person, place, and time. Mental status is at baseline.   Psychiatric:         Mood and Affect: Mood normal.         Behavior: Behavior normal.         Thought Content: Thought content normal.         Fluids    Intake/Output Summary (Last 24 hours) at 11/8/2021 1622  Last data filed at 11/8/2021 0315  Gross per 24 hour   Intake --   Output 1600 ml   Net -1600 ml       Laboratory  Recent Labs     11/06/21  0417 11/07/21  0842 11/08/21  0702   WBC 8.3 7.9 11.0*   RBC 2.58* 2.76* 2.68*   HEMOGLOBIN 7.8* 8.3* 8.0*   HEMATOCRIT 23.0* 26.2* 24.8*   MCV 89.1 94.9 92.5   MCH 30.2 30.1 29.9   MCHC 33.9 31.7* 32.3*   RDW 47.6 51.8* 50.3*   PLATELETCT 281 152* 206   MPV 9.5 12.1 9.3     Recent Labs     11/06/21  1525 11/07/21  0842 11/08/21  0702   SODIUM 131* 133*  134* 132*   POTASSIUM 3.7 3.9  3.8 3.5*   CHLORIDE 92* 93*  94* 92*   CO2 23 19*  23 28   GLUCOSE 82 95  99 122*   BUN 21 30*  29* 35*   CREATININE 2.94* 4.43*  4.26* 4.46*   CALCIUM 8.1* 8.0*  7.8* 7.9*                   Imaging  IR-SOPHIA GUERRIER PLACEMENT >5   Final Result      Successful image guided tunneled dialysis catheter placement.      Plan: The catheter is available for immediate use.            CT-NEEDLE BX-RENAL   Final Result      CT-guided core biopsy of the right kidney.      US-RENAL   Final Result      1.  BILATERAL pleural effusions   2.  Small volume of ascites   3.  Cholelithiasis  and gallbladder sludge with gallbladder wall thickening incidentally noted. Cholecystitis is a consideration.      EC-ECHOCARDIOGRAM COMPLETE W/O CONT   Final Result      DX-CHEST-PORTABLE (1 VIEW)   Final Result      Mild basilar and apical opacity compatible with atelectasis or scarring although consolidation is not excluded      Effusions on recent CT are not detected radiographically      CT-ABDOMEN-PELVIS W/O   Final Result      Diffuse urinary bladder wall thickening with Guy catheter in place. This is suspicious for cystitis but the finding is nonspecific      No hydronephrosis or urolithiasis      Cholelithiasis      Impending diarrhea      Small to medium right, small left pleural effusions      5 mm mean diameter left lower lobe pulmonary nodule. Follow-up recommendations as below   Low Risk: No routine follow-up      High Risk: Optional CT at 12 months      Comments: Nodules less than 6 mm do not require routine follow-up, but certain patients at high risk with suspicious nodule morphology, upper lobe location, or both may warrant 12-month follow-up.      Low Risk - Minimal or absent history of smoking and of other known risk factors.      High Risk - History of smoking or of other known risk factors.      Note: These recommendations do not apply to lung cancer screening, patients with immunosuppression, or patients with known primary cancer.      Fleischner Society 2017 Guidelines for Management of Incidentally Detected Pulmonary Nodules in Adults         CT-HEAD W/O   Final Result      No noncontrast CT evidence of acute intracranial hemorrhage.      Severe white matter hypodensity is present.  This is a nonspecific finding which usually is found to represent chronic microvascular disease in patient's of this demographic.  Demyelination, age indeterminant ischemia and gliosis are also common    possibilities.      Moderate to severe parenchymal volume loss         US-FOREIGN FILM ULTRASOUND   Final  Result      DX-CHEST-PORTABLE (1 VIEW)    (Results Pending)         Assessment/Plan  1.UTE/CKD : Secondary to ATN(biopsy-proven), requiring dialysis .  2.HTN: BP well controlled  3.hyponatremia  4.Anemia: Hb to monitor  5.Metabolic acidosis: Better  Continue to evaluate daily for the need of hemodialysis  Epogen with dialysis  Renal diet  Daily labs  Renal dose all meds  Avoid nephrotoxins  Prognosis guarded.

## 2021-11-09 NOTE — PROGRESS NOTES
Hemodialysis ordered by Dr. Najjar. Treatment started at 0729 and ended at 1029. Pt stable, vss, no c/o post tx. Net UF 2.0 L. Reported to JANNETH Aleman RN.

## 2021-11-10 LAB
ALBUMIN SERPL BCP-MCNC: 3.1 G/DL (ref 3.2–4.9)
ALBUMIN/GLOB SERPL: 1 G/DL
ALP SERPL-CCNC: 94 U/L (ref 30–99)
ALT SERPL-CCNC: 11 U/L (ref 2–50)
ANION GAP SERPL CALC-SCNC: 9 MMOL/L (ref 7–16)
AST SERPL-CCNC: 13 U/L (ref 12–45)
BASOPHILS # BLD AUTO: 0.2 % (ref 0–1.8)
BASOPHILS # BLD: 0.02 K/UL (ref 0–0.12)
BILIRUB SERPL-MCNC: 0.8 MG/DL (ref 0.1–1.5)
BUN SERPL-MCNC: 21 MG/DL (ref 8–22)
CALCIUM SERPL-MCNC: 8.3 MG/DL (ref 8.5–10.5)
CHLORIDE SERPL-SCNC: 92 MMOL/L (ref 96–112)
CO2 SERPL-SCNC: 30 MMOL/L (ref 20–33)
CREAT SERPL-MCNC: 2.2 MG/DL (ref 0.5–1.4)
EOSINOPHIL # BLD AUTO: 0.04 K/UL (ref 0–0.51)
EOSINOPHIL NFR BLD: 0.5 % (ref 0–6.9)
ERYTHROCYTE [DISTWIDTH] IN BLOOD BY AUTOMATED COUNT: 48.6 FL (ref 35.9–50)
GLOBULIN SER CALC-MCNC: 3 G/DL (ref 1.9–3.5)
GLUCOSE SERPL-MCNC: 98 MG/DL (ref 65–99)
HCT VFR BLD AUTO: 22.8 % (ref 42–52)
HGB BLD-MCNC: 7.5 G/DL (ref 14–18)
IMM GRANULOCYTES # BLD AUTO: 0.05 K/UL (ref 0–0.11)
IMM GRANULOCYTES NFR BLD AUTO: 0.6 % (ref 0–0.9)
LYMPHOCYTES # BLD AUTO: 1.38 K/UL (ref 1–4.8)
LYMPHOCYTES NFR BLD: 17 % (ref 22–41)
MCH RBC QN AUTO: 30.1 PG (ref 27–33)
MCHC RBC AUTO-ENTMCNC: 32.9 G/DL (ref 33.7–35.3)
MCV RBC AUTO: 91.6 FL (ref 81.4–97.8)
MONOCYTES # BLD AUTO: 0.67 K/UL (ref 0–0.85)
MONOCYTES NFR BLD AUTO: 8.3 % (ref 0–13.4)
NEUTROPHILS # BLD AUTO: 5.94 K/UL (ref 1.82–7.42)
NEUTROPHILS NFR BLD: 73.4 % (ref 44–72)
NRBC # BLD AUTO: 0 K/UL
NRBC BLD-RTO: 0 /100 WBC
PHOSPHATE SERPL-MCNC: 3.3 MG/DL (ref 2.5–4.5)
PLATELET # BLD AUTO: 172 K/UL (ref 164–446)
PMV BLD AUTO: 10.4 FL (ref 9–12.9)
POTASSIUM SERPL-SCNC: 3.5 MMOL/L (ref 3.6–5.5)
PROT SERPL-MCNC: 6.1 G/DL (ref 6–8.2)
RBC # BLD AUTO: 2.49 M/UL (ref 4.7–6.1)
SODIUM SERPL-SCNC: 131 MMOL/L (ref 135–145)
WBC # BLD AUTO: 8.1 K/UL (ref 4.8–10.8)

## 2021-11-10 PROCEDURE — A9270 NON-COVERED ITEM OR SERVICE: HCPCS | Performed by: STUDENT IN AN ORGANIZED HEALTH CARE EDUCATION/TRAINING PROGRAM

## 2021-11-10 PROCEDURE — 700102 HCHG RX REV CODE 250 W/ 637 OVERRIDE(OP): Performed by: INTERNAL MEDICINE

## 2021-11-10 PROCEDURE — 85025 COMPLETE CBC W/AUTO DIFF WBC: CPT

## 2021-11-10 PROCEDURE — 770006 HCHG ROOM/CARE - MED/SURG/GYN SEMI*

## 2021-11-10 PROCEDURE — 700111 HCHG RX REV CODE 636 W/ 250 OVERRIDE (IP): Performed by: INTERNAL MEDICINE

## 2021-11-10 PROCEDURE — 84100 ASSAY OF PHOSPHORUS: CPT

## 2021-11-10 PROCEDURE — 80053 COMPREHEN METABOLIC PANEL: CPT

## 2021-11-10 PROCEDURE — A9270 NON-COVERED ITEM OR SERVICE: HCPCS | Performed by: INTERNAL MEDICINE

## 2021-11-10 PROCEDURE — 99233 SBSQ HOSP IP/OBS HIGH 50: CPT | Performed by: INTERNAL MEDICINE

## 2021-11-10 PROCEDURE — 36415 COLL VENOUS BLD VENIPUNCTURE: CPT

## 2021-11-10 PROCEDURE — 700102 HCHG RX REV CODE 250 W/ 637 OVERRIDE(OP): Performed by: STUDENT IN AN ORGANIZED HEALTH CARE EDUCATION/TRAINING PROGRAM

## 2021-11-10 PROCEDURE — 700102 HCHG RX REV CODE 250 W/ 637 OVERRIDE(OP): Performed by: NURSE PRACTITIONER

## 2021-11-10 PROCEDURE — A9270 NON-COVERED ITEM OR SERVICE: HCPCS | Performed by: NURSE PRACTITIONER

## 2021-11-10 PROCEDURE — 99232 SBSQ HOSP IP/OBS MODERATE 35: CPT | Performed by: PHYSICAL MEDICINE & REHABILITATION

## 2021-11-10 RX ADMIN — BUPRENORPHINE HCL 2 MG: 2 TABLET SUBLINGUAL at 20:35

## 2021-11-10 RX ADMIN — BUPRENORPHINE HCL 2 MG: 2 TABLET SUBLINGUAL at 09:26

## 2021-11-10 RX ADMIN — NYSTATIN 500000 UNITS: 100000 SUSPENSION ORAL at 13:38

## 2021-11-10 RX ADMIN — NYSTATIN 500000 UNITS: 100000 SUSPENSION ORAL at 18:12

## 2021-11-10 RX ADMIN — HYDRALAZINE HYDROCHLORIDE 25 MG: 50 TABLET ORAL at 13:38

## 2021-11-10 RX ADMIN — HYDRALAZINE HYDROCHLORIDE 25 MG: 50 TABLET ORAL at 20:35

## 2021-11-10 RX ADMIN — SODIUM BICARBONATE 1300 MG: 650 TABLET ORAL at 20:38

## 2021-11-10 RX ADMIN — ACETAMINOPHEN 1000 MG: 500 TABLET ORAL at 20:35

## 2021-11-10 RX ADMIN — OMEPRAZOLE 20 MG: 20 CAPSULE, DELAYED RELEASE ORAL at 04:30

## 2021-11-10 RX ADMIN — HEPARIN SODIUM 5000 UNITS: 5000 INJECTION, SOLUTION INTRAVENOUS; SUBCUTANEOUS at 13:37

## 2021-11-10 RX ADMIN — HYDRALAZINE HYDROCHLORIDE 25 MG: 50 TABLET ORAL at 04:30

## 2021-11-10 RX ADMIN — NYSTATIN 500000 UNITS: 100000 SUSPENSION ORAL at 20:35

## 2021-11-10 RX ADMIN — NYSTATIN 500000 UNITS: 100000 SUSPENSION ORAL at 09:26

## 2021-11-10 RX ADMIN — MICONAZOLE NITRATE: 20 CREAM TOPICAL at 04:30

## 2021-11-10 RX ADMIN — SODIUM BICARBONATE 1300 MG: 650 TABLET ORAL at 13:00

## 2021-11-10 RX ADMIN — SODIUM BICARBONATE 1300 MG: 650 TABLET ORAL at 17:00

## 2021-11-10 RX ADMIN — AMLODIPINE BESYLATE 10 MG: 10 TABLET ORAL at 04:30

## 2021-11-10 RX ADMIN — BUPRENORPHINE HCL 2 MG: 2 TABLET SUBLINGUAL at 14:41

## 2021-11-10 RX ADMIN — OMEPRAZOLE 20 MG: 20 CAPSULE, DELAYED RELEASE ORAL at 18:12

## 2021-11-10 RX ADMIN — SODIUM BICARBONATE 1300 MG: 650 TABLET ORAL at 09:26

## 2021-11-10 ASSESSMENT — ENCOUNTER SYMPTOMS
DIARRHEA: 0
CHILLS: 0
FEVER: 0
PALPITATIONS: 0
HEADACHES: 0
PHOTOPHOBIA: 0
SHORTNESS OF BREATH: 0
COUGH: 0
TINGLING: 0
HEARTBURN: 0
VOMITING: 0
ABDOMINAL PAIN: 0
SORE THROAT: 0
NAUSEA: 0
DOUBLE VISION: 0
BACK PAIN: 0

## 2021-11-10 ASSESSMENT — PAIN DESCRIPTION - PAIN TYPE
TYPE: ACUTE PAIN
TYPE: CHRONIC PAIN

## 2021-11-10 NOTE — PROGRESS NOTES
Physical Medicine and Rehabilitation Consultation              Date of initial consultation: 11/8/2021  Reason for consultation: assessment of rehabilitation needs  LOS: 8 Day(s)    SUBJECTIVE: Seen today with spouse present. Patient had therapy yesterday after dialysis so was more fatigued than today. He does have a rectal tube in and reports that there is plan to remove it today.     We discussed the results of his right wrist x ray which shows significant osteoarthritis. Ok for ice and avoid positions that aggravate pain.    Medications:  Current Facility-Administered Medications   Medication Dose   • acetaminophen (TYLENOL) tablet 1,000 mg  1,000 mg   • hydrALAZINE (APRESOLINE) tablet 25 mg  25 mg   • lactated ringers infusion (BOLUS)  500 mL   • amLODIPine (NORVASC) tablet 10 mg  10 mg   • benzocaine-menthol (CEPACOL) lozenge 1 Lozenge  1 Lozenge   • sore throat spray (CHLORASEPTIC) 1 Spray  1 Spray   • nystatin (MYCOSTATIN) 602069 UNIT/ML suspension 500,000 Units  5 mL   • heparin injection 5,000 Units  5,000 Units   • heparin lock flush 100 unit/mL injection 300-500 Units  300-500 Units   • heparin injection 3,700 Units  3,700 Units   • miconazole 2%-zinc oxide (Bert) topical cream     • hydrALAZINE (APRESOLINE) injection 10 mg  10 mg   • guaiFENesin (ROBITUSSIN) 100 MG/5ML solution 200 mg  10 mL   • mag hydrox-al hydrox-simeth (MAALOX PLUS ES or MYLANTA DS) suspension 10 mL  10 mL   • sodium bicarbonate tablet 1,300 mg  1,300 mg   • omeprazole (PRILOSEC) capsule 20 mg  20 mg   • buprenorphine (Subutex) sublingual tablet 2 mg  2 mg   • Pharmacy Consult Request - to monitor for nephrotoxic agents  1 Each   • labetalol (NORMODYNE/TRANDATE) injection 10 mg  10 mg   • ondansetron (ZOFRAN) syringe/vial injection 4 mg  4 mg   • ondansetron (ZOFRAN ODT) dispertab 4 mg  4 mg       Allergies:  No Known Allergies    Physical Exam:  Vitals: /66   Pulse 94   Temp 36.7 °C (98.1 °F) (Temporal)   Resp 17   Ht  "1.778 m (5' 10\")   Wt 76.5 kg (168 lb 10.4 oz)   SpO2 95%   General: well-groomed sitting up in no acute distress, spouse at bedside  Eyes: no scleral icterus or conjunctival injection  Ears, nose, mouth and throat: moist oral mucosa  Cardiovascular: regular rate, good peripheral perfusion, negative Romulo sign bilaterally  Respiratory: breathing room air comfortably without use of accessory muscles  Gastrointestinal: soft, nontender, nondistended  Musculoskeletal: less tender to palpation in right lateral wrist  Skin: no wounds seen on exposed skin    Neurologic:  Mental status:  A&Ox4 (person, place, date, situation) answers questions appropriately follows commands  Speech: fluent, no aphasia or dysarthria  Tone: no spasticity noted  Psychiatric: appropriate affect    Labs: Reviewed and significant for   Recent Labs     11/08/21  0702 11/09/21  0438 11/10/21  0351   RBC 2.68* 2.63* 2.49*   HEMOGLOBIN 8.0* 7.7* 7.5*   HEMATOCRIT 24.8* 24.2* 22.8*   PLATELETCT 206 166 172     Recent Labs     11/08/21  0702 11/09/21  0438 11/10/21  0351   SODIUM 132* 130* 131*   POTASSIUM 3.5* 3.4* 3.5*   CHLORIDE 92* 90* 92*   CO2 28 30 30   GLUCOSE 122* 118* 98   BUN 35* 23* 21   CREATININE 4.46* 2.52* 2.20*   CALCIUM 7.9* 8.0* 8.3*     Recent Results (from the past 24 hour(s))   Renal Function Panel    Collection Time: 11/10/21  3:51 AM   Result Value Ref Range    Sodium 131 (L) 135 - 145 mmol/L    Potassium 3.5 (L) 3.6 - 5.5 mmol/L    Chloride 92 (L) 96 - 112 mmol/L    Co2 30 20 - 33 mmol/L    Glucose 98 65 - 99 mg/dL    Creatinine 2.20 (H) 0.50 - 1.40 mg/dL    Bun 21 8 - 22 mg/dL    Calcium 8.3 (L) 8.5 - 10.5 mg/dL    Phosphorus 3.3 2.5 - 4.5 mg/dL    Albumin 3.1 (L) 3.2 - 4.9 g/dL   CBC WITH DIFFERENTIAL    Collection Time: 11/10/21  3:51 AM   Result Value Ref Range    WBC 8.1 4.8 - 10.8 K/uL    RBC 2.49 (L) 4.70 - 6.10 M/uL    Hemoglobin 7.5 (L) 14.0 - 18.0 g/dL    Hematocrit 22.8 (L) 42.0 - 52.0 %    MCV 91.6 81.4 - 97.8 fL "    MCH 30.1 27.0 - 33.0 pg    MCHC 32.9 (L) 33.7 - 35.3 g/dL    RDW 48.6 35.9 - 50.0 fL    Platelet Count 172 164 - 446 K/uL    MPV 10.4 9.0 - 12.9 fL    Neutrophils-Polys 73.40 (H) 44.00 - 72.00 %    Lymphocytes 17.00 (L) 22.00 - 41.00 %    Monocytes 8.30 0.00 - 13.40 %    Eosinophils 0.50 0.00 - 6.90 %    Basophils 0.20 0.00 - 1.80 %    Immature Granulocytes 0.60 0.00 - 0.90 %    Nucleated RBC 0.00 /100 WBC    Neutrophils (Absolute) 5.94 1.82 - 7.42 K/uL    Lymphs (Absolute) 1.38 1.00 - 4.80 K/uL    Monos (Absolute) 0.67 0.00 - 0.85 K/uL    Eos (Absolute) 0.04 0.00 - 0.51 K/uL    Baso (Absolute) 0.02 0.00 - 0.12 K/uL    Immature Granulocytes (abs) 0.05 0.00 - 0.11 K/uL    NRBC (Absolute) 0.00 K/uL   Comp Metabolic Panel    Collection Time: 11/10/21  3:51 AM   Result Value Ref Range    Anion Gap 9.0 7.0 - 16.0    AST(SGOT) 13 12 - 45 U/L    ALT(SGPT) 11 2 - 50 U/L    Alkaline Phosphatase 94 30 - 99 U/L    Total Bilirubin 0.8 0.1 - 1.5 mg/dL    Total Protein 6.1 6.0 - 8.2 g/dL    Globulin 3.0 1.9 - 3.5 g/dL    A-G Ratio 1.0 g/dL   ESTIMATED GFR    Collection Time: 11/10/21  3:51 AM   Result Value Ref Range    GFR If  36 (A) >60 mL/min/1.73 m 2    GFR If Non African American 30 (A) >60 mL/min/1.73 m 2       ASSESSMENT:  IMPRESSION: he patient is a 71 y.o. male with a past medical history of  Arthritis, hypertension, skin cancer and alcohol use;  who presented on 11/2/2021  7:29 PM as a [facility] for worsening renal failure function and hyponatremia. Patient reports he had a ground level fall (stepping on stair)  on October 12 and sustained a right RLE femur fracture, admitted at [facility] and underwent right ORIF for a comminuted fracture proximal to his right knee prosthesis, which was complicated later by urosepsis and alcohol resulting in debility    [facility] Code: 0016 - Debility (Non-Cardiac, Non-Pulmonary)  -With acute secondary complications of: impaired ADLs  and mobility,   -with chronic conditions of: Arthritis, hypertension, skin cancer and alcohol use, bilateral feet neuropathy, bilateral knee replacement (left from traumatic injury), right femur fracture 2021  -and:     Medical Complexity:  Acute renal failure with oliguria on temporary hemodialysis MWF  Anemia, suspect slow GI bleed component, Hb 7.5 today  Hyponatremia  Hyperglycemia  Dyscoagulopathy    RECOMMENDATIONS:    ##MSK  #Impaired ADLs and mobility: Agree with continuing OT/PT while admitted here. No new therapy notes in several days so placed a new order  Patient is a good candidate for acute inpatient rehabilitation provided that: patient is medically stable, bed becomes available, insurance authorization is obtained. Notably, patient chose SNF after right femur fracture due to location (near home) but is now willing to stay in [place] to get intensive therapies so he can get stronger and go home. Wife is willing to provide cares that he may need when he goes home. Wife and patient are agreeable to going home at wheelchair level, as long as he can improve his independence with ADLs and mobility. Patient is motivated with therapies and eager to participate.     PLEASE clarify plan for hemodialysis and have patient's right femur evaluated for possibly increasing weight bearing status. It has been a month since his surgery.    See below for details    #Posttraumatic pain, acute, controlled:  #Postsurgical pain, acute, controlled:   #Neuropathic pain, acute, controlled: :   Agree with current management    #Right wrist osteoarthritis  Ordered ice pack for right wrist  Avoid painful positions by optimizing with therapy    ##NEURO  #Altered mentation, improving  SLP    ##GI  #Dysphagia, likely secondary to altered mentation, improving  SLP     ##/renal  #Acute renal failure  PLEASE clarify plan for hemodialysis for purposes of planning inpatient rehabilitation and outpatient services    ##SKIN  Recommend  turning at least Q2hrs while in bed to prevent skin injury    DVT chemoprophlaxis: none noted  GI prophylaxis: omeprazole 20mg BID  Estimated length of stay: 14-18 days  Anticipated discharge destination: home with spouse  Prognosis: good  Code: full resuscitation      Thank you for allowing us to participate in the care of this patient. Physiatry will continue to follow and provide recommendations, as needed.    Patient was seen for 28 minutes on unit/floor of which > 50% of time was spent on counseling and coordination of care regarding the above, including prognosis, risk reduction, benefits of treatment, and options for next stage of care.    Melissa Dodson D.O.   Physical Medicine and Rehabilitation     Please note that this dictation was created using voice recognition software. I have made every reasonable attempt to correct obvious errors, but there may be errors of grammar and possibly content that I did not discover before finalizing the note.

## 2021-11-10 NOTE — CARE PLAN
The patient is Stable - Low risk of patient condition declining or worsening    Shift Goals  Clinical Goals: Maintain skin integrity  Patient Goals: comfort, rest, pain control  Family Goals: NAVID    Progress made toward(s) clinical / shift goals:  pt on [id number]. Pt remaining clean and dry. Pt educated on the purpose of [id number].     Patient is not progressing towards the following goals:

## 2021-11-10 NOTE — DISCHARGE PLANNING
Medical Social Work  SW verified patient is medically clear to discharge with Dr Bill Turpin to [facility], with above information, PT/OT saw patient yesterday.

## 2021-11-10 NOTE — PROGRESS NOTES
[facility] Medicine Daily Progress Note    Date of Service  11/10/2021    Chief Complaint  Zachary Moore is a 71 y.o. male admitted 11/2/2021 with UTE    Hospital Course  This is a 71-year-old gentleman who originally suffered a hip fracture and was treated at [facility].  During that hospitalization he was also treated for alcohol withdrawal and urosepsis.  From there he was discharged to rehab facility, and has since been transferred to us.  His previous medical history is also significant for hypertension, alcohol abuse, stage II chronic kidney disease..  Patient was sent to us from rehab facility in [place] after he was found to be hyponatremic and with acute kidney injury.  On arrival here, his sodium was 114, potassium 5.2, chloride 86, CO2 12, BUN 64, creatinine 6.10.  He has a previous diagnosis of stage II chronic kidney disease with a baseline creatinine around 1.0.  Patient was admitted to the hospital with a diagnosis of likely hypovolemic hyponatremia.  He was initially treated with fluid resuscitation, and corrected rapidly, however, he remained with very poor urine output and therefore nephrology has been consulted.    Interval Problem Update  Pt c/o of being sleepy this am but no other specific complaints    AFebrile  Sinus 70-80s  -160s    11/5. Sleepy but arousable. SLP couldn't do assessment because he complained of odynophagia. I clarified with him, He can still take PO and his odynophagia has been going on for 2 years on and off. I told SLP to see if we can reevaluate his swallowing again. CUrrently he is NPO anyway for catheter placement.  11/6. SLP recommended NPO but will reassess Saturday.  Family at bedside. Discussed with Speech. He did pass his test he can do thin liquids. I added supoplements. I ordered dietitian. Patient planned for dialysis cathter placement today.  11/7. Arousable. His Hb 7.8. He has occult positive stool. No active bleeding. I discussed with him  if he wants an GI eval/ endoscopy or colonoscopy but at this time he declines and would prefer to do it outpatient. I did say if his Hb drops <7 or if he has copious melena or bright red blood then we will have GI consulted. He agreed with the plan. I encouraged PO and supplements.      11/9.  Patient is getting dialysis today.  No acute issue overnight. we will get updated PT OT evaluation today.    11/10 no acute issue overnight.  Physiatry is following and no bed available today for now.  I have personally seen and examined the patient at bedside. I discussed the plan of care with patient, bedside RN and pulmonary. And SLP    Consultants/Specialty  nephrology  critical care    Code Status  Full Code    Disposition  Patient is not medically cleared. SNF pending, waiting for renal biopsy results and Nephrology clearance.  Anticipate discharge to to skilled nursing facility.  I have placed the appropriate orders for post-discharge needs.    Review of Systems  Review of Systems   Constitutional: Positive for malaise/fatigue. Negative for chills.   HENT: Negative for nosebleeds and sore throat.    Eyes: Negative for double vision and photophobia.   Respiratory: Negative for cough and shortness of breath.    Cardiovascular: Positive for leg swelling. Negative for chest pain and palpitations.   Gastrointestinal: Negative for abdominal pain, diarrhea, heartburn, nausea and vomiting.   Genitourinary: Negative for dysuria, frequency and urgency.   Musculoskeletal: Negative for back pain.   Skin: Negative for rash.   Neurological: Negative for tingling and headaches.        Physical Exam  Temp:  [35.6 °C (96.1 °F)-37.7 °C (99.8 °F)] 36.7 °C (98.1 °F)  Pulse:  [] 94  Resp:  [16-18] 17  BP: (135-156)/(58-72) 135/66  SpO2:  [92 %-96 %] 95 %    Physical Exam  Constitutional:       General: He is not in acute distress.     Appearance: Normal appearance. He is well-developed. He is obese.   HENT:      Head: Normocephalic  and atraumatic.      Mouth/Throat:      Comments: NO odynophagia, out of proportion to exam.  Eyes:      Conjunctiva/sclera: Conjunctivae normal.   Neck:      Vascular: No JVD.   Cardiovascular:      Rate and Rhythm: Normal rate.      Pulses: Normal pulses.      Heart sounds: Normal heart sounds. No murmur heard.      Pulmonary:      Effort: Pulmonary effort is normal. No respiratory distress.      Breath sounds: No stridor. No wheezing.   Abdominal:      Palpations: Abdomen is soft.      Tenderness: There is no abdominal tenderness. There is no guarding.   Musculoskeletal:      Right lower leg: Edema present.      Left lower leg: Edema present.      Comments: 1+ edema B slightly more on the R    Skin:     General: Skin is warm and dry.      Coloration: Skin is pale.      Findings: No rash.   Neurological:      General: No focal deficit present.      Mental Status: He is alert and oriented to person, place, and time.      Comments: Less sleepy, more awake         Fluids    Intake/Output Summary (Last 24 hours) at 11/10/2021 0748  Last data filed at 11/10/2021 0600  Gross per 24 hour   Intake 1500 ml   Output 3800 ml   Net -2300 ml       Laboratory  Recent Labs     11/08/21  0702 11/09/21  0438 11/10/21  0351   WBC 11.0* 10.5 8.1   RBC 2.68* 2.63* 2.49*   HEMOGLOBIN 8.0* 7.7* 7.5*   HEMATOCRIT 24.8* 24.2* 22.8*   MCV 92.5 92.0 91.6   MCH 29.9 29.3 30.1   MCHC 32.3* 31.8* 32.9*   RDW 50.3* 48.9 48.6   PLATELETCT 206 166 172   MPV 9.3 10.0 10.4     Recent Labs     11/08/21  0702 11/09/21  0438 11/10/21  0351   SODIUM 132* 130* 131*   POTASSIUM 3.5* 3.4* 3.5*   CHLORIDE 92* 90* 92*   CO2 28 30 30   GLUCOSE 122* 118* 98   BUN 35* 23* 21   CREATININE 4.46* 2.52* 2.20*   CALCIUM 7.9* 8.0* 8.3*                   Imaging  DX-WRIST-COMPLETE 3+ RIGHT   Final Result      1.  No evidence of acute fracture or dislocation.      2.  Prominent osteoarthritis involving the first carpometacarpal and triscaphe articulations. Less  prominent osteoarthritis involves the first MCP and first IP joints.         DX-CHEST-PORTABLE (1 VIEW)   Final Result      1.  Possible minimal right pleural effusion.      2.  No focal pulmonary consolidation.      3.  New right IJV multilumen dialysis catheter in place.      IR-SOPHIA GUERRIER PLACEMENT >5   Final Result      Successful image guided tunneled dialysis catheter placement.      Plan: The catheter is available for immediate use.            CT-NEEDLE BX-RENAL   Final Result      CT-guided core biopsy of the right kidney.      US-RENAL   Final Result      1.  BILATERAL pleural effusions   2.  Small volume of ascites   3.  Cholelithiasis and gallbladder sludge with gallbladder wall thickening incidentally noted. Cholecystitis is a consideration.      EC-ECHOCARDIOGRAM COMPLETE W/O CONT   Final Result      DX-CHEST-PORTABLE (1 VIEW)   Final Result      Mild basilar and apical opacity compatible with atelectasis or scarring although consolidation is not excluded      Effusions on recent CT are not detected radiographically      CT-ABDOMEN-PELVIS W/O   Final Result      Diffuse urinary bladder wall thickening with Guy catheter in place. This is suspicious for cystitis but the finding is nonspecific      No hydronephrosis or urolithiasis      Cholelithiasis      Impending diarrhea      Small to medium right, small left pleural effusions      5 mm mean diameter left lower lobe pulmonary nodule. Follow-up recommendations as below   Low Risk: No routine follow-up      High Risk: Optional CT at 12 months      Comments: Nodules less than 6 mm do not require routine follow-up, but certain patients at high risk with suspicious nodule morphology, upper lobe location, or both may warrant 12-month follow-up.      Low Risk - Minimal or absent history of smoking and of other known risk factors.      High Risk - History of smoking or of other known risk factors.      Note: These recommendations do not apply to lung  cancer screening, patients with immunosuppression, or patients with known primary cancer.      Fleischner Society 2017 Guidelines for Management of Incidentally Detected Pulmonary Nodules in Adults         CT-HEAD W/O   Final Result      No noncontrast CT evidence of acute intracranial hemorrhage.      Severe white matter hypodensity is present.  This is a nonspecific finding which usually is found to represent chronic microvascular disease in patient's of this demographic.  Demyelination, age indeterminant ischemia and gliosis are also common    possibilities.      Moderate to severe parenchymal volume loss         US-FOREIGN FILM ULTRASOUND   Final Result           Assessment/Plan  * UTE (acute kidney injury)/hypotension, hyponatremia, s/p femoral ORIF, anemia/GI bleed/odynophagia (HCC)- (present on admission)  Assessment & Plan  Creatinine 2.2  Improving  Hemodialysis per nephrology  Maybe he is in recovery phase at this point        Occult blood positive stool  Assessment & Plan  He has seen [facility] in the past for routine colonoscopy severalyears ago.  At this time he does not want endoscopy/colonoscopy inpatient but will revisit if he has actual BRBPR or melena,or Hb drops to <7.  Updated Nephrology  11/8. Because of poor PO intake/odynophagia  GI recommended follow up    Anemia  Assessment & Plan  Ordered occult stool  Ordered iron studies  Currently no active bleeding.  Occult positive  At this time patient prefers GI work-up outpatient  May need epoietin, updated Nephrology  11/8. Consulted Dr. Khoury, GI for odynophagia/poor PO intake, thus will also evaluate UGI/anemia.  Recommended follow up outpatient    Dehydration  Assessment & Plan  Has been fluid resuscitated  Secondary to diarrhea      Encephalopathy acute- (present on admission)  Assessment & Plan  2/2 UTE vs. Urosepsis vs. Acute hyponatremia  Improving  CT head neg for acute findings    Sepsis due to Escherichia coli with acute renal failure  "without septic shock (HCC)  Assessment & Plan  NOT sepsis  Cultures neg  Only given one dose of ABx's on presentation          Mass of urinary bladder- (present on admission)  Assessment & Plan  Based on imaging from [facility]  Repeat CT Abd/Plvs and renal US do not show any mass    Jeana-prosthetic femoral shaft fracture- (present on admission)  Assessment & Plan  S/p ORIF  Pain control  Brace present  PT/OT consults\"    F/u Orthopedics      Alcohol use disorder, moderate, dependence (HCC)- (present on admission)  Assessment & Plan  Alcohol level ordered  Last drink reported 5 days prior by patient  Monitor for signs of withdrawal    Acute cystitis with hematuria- (present on admission)  Assessment & Plan  Treated for sepsis 1 week ago for Bacteremia & UTI treated with zosyn & Unasyn and d/c on cipro 750mg BID for 9 more doses and Flagyl 500mg TID x 16 days growing Resistant Ecoli.  Urine cultures here are neg  US at [facility] showing possible mass however CT Abd/Plvs and renal US both neg for mass here  Cont to monitor off Abx's  Catheter change w/in 48hrs of admission\"  Repeat UA + for UTI but he is on chronic Guy catheter  Monitor closely  We will hold off on antibiotic at this point        Acute hyponatremia- (present on admission)  Assessment & Plan  Resolved        Elevated liver enzymes- (present on admission)  Assessment & Plan  Trend  Appears chronic  Monitor      Hypertension- (present on admission)  Assessment & Plan  On lisinopril 10 as an outpt which was stopped due to hypotension and UTE  Start amlodipine 5mg    Vitals:    11/08/21 0843   BP: 153/81   Pulse: 100   Resp: 14   Temp: 36.9 °C (98.4 °F)   SpO2: 93%     Add hydralazine  Continue amlodipine  ACEI held bec of elevated Cr-       VTE prophylaxis: heparin ppx    I have performed a physical exam and reviewed and updated ROS and Plan today (11/10/2021). In review of yesterday's note (11/9/2021), there are no changes except as documented above.      "

## 2021-11-10 NOTE — PROGRESS NOTES
COVID-19 surge in effect     Received report from ALDEN Galeana. Pt is A&O x3, disoriented to time. Pt on 1L via nasal canula, no signs of acute distress. Pt complains of 10/10 pain to bilateral wrists. Medicated with Tylenol per MAR. Pt presents with a DTI to right ear. Rectal tube in place, no leaking seen. Guy site is CDI. POC discussed with patient. Safety precautions in place, bed in lowest position, and call light and personal belongings within reach. Hourly rounding in place.

## 2021-11-10 NOTE — DISCHARGE PLANNING
Per physiatry, pending final plan for dialysis and weight bearing status. No beds available today at [facility], notified latoya sanford.

## 2021-11-10 NOTE — CARE PLAN
Problem: Nutritional:  Goal: Achieve adequate nutritional intake  Description: Patient will consume >50% of meals with >75% supplements once diet advanced.  Outcome: Progressing. Pt reports eating ~ 1/3 of meals but drinking ~ 2 Boost/day. Discussed importance of drinking all of Boost if pt eating <50% of meals, pt verbalized understanding with no questions/concerns. RD to follow for consistently adequate intake.

## 2021-11-10 NOTE — THERAPY
"Occupational Therapy  Daily Treatment     Patient Name: Zachary Moore  Age:  71 y.o., Sex:  male  Medical Record #: [id number]  Today's Date: 11/9/2021       Precautions: Fall Risk,Toe Touch Weight Bearing Right Lower Extremity,Swallow Precautions ( See Comments)  Comments: 10/13 R femur ORIF done [place], was in hospital or at SNF since then.    Assessment    Pt seen for OT tx.  Pt's wife present & very supportive.  Pt stated he was fatigued from dialysis earlier this am.  Pt did show progress from prior tx session & was able to have improved sitting balance EOB & did stand with Mod A & FWW while maintaining TTWB RLE.  Pt encouraged to sit on EOB for meals & take an active role in his care.  Pt will need Post Acute OT services prior to D/C home.    Plan    Continue current treatment plan.    DC Equipment Recommendations: Unable to determine at this time  Discharge Recommendations: Recommend post-acute placement for additional occupational therapy services prior to discharge home    Subjective    \"I'm tired from dialysis this am\"     Objective       11/09/21 1623   Cognition    Cognition / Consciousness X   Speech/ Communication Delayed Responses   Level of Consciousness Alert   Ability To Follow Commands 1 Step   Safety Awareness Impaired   New Learning Impaired   Attention Impaired   Sequencing Impaired   Comments Pt with significant delay in responses, reported feeling fatigued from dialysis today   Neuro-Muscular Treatments   Comments Pt worked on EOB sitting with forward wt shift & use of BUE for trunk support    Other Treatments   Other Treatments Provided Pt educated on importance of being OOB, need to maintain TTWB RLE & encouraged him to engage in daily ADL's to regain his strength & independence   Balance   Sitting Balance (Static) Fair   Sitting Balance (Dynamic) Fair -   Standing Balance (Static) Trace +   Standing Balance (Dynamic) Trace   Weight Shift Sitting Fair   Weight Shift Standing Absent "   Comments Pt reported feeling very fatigued after dialysis this am   Bed Mobility    Supine to Sit Moderate Assist   Sit to Supine Minimal Assist   Scooting Moderate Assist   Rolling Minimal Assist to Rt.;Minimum Assist to Lt.   Activities of Daily Living   Eating Minimal Assist   Grooming Minimal Assist;Seated   Upper Body Dressing Minimal Assist   Lower Body Dressing Maximal Assist   Toileting Total Assist   Functional Mobility   Sit to Stand Moderate Assist   Bed, Chair, Wheelchair Transfer Unable to Participate   Comments Pt educated on TTWB RLE with sit-stand.  Pt was able to achieve full upright standing today with Mod A while TTWB RLE   Patient / Family Goals   Patient / Family Goal #1 To get better   Goal #1 Outcome Goal not met   Short Term Goals   Short Term Goal # 1 Pt will perform functional t/f's with min a while adhering to TTWB precautions   Goal Outcome # 1 Goal not met   Short Term Goal # 2 Pt will perform LB dressing with min a using AE    Goal Outcome # 2 Goal not met   Short Term Goal # 3 Pt will perform toileting task with min a   Goal Outcome # 3 Goal not met

## 2021-11-10 NOTE — CARE PLAN
Problem: Hemodynamics  Goal: Patient's hemodynamics, fluid balance and neurologic status will be stable or improve  Outcome: Progressing     Problem: Fall Risk  Goal: Patient will remain free from falls  Outcome: Progressing   The patient is Stable - Low risk of patient condition declining or worsening    Shift Goals  Clinical Goals: safety  Patient Goals: rest  Family Goals: NAVID    Progress made toward(s) clinical / shift goals:      Patient is not progressing towards the following goals:

## 2021-11-10 NOTE — PROGRESS NOTES
Nephrology Daily Progress Note    Date of Service  11/10/2021    Chief Complaint  Zachary Moore is a 71 y.o. male with CKD II, recently hospitalized with RLE femur fx, urosepsis, admitted 11/2/2021 with UTE, hyponatremia    Interval Problem Update  11/4 -no acute events, no new complaints  UTE -creat at 6's  oliguric  Low C3 C4 -scheduled kidney biopsy  ABDULAZIZ, ANCA -pending  11/7 -doing well, no complaints  S/p HD x 2 tolerated well  S/p diagnostic kidney biopsy -results pending  UOP slightly better  11/8 patient is doing slightly better, still very weak  Seen and examined while getting HD.  Kidney biopsy showed ATN  11/9 patient is doing better today, no chest pain or shortness of breath  Seen and examined while getting HD.  11/10 pt is doing better, no CP, no SOB  Code Status  Full Code      Review of Systems  Review of Systems   Constitutional: Positive for malaise/fatigue. Negative for chills and fever.   Respiratory: Negative for cough and shortness of breath.    Cardiovascular: Negative for chest pain and leg swelling.   Gastrointestinal: Negative for nausea and vomiting.   Genitourinary: Negative for dysuria, frequency and urgency.   All other systems reviewed and are negative.       Physical Exam  Temp:  [36.7 °C (98.1 °F)-37.7 °C (99.8 °F)] 36.7 °C (98.1 °F)  Pulse:  [] 94  Resp:  [16-18] 17  BP: (135-156)/(60-66) 135/66  SpO2:  [92 %-96 %] 95 %    Physical Exam  Vitals and nursing note reviewed.   Constitutional:       General: He is awake.      Appearance: He is ill-appearing.   HENT:      Head: Normocephalic and atraumatic.      Right Ear: External ear normal.      Left Ear: External ear normal.      Nose: Nose normal.      Mouth/Throat:      Pharynx: No oropharyngeal exudate or posterior oropharyngeal erythema.   Eyes:      General:         Right eye: No discharge.         Left eye: No discharge.      Conjunctiva/sclera: Conjunctivae normal.   Cardiovascular:      Rate and Rhythm: Normal rate and  regular rhythm.   Pulmonary:      Effort: Pulmonary effort is normal. No respiratory distress.      Breath sounds: Normal breath sounds. No wheezing.   Abdominal:      General: Abdomen is flat. Bowel sounds are normal.   Genitourinary:     Comments: Guy catheter   Musculoskeletal:         General: No tenderness.      Cervical back: No rigidity. No muscular tenderness.      Right lower leg: No edema.      Left lower leg: No edema.   Skin:     General: Skin is warm and dry.      Coloration: Skin is not jaundiced.      Findings: No lesion or rash.   Neurological:      General: No focal deficit present.      Mental Status: He is alert and oriented to person, place, and time. Mental status is at baseline.   Psychiatric:         Mood and Affect: Mood normal.         Behavior: Behavior normal.         Thought Content: Thought content normal.         Fluids    Intake/Output Summary (Last 24 hours) at 11/10/2021 1454  Last data filed at 11/10/2021 1200  Gross per 24 hour   Intake 760 ml   Output 1300 ml   Net -540 ml       Laboratory  Recent Labs     11/08/21  0702 11/09/21 0438 11/10/21  0351   WBC 11.0* 10.5 8.1   RBC 2.68* 2.63* 2.49*   HEMOGLOBIN 8.0* 7.7* 7.5*   HEMATOCRIT 24.8* 24.2* 22.8*   MCV 92.5 92.0 91.6   MCH 29.9 29.3 30.1   MCHC 32.3* 31.8* 32.9*   RDW 50.3* 48.9 48.6   PLATELETCT 206 166 172   MPV 9.3 10.0 10.4     Recent Labs     11/08/21  0702 11/09/21 0438 11/10/21  0351   SODIUM 132* 130* 131*   POTASSIUM 3.5* 3.4* 3.5*   CHLORIDE 92* 90* 92*   CO2 28 30 30   GLUCOSE 122* 118* 98   BUN 35* 23* 21   CREATININE 4.46* 2.52* 2.20*   CALCIUM 7.9* 8.0* 8.3*                   Imaging  DX-WRIST-COMPLETE 3+ RIGHT   Final Result      1.  No evidence of acute fracture or dislocation.      2.  Prominent osteoarthritis involving the first carpometacarpal and triscaphe articulations. Less prominent osteoarthritis involves the first MCP and first IP joints.         DX-CHEST-PORTABLE (1 VIEW)   Final Result      1.   Possible minimal right pleural effusion.      2.  No focal pulmonary consolidation.      3.  New right IJV multilumen dialysis catheter in place.      IR-GUERRIER,GROSHONG PLACEMENT >5   Final Result      Successful image guided tunneled dialysis catheter placement.      Plan: The catheter is available for immediate use.            CT-NEEDLE BX-RENAL   Final Result      CT-guided core biopsy of the right kidney.      US-RENAL   Final Result      1.  BILATERAL pleural effusions   2.  Small volume of ascites   3.  Cholelithiasis and gallbladder sludge with gallbladder wall thickening incidentally noted. Cholecystitis is a consideration.      EC-ECHOCARDIOGRAM COMPLETE W/O CONT   Final Result      DX-CHEST-PORTABLE (1 VIEW)   Final Result      Mild basilar and apical opacity compatible with atelectasis or scarring although consolidation is not excluded      Effusions on recent CT are not detected radiographically      CT-ABDOMEN-PELVIS W/O   Final Result      Diffuse urinary bladder wall thickening with Guy catheter in place. This is suspicious for cystitis but the finding is nonspecific      No hydronephrosis or urolithiasis      Cholelithiasis      Impending diarrhea      Small to medium right, small left pleural effusions      5 mm mean diameter left lower lobe pulmonary nodule. Follow-up recommendations as below   Low Risk: No routine follow-up      High Risk: Optional CT at 12 months      Comments: Nodules less than 6 mm do not require routine follow-up, but certain patients at high risk with suspicious nodule morphology, upper lobe location, or both may warrant 12-month follow-up.      Low Risk - Minimal or absent history of smoking and of other known risk factors.      High Risk - History of smoking or of other known risk factors.      Note: These recommendations do not apply to lung cancer screening, patients with immunosuppression, or patients with known primary cancer.      Fleischner Society 2017 Guidelines  for Management of Incidentally Detected Pulmonary Nodules in Adults         CT-HEAD W/O   Final Result      No noncontrast CT evidence of acute intracranial hemorrhage.      Severe white matter hypodensity is present.  This is a nonspecific finding which usually is found to represent chronic microvascular disease in patient's of this demographic.  Demyelination, age indeterminant ischemia and gliosis are also common    possibilities.      Moderate to severe parenchymal volume loss         US-FOREIGN FILM ULTRASOUND   Final Result            Assessment/Plan  1.UTE/CKD : Secondary to ATN  2.HTN: BP well controlled  3.hyponatremia  4.Anemia.  5.Metabolic acidosis: Better  6. Volume overload   7 hypokalemia  no acute need for HD, continue to evaluate daily for renal recovery  Increased urine output is a good sign  Replace potassium  Renal diet  Daily labs  Renal dose all meds  Avoid nephrotoxins  Okay to discharge to rehab when medically stable  Prognosis guarded.

## 2021-11-11 LAB
ALBUMIN SERPL BCP-MCNC: 2.9 G/DL (ref 3.2–4.9)
ALBUMIN/GLOB SERPL: 0.9 G/DL
ALP SERPL-CCNC: 108 U/L (ref 30–99)
ALT SERPL-CCNC: 11 U/L (ref 2–50)
ANION GAP SERPL CALC-SCNC: 12 MMOL/L (ref 7–16)
AST SERPL-CCNC: 12 U/L (ref 12–45)
BASOPHILS # BLD AUTO: 0.5 % (ref 0–1.8)
BASOPHILS # BLD: 0.03 K/UL (ref 0–0.12)
BILIRUB SERPL-MCNC: 0.8 MG/DL (ref 0.1–1.5)
BUN SERPL-MCNC: 27 MG/DL (ref 8–22)
CALCIUM SERPL-MCNC: 8.4 MG/DL (ref 8.5–10.5)
CHLORIDE SERPL-SCNC: 91 MMOL/L (ref 96–112)
CO2 SERPL-SCNC: 30 MMOL/L (ref 20–33)
CREAT SERPL-MCNC: 2.07 MG/DL (ref 0.5–1.4)
EOSINOPHIL # BLD AUTO: 0.09 K/UL (ref 0–0.51)
EOSINOPHIL NFR BLD: 1.5 % (ref 0–6.9)
ERYTHROCYTE [DISTWIDTH] IN BLOOD BY AUTOMATED COUNT: 46.4 FL (ref 35.9–50)
GLOBULIN SER CALC-MCNC: 3.4 G/DL (ref 1.9–3.5)
GLUCOSE SERPL-MCNC: 100 MG/DL (ref 65–99)
HCT VFR BLD AUTO: 24.5 % (ref 42–52)
HGB BLD-MCNC: 8 G/DL (ref 14–18)
IMM GRANULOCYTES # BLD AUTO: 0.03 K/UL (ref 0–0.11)
IMM GRANULOCYTES NFR BLD AUTO: 0.5 % (ref 0–0.9)
LYMPHOCYTES # BLD AUTO: 0.83 K/UL (ref 1–4.8)
LYMPHOCYTES NFR BLD: 14 % (ref 22–41)
MCH RBC QN AUTO: 29.2 PG (ref 27–33)
MCHC RBC AUTO-ENTMCNC: 32.7 G/DL (ref 33.7–35.3)
MCV RBC AUTO: 89.4 FL (ref 81.4–97.8)
MONOCYTES # BLD AUTO: 0.46 K/UL (ref 0–0.85)
MONOCYTES NFR BLD AUTO: 7.7 % (ref 0–13.4)
NEUTROPHILS # BLD AUTO: 4.5 K/UL (ref 1.82–7.42)
NEUTROPHILS NFR BLD: 75.8 % (ref 44–72)
NRBC # BLD AUTO: 0 K/UL
NRBC BLD-RTO: 0 /100 WBC
PHOSPHATE SERPL-MCNC: 4.4 MG/DL (ref 2.5–4.5)
PLATELET # BLD AUTO: 158 K/UL (ref 164–446)
PMV BLD AUTO: 10.3 FL (ref 9–12.9)
POTASSIUM SERPL-SCNC: 3.2 MMOL/L (ref 3.6–5.5)
PROT SERPL-MCNC: 6.3 G/DL (ref 6–8.2)
RBC # BLD AUTO: 2.74 M/UL (ref 4.7–6.1)
SODIUM SERPL-SCNC: 133 MMOL/L (ref 135–145)
WBC # BLD AUTO: 5.9 K/UL (ref 4.8–10.8)

## 2021-11-11 PROCEDURE — 36415 COLL VENOUS BLD VENIPUNCTURE: CPT

## 2021-11-11 PROCEDURE — 94760 N-INVAS EAR/PLS OXIMETRY 1: CPT

## 2021-11-11 PROCEDURE — 700111 HCHG RX REV CODE 636 W/ 250 OVERRIDE (IP): Performed by: INTERNAL MEDICINE

## 2021-11-11 PROCEDURE — 85025 COMPLETE CBC W/AUTO DIFF WBC: CPT

## 2021-11-11 PROCEDURE — 700102 HCHG RX REV CODE 250 W/ 637 OVERRIDE(OP): Performed by: INTERNAL MEDICINE

## 2021-11-11 PROCEDURE — A9270 NON-COVERED ITEM OR SERVICE: HCPCS | Performed by: INTERNAL MEDICINE

## 2021-11-11 PROCEDURE — A9270 NON-COVERED ITEM OR SERVICE: HCPCS | Performed by: STUDENT IN AN ORGANIZED HEALTH CARE EDUCATION/TRAINING PROGRAM

## 2021-11-11 PROCEDURE — 99233 SBSQ HOSP IP/OBS HIGH 50: CPT | Performed by: INTERNAL MEDICINE

## 2021-11-11 PROCEDURE — 92526 ORAL FUNCTION THERAPY: CPT

## 2021-11-11 PROCEDURE — 770006 HCHG ROOM/CARE - MED/SURG/GYN SEMI*

## 2021-11-11 PROCEDURE — 99232 SBSQ HOSP IP/OBS MODERATE 35: CPT | Performed by: INTERNAL MEDICINE

## 2021-11-11 PROCEDURE — 700102 HCHG RX REV CODE 250 W/ 637 OVERRIDE(OP): Performed by: STUDENT IN AN ORGANIZED HEALTH CARE EDUCATION/TRAINING PROGRAM

## 2021-11-11 PROCEDURE — 84100 ASSAY OF PHOSPHORUS: CPT

## 2021-11-11 PROCEDURE — 80053 COMPREHEN METABOLIC PANEL: CPT

## 2021-11-11 RX ADMIN — BUPRENORPHINE HCL 2 MG: 2 TABLET SUBLINGUAL at 08:17

## 2021-11-11 RX ADMIN — SODIUM BICARBONATE 1300 MG: 650 TABLET ORAL at 21:29

## 2021-11-11 RX ADMIN — OMEPRAZOLE 20 MG: 20 CAPSULE, DELAYED RELEASE ORAL at 17:32

## 2021-11-11 RX ADMIN — BUPRENORPHINE HCL 2 MG: 2 TABLET SUBLINGUAL at 21:28

## 2021-11-11 RX ADMIN — SODIUM BICARBONATE 1300 MG: 650 TABLET ORAL at 09:00

## 2021-11-11 RX ADMIN — HYDRALAZINE HYDROCHLORIDE 25 MG: 50 TABLET ORAL at 21:29

## 2021-11-11 RX ADMIN — SODIUM BICARBONATE 1300 MG: 650 TABLET ORAL at 17:32

## 2021-11-11 RX ADMIN — BUPRENORPHINE HCL 2 MG: 2 TABLET SUBLINGUAL at 15:10

## 2021-11-11 RX ADMIN — HYDRALAZINE HYDROCHLORIDE 25 MG: 50 TABLET ORAL at 14:00

## 2021-11-11 RX ADMIN — SODIUM BICARBONATE 1300 MG: 650 TABLET ORAL at 14:00

## 2021-11-11 RX ADMIN — NYSTATIN 500000 UNITS: 100000 SUSPENSION ORAL at 08:17

## 2021-11-11 RX ADMIN — NYSTATIN 500000 UNITS: 100000 SUSPENSION ORAL at 21:28

## 2021-11-11 RX ADMIN — HEPARIN SODIUM 5000 UNITS: 5000 INJECTION, SOLUTION INTRAVENOUS; SUBCUTANEOUS at 14:00

## 2021-11-11 RX ADMIN — NYSTATIN 500000 UNITS: 100000 SUSPENSION ORAL at 17:32

## 2021-11-11 RX ADMIN — AMLODIPINE BESYLATE 10 MG: 10 TABLET ORAL at 04:23

## 2021-11-11 RX ADMIN — NYSTATIN 500000 UNITS: 100000 SUSPENSION ORAL at 14:00

## 2021-11-11 RX ADMIN — OMEPRAZOLE 20 MG: 20 CAPSULE, DELAYED RELEASE ORAL at 04:23

## 2021-11-11 RX ADMIN — HEPARIN SODIUM 5000 UNITS: 5000 INJECTION, SOLUTION INTRAVENOUS; SUBCUTANEOUS at 04:24

## 2021-11-11 RX ADMIN — HYDRALAZINE HYDROCHLORIDE 25 MG: 50 TABLET ORAL at 04:23

## 2021-11-11 ASSESSMENT — ENCOUNTER SYMPTOMS
ABDOMINAL PAIN: 0
NAUSEA: 0
DIARRHEA: 0
BLURRED VISION: 0
SHORTNESS OF BREATH: 0
SORE THROAT: 0
CHILLS: 0
VOMITING: 0
BACK PAIN: 0
PALPITATIONS: 0
PHOTOPHOBIA: 0
TINGLING: 0
FEVER: 0
COUGH: 0
HEADACHES: 0
DOUBLE VISION: 0

## 2021-11-11 NOTE — PROGRESS NOTES
COVID-19 surge in effect     Received report from ALDEN Mccain. Pt is A&O x4. Pt on RA, no signs of acute distress. Pt complains of 6/10 pain to bilateral wrists. Medicated with Tylenol per MAR. Pt presents with a DTI to right ear. Rectal tube in place, no leaking seen. Guy site is CDI. POC discussed with patient. Safety precautions in place, bed in lowest position, and call light and personal belongings within reach. Hourly rounding in place.

## 2021-11-11 NOTE — DISCHARGE PLANNING
Anticipated Discharge Disposition: Rehab    Action: Discussed patient's needs during IDT rounds.  Dr Khan stated that the patient's kidney functions are improving and may not need dialysis.  Is attempting to contact Dr Tello for patient's weightbearing status.    Patient lives with his spouse in a single level home    Barriers to Discharge: medical clearance    Plan: continue to monitor for discharge barriers

## 2021-11-11 NOTE — PROGRESS NOTES
Nephrology Daily Progress Note    Date of Service  11/11/2021    Chief Complaint  Zachary Moore is a 71 y.o. male with CKD II, recently hospitalized with RLE femur fx, urosepsis, admitted 11/2/2021 with UTE, hyponatremia    Interval Problem Update  11/4 -no acute events, no new complaints  UTE -creat at 6's  oliguric  Low C3 C4 -scheduled kidney biopsy  ABDULAZIZ, ANCA -pending  11/7 -doing well, no complaints  S/p HD x 2 tolerated well  S/p diagnostic kidney biopsy -results pending  UOP slightly better  11/8 patient is doing slightly better, still very weak  Seen and examined while getting HD.  Kidney biopsy showed ATN  11/9 patient is doing better today, no chest pain or shortness of breath  Seen and examined while getting HD.  11/10 pt is doing better, no CP, no SOB  11/11 patient feels better today, no new complaints   good urine output    Code Status  Full Code      Review of Systems  Review of Systems   Constitutional: Negative for chills, fever and malaise/fatigue.   Respiratory: Negative for cough and shortness of breath.    Cardiovascular: Negative for chest pain and leg swelling.   Gastrointestinal: Negative for nausea and vomiting.   Genitourinary: Negative for dysuria, frequency and urgency.   All other systems reviewed and are negative.       Physical Exam  Temp:  [36.1 °C (96.9 °F)-37.1 °C (98.8 °F)] 37.1 °C (98.8 °F)  Pulse:  [] 99  Resp:  [16-20] 18  BP: (135-164)/(68-79) 135/73  SpO2:  [92 %-98 %] 94 %    Physical Exam  Vitals and nursing note reviewed.   Constitutional:       General: He is awake.      Appearance: He is not ill-appearing.   HENT:      Head: Normocephalic and atraumatic.      Right Ear: External ear normal.      Left Ear: External ear normal.      Nose: Nose normal.      Mouth/Throat:      Pharynx: No oropharyngeal exudate or posterior oropharyngeal erythema.   Eyes:      General:         Right eye: No discharge.         Left eye: No discharge.      Conjunctiva/sclera:  Conjunctivae normal.   Cardiovascular:      Rate and Rhythm: Normal rate and regular rhythm.   Pulmonary:      Effort: Pulmonary effort is normal. No respiratory distress.      Breath sounds: Normal breath sounds. No wheezing.   Abdominal:      General: Abdomen is flat. Bowel sounds are normal.   Musculoskeletal:         General: No tenderness.      Cervical back: No rigidity. No muscular tenderness.      Right lower leg: No edema.      Left lower leg: No edema.   Skin:     General: Skin is warm and dry.      Coloration: Skin is not jaundiced.      Findings: No lesion or rash.   Neurological:      General: No focal deficit present.      Mental Status: He is alert and oriented to person, place, and time. Mental status is at baseline.   Psychiatric:         Mood and Affect: Mood normal.         Behavior: Behavior normal.         Thought Content: Thought content normal.         Fluids    Intake/Output Summary (Last 24 hours) at 11/11/2021 1333  Last data filed at 11/11/2021 1300  Gross per 24 hour   Intake 840 ml   Output 1475 ml   Net -635 ml       Laboratory  Recent Labs     11/09/21  0438 11/10/21  0351 11/11/21  0536   WBC 10.5 8.1 5.9   RBC 2.63* 2.49* 2.74*   HEMOGLOBIN 7.7* 7.5* 8.0*   HEMATOCRIT 24.2* 22.8* 24.5*   MCV 92.0 91.6 89.4   MCH 29.3 30.1 29.2   MCHC 31.8* 32.9* 32.7*   RDW 48.9 48.6 46.4   PLATELETCT 166 172 158*   MPV 10.0 10.4 10.3     Recent Labs     11/09/21 0438 11/10/21  0351 11/11/21  0536   SODIUM 130* 131* 133*   POTASSIUM 3.4* 3.5* 3.2*   CHLORIDE 90* 92* 91*   CO2 30 30 30   GLUCOSE 118* 98 100*   BUN 23* 21 27*   CREATININE 2.52* 2.20* 2.07*   CALCIUM 8.0* 8.3* 8.4*                   Imaging  DX-WRIST-COMPLETE 3+ RIGHT   Final Result      1.  No evidence of acute fracture or dislocation.      2.  Prominent osteoarthritis involving the first carpometacarpal and triscaphe articulations. Less prominent osteoarthritis involves the first MCP and first IP joints.         DX-CHEST-PORTABLE (1  VIEW)   Final Result      1.  Possible minimal right pleural effusion.      2.  No focal pulmonary consolidation.      3.  New right IJV multilumen dialysis catheter in place.      IR-GUERRIER,GROSHEREE PLACEMENT >5   Final Result      Successful image guided tunneled dialysis catheter placement.      Plan: The catheter is available for immediate use.            CT-NEEDLE BX-RENAL   Final Result      CT-guided core biopsy of the right kidney.      US-RENAL   Final Result      1.  BILATERAL pleural effusions   2.  Small volume of ascites   3.  Cholelithiasis and gallbladder sludge with gallbladder wall thickening incidentally noted. Cholecystitis is a consideration.      EC-ECHOCARDIOGRAM COMPLETE W/O CONT   Final Result      DX-CHEST-PORTABLE (1 VIEW)   Final Result      Mild basilar and apical opacity compatible with atelectasis or scarring although consolidation is not excluded      Effusions on recent CT are not detected radiographically      CT-ABDOMEN-PELVIS W/O   Final Result      Diffuse urinary bladder wall thickening with Guy catheter in place. This is suspicious for cystitis but the finding is nonspecific      No hydronephrosis or urolithiasis      Cholelithiasis      Impending diarrhea      Small to medium right, small left pleural effusions      5 mm mean diameter left lower lobe pulmonary nodule. Follow-up recommendations as below   Low Risk: No routine follow-up      High Risk: Optional CT at 12 months      Comments: Nodules less than 6 mm do not require routine follow-up, but certain patients at high risk with suspicious nodule morphology, upper lobe location, or both may warrant 12-month follow-up.      Low Risk - Minimal or absent history of smoking and of other known risk factors.      High Risk - History of smoking or of other known risk factors.      Note: These recommendations do not apply to lung cancer screening, patients with immunosuppression, or patients with known primary cancer.       Fleischner Society 2017 Guidelines for Management of Incidentally Detected Pulmonary Nodules in Adults         CT-HEAD W/O   Final Result      No noncontrast CT evidence of acute intracranial hemorrhage.      Severe white matter hypodensity is present.  This is a nonspecific finding which usually is found to represent chronic microvascular disease in patient's of this demographic.  Demyelination, age indeterminant ischemia and gliosis are also common    possibilities.      Moderate to severe parenchymal volume loss         US-FOREIGN FILM ULTRASOUND   Final Result            Assessment/Plan  1.UTE/CKD : Secondary to ATN , nonoliguric   2.HTN: BP well controlled  3.hyponatremia: Better  4.Anemia.  5.Metabolic acidosis: Better  6. Volume overload: Better  7 hypokalemia  no acute need for HD today, evaluate daily for renal recovery  If creatinine stable tomorrow we will remove hemodialysis catheter  Replace potassium  Renal diet  Daily labs  Renal dose all meds  Avoid nephrotoxins  Prognosis guarded.

## 2021-11-11 NOTE — THERAPY
Speech Language Pathology  Daily Treatment     Patient Name: Zachary Moore  Age:  71 y.o., Sex:  male  Medical Record #: [id number]  Today's Date: 11/11/2021     Precautions  Precautions: Fall Risk,Toe Touch Weight Bearing Right Lower Extremity  Comments: 10/13: R femur ORIF done [place], has been in hospital or at SNF since then    Assessment    Pertinent Information    PO trials: thin liquids, liquidized and soft and bite sized   Behavioral observations: Alert, Cooperative and A&Ox4  Drooling/excess secretions: Within Normal Limits    Treatment:    The patient was seen on this date for a dysphagia treatment during his thin liquid, liquidized textured meal. Patient with complete change in mental status from previous therapy session. AOx4 and able to participate in conversation. The patient was also provided PO trials of soft and bite sized textures. He consumed all textures with no overt s/sx of aspiration. Adequate oral acceptance and containment, mastication was functional. Patient denied any globus sensation and pain at the level of the esophagus or chest. Patient eager for advancement of PO.     Recommendation:    Upgrade to soft and bite sized with thin liquids.   Swallowing instructions/precautions:   Recommendations: Direct swallowing treatment  Supervision: Independent  Positioning: Seat fully upright and midline when eating  Medication: Float whole in puree  Swallow Techniques: small bites and sips and slow rate of pace    Plan    Continue current treatment plan.    Discharge Recommendations: Recommend home health for continued speech therapy services     Objective       11/11/21 0826   Dysphagia    Other Treatments Trials of liquidized, thin liquids, Soft and bite sized    Diet / Liquid Recommendation Thin (0);Soft & Bite-Sized (6) - (Dysphagia III)   Nutritional Liquid Intake Rating Scale Non thickened beverages   Nutritional Food Intake Rating Scale Total oral diet with multiple consistencies but  requiring special preparation or compensations   Nursing Communication Swallow Precaution Sign Posted at Head of Bed   Skilled Intervention Verbal Cueing   Recommended Route of Medication Administration   Medication Administration  Whole with Liquid Wash;Float Whole with Puree   Patient / Family Goals   Patient / Family Goal #1 I'd like something to drink   Goal #1 Outcome Progressing as expected   Short Term Goals   Short Term Goal # 2 Pt will consume diet of thin liquids and liquidized textures without overt s/s of aspiration    Goal Outcome # 2  Goal met, new goal aded   Short Term Goal # 2 B  Pt will consume SB6/TN0 meal with no overt s/sx of aspiration

## 2021-11-11 NOTE — PROGRESS NOTES
Received bedside report from RN Thanh, pt care assumed. VSS, pt assessment complete. Pt AAOx4,  c/o no pain at this time. POC discussed with pt and verbalizes no questions. Pt denies any additional needs at this time. Bed locked and in lowest position, bed alarm on. Pt educated on fall risk and verbalized understanding, call light within reach, hourly rounding initiated.

## 2021-11-11 NOTE — DISCHARGE PLANNING
Per nephrology, still working on plan/schedule for dialysis. Patient's kidneys may be recovering, will know more tomorrow per Dr Najjar.     Patient is also on Subutex, unable to provide that medication at [facility].     Attending is working on following up with the surgeon at [facility] regarding the weight bearing status. TCC to continue to follow.

## 2021-11-11 NOTE — CARE PLAN
The patient is Stable - Low risk of patient condition declining or worsening    Shift Goals  Clinical Goals: Skin integrity, HD  Patient Goals: Rest  Family Goals: NAVID    Progress made toward(s) clinical / shift goals:      Patient is not progressing towards the following goals:      Problem: Knowledge Deficit - Standard  Goal: Patient and family/care givers will demonstrate understanding of plan of care, disease process/condition, diagnostic tests and medications  Outcome: Progressing     Problem: Respiratory  Goal: Patient will achieve/maintain optimum respiratory ventilation and gas exchange  Outcome: Progressing     Problem: Skin Integrity  Goal: Skin integrity is maintained or improved  Outcome: Progressing

## 2021-11-11 NOTE — PROGRESS NOTES
Hospital Medicine Daily Progress Note    Date of Service  11/11/2021    Chief Complaint  Zachary Moore is a 71 y.o. male admitted 11/2/2021 with UTE    Hospital Course  This is a 71-year-old gentleman who originally suffered a hip fracture and was treated at [facility].  During that hospitalization he was also treated for alcohol withdrawal and urosepsis.  From there he was discharged to rehab facility, and has since been transferred to us.  His previous medical history is also significant for hypertension, alcohol abuse, stage II chronic kidney disease..  Patient was sent to us from rehab facility in [place] after he was found to be hyponatremic and with acute kidney injury.  On arrival here, his sodium was 114, potassium 5.2, chloride 86, CO2 12, BUN 64, creatinine 6.10.  He has a previous diagnosis of stage II chronic kidney disease with a baseline creatinine around 1.0.  Patient was admitted to the hospital with a diagnosis of likely hypovolemic hyponatremia.  He was initially treated with fluid resuscitation, and corrected rapidly, however, he remained with very poor urine output and therefore nephrology has been consulted.    Interval Problem Update  Pt c/o of being sleepy this am but no other specific complaints    AFebrile  Sinus 70-80s  -160s    11/5. Sleepy but arousable. SLP couldn't do assessment because he complained of odynophagia. I clarified with him, He can still take PO and his odynophagia has been going on for 2 years on and off. I told SLP to see if we can reevaluate his swallowing again. CUrrently he is NPO anyway for catheter placement.  11/6. SLP recommended NPO but will reassess Saturday.  Family at bedside. Discussed with Speech. He did pass his test he can do thin liquids. I added supoplements. I ordered dietitian. Patient planned for dialysis cathter placement today.  11/7. Arousable. His Hb 7.8. He has occult positive stool. No active bleeding. I discussed with him  if he wants an GI eval/ endoscopy or colonoscopy but at this time he declines and would prefer to do it outpatient. I did say if his Hb drops <7 or if he has copious melena or bright red blood then we will have GI consulted. He agreed with the plan. I encouraged PO and supplements.      11/9.  Patient is getting dialysis today.  No acute issue overnight. we will get updated PT OT evaluation today.    11/10 no acute issue overnight.  Physiatry is following and no bed available today for now.    11/11: The patient stated that his orthopedic surgeon was Dr. Tello.  His kidney function is improving and I do not think he will need another hemodialysis at this point.  We will find out about his weightbearing status from Dr. Tello.  I have personally seen and examined the patient at bedside. I discussed the plan of care with patient, bedside RN and pulmonary. And SLP    Consultants/Specialty  nephrology  critical care    Code Status  Full Code    Disposition  Patient is not medically cleared. SNF pending, waiting for renal biopsy results and Nephrology clearance.  Anticipate discharge to to skilled nursing facility.  I have placed the appropriate orders for post-discharge needs.    Review of Systems  Review of Systems   Constitutional: Positive for malaise/fatigue. Negative for chills.   HENT: Negative for nosebleeds and sore throat.    Eyes: Negative for blurred vision, double vision and photophobia.   Respiratory: Negative for cough and shortness of breath.    Cardiovascular: Positive for leg swelling. Negative for chest pain and palpitations.   Gastrointestinal: Negative for abdominal pain, diarrhea and vomiting.   Genitourinary: Negative for dysuria, frequency and urgency.   Musculoskeletal: Negative for back pain.   Skin: Negative for rash.   Neurological: Negative for tingling and headaches.        Physical Exam  Temp:  [36.1 °C (96.9 °F)-36.7 °C (98 °F)] 36.7 °C (98 °F)  Pulse:  [93-99] 93  Resp:  [16-18]  16  BP: (148-164)/(68-79) 164/79  SpO2:  [93 %-98 %] 98 %    Physical Exam  Constitutional:       General: He is not in acute distress.     Appearance: Normal appearance. He is well-developed. He is obese.   HENT:      Head: Normocephalic and atraumatic.      Mouth/Throat:      Comments: NO odynophagia, out of proportion to exam.  Eyes:      Conjunctiva/sclera: Conjunctivae normal.   Neck:      Vascular: No JVD.   Cardiovascular:      Rate and Rhythm: Normal rate.      Pulses: Normal pulses.      Heart sounds: Normal heart sounds. No murmur heard.      Pulmonary:      Effort: Pulmonary effort is normal. No respiratory distress.      Breath sounds: No stridor. No wheezing.   Abdominal:      Palpations: Abdomen is soft.      Tenderness: There is no abdominal tenderness. There is no guarding.   Musculoskeletal:      Right lower leg: Edema present.      Left lower leg: Edema present.   Skin:     General: Skin is warm and dry.      Coloration: Skin is pale.      Findings: No rash.   Neurological:      General: No focal deficit present.      Mental Status: He is alert.         Fluids    Intake/Output Summary (Last 24 hours) at 11/11/2021 0755  Last data filed at 11/11/2021 0600  Gross per 24 hour   Intake 720 ml   Output 1475 ml   Net -755 ml       Laboratory  Recent Labs     11/09/21  0438 11/10/21  0351 11/11/21  0536   WBC 10.5 8.1 5.9   RBC 2.63* 2.49* 2.74*   HEMOGLOBIN 7.7* 7.5* 8.0*   HEMATOCRIT 24.2* 22.8* 24.5*   MCV 92.0 91.6 89.4   MCH 29.3 30.1 29.2   MCHC 31.8* 32.9* 32.7*   RDW 48.9 48.6 46.4   PLATELETCT 166 172 158*   MPV 10.0 10.4 10.3     Recent Labs     11/09/21  0438 11/10/21  0351 11/11/21  0536   SODIUM 130* 131* 133*   POTASSIUM 3.4* 3.5* 3.2*   CHLORIDE 90* 92* 91*   CO2 30 30 30   GLUCOSE 118* 98 100*   BUN 23* 21 27*   CREATININE 2.52* 2.20* 2.07*   CALCIUM 8.0* 8.3* 8.4*                   Imaging  DX-WRIST-COMPLETE 3+ RIGHT   Final Result      1.  No evidence of acute fracture or dislocation.       2.  Prominent osteoarthritis involving the first carpometacarpal and triscaphe articulations. Less prominent osteoarthritis involves the first MCP and first IP joints.         DX-CHEST-PORTABLE (1 VIEW)   Final Result      1.  Possible minimal right pleural effusion.      2.  No focal pulmonary consolidation.      3.  New right IJV multilumen dialysis catheter in place.      IR-SOPHIA GUERRIER PLACEMENT >5   Final Result      Successful image guided tunneled dialysis catheter placement.      Plan: The catheter is available for immediate use.            CT-NEEDLE BX-RENAL   Final Result      CT-guided core biopsy of the right kidney.      US-RENAL   Final Result      1.  BILATERAL pleural effusions   2.  Small volume of ascites   3.  Cholelithiasis and gallbladder sludge with gallbladder wall thickening incidentally noted. Cholecystitis is a consideration.      EC-ECHOCARDIOGRAM COMPLETE W/O CONT   Final Result      DX-CHEST-PORTABLE (1 VIEW)   Final Result      Mild basilar and apical opacity compatible with atelectasis or scarring although consolidation is not excluded      Effusions on recent CT are not detected radiographically      CT-ABDOMEN-PELVIS W/O   Final Result      Diffuse urinary bladder wall thickening with Guy catheter in place. This is suspicious for cystitis but the finding is nonspecific      No hydronephrosis or urolithiasis      Cholelithiasis      Impending diarrhea      Small to medium right, small left pleural effusions      5 mm mean diameter left lower lobe pulmonary nodule. Follow-up recommendations as below   Low Risk: No routine follow-up      High Risk: Optional CT at 12 months      Comments: Nodules less than 6 mm do not require routine follow-up, but certain patients at high risk with suspicious nodule morphology, upper lobe location, or both may warrant 12-month follow-up.      Low Risk - Minimal or absent history of smoking and of other known risk factors.      High Risk -  History of smoking or of other known risk factors.      Note: These recommendations do not apply to lung cancer screening, patients with immunosuppression, or patients with known primary cancer.      Fleischner Society 2017 Guidelines for Management of Incidentally Detected Pulmonary Nodules in Adults         CT-HEAD W/O   Final Result      No noncontrast CT evidence of acute intracranial hemorrhage.      Severe white matter hypodensity is present.  This is a nonspecific finding which usually is found to represent chronic microvascular disease in patient's of this demographic.  Demyelination, age indeterminant ischemia and gliosis are also common    possibilities.      Moderate to severe parenchymal volume loss         US-FOREIGN FILM ULTRASOUND   Final Result           Assessment/Plan  * UTE (acute kidney injury)/hypotension, hyponatremia, s/p femoral ORIF, anemia/GI bleed/odynophagia (HCC)- (present on admission)  Assessment & Plan  Creatinine 2.07 today  Improving  Hemodialysis per nephrology  Maybe he is in recovery phase at this point  No need for hemodialysis at this point        Occult blood positive stool  Assessment & Plan  He has seen [facility] in the past for routine colonoscopy severalyears ago.  At this time he does not want endoscopy/colonoscopy inpatient but will revisit if he has actual BRBPR or melena,or Hb drops to <7.  Updated Nephrology  11/8. Because of poor PO intake/odynophagia  GI recommended follow up    Anemia  Assessment & Plan  Ordered occult stool  Ordered iron studies  Currently no active bleeding.  Occult positive  At this time patient prefers GI work-up outpatient  May need epoietin, updated Nephrology  11/8. Consulted Dr. Khoury, GI for odynophagia/poor PO intake, thus will also evaluate UGI/anemia.  Recommended follow up outpatient    Dehydration  Assessment & Plan  Has been fluid resuscitated  Secondary to diarrhea      Encephalopathy acute- (present on admission)  Assessment &  "Plan  2/2 UTE vs. Urosepsis vs. Acute hyponatremia  Improving  CT head neg for acute findings    Sepsis due to Escherichia coli with acute renal failure without septic shock (HCC)  Assessment & Plan  NOT sepsis  Cultures neg  Only given one dose of ABx's on presentation          Mass of urinary bladder- (present on admission)  Assessment & Plan  Based on imaging from [facility]  Repeat CT Abd/Plvs and renal US do not show any mass    Jeana-prosthetic femoral shaft fracture- (present on admission)  Assessment & Plan  S/p ORIF by Dr. Tello  Pain control  Brace present  PT/OT consults\"    Physiatry want to know about weightbearing status      Alcohol use disorder, moderate, dependence (HCC)- (present on admission)  Assessment & Plan  Alcohol level ordered  Last drink reported 5 days prior by patient  Monitor for signs of withdrawal    Acute cystitis with hematuria- (present on admission)  Assessment & Plan  Treated for sepsis 1 week ago for Bacteremia & UTI treated with zosyn & Unasyn and d/c on cipro 750mg BID for 9 more doses and Flagyl 500mg TID x 16 days growing Resistant Ecoli.  Urine cultures here are neg  US at [facility] showing possible mass however CT Abd/Plvs and renal US both neg for mass here  Cont to monitor off Abx's  Catheter change w/in 48hrs of admission\"  Repeat UA + for UTI but he is on chronic Guy catheter  Monitor closely  We will hold off on antibiotic at this point        Acute hyponatremia- (present on admission)  Assessment & Plan  Resolved        Elevated liver enzymes- (present on admission)  Assessment & Plan  Trend  Appears chronic  Monitor      Hypertension- (present on admission)  Assessment & Plan  On lisinopril 10 as an outpt which was stopped due to hypotension and UTE  Start amlodipine 5mg    Vitals:    11/08/21 0843   BP: 153/81   Pulse: 100   Resp: 14   Temp: 36.9 °C (98.4 °F)   SpO2: 93%     Add hydralazine  Continue amlodipine  ACEI held bec of elevated Cr-       VTE prophylaxis: heparin " ppx    I have performed a physical exam and reviewed and updated ROS and Plan today (11/11/2021). In review of yesterday's note (11/10/2021), there are no changes except as documented above.

## 2021-11-12 ENCOUNTER — APPOINTMENT (OUTPATIENT)
Dept: RADIOLOGY | Facility: MEDICAL CENTER | Age: 71
DRG: 682 | End: 2021-11-12
Attending: ORTHOPAEDIC SURGERY
Payer: MEDICARE

## 2021-11-12 LAB
ALBUMIN SERPL BCP-MCNC: 3.2 G/DL (ref 3.2–4.9)
ALBUMIN/GLOB SERPL: 1 G/DL
ALP SERPL-CCNC: 121 U/L (ref 30–99)
ALT SERPL-CCNC: 15 U/L (ref 2–50)
ANION GAP SERPL CALC-SCNC: 13 MMOL/L (ref 7–16)
ANION GAP SERPL CALC-SCNC: 13 MMOL/L (ref 7–16)
AST SERPL-CCNC: 17 U/L (ref 12–45)
BILIRUB SERPL-MCNC: 0.8 MG/DL (ref 0.1–1.5)
BUN SERPL-MCNC: 29 MG/DL (ref 8–22)
BUN SERPL-MCNC: 29 MG/DL (ref 8–22)
CALCIUM SERPL-MCNC: 8.2 MG/DL (ref 8.5–10.5)
CALCIUM SERPL-MCNC: 8.2 MG/DL (ref 8.5–10.5)
CHLORIDE SERPL-SCNC: 90 MMOL/L (ref 96–112)
CHLORIDE SERPL-SCNC: 96 MMOL/L (ref 96–112)
CO2 SERPL-SCNC: 30 MMOL/L (ref 20–33)
CO2 SERPL-SCNC: 31 MMOL/L (ref 20–33)
CREAT SERPL-MCNC: 1.8 MG/DL (ref 0.5–1.4)
CREAT SERPL-MCNC: 1.92 MG/DL (ref 0.5–1.4)
GLOBULIN SER CALC-MCNC: 3.3 G/DL (ref 1.9–3.5)
GLUCOSE SERPL-MCNC: 112 MG/DL (ref 65–99)
GLUCOSE SERPL-MCNC: 94 MG/DL (ref 65–99)
PHOSPHATE SERPL-MCNC: 4.6 MG/DL (ref 2.5–4.5)
POTASSIUM SERPL-SCNC: 3.2 MMOL/L (ref 3.6–5.5)
POTASSIUM SERPL-SCNC: 3.4 MMOL/L (ref 3.6–5.5)
PROT SERPL-MCNC: 6.5 G/DL (ref 6–8.2)
SODIUM SERPL-SCNC: 134 MMOL/L (ref 135–145)
SODIUM SERPL-SCNC: 139 MMOL/L (ref 135–145)

## 2021-11-12 PROCEDURE — 99232 SBSQ HOSP IP/OBS MODERATE 35: CPT | Performed by: INTERNAL MEDICINE

## 2021-11-12 PROCEDURE — 80048 BASIC METABOLIC PNL TOTAL CA: CPT

## 2021-11-12 PROCEDURE — 700102 HCHG RX REV CODE 250 W/ 637 OVERRIDE(OP): Performed by: INTERNAL MEDICINE

## 2021-11-12 PROCEDURE — 73552 X-RAY EXAM OF FEMUR 2/>: CPT | Mod: RT

## 2021-11-12 PROCEDURE — A9270 NON-COVERED ITEM OR SERVICE: HCPCS | Performed by: STUDENT IN AN ORGANIZED HEALTH CARE EDUCATION/TRAINING PROGRAM

## 2021-11-12 PROCEDURE — A9270 NON-COVERED ITEM OR SERVICE: HCPCS | Performed by: INTERNAL MEDICINE

## 2021-11-12 PROCEDURE — 99232 SBSQ HOSP IP/OBS MODERATE 35: CPT | Performed by: PHYSICAL MEDICINE & REHABILITATION

## 2021-11-12 PROCEDURE — 770006 HCHG ROOM/CARE - MED/SURG/GYN SEMI*

## 2021-11-12 PROCEDURE — 700102 HCHG RX REV CODE 250 W/ 637 OVERRIDE(OP): Performed by: STUDENT IN AN ORGANIZED HEALTH CARE EDUCATION/TRAINING PROGRAM

## 2021-11-12 PROCEDURE — 84100 ASSAY OF PHOSPHORUS: CPT

## 2021-11-12 PROCEDURE — 80053 COMPREHEN METABOLIC PANEL: CPT

## 2021-11-12 PROCEDURE — 36415 COLL VENOUS BLD VENIPUNCTURE: CPT

## 2021-11-12 PROCEDURE — 700111 HCHG RX REV CODE 636 W/ 250 OVERRIDE (IP): Performed by: INTERNAL MEDICINE

## 2021-11-12 RX ADMIN — HYDRALAZINE HYDROCHLORIDE 25 MG: 50 TABLET ORAL at 04:54

## 2021-11-12 RX ADMIN — OMEPRAZOLE 20 MG: 20 CAPSULE, DELAYED RELEASE ORAL at 04:54

## 2021-11-12 RX ADMIN — BUPRENORPHINE HCL 2 MG: 2 TABLET SUBLINGUAL at 13:56

## 2021-11-12 RX ADMIN — NYSTATIN 500000 UNITS: 100000 SUSPENSION ORAL at 13:55

## 2021-11-12 RX ADMIN — SODIUM BICARBONATE 1300 MG: 650 TABLET ORAL at 16:09

## 2021-11-12 RX ADMIN — HEPARIN SODIUM 5000 UNITS: 5000 INJECTION, SOLUTION INTRAVENOUS; SUBCUTANEOUS at 20:27

## 2021-11-12 RX ADMIN — BUPRENORPHINE HCL 2 MG: 2 TABLET SUBLINGUAL at 20:27

## 2021-11-12 RX ADMIN — NYSTATIN 500000 UNITS: 100000 SUSPENSION ORAL at 08:13

## 2021-11-12 RX ADMIN — AMLODIPINE BESYLATE 10 MG: 10 TABLET ORAL at 04:54

## 2021-11-12 RX ADMIN — SODIUM BICARBONATE 1300 MG: 650 TABLET ORAL at 13:56

## 2021-11-12 RX ADMIN — HYDRALAZINE HYDROCHLORIDE 25 MG: 50 TABLET ORAL at 20:27

## 2021-11-12 RX ADMIN — OMEPRAZOLE 20 MG: 20 CAPSULE, DELAYED RELEASE ORAL at 16:09

## 2021-11-12 RX ADMIN — SODIUM BICARBONATE 1300 MG: 650 TABLET ORAL at 08:13

## 2021-11-12 RX ADMIN — HEPARIN SODIUM 5000 UNITS: 5000 INJECTION, SOLUTION INTRAVENOUS; SUBCUTANEOUS at 13:55

## 2021-11-12 RX ADMIN — NYSTATIN 500000 UNITS: 100000 SUSPENSION ORAL at 20:19

## 2021-11-12 RX ADMIN — HYDRALAZINE HYDROCHLORIDE 25 MG: 50 TABLET ORAL at 13:56

## 2021-11-12 RX ADMIN — BUPRENORPHINE HCL 2 MG: 2 TABLET SUBLINGUAL at 08:13

## 2021-11-12 RX ADMIN — SODIUM BICARBONATE 1300 MG: 650 TABLET ORAL at 20:19

## 2021-11-12 RX ADMIN — NYSTATIN 500000 UNITS: 100000 SUSPENSION ORAL at 16:09

## 2021-11-12 ASSESSMENT — ENCOUNTER SYMPTOMS
PHOTOPHOBIA: 0
VOMITING: 0
SORE THROAT: 0
BACK PAIN: 0
ABDOMINAL PAIN: 0
NAUSEA: 0
PALPITATIONS: 0
DOUBLE VISION: 0
COUGH: 0
CHILLS: 0
SHORTNESS OF BREATH: 0
FEVER: 0
TINGLING: 0

## 2021-11-12 ASSESSMENT — PAIN DESCRIPTION - PAIN TYPE: TYPE: ACUTE PAIN;CHRONIC PAIN

## 2021-11-12 NOTE — PROGRESS NOTES
[facility] Medicine Daily Progress Note    Date of Service  11/12/2021    Chief Complaint  Zachary Moore is a 71 y.o. male admitted 11/2/2021 with UTE    Hospital Course  This is a 71-year-old gentleman who originally suffered a hip fracture and was treated at [facility].  During that hospitalization he was also treated for alcohol withdrawal and urosepsis.  From there he was discharged to rehab facility, and has since been transferred to us.  His previous medical history is also significant for hypertension, alcohol abuse, stage II chronic kidney disease..  Patient was sent to us from rehab facility in [place] after he was found to be hyponatremic and with acute kidney injury.  On arrival here, his sodium was 114, potassium 5.2, chloride 86, CO2 12, BUN 64, creatinine 6.10.  He has a previous diagnosis of stage II chronic kidney disease with a baseline creatinine around 1.0.  Patient was admitted to the hospital with a diagnosis of likely hypovolemic hyponatremia.  He was initially treated with fluid resuscitation, and corrected rapidly, however, he remained with very poor urine output and therefore nephrology has been consulted.    Interval Problem Update  Pt c/o of being sleepy this am but no other specific complaints    AFebrile  Sinus 70-80s  -160s    11/5. Sleepy but arousable. SLP couldn't do assessment because he complained of odynophagia. I clarified with him, He can still take PO and his odynophagia has been going on for 2 years on and off. I told SLP to see if we can reevaluate his swallowing again. CUrrently he is NPO anyway for catheter placement.  11/6. SLP recommended NPO but will reassess Saturday.  Family at bedside. Discussed with Speech. He did pass his test he can do thin liquids. I added supoplements. I ordered dietitian. Patient planned for dialysis cathter placement today.  11/7. Arousable. His Hb 7.8. He has occult positive stool. No active bleeding. I discussed with him  if he wants an GI eval/ endoscopy or colonoscopy but at this time he declines and would prefer to do it outpatient. I did say if his Hb drops <7 or if he has copious melena or bright red blood then we will have GI consulted. He agreed with the plan. I encouraged PO and supplements.      11/9.  Patient is getting dialysis today.  No acute issue overnight. we will get updated PT OT evaluation today.    11/10 no acute issue overnight.  Physiatry is following and no bed available today for now.    11/11: The patient stated that his orthopedic surgeon was Dr. Tello.  His kidney function is improving and I do not think he will need another hemodialysis at this point.  We will find out about his weightbearing status from Dr. Tello.    11/12: Patient is getting x-ray of his right femur today and Dr. Tello will give us recommendation about weightbearing status depending on the results of the x-ray.  Also will discuss with pharmacy about buprenorphine since we have do not have the medication.  I have personally seen and examined the patient at bedside. I discussed the plan of care with patient, bedside RN and pulmonary. And SLP    Consultants/Specialty  nephrology  critical care    Code Status  Full Code    Disposition  Patient is not medically cleared. SNF pending, waiting for renal biopsy results and Nephrology clearance.  Anticipate discharge to to skilled nursing facility.  I have placed the appropriate orders for post-discharge needs.    Review of Systems  Review of Systems   Constitutional: Positive for malaise/fatigue. Negative for chills.   HENT: Negative for nosebleeds and sore throat.    Eyes: Negative for double vision and photophobia.   Respiratory: Negative for cough and shortness of breath.    Cardiovascular: Positive for leg swelling. Negative for chest pain and palpitations.   Gastrointestinal: Negative for abdominal pain and vomiting.   Genitourinary: Negative for dysuria, frequency and urgency.    Musculoskeletal: Negative for back pain.   Skin: Negative for rash.   Neurological: Negative for tingling.        Physical Exam  Temp:  [36.1 °C (97 °F)-37.4 °C (99.3 °F)] 37.4 °C (99.3 °F)  Pulse:  [] 99  Resp:  [16-19] 18  BP: (122-159)/(66-84) 122/77  SpO2:  [91 %-95 %] 91 %    Physical Exam  Constitutional:       Appearance: Normal appearance. He is well-developed.   HENT:      Head: Normocephalic and atraumatic.      Mouth/Throat:      Comments: NO odynophagia, out of proportion to exam.  Eyes:      Conjunctiva/sclera: Conjunctivae normal.   Neck:      Vascular: No JVD.   Cardiovascular:      Rate and Rhythm: Normal rate.      Pulses: Normal pulses.      Heart sounds: Normal heart sounds. No murmur heard.      Pulmonary:      Effort: Pulmonary effort is normal. No respiratory distress.      Breath sounds: No stridor. No wheezing.   Abdominal:      Palpations: Abdomen is soft.      Tenderness: There is no abdominal tenderness. There is no guarding.   Musculoskeletal:      Right lower leg: Edema present.      Left lower leg: Edema present.   Skin:     General: Skin is warm and dry.      Coloration: Skin is pale.      Findings: No rash.   Neurological:      General: No focal deficit present.      Mental Status: He is alert.         Fluids    Intake/Output Summary (Last 24 hours) at 11/12/2021 0810  Last data filed at 11/12/2021 0500  Gross per 24 hour   Intake 1090 ml   Output 1700 ml   Net -610 ml       Laboratory  Recent Labs     11/10/21  0351 11/11/21  0536   WBC 8.1 5.9   RBC 2.49* 2.74*   HEMOGLOBIN 7.5* 8.0*   HEMATOCRIT 22.8* 24.5*   MCV 91.6 89.4   MCH 30.1 29.2   MCHC 32.9* 32.7*   RDW 48.6 46.4   PLATELETCT 172 158*   MPV 10.4 10.3     Recent Labs     11/10/21  0351 11/11/21  0536 11/12/21  0012   SODIUM 131* 133* 134*   POTASSIUM 3.5* 3.2* 3.2*   CHLORIDE 92* 91* 90*   CO2 30 30 31   GLUCOSE 98 100* 94   BUN 21 27* 29*   CREATININE 2.20* 2.07* 1.92*   CALCIUM 8.3* 8.4* 8.2*                    Imaging  DX-WRIST-COMPLETE 3+ RIGHT   Final Result      1.  No evidence of acute fracture or dislocation.      2.  Prominent osteoarthritis involving the first carpometacarpal and triscaphe articulations. Less prominent osteoarthritis involves the first MCP and first IP joints.         DX-CHEST-PORTABLE (1 VIEW)   Final Result      1.  Possible minimal right pleural effusion.      2.  No focal pulmonary consolidation.      3.  New right IJV multilumen dialysis catheter in place.      IR-GUERRIER,GROSHJUAN ANTONIO PLACEMENT >5   Final Result      Successful image guided tunneled dialysis catheter placement.      Plan: The catheter is available for immediate use.            CT-NEEDLE BX-RENAL   Final Result      CT-guided core biopsy of the right kidney.      US-RENAL   Final Result      1.  BILATERAL pleural effusions   2.  Small volume of ascites   3.  Cholelithiasis and gallbladder sludge with gallbladder wall thickening incidentally noted. Cholecystitis is a consideration.      EC-ECHOCARDIOGRAM COMPLETE W/O CONT   Final Result      DX-CHEST-PORTABLE (1 VIEW)   Final Result      Mild basilar and apical opacity compatible with atelectasis or scarring although consolidation is not excluded      Effusions on recent CT are not detected radiographically      CT-ABDOMEN-PELVIS W/O   Final Result      Diffuse urinary bladder wall thickening with Guy catheter in place. This is suspicious for cystitis but the finding is nonspecific      No hydronephrosis or urolithiasis      Cholelithiasis      Impending diarrhea      Small to medium right, small left pleural effusions      5 mm mean diameter left lower lobe pulmonary nodule. Follow-up recommendations as below   Low Risk: No routine follow-up      High Risk: Optional CT at 12 months      Comments: Nodules less than 6 mm do not require routine follow-up, but certain patients at high risk with suspicious nodule morphology, upper lobe location, or both may warrant 12-month  follow-up.      Low Risk - Minimal or absent history of smoking and of other known risk factors.      High Risk - History of smoking or of other known risk factors.      Note: These recommendations do not apply to lung cancer screening, patients with immunosuppression, or patients with known primary cancer.      Fleischner Society 2017 Guidelines for Management of Incidentally Detected Pulmonary Nodules in Adults         CT-HEAD W/O   Final Result      No noncontrast CT evidence of acute intracranial hemorrhage.      Severe white matter hypodensity is present.  This is a nonspecific finding which usually is found to represent chronic microvascular disease in patient's of this demographic.  Demyelination, age indeterminant ischemia and gliosis are also common    possibilities.      Moderate to severe parenchymal volume loss         US-FOREIGN FILM ULTRASOUND   Final Result      DX-FEMUR-2+ RIGHT    (Results Pending)        Assessment/Plan  * UTE (acute kidney injury)/hypotension, hyponatremia, s/p femoral ORIF, anemia/GI bleed/odynophagia (HCC)- (present on admission)  Assessment & Plan  Creatinine 1.92 today  Improving  Hemodialysis per nephrology  Maybe he is in recovery phase at this point  No need for hemodialysis at this point        Occult blood positive stool  Assessment & Plan  He has seen [facility] in the past for routine colonoscopy severalyears ago.  At this time he does not want endoscopy/colonoscopy inpatient but will revisit if he has actual BRBPR or melena,or Hb drops to <7.  Updated Nephrology  11/8. Because of poor PO intake/odynophagia  GI recommended follow up    Anemia  Assessment & Plan  Ordered occult stool  Ordered iron studies  Currently no active bleeding.  Occult positive  At this time patient prefers GI work-up outpatient  May need epoietin, updated Nephrology  11/8. Consulted Dr. Khoury, GI for odynophagia/poor PO intake, thus will also evaluate UGI/anemia.  Recommended follow up  "outpatient    Dehydration  Assessment & Plan  Has been fluid resuscitated  Secondary to diarrhea      Encephalopathy acute- (present on admission)  Assessment & Plan  2/2 UTE vs. Urosepsis vs. Acute hyponatremia  Improving  CT head neg for acute findings    Sepsis due to Escherichia coli with acute renal failure without septic shock (HCC)  Assessment & Plan  NOT sepsis  Cultures neg  Only given one dose of ABx's on presentation          Mass of urinary bladder- (present on admission)  Assessment & Plan  Based on imaging from [facility]  Repeat CT Abd/Plvs and renal US do not show any mass    Jeana-prosthetic femoral shaft fracture- (present on admission)  Assessment & Plan  S/p ORIF by Dr. Tello  Pain control  Brace present  PT/OT consults\"    Getting x-ray of the right hip by surgery and they will give us recommendation about weightbearing status after      Alcohol use disorder, moderate, dependence (HCC)- (present on admission)  Assessment & Plan  Alcohol level ordered  Last drink reported 5 days prior by patient  Monitor for signs of withdrawal    Acute cystitis with hematuria- (present on admission)  Assessment & Plan  Treated for sepsis 1 week ago for Bacteremia & UTI treated with zosyn & Unasyn and d/c on cipro 750mg BID for 9 more doses and Flagyl 500mg TID x 16 days growing Resistant Ecoli.  Urine cultures here are neg  US at [facility] showing possible mass however CT Abd/Plvs and renal US both neg for mass here  Cont to monitor off Abx's  Catheter change w/in 48hrs of admission\"  Repeat UA + for UTI but he is on chronic Guy catheter  Monitor closely  We will hold off on antibiotic at this point        Acute hyponatremia- (present on admission)  Assessment & Plan  Resolved        Elevated liver enzymes- (present on admission)  Assessment & Plan  Trend  Appears chronic  Monitor      Hypertension- (present on admission)  Assessment & Plan  On lisinopril 10 as an outpt which was stopped due to hypotension and " UTE  Start amlodipine 5mg    Vitals:    11/08/21 0843   BP: 153/81   Pulse: 100   Resp: 14   Temp: 36.9 °C (98.4 °F)   SpO2: 93%     Add hydralazine  Continue amlodipine  ACEI held bec of elevated Cr-       VTE prophylaxis: heparin ppx    I have performed a physical exam and reviewed and updated ROS and Plan today (11/12/2021). In review of yesterday's note (11/11/2021), there are no changes except as documented above.

## 2021-11-12 NOTE — CARE PLAN
Problem: Knowledge Deficit - Standard  Goal: Patient and family/care givers will demonstrate understanding of plan of care, disease process/condition, diagnostic tests and medications  Outcome: Progressing     Problem: Hemodynamics  Goal: Patient's hemodynamics, fluid balance and neurologic status will be stable or improve  Outcome: Progressing     Problem: Fluid Volume  Goal: Fluid volume balance will be maintained  Outcome: Progressing     Problem: Urinary - Renal Perfusion  Goal: Ability to achieve and maintain adequate renal perfusion and functioning will improve  Outcome: Progressing     Problem: Respiratory  Goal: Patient will achieve/maintain optimum respiratory ventilation and gas exchange  Outcome: Progressing     Problem: Mechanical Ventilation  Goal: Safe management of artificial airway and ventilation  Outcome: Progressing  Goal: Successful weaning off mechanical ventilator, spontaneously maintains adequate gas exchange  Outcome: Progressing  Goal: Patient will be able to express needs and understand communication  Outcome: Progressing     Problem: Physical Regulation  Goal: Diagnostic test results will improve  Outcome: Progressing  Goal: Signs and symptoms of infection will decrease  Outcome: Progressing     Problem: Pain - Standard  Goal: Alleviation of pain or a reduction in pain to the patient’s comfort goal  Outcome: Progressing     Problem: Skin Integrity  Goal: Skin integrity is maintained or improved  Outcome: Progressing     Problem: Fall Risk  Goal: Patient will remain free from falls  Outcome: Progressing   The patient is Stable - Low risk of patient condition declining or worsening    Shift Goals  Clinical Goals: d/c rectal tube, mobility  Patient Goals: rest, rehab  Family Goals: n/a    Progress made toward(s) clinical / shift goals:  Understands POC, hemodynamically stabel    Patient is not progressing towards the following goals:    (covid 19 surge in effect)

## 2021-11-12 NOTE — PROGRESS NOTES
"   Orthopaedic PA Progress Note    Interval changes:71M patient of Dr. Tello's, s/p ORIF R periprosthetic distal femur fracture at [facility] admitted here from SNF with UTE. Now doing well and has no new leg complaints. XR obtained for WB reccomendations. Films reviewed with [facility] [employer] for WBAT. Clear for disposition (home/SNF/IRF) from Orthopaedic team standpoint.    ROS - Patient denies any new issues. No chest pain, dyspnea, or fever.  Pain well controlled.    /79   Pulse 98   Temp 37.4 °C (99.3 °F) (Temporal)   Resp 18   Ht 1.778 m (5' 10\")   Wt 76.5 kg (168 lb 10.4 oz)   SpO2 91%     Patient seen and examined  No acute distress  Breathing non labored  RRR  Knee immobilizer off, at bedside  Skin RLE is clean, dry, and intact. Patient clearly fires tibialis anterior, EHL, and gastrocnemius/soleus. Sensation is intact to light touch throughout superficial peroneal, deep peroneal, tibial, saphenous, and sural nerve distributions. Strong and palpable 2+ dorsalis pedis and posterior tibial pulses with capillary refill less than 2 seconds. No lower leg tenderness or discomfort.    Recent Labs     11/10/21  0351 11/11/21  0536   WBC 8.1 5.9   RBC 2.49* 2.74*   HEMOGLOBIN 7.5* 8.0*   HEMATOCRIT 22.8* 24.5*   MCV 91.6 89.4   MCH 30.1 29.2   MCHC 32.9* 32.7*   RDW 48.6 46.4   PLATELETCT 172 158*   MPV 10.4 10.3       Active Hospital Problems    Diagnosis    • Occult blood positive stool [R19.5]    • Anemia [D64.9]    • Dehydration [E86.0]    • Acute hyponatremia [E87.1]    • UTE (acute kidney injury)/hypotension, hyponatremia, s/p femoral ORIF, anemia/GI bleed/odynophagia (HCC) [N17.9]    • Acute cystitis with hematuria [N30.01]    • Mass of urinary bladder [N32.89]    • Encephalopathy acute [G93.40]    • Alcohol use disorder, moderate, dependence (HCC) [F10.20]    • Jeana-prosthetic femoral shaft fracture [M97.8XXA, Z96.649]    • Elevated liver enzymes [R74.8]    • Hypertension [I10]      Assessment/Plan:  " S/P R PP FFx  Wt bearing status - AT, may use immobilizer for support and to prevent A/P/M/L Stress  PT/OT-initiated  Wound care:dressing changes PRN  Drains - no  Guy-per Med  Follow-Up: needs appointment with Dr. Tello at  [facility] next week for re-eval. Ortho will return to bedsid this admission of condition worsens or otherwise if needed.

## 2021-11-12 NOTE — CARE PLAN
Problem: Nutritional:  Goal: Achieve adequate nutritional intake  Description: Patient will consume >50% of meals with >75% supplements once diet advanced.  11/12/2021 0925 by Margarita Ku R.D.  Outcome: Progressing. See dietary progress note.

## 2021-11-12 NOTE — DISCHARGE PLANNING
Anticipated Discharge Disposition: Rehab    Action: Pt discussed in IDT rounds. Per Dr. Khan, Pt can bring this medication from home to rehab if necessary for admission there.LSW voalte messaged [facility] , they will not take Pt even if he brings the medication himself. Per [facility], Pt will either have to go on another medication or go to SNF.     FRANCIEW updated Dr. Khan.     LSW discussed rehab barriers with Pt and his wife, Dannie. Pt would rather substitute his medication for something else than go to a SNF instead of Rehab. LSW updated Dr. Khan.     Barriers to Discharge: Rehab acceptance, Medications    Plan: LSW to f/u with Dr. Khan, will med change of initiate SNf referrals.

## 2021-11-12 NOTE — DISCHARGE PLANNING
0954: Unable to accept patient at [facility] on Subutex, attending to follow up with pharmacy. Patient's kidney function improving, likely will not need additional dialysis. Attending following up with orthopedic surge nelson Dr Tello regarding weight bearing status. TCC to continue to follow.     1217: Notified cm, no beds available at [facility] until Monday. Patient has been cleared by nephrology, patient no longer needs dialysis. Patient is not able to bring home meds, [facility] will not accept patients taking Subutex per medical director. Will continue to follow.

## 2021-11-12 NOTE — CARE PLAN
The patient is Stable - Low risk of patient condition declining or worsening    Shift Goals  Clinical Goals: comfort  Patient Goals: pt will be able to rest and sleep comfortably  Family Goals: NAVID    Progress made toward(s) clinical / shift goals:      Patient is not progressing towards the following goals:

## 2021-11-12 NOTE — PROGRESS NOTES
Physical Medicine and Rehabilitation Consultation              Date of initial consultation: 11/8/2021  Reason for consultation: assessment of rehabilitation needs  LOS: 10 Day(s)    SUBJECTIVE: Seen today. No visitors present. Has rectal tube in place. Patient unsure of what pain medications he takes at home - per records he takes Subutex at home, which controls pain well - currently pain well controlled. Per documentation, patient willing to have pain medication changed so he can go to rehab as unfortunately, he cannot take it while in rehab.     Was seen by ortho today who cleared patient to be weight bearing as tolerated.    Seen by nephro recommending no further hemodialysis    We discussed the results of his right wrist x ray which shows significant osteoarthritis. Ok for ice and avoid positions that aggravate pain.    Medications:  Current Facility-Administered Medications   Medication Dose   • acetaminophen (TYLENOL) tablet 1,000 mg  1,000 mg   • hydrALAZINE (APRESOLINE) tablet 25 mg  25 mg   • lactated ringers infusion (BOLUS)  500 mL   • amLODIPine (NORVASC) tablet 10 mg  10 mg   • benzocaine-menthol (CEPACOL) lozenge 1 Lozenge  1 Lozenge   • sore throat spray (CHLORASEPTIC) 1 Spray  1 Spray   • nystatin (MYCOSTATIN) 393617 UNIT/ML suspension 500,000 Units  5 mL   • heparin injection 5,000 Units  5,000 Units   • heparin lock flush 100 unit/mL injection 300-500 Units  300-500 Units   • heparin injection 3,700 Units  3,700 Units   • hydrALAZINE (APRESOLINE) injection 10 mg  10 mg   • guaiFENesin (ROBITUSSIN) 100 MG/5ML solution 200 mg  10 mL   • mag hydrox-al hydrox-simeth (MAALOX PLUS ES or MYLANTA DS) suspension 10 mL  10 mL   • sodium bicarbonate tablet 1,300 mg  1,300 mg   • omeprazole (PRILOSEC) capsule 20 mg  20 mg   • buprenorphine (Subutex) sublingual tablet 2 mg  2 mg   • Pharmacy Consult Request - to monitor for nephrotoxic agents  1 Each   • labetalol (NORMODYNE/TRANDATE) injection 10 mg  10  "mg   • ondansetron (ZOFRAN) syringe/vial injection 4 mg  4 mg   • ondansetron (ZOFRAN ODT) dispertab 4 mg  4 mg       Allergies:  No Known Allergies    Physical Exam:  Vitals: /71   Pulse 95   Temp 36.6 °C (97.8 °F) (Temporal)   Resp 17   Ht 1.778 m (5' 10\")   Wt 76.5 kg (168 lb 10.4 oz)   SpO2 93%   General: well-groomed sitting up in no acute distress, no visitors present  Eyes: no scleral icterus or conjunctival injection  Ears, nose, mouth and throat: moist oral mucosa  Cardiovascular: good peripheral perfusion,  Respiratory: breathing room air comfortably without use of accessory muscles  Gastrointestinal: soft, nontender, nondistended  Musculoskeletal: less tender to palpation in right lateral wrist  Skin: no wounds seen on exposed skin    Neurologic:  Mental status:  A&Ox4 (person, place, date, situation) answers questions appropriately follows commands  Speech: fluent, no aphasia or dysarthria  Tone: no spasticity noted  Psychiatric: appropriate affect    Labs: Reviewed and significant for   Recent Labs     11/10/21  0351 11/11/21  0536   RBC 2.49* 2.74*   HEMOGLOBIN 7.5* 8.0*   HEMATOCRIT 22.8* 24.5*   PLATELETCT 172 158*     Recent Labs     11/11/21  0536 11/12/21  0012 11/12/21  1105   SODIUM 133* 134* 139   POTASSIUM 3.2* 3.2* 3.4*   CHLORIDE 91* 90* 96   CO2 30 31 30   GLUCOSE 100* 94 112*   BUN 27* 29* 29*   CREATININE 2.07* 1.92* 1.80*   CALCIUM 8.4* 8.2* 8.2*     Recent Results (from the past 24 hour(s))   Renal Function Panel    Collection Time: 11/12/21 12:12 AM   Result Value Ref Range    Sodium 134 (L) 135 - 145 mmol/L    Potassium 3.2 (L) 3.6 - 5.5 mmol/L    Chloride 90 (L) 96 - 112 mmol/L    Co2 31 20 - 33 mmol/L    Glucose 94 65 - 99 mg/dL    Creatinine 1.92 (H) 0.50 - 1.40 mg/dL    Bun 29 (H) 8 - 22 mg/dL    Calcium 8.2 (L) 8.5 - 10.5 mg/dL    Phosphorus 4.6 (H) 2.5 - 4.5 mg/dL    Albumin 3.2 3.2 - 4.9 g/dL   Comp Metabolic Panel    Collection Time: 11/12/21 12:12 AM   Result " Value Ref Range    Anion Gap 13.0 7.0 - 16.0    AST(SGOT) 17 12 - 45 U/L    ALT(SGPT) 15 2 - 50 U/L    Alkaline Phosphatase 121 (H) 30 - 99 U/L    Total Bilirubin 0.8 0.1 - 1.5 mg/dL    Total Protein 6.5 6.0 - 8.2 g/dL    Globulin 3.3 1.9 - 3.5 g/dL    A-G Ratio 1.0 g/dL   ESTIMATED GFR    Collection Time: 11/12/21 12:12 AM   Result Value Ref Range    GFR If  42 (A) >60 mL/min/1.73 m 2    GFR If Non African American 35 (A) >60 mL/min/1.73 m 2   Basic Metabolic Panel    Collection Time: 11/12/21 11:05 AM   Result Value Ref Range    Sodium 139 135 - 145 mmol/L    Potassium 3.4 (L) 3.6 - 5.5 mmol/L    Chloride 96 96 - 112 mmol/L    Co2 30 20 - 33 mmol/L    Glucose 112 (H) 65 - 99 mg/dL    Bun 29 (H) 8 - 22 mg/dL    Creatinine 1.80 (H) 0.50 - 1.40 mg/dL    Calcium 8.2 (L) 8.5 - 10.5 mg/dL    Anion Gap 13.0 7.0 - 16.0   ESTIMATED GFR    Collection Time: 11/12/21 11:05 AM   Result Value Ref Range    GFR If African American 45 (A) >60 mL/min/1.73 m 2    GFR If Non  37 (A) >60 mL/min/1.73 m 2       ASSESSMENT:  IMPRESSION: he patient is a 71 y.o. male with a past medical history of  Arthritis, hypertension, skin cancer and alcohol use;  who presented on 11/2/2021  7:29 PM as a direct admit from [facility] on 11/2/21 for worsening renal failure function and hyponatremia. 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 No new therapy notes in several days so placed a new order  Patient is a good candidate for acute inpatient rehabilitation provided that: no longer on Subutex, patient is medically stable, bed becomes available, insurance authorization is obtained. Notably, patient chose SNF after right femur fracture due to location (near home) but is now willing to stay in [place] to get intensive therapies so he can get stronger and go home. Wife is willing to provide cares that he may need when he goes home. Wife and patient are agreeable to going home at wheelchair level, as long as he can improve his independence with ADLs and mobility. Patient is motivated with therapies and eager to participate.     Per nephrology, no need for further hemodialysis  Per ortho, weight bearing as tolerated  Unfortunately, Subutex is not supported at [facility]. Patient willing to switch pain medications. Please confer with patient.    #Posttraumatic pain, acute, controlled:  #Postsurgical pain, acute, controlled:   #Neuropathic pain, acute, controlled: :   Agree with current management but if wanting to go to [facility], need to switch off Subutex.    #Right wrist osteoarthritis  Ordered ice pack for right wrist  Avoid painful positions by optimizing with therapy    ##NEURO  #Altered mentation, improving  SLP    ##GI  #Loose stools  Recommend removing rectal tube and start bowel training.  For social continence, can do daily suppository    #Dysphagia, likely secondary to altered mentation, improving  SLP     ##/renal  #Acute renal failure  PLEASE clarify plan for hemodialysis for purposes of planning inpatient rehabilitation and  outpatient services    ##SKIN  Recommend turning at least Q2hrs while in bed to prevent skin injury    DVT chemoprophlaxis: none noted  GI prophylaxis: omeprazole 20mg BID  Estimated length of stay: 14-18 days  Anticipated discharge destination: home with spouse  Prognosis: good  Code: full resuscitation      Thank you for allowing us to participate in the care of this patient. Physiatry will continue to follow and provide recommendations, as needed.    Patient was seen for 26 minutes on unit/floor of which > 50% of time was spent on counseling and coordination of care regarding the above, including prognosis, risk reduction, benefits of treatment, and options for next stage of care.    Melissa Dodson D.O.   Physical Medicine and Rehabilitation     Please note that this dictation was created using voice recognition software. I have made every reasonable attempt to correct obvious errors, but there may be errors of grammar and possibly content that I did not discover before finalizing the note.

## 2021-11-12 NOTE — DIETARY
Brief Nutrition Services Note: RD seen pt for PO intake follow up. Pt very happy diet just updraded by SLP yesterday, now on Soft bite sized foods with thin liquids, as he disliked the Pureed foods previously sent. Pt states he ate ~ 50% of dinner last night but not drinking BOOST (does not like them).  Expect PO will improve now that diet advanced.    Pertinent Labs: Na+ 134, K+ 3.3, Phos 4.6. Pt followed by nephrology for CKD II, who states HD catheter to be removed  if creatine stable today.     Plan/Rec:   1) RD will discontinue Boost and add high protein snack once daily per pt preference.   2) Monitor and replete K+ PRN.   3) Monitor Renal labs and adjust diet PRN.    RD following.

## 2021-11-13 ENCOUNTER — APPOINTMENT (OUTPATIENT)
Dept: RADIOLOGY | Facility: MEDICAL CENTER | Age: 71
DRG: 682 | End: 2021-11-13
Attending: INTERNAL MEDICINE
Payer: MEDICARE

## 2021-11-13 LAB
ALBUMIN SERPL BCP-MCNC: 3.2 G/DL (ref 3.2–4.9)
ANION GAP SERPL CALC-SCNC: 14 MMOL/L (ref 7–16)
BUN SERPL-MCNC: 30 MG/DL (ref 8–22)
CALCIUM SERPL-MCNC: 8.2 MG/DL (ref 8.5–10.5)
CHLORIDE SERPL-SCNC: 91 MMOL/L (ref 96–112)
CO2 SERPL-SCNC: 31 MMOL/L (ref 20–33)
CREAT SERPL-MCNC: 1.58 MG/DL (ref 0.5–1.4)
GLUCOSE SERPL-MCNC: 106 MG/DL (ref 65–99)
PHOSPHATE SERPL-MCNC: 5 MG/DL (ref 2.5–4.5)
POTASSIUM SERPL-SCNC: 3.2 MMOL/L (ref 3.6–5.5)
SODIUM SERPL-SCNC: 136 MMOL/L (ref 135–145)

## 2021-11-13 PROCEDURE — 97530 THERAPEUTIC ACTIVITIES: CPT

## 2021-11-13 PROCEDURE — A9270 NON-COVERED ITEM OR SERVICE: HCPCS | Performed by: INTERNAL MEDICINE

## 2021-11-13 PROCEDURE — 700102 HCHG RX REV CODE 250 W/ 637 OVERRIDE(OP): Performed by: INTERNAL MEDICINE

## 2021-11-13 PROCEDURE — 700111 HCHG RX REV CODE 636 W/ 250 OVERRIDE (IP): Performed by: INTERNAL MEDICINE

## 2021-11-13 PROCEDURE — 80069 RENAL FUNCTION PANEL: CPT

## 2021-11-13 PROCEDURE — 99232 SBSQ HOSP IP/OBS MODERATE 35: CPT | Performed by: INTERNAL MEDICINE

## 2021-11-13 PROCEDURE — 36589 REMOVAL TUNNELED CV CATH: CPT

## 2021-11-13 PROCEDURE — 97112 NEUROMUSCULAR REEDUCATION: CPT

## 2021-11-13 PROCEDURE — 302106 OSTOMY POWDER

## 2021-11-13 PROCEDURE — 02PYX3Z REMOVAL OF INFUSION DEVICE FROM GREAT VESSEL, EXTERNAL APPROACH: ICD-10-PCS | Performed by: RADIOLOGY

## 2021-11-13 PROCEDURE — A9270 NON-COVERED ITEM OR SERVICE: HCPCS | Performed by: STUDENT IN AN ORGANIZED HEALTH CARE EDUCATION/TRAINING PROGRAM

## 2021-11-13 PROCEDURE — 97535 SELF CARE MNGMENT TRAINING: CPT

## 2021-11-13 PROCEDURE — 770006 HCHG ROOM/CARE - MED/SURG/GYN SEMI*

## 2021-11-13 PROCEDURE — 36415 COLL VENOUS BLD VENIPUNCTURE: CPT

## 2021-11-13 PROCEDURE — 51798 US URINE CAPACITY MEASURE: CPT

## 2021-11-13 PROCEDURE — 700102 HCHG RX REV CODE 250 W/ 637 OVERRIDE(OP): Performed by: STUDENT IN AN ORGANIZED HEALTH CARE EDUCATION/TRAINING PROGRAM

## 2021-11-13 RX ORDER — POTASSIUM CHLORIDE 20 MEQ/1
40 TABLET, EXTENDED RELEASE ORAL ONCE
Status: COMPLETED | OUTPATIENT
Start: 2021-11-13 | End: 2021-11-13

## 2021-11-13 RX ADMIN — BUPRENORPHINE HCL 2 MG: 2 TABLET SUBLINGUAL at 22:05

## 2021-11-13 RX ADMIN — OMEPRAZOLE 20 MG: 20 CAPSULE, DELAYED RELEASE ORAL at 05:53

## 2021-11-13 RX ADMIN — HEPARIN SODIUM 5000 UNITS: 5000 INJECTION, SOLUTION INTRAVENOUS; SUBCUTANEOUS at 22:05

## 2021-11-13 RX ADMIN — AMLODIPINE BESYLATE 10 MG: 10 TABLET ORAL at 05:53

## 2021-11-13 RX ADMIN — BUPRENORPHINE HCL 2 MG: 2 TABLET SUBLINGUAL at 14:52

## 2021-11-13 RX ADMIN — HYDRALAZINE HYDROCHLORIDE 25 MG: 50 TABLET ORAL at 05:52

## 2021-11-13 RX ADMIN — SODIUM BICARBONATE 1300 MG: 650 TABLET ORAL at 22:05

## 2021-11-13 RX ADMIN — POTASSIUM CHLORIDE 40 MEQ: 1500 TABLET, EXTENDED RELEASE ORAL at 10:46

## 2021-11-13 RX ADMIN — SODIUM BICARBONATE 1300 MG: 650 TABLET ORAL at 14:53

## 2021-11-13 RX ADMIN — SODIUM BICARBONATE 1300 MG: 650 TABLET ORAL at 18:21

## 2021-11-13 RX ADMIN — HYDRALAZINE HYDROCHLORIDE 25 MG: 50 TABLET ORAL at 14:52

## 2021-11-13 RX ADMIN — BUPRENORPHINE HCL 2 MG: 2 TABLET SUBLINGUAL at 10:41

## 2021-11-13 RX ADMIN — SODIUM BICARBONATE 1300 MG: 650 TABLET ORAL at 10:41

## 2021-11-13 RX ADMIN — HEPARIN SODIUM 5000 UNITS: 5000 INJECTION, SOLUTION INTRAVENOUS; SUBCUTANEOUS at 14:53

## 2021-11-13 RX ADMIN — HYDRALAZINE HYDROCHLORIDE 25 MG: 50 TABLET ORAL at 22:04

## 2021-11-13 RX ADMIN — OMEPRAZOLE 20 MG: 20 CAPSULE, DELAYED RELEASE ORAL at 18:21

## 2021-11-13 ASSESSMENT — ENCOUNTER SYMPTOMS
ABDOMINAL PAIN: 0
BACK PAIN: 0
NAUSEA: 0
PALPITATIONS: 0
VOMITING: 0
DOUBLE VISION: 0
SORE THROAT: 0
SHORTNESS OF BREATH: 0
COUGH: 0
FEVER: 0
CHILLS: 0
PHOTOPHOBIA: 0

## 2021-11-13 ASSESSMENT — COGNITIVE AND FUNCTIONAL STATUS - GENERAL
MOVING TO AND FROM BED TO CHAIR: UNABLE
WALKING IN HOSPITAL ROOM: A LOT
DAILY ACTIVITIY SCORE: 15
EATING MEALS: A LITTLE
STANDING UP FROM CHAIR USING ARMS: A LOT
DRESSING REGULAR UPPER BODY CLOTHING: A LITTLE
TOILETING: A LOT
TURNING FROM BACK TO SIDE WHILE IN FLAT BAD: A LITTLE
PERSONAL GROOMING: A LITTLE
HELP NEEDED FOR BATHING: A LOT
MOVING FROM LYING ON BACK TO SITTING ON SIDE OF FLAT BED: UNABLE
DRESSING REGULAR LOWER BODY CLOTHING: A LOT
CLIMB 3 TO 5 STEPS WITH RAILING: TOTAL
SUGGESTED CMS G CODE MODIFIER DAILY ACTIVITY: CK
MOBILITY SCORE: 10
SUGGESTED CMS G CODE MODIFIER MOBILITY: CL

## 2021-11-13 ASSESSMENT — GAIT ASSESSMENTS
GAIT LEVEL OF ASSIST: MINIMAL ASSIST
DISTANCE (FEET): 3

## 2021-11-13 NOTE — PROGRESS NOTES
"Pt has not voided since penn removal; per day team penn removed around 1530. Pt refused bladder scan at this time, stated \"Why don't we wait a little bit more\". Education provided; will attempt again.    0023 Bladder scan result 371; bladder nondistended; pt denied discomfort. Urinal provided at bedside.  "

## 2021-11-13 NOTE — PROGRESS NOTES
Assumed care of pt at shift change. Pt is on RA with no signs of acute distress. A&Ox4. Denies any pain. POC discussed with patient. All safety precautions in place. Wife at bedside. Call light and personal belongings by bedside. Bed locked and in lowest position. Hourly rounding in place.

## 2021-11-13 NOTE — DISCHARGE PLANNING
Patient is medically cleared and weight bearing status increased by orthopedic surgery. Patient also has been cleared by nephrology, no further need for dialysis. Patient's Subutex to be discontinued and substituted to come to [facility]. Per cm, wife/patient are agreeable to changing the medication.    No beds currently available at [facility]. Would appreciate updated therapy notes Sunday 11/14 for physiatry to review Monday morning. TCC to continue to follow.

## 2021-11-13 NOTE — PROGRESS NOTES
[facility] Medicine Daily Progress Note    Date of Service  11/13/2021    Chief Complaint  Zachary Moore is a 71 y.o. male admitted 11/2/2021 with UTE    Hospital Course  This is a 71-year-old gentleman who originally suffered a hip fracture and was treated at [facility].  During that hospitalization he was also treated for alcohol withdrawal and urosepsis.  From there he was discharged to rehab facility, and has since been transferred to us.  His previous medical history is also significant for hypertension, alcohol abuse, stage II chronic kidney disease..  Patient was sent to us from rehab facility in [place] after he was found to be hyponatremic and with acute kidney injury.  On arrival here, his sodium was 114, potassium 5.2, chloride 86, CO2 12, BUN 64, creatinine 6.10.  He has a previous diagnosis of stage II chronic kidney disease with a baseline creatinine around 1.0.  Patient was admitted to the hospital with a diagnosis of likely hypovolemic hyponatremia.  He was initially treated with fluid resuscitation, and corrected rapidly, however, he remained with very poor urine output and therefore nephrology has been consulted.    Interval Problem Update  Pt c/o of being sleepy this am but no other specific complaints    AFebrile  Sinus 70-80s  -160s    11/5. Sleepy but arousable. SLP couldn't do assessment because he complained of odynophagia. I clarified with him, He can still take PO and his odynophagia has been going on for 2 years on and off. I told SLP to see if we can reevaluate his swallowing again. CUrrently he is NPO anyway for catheter placement.  11/6. SLP recommended NPO but will reassess Saturday.  Family at bedside. Discussed with Speech. He did pass his test he can do thin liquids. I added supoplements. I ordered dietitian. Patient planned for dialysis cathter placement today.  11/7. Arousable. His Hb 7.8. He has occult positive stool. No active bleeding. I discussed with him  if he wants an GI eval/ endoscopy or colonoscopy but at this time he declines and would prefer to do it outpatient. I did say if his Hb drops <7 or if he has copious melena or bright red blood then we will have GI consulted. He agreed with the plan. I encouraged PO and supplements.      11/9.  Patient is getting dialysis today.  No acute issue overnight. we will get updated PT OT evaluation today.    11/10 no acute issue overnight.  Physiatry is following and no bed available today for now.    11/11: The patient stated that his orthopedic surgeon was Dr. Tello.  His kidney function is improving and I do not think he will need another hemodialysis at this point.  We will find out about his weightbearing status from Dr. Tello.    11/12: Patient is getting x-ray of his right femur today and Dr. Tello will give us recommendation about weightbearing status depending on the results of the x-ray.  Also will discuss with pharmacy about buprenorphine since we have do not have the medication.  I have personally seen and examined the patient at bedside. I discussed the plan of care with patient, bedside RN and pulmonary. And SLP    Consultants/Specialty  nephrology  critical care    Code Status  Full Code    Disposition  Patient is not medically cleared. SNF pending, waiting for renal biopsy results and Nephrology clearance.  Anticipate discharge to to skilled nursing facility.  I have placed the appropriate orders for post-discharge needs.    Review of Systems  Review of Systems   Constitutional: Positive for malaise/fatigue. Negative for chills.   HENT: Negative for nosebleeds and sore throat.    Eyes: Negative for double vision and photophobia.   Respiratory: Negative for cough and shortness of breath.    Cardiovascular: Positive for leg swelling. Negative for chest pain and palpitations.   Gastrointestinal: Negative for abdominal pain and vomiting.   Genitourinary: Negative for frequency and urgency.    Musculoskeletal: Negative for back pain.   Skin: Negative for rash.        Physical Exam  Temp:  [36.5 °C (97.7 °F)-36.8 °C (98.3 °F)] 36.8 °C (98.3 °F)  Pulse:  [] 83  Resp:  [16-17] 16  BP: (125-141)/(64-81) 135/64  SpO2:  [93 %-99 %] 96 %    Physical Exam  Constitutional:       Appearance: Normal appearance. He is well-developed.   HENT:      Head: Normocephalic and atraumatic.      Mouth/Throat:      Comments: NO odynophagia, out of proportion to exam.  Eyes:      Conjunctiva/sclera: Conjunctivae normal.   Neck:      Vascular: No JVD.   Cardiovascular:      Rate and Rhythm: Normal rate.      Pulses: Normal pulses.      Heart sounds: No murmur heard.      Pulmonary:      Effort: Pulmonary effort is normal. No respiratory distress.      Breath sounds: No stridor. No wheezing.   Abdominal:      Palpations: Abdomen is soft.      Tenderness: There is no abdominal tenderness. There is no guarding.   Musculoskeletal:      Right lower leg: Edema present.      Left lower leg: Edema present.   Skin:     General: Skin is warm.      Coloration: Skin is pale.   Neurological:      General: No focal deficit present.      Mental Status: He is alert.         Fluids    Intake/Output Summary (Last 24 hours) at 11/13/2021 0835  Last data filed at 11/13/2021 0525  Gross per 24 hour   Intake 462 ml   Output 250 ml   Net 212 ml       Laboratory  Recent Labs     11/11/21  0536   WBC 5.9   RBC 2.74*   HEMOGLOBIN 8.0*   HEMATOCRIT 24.5*   MCV 89.4   MCH 29.2   MCHC 32.7*   RDW 46.4   PLATELETCT 158*   MPV 10.3     Recent Labs     11/11/21  0536 11/12/21  0012 11/12/21  1105   SODIUM 133* 134* 139   POTASSIUM 3.2* 3.2* 3.4*   CHLORIDE 91* 90* 96   CO2 30 31 30   GLUCOSE 100* 94 112*   BUN 27* 29* 29*   CREATININE 2.07* 1.92* 1.80*   CALCIUM 8.4* 8.2* 8.2*                   Imaging  DX-FEMUR-2+ RIGHT   Final Result      Comminuted distal femoral metadiaphysis fractures with lateral plate and screw fixation hardware in place       Incompletely visualized total knee arthroplasty      DX-WRIST-COMPLETE 3+ RIGHT   Final Result      1.  No evidence of acute fracture or dislocation.      2.  Prominent osteoarthritis involving the first carpometacarpal and triscaphe articulations. Less prominent osteoarthritis involves the first MCP and first IP joints.         DX-CHEST-PORTABLE (1 VIEW)   Final Result      1.  Possible minimal right pleural effusion.      2.  No focal pulmonary consolidation.      3.  New right IJV multilumen dialysis catheter in place.      IR-GUERRIERSOPHIA MAYER PLACEMENT >5   Final Result      Successful image guided tunneled dialysis catheter placement.      Plan: The catheter is available for immediate use.            CT-NEEDLE BX-RENAL   Final Result      CT-guided core biopsy of the right kidney.      US-RENAL   Final Result      1.  BILATERAL pleural effusions   2.  Small volume of ascites   3.  Cholelithiasis and gallbladder sludge with gallbladder wall thickening incidentally noted. Cholecystitis is a consideration.      EC-ECHOCARDIOGRAM COMPLETE W/O CONT   Final Result      DX-CHEST-PORTABLE (1 VIEW)   Final Result      Mild basilar and apical opacity compatible with atelectasis or scarring although consolidation is not excluded      Effusions on recent CT are not detected radiographically      CT-ABDOMEN-PELVIS W/O   Final Result      Diffuse urinary bladder wall thickening with Guy catheter in place. This is suspicious for cystitis but the finding is nonspecific      No hydronephrosis or urolithiasis      Cholelithiasis      Impending diarrhea      Small to medium right, small left pleural effusions      5 mm mean diameter left lower lobe pulmonary nodule. Follow-up recommendations as below   Low Risk: No routine follow-up      High Risk: Optional CT at 12 months      Comments: Nodules less than 6 mm do not require routine follow-up, but certain patients at high risk with suspicious nodule morphology, upper lobe  location, or both may warrant 12-month follow-up.      Low Risk - Minimal or absent history of smoking and of other known risk factors.      High Risk - History of smoking or of other known risk factors.      Note: These recommendations do not apply to lung cancer screening, patients with immunosuppression, or patients with known primary cancer.      Fleischner Society 2017 Guidelines for Management of Incidentally Detected Pulmonary Nodules in Adults         CT-HEAD W/O   Final Result      No noncontrast CT evidence of acute intracranial hemorrhage.      Severe white matter hypodensity is present.  This is a nonspecific finding which usually is found to represent chronic microvascular disease in patient's of this demographic.  Demyelination, age indeterminant ischemia and gliosis are also common    possibilities.      Moderate to severe parenchymal volume loss         US-FOREIGN FILM ULTRASOUND   Final Result      IR-CVC TUNNEL W/O PORT REMOVAL    (Results Pending)        Assessment/Plan  * UTE (acute kidney injury)/hypotension, hyponatremia, s/p femoral ORIF, anemia/GI bleed/odynophagia (HCC)- (present on admission)  Assessment & Plan  Creatinine 1.58 today  Improving  Hemodialysis per nephrology  Maybe he is in recovery phase at this point  No need for hemodialysis at this point        Occult blood positive stool  Assessment & Plan  He has seen [facility] in the past for routine colonoscopy severalyears ago.  At this time he does not want endoscopy/colonoscopy inpatient but will revisit if he has actual BRBPR or melena,or Hb drops to <7.  Updated Nephrology  11/8. Because of poor PO intake/odynophagia  GI recommended follow up    Anemia  Assessment & Plan  Ordered occult stool  Ordered iron studies  Currently no active bleeding.  Occult positive  At this time patient prefers GI work-up outpatient  May need epoietin, updated Nephrology  11/8. Consulted Dr. Khoury, GI for odynophagia/poor PO intake, thus will also  "evaluate UGI/anemia.  Recommended follow up outpatient    Dehydration  Assessment & Plan  Has been fluid resuscitated  Secondary to diarrhea      Encephalopathy acute- (present on admission)  Assessment & Plan  2/2 UTE vs. Urosepsis vs. Acute hyponatremia  Improving  CT head neg for acute findings    Sepsis due to Escherichia coli with acute renal failure without septic shock (HCC)  Assessment & Plan  NOT sepsis  Cultures neg  Only given one dose of ABx's on presentation          Mass of urinary bladder- (present on admission)  Assessment & Plan  Based on imaging from [facility]  Repeat CT Abd/Plvs and renal US do not show any mass    Jeana-prosthetic femoral shaft fracture- (present on admission)  Assessment & Plan  S/p ORIF by Dr. Tello  Pain control  Brace present  PT/OT consults\"    Weight bearing per ortho recommendation      Alcohol use disorder, moderate, dependence (HCC)- (present on admission)  Assessment & Plan  Alcohol level ordered  Last drink reported 5 days prior by patient  Monitor for signs of withdrawal    Acute cystitis with hematuria- (present on admission)  Assessment & Plan  Treated for sepsis 1 week ago for Bacteremia & UTI treated with zosyn & Unasyn and d/c on cipro 750mg BID for 9 more doses and Flagyl 500mg TID x 16 days growing Resistant Ecoli.  Urine cultures here are neg  US at [facility] showing possible mass however CT Abd/Plvs and renal US both neg for mass here  Cont to monitor off Abx's  Catheter change w/in 48hrs of admission\"  Repeat UA + for UTI but he is on chronic Guy catheter  Monitor closely  We will hold off on antibiotic at this point        Acute hyponatremia- (present on admission)  Assessment & Plan  Resolved        Elevated liver enzymes- (present on admission)  Assessment & Plan  Trend  Appears chronic  Monitor      Hypertension- (present on admission)  Assessment & Plan  On lisinopril 10 as an outpt which was stopped due to hypotension and UTE  Start amlodipine 5mg    Vitals: "    11/08/21 0843   BP: 153/81   Pulse: 100   Resp: 14   Temp: 36.9 °C (98.4 °F)   SpO2: 93%     Add hydralazine  Continue amlodipine  ACEI held bec of elevated Cr-       VTE prophylaxis: heparin ppx    I have performed a physical exam and reviewed and updated ROS and Plan today (11/13/2021). In review of yesterday's note (11/12/2021), there are no changes except as documented above.

## 2021-11-13 NOTE — PROGRESS NOTES
Nephrology Daily Progress Note    Date of Service  11/13/2021    Chief Complaint  Zachary Moore is a 71 y.o. male with CKD II, recently hospitalized with RLE femur fx, urosepsis, admitted 11/2/2021 with UTE, hyponatremia    Interval Problem Update  11/4 -no acute events, no new complaints  UTE -creat at 6's  oliguric  Low C3 C4 -scheduled kidney biopsy  ABDULAZIZ, ANCA -pending  11/7 -doing well, no complaints  S/p HD x 2 tolerated well  S/p diagnostic kidney biopsy -results pending  UOP slightly better  11/8 patient is doing slightly better, still very weak  Seen and examined while getting HD.  Kidney biopsy showed ATN  11/9 patient is doing better today, no chest pain or shortness of breath  Seen and examined while getting HD.  11/10 pt is doing better, no CP, no SOB  11/11 patient feels better today, no new complaints   good urine output  11/12 patient is doing better, no chest pain no shortness of breath  11/13 patient is doing better, hemodialysis catheter was removed  Code Status  Full Code      Review of Systems  Review of Systems   Constitutional: Negative for chills, fever and malaise/fatigue.   Respiratory: Negative for cough and shortness of breath.    Cardiovascular: Negative for chest pain and leg swelling.   Gastrointestinal: Negative for nausea and vomiting.   Genitourinary: Negative for dysuria, frequency and urgency.        Physical Exam  Temp:  [36.5 °C (97.7 °F)-37.1 °C (98.7 °F)] 37.1 °C (98.7 °F)  Pulse:  [] 95  Resp:  [16-18] 18  BP: (125-141)/(58-81) 127/58  SpO2:  [93 %-99 %] 95 %    Physical Exam  Vitals and nursing note reviewed.   Constitutional:       General: He is not in acute distress.     Appearance: He is not ill-appearing.   HENT:      Head: Normocephalic and atraumatic.      Right Ear: External ear normal.      Left Ear: External ear normal.      Nose: Nose normal.   Eyes:      General:         Right eye: No discharge.         Left eye: No discharge.      Conjunctiva/sclera:  Conjunctivae normal.   Cardiovascular:      Rate and Rhythm: Normal rate and regular rhythm.      Heart sounds: No murmur heard.      Pulmonary:      Effort: Pulmonary effort is normal. No respiratory distress.      Breath sounds: Normal breath sounds. No wheezing.   Musculoskeletal:         General: No tenderness or deformity.      Right lower leg: No edema.      Left lower leg: No edema.   Skin:     General: Skin is warm.   Neurological:      General: No focal deficit present.      Mental Status: He is alert and oriented to person, place, and time.   Psychiatric:         Mood and Affect: Mood normal.         Behavior: Behavior normal.         Fluids    Intake/Output Summary (Last 24 hours) at 11/13/2021 1427  Last data filed at 11/13/2021 0900  Gross per 24 hour   Intake --   Output 750 ml   Net -750 ml       Laboratory  Recent Labs     11/11/21  0536   WBC 5.9   RBC 2.74*   HEMOGLOBIN 8.0*   HEMATOCRIT 24.5*   MCV 89.4   MCH 29.2   MCHC 32.7*   RDW 46.4   PLATELETCT 158*   MPV 10.3     Recent Labs     11/12/21  0012 11/12/21  1105 11/13/21  0802   SODIUM 134* 139 136   POTASSIUM 3.2* 3.4* 3.2*   CHLORIDE 90* 96 91*   CO2 31 30 31   GLUCOSE 94 112* 106*   BUN 29* 29* 30*   CREATININE 1.92* 1.80* 1.58*   CALCIUM 8.2* 8.2* 8.2*                   Imaging  IR-CVC TUNNEL W/O PORT REMOVAL   Final Result      1. Removal of RIGHT internal jugular tunneled hemodialysis catheter.      DX-FEMUR-2+ RIGHT   Final Result      Comminuted distal femoral metadiaphysis fractures with lateral plate and screw fixation hardware in place      Incompletely visualized total knee arthroplasty      DX-WRIST-COMPLETE 3+ RIGHT   Final Result      1.  No evidence of acute fracture or dislocation.      2.  Prominent osteoarthritis involving the first carpometacarpal and triscaphe articulations. Less prominent osteoarthritis involves the first MCP and first IP joints.         DX-CHEST-PORTABLE (1 VIEW)   Final Result      1.  Possible minimal  right pleural effusion.      2.  No focal pulmonary consolidation.      3.  New right IJV multilumen dialysis catheter in place.      IR-GUERRIER,GROSHONG PLACEMENT >5   Final Result      Successful image guided tunneled dialysis catheter placement.      Plan: The catheter is available for immediate use.            CT-NEEDLE BX-RENAL   Final Result      CT-guided core biopsy of the right kidney.      US-RENAL   Final Result      1.  BILATERAL pleural effusions   2.  Small volume of ascites   3.  Cholelithiasis and gallbladder sludge with gallbladder wall thickening incidentally noted. Cholecystitis is a consideration.      EC-ECHOCARDIOGRAM COMPLETE W/O CONT   Final Result      DX-CHEST-PORTABLE (1 VIEW)   Final Result      Mild basilar and apical opacity compatible with atelectasis or scarring although consolidation is not excluded      Effusions on recent CT are not detected radiographically      CT-ABDOMEN-PELVIS W/O   Final Result      Diffuse urinary bladder wall thickening with Guy catheter in place. This is suspicious for cystitis but the finding is nonspecific      No hydronephrosis or urolithiasis      Cholelithiasis      Impending diarrhea      Small to medium right, small left pleural effusions      5 mm mean diameter left lower lobe pulmonary nodule. Follow-up recommendations as below   Low Risk: No routine follow-up      High Risk: Optional CT at 12 months      Comments: Nodules less than 6 mm do not require routine follow-up, but certain patients at high risk with suspicious nodule morphology, upper lobe location, or both may warrant 12-month follow-up.      Low Risk - Minimal or absent history of smoking and of other known risk factors.      High Risk - History of smoking or of other known risk factors.      Note: These recommendations do not apply to lung cancer screening, patients with immunosuppression, or patients with known primary cancer.      Fleischner Society 2017 Guidelines for Management  of Incidentally Detected Pulmonary Nodules in Adults         CT-HEAD W/O   Final Result      No noncontrast CT evidence of acute intracranial hemorrhage.      Severe white matter hypodensity is present.  This is a nonspecific finding which usually is found to represent chronic microvascular disease in patient's of this demographic.  Demyelination, age indeterminant ischemia and gliosis are also common    possibilities.      Moderate to severe parenchymal volume loss         US-FOREIGN FILM ULTRASOUND   Final Result            Assessment/Plan  1.UTE/CKD : Improving  2.HTN: BP well controlled  3.hyponatremia: Better   4.Anemia.  5.Metabolic acidosis: Resolved  6. Volume overload: Better  7 hypokalemia  no need for HD  Renal diet  Replace potassium  Daily labs  Renal dose all meds  Avoid nephrotoxins  Okay to discharge to rehab from [facility]

## 2021-11-13 NOTE — DOCUMENTATION QUERY
[facility]                                                                       Query Response Note      PATIENT:               RAMA ROUSE  ACCT #:                  [id number]  MRN:                     [id number]  :                      1950  ADMIT DATE:       2021 7:29 PM  DISCH DATE:          RESPONDING  PROVIDER #:        [id number]           QUERY TEXT:    Encephalopathy is documented in the Medical Record. Please specify type.    NOTE:  If an appropriate response is not listed below, please respond with a new note.    The patient's Clinical Indicators include:  H&P- 21 MD PN: Acute encephalopathy: 2/2 UTE vs. Urosepsis vs. Acute hyponatremia  21 MD PN: Sepsis ruled out  21 Documentation query response: Acute cystitis with hematuria is ruled out     Treatment: CT head, SLP consult, Hemodialysis  Risk Factors: Dehydration, Hyponatremia, UTE, GI bleed    Thank you,  Tanna Headley RN, BSN  Clinical   Connect via [employer]  .  Options provided:   -- Metabolic encephalopathy   -- Toxic encephalopathy   -- Other type of encephalopathy   -- Unable to determine      Query created by: Tanna Headley on 2021 1:15 PM    RESPONSE TEXT:    Metabolic encephalopathy          Electronically signed by:  MECHE SAMSON MD 2021 11:01 AM

## 2021-11-14 LAB
ALBUMIN SERPL BCP-MCNC: 3.1 G/DL (ref 3.2–4.9)
ALBUMIN/GLOB SERPL: 0.9 G/DL
ALP SERPL-CCNC: 107 U/L (ref 30–99)
ALT SERPL-CCNC: 22 U/L (ref 2–50)
ANION GAP SERPL CALC-SCNC: 12 MMOL/L (ref 7–16)
AST SERPL-CCNC: 24 U/L (ref 12–45)
BILIRUB SERPL-MCNC: 0.9 MG/DL (ref 0.1–1.5)
BUN SERPL-MCNC: 33 MG/DL (ref 8–22)
CALCIUM SERPL-MCNC: 8.3 MG/DL (ref 8.5–10.5)
CHLORIDE SERPL-SCNC: 91 MMOL/L (ref 96–112)
CO2 SERPL-SCNC: 31 MMOL/L (ref 20–33)
CREAT SERPL-MCNC: 1.48 MG/DL (ref 0.5–1.4)
GLOBULIN SER CALC-MCNC: 3.5 G/DL (ref 1.9–3.5)
GLUCOSE SERPL-MCNC: 100 MG/DL (ref 65–99)
PHOSPHATE SERPL-MCNC: 4.6 MG/DL (ref 2.5–4.5)
POTASSIUM SERPL-SCNC: 3.2 MMOL/L (ref 3.6–5.5)
PROT SERPL-MCNC: 6.6 G/DL (ref 6–8.2)
SODIUM SERPL-SCNC: 134 MMOL/L (ref 135–145)

## 2021-11-14 PROCEDURE — A9270 NON-COVERED ITEM OR SERVICE: HCPCS | Performed by: INTERNAL MEDICINE

## 2021-11-14 PROCEDURE — A9270 NON-COVERED ITEM OR SERVICE: HCPCS | Performed by: STUDENT IN AN ORGANIZED HEALTH CARE EDUCATION/TRAINING PROGRAM

## 2021-11-14 PROCEDURE — 700102 HCHG RX REV CODE 250 W/ 637 OVERRIDE(OP): Performed by: INTERNAL MEDICINE

## 2021-11-14 PROCEDURE — 80053 COMPREHEN METABOLIC PANEL: CPT

## 2021-11-14 PROCEDURE — 99232 SBSQ HOSP IP/OBS MODERATE 35: CPT | Performed by: INTERNAL MEDICINE

## 2021-11-14 PROCEDURE — 84100 ASSAY OF PHOSPHORUS: CPT

## 2021-11-14 PROCEDURE — 700102 HCHG RX REV CODE 250 W/ 637 OVERRIDE(OP): Performed by: NURSE PRACTITIONER

## 2021-11-14 PROCEDURE — 700111 HCHG RX REV CODE 636 W/ 250 OVERRIDE (IP): Performed by: INTERNAL MEDICINE

## 2021-11-14 PROCEDURE — 770006 HCHG ROOM/CARE - MED/SURG/GYN SEMI*

## 2021-11-14 PROCEDURE — A9270 NON-COVERED ITEM OR SERVICE: HCPCS | Performed by: NURSE PRACTITIONER

## 2021-11-14 PROCEDURE — 700102 HCHG RX REV CODE 250 W/ 637 OVERRIDE(OP): Performed by: STUDENT IN AN ORGANIZED HEALTH CARE EDUCATION/TRAINING PROGRAM

## 2021-11-14 PROCEDURE — 36415 COLL VENOUS BLD VENIPUNCTURE: CPT

## 2021-11-14 RX ADMIN — HEPARIN SODIUM 5000 UNITS: 5000 INJECTION, SOLUTION INTRAVENOUS; SUBCUTANEOUS at 04:45

## 2021-11-14 RX ADMIN — BUPRENORPHINE HCL 2 MG: 2 TABLET SUBLINGUAL at 14:15

## 2021-11-14 RX ADMIN — BUPRENORPHINE HCL 2 MG: 2 TABLET SUBLINGUAL at 23:47

## 2021-11-14 RX ADMIN — ACETAMINOPHEN 1000 MG: 500 TABLET ORAL at 08:57

## 2021-11-14 RX ADMIN — OMEPRAZOLE 20 MG: 20 CAPSULE, DELAYED RELEASE ORAL at 17:40

## 2021-11-14 RX ADMIN — HYDRALAZINE HYDROCHLORIDE 25 MG: 50 TABLET ORAL at 04:45

## 2021-11-14 RX ADMIN — OMEPRAZOLE 20 MG: 20 CAPSULE, DELAYED RELEASE ORAL at 04:45

## 2021-11-14 RX ADMIN — SODIUM BICARBONATE 1300 MG: 650 TABLET ORAL at 14:15

## 2021-11-14 RX ADMIN — HYDRALAZINE HYDROCHLORIDE 25 MG: 50 TABLET ORAL at 14:15

## 2021-11-14 RX ADMIN — AMLODIPINE BESYLATE 10 MG: 10 TABLET ORAL at 04:45

## 2021-11-14 RX ADMIN — SODIUM BICARBONATE 1300 MG: 650 TABLET ORAL at 17:00

## 2021-11-14 RX ADMIN — SODIUM BICARBONATE 1300 MG: 650 TABLET ORAL at 21:56

## 2021-11-14 RX ADMIN — BUPRENORPHINE HCL 2 MG: 2 TABLET SUBLINGUAL at 08:48

## 2021-11-14 RX ADMIN — HEPARIN SODIUM 5000 UNITS: 5000 INJECTION, SOLUTION INTRAVENOUS; SUBCUTANEOUS at 21:56

## 2021-11-14 RX ADMIN — SODIUM BICARBONATE 1300 MG: 650 TABLET ORAL at 08:48

## 2021-11-14 RX ADMIN — HYDRALAZINE HYDROCHLORIDE 25 MG: 50 TABLET ORAL at 21:55

## 2021-11-14 RX ADMIN — ACETAMINOPHEN 1000 MG: 500 TABLET ORAL at 17:40

## 2021-11-14 RX ADMIN — HEPARIN SODIUM 5000 UNITS: 5000 INJECTION, SOLUTION INTRAVENOUS; SUBCUTANEOUS at 14:15

## 2021-11-14 ASSESSMENT — ENCOUNTER SYMPTOMS
PALPITATIONS: 0
COUGH: 0
BACK PAIN: 0
VOMITING: 0
CHILLS: 0
ABDOMINAL PAIN: 0
PHOTOPHOBIA: 0
FEVER: 0
SHORTNESS OF BREATH: 0
NAUSEA: 0
DOUBLE VISION: 0

## 2021-11-14 NOTE — DISCHARGE PLANNING
Anticipated Discharge Disposition: [facility]    Action: Reviewed chart and noted [facility] re[facility] liaison’s note, that there are no beds at [facility] currently and that she would appreciate updated PT and OT notes.  Reviewed therapy notes and they were just updated on 11/13/21 at 16:30 on 11/13/21 which should be good for rehab to review on Monday morning 11/15/21.     Barriers to Discharge: await [facility] bed.     Plan: Care coordination to follow for dc planning needs.

## 2021-11-14 NOTE — THERAPY
Physical Therapy   Daily Treatment     Patient Name: Zachary Moore  Age:  71 y.o., Sex:  male  Medical Record #: [id number]  Today's Date: 11/13/2021     Precautions  Precautions: Fall Risk;Weight Bearing As Tolerated Right Lower Extremity  Comments: 10/13: right femoral ORIF, now WBAT     Assessment    Pt with improving upright tolerance; able to progress in sit<>stand abilities with less assist now WBAT; able to initiate steps along bed and provided HEP for supine exercises; recommend PMR consult for acute rehab as from a PT perspective would be a great candidate with dc support from his wife who was present and supportive.     Plan    Continue current treatment plan.    DC Equipment Recommendations: Unable to determine at this time  Discharge Recommendations: Recommend post-acute placement for additional physical therapy services prior to discharge home      Abridged Subjective/Objective     11/13/21 1635   Cognition    Cognition / Consciousness WDL   Level of Consciousness Alert   Ability To Follow Commands 1 Step   Comments improved cognition from last session; wife at bedside; very appreciative; eager for rehab; cognition not tested past conversation;    Passive ROM Lower Body   Passive ROM Lower Body X   Comments right knee ~ 45 deg, empty end feel    Strength Lower Body   Lower Body Strength  X   Comments L: 4/5; R: DF 4/5, knee extension 3+/5, hamstring 4/5;    Sensation Lower Body   Lower Extremity Sensation   WDL   Comments denies t/mn   Supine Lower Body Exercise   Supine Lower Body Exercises Yes   Heel Slide Bilateral  (a few reps pre mobility )   Ankle Pumps 1 set of 10;Bilateral   Gluteal Isometrics Bilateral  (a few pre mobility )   Quadriceps Isometrics Bilateral  (a few pre mobility )   Comments discussed goal of each ex 30-45 at least; ankel pumps every 10 mins awake    Sitting Lower Body Exercises   Sitting Lower Body Exercises Yes   Long Arc Quad 1 set of 10;Bilateral   Comments seated forward  reach for low back stretch x 10 seconds holds forward; reaching over head for chest wall expansion    Balance   Sitting Balance (Static) Fair +   Sitting Balance (Dynamic) Fair   Standing Balance (Static) Fair -   Standing Balance (Dynamic) Poor +   Weight Shift Sitting Fair   Weight Shift Standing Poor   Skilled Intervention Postural Facilitation;Sequencing;Tactile Cuing;Verbal Cuing   Comments B UE support in sitting/standing; posterior losse sof balance requiring assist to shift to toes;    Gait Analysis   Gait Level Of Assist Minimal Assist   Comments able to take forward/backward and multiple side steps with facilitation of knee extension and stability of FWW    Bed Mobility    Supine to Sit Minimal Assist  (of 2 for LE management and trunk; raised HOB )   Sit to Supine Minimal Assist  (for LE management )   Functional Mobility   Sit to Stand Moderate Assist  (1st rep; 2nd rep min A with fWW )   Bed, Chair, Wheelchair Transfer Refused   Patient / Family Goals    Patient / Family Goal #1 to improve to home    Short Term Goals    Short Term Goal # 1 Pt will perform bed mobility with mod indep in 6 vistis.    Goal Outcome # 1 goal not met   Short Term Goal # 2 Pt will perform sit<>stand with fWW and supervision withi n6 visits to ensure independent mobility ath ome.    Goal Outcome # 2 Goal not met   Short Term Goal # 3 Pt will ambulate x 150ft with fWW and supervision within 6 visits to ensure independent mobility at home.    Education Group   Role of Physical Therapist Patient Response Patient;Acceptance;Explanation;Demonstration;Action Demonstration;Verbal Demonstration   Exercises - Supine Patient Response Patient;Acceptance;Demonstration;Explanation;Verbal Demonstration;Action Demonstration;Handout

## 2021-11-14 NOTE — PROGRESS NOTES
[facility] Medicine Daily Progress Note    Date of Service  11/14/2021    Chief Complaint  Zachary Moore is a 71 y.o. male admitted 11/2/2021 with UTE    Hospital Course  This is a 71-year-old gentleman who originally suffered a hip fracture and was treated at [facility].  During that hospitalization he was also treated for alcohol withdrawal and urosepsis.  From there he was discharged to rehab facility, and has since been transferred to us.  His previous medical history is also significant for hypertension, alcohol abuse, stage II chronic kidney disease..  Patient was sent to us from rehab facility in [place] after he was found to be hyponatremic and with acute kidney injury.  On arrival here, his sodium was 114, potassium 5.2, chloride 86, CO2 12, BUN 64, creatinine 6.10.  He has a previous diagnosis of stage II chronic kidney disease with a baseline creatinine around 1.0.  Patient was admitted to the hospital with a diagnosis of likely hypovolemic hyponatremia.  He was initially treated with fluid resuscitation, and corrected rapidly, however, he remained with very poor urine output and therefore nephrology has been consulted.    Interval Problem Update  Pt c/o of being sleepy this am but no other specific complaints    AFebrile  Sinus 70-80s  -160s    11/5. Sleepy but arousable. SLP couldn't do assessment because he complained of odynophagia. I clarified with him, He can still take PO and his odynophagia has been going on for 2 years on and off. I told SLP to see if we can reevaluate his swallowing again. CUrrently he is NPO anyway for catheter placement.  11/6. SLP recommended NPO but will reassess Saturday.  Family at bedside. Discussed with Speech. He did pass his test he can do thin liquids. I added supoplements. I ordered dietitian. Patient planned for dialysis cathter placement today.  11/7. Arousable. His Hb 7.8. He has occult positive stool. No active bleeding. I discussed with him  if he wants an GI eval/ endoscopy or colonoscopy but at this time he declines and would prefer to do it outpatient. I did say if his Hb drops <7 or if he has copious melena or bright red blood then we will have GI consulted. He agreed with the plan. I encouraged PO and supplements.      11/9.  Patient is getting dialysis today.  No acute issue overnight. we will get updated PT OT evaluation today.    11/10 no acute issue overnight.  Physiatry is following and no bed available today for now.    11/11: The patient stated that his orthopedic surgeon was Dr. Tello.  His kidney function is improving and I do not think he will need another hemodialysis at this point.  We will find out about his weightbearing status from Dr. Tello.    11/12: Patient is getting x-ray of his right femur today and Dr. Tello will give us recommendation about weightbearing status depending on the results of the x-ray.  Also will discuss with pharmacy about buprenorphine since we have do not have the medication.    11/13 no acute issue overnight.  Likely to rehab on Monday  I have personally seen and examined the patient at bedside. I discussed the plan of care with patient, bedside RN and pulmonary. And SLP    Consultants/Specialty  nephrology  critical care    Code Status  Full Code    Disposition  Patient is not medically cleared. SNF pending, waiting for renal biopsy results and Nephrology clearance.  Anticipate discharge to to skilled nursing facility.  I have placed the appropriate orders for post-discharge needs.    Review of Systems  Review of Systems   Constitutional: Positive for malaise/fatigue. Negative for chills.   HENT: Negative for nosebleeds.    Eyes: Negative for double vision and photophobia.   Respiratory: Negative for cough and shortness of breath.    Cardiovascular: Positive for leg swelling. Negative for chest pain and palpitations.   Gastrointestinal: Negative for abdominal pain and vomiting.   Genitourinary:  Negative for urgency.   Musculoskeletal: Negative for back pain.   Skin: Negative for rash.        Physical Exam  Temp:  [36.8 °C (98.2 °F)-37.1 °C (98.7 °F)] 36.8 °C (98.2 °F)  Pulse:  [] 117  Resp:  [16-19] 19  BP: (127-169)/(58-86) 169/81  SpO2:  [95 %-96 %] 95 %    Physical Exam  Constitutional:       Appearance: Normal appearance. He is well-developed.   HENT:      Head: Normocephalic and atraumatic.      Mouth/Throat:      Comments: NO odynophagia, out of proportion to exam.  Eyes:      Conjunctiva/sclera: Conjunctivae normal.   Neck:      Vascular: No JVD.   Cardiovascular:      Rate and Rhythm: Normal rate.      Pulses: Normal pulses.      Heart sounds: No murmur heard.      Pulmonary:      Effort: Pulmonary effort is normal. No respiratory distress.      Breath sounds: No stridor.   Abdominal:      Palpations: Abdomen is soft.      Tenderness: There is no abdominal tenderness.   Musculoskeletal:      Right lower leg: Edema present.      Left lower leg: Edema present.   Skin:     General: Skin is warm.      Coloration: Skin is pale.   Neurological:      General: No focal deficit present.      Mental Status: He is alert.         Fluids    Intake/Output Summary (Last 24 hours) at 11/14/2021 0730  Last data filed at 11/14/2021 0500  Gross per 24 hour   Intake --   Output 1275 ml   Net -1275 ml       Laboratory      Recent Labs     11/12/21  1105 11/13/21  0802 11/14/21  0542   SODIUM 139 136 134*   POTASSIUM 3.4* 3.2* 3.2*   CHLORIDE 96 91* 91*   CO2 30 31 31   GLUCOSE 112* 106* 100*   BUN 29* 30* 33*   CREATININE 1.80* 1.58* 1.48*   CALCIUM 8.2* 8.2* 8.3*                   Imaging  IR-CVC TUNNEL W/O PORT REMOVAL   Final Result      1. Removal of RIGHT internal jugular tunneled hemodialysis catheter.      DX-FEMUR-2+ RIGHT   Final Result      Comminuted distal femoral metadiaphysis fractures with lateral plate and screw fixation hardware in place      Incompletely visualized total knee arthroplasty       DX-WRIST-COMPLETE 3+ RIGHT   Final Result      1.  No evidence of acute fracture or dislocation.      2.  Prominent osteoarthritis involving the first carpometacarpal and triscaphe articulations. Less prominent osteoarthritis involves the first MCP and first IP joints.         DX-CHEST-PORTABLE (1 VIEW)   Final Result      1.  Possible minimal right pleural effusion.      2.  No focal pulmonary consolidation.      3.  New right IJV multilumen dialysis catheter in place.      IR-GUERRIER,GROSHONG PLACEMENT >5   Final Result      Successful image guided tunneled dialysis catheter placement.      Plan: The catheter is available for immediate use.            CT-NEEDLE BX-RENAL   Final Result      CT-guided core biopsy of the right kidney.      US-RENAL   Final Result      1.  BILATERAL pleural effusions   2.  Small volume of ascites   3.  Cholelithiasis and gallbladder sludge with gallbladder wall thickening incidentally noted. Cholecystitis is a consideration.      EC-ECHOCARDIOGRAM COMPLETE W/O CONT   Final Result      DX-CHEST-PORTABLE (1 VIEW)   Final Result      Mild basilar and apical opacity compatible with atelectasis or scarring although consolidation is not excluded      Effusions on recent CT are not detected radiographically      CT-ABDOMEN-PELVIS W/O   Final Result      Diffuse urinary bladder wall thickening with Guy catheter in place. This is suspicious for cystitis but the finding is nonspecific      No hydronephrosis or urolithiasis      Cholelithiasis      Impending diarrhea      Small to medium right, small left pleural effusions      5 mm mean diameter left lower lobe pulmonary nodule. Follow-up recommendations as below   Low Risk: No routine follow-up      High Risk: Optional CT at 12 months      Comments: Nodules less than 6 mm do not require routine follow-up, but certain patients at high risk with suspicious nodule morphology, upper lobe location, or both may warrant 12-month follow-up.       Low Risk - Minimal or absent history of smoking and of other known risk factors.      High Risk - History of smoking or of other known risk factors.      Note: These recommendations do not apply to lung cancer screening, patients with immunosuppression, or patients with known primary cancer.      Fleischner Society 2017 Guidelines for Management of Incidentally Detected Pulmonary Nodules in Adults         CT-HEAD W/O   Final Result      No noncontrast CT evidence of acute intracranial hemorrhage.      Severe white matter hypodensity is present.  This is a nonspecific finding which usually is found to represent chronic microvascular disease in patient's of this demographic.  Demyelination, age indeterminant ischemia and gliosis are also common    possibilities.      Moderate to severe parenchymal volume loss         US-FOREIGN FILM ULTRASOUND   Final Result           Assessment/Plan  * UTE (acute kidney injury)/hypotension, hyponatremia, s/p femoral ORIF, anemia/GI bleed/odynophagia (HCC)- (present on admission)  Assessment & Plan  Creatinine 1.48 today  Improving  Hemodialysis per nephrology  Maybe he is in recovery phase at this point  No need for hemodialysis at this point        Occult blood positive stool  Assessment & Plan  He has seen [facility] in the past for routine colonoscopy severalyears ago.  At this time he does not want endoscopy/colonoscopy inpatient but will revisit if he has actual BRBPR or melena,or Hb drops to <7.  Updated Nephrology  11/8. Because of poor PO intake/odynophagia  GI recommended follow up    Anemia  Assessment & Plan  Ordered occult stool  Ordered iron studies  Currently no active bleeding.  Occult positive  At this time patient prefers GI work-up outpatient  May need epoietin, updated Nephrology  11/8. Consulted Dr. Khoury, GI for odynophagia/poor PO intake, thus will also evaluate UGI/anemia.  Recommended follow up outpatient    Dehydration  Assessment & Plan  Has been fluid  "resuscitated  Secondary to diarrhea      Encephalopathy acute- (present on admission)  Assessment & Plan  2/2 UTE vs. Urosepsis vs. Acute hyponatremia  Improving  CT head neg for acute findings    Sepsis due to Escherichia coli with acute renal failure without septic shock (HCC)  Assessment & Plan  NOT sepsis  Cultures neg  Only given one dose of ABx's on presentation          Mass of urinary bladder- (present on admission)  Assessment & Plan  Based on imaging from [facility]  Repeat CT Abd/Plvs and renal US do not show any mass    Jeana-prosthetic femoral shaft fracture- (present on admission)  Assessment & Plan  S/p ORIF by Dr. Tello  Pain control  Brace present  PT/OT consults\"    Weight bearing per ortho recommendation      Alcohol use disorder, moderate, dependence (HCC)- (present on admission)  Assessment & Plan  Alcohol level ordered  Last drink reported 5 days prior by patient  Monitor for signs of withdrawal    Acute cystitis with hematuria- (present on admission)  Assessment & Plan  Treated for sepsis 1 week ago for Bacteremia & UTI treated with zosyn & Unasyn and d/c on cipro 750mg BID for 9 more doses and Flagyl 500mg TID x 16 days growing Resistant Ecoli.  Urine cultures here are neg  US at [facility] showing possible mass however CT Abd/Plvs and renal US both neg for mass here  Cont to monitor off Abx's  Catheter change w/in 48hrs of admission\"  Repeat UA + for UTI but he is on chronic Guy catheter  Monitor closely  We will hold off on antibiotic at this point        Acute hyponatremia- (present on admission)  Assessment & Plan  Resolved        Elevated liver enzymes- (present on admission)  Assessment & Plan  Trend  Appears chronic  Monitor      Hypertension- (present on admission)  Assessment & Plan  On lisinopril 10 as an outpt which was stopped due to hypotension and UTE  Start amlodipine 5mg    Vitals:    11/08/21 0843   BP: 153/81   Pulse: 100   Resp: 14   Temp: 36.9 °C (98.4 °F)   SpO2: 93%     Add " hydralazine  Continue amlodipine  ACEI held bec of elevated Cr-       VTE prophylaxis: heparin ppx    I have performed a physical exam and reviewed and updated ROS and Plan today (11/14/2021). In review of yesterday's note (11/13/2021), there are no changes except as documented above.

## 2021-11-14 NOTE — PROGRESS NOTES
COVID19 surge in effect.    HD port removed 11/13. Pt on RA. Pt A&Ox2-3 pt could not recall time, situation was partial. Pt no complaints of pain. Bed alarm on, call light in reach.

## 2021-11-14 NOTE — CARE PLAN
The patient is Watcher - Medium risk of patient condition declining or worsening    Shift Goals  Clinical Goals: BP management   Patient Goals: rest, rehab  Family Goals: n/a    Progress made toward(s) clinical / shift goals:  BP at baseline for Pt.     Patient is not progressing towards the following goals:

## 2021-11-14 NOTE — PROGRESS NOTES
Nephrology Daily Progress Note    Date of Service  11/14/2021    Chief Complaint  Zachary Moore is a 71 y.o. male with CKD II, recently hospitalized with RLE femur fx, urosepsis, admitted 11/2/2021 with UTE, hyponatremia    Interval Problem Update  11/4 -no acute events, no new complaints  UTE -creat at 6's  oliguric  Low C3 C4 -scheduled kidney biopsy  ABDULAZIZ, ANCA -pending  11/7 -doing well, no complaints  S/p HD x 2 tolerated well  S/p diagnostic kidney biopsy -results pending  UOP slightly better  11/8 patient is doing slightly better, still very weak  Seen and examined while getting HD.  Kidney biopsy showed ATN  11/9 patient is doing better today, no chest pain or shortness of breath  Seen and examined while getting HD.  11/10 pt is doing better, no CP, no SOB  11/11 patient feels better today, no new complaints   good urine output  11/12 patient is doing better, no chest pain no shortness of breath  11/13 patient is doing better, hemodialysis catheter was removed  11/14 pt has no new complaints, awaiting rehab placement  Code Status  Full Code      Review of Systems  Review of Systems   Constitutional: Negative for chills, fever and malaise/fatigue.   Respiratory: Negative for cough and shortness of breath.    Cardiovascular: Negative for chest pain and leg swelling.   Gastrointestinal: Negative for nausea and vomiting.   Genitourinary: Negative for dysuria, frequency and urgency.        Physical Exam  Temp:  [36.8 °C (98.2 °F)-37.1 °C (98.7 °F)] 36.8 °C (98.2 °F)  Pulse:  [] 100  Resp:  [18-19] 18  BP: (127-169)/(53-86) 134/53  SpO2:  [95 %-97 %] 97 %    Physical Exam  Vitals and nursing note reviewed.   Constitutional:       General: He is not in acute distress.     Appearance: He is not ill-appearing.   HENT:      Head: Normocephalic and atraumatic.      Right Ear: External ear normal.      Left Ear: External ear normal.      Nose: Nose normal.   Eyes:      General:         Right eye: No discharge.          Left eye: No discharge.      Conjunctiva/sclera: Conjunctivae normal.   Cardiovascular:      Rate and Rhythm: Normal rate and regular rhythm.      Heart sounds: No murmur heard.      Pulmonary:      Effort: Pulmonary effort is normal. No respiratory distress.      Breath sounds: Normal breath sounds. No wheezing.   Musculoskeletal:         General: No tenderness or deformity.      Right lower leg: No edema.      Left lower leg: No edema.   Skin:     General: Skin is warm.   Neurological:      General: No focal deficit present.      Mental Status: He is alert and oriented to person, place, and time.   Psychiatric:         Mood and Affect: Mood normal.         Behavior: Behavior normal.         Fluids    Intake/Output Summary (Last 24 hours) at 11/14/2021 1325  Last data filed at 11/14/2021 0500  Gross per 24 hour   Intake --   Output 775 ml   Net -775 ml       Laboratory      Recent Labs     11/12/21  1105 11/13/21  0802 11/14/21  0542   SODIUM 139 136 134*   POTASSIUM 3.4* 3.2* 3.2*   CHLORIDE 96 91* 91*   CO2 30 31 31   GLUCOSE 112* 106* 100*   BUN 29* 30* 33*   CREATININE 1.80* 1.58* 1.48*   CALCIUM 8.2* 8.2* 8.3*                   Imaging  IR-CVC TUNNEL W/O PORT REMOVAL   Final Result      1. Removal of RIGHT internal jugular tunneled hemodialysis catheter.      DX-FEMUR-2+ RIGHT   Final Result      Comminuted distal femoral metadiaphysis fractures with lateral plate and screw fixation hardware in place      Incompletely visualized total knee arthroplasty      DX-WRIST-COMPLETE 3+ RIGHT   Final Result      1.  No evidence of acute fracture or dislocation.      2.  Prominent osteoarthritis involving the first carpometacarpal and triscaphe articulations. Less prominent osteoarthritis involves the first MCP and first IP joints.         DX-CHEST-PORTABLE (1 VIEW)   Final Result      1.  Possible minimal right pleural effusion.      2.  No focal pulmonary consolidation.      3.  New right IJV multilumen dialysis  catheter in place.      IR-GUERRIER,GROSHJUAN ANTONIO PLACEMENT >5   Final Result      Successful image guided tunneled dialysis catheter placement.      Plan: The catheter is available for immediate use.            CT-NEEDLE BX-RENAL   Final Result      CT-guided core biopsy of the right kidney.      US-RENAL   Final Result      1.  BILATERAL pleural effusions   2.  Small volume of ascites   3.  Cholelithiasis and gallbladder sludge with gallbladder wall thickening incidentally noted. Cholecystitis is a consideration.      EC-ECHOCARDIOGRAM COMPLETE W/O CONT   Final Result      DX-CHEST-PORTABLE (1 VIEW)   Final Result      Mild basilar and apical opacity compatible with atelectasis or scarring although consolidation is not excluded      Effusions on recent CT are not detected radiographically      CT-ABDOMEN-PELVIS W/O   Final Result      Diffuse urinary bladder wall thickening with Guy catheter in place. This is suspicious for cystitis but the finding is nonspecific      No hydronephrosis or urolithiasis      Cholelithiasis      Impending diarrhea      Small to medium right, small left pleural effusions      5 mm mean diameter left lower lobe pulmonary nodule. Follow-up recommendations as below   Low Risk: No routine follow-up      High Risk: Optional CT at 12 months      Comments: Nodules less than 6 mm do not require routine follow-up, but certain patients at high risk with suspicious nodule morphology, upper lobe location, or both may warrant 12-month follow-up.      Low Risk - Minimal or absent history of smoking and of other known risk factors.      High Risk - History of smoking or of other known risk factors.      Note: These recommendations do not apply to lung cancer screening, patients with immunosuppression, or patients with known primary cancer.      Fleischner Society 2017 Guidelines for Management of Incidentally Detected Pulmonary Nodules in Adults         CT-HEAD W/O   Final Result      No noncontrast CT  evidence of acute intracranial hemorrhage.      Severe white matter hypodensity is present.  This is a nonspecific finding which usually is found to represent chronic microvascular disease in patient's of this demographic.  Demyelination, age indeterminant ischemia and gliosis are also common    possibilities.      Moderate to severe parenchymal volume loss         US-FOREIGN FILM ULTRASOUND   Final Result            Assessment/Plan  1.UTE/CKD : improved  2.HTN: BP well controlled  3.hyponatremia: Better   4.Anemia.  5.Metabolic acidosis: Resolved  6. Volume overload: Better  7 hypokalemia  no need for HD  Replace K  Renal diet  Daily labs  Renal dose all meds  Avoid nephrotoxins  Prognosis guarded.  We will sign off the case, please call if needed

## 2021-11-14 NOTE — THERAPY
Occupational Therapy  Daily Treatment     Patient Name: Zachary Moore  Age:  71 y.o., Sex:  male  Medical Record #: [id number]  Today's Date: 11/13/2021     Precautions  Precautions: Fall Risk,Weight Bearing As Tolerated Right Lower Extremity  Comments: 10/13: R femur ORIF done [place], has been in hospital or at SNF since then    Assessment    Pt seen for OT tx today, pt now progressed to WBAT and was able to come to stand and tolerate few steps eob with min a using FWW, UB ADLs sba/supervision after s/u, LB ADLs mod to max a. Pt demonstrating improved activity tolerance, safety awareness and cognition compared to initial eval. Will continue to benefit from acute OT services and post acute placement recommended at this time.     Plan    Continue current treatment plan.    DC Equipment Recommendations: Unable to determine at this time  Discharge Recommendations: Recommend post-acute placement for additional occupational therapy services prior to discharge home     Objective       11/13/21 1633   Cognition    Cognition / Consciousness X   Level of Consciousness Alert   Ability To Follow Commands 1 Step   Comments demonstrates significantly improved cognition and command following compared to initial evaluation   Other Treatments   Other Treatments Provided Pt and spouse educated on updated wb status, discussed post acute rehab and expectation. Both pt and spouse excited about the progress pt has made while in house and looking forward to going to post acute rehab   Balance   Sitting Balance (Static) Fair +   Sitting Balance (Dynamic) Fair   Standing Balance (Static) Fair -   Standing Balance (Dynamic) Poor +   Weight Shift Sitting Fair   Weight Shift Standing Poor   Skilled Intervention Verbal Cuing;Tactile Cuing;Sequencing   Comments w/FWW   Bed Mobility    Supine to Sit Moderate Assist   Sit to Supine Moderate Assist   Scooting Moderate Assist   Rolling Minimal Assist to Rt.   Skilled Intervention Verbal  Cuing;Tactile Cuing;Sequencing   Comments raised hob   Activities of Daily Living   Eating Supervision   Grooming Supervision;Seated   Bathing   (NT)   Upper Body Dressing Minimal Assist   Lower Body Dressing Maximal Assist  (for socks)   Toileting Moderate Assist   Skilled Intervention Verbal Cuing;Tactile Cuing;Sequencing;Compensatory Strategies   Functional Mobility   Sit to Stand Minimal Assist   Bed, Chair, Wheelchair Transfer Refused   Toilet Transfers Refused   Mobility bed mobility, STS eob. tolerate few steps eob w/ min a   Comments w/FWW   Short Term Goals   Short Term Goal # 1 Pt will perform functional t/f's with min a while adhering to TTWB precautions   Goal Outcome # 1 Progressing as expected   Short Term Goal # 2 Pt will perform LB dressing with min a using AE    Goal Outcome # 2 Progressing as expected   Short Term Goal # 3 Pt will perform toileting task with min a   Goal Outcome # 3 Progressing as expected   Anticipated Discharge Equipment and Recommendations   DC Equipment Recommendations Unable to determine at this time

## 2021-11-15 LAB
ALBUMIN SERPL BCP-MCNC: 3 G/DL (ref 3.2–4.9)
BUN SERPL-MCNC: 32 MG/DL (ref 8–22)
CALCIUM SERPL-MCNC: 8.2 MG/DL (ref 8.5–10.5)
CHLORIDE SERPL-SCNC: 91 MMOL/L (ref 96–112)
CO2 SERPL-SCNC: 33 MMOL/L (ref 20–33)
CREAT SERPL-MCNC: 1.37 MG/DL (ref 0.5–1.4)
GLUCOSE SERPL-MCNC: 99 MG/DL (ref 65–99)
PHOSPHATE SERPL-MCNC: 4.1 MG/DL (ref 2.5–4.5)
POTASSIUM SERPL-SCNC: 2.9 MMOL/L (ref 3.6–5.5)
SODIUM SERPL-SCNC: 134 MMOL/L (ref 135–145)

## 2021-11-15 PROCEDURE — A9270 NON-COVERED ITEM OR SERVICE: HCPCS | Performed by: STUDENT IN AN ORGANIZED HEALTH CARE EDUCATION/TRAINING PROGRAM

## 2021-11-15 PROCEDURE — A9270 NON-COVERED ITEM OR SERVICE: HCPCS | Performed by: NURSE PRACTITIONER

## 2021-11-15 PROCEDURE — 700102 HCHG RX REV CODE 250 W/ 637 OVERRIDE(OP): Performed by: INTERNAL MEDICINE

## 2021-11-15 PROCEDURE — 770006 HCHG ROOM/CARE - MED/SURG/GYN SEMI*

## 2021-11-15 PROCEDURE — 700102 HCHG RX REV CODE 250 W/ 637 OVERRIDE(OP): Performed by: NURSE PRACTITIONER

## 2021-11-15 PROCEDURE — 36415 COLL VENOUS BLD VENIPUNCTURE: CPT

## 2021-11-15 PROCEDURE — 700102 HCHG RX REV CODE 250 W/ 637 OVERRIDE(OP): Performed by: STUDENT IN AN ORGANIZED HEALTH CARE EDUCATION/TRAINING PROGRAM

## 2021-11-15 PROCEDURE — A9270 NON-COVERED ITEM OR SERVICE: HCPCS | Performed by: INTERNAL MEDICINE

## 2021-11-15 PROCEDURE — 80069 RENAL FUNCTION PANEL: CPT

## 2021-11-15 PROCEDURE — 700111 HCHG RX REV CODE 636 W/ 250 OVERRIDE (IP): Performed by: INTERNAL MEDICINE

## 2021-11-15 PROCEDURE — 99232 SBSQ HOSP IP/OBS MODERATE 35: CPT | Performed by: INTERNAL MEDICINE

## 2021-11-15 RX ORDER — POTASSIUM CHLORIDE 20 MEQ/1
40 TABLET, EXTENDED RELEASE ORAL ONCE
Status: COMPLETED | OUTPATIENT
Start: 2021-11-15 | End: 2021-11-15

## 2021-11-15 RX ORDER — POTASSIUM CHLORIDE 20 MEQ/1
40 TABLET, EXTENDED RELEASE ORAL DAILY
Status: DISCONTINUED | OUTPATIENT
Start: 2021-11-16 | End: 2021-11-15

## 2021-11-15 RX ADMIN — POTASSIUM CHLORIDE 40 MEQ: 1500 TABLET, EXTENDED RELEASE ORAL at 21:56

## 2021-11-15 RX ADMIN — OMEPRAZOLE 20 MG: 20 CAPSULE, DELAYED RELEASE ORAL at 04:59

## 2021-11-15 RX ADMIN — OMEPRAZOLE 20 MG: 20 CAPSULE, DELAYED RELEASE ORAL at 17:42

## 2021-11-15 RX ADMIN — HEPARIN SODIUM 5000 UNITS: 5000 INJECTION, SOLUTION INTRAVENOUS; SUBCUTANEOUS at 15:08

## 2021-11-15 RX ADMIN — HYDRALAZINE HYDROCHLORIDE 25 MG: 50 TABLET ORAL at 05:00

## 2021-11-15 RX ADMIN — Medication 400 MG: at 21:56

## 2021-11-15 RX ADMIN — HYDRALAZINE HYDROCHLORIDE 25 MG: 50 TABLET ORAL at 21:56

## 2021-11-15 RX ADMIN — BUPRENORPHINE HCL 2 MG: 2 TABLET SUBLINGUAL at 09:01

## 2021-11-15 RX ADMIN — BUPRENORPHINE HCL 2 MG: 2 TABLET SUBLINGUAL at 15:08

## 2021-11-15 RX ADMIN — SODIUM BICARBONATE 1300 MG: 650 TABLET ORAL at 21:56

## 2021-11-15 RX ADMIN — ACETAMINOPHEN 1000 MG: 500 TABLET ORAL at 17:42

## 2021-11-15 RX ADMIN — SODIUM BICARBONATE 1300 MG: 650 TABLET ORAL at 09:01

## 2021-11-15 RX ADMIN — BUPRENORPHINE HCL 2 MG: 2 TABLET SUBLINGUAL at 21:56

## 2021-11-15 RX ADMIN — SODIUM BICARBONATE 1300 MG: 650 TABLET ORAL at 15:08

## 2021-11-15 RX ADMIN — ACETAMINOPHEN 1000 MG: 500 TABLET ORAL at 09:07

## 2021-11-15 RX ADMIN — AMLODIPINE BESYLATE 10 MG: 10 TABLET ORAL at 04:59

## 2021-11-15 RX ADMIN — HYDRALAZINE HYDROCHLORIDE 25 MG: 50 TABLET ORAL at 15:07

## 2021-11-15 ASSESSMENT — ENCOUNTER SYMPTOMS
PHOTOPHOBIA: 0
CHILLS: 0
DOUBLE VISION: 0
VOMITING: 0
PALPITATIONS: 0
ABDOMINAL PAIN: 0
COUGH: 0
BACK PAIN: 0
SHORTNESS OF BREATH: 0

## 2021-11-15 ASSESSMENT — PAIN DESCRIPTION - PAIN TYPE: TYPE: ACUTE PAIN;CHRONIC PAIN

## 2021-11-15 NOTE — PROGRESS NOTES
Received bedside report and assumed care of pt at change of shift. Pt is resting in bed states pain in left ankle and medicated per MAR. Discussed POC for the day and pt had no questions at this time. Bed is locked and in lowest position with water and call light within reach.      *COVID 19 surge in effect

## 2021-11-15 NOTE — CARE PLAN
Problem: Nutritional:  Goal: Achieve adequate nutritional intake  Description: Patient will consume >50% of meals with >75% supplements once diet advanced.  Outcome: Progressing    Per RN, pt eats well when wife present, % of breakfast and lunch. Reported PO intake at dinners when pt's wife is not present: 25-50% per RN. No Boosts sent d/t pt dislikes, as previously noted in 11/12 RD note. Added Chobani yogurt smoothies BID.

## 2021-11-15 NOTE — CARE PLAN
The patient is Stable - Low risk of patient condition declining or worsening    Shift Goals  Clinical Goals: pain management  Patient Goals: rest  Family Goals: n/a    Progress made toward(s) clinical / shift goals:  pts pain has been managed with tylenol and ice applied to area of pain. Pt states having good rest for the day     Patient is not progressing towards the following goals:

## 2021-11-15 NOTE — DISCHARGE PLANNING
Patient is still receiving Subutex which is a barrier to coming to [facility]. Per medical director, d/t Subutex's long half life will need to monitor patient's pain tolerance for at least 24 hours after it is discontinued prior to coming to [facility]. Notified attending and cm. TCC to remain available.

## 2021-11-15 NOTE — PROGRESS NOTES
Taye talked to Dr. Mario from rehab about the patient transfer.  He stated that they do not take any patient who is on buprenorphine and that is the policy.  I requested whether the patient can bring his own medication and he stated no.

## 2021-11-15 NOTE — DISCHARGE PLANNING
Anticipated Discharge Disposition: Skilled    Action: SW spoke to patient and spouse about [facility] declining and why.  Sw provided list of local skilled facilities to review.  Spouse stated that she knows someone at the [facility] and they have several swing beds.  She will contact them to see if patient can go there.  Spouse stated if not will provide a choice of what facility to send referral.  SW requested at least three.     Barriers to Discharge: medical clearance/skilled acceptance    Plan: continue to monitor for discharge barriers

## 2021-11-15 NOTE — PROGRESS NOTES
Received bedside report and assumed care of pt at change of shift. Pt is resting in bed states pain in left ankle and medicated per MAR. Discussed POC for the day and pt had no questions at this time. Bed is locked and in lowest position with water and call light within reach.     COVID 19 surge in effect*

## 2021-11-15 NOTE — PROGRESS NOTES
Contacted MD Khan with regards to pts potassium of 2.9. Dr. Khan stated ok, no new orders placed.

## 2021-11-15 NOTE — CARE PLAN
The patient is Watcher - Medium risk of patient condition declining or worsening    Shift Goals  Clinical Goals: BP management  Patient Goals: rest  Family Goals: n/a    Progress made toward(s) clinical / shift goals:  Pt medications administered, BP evaluated periodically.      Patient is not progressing towards the following goals:

## 2021-11-15 NOTE — PROGRESS NOTES
[facility] Medicine Daily Progress Note    Date of Service  11/15/2021    Chief Complaint  Zachary Moore is a 71 y.o. male admitted 11/2/2021 with UTE    Hospital Course  This is a 71-year-old gentleman who originally suffered a hip fracture and was treated at [facility].  During that hospitalization he was also treated for alcohol withdrawal and urosepsis.  From there he was discharged to rehab facility, and has since been transferred to us.  His previous medical history is also significant for hypertension, alcohol abuse, stage II chronic kidney disease..  Patient was sent to us from rehab facility in [place] after he was found to be hyponatremic and with acute kidney injury.  On arrival here, his sodium was 114, potassium 5.2, chloride 86, CO2 12, BUN 64, creatinine 6.10.  He has a previous diagnosis of stage II chronic kidney disease with a baseline creatinine around 1.0.  Patient was admitted to the hospital with a diagnosis of likely hypovolemic hyponatremia.  He was initially treated with fluid resuscitation, and corrected rapidly, however, he remained with very poor urine output and therefore nephrology has been consulted.    Interval Problem Update  Pt c/o of being sleepy this am but no other specific complaints    AFebrile  Sinus 70-80s  -160s    11/5. Sleepy but arousable. SLP couldn't do assessment because he complained of odynophagia. I clarified with him, He can still take PO and his odynophagia has been going on for 2 years on and off. I told SLP to see if we can reevaluate his swallowing again. CUrrently he is NPO anyway for catheter placement.  11/6. SLP recommended NPO but will reassess Saturday.  Family at bedside. Discussed with Speech. He did pass his test he can do thin liquids. I added supoplements. I ordered dietitian. Patient planned for dialysis cathter placement today.  11/7. Arousable. His Hb 7.8. He has occult positive stool. No active bleeding. I discussed with him  if he wants an GI eval/ endoscopy or colonoscopy but at this time he declines and would prefer to do it outpatient. I did say if his Hb drops <7 or if he has copious melena or bright red blood then we will have GI consulted. He agreed with the plan. I encouraged PO and supplements.      11/9.  Patient is getting dialysis today.  No acute issue overnight. we will get updated PT OT evaluation today.    11/10 no acute issue overnight.  Physiatry is following and no bed available today for now.    11/11: The patient stated that his orthopedic surgeon was Dr. Tello.  His kidney function is improving and I do not think he will need another hemodialysis at this point.  We will find out about his weightbearing status from Dr. Tello.    11/12: Patient is getting x-ray of his right femur today and Dr. Tello will give us recommendation about weightbearing status depending on the results of the x-ray.  Also will discuss with pharmacy about buprenorphine since we have do not have the medication.    11/13 no acute issue overnight.  Likely to rehab on Monday    11/15 no acute issue overnight.  He is feeling better and better each day.  Plan [facility] but due to his buprenorphine which they do not carry, they could not take the patient.  I will reach out to hospitalist at rehab and see whether he can bring his own medication for that.  I have personally seen and examined the patient at bedside. I discussed the plan of care with patient, bedside RN and pulmonary. And SLP    Consultants/Specialty  nephrology  critical care    Code Status  Full Code    Disposition  Patient is not medically cleared. SNF pending, waiting for renal biopsy results and Nephrology clearance.  Anticipate discharge to to skilled nursing facility.  I have placed the appropriate orders for post-discharge needs.    Review of Systems  Review of Systems   Constitutional: Positive for malaise/fatigue. Negative for chills.   HENT:  Negative for nosebleeds.    Eyes: Negative for double vision and photophobia.   Respiratory: Negative for cough and shortness of breath.    Cardiovascular: Positive for leg swelling. Negative for palpitations.   Gastrointestinal: Negative for abdominal pain and vomiting.   Genitourinary: Negative for urgency.   Musculoskeletal: Negative for back pain.   Skin: Negative for rash.        Physical Exam  Temp:  [36.3 °C (97.4 °F)-36.8 °C (98.2 °F)] 36.3 °C (97.4 °F)  Pulse:  [] 92  Resp:  [16-18] 16  BP: (133-141)/(53-66) 141/65  SpO2:  [91 %-97 %] 92 %    Physical Exam  Constitutional:       Appearance: Normal appearance. He is well-developed.   HENT:      Head: Normocephalic and atraumatic.      Mouth/Throat:      Comments: NO odynophagia, out of proportion to exam.  Eyes:      Conjunctiva/sclera: Conjunctivae normal.   Neck:      Vascular: No JVD.   Cardiovascular:      Rate and Rhythm: Normal rate.      Pulses: Normal pulses.   Pulmonary:      Effort: Pulmonary effort is normal. No respiratory distress.      Breath sounds: No stridor.   Abdominal:      Palpations: Abdomen is soft.      Tenderness: There is no abdominal tenderness.   Musculoskeletal:      Right lower leg: Edema present.      Left lower leg: Edema present.   Skin:     General: Skin is warm.      Coloration: Skin is pale.   Neurological:      General: No focal deficit present.      Mental Status: He is alert.         Fluids    Intake/Output Summary (Last 24 hours) at 11/15/2021 0809  Last data filed at 11/14/2021 2300  Gross per 24 hour   Intake --   Output 200 ml   Net -200 ml       Laboratory      Recent Labs     11/13/21  0802 11/14/21  0542 11/15/21  0605   SODIUM 136 134* 134*   POTASSIUM 3.2* 3.2* 2.9*   CHLORIDE 91* 91* 91*   CO2 31 31 33   GLUCOSE 106* 100* 99   BUN 30* 33* 32*   CREATININE 1.58* 1.48* 1.37   CALCIUM 8.2* 8.3* 8.2*                   Imaging  IR-CVC TUNNEL W/O PORT REMOVAL   Final Result      1. Removal of RIGHT internal  jugular tunneled hemodialysis catheter.      DX-FEMUR-2+ RIGHT   Final Result      Comminuted distal femoral metadiaphysis fractures with lateral plate and screw fixation hardware in place      Incompletely visualized total knee arthroplasty      DX-WRIST-COMPLETE 3+ RIGHT   Final Result      1.  No evidence of acute fracture or dislocation.      2.  Prominent osteoarthritis involving the first carpometacarpal and triscaphe articulations. Less prominent osteoarthritis involves the first MCP and first IP joints.         DX-CHEST-PORTABLE (1 VIEW)   Final Result      1.  Possible minimal right pleural effusion.      2.  No focal pulmonary consolidation.      3.  New right IJV multilumen dialysis catheter in place.      IR-GUERRIER,GROSHEREE PLACEMENT >5   Final Result      Successful image guided tunneled dialysis catheter placement.      Plan: The catheter is available for immediate use.            CT-NEEDLE BX-RENAL   Final Result      CT-guided core biopsy of the right kidney.      US-RENAL   Final Result      1.  BILATERAL pleural effusions   2.  Small volume of ascites   3.  Cholelithiasis and gallbladder sludge with gallbladder wall thickening incidentally noted. Cholecystitis is a consideration.      EC-ECHOCARDIOGRAM COMPLETE W/O CONT   Final Result      DX-CHEST-PORTABLE (1 VIEW)   Final Result      Mild basilar and apical opacity compatible with atelectasis or scarring although consolidation is not excluded      Effusions on recent CT are not detected radiographically      CT-ABDOMEN-PELVIS W/O   Final Result      Diffuse urinary bladder wall thickening with Guy catheter in place. This is suspicious for cystitis but the finding is nonspecific      No hydronephrosis or urolithiasis      Cholelithiasis      Impending diarrhea      Small to medium right, small left pleural effusions      5 mm mean diameter left lower lobe pulmonary nodule. Follow-up recommendations as below   Low Risk: No routine follow-up       High Risk: Optional CT at 12 months      Comments: Nodules less than 6 mm do not require routine follow-up, but certain patients at high risk with suspicious nodule morphology, upper lobe location, or both may warrant 12-month follow-up.      Low Risk - Minimal or absent history of smoking and of other known risk factors.      High Risk - History of smoking or of other known risk factors.      Note: These recommendations do not apply to lung cancer screening, patients with immunosuppression, or patients with known primary cancer.      Fleischner Society 2017 Guidelines for Management of Incidentally Detected Pulmonary Nodules in Adults         CT-HEAD W/O   Final Result      No noncontrast CT evidence of acute intracranial hemorrhage.      Severe white matter hypodensity is present.  This is a nonspecific finding which usually is found to represent chronic microvascular disease in patient's of this demographic.  Demyelination, age indeterminant ischemia and gliosis are also common    possibilities.      Moderate to severe parenchymal volume loss         US-FOREIGN FILM ULTRASOUND   Final Result           Assessment/Plan  * UTE (acute kidney injury)/hypotension, hyponatremia, s/p femoral ORIF, anemia/GI bleed/odynophagia (HCC)- (present on admission)  Assessment & Plan  Creatinine 1.37 today  Improving  Maybe he is in recovery phase at this point  No need for hemodialysis at this point        Occult blood positive stool  Assessment & Plan  He has seen [facility] in the past for routine colonoscopy severalyears ago.  At this time he does not want endoscopy/colonoscopy inpatient but will revisit if he has actual BRBPR or melena,or Hb drops to <7.  Updated Nephrology  11/8. Because of poor PO intake/odynophagia  GI recommended follow up    Anemia  Assessment & Plan  Ordered occult stool  Ordered iron studies  Currently no active bleeding.  Occult positive  At this time patient prefers GI work-up outpatient  May need  "curt, updated Nephrology  11/8. Consulted Dr. Khoury, GI for odynophagia/poor PO intake, thus will also evaluate UGI/anemia.  Recommended follow up outpatient    Dehydration  Assessment & Plan  Has been fluid resuscitated  Secondary to diarrhea      Encephalopathy acute- (present on admission)  Assessment & Plan  2/2 UTE vs. Urosepsis vs. Acute hyponatremia  Improving  CT head neg for acute findings    Sepsis due to Escherichia coli with acute renal failure without septic shock (HCC)  Assessment & Plan  NOT sepsis  Cultures neg  Only given one dose of ABx's on presentation          Mass of urinary bladder- (present on admission)  Assessment & Plan  Based on imaging from [facility]  Repeat CT Abd/Plvs and renal US do not show any mass    Jeana-prosthetic femoral shaft fracture- (present on admission)  Assessment & Plan  S/p ORIF by Dr. Tello  Pain control  Brace present  PT/OT consults\"    Weight bearing per ortho recommendation      Alcohol use disorder, moderate, dependence (HCC)- (present on admission)  Assessment & Plan  Alcohol level ordered  Last drink reported 5 days prior by patient  Monitor for signs of withdrawal    Acute cystitis with hematuria- (present on admission)  Assessment & Plan  Treated for sepsis 1 week ago for Bacteremia & UTI treated with zosyn & Unasyn and d/c on cipro 750mg BID for 9 more doses and Flagyl 500mg TID x 16 days growing Resistant Ecoli.  Urine cultures here are neg  US at [facility] showing possible mass however CT Abd/Plvs and renal US both neg for mass here  Cont to monitor off Abx's  Catheter change w/in 48hrs of admission\"  Repeat UA + for UTI but he is on chronic Guy catheter  Monitor closely  We will hold off on antibiotic at this point        Acute hyponatremia- (present on admission)  Assessment & Plan  Resolved        Elevated liver enzymes- (present on admission)  Assessment & Plan  Trend  Appears chronic  Monitor      Hypertension- (present on admission)  Assessment & " Plan  On lisinopril 10 as an outpt which was stopped due to hypotension and UTE  Start amlodipine 5mg    Vitals:    11/08/21 0843   BP: 153/81   Pulse: 100   Resp: 14   Temp: 36.9 °C (98.4 °F)   SpO2: 93%     Add hydralazine  Continue amlodipine  ACEI held bec of elevated Cr-       VTE prophylaxis: heparin ppx    I have performed a physical exam and reviewed and updated ROS and Plan today (11/15/2021). In review of yesterday's note (11/14/2021), there are no changes except as documented above.

## 2021-11-15 NOTE — CARE PLAN
The patient is Stable - Low risk of patient condition declining or worsening    Shift Goals  Clinical Goals: eat>50% of meals   Patient Goals: rest  Family Goals: n/a    Progress made toward(s) clinical / shift goals: pt ate about 75% of meals so far     Patient is not progressing towards the following goals:

## 2021-11-15 NOTE — DISCHARGE PLANNING
Per ip sw, now exploring SNF options as theres no plan to discontinue the Subutex. TCC to longer follow. Please call with any questions, [phone].

## 2021-11-16 LAB
ALBUMIN SERPL BCP-MCNC: 2.8 G/DL (ref 3.2–4.9)
BUN SERPL-MCNC: 27 MG/DL (ref 8–22)
CALCIUM SERPL-MCNC: 8.4 MG/DL (ref 8.5–10.5)
CHLORIDE SERPL-SCNC: 92 MMOL/L (ref 96–112)
CO2 SERPL-SCNC: 30 MMOL/L (ref 20–33)
CREAT SERPL-MCNC: 1.17 MG/DL (ref 0.5–1.4)
GLUCOSE SERPL-MCNC: 96 MG/DL (ref 65–99)
MAGNESIUM SERPL-MCNC: 1.3 MG/DL (ref 1.5–2.5)
PHOSPHATE SERPL-MCNC: 3.6 MG/DL (ref 2.5–4.5)
POTASSIUM SERPL-SCNC: 3.2 MMOL/L (ref 3.6–5.5)
SODIUM SERPL-SCNC: 135 MMOL/L (ref 135–145)

## 2021-11-16 PROCEDURE — 700111 HCHG RX REV CODE 636 W/ 250 OVERRIDE (IP): Performed by: INTERNAL MEDICINE

## 2021-11-16 PROCEDURE — A9270 NON-COVERED ITEM OR SERVICE: HCPCS | Performed by: INTERNAL MEDICINE

## 2021-11-16 PROCEDURE — 80069 RENAL FUNCTION PANEL: CPT

## 2021-11-16 PROCEDURE — 83735 ASSAY OF MAGNESIUM: CPT

## 2021-11-16 PROCEDURE — 770006 HCHG ROOM/CARE - MED/SURG/GYN SEMI*

## 2021-11-16 PROCEDURE — 700102 HCHG RX REV CODE 250 W/ 637 OVERRIDE(OP): Performed by: INTERNAL MEDICINE

## 2021-11-16 PROCEDURE — 700102 HCHG RX REV CODE 250 W/ 637 OVERRIDE(OP): Performed by: NURSE PRACTITIONER

## 2021-11-16 PROCEDURE — A9270 NON-COVERED ITEM OR SERVICE: HCPCS | Performed by: STUDENT IN AN ORGANIZED HEALTH CARE EDUCATION/TRAINING PROGRAM

## 2021-11-16 PROCEDURE — 700102 HCHG RX REV CODE 250 W/ 637 OVERRIDE(OP): Performed by: STUDENT IN AN ORGANIZED HEALTH CARE EDUCATION/TRAINING PROGRAM

## 2021-11-16 PROCEDURE — 99232 SBSQ HOSP IP/OBS MODERATE 35: CPT | Performed by: INTERNAL MEDICINE

## 2021-11-16 PROCEDURE — A9270 NON-COVERED ITEM OR SERVICE: HCPCS | Performed by: NURSE PRACTITIONER

## 2021-11-16 PROCEDURE — 36415 COLL VENOUS BLD VENIPUNCTURE: CPT

## 2021-11-16 RX ORDER — POTASSIUM CHLORIDE 20 MEQ/1
20 TABLET, EXTENDED RELEASE ORAL ONCE
Status: COMPLETED | OUTPATIENT
Start: 2021-11-16 | End: 2021-11-16

## 2021-11-16 RX ADMIN — BUPRENORPHINE HCL 2 MG: 2 TABLET SUBLINGUAL at 22:05

## 2021-11-16 RX ADMIN — BUPRENORPHINE HCL 2 MG: 2 TABLET SUBLINGUAL at 08:42

## 2021-11-16 RX ADMIN — SODIUM BICARBONATE 1300 MG: 650 TABLET ORAL at 13:49

## 2021-11-16 RX ADMIN — BUPRENORPHINE HCL 2 MG: 2 TABLET SUBLINGUAL at 15:29

## 2021-11-16 RX ADMIN — SODIUM BICARBONATE 1300 MG: 650 TABLET ORAL at 18:35

## 2021-11-16 RX ADMIN — ACETAMINOPHEN 1000 MG: 500 TABLET ORAL at 22:04

## 2021-11-16 RX ADMIN — HYDRALAZINE HYDROCHLORIDE 25 MG: 50 TABLET ORAL at 05:00

## 2021-11-16 RX ADMIN — POTASSIUM CHLORIDE 20 MEQ: 1500 TABLET, EXTENDED RELEASE ORAL at 08:42

## 2021-11-16 RX ADMIN — AMLODIPINE BESYLATE 10 MG: 10 TABLET ORAL at 05:00

## 2021-11-16 RX ADMIN — HYDRALAZINE HYDROCHLORIDE 25 MG: 50 TABLET ORAL at 22:04

## 2021-11-16 RX ADMIN — SODIUM BICARBONATE 1300 MG: 650 TABLET ORAL at 08:42

## 2021-11-16 RX ADMIN — OMEPRAZOLE 20 MG: 20 CAPSULE, DELAYED RELEASE ORAL at 18:36

## 2021-11-16 RX ADMIN — OMEPRAZOLE 20 MG: 20 CAPSULE, DELAYED RELEASE ORAL at 05:00

## 2021-11-16 RX ADMIN — SODIUM BICARBONATE 1300 MG: 650 TABLET ORAL at 22:03

## 2021-11-16 RX ADMIN — Medication 400 MG: at 05:00

## 2021-11-16 RX ADMIN — HEPARIN SODIUM 5000 UNITS: 5000 INJECTION, SOLUTION INTRAVENOUS; SUBCUTANEOUS at 05:00

## 2021-11-16 RX ADMIN — HEPARIN SODIUM 5000 UNITS: 5000 INJECTION, SOLUTION INTRAVENOUS; SUBCUTANEOUS at 13:49

## 2021-11-16 RX ADMIN — HYDRALAZINE HYDROCHLORIDE 25 MG: 50 TABLET ORAL at 13:49

## 2021-11-16 ASSESSMENT — ENCOUNTER SYMPTOMS
PALPITATIONS: 0
DOUBLE VISION: 0
PHOTOPHOBIA: 0
CHILLS: 0
ABDOMINAL PAIN: 0
VOMITING: 0
BACK PAIN: 0
COUGH: 0
SHORTNESS OF BREATH: 0

## 2021-11-16 ASSESSMENT — PAIN DESCRIPTION - PAIN TYPE
TYPE: CHRONIC PAIN
TYPE: CHRONIC PAIN

## 2021-11-16 NOTE — CARE PLAN
The patient is Watcher - Medium risk of patient condition declining or worsening    Shift Goals  Clinical Goals: Potassium replacement   Patient Goals: rest  Family Goals: n/a    Progress made toward(s) clinical / shift goals:  Pt took PO potassium, lab redraw ordered for AM.     Patient is not progressing towards the following goals:

## 2021-11-16 NOTE — PROGRESS NOTES
COVID19 surge in effect.    Pt is A&Ox3-4, mild confusion to time. Pt is resting in bed, no dyspnea. Pt has no complaints of pain at this time. Pt bed in low locked position, non skid socks on, and bed alarm on. Pt has no other requests at this time.

## 2021-11-16 NOTE — PROGRESS NOTES
[facility] Medicine Daily Progress Note    Date of Service  11/16/2021    Chief Complaint  Zachary Moore is a 71 y.o. male admitted 11/2/2021 with UTE    Hospital Course  This is a 71-year-old gentleman who originally suffered a hip fracture and was treated at [facility].  During that hospitalization he was also treated for alcohol withdrawal and urosepsis.  From there he was discharged to rehab facility, and has since been transferred to us.  His previous medical history is also significant for hypertension, alcohol abuse, stage II chronic kidney disease..  Patient was sent to us from rehab facility in [place] after he was found to be hyponatremic and with acute kidney injury.  On arrival here, his sodium was 114, potassium 5.2, chloride 86, CO2 12, BUN 64, creatinine 6.10.  He has a previous diagnosis of stage II chronic kidney disease with a baseline creatinine around 1.0.  Patient was admitted to the hospital with a diagnosis of likely hypovolemic hyponatremia.  He was initially treated with fluid resuscitation, and corrected rapidly, however, he remained with very poor urine output and therefore nephrology has been consulted.    Interval Problem Update  Pt c/o of being sleepy this am but no other specific complaints    AFebrile  Sinus 70-80s  -160s    11/5. Sleepy but arousable. SLP couldn't do assessment because he complained of odynophagia. I clarified with him, He can still take PO and his odynophagia has been going on for 2 years on and off. I told SLP to see if we can reevaluate his swallowing again. CUrrently he is NPO anyway for catheter placement.  11/6. SLP recommended NPO but will reassess Saturday.  Family at bedside. Discussed with Speech. He did pass his test he can do thin liquids. I added supoplements. I ordered dietitian. Patient planned for dialysis cathter placement today.  11/7. Arousable. His Hb 7.8. He has occult positive stool. No active bleeding. I discussed with him  "if he wants an GI eval/ endoscopy or colonoscopy but at this time he declines and would prefer to do it outpatient. I did say if his Hb drops <7 or if he has copious melena or bright red blood then we will have GI consulted. He agreed with the plan. I encouraged PO and supplements.      11/9.  Patient is getting dialysis today.  No acute issue overnight. we will get updated PT OT evaluation today.    11/10 no acute issue overnight.  Physiatry is following and no bed available today for now.    11/11: The patient stated that his orthopedic surgeon was Dr. Tello.  His kidney function is improving and I do not think he will need another hemodialysis at this point.  We will find out about his weightbearing status from Dr. Tello.    11/12: Patient is getting x-ray of his right femur today and Dr. Tello will give us recommendation about weightbearing status depending on the results of the x-ray.  Also will discuss with pharmacy about buprenorphine since we have do not have the medication.    11/13 no acute issue overnight.  Likely to rehab on Monday    11/15 no acute issue overnight.  He is feeling better and better each day.  [facility] but due to his buprenorphine which they do not carry, they could not take the patient.  I will reach out to hospitalist at rehab and see whether he can bring his own medication for that.\"    11/16. Awaiting SNF. Nephrology signed off.    I have personally seen and examined the patient at bedside. I discussed the plan of care with patient, bedside RN and pulmonary. And SLP    Consultants/Specialty  nephrology  critical care    Code Status  Full Code    Disposition  Patient is medically cleared. SNF pending.  Anticipate discharge to to skilled nursing facility.  I have placed the appropriate orders for post-discharge needs.    Review of Systems  Review of Systems   Constitutional: Positive for malaise/fatigue. Negative for chills.   HENT: Negative for " nosebleeds.    Eyes: Negative for double vision and photophobia.   Respiratory: Negative for cough and shortness of breath.    Cardiovascular: Positive for leg swelling. Negative for palpitations.   Gastrointestinal: Negative for abdominal pain and vomiting.   Genitourinary: Negative for urgency.   Musculoskeletal: Negative for back pain.   Skin: Negative for rash.        Physical Exam  Temp:  [36.2 °C (97.2 °F)-36.9 °C (98.4 °F)] 36.2 °C (97.2 °F)  Pulse:  [88-95] 88  Resp:  [16-18] 18  BP: (129-146)/(56-66) 133/60  SpO2:  [93 %-96 %] 96 %    Physical Exam  Constitutional:       Appearance: Normal appearance. He is well-developed.   HENT:      Head: Normocephalic and atraumatic.      Mouth/Throat:      Comments: NO odynophagia, out of proportion to exam.  Eyes:      Conjunctiva/sclera: Conjunctivae normal.   Neck:      Vascular: No JVD.   Cardiovascular:      Rate and Rhythm: Normal rate.      Pulses: Normal pulses.   Pulmonary:      Effort: Pulmonary effort is normal. No respiratory distress.      Breath sounds: No stridor.   Abdominal:      Palpations: Abdomen is soft.      Tenderness: There is no abdominal tenderness.   Musculoskeletal:      Right lower leg: Edema present.      Left lower leg: Edema present.      Comments: Edema improved   Skin:     General: Skin is warm.      Coloration: Skin is pale.   Neurological:      General: No focal deficit present.      Mental Status: He is alert.   Psychiatric:      Comments: Pleasant         Fluids    Intake/Output Summary (Last 24 hours) at 11/16/2021 1228  Last data filed at 11/16/2021 1000  Gross per 24 hour   Intake 720 ml   Output 1350 ml   Net -630 ml       Laboratory      Recent Labs     11/14/21  0542 11/15/21  0605 11/16/21  0408   SODIUM 134* 134* 135   POTASSIUM 3.2* 2.9* 3.2*   CHLORIDE 91* 91* 92*   CO2 31 33 30   GLUCOSE 100* 99 96   BUN 33* 32* 27*   CREATININE 1.48* 1.37 1.17   CALCIUM 8.3* 8.2* 8.4*                   Imaging  IR-CVC TUNNEL W/O PORT  REMOVAL   Final Result      1. Removal of RIGHT internal jugular tunneled hemodialysis catheter.      DX-FEMUR-2+ RIGHT   Final Result      Comminuted distal femoral metadiaphysis fractures with lateral plate and screw fixation hardware in place      Incompletely visualized total knee arthroplasty      DX-WRIST-COMPLETE 3+ RIGHT   Final Result      1.  No evidence of acute fracture or dislocation.      2.  Prominent osteoarthritis involving the first carpometacarpal and triscaphe articulations. Less prominent osteoarthritis involves the first MCP and first IP joints.         DX-CHEST-PORTABLE (1 VIEW)   Final Result      1.  Possible minimal right pleural effusion.      2.  No focal pulmonary consolidation.      3.  New right IJV multilumen dialysis catheter in place.      IR-GUERRIER,GROSHONG PLACEMENT >5   Final Result      Successful image guided tunneled dialysis catheter placement.      Plan: The catheter is available for immediate use.            CT-NEEDLE BX-RENAL   Final Result      CT-guided core biopsy of the right kidney.      US-RENAL   Final Result      1.  BILATERAL pleural effusions   2.  Small volume of ascites   3.  Cholelithiasis and gallbladder sludge with gallbladder wall thickening incidentally noted. Cholecystitis is a consideration.      EC-ECHOCARDIOGRAM COMPLETE W/O CONT   Final Result      DX-CHEST-PORTABLE (1 VIEW)   Final Result      Mild basilar and apical opacity compatible with atelectasis or scarring although consolidation is not excluded      Effusions on recent CT are not detected radiographically      CT-ABDOMEN-PELVIS W/O   Final Result      Diffuse urinary bladder wall thickening with Guy catheter in place. This is suspicious for cystitis but the finding is nonspecific      No hydronephrosis or urolithiasis      Cholelithiasis      Impending diarrhea      Small to medium right, small left pleural effusions      5 mm mean diameter left lower lobe pulmonary nodule. Follow-up  "recommendations as below   Low Risk: No routine follow-up      High Risk: Optional CT at 12 months      Comments: Nodules less than 6 mm do not require routine follow-up, but certain patients at high risk with suspicious nodule morphology, upper lobe location, or both may warrant 12-month follow-up.      Low Risk - Minimal or absent history of smoking and of other known risk factors.      High Risk - History of smoking or of other known risk factors.      Note: These recommendations do not apply to lung cancer screening, patients with immunosuppression, or patients with known primary cancer.      Fleischner Society 2017 Guidelines for Management of Incidentally Detected Pulmonary Nodules in Adults         CT-HEAD W/O   Final Result      No noncontrast CT evidence of acute intracranial hemorrhage.      Severe white matter hypodensity is present.  This is a nonspecific finding which usually is found to represent chronic microvascular disease in patient's of this demographic.  Demyelination, age indeterminant ischemia and gliosis are also common    possibilities.      Moderate to severe parenchymal volume loss         US-FOREIGN FILM ULTRASOUND   Final Result           Assessment/Plan  * UTE (acute kidney injury)/hypotension, hyponatremia, s/p femoral ORIF, anemia/GI bleed/odynophagia (HCC)- (present on admission)  Assessment & Plan  Creatinine 1.37 today  Improving  Maybe he is in recovery phase at this point  No need for hemodialysis at this point\"    SNF planned  Nephrology signed off.          Occult blood positive stool  Assessment & Plan  He has seen [facility] in the past for routine colonoscopy severalyears ago.  At this time he does not want endoscopy/colonoscopy inpatient but will revisit if he has actual BRBPR or melena,or Hb drops to <7.  Updated Nephrology  11/8. Because of poor PO intake/odynophagia  GI recommended follow up    Anemia  Assessment & Plan  Ordered occult stool  Ordered iron studies  Currently no " "active bleeding.  Occult positive  At this time patient prefers GI work-up outpatient  May need epoietin, updated Nephrology  11/8. Consulted Dr. Khoury, GI for odynophagia/poor PO intake, thus will also evaluate UGI/anemia.  Recommended follow up outpatient    Dehydration  Assessment & Plan  Has been fluid resuscitated  Secondary to diarrhea      Encephalopathy acute- (present on admission)  Assessment & Plan  2/2 UTE vs. Urosepsis vs. Acute hyponatremia  Improving  CT head neg for acute findings    Sepsis due to Escherichia coli with acute renal failure without septic shock (HCC)  Assessment & Plan  NOT sepsis  Cultures neg  Only given one dose of ABx's on presentation          Mass of urinary bladder- (present on admission)  Assessment & Plan  Based on imaging from [facility]  Repeat CT Abd/Plvs and renal US do not show any mass    Jeana-prosthetic femoral shaft fracture- (present on admission)  Assessment & Plan  S/p ORIF by Dr. Tello  Pain control  Brace present  PT/OT consults\"    Weight bearing per ortho recommendation      Alcohol use disorder, moderate, dependence (HCC)- (present on admission)  Assessment & Plan  Alcohol level ordered  Last drink reported 5 days prior by patient  Monitor for signs of withdrawal    Acute cystitis with hematuria- (present on admission)  Assessment & Plan  Treated for sepsis 1 week ago for Bacteremia & UTI treated with zosyn & Unasyn and d/c on cipro 750mg BID for 9 more doses and Flagyl 500mg TID x 16 days growing Resistant Ecoli.  Urine cultures here are neg  US at [facility] showing possible mass however CT Abd/Plvs and renal US both neg for mass here  Cont to monitor off Abx's  Catheter change w/in 48hrs of admission\"  Repeat UA + for UTI but he is on chronic Guy catheter  Monitor closely  We will hold off on antibiotic at this point        Acute hyponatremia- (present on admission)  Assessment & Plan  Resolved        Elevated liver enzymes- (present on admission)  Assessment & " "Plan  Trend  Appears chronic  Monitor      Hypertension- (present on admission)  Assessment & Plan  On lisinopril 10 as an outpt which was stopped due to hypotension and UTE  Start amlodipine 5mg    Vitals:    11/16/21 1620   BP: 141/59   Pulse: 95   Resp: 17   Temp: 36.6 °C (97.9 °F)   SpO2: 96%     Add hydralazine  Continue amlodipine  ACEI held bec of elevated Cr-\"    Improved.       VTE prophylaxis: heparin ppx    I have performed a physical exam and reviewed and updated ROS and Plan today (11/16/2021). In review of yesterday's note (11/15/2021), there are no changes except as documented above.      "

## 2021-11-16 NOTE — PROGRESS NOTES
On call Hosp.Rachael Redd notified of K value  2.9.  Will follow up in 30 min to confirm orders.     2030: Replacement and lab redraw ordered.

## 2021-11-16 NOTE — DISCHARGE PLANNING
Received Choice form at 2109  Agency/Facility Name: [facility]. [facility]. [facility]   Referral sent per Choice form @ 3649

## 2021-11-16 NOTE — DISCHARGE PLANNING
Anticipated Discharge Disposition: SNF    Action: CM spoke with pt and spouse at bedside.  SNF choice received; choice form sent to [facility]. Pt & spouse request HCM contact Misa at [facility] (first choice) for swing bed availability [phone].     [facility] #[id number]    Barriers to Discharge: SNF acceptance    Plan: HCM/[facility] to follow up on SNF referral.

## 2021-11-17 ENCOUNTER — PATIENT OUTREACH (OUTPATIENT)
Dept: HEALTH INFORMATION MANAGEMENT | Facility: OTHER | Age: 71
End: 2021-11-17

## 2021-11-17 VITALS
HEART RATE: 103 BPM | BODY MASS INDEX: 24.14 KG/M2 | TEMPERATURE: 99.1 F | OXYGEN SATURATION: 94 % | WEIGHT: 168.65 LBS | DIASTOLIC BLOOD PRESSURE: 68 MMHG | HEIGHT: 70 IN | RESPIRATION RATE: 18 BRPM | SYSTOLIC BLOOD PRESSURE: 142 MMHG

## 2021-11-17 LAB
ALBUMIN SERPL BCP-MCNC: 3.2 G/DL (ref 3.2–4.9)
BUN SERPL-MCNC: 23 MG/DL (ref 8–22)
CALCIUM SERPL-MCNC: 8.4 MG/DL (ref 8.5–10.5)
CHLORIDE SERPL-SCNC: 93 MMOL/L (ref 96–112)
CO2 SERPL-SCNC: 31 MMOL/L (ref 20–33)
CREAT SERPL-MCNC: 1.12 MG/DL (ref 0.5–1.4)
ERYTHROCYTE [DISTWIDTH] IN BLOOD BY AUTOMATED COUNT: 48.6 FL (ref 35.9–50)
FLUAV RNA SPEC QL NAA+PROBE: NEGATIVE
FLUBV RNA SPEC QL NAA+PROBE: NEGATIVE
GLUCOSE SERPL-MCNC: 97 MG/DL (ref 65–99)
HCT VFR BLD AUTO: 23.5 % (ref 42–52)
HGB BLD-MCNC: 7.5 G/DL (ref 14–18)
MCH RBC QN AUTO: 28.8 PG (ref 27–33)
MCHC RBC AUTO-ENTMCNC: 31.9 G/DL (ref 33.7–35.3)
MCV RBC AUTO: 90.4 FL (ref 81.4–97.8)
PHOSPHATE SERPL-MCNC: 3.8 MG/DL (ref 2.5–4.5)
PLATELET # BLD AUTO: 293 K/UL (ref 164–446)
PMV BLD AUTO: 10.4 FL (ref 9–12.9)
POTASSIUM SERPL-SCNC: 3.1 MMOL/L (ref 3.6–5.5)
RBC # BLD AUTO: 2.6 M/UL (ref 4.7–6.1)
RSV RNA SPEC QL NAA+PROBE: NEGATIVE
SARS-COV-2 RNA RESP QL NAA+PROBE: NOTDETECTED
SODIUM SERPL-SCNC: 134 MMOL/L (ref 135–145)
SPECIMEN SOURCE: NORMAL
WBC # BLD AUTO: 5 K/UL (ref 4.8–10.8)

## 2021-11-17 PROCEDURE — C9803 HOPD COVID-19 SPEC COLLECT: HCPCS | Performed by: INTERNAL MEDICINE

## 2021-11-17 PROCEDURE — 700111 HCHG RX REV CODE 636 W/ 250 OVERRIDE (IP): Performed by: INTERNAL MEDICINE

## 2021-11-17 PROCEDURE — 36415 COLL VENOUS BLD VENIPUNCTURE: CPT

## 2021-11-17 PROCEDURE — 700102 HCHG RX REV CODE 250 W/ 637 OVERRIDE(OP): Performed by: INTERNAL MEDICINE

## 2021-11-17 PROCEDURE — 99239 HOSP IP/OBS DSCHRG MGMT >30: CPT | Performed by: INTERNAL MEDICINE

## 2021-11-17 PROCEDURE — A9270 NON-COVERED ITEM OR SERVICE: HCPCS | Performed by: STUDENT IN AN ORGANIZED HEALTH CARE EDUCATION/TRAINING PROGRAM

## 2021-11-17 PROCEDURE — A9270 NON-COVERED ITEM OR SERVICE: HCPCS | Performed by: INTERNAL MEDICINE

## 2021-11-17 PROCEDURE — 85027 COMPLETE CBC AUTOMATED: CPT

## 2021-11-17 PROCEDURE — 97535 SELF CARE MNGMENT TRAINING: CPT | Mod: CO

## 2021-11-17 PROCEDURE — 97530 THERAPEUTIC ACTIVITIES: CPT | Mod: CO

## 2021-11-17 PROCEDURE — 92526 ORAL FUNCTION THERAPY: CPT

## 2021-11-17 PROCEDURE — 0241U HCHG SARS-COV-2 COVID-19 NFCT DS RESP RNA 4 TRGT MIC: CPT

## 2021-11-17 PROCEDURE — 700102 HCHG RX REV CODE 250 W/ 637 OVERRIDE(OP): Performed by: STUDENT IN AN ORGANIZED HEALTH CARE EDUCATION/TRAINING PROGRAM

## 2021-11-17 PROCEDURE — 80069 RENAL FUNCTION PANEL: CPT

## 2021-11-17 RX ORDER — OMEPRAZOLE 20 MG/1
20 CAPSULE, DELAYED RELEASE ORAL 2 TIMES DAILY
Qty: 30 CAPSULE | Status: SHIPPED
Start: 2021-11-17

## 2021-11-17 RX ORDER — AMLODIPINE BESYLATE 10 MG/1
10 TABLET ORAL DAILY
Qty: 30 TABLET | Status: SHIPPED
Start: 2021-11-18

## 2021-11-17 RX ORDER — SODIUM BICARBONATE 650 MG/1
1300 TABLET ORAL 4 TIMES DAILY
Qty: 120 TABLET | Refills: 3 | Status: SHIPPED
Start: 2021-11-17

## 2021-11-17 RX ORDER — HYDRALAZINE HYDROCHLORIDE 25 MG/1
25 TABLET, FILM COATED ORAL EVERY 8 HOURS
Qty: 90 TABLET | Status: SHIPPED
Start: 2021-11-17

## 2021-11-17 RX ORDER — ALUMINA, MAGNESIA, AND SIMETHICONE 2400; 2400; 240 MG/30ML; MG/30ML; MG/30ML
10 SUSPENSION ORAL 4 TIMES DAILY PRN
Qty: 560 ML | Status: SHIPPED
Start: 2021-11-17

## 2021-11-17 RX ADMIN — HEPARIN SODIUM 5000 UNITS: 5000 INJECTION, SOLUTION INTRAVENOUS; SUBCUTANEOUS at 15:35

## 2021-11-17 RX ADMIN — OMEPRAZOLE 20 MG: 20 CAPSULE, DELAYED RELEASE ORAL at 04:46

## 2021-11-17 RX ADMIN — BUPRENORPHINE HCL 2 MG: 2 TABLET SUBLINGUAL at 15:35

## 2021-11-17 RX ADMIN — OMEPRAZOLE 20 MG: 20 CAPSULE, DELAYED RELEASE ORAL at 17:24

## 2021-11-17 RX ADMIN — SODIUM BICARBONATE 1300 MG: 650 TABLET ORAL at 17:24

## 2021-11-17 RX ADMIN — AMLODIPINE BESYLATE 10 MG: 10 TABLET ORAL at 04:46

## 2021-11-17 RX ADMIN — BUPRENORPHINE HCL 2 MG: 2 TABLET SUBLINGUAL at 08:15

## 2021-11-17 RX ADMIN — SODIUM BICARBONATE 1300 MG: 650 TABLET ORAL at 12:18

## 2021-11-17 RX ADMIN — HYDRALAZINE HYDROCHLORIDE 25 MG: 50 TABLET ORAL at 04:46

## 2021-11-17 RX ADMIN — SODIUM BICARBONATE 1300 MG: 650 TABLET ORAL at 08:15

## 2021-11-17 RX ADMIN — HYDRALAZINE HYDROCHLORIDE 25 MG: 50 TABLET ORAL at 15:35

## 2021-11-17 ASSESSMENT — COGNITIVE AND FUNCTIONAL STATUS - GENERAL
DAILY ACTIVITIY SCORE: 15
EATING MEALS: A LITTLE
DRESSING REGULAR LOWER BODY CLOTHING: A LOT
SUGGESTED CMS G CODE MODIFIER DAILY ACTIVITY: CK
HELP NEEDED FOR BATHING: A LOT
PERSONAL GROOMING: A LITTLE
TOILETING: A LOT
DRESSING REGULAR UPPER BODY CLOTHING: A LITTLE

## 2021-11-17 ASSESSMENT — PAIN DESCRIPTION - PAIN TYPE: TYPE: CHRONIC PAIN

## 2021-11-17 NOTE — DISCHARGE PLANNING
Transport Communication form sent to [facility] for a 6:00 pm transport pending negative COVID test.

## 2021-11-17 NOTE — CARE PLAN
The patient is Stable - Low risk of patient condition declining or worsening    Shift Goals  Clinical Goals: pain management  Patient Goals: rest  Family Goals: n/a    Progress made toward(s) clinical / shift goals:    Problem: Physical Regulation  Goal: Diagnostic test results will improve  Outcome: Progressing     Problem: Pain - Standard  Goal: Alleviation of pain or a reduction in pain to the patient’s comfort goal  Outcome: Progressing     Problem: Fall Risk  Goal: Patient will remain free from falls  Outcome: Progressing       Patient is not progressing towards the following goals:

## 2021-11-17 NOTE — CARE PLAN
Problem: Nutritional:  Goal: Achieve adequate nutritional intake  Description: Patient will consume >50% of meals with >75% supplements once diet advanced.  Outcome: Met   Eating 50-75% or %, eating adequately at this time.  RD to monitor per department policy.

## 2021-11-17 NOTE — DISCHARGE SUMMARY
"Discharge Summary    CHIEF COMPLAINT ON ADMISSION  No chief complaint on file.      Reason for Admission  Acute renal failure, anuria - neph*     CODE STATUS  Full Code    HPI & HOSPITAL COURSE  This is a 71 y.o. male [facility] and [facility].  Please review Dr. Jacobo Bosch M.D. notes for further details of history of present illness, past medical/social/family histories, allergies and medications. Please review Dr. Joshi, Nephrology, Dr. Levine, CC consultation notes.  According to their records, patient was in rehab at [facility] following a convoluted course at [facility] after a ground level fall (stepped in a chair) on  10/12/2021, sustained  comminuted fracture proximal to his right knee prosthesis and thus underwent an ORIF complicated later by urosepsis and alcohol withdrawal. He was treated there with Zosyn and Unasyn allegedly, given cholestyramine and discharged on multiple antihypertensives and cipro 750mg BID for 9 more doses and Flagyl 500mg TID x 16 remaining days. 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   Outside records show an episode of hypotension, hypnatremia with sodium as low as 112 BUN 69 Cr- 6 K 5.1, mild leukocytoisis, Hb 8.4.  Because of sodium and acute renal failure, [facility] a[facility], slightly hypertensive. Appeared dry on exam.   CT head showed no intracranial bleed.  He was continued on antibiotics and was \"pancultured\" per critical care. Nephrology was consulted. He was started on dialysis and fluid resuscitated for oliguric UTE. He improved. Dialysis was discontinued. Kidney biopsy revealed acute tubular injury. Nephrology signed off.   Antibiotics was stopped as per evaluation from Dr. Magaña, sepsis and cystitis was ruled out.  His hospital course was " complicated by odynophagia. He also had occult positive stool and anemia. Initally patient declined having an EGD, however given his severe odynophagia and poor PO intake, SLP and dietitian requested evaluation. Dr. Khoury was consulted. Per Dr. Khan, They felt the work-up can be done outpatient. Meanwhile iron studies was consistent with ACKD and Nephrology updated to evaluate for epoietin. ACEI still held, instead amlodipine was started for hypertension.    At discharge date, Zachary Moore afebrile and hemodynamically stable.  Zachary Moore wanted to be discharged today.    Discharge Physical Exam  Constitutional:       Appearance: Normal appearance. He is well-developed.   HENT:      Head: Normocephalic and atraumatic.      Mouth/Throat:      Comments: odynophagia improved, out of proportion to exam.  Eyes:      Conjunctiva/sclera: Conjunctivae normal.   Neck:      Vascular: No JVD.   Cardiovascular:      Rate and Rhythm: Normal rate.      Pulses: Normal pulses.   Pulmonary:      Effort: Pulmonary effort is normal. No respiratory distress.      Breath sounds: No stridor.   Abdominal:      Palpations: Abdomen is soft.      Tenderness: There is no abdominal tenderness.   Musculoskeletal:      Right lower leg: Edema present.      Left lower leg: Edema present.      Comments: Edema improved   Skin:     General: Skin is warm.      Coloration: Skin is pale.   Neurological:      General: No focal deficit present.      Mental Status: He is alert.   Psychiatric:      Comments: Pleasant     Imaging  IR-CVC TUNNEL W/O PORT REMOVAL   Final Result      1. Removal of RIGHT internal jugular tunneled hemodialysis catheter.      DX-FEMUR-2+ RIGHT   Final Result      Comminuted distal femoral metadiaphysis fractures with lateral plate and screw fixation hardware in place      Incompletely visualized total knee arthroplasty      DX-WRIST-COMPLETE 3+ RIGHT   Final Result      1.  No evidence of acute fracture or dislocation.       2.  Prominent osteoarthritis involving the first carpometacarpal and triscaphe articulations. Less prominent osteoarthritis involves the first MCP and first IP joints.         DX-CHEST-PORTABLE (1 VIEW)   Final Result      1.  Possible minimal right pleural effusion.      2.  No focal pulmonary consolidation.      3.  New right IJV multilumen dialysis catheter in place.      IR-SOPHIA GUERRIER PLACEMENT >5   Final Result      Successful image guided tunneled dialysis catheter placement.      Plan: The catheter is available for immediate use.            CT-NEEDLE BX-RENAL   Final Result      CT-guided core biopsy of the right kidney.      US-RENAL   Final Result      1.  BILATERAL pleural effusions   2.  Small volume of ascites   3.  Cholelithiasis and gallbladder sludge with gallbladder wall thickening incidentally noted. Cholecystitis is a consideration.      EC-ECHOCARDIOGRAM COMPLETE W/O CONT   Final Result      DX-CHEST-PORTABLE (1 VIEW)   Final Result      Mild basilar and apical opacity compatible with atelectasis or scarring although consolidation is not excluded      Effusions on recent CT are not detected radiographically      CT-ABDOMEN-PELVIS W/O   Final Result      Diffuse urinary bladder wall thickening with Guy catheter in place. This is suspicious for cystitis but the finding is nonspecific      No hydronephrosis or urolithiasis      Cholelithiasis      Impending diarrhea      Small to medium right, small left pleural effusions      5 mm mean diameter left lower lobe pulmonary nodule. Follow-up recommendations as below   Low Risk: No routine follow-up      High Risk: Optional CT at 12 months      Comments: Nodules less than 6 mm do not require routine follow-up, but certain patients at high risk with suspicious nodule morphology, upper lobe location, or both may warrant 12-month follow-up.      Low Risk - Minimal or absent history of smoking and of other known risk factors.      High Risk -  History of smoking or of other known risk factors.      Note: These recommendations do not apply to lung cancer screening, patients with immunosuppression, or patients with known primary cancer.      Fleischner Society 2017 Guidelines for Management of Incidentally Detected Pulmonary Nodules in Adults         CT-HEAD W/O   Final Result      No noncontrast CT evidence of acute intracranial hemorrhage.      Severe white matter hypodensity is present.  This is a nonspecific finding which usually is found to represent chronic microvascular disease in patient's of this demographic.  Demyelination, age indeterminant ischemia and gliosis are also common    possibilities.      Moderate to severe parenchymal volume loss         US-FOREIGN FILM ULTRASOUND   Final Result                No notes on file    Therefore, he is discharged in fair and stable condition to skilled nursing facility.    The patient met 2-midnight criteria for an inpatient stay at the time of discharge.      FOLLOW UP ITEMS POST DISCHARGE      DISCHARGE DIAGNOSES  Principal Problem:    UTE (acute kidney injury)/hypotension, hyponatremia, s/p femoral ORIF, anemia/GI bleed/odynophagia (HCC) POA: Yes  Active Problems:    Hypertension POA: Yes    Elevated liver enzymes POA: Yes    Acute hyponatremia POA: Yes    Acute cystitis with hematuria POA: Yes    Alcohol use disorder, moderate, dependence (HCC) POA: Yes    Jeana-prosthetic femoral shaft fracture POA: Yes    Mass of urinary bladder POA: Yes    Encephalopathy acute POA: Yes    Dehydration POA: Unknown    Anemia POA: Unknown    Occult blood positive stool POA: Unknown        FOLLOW UP  No future appointments.  Kevin Tello M.D.  [address]  [place] [id number] [phone]    Call  ASAP for follow-up appointment  Follow up Gerhard Carrillo M.D. or attending physican at facility upon receipt  Follow up with Dr. Khoury gastroenterology for occult positive stool and odynophagia in 1 week.   Thomas  up with Dr. Hutchinson, Nephrology in 1 week. BP stable, currently lisinopril being held. defer restart to Nephrology  Return to ER in the event of new or worsening symptoms. Please note importance of compliance and the patient has agreed to proceed with all medical recommendations and follow up plan indicated above. All medications come with benefits and risks. Risks may include permanent injury or death and these risks can be minimized with close reassessment and monitoring. Please make it to your scheduled follow ups with your primary provider, and/or specialists clinic.      MEDICATIONS ON DISCHARGE     Medication List      START taking these medications      Instructions   amLODIPine 10 MG Tabs  Start taking on: November 18, 2021  Commonly known as: NORVASC   Take 1 Tablet by mouth every day.  Dose: 10 mg     benzocaine-menthol 15-3.6 MG Lozg  Commonly known as: CEPACOL   Dissolve 1 Lozenge in the mouth every 2 hours as needed.  Dose: 1 Lozenge     guaiFENesin 100 MG/5ML Soln  Commonly known as: ROBITUSSIN   Take 10 mL by mouth every four hours as needed for Cough.  Dose: 10 mL     hydrALAZINE 25 MG Tabs  Commonly known as: APRESOLINE   Take 1 Tablet by mouth every 8 hours.  Dose: 25 mg     mag hydrox-al hydrox-simeth 400-400-40 MG/5ML Susp  Commonly known as: MAALOX PLUS ES or MYLANTA DS   Take 10 mL by mouth 4 times a day as needed (Heartburn).  Dose: 10 mL     omeprazole 20 MG delayed-release capsule  Commonly known as: PRILOSEC   Take 1 Capsule by mouth 2 times a day.  Dose: 20 mg     sodium bicarbonate 650 MG Tabs  Commonly known as: SODIUM BICARBONATE   Take 2 Tablets by mouth 4 times a day.  Dose: 1,300 mg     sore throat spray 1.4 % Liqd  Commonly known as: CHLORASEPTIC   Use 1 Spray in the mouth or throat every 2 hours as needed.  Dose: 1 Spray        CHANGE how you take these medications      Instructions   buprenorphine 8 MG Subl  What changed: Another medication with the same name was removed. Continue  taking this medication, and follow the directions you see here.  Commonly known as: Subutex   Place  under tongue every day.     HYDROcodone-acetaminophen 5-325 MG Tabs per tablet  What changed: Another medication with the same name was removed. Continue taking this medication, and follow the directions you see here.  Commonly known as: NORCO   Take 1-2 Tabs by mouth every four hours as needed (Pain).  Dose: 1-2 Tablet        CONTINUE taking these medications      Instructions   methocarbamol 750 MG Tabs  Commonly known as: ROBAXIN   Take 1 Tab by mouth 3 times a day as needed (As needed for spasm).  Dose: 750 mg     MULTI VITAMIN DAILY PO   Take  by mouth.     ondansetron 4 MG Tabs tablet  Commonly known as: ZOFRAN   TAKE 1 TABLET BY MOUTH EVERY 6 HOURS IF NEEDED FOR NAUSEA AND VOMITING.     tizanidine 4 MG Tabs  Commonly known as: ZANAFLEX   TAKE 1 TO 2 TABLETS BY MOUTH AT BEDTIME AS NEEDED        STOP taking these medications    diclofenac DR 75 MG Tbec  Commonly known as: Voltaren     etodolac 400 MG tablet  Commonly known as: LODINE     lisinopril 20 MG Tabs  Commonly known as: PRINIVIL     oxyCODONE-acetaminophen 5-325 MG Tabs  Commonly known as: PERCOCET            Allergies  No Known Allergies    DIET  Orders Placed This Encounter   Procedures   • Diet Order Diet: Level 6 - Soft and Bite Sized; Liquid level: Level 0 - Thin     Standing Status:   Standing     Number of Occurrences:   1     Order Specific Question:   Diet:     Answer:   Level 6 - Soft and Bite Sized [23]     Order Specific Question:   Liquid level     Answer:   Level 0 - Thin       ACTIVITY  As per SNF    LINES, DRAINS, AND WOUNDS  This is an automated list. Peripheral IVs will be removed prior to discharge.       Wound 11/03/21 Partial Thickness Wound Buttocks;Jaw;Thigh Medial Right MASD/edema  (Active)   Wound Image   11/03/21 0800   Site Assessment Pink;Red 11/17/21 0815   Periwound Assessment Pink;Red 11/17/21 0815   Margins Attached edges  11/15/21 0900   Closure None 11/14/21 0845   Drainage Amount None 11/14/21 0845   Drainage Description Serosanguineous 11/04/21 1700   Treatments Site care 11/11/21 2046   Wound Cleansing Foam Cleanser/Washcloth 11/13/21 2207   Periwound Protectant Barrier Paste 11/13/21 2207   Dressing Cleansing/Solutions Normal Saline 11/07/21 2027   Dressing Options Viscopaste 11/14/21 2155   Dressing Changed Changed 11/12/21 2031   Dressing Status Open to Air 11/14/21 2155   Dressing Change/Treatment Frequency Every Shift, and As Needed 11/16/21 2204   NEXT Dressing Change/Treatment Date 11/08/21 11/08/21 0715   NEXT Weekly Photo (Inpatient Only) 11/10/21 11/07/21 2027   Non-staged Wound Description Partial thickness 11/04/21 1700   WOUND NURSE ONLY - Time Spent with Patient (mins) 45 11/04/21 1700                  MENTAL STATUS ON TRANSFER             CONSULTATIONS  GI  Nephrology    PROCEDURES  CT-ABDOMEN-PELVIS W/O    Result Date: 11/3/2021  11/2/2021 10:49 PM HISTORY/REASON FOR EXAM:  renal dysfunction. Pain. TECHNIQUE/EXAM DESCRIPTION: CT scan of the abdomen and pelvis without contrast. Noncontrast helical scanning was obtained from the diaphragmatic domes through the pubic symphysis. Low dose optimization technique was utilized for this CT exam including automated exposure control and adjustment of the mA and/or kV according to patient size. COMPARISON: None. FINDINGS: Lower Chest: Smallest medium-sized bilateral pleural effusions and there is some compressive atelectasis. Left lower lobe noncalcified 6 x 4 mm nodule. Distal esophageal wall appears thickened but this may be due to incomplete distention. No acute bony abnormality. There is severe lumbosacral junction facet arthropathy and right posterior fixation hardware is present There is mostly subcutaneous fat stranding Liver: Normal. Spleen: Unremarkable. Pancreas: Unremarkable. Gallbladder: Large gallstone. Biliary: Nondilated. Adrenal glands: Normal. Kidneys:  Unremarkable without hydronephrosis. Bowel: No obstruction or acute inflammation. Medial displacement of the cecum but without volvulus. No abnormal appendix is seen. Some distal colonic air-fluid levels indicating impending diarrhea. Mild distal colonic diverticulosis Lymph nodes: No adenopathy. Vasculature: Some atherosclerosis. Pelvis: No adenopathy No free fluid. Guy catheter in a mostly decompressed bladder. The bladder wall appears diffusely thickened No prostate enlargement     Diffuse urinary bladder wall thickening with Guy catheter in place. This is suspicious for cystitis but the finding is nonspecific No hydronephrosis or urolithiasis Cholelithiasis Impending diarrhea Small to medium right, small left pleural effusions 5 mm mean diameter left lower lobe pulmonary nodule. Follow-up recommendations as below Low Risk: No routine follow-up High Risk: Optional CT at 12 months Comments: Nodules less than 6 mm do not require routine follow-up, but certain patients at high risk with suspicious nodule morphology, upper lobe location, or both may warrant 12-month follow-up. Low Risk - Minimal or absent history of smoking and of other known risk factors. High Risk - History of smoking or of other known risk factors. Note: These recommendations do not apply to lung cancer screening, patients with immunosuppression, or patients with known primary cancer. Fleischner Society 2017 Guidelines for Management of Incidentally Detected Pulmonary Nodules in Adults     CT-HEAD W/O    Result Date: 11/2/2021 11/2/2021 10:49 PM HISTORY/REASON FOR EXAM:  Transient ischemic attack (TIA). Altered mental status TECHNIQUE/EXAM DESCRIPTION AND NUMBER OF VIEWS:    CT of the head without contrast. Contiguous 5 mm axial sections were obtained from the skull base through the vertex. Up to date radiation dose reduction adjustments have been utilized to meet ALARA standards for radiation dose reduction. COMPARISON: None. FINDINGS: No  hemorrhage is seen. There is cerebral volume loss. The ventricular system is normal in size and position for the degree of cerebral volume loss. Gray white junction differentiation is preserved. There are severe nonspecific white matter hypodensities. No noncontrast evidence of mass. Visualized paranasal sinuses are clear.     No noncontrast CT evidence of acute intracranial hemorrhage. Severe white matter hypodensity is present.  This is a nonspecific finding which usually is found to represent chronic microvascular disease in patient's of this demographic.  Demyelination, age indeterminant ischemia and gliosis are also common possibilities. Moderate to severe parenchymal volume loss     CT-NEEDLE BX-RENAL    Result Date: 11/4/2021 11/4/2021 2:44 PM HISTORY/REASON FOR EXAM:  UTE of unclear etiology; UTE of unclear etiology. TECHNIQUE/EXAM DESCRIPTION: CT-guided renal biopsy. Low dose optimization technique was utilized for this CT exam including automated exposure control and adjustment of the mA and/or kV according to patient size. PROCEDURE: Following informed consent the patient was prepped and draped in the usual fashion.  8 cc of 1% lidocaine were utilized for local and subcutaneous anesthesia. Moderate sedation was provided. Pulse oximetry and continuous cardiac monitoring by the nurse was performed throughout the exam. Intraservice time was 20 minutes. Utilizing CT guidance a 17-gauge introducer needle was advanced through the abdominal wall and positioned adjacent to the right inferior renal pole. 3 core biopsies were obtained with an 18-gauge core biopsy needle and provided to pathology who were on-site. Sampling was terminated when biopsy specimens were deemed adequate by pathology. The patient tolerated procedure well and was sent to the floor for observation. COMPLICATIONS: No immediate complications. FINDINGS:  CT images demonstrate the biopsy needle well positioned adjacent to the right inferior  renal pole.     CT-guided core biopsy of the right kidney.    DX-CHEST-PORTABLE (1 VIEW)    Result Date: 11/8/2021 11/8/2021 5:04 PM HISTORY/REASON FOR EXAM:  Fever. TECHNIQUE/EXAM DESCRIPTION AND NUMBER OF VIEWS: Single portable view of the chest. COMPARISON:  11/3/2021 and 1/20/2014 FINDINGS: A new right IJV multilumen dialysis catheter is present. No pneumothorax is identified. The cardiac silhouette is within normal limits. No infiltrates or consolidations are noted. There is minimal blunting of the right costophrenic angle which could represent a minimal right pleural effusion.     1.  Possible minimal right pleural effusion. 2.  No focal pulmonary consolidation. 3.  New right IJV multilumen dialysis catheter in place.    DX-CHEST-PORTABLE (1 VIEW)    Result Date: 11/3/2021  11/3/2021 3:35 AM HISTORY/REASON FOR EXAM: Cough. TECHNIQUE/EXAM DESCRIPTION AND NUMBER OF VIEWS: Single AP view of the chest. COMPARISON: January 28, 2014 FINDINGS: Lungs: Some basilar increased opacity seen best on the right. There is also mild biapical opacity Pleura:  No pleural space process is seen. Heart and mediastinum: The cardiomediastinal contours are normal.     Mild basilar and apical opacity compatible with atelectasis or scarring although consolidation is not excluded Effusions on recent CT are not detected radiographically    DX-WRIST-COMPLETE 3+ RIGHT    Result Date: 11/9/2021 11/9/2021 2:13 PM HISTORY/REASON FOR EXAM:  Right wrist pain. TECHNIQUE/EXAM DESCRIPTION AND NUMBER OF VIEWS: 4 views of the RIGHT wrist. COMPARISON: FINDINGS:  Bone mineralization is normal. There is osteoarthritis involving the first carpometacarpal and triscaphe articulations characterized by joint space loss, osteophytic spurring and subchondral sclerosis. There is also osteoarthritis involving the first MCP and first IP joints.     1.  No evidence of acute fracture or dislocation. 2.  Prominent osteoarthritis involving the first carpometacarpal  and triscaphe articulations. Less prominent osteoarthritis involves the first MCP and first IP joints.     IR-CVC TUNNEL W/O PORT REMOVAL    Result Date: 11/13/2021 11/13/2021 10:49 AM HISTORY/REASON FOR EXAM:  Resolved renal failure. Hemodialysis Catheter no longer needed TECHNIQUE/EXAM DESCRIPTION: Removal of tunneled right internal jugular hemodialysis catheter PROCEDURE: Informed consent was obtained.  The tunneled hemodialysis catheter and anterior chest wall were prepped and draped in a sterile manner. Gentle traction was applied and the catheter removed intact. Manual compression was applied over the catheter entry site and right internal jugular vein for about 5 minutes. A sterile dressing was applied. The patient tolerated the procedure well with no evidence of complication. There was no exudate or discharge at the catheter entry site. CURRENT ALARA PRINCIPLES FOR DOSE REDUCTION TECHNIQUES WERE UTILIZED FOR THIS EXAMINATION. The procedure was performed without IV sedation or local anesthetic at the patient's bedside. SEDATION DURATION: Intraservice time was N/A minutes. FLUOROSCOPY TIME: zero minutes NUMBER OF FILMS: zero fluoroscopic frame capture images and/or digital series obtained. COMPARISON:  1 view chest 11/8/2021, tunneled hemodialysis catheter placement 11/5/2021 FINDINGS:  The catheter was removed intact.  There was no discharge or exudate at the catheter entry site.     1. Removal of RIGHT internal jugular tunneled hemodialysis catheter.    IR-SOPHIA GUERRIER PLACEMENT >5    Result Date: 11/5/2021  HISTORY/REASON FOR EXAM:  Renal insufficiency. TECHNIQUE/EXAM DESCRIPTION AND NUMBER OF VIEWS: Image guided tunneled central hemodialysis catheter placement COMPARISON:  None. Contrast: None FLUOROSCOPIC IMAGES: 1 fluoroscopic image(s) obtained. Fluoroscopic guidance was used during the procedure. FLUOROSCOPY TIME: 1.1 minutes MEDICATIONS: Moderate sedation was provided. Pulse oximetry and  continuous cardiac monitoring by the nurse was performed throughout the exam. Intraservice time was 15 minutes. PROCEDURE:     The risks, benefits, goals and objectives and alternatives were discussed. Risks were specified as including but not limited to bleeding, infection, damage to vessels or nerves, pain and discomfort. Issues related to catheter care were discussed. The patient's questions were answered. Informed oral and written consent were obtained. The patient was placed supine on the angiography table. The skin of the  RIGHT neck was prepped and draped in the usual sterile manner. Maximum sterile barrier technique was used. A timeout was performed. Patency of the vein was documented with real-time  ultrasound and the recorded image was entered into the patient?s medical record. Local anesthetic result was achieved with administration of 1% lidocaine. A small skin nick was made with an 11 blade scalpel. Using real-time sonographic guidance and sterile technique a micro-access needle was advanced into the internal jugular vein. Fluoroscopic guidance was then utilized. Access was secured with a wire. 1% lidocaine was administered over the tunnel site in the ipsilateral chest wall, a tunnel was created in the usual manner and the catheter pulled through the tunnel and flushed. The tract was dilated to accommodate a 14.5 [place] 23 cm tip to cuff HemoSplit hemodialysis catheter which was put in place with its tip at the cavoatrial junction. All ports aspirated and flushed appropriately and were terminally flushed with heparinized saline. The venotomy site was closed with 4-0 Vicryl suture. The catheter was secured to the skin in the usual manner and is available for immediate use. The skin was cleaned and a dressing was applied. The patient remained comfortable throughout the procedure and there were no complications. FINDINGS: Internal jugular vein patent by ultrasound.     Successful image guided tunneled  dialysis catheter placement. Plan: The catheter is available for immediate use.     US-RENAL    Result Date: 11/3/2021  11/3/2021 1:47 PM HISTORY/REASON FOR EXAM:  Abnormal Labs TECHNIQUE/EXAM DESCRIPTION: Renal ultrasound. COMPARISON:  Unenhanced CT abdomen and pelvis 2021 FINDINGS: The right kidney measures 9.96 cm. The left kidney measures 11.42 cm. There is no hydronephrosis. There are no abnormal calcifications. The bladder is decompressed around a Guy catheter. Incidental note is made of BILATERAL pleural effusions. There is a small amount of ascites. Incidental note is further made of gallbladder sludge in cholelithiasis with gallbladder wall thickening.     1.  BILATERAL pleural effusions 2.  Small volume of ascites 3.  Cholelithiasis and gallbladder sludge with gallbladder wall thickening incidentally noted. Cholecystitis is a consideration.    EC-ECHOCARDIOGRAM COMPLETE W/O CONT    Result Date: 11/3/2021  Transthoracic Echo Report Echocardiography Laboratory CONCLUSIONS No prior study is available for comparison. Normal left ventricular chamber size, wall thickness, diastolic and systolic function. Normal right ventricular size and systolic function. No hemodynamically significant valvular heart disease. RAMA ROUSE Exam Date:         2021                    13:03 Exam Location:     Inpatient Priority:          Routine Ordering Physician:        ROSE MARIE MOTTA Referring Physician:       [id number], MINOR Sonographer:               Kendra Echols Age:    71     Gender:    M MRN:    [id number] :    1950 BSA:    1.9    Ht (in):    70     Wt (lb):    160 Exam Type:     Complete Indications:     Heart Failure, Systolic ICD Codes:       428.2 CPT Codes:       75775 BP:   157    /   77     HR:   85 Technical Quality:       Technically difficult study -                          adequate information is obtained MEASUREMENTS  (Male / Female) Normal Values 2D ECHO LV Diastolic Diameter PLAX         4.2 cm                4.2 - 5.9 / 3.9 - 5.3 cm LV Systolic Diameter PLAX         3.6 cm                2.1 - 4.0 cm IVS Diastolic Thickness           0.69 cm               LVPW Diastolic Thickness          0.79 cm               LVOT Diameter                     1.8 cm                Estimated LV Ejection Fraction    60 %                  LV Ejection Fraction MOD BP       61.2 %                >= 55  % LV Ejection Fraction MOD 4C       66.5 %                LV Ejection Fraction MOD 2C       52.3 %                IVC Diameter                      0.82 cm               DOPPLER AV Peak Velocity                  1.3 m/s               AV Peak Gradient                  6.8 mmHg              AV Mean Gradient                  4 mmHg                LVOT Peak Velocity                1.1 m/s               AV Area Cont Eq vti               2.6 cm2               Mitral E Point Velocity           0.42 m/s              Mitral E to A Ratio               0.53                  MV Pressure Half Time             64.6 ms               MV Area PHT                       3.4 cm2               MV Deceleration Time              223 ms                PV Peak Velocity                  1.1 m/s               PV Peak Gradient                  4.8 mmHg              * Indicates values subject to auto-interpretation LV EF:  60    % FINDINGS Left Ventricle Normal left ventricular chamber size. Normal left ventricular wall thickness. Normal left ventricular systolic function. The left ventricular ejection fraction is visually estimated to be 60%. Normal regional wall motion. Normal diastolic function. Right Ventricle Normal right ventricular size and systolic function. Right Atrium Normal right atrial size. Normal inferior vena cava size and inspiratory collapse. Left Atrium Normal left atrial size. Left atrial volume index is 19 mL/sq m. Mitral Valve Structurally normal mitral valve without significant stenosis or regurgitation. Aortic Valve  Structurally normal aortic valve without significant stenosis or regurgitation. Tricuspid Valve Structurally normal tricuspid valve without significant stenosis or regurgitation. Right atrial pressure is estimated to be 3 mmHg. Unable to estimate pulmonary artery pressure due to an inadequate tricuspid regurgitant jet. Pulmonic Valve Structurally normal pulmonic valve without significant stenosis or regurgitation. Pericardium No pericardial effusion. Aorta Normal aortic root for body surface area. The aortic root and ascending aorta are not well visualized. Sukhjinder Chilel MD (Electronically Signed) Final Date:     03 November 2021                 15:18    [id number]+ RIGHT    Result Date: 11/12/2021 11/12/2021 8:48 AM HISTORY/REASON FOR EXAM:  Pain/Deformity Following Trauma. TECHNIQUE/EXAM DESCRIPTION AND NUMBER OF VIEWS:  2 views of the RIGHT femur. COMPARISON: None FINDINGS: Distal femoral lateral plate and screw fixation hardware is identified. The total knee arthroplasty is incompletely visualized. No hardware fracture or loosening is detected. Comminuted metadiaphysis fractures distally are seen. These are likely early subacute with no bridging callus seen. No proximal femoral fracture     Comminuted distal femoral metadiaphysis fractures with lateral plate and screw fixation hardware in place Incompletely visualized total knee arthroplasty    See Dr. Zacarias PROC NOTE for renal biopsy    LABORATORY  Lab Results   Component Value Date    SODIUM 134 (L) 11/17/2021    POTASSIUM 3.1 (L) 11/17/2021    CHLORIDE 93 (L) 11/17/2021    CO2 31 11/17/2021    GLUCOSE 97 11/17/2021    BUN 23 (H) 11/17/2021    CREATININE 1.12 11/17/2021        Lab Results   Component Value Date    WBC 5.0 11/17/2021    HEMOGLOBIN 7.5 (L) 11/17/2021    HEMATOCRIT 23.5 (L) 11/17/2021    PLATELETCT 293 11/17/2021        Total time of the discharge process exceeds 35 minutes.

## 2021-11-17 NOTE — DISCHARGE INSTRUCTIONS
Discharge Instructions    Discharged to other by medical transportation with escort. Discharged via wheelchair, hospital escort: Yes.  Special equipment needed: Not Applicable    Be sure to schedule a follow-up appointment with your primary care doctor or any specialists as instructed.     Discharge Plan:   Influenza Vaccine Indication: Patient Refuses    I understand that a diet low in cholesterol, fat, and sodium is recommended for good health. Unless I have been given specific instructions below for another diet, I accept this instruction as my diet prescription.   Other diet:     Special Instructions:     Per MD    Follow up Gerhard Carrillo M.D. or attending physican at facility upon receipt     Follow up with gastroenterology for occult positive stool in 1 week.     Folow up with Dr. Hutchinson, Nephrology in 1 week. BP stable, currently lisinopril being held. defer restart as per Nephrology     Return to ER in the event of new or worsening symptoms. Please note importance of compliance and the patient has agreed to proceed with all medical recommendations and follow up plan indicated above. All medications come with benefits and risks. Risks may include permanent injury or death and these risks can be minimized with close reassessment and monitoring. Please make it to your scheduled follow ups with your primary provider, and/or specialists clinic.    · Is patient discharged on Warfarin / Coumadin?   No     Acute Kidney Injury, Adult    Acute kidney injury is a sudden worsening of kidney function. The kidneys are organs that have several jobs. They filter the blood to remove waste products and extra fluid. They also maintain a healthy balance of minerals and hormones in the body, which helps control blood pressure and keep bones strong. With this condition, your kidneys do not do their jobs as well as they should.  This condition ranges from mild to severe. Over time it may develop into long-lasting (chronic) kidney  disease. Early detection and treatment may prevent acute kidney injury from developing into a chronic condition.  What are the causes?  Common causes of this condition include:  · A problem with blood flow to the kidneys. This may be caused by:  ? Low blood pressure (hypotension) or shock.  ? Blood loss.  ? Heart and blood vessel (cardiovascular) disease.  ? Severe burns.  ? Liver disease.  · Direct damage to the kidneys. This may be caused by:  ? Certain medicines.  ? A kidney infection.  ? Poisoning.  ? Being around or in contact with toxic substances.  ? A surgical wound.  ? A hard, direct hit to the kidney area.  · A sudden blockage of urine flow. This may be caused by:  ? Cancer.  ? Kidney stones.  ? An enlarged prostate in males.  What are the signs or symptoms?  Symptoms of this condition may not be obvious until the condition becomes severe. Symptoms of this condition can include:  · Tiredness (lethargy), or difficulty staying awake.  · Nausea or vomiting.  · Swelling (edema) of the face, legs, ankles, or feet.  · Problems with urination, such as:  ? Abdominal pain, or pain along the side of your stomach (flank).  ? Decreased urine production.  ? Decrease in the force of urine flow.  · Muscle twitches and cramps, especially in the legs.  · Confusion or trouble concentrating.  · Loss of appetite.  · Fever.  How is this diagnosed?  This condition may be diagnosed with tests, including:  · Blood tests.  · Urine tests.  · Imaging tests.  · A test in which a sample of tissue is removed from the kidneys to be examined under a microscope (kidney biopsy).  How is this treated?  Treatment for this condition depends on the cause and how severe the condition is. In mild cases, treatment may not be needed. The kidneys may heal on their own. In more severe cases, treatment will involve:  · Treating the cause of the kidney injury. This may involve changing any medicines you are taking or adjusting your dosage.  · Fluids.  You may need specialized IV fluids to balance your body's needs.  · Having a catheter placed to drain urine and prevent blockages.  · Preventing problems from occurring. This may mean avoiding certain medicines or procedures that can cause further injury to the kidneys.  In some cases treatment may also require:  · A procedure to remove toxic wastes from the body (dialysis or continuous renal replacement therapy - CRRT).  · Surgery. This may be done to repair a torn kidney, or to remove the blockage from the urinary system.  Follow these instructions at home:  Medicines  · Take over-the-counter and prescription medicines only as told by your health care provider.  · Do not take any new medicines without your health care provider's approval. Many medicines can worsen your kidney damage.  · Do not take any vitamin and mineral supplements without your health care provider's approval. Many nutritional supplements can worsen your kidney damage.  Lifestyle  · If your health care provider prescribed changes to your diet, follow them. You may need to decrease the amount of protein you eat.  · Achieve and maintain a healthy weight. If you need help with this, ask your health care provider.  · Start or continue an exercise plan. Try to exercise at least 30 minutes a day, 5 days a week.  · Do not use any tobacco products, such as cigarettes, chewing tobacco, and e-cigarettes. If you need help quitting, ask your health care provider.  General instructions  · Keep track of your blood pressure. Report changes in your blood pressure as told by your health care provider.  · Stay up to date with immunizations. Ask your health care provider which immunizations you need.  · Keep all follow-up visits as told by your health care provider. This is important.  Where to find more information  · American Association of Kidney Patients: www.aakp.org  · National Kidney Foundation: www.kidney.org  · American Kidney Fund: www.akfinc.org  · [employer]  [employer] [facility]:  ? www.lifeoptions.org  ? www.kidneyschool.org  Contact a health care provider if:  · Your symptoms get worse.  · You develop new symptoms.  Get help right away if:  · You develop symptoms of worsening kidney disease, which include:  ? Headaches.  ? Abnormally dark or light skin.  ? Easy bruising.  ? Frequent hiccups.  ? Chest pain.  ? Shortness of breath.  ? End of menstruation in women.  ? Seizures.  ? Confusion or altered mental status.  ? Abdominal or back pain.  ? Itchiness.  · You have a fever.  · Your body is producing less urine.  · You have pain or bleeding when you urinate.  Summary  · Acute kidney injury is a sudden worsening of kidney function.  · Acute kidney injury can be caused by problems with blood flow to the kidneys, direct damage to the kidneys, and sudden blockage of urine flow.  · Symptoms of this condition may not be obvious until it becomes severe. Symptoms may include edema, lethargy, confusion, nausea or vomiting, and problems passing urine.  · This condition can usually be diagnosed with blood tests, urine tests, and imaging tests. Sometimes a kidney biopsy is done to diagnose this condition.  · Treatment for this condition often involves treating the underlying cause. It is treated with fluids, medicines, dialysis, diet changes, or surgery.  This information is not intended to replace advice given to you by your health care provider. Make sure you discuss any questions you have with your health care provider.  Document Released: 07/02/2012 Document Revised: 11/30/2018 Document Reviewed: 12/08/2017  Elsevier Patient Education © 2020 [employer]      Hyponatremia  Hyponatremia is when the amount of salt (sodium) in your blood is too low. When salt levels are low, your body may take in extra water. This can cause swelling throughout the body. The swelling often affects the brain.  What are the causes?  This condition may be caused by:  · Certain medical  problems or conditions.  · Vomiting a lot.  · Having watery poop (diarrhea) often.  · Certain medicines or illegal drugs.  · Not having enough water in the body (dehydration).  · Drinking too much water.  · Eating a diet that is low in salt.  · Large burns on your body.  · Too much sweating.  What increases the risk?  You are more likely to get this condition if you:  · Have long-term (chronic) kidney disease.  · Have heart failure.  · Have a medical condition that causes you to have watery poop often.  · Do very hard exercises.  · Take medicines that affect the amount of salt is in your blood.  What are the signs or symptoms?  Symptoms of this condition include:  · Headache.  · Feeling like you may vomit (nausea).  · Vomiting.  · Being very tired (lethargic).  · Muscle weakness and cramps.  · Not wanting to eat as much as normal (loss of appetite).  · Feeling weak or light-headed.  Severe symptoms of this condition include:  · Confusion.  · Feeling restless (agitation).  · Having a fast heart rate.  · Passing out (fainting).  · Seizures.  · Coma.  How is this treated?  Treatment for this condition depends on the cause. Treatment may include:  · Getting fluids through an IV tube that is put into one of your veins.  · Taking medicines to fix the salt levels in your blood. If medicines are causing the problem, your medicines will need to be changed.  · Limiting how much water or fluid you take in.  · Monitoring in the hospital to watch your symptoms.  Follow these instructions at home:    · Take over-the-counter and prescription medicines only as told by your doctor. Many medicines can make this condition worse. Talk with your doctor about any medicines that you are taking.  · Eat and drink exactly as you are told by your doctor.  ? Eat only the foods you are told to eat.  ? Limit how much fluid you take.  · Do not drink alcohol.  · Keep all follow-up visits as told by your doctor. This is important.  Contact a doctor  if:  · You feel more like you may vomit.  · You feel more tired.  · Your headache gets worse.  · You feel more confused.  · You feel weaker.  · Your symptoms go away and then they come back.  · You have trouble following the diet instructions.  Get help right away if:  · You have a seizure.  · You pass out.  · You keep having watery poop.  · You keep vomiting.  Summary  · Hyponatremia is when the amount of salt in your blood is too low.  · When salt levels are low, you can have swelling throughout the body. The swelling mostly affects the brain.  · Treatment depends on the cause. Treatment may include getting IV fluids, medicines, or not drinking as much fluid.  This information is not intended to replace advice given to you by your health care provider. Make sure you discuss any questions you have with your health care provider.  Document Released: 08/29/2012 Document Revised: 03/05/2020 Document Reviewed: 11/21/2019  [employer] Patient Education © 2020 [employer]    Amlodipine tablets  What is this medicine?  AMLODIPINE (am ELAINE di pejulio) is a calcium-channel blocker. It affects the amount of calcium found in your heart and muscle cells. This relaxes your blood vessels, which can reduce the amount of work the heart has to do. This medicine is used to lower high blood pressure. It is also used to prevent chest pain.  This medicine may be used for other purposes; ask your health care provider or pharmacist if you have questions.  COMMON BRAND NAME(S): Norvasc  What should I tell my health care provider before I take this medicine?  They need to know if you have any of these conditions:  · heart disease  · liver disease  · an unusual or allergic reaction to amlodipine, other medicines, foods, dyes, or preservatives  · pregnant or trying to get pregnant  · breast-feeding  How should I use this medicine?  Take this medicine by mouth with a glass of water. Follow the directions on the prescription label. You can take it  with or without food. If it upsets your stomach, take it with food. Take your medicine at regular intervals. Do not take it more often than directed. Do not stop taking except on your doctor's advice.  Talk to your pediatrician regarding the use of this medicine in children. While this drug may be prescribed for children as young as 6 years for selected conditions, precautions do apply.  Patients over 65 years of age may have a stronger reaction and need a smaller dose.  Overdosage: If you think you have taken too much of this medicine contact a poison control center or emergency room at once.  NOTE: This medicine is only for you. Do not share this medicine with others.  What if I miss a dose?  If you miss a dose, take it as soon as you can. If it is almost time for your next dose, take only that dose. Do not take double or extra doses.  What may interact with this medicine?  Do not take this medicine with any of the following medications:  · tranylcypromine  This medicine may also interact with the following medications:  · clarithromycin  · cyclosporine  · diltiazem  · itraconazole  · simvastatin  · tacrolimus  This list may not describe all possible interactions. Give your health care provider a list of all the medicines, herbs, non-prescription drugs, or dietary supplements you use. Also tell them if you smoke, drink alcohol, or use illegal drugs. Some items may interact with your medicine.  What should I watch for while using this medicine?  Visit your healthcare professional for regular checks on your progress. Check your blood pressure as directed. Ask your healthcare professional what your blood pressure should be and when you should contact him or her.  Do not treat yourself for coughs, colds, or pain while you are using this medicine without asking your healthcare professional for advice. Some medicines may increase your blood pressure.  You may get dizzy. Do not drive, use machinery, or do anything that  needs mental alertness until you know how this medicine affects you. Do not stand or sit up quickly, especially if you are an older patient. This reduces the risk of dizzy or fainting spells. Avoid alcoholic drinks; they can make you dizzier.  What side effects may I notice from receiving this medicine?  Side effects that you should report to your doctor or health care professional as soon as possible:  · allergic reactions like skin rash, itching or hives; swelling of the face, lips, or tongue  · fast, irregular heartbeat  · signs and symptoms of low blood pressure like dizziness; feeling faint or lightheaded, falls; unusually weak or tired  · swelling of ankles, feet, hands  Side effects that usually do not require medical attention (report these to your doctor or health care professional if they continue or are bothersome):  · dry mouth  · facial flushing  · headache  · stomach pain  · tiredness  This list may not describe all possible side effects. Call your doctor for medical advice about side effects. You may report side effects to FDA at 6-439-GMK-3985.  Where should I keep my medicine?  Keep out of the reach of children.  Store at room temperature between 59 and 86 degrees F (15 and 30 degrees C).  Throw away any unused medicine after the expiration date.  NOTE: This sheet is a summary. It may not cover all possible information. If you have questions about this medicine, talk to your doctor, pharmacist, or health care provider.  © 2020 Elsevier[employer] Standard (2019-07-12 15:07:10)    Benzocaine Oral Lozenges  What is this medicine?  BENZOCAINE (ZULY gary howard) causes loss of feeling on the skin and in the mouth. It is used to treat mouth and throat pain.  This medicine may be used for other purposes; ask your health care provider or pharmacist if you have questions.  COMMON BRAND NAME(S): Cepacol Sensations, Chloraseptic  What should I tell my health care provider before I take this medicine?  They need to know  if you have any of these conditions:  · mouth sores or infection  · an unusual or allergic reaction to benzocaine, para-aminobenzoic acid (PABA), other medicines, foods, dyes, or preservatives  · pregnant or trying to get pregnant  · breast-feeding  How should I use this medicine?  Dissolve this medicine slowly and completely in the mouth. Do not cut, crush or chew this medicine. Wash your hands before and after using this medicine. Follow the directions on the label or those given to you by your doctor or health care professional. Do not use this medicine more often than directed.  Talk to your pediatrician regarding the use of this medicine in children. Special care may be needed.  Overdosage: If you think you have taken too much of this medicine contact a poison control center or emergency room at once.  NOTE: This medicine is only for you. Do not share this medicine with others.  What if I miss a dose?  If you miss a dose, take it as soon as you can. If it is almost time for your next dose, take only that dose. Do not take double or extra doses.  What may interact with this medicine?  · sulfonamides like sulfacetamide, sulfamethoxazole, sulfisoxazole, and others  This list may not describe all possible interactions. Give your health care provider a list of all the medicines, herbs, non-prescription drugs, or dietary supplements you use. Also tell them if you smoke, drink alcohol, or use illegal drugs. Some items may interact with your medicine.  What should I watch for while using this medicine?  This medicine is not for long-term use. Do not use for longer than directed by your doctor or health care professional. Contact your doctor or health care professional if your condition does not start to get better within a few days or if you notice redness, itching or swelling.  The affected area of your mouth will be numb. Try to avoid injury to that area. To avoid biting the tongue or cheek, or difficulty swallowing,  do not chew gum or food until the numbness wears off.  What side effects may I notice from receiving this medicine?  Side effects that you should report to your doctor or health care professional as soon as possible:  · allergic reactions like skin rash, itching or hives, swelling of the face, lips, or tongue  · breathing problems  · dizziness or drowsiness  · fast or slow heartbeat  · headache  · increased sweating  · restlessness, nervousness, anxiety  · seizures  · tremors  Side effects that usually do not require medical attention (report to your doctor or health care professional if they continue or are bothersome):  · redness, swelling, or pain  This list may not describe all possible side effects. Call your doctor for medical advice about side effects. You may report side effects to FDA at 1-107-NZD-0256.  Where should I keep my medicine?  Keep out of the reach of children.  Store at room temperature between 15 and 30 degrees C (59 and 86 degrees F). Throw away any unused medicine after the expiration date.  NOTE: This sheet is a summary. It may not cover all possible information. If you have questions about this medicine, talk to your doctor, pharmacist, or health care provider.  © 2020 Elsevier/Gold Standard (2009-03-23 15:57:14)      Guaifenesin oral solution and syrup  What is this medicine?  GUAIFENESIN (gwye FEN e sin) is an expectorant. It helps to thin mucous and make coughs more productive. This medicine is used to treat coughs caused by colds or the flu. It is not intended to treat chronic cough caused by smoking, asthma, emphysema, or heart failure.  This medicine may be used for other purposes; ask your health care provider or pharmacist if you have questions.  COMMON BRAND NAME(S): Altarussin, Altorant, Cough, Diabetic Tussin EX, Diabetic Tussin Mucus Relief, ElixSure EX, Ganidin NR, MORENITA-TUSSIN, Guiatuss, Iophen-NR, Miltuss EX, Mucinex Children's, Mucus + Chest Congestion, Mucus Relief  Children's, Naldecon, Organidin NR, Q-Tussin, Robafen, Robafen Congestion, Robitussin, Robitussin Mucus + Chest Congestion, Scot-Tussin Expectorant, Siltussin FLETCHER, Siltussin Diabetic FLETCHER-Na, Siltussin SA  What should I tell my health care provider before I take this medicine?  They need to know if you have any of these conditions:  · diabetes  · fever  · kidney disease  · an unusual or allergic reaction to guaifenesin, other medicines, foods, dyes, or preservatives  · pregnant or trying to get pregnant  · breast-feeding  How should I use this medicine?  Take this medicine by mouth. Follow the directions on the prescription label. Use a specially marked spoon or container to measure your dose. Household spoons are not accurate. Take your medicine at regular intervals. Do not take it more often than directed.  Talk to your pediatrician regarding the use of this medicine in children. Special care may be needed.  Overdosage: If you think you have taken too much of this medicine contact a poison control center or emergency room at once.  NOTE: This medicine is only for you. Do not share this medicine with others.  What if I miss a dose?  If you miss a dose, take it as soon as you can. If it is almost time for your next dose, take only that dose. Do not take double or extra doses.  What may interact with this medicine?  Interactions are not expected.  This list may not describe all possible interactions. Give your health care provider a list of all the medicines, herbs, non-prescription drugs, or dietary supplements you use. Also tell them if you smoke, drink alcohol, or use illegal drugs. Some items may interact with your medicine.  What should I watch for while using this medicine?  Do not treat a cough for more than 1 week without consulting your doctor or health care professional. If you also have a high fever, skin rash, continuing headache, or sore throat, see your doctor.  For best results, drink 6 to 8 glasses  water daily while you are taking this medicine.  What side effects may I notice from receiving this medicine?  Side effects that you should report to your doctor or health care professional as soon as possible:  · allergic reactions like skin rash, itching or hives, swelling of the face, lips, or tongue  Side effects that usually do not require medical attention (report to your doctor or health care professional if they continue or are bothersome):  · dizziness  · headache  · stomach upset  This list may not describe all possible side effects. Call your doctor for medical advice about side effects. You may report side effects to FDA at 8-357-JOA-4283.  Where should I keep my medicine?  Keep out of the reach of children.  Store at room temperature between 20 and 25 degrees C (68 and 77 degrees F). Do not freeze. Keep container tightly closed. Throw away any unused medicine after the expiration date.  NOTE: This sheet is a summary. It may not cover all possible information. If you have questions about this medicine, talk to your doctor, pharmacist, or health care provider.  © 2020 [employer] (2009-04-29 11:48:29)    Hydralazine tablets  What is this medicine?  HYDRALAZINE (yogi brower) is a type of vasodilator. It relaxes blood vessels, increasing the blood and oxygen supply to your heart. This medicine is used to treat high blood pressure.  This medicine may be used for other purposes; ask your health care provider or pharmacist if you have questions.  COMMON BRAND NAME(S): Apresoline  What should I tell my health care provider before I take this medicine?  They need to know if you have any of these conditions:  · blood vessel disease  · heart disease including angina or history of heart attack  · kidney or liver disease  · systemic lupus erythematosus (SLE)  · an unusual or allergic reaction to hydralazine, tartrazine dye, other medicines, foods, dyes, or preservatives  · pregnant or trying to get  pregnant  · breast-feeding  How should I use this medicine?  Take this medicine by mouth with a glass of water. Follow the directions on the prescription label. Take your doses at regular intervals. Do not take your medicine more often than directed. Do not stop taking except on the advice of your doctor or health care professional.  Talk to your pediatrician regarding the use of this medicine in children. Special care may be needed. While this drug may be prescribed for children for selected conditions, precautions do apply.  Overdosage: If you think you have taken too much of this medicine contact a poison control center or emergency room at once.  NOTE: This medicine is only for you. Do not share this medicine with others.  What if I miss a dose?  If you miss a dose, take it as soon as you can. If it is almost time for your next dose, take only that dose. Do not take double or extra doses.  What may interact with this medicine?  · medicines for high blood pressure  · medicines for mental depression  This list may not describe all possible interactions. Give your health care provider a list of all the medicines, herbs, non-prescription drugs, or dietary supplements you use. Also tell them if you smoke, drink alcohol, or use illegal drugs. Some items may interact with your medicine.  What should I watch for while using this medicine?  Visit your doctor or health care professional for regular checks on your progress. Check your blood pressure and pulse rate regularly. Ask your doctor or health care professional what your blood pressure and pulse rate should be and when you should contact him or her.  You may get drowsy or dizzy. Do not drive, use machinery, or do anything that needs mental alertness until you know how this medicine affects you. Do not stand or sit up quickly, especially if you are an older patient. This reduces the risk of dizzy or fainting spells. Alcohol may interfere with the effect of this  medicine. Avoid alcoholic drinks.  Do not treat yourself for coughs, colds, or pain while you are taking this medicine without asking your doctor or health care professional for advice. Some ingredients may increase your blood pressure.  What side effects may I notice from receiving this medicine?  Side effects that you should report to your doctor or health care professional as soon as possible:  · chest pain, or fast or irregular heartbeat  · fever, chills, or sore throat  · numbness or tingling in the hands or feet  · shortness of breath  · skin rash, redness, blisters or itching  · stiff or swollen joints  · sudden weight gain  · swelling of the feet or legs  · swollen lymph glands  · unusual weakness  Side effects that usually do not require medical attention (report to your doctor or health care professional if they continue or are bothersome):  · diarrhea, or constipation  · headache  · loss of appetite  · nausea, vomiting  This list may not describe all possible side effects. Call your doctor for medical advice about side effects. You may report side effects to FDA at 6-913-ZRD-0614.  Where should I keep my medicine?  Keep out of the reach of children.  Store at room temperature between 15 and 30 degrees C (59 and 86 degrees F). Throw away any unused medicine after the expiration date.  NOTE: This sheet is a summary. It may not cover all possible information. If you have questions about this medicine, talk to your doctor, pharmacist, or health care provider.  © 2020 Elsevier/Gold Standard (2009-05-01 15:44:58)    Sodium Bicarbonate tablets  What is this medicine?  SODIUM BICARBONATE (ORA janelle um; bye LUKASZ bon ate) is an antacid. It is used to treat acid indigestion and heartburn caused by too much acid in the stomach.  This medicine may be used for other purposes; ask your health care provider or pharmacist if you have questions.  What should I tell my health care provider before I take this medicine?  They  need to know if you have any of these conditions:  · Cushing's syndrome  · heart problems  · kidney disease  · low levels of calcium or potassium in the blood  · low salt or sodium diet  · stomach ulcer  · an unusual or allergic reaction to sodium bicarbonate, other medicines, foods, dyes, or preservatives  · pregnant or trying to get pregnant  · breast-feeding  How should I use this medicine?  Take this medicine by mouth. Dissolve it in a glass of water before taking. Do not take tablets whole. Follow the directions on the package label. Take your medicine at regular intervals. Do not take it more often than directed.  Talk to your pediatrician regarding the use of this medicine in children. Special care may be needed.  Patients over 60 years old may have a stronger reaction and need a smaller dose.  Overdosage: If you think you have taken too much of this medicine contact a poison control center or emergency room at once.  NOTE: This medicine is only for you. Do not share this medicine with others.  What if I miss a dose?  If you miss a dose, take it as soon as you can. If it is almost time for your next dose, take only that dose. Do not take double or extra doses.  What may interact with this medicine?  Do not take this medicine with any of the following medications:  · ammonium chloride  This medicine may also interact with the following medications:  · antibiotics like ciprofloxacin, doxycycline, tetracycline  · aspirin and aspirin-like medicines  · delavirdine  · ephedra, Ma Rubin  · itraconazole, ketoconazole  · lithium  · methenamine  · norfloxacin  · quinidine  · quinine  · stimulants like amphetamine, dexmethylphenidate, and others  · tolmetin  This list may not describe all possible interactions. Give your health care provider a list of all the medicines, herbs, non-prescription drugs, or dietary supplements you use. Also tell them if you smoke, drink alcohol, or use illegal drugs. Some items may interact  with your medicine.  What should I watch for while using this medicine?  Tell your doctor or healthcare professional if your symptoms do not start to get better or if they get worse. Do not take this medicine for more than 2 weeks unless your doctor directs you to. See your doctor if your symptoms return. You may have a serious medical condition.  What side effects may I notice from receiving this medicine?  Side effects that you should report to your doctor or health care professional as soon as possible:  · allergic reactions like skin rash, itching or hives, swelling of the face, lips, or tongue  · confusion  · dizziness  · muscle pain  · nausea, vomiting  · seizures  · swelling of the feet and legs  · unusually weak or tired  Side effects that usually do not require medical attention (report to your doctor or health care professional if they continue or are bothersome):  · bloating and stomach gas  · increased thirst  · stomach cramps  This list may not describe all possible side effects. Call your doctor for medical advice about side effects. You may report side effects to FDA at 7-999-XMI-5029.  Where should I keep my medicine?  Keep out of the reach of children.  Store at room temperature between 15 and 30 degrees C (59 and 86 degrees F). Keep container tightly closed. Throw away any unused medicine after the expiration date.  NOTE: This sheet is a summary. It may not cover all possible information. If you have questions about this medicine, talk to your doctor, pharmacist, or health care provider.  © 2020 Elsevier/Gold Standard (2009-08-19 15:35:52)  Depression / Suicide Risk    As you are discharged from this [facility] facility, it is important to learn how to keep safe from harming yourself.    Recognize the warning signs:  · Abrupt changes in personality, positive or negative- including increase in energy   · Giving away possessions  · Change in eating patterns- significant weight changes-  positive  or negative  · Change in sleeping patterns- unable to sleep or sleeping all the time   · Unwillingness or inability to communicate  · Depression  · Unusual sadness, discouragement and loneliness  · Talk of wanting to die  · Neglect of personal appearance   · Rebelliousness- reckless behavior  · Withdrawal from people/activities they love  · Confusion- inability to concentrate     If you or a loved one observes any of these behaviors or has concerns about self-harm, here's what you can do:  · Talk about it- your feelings and reasons for harming yourself  · Remove any means that you might use to hurt yourself (examples: pills, rope, extension cords, firearm)  · Get professional help from the community (Mental Health, Substance Abuse, psychological counseling)  · Do not be alone:Call your Safe Contact- someone whom you trust who will be there for you.  · Call your local CRISIS HOTLINE 200-9507 or 668-828-9300  · Call your local Children's Mobile Crisis Response Team [place] (598) 832-1354 or www.Movebubble  · Call the toll free National Suicide Prevention Hotlines   · National Suicide Prevention Lifeline 310-893-ZDOS (0026)  · National Hope Line Network 800-SUICIDE (309-7012)

## 2021-11-17 NOTE — DISCHARGE PLANNING
Met with pt's wife. She was at the bedside. She wanted CM to check with [facility] so CM left a message . She will also contact [facility]. Dr. Sesay is agreeable with Dc and he will order the rapid COVID test. Wife also signed the IMM.     Care Transition Team Assessment    Information Source  Orientation Level: Oriented to place,Oriented to person,Oriented to situation,Oriented to time,Oriented X4  Information Given By: Spouse  Informant's Name: Dannie    Readmission Evaluation  Is this a readmission?: No    Elopement Risk  Legal Hold: No  Ambulatory or Self Mobile in Wheelchair: No-Not an Elopement Risk  Elopement Risk: Not at Risk for Elopement    Interdisciplinary Discharge Planning  Does Admitting Nurse Feel This Could be a Complex Discharge?: No  Primary Care Physician: Dr. Kendall  Lives with - Patient's Self Care Capacity: Spouse  Patient or legal guardian wants to designate a caregiver: Yes  Caregiver name: Pritesh Moore  ([facility]) Authorization for Release of Health Information has been completed: Yes  Support Systems: Spouse / Significant Other  Housing / Facility: [address]  Do You Take your Prescribed Medications Regularly: Yes  Able to Return to Previous ADL's: Future Time w/Therapy  Mobility Issues: Yes  Prior Services: None,Home-Independent  Patient Prefers to be Discharged to:: SNF  Assistance Needed: Yes  Durable Medical Equipment: Walker,Other - Specify (cane)    Discharge Preparedness  Prior Functional Level: Ambulatory,Drives Self,Independent with Activities of Daily Living,Independent with Medication Management,Uses Cane  Difficulity with ADLs: Bathing,Dressing,Walking  Difficulity with IADLs: Driving,Shopping    Functional Assesment  Prior Functional Level: Ambulatory,Drives Self,Independent with Activities of Daily Living,Independent with Medication Management,Uses Cane    Finances  Financial Barriers to Discharge: Yes  Prescription Coverage: Yes    Vision / Hearing  Impairment  Vision Impairment : Yes  Right Eye Vision: Impaired,Wears Glasses  Left Eye Vision: Impaired,Wears Glasses  Hearing Impairment : No    Advance Directive  Advance Directive?: None    Domestic Abuse  Have you ever been the victim of abuse or violence?: No  Physical Abuse or Sexual Abuse: No  Verbal Abuse or Emotional Abuse: No  Possible Abuse/Neglect Reported to:: Not Applicable    Discharge Risks or Barriers  Discharge risks or barriers?: Post-acute placement / services    Anticipated Discharge Information  Discharge Disposition: D/T to SNF with Medicare cert in anticipation of skilled care (03)

## 2021-11-17 NOTE — DISCHARGE PLANNING
Anticipated Discharge Disposition:   Accepted by [facility]    Action:   Discussed with IDT, MD said pt is medically clear for dc.   CM called Dannie at [facility] and she has confirmed acceptance for today.   They will need a rapid COVID test, MD notified.  SW will set up transport.     Barriers to Discharge:   Transport set up.   Rapid COVID test    Plan:   CM will follow up.

## 2021-11-17 NOTE — CARE PLAN
The patient is Watcher - Medium risk of patient condition declining or worsening    Shift Goals  Clinical Goals: pain management   Patient Goals: rest  Family Goals: n/a    Progress made toward(s) clinical / shift goals:  Assess and reassess for pain periodically, see MAR and  flow sheets for intervention.     Patient is not progressing towards the following goals:

## 2021-11-17 NOTE — PROGRESS NOTES
Assumed care of patient at bedside report from NOC RN. Updated on POC. Patient currently A & O x 4; on room air; up x1 assist with FWW; without complaints of acute pain. Assessment completed. Call light within reach. Whiteboard updated. Fall precautions in place. Bed locked and in lowest position. All questions answered. No other needs indicated at this time.    COVID 19 surge in effect

## 2021-11-17 NOTE — DISCHARGE PLANNING
DC Transport Scheduled    Received request at: 1431    Transport Company Scheduled:  [employer]    Scheduled Date: 11/17/21  Scheduled Time: 1800    Destination:  [facility]  [address]    Notified care team of scheduled transport via Voalte.     If there are any changes needed to the DC transportation scheduled, please contact [facility] at ext. [phone] between the hours of 4769-8037 Mon-Fri. If outside those hours, contact the ED Case Manager at ext. [phone].

## 2021-11-17 NOTE — THERAPY
"Occupational Therapy  Daily Treatment     Patient Name: Zachary Moore  Age:  71 y.o., Sex:  male  Medical Record #: [id number]  Today's Date: 11/17/2021     Precautions  Precautions: Fall Risk,Weight Bearing As Tolerated Right Upper Extremity  Comments: 10/13: right femoral ORIF, now WBAT     Assessment    Pt was seen for OT treatment. Pt gave good effort and is motivated in therapy. Pt's wife not present for family training today.  Pt required Min A for bed mobility with HOB elevated and extended time to process MP. Pt initially  wanted to dress LB seated EOB without use of AE . Pt struggled and tended to hold his breath struggling with activity. C/o dizziness. Informed pt that he needed to try using AE which would be less stressful on the body. Pt stated again , \"But I want to do this without tools \", but then gave in. Pt educated in use of reacher to don pants and sock aid to don socks. Pt stated, \"You're right,  This is easier and less struggling\". Pt requried Min A to don pants with extended time and for problem solving but Mod A to don socks due to heels covered with Mepilex. Min A for UB dressing due to problems with IV in Right arm.  IV appeared the need to be replaced , bleeding slightly. RN informed . Pt able to do H/G seated EOB post set up. Pt stood with FWW for 3.5-4 mins at EOB working on increasing standing endurance and balance needed for standing ADL's and ADL transfers. Pt refused to sit up in chair post session stating he felt \"tired and dizzy.\" Requested to get BTB.  RN updated on OT treatment findings and recommendations .Pt is making progress towards OT goals.      Plan    Continue current treatment plan.    DC Equipment Recommendations: Unable to determine at this time  Discharge Recommendations: Recommend post-acute placement for additional occupational therapy services prior to discharge home    Subjective    \"I haven't stood for days. \"This feels so good\". \"Thank You\". Preparing pt for " standing ADL's and transfers.      Objective       11/17/21 1040   Cognition    Cognition / Consciousness WDL   Speech/ Communication Delayed Responses   Orientation Level Oriented x 4   Level of Consciousness Alert   Ability To Follow Commands 1 Step   Safety Awareness Impaired   New Learning Impaired   Attention Impaired  (increasing in attention span )   Sequencing Impaired   Comments improving in cognition as demo.    Passive ROM Upper Body   Comments WFL for simple self care tasks.    Active ROM Upper Body   Dominant Hand Right   Comments WFL for simple self care tasks.    Strength Upper Body   Comments functional for ADLs   Other Treatments   Other Treatments Provided Pt stood at EOB with FWW for 3.5 mins working on breath control and stretching back and legs.  Psychosocial intervention addressed.  Energy conservation techs with ADL's and use of AE for LB dressing.    Balance   Sitting Balance (Static) Fair +   Sitting Balance (Dynamic) Fair   Standing Balance (Static) Fair   Standing Balance (Dynamic) Fair -   Weight Shift Sitting Fair   Weight Shift Standing Fair  (with FWW)   Skilled Intervention Verbal Cuing;Tactile Cuing;Sequencing;Compensatory Strategies   Comments with FWW for standing ADL's at EOB.    Bed Mobility    Supine to Sit Minimal Assist   Sit to Supine Minimal Assist   Skilled Intervention Verbal Cuing;Tactile Cuing;Sequencing   Comments HOB elevated and use of bedrail.    Activities of Daily Living   Eating Supervision   Grooming Supervision;Seated   Bathing   (NT declined)   Upper Body Dressing Minimal Assist   Lower Body Dressing Moderate Assist  (EOB using AE and needing extended time. )   Toileting Moderate Assist   Skilled Intervention Verbal Cuing;Tactile Cuing;Sequencing;Compensatory Strategies   Comments Pt seen EOB only.  Mod A for clothing management up over hips in standing.    Functional Mobility   Sit to Stand Minimal Assist   Bed, Chair, Wheelchair Transfer   (declined due to  dizziness. )   Toilet Transfers Refused   Skilled Intervention Verbal Cuing;Tactile Cuing;Sequencing;Postural Facilitation;Compensatory Strategies   Comments with FWW    Activity Tolerance   Comments low activity tolerance,  limited by pain and fear of falling    Patient / Family Goals   Patient / Family Goal #1 To get better   Goal #1 Outcome Progressing as expected   Short Term Goals   Short Term Goal # 1 Pt will perform functional t/f's with min a while adhering to TTWB precautions   Goal Outcome # 1 Progressing slower than expected   Short Term Goal # 2 Pt will perform LB dressing with min a using AE    Goal Outcome # 2 Progressing as expected   Short Term Goal # 3 Pt will perform toileting task with min a   Goal Outcome # 3 Progressing as expected   Anticipated Discharge Equipment and Recommendations   DC Equipment Recommendations Unable to determine at this time   Discharge Recommendations Recommend post-acute placement for additional occupational therapy services prior to discharge home   Interdisciplinary Plan of Care Collaboration   IDT Collaboration with  Nursing   Collaboration Comments RN updated

## 2021-11-18 NOTE — PROGRESS NOTES
Patient discharged to [facility]. Report called. COBRA completed. Medications returned to wife from pharmacy. All personal belongings collected. IV access removed. Discharge instructions discussed with patient and wife. Medications reviewed. Follow up appointments to be scheduled at earliest convenience, contact information provided for recommended follow up. Patient escorted off unit via wheelchair with all personal belongings and family without incident.

## 2021-11-18 NOTE — DISCHARGE PLANNING
Noted that pt is COVID negative and result printed and included in packet.     Discussed with Dr. Sesay and pt has no narcotic prescriptions needed.

## 2022-01-10 ENCOUNTER — APPOINTMENT (RX ONLY)
Dept: URBAN - METROPOLITAN AREA CLINIC 4 | Facility: CLINIC | Age: 72
Setting detail: DERMATOLOGY
End: 2022-01-10

## 2022-01-10 DIAGNOSIS — D18.0 HEMANGIOMA: ICD-10-CM

## 2022-01-10 DIAGNOSIS — L81.4 OTHER MELANIN HYPERPIGMENTATION: ICD-10-CM

## 2022-01-10 DIAGNOSIS — Z85.828 PERSONAL HISTORY OF OTHER MALIGNANT NEOPLASM OF SKIN: ICD-10-CM

## 2022-01-10 DIAGNOSIS — L57.0 ACTINIC KERATOSIS: ICD-10-CM

## 2022-01-10 DIAGNOSIS — L82.1 OTHER SEBORRHEIC KERATOSIS: ICD-10-CM

## 2022-01-10 PROBLEM — D18.01 HEMANGIOMA OF SKIN AND SUBCUTANEOUS TISSUE: Status: ACTIVE | Noted: 2022-01-10

## 2022-01-10 PROBLEM — D48.5 NEOPLASM OF UNCERTAIN BEHAVIOR OF SKIN: Status: ACTIVE | Noted: 2022-01-10

## 2022-01-10 PROBLEM — C44.529 SQUAMOUS CELL CARCINOMA OF SKIN OF OTHER PART OF TRUNK: Status: ACTIVE | Noted: 2022-01-10

## 2022-01-10 PROCEDURE — 17003 DESTRUCT PREMALG LES 2-14: CPT

## 2022-01-10 PROCEDURE — ? LIQUID NITROGEN

## 2022-01-10 PROCEDURE — 69100 BIOPSY OF EXTERNAL EAR: CPT | Mod: 59

## 2022-01-10 PROCEDURE — 11102 TANGNTL BX SKIN SINGLE LES: CPT | Mod: 59

## 2022-01-10 PROCEDURE — 99213 OFFICE O/P EST LOW 20 MIN: CPT | Mod: 25

## 2022-01-10 PROCEDURE — ? DIAGNOSIS COMMENT

## 2022-01-10 PROCEDURE — 11103 TANGNTL BX SKIN EA SEP/ADDL: CPT | Mod: 59

## 2022-01-10 PROCEDURE — 17000 DESTRUCT PREMALG LESION: CPT | Mod: 59

## 2022-01-10 PROCEDURE — ? BIOPSY BY SHAVE METHOD

## 2022-01-10 PROCEDURE — ? OBSERVATION

## 2022-01-10 PROCEDURE — ? COUNSELING

## 2022-01-10 ASSESSMENT — LOCATION DETAILED DESCRIPTION DERM
LOCATION DETAILED: LEFT INFERIOR LATERAL MALAR CHEEK
LOCATION DETAILED: LEFT PROXIMAL POSTERIOR UPPER ARM
LOCATION DETAILED: RIGHT CLAVICULAR NECK
LOCATION DETAILED: RIGHT PROXIMAL POSTERIOR UPPER ARM
LOCATION DETAILED: RIGHT SUPERIOR CENTRAL BUCCAL CHEEK
LOCATION DETAILED: EPIGASTRIC SKIN
LOCATION DETAILED: LEFT CENTRAL MALAR CHEEK
LOCATION DETAILED: RIGHT SUPERIOR UPPER BACK
LOCATION DETAILED: LEFT ANTERIOR PROXIMAL UPPER ARM
LOCATION DETAILED: STERNUM
LOCATION DETAILED: RIGHT ANTERIOR PROXIMAL UPPER ARM
LOCATION DETAILED: RIGHT POSTERIOR SHOULDER

## 2022-01-10 ASSESSMENT — LOCATION SIMPLE DESCRIPTION DERM
LOCATION SIMPLE: LEFT CHEEK
LOCATION SIMPLE: CHEST
LOCATION SIMPLE: RIGHT CHEEK
LOCATION SIMPLE: LEFT UPPER ARM
LOCATION SIMPLE: RIGHT UPPER ARM
LOCATION SIMPLE: ABDOMEN
LOCATION SIMPLE: RIGHT ANTERIOR NECK
LOCATION SIMPLE: RIGHT SHOULDER
LOCATION SIMPLE: RIGHT UPPER BACK

## 2022-01-10 ASSESSMENT — LOCATION ZONE DERM
LOCATION ZONE: NECK
LOCATION ZONE: ARM
LOCATION ZONE: TRUNK
LOCATION ZONE: FACE

## 2022-01-10 NOTE — PROCEDURE: LIQUID NITROGEN
Post-Care Instructions: I reviewed with the patient in detail post-care instructions. Patient is to wear sunprotection, and avoid picking at any of the treated lesions. Pt may apply Vaseline to crusted or scabbing areas.
Duration Of Freeze Thaw-Cycle (Seconds): 5
Number Of Freeze-Thaw Cycles: 1 freeze-thaw cycle
Show Applicator Variable?: Yes
Render Post-Care Instructions In Note?: no
Detail Level: Detailed
Consent: The patient's consent was obtained including but not limited to risks of crusting, scabbing, blistering, scarring, darker or lighter pigmentary change, recurrence, incomplete removal and infection.

## 2022-01-10 NOTE — PROCEDURE: BIOPSY BY SHAVE METHOD
Detail Level: Detailed
Depth Of Biopsy: dermis
Was A Bandage Applied: Yes
Size Of Lesion In Cm: 0
Anticipated Plan (Based On Presumed Biopsy Results): Follow up in 2 months
Biopsy Type: H and E
Biopsy Method: Personna blade
Anesthesia Type: 1% lidocaine with epinephrine and a 1:10 solution of 8.4% sodium bicarbonate
Anesthesia Volume In Cc: 2
Hemostasis: Drysol and Electrocautery
Wound Care: Vaseline
Dressing: Band-Aid
Type Of Destruction Used: Curettage
Curettage Text: The wound bed was treated with curettage after the biopsy was performed.
Electrodesiccation Text: The wound bed was treated with electrodesiccation after the biopsy was performed.
Electrodesiccation And Curettage Text: The wound bed was treated with electrodesiccation and curettage after the biopsy was performed.
Lab: [id number]
Lab Facility: 
Render Path Notes In Note?: No
Consent: Verbal consent was obtained and risks were reviewed including but not limited to scarring, infection, bleeding, scabbing, incomplete removal, nerve damage and allergy to anesthesia.
Post-Care Instructions: I reviewed with the patient in detail post-care instructions. Patient is to keep the biopsy site dry overnight, and then apply vasaline twice daily until healed.
Notification Instructions: Patient will be notified of biopsy results. However, patient instructed to call the office if not contacted within 2 weeks.
Billing Type: Third-Party Bill
Information: Selecting Yes will display possible errors in your note based on the variables you have selected. This validation is only offered as a suggestion for you. PLEASE NOTE THAT THE VALIDATION TEXT WILL BE REMOVED WHEN YOU FINALIZE YOUR NOTE. IF YOU WANT TO FAX A PRELIMINARY NOTE YOU WILL NEED TO TOGGLE THIS TO 'NO' IF YOU DO NOT WANT IT IN YOUR FAXED NOTE.
Anticipated Plan (Based On Presumed Biopsy Results): MOHS
Anesthesia Volume In Cc: 1

## 2022-02-08 ENCOUNTER — APPOINTMENT (RX ONLY)
Dept: URBAN - METROPOLITAN AREA CLINIC 36 | Facility: CLINIC | Age: 72
Setting detail: DERMATOLOGY
End: 2022-02-08

## 2022-02-08 PROBLEM — C44.219 BASAL CELL CARCINOMA OF SKIN OF LEFT EAR AND EXTERNAL AURICULAR CANAL: Status: ACTIVE | Noted: 2022-02-08

## 2022-02-08 PROCEDURE — 17311 MOHS 1 STAGE H/N/HF/G: CPT

## 2022-02-08 PROCEDURE — ? MOHS SURGERY

## 2022-02-08 PROCEDURE — 15576 PEDICLE E/N/E/L/NTRORAL: CPT

## 2022-02-08 PROCEDURE — 17312 MOHS ADDL STAGE: CPT

## 2022-02-08 NOTE — PROCEDURE: MOHS SURGERY
Mohs Case Number: 
Previous Accession (Optional): [id number]
Biopsy Photograph Reviewed: Yes
Referring Physician (Optional): Tien
Consent Type: Consent 1 (Standard)
Eye Shield Used: No
Surgeon Performing Repair (Optional): Jose
Initial Size Of Lesion: 0.6
X Size Of Lesion In Cm (Optional): 0.5
Number Of Stages: 7
Primary Defect Length In Cm (Final Defect Size - Required For Flaps/Grafts): 3.7
Primary Defect Width In Cm (Final Defect Size - Required For Flaps/Grafts): 2.5
Repair Type: Flap
Oculoplastic Surgeon (A): Sravan
Oculoplastic Surgeon Procedure Text (A): After obtaining clear surgical margins the patient was sent to oculoplastics for surgical repair.  The patient understands they will receive post-surgical care and follow-up from the referring physician's office.
Otolaryngologist Procedure Text (A): After obtaining clear surgical margins the patient was sent to otolaryngology for surgical repair.  The patient understands they will receive post-surgical care and follow-up from the referring physician's office.
Plastic Surgeon Procedure Text (A): After obtaining clear surgical margins the patient was sent to plastics for surgical repair.  The patient understands they will receive post-surgical care and follow-up from the referring physician's office.
Mid-Level Procedure Text (A): After obtaining clear surgical margins the patient was sent to a mid-level provider for surgical repair.  The patient understands they will receive post-surgical care and follow-up from the mid-level provider.
Provider Procedure Text (A): After obtaining clear surgical margins the defect was repaired by another provider.
Asc Procedure Text (A): After obtaining clear surgical margins the patient was sent to an ASC for surgical repair.  The patient understands they will receive post-surgical care and follow-up from the ASC physician.
Simple / Intermediate / Complex Repair - Final Wound Length In Cm: 0
Suturegard Retention Suture: 2-0 Nylon
Retention Suture Bite Size: 3 mm
Length To Time In Minutes Device Was In Place: 10
Number Of Hemigard Strips Per Side: 1
Undermining Type: Entire Wound
Debridement Text: The wound edges were debrided prior to proceeding with the closure to facilitate wound healing.
Helical Rim Text: The closure involved the helical rim.
Vermilion Border Text: The closure involved the vermilion border.
Nostril Rim Text: The closure involved the nostril rim.
Retention Suture Text: Retention sutures were placed to support the closure and prevent dehiscence.
Flap Type: Posterior Auricular Interpolation Flap
Secondary Defect Length In Cm (Required For Flaps): 7.5
Secondary Defect Width In Cm (Required For Flaps): 4.5
Area H Indication Text: Tumors in this location are included in Area H (eyelids, eyebrows, nose, lips, chin, ear, pre-auricular, post-auricular, temple, genitalia, hands, feet, ankles and areola).  Tissue conservation is critical in these anatomic locations.
Area M Indication Text: Tumors in this location are included in Area M (cheek, forehead, scalp, neck, jawline and pretibial skin).  Mohs surgery is indicated for tumors in these anatomic locations.
Area L Indication Text: Tumors in this location are included in Area L (trunk and extremities).  Mohs surgery is indicated for larger tumors, or tumors with aggressive histologic features, in these anatomic locations.
Surgical Defect Length In Cm (Optional): 1.2
Surgical Defect Width In Cm (Optional): 1.0
Special Stains Stage 1 - Results: Base On Clearance Noted Above
Stage 2: Additional Anesthesia Type: 1% lidocaine with 1:100,000 epinephrine and 408mcg clindamycin/ml and a 1:10 solution of 8.4% sodium bicarbonate
Surgical Defect Length In Cm (Optional): 2.3
Surgical Defect Length In Cm (Optional): 2.7
Surgical Defect Width In Cm (Optional): 1.5
Stage 4: Additional Anesthesia Type: 1% lidocaine with epinephrine
Surgical Defect Length In Cm (Optional): 3.0
Surgical Defect Width In Cm (Optional): 1.7
Include Tumor Staging In Mohs Note?: Please Select the Appropriate Response
Staging Info: By selecting yes to the question above you will include information on AJCC 8 tumor staging in your Mohs note. Information on tumor staging will be automatically added for SCCs on the head and neck. AJCC 8 includes tumor size, tumor depth, perineural involvement and bone invasion.
Tumor Depth: Less than 6mm from granular layer and no invasion beyond the subcutaneous fat
Medical Necessity Statement: Based on my medical judgement, Mohs surgery is the most appropriate treatment for this cancer compared to other treatments.
Alternatives Discussed Intro (Do Not Add Period): I discussed alternative treatments to Mohs surgery and specifically discussed the risks and benefits of
Consent 1/Introductory Paragraph: The rationale for Mohs was explained to the patient and consent was obtained. The risks, benefits and alternatives to therapy were discussed in detail. Specifically, the risks of infection, scarring, bleeding, prolonged wound healing, incomplete removal, allergy to anesthesia, nerve injury and recurrence were addressed. Prior to the procedure, the treatment site was clearly identified and confirmed by the patient. All components of Universal Protocol/PAUSE Rule completed.
Consent 2/Introductory Paragraph: Mohs surgery was explained to the patient and consent was obtained. The risks, benefits and alternatives to therapy were discussed in detail. Specifically, the risks of infection, scarring, bleeding, prolonged wound healing, incomplete removal, allergy to anesthesia, nerve injury and recurrence were addressed. Prior to the procedure, the treatment site was clearly identified and confirmed by the patient. All components of Universal Protocol/PAUSE Rule completed.
Consent 3/Introductory Paragraph: I gave the patient a chance to ask questions they had about the procedure.  Following this I explained the Mohs procedure and consent was obtained. The risks, benefits and alternatives to therapy were discussed in detail. Specifically, the risks of infection, scarring, bleeding, prolonged wound healing, incomplete removal, allergy to anesthesia, nerve injury and recurrence were addressed. Prior to the procedure, the treatment site was clearly identified and confirmed by the patient. All components of Universal Protocol/PAUSE Rule completed.
Consent (Temporal Branch)/Introductory Paragraph: The rationale for Mohs was explained to the patient and consent was obtained. The risks, benefits and alternatives to therapy were discussed in detail. Specifically, the risks of damage to the temporal branch of the facial nerve, infection, scarring, bleeding, prolonged wound healing, incomplete removal, allergy to anesthesia, and recurrence were addressed. Prior to the procedure, the treatment site was clearly identified and confirmed by the patient. All components of Universal Protocol/PAUSE Rule completed.
Consent (Marginal Mandibular)/Introductory Paragraph: The rationale for Mohs was explained to the patient and consent was obtained. The risks, benefits and alternatives to therapy were discussed in detail. Specifically, the risks of damage to the marginal mandibular branch of the facial nerve, infection, scarring, bleeding, prolonged wound healing, incomplete removal, allergy to anesthesia, and recurrence were addressed. Prior to the procedure, the treatment site was clearly identified and confirmed by the patient. All components of Universal Protocol/PAUSE Rule completed.
Consent (Spinal Accessory)/Introductory Paragraph: The rationale for Mohs was explained to the patient and consent was obtained. The risks, benefits and alternatives to therapy were discussed in detail. Specifically, the risks of damage to the spinal accessory nerve, infection, scarring, bleeding, prolonged wound healing, incomplete removal, allergy to anesthesia, and recurrence were addressed. Prior to the procedure, the treatment site was clearly identified and confirmed by the patient. All components of Universal Protocol/PAUSE Rule completed.
Consent (Near Eyelid Margin)/Introductory Paragraph: The rationale for Mohs was explained to the patient and consent was obtained. The risks, benefits and alternatives to therapy were discussed in detail. Specifically, the risks of ectropion or eyelid deformity, infection, scarring, bleeding, prolonged wound healing, incomplete removal, allergy to anesthesia, nerve injury and recurrence were addressed. Prior to the procedure, the treatment site was clearly identified and confirmed by the patient. All components of Universal Protocol/PAUSE Rule completed.
Consent (Ear)/Introductory Paragraph: The rationale for Mohs was explained to the patient and consent was obtained. The risks, benefits and alternatives to therapy were discussed in detail. Specifically, the risks of ear deformity, infection, scarring, bleeding, prolonged wound healing, incomplete removal, allergy to anesthesia, nerve injury and recurrence were addressed. Prior to the procedure, the treatment site was clearly identified and confirmed by the patient. All components of Universal Protocol/PAUSE Rule completed.
Consent (Nose)/Introductory Paragraph: The rationale for Mohs was explained to the patient and consent was obtained. The risks, benefits and alternatives to therapy were discussed in detail. Specifically, the risks of nasal deformity, changes in the flow of air through the nose, infection, scarring, bleeding, prolonged wound healing, incomplete removal, allergy to anesthesia, nerve injury and recurrence were addressed. Prior to the procedure, the treatment site was clearly identified and confirmed by the patient. All components of Universal Protocol/PAUSE Rule completed.
Consent (Lip)/Introductory Paragraph: The rationale for Mohs was explained to the patient and consent was obtained. The risks, benefits and alternatives to therapy were discussed in detail. Specifically, the risks of lip deformity, changes in the oral aperture, infection, scarring, bleeding, prolonged wound healing, incomplete removal, allergy to anesthesia, nerve injury and recurrence were addressed. Prior to the procedure, the treatment site was clearly identified and confirmed by the patient. All components of Universal Protocol/PAUSE Rule completed.
Consent (Scalp)/Introductory Paragraph: The rationale for Mohs was explained to the patient and consent was obtained. The risks, benefits and alternatives to therapy were discussed in detail. Specifically, the risks of changes in hair growth pattern secondary to repair, infection, scarring, bleeding, prolonged wound healing, incomplete removal, allergy to anesthesia, nerve injury and recurrence were addressed. Prior to the procedure, the treatment site was clearly identified and confirmed by the patient. All components of Universal Protocol/PAUSE Rule completed.
Detail Level: Detailed
Postop Diagnosis: same
Anesthesia Type: 0.5% lidocaine with 1:200,000 epinephrine and a 1:10 solution of 8.4% sodium bicarbonate and 408mcg clindamycin/ml
Anesthesia Volume In Cc: 2.8
Additional Anesthesia Volume In Cc: 6
Hemostasis: Electrocautery
Estimated Blood Loss (Cc): less than 5 cc
Surgical Defect Length In Cm (Optional): 3.2
Surgical Defect Width In Cm (Optional): 1.9
Surgical Defect Length In Cm (Optional): 3.5
Surgical Defect Width In Cm (Optional): 2.2
Repair Anesthesia Method: local infiltration
Brow Lift Text: A midfrontal incision was made medially to the defect to allow access to the tissues just superior to the left eyebrow. Following careful dissection inferiorly in a supraperiosteal plane to the level of the left eyebrow, several 3-0 monocryl sutures were used to resuspend the eyebrow orbicularis oculi muscular unit to the superior frontal bone periosteum. This resulted in an appropriate reapproximation of static eyebrow symmetry and correction of the left brow ptosis.
Deep Sutures: 5-0 Polysorb
Epidermal Sutures: 4-0 Caprosyn
Epidermal Closure: simple interrupted
Suturegard Intro: Intraoperative tissue expansion was performed, utilizing the SUTUREGARD device, in order to reduce wound tension.
Suturegard Body: The suture ends were repeatedly re-tightened and re-clamped to achieve the desired tissue expansion.
Hemigard Intro: Due to skin fragility and wound tension, it was decided to use HEMIGARD adhesive retention suture devices to permit a linear closure. The skin was cleaned and dried for a 6cm distance away from the wound. Excessive hair, if present, was removed to allow for adhesion.
Hemigard Postcare Instructions: The HEMIGARD strips are to remain completely dry for at least 5-7 days.
Donor Site Anesthesia Type: same as repair anesthesia
Graft Basting Suture (Optional): 5-0 Fast Absorbing Gut
Graft Donor Site Epidermal Sutures (Optional): 5-0 Ethibond
Graft Donor Site Bandage (Optional-Leave Blank If You Don't Want In Note): Aquaphor and telefa placed on wound. Pressure dressing applied to donor site
Closure 2 Information: This tab is for additional flaps and grafts, including complex repair and grafts and complex repair and flaps. You can also specify a different location for the additional defect, if the location is the same you do not need to select a new one. We will insert the automated text for the repair you select below just as we do for solitary flaps and grafts. Please note that at this time if you select a location with a different insurance zone you will need to override the ICD10 and CPT if appropriate.
Closure 3 Information: This tab is for additional flaps and grafts above and beyond our usual structured repairs.  Please note if you enter information here it will not currently bill and you will need to add the billing information manually.
Wound Care: Aquaphor
Dressing: dry sterile dressing
Wound Care (No Sutures): Petrolatum
Suture Removal: 7 days
Unna Boot Text: An Unna boot was placed to help immobilize the limb and facilitate more rapid healing.
Home Suture Removal Text: Patient was provided instructions on removing sutures and will remove their sutures at home.  If they have any questions or difficulties they will call the office.
Post-Care Instructions: I reviewed with the patient in detail post-care instructions. Patient is not to engage in any heavy lifting, exercise, or swimming for the next 14 days. Should the patient develop any fevers, chills, bleeding, severe pain patient will contact the office immediately.
Pain Refusal Text: I offered to prescribe pain medication but the patient refused to take this medication.
Mauc Instructions: By selecting yes to the question below the MAUC number will be added into the note.  This will be calculated automatically based on the diagnosis chosen, the size entered, the body zone selected (H,M,L) and the specific indications you chose. You will also have the option to override the Mohs AUC if you disagree with the automatically calculated number and this option is found in the Case Summary tab.
Where Do You Want The Question To Include Opioid Counseling Located?: Case Summary Tab
Eye Protection Verbiage: Before proceeding with the stage, a plastic scleral shield was inserted. The globe was anesthetized with a few drops of 1% lidocaine with 1:100,000 epinephrine. Then, an appropriate sized scleral shield was chosen and coated with lacrilube ointment. The shield was gently inserted and left in place for the duration of each stage. After the stage was completed, the shield was gently removed.
Mohs Method Verbiage: An incision at a 45 degree angle following the standard Mohs approach was done and the specimen was harvested as a microscopic controlled layer.
Surgeon/Pathologist Verbiage (Will Incorporate Name Of Surgeon From Intro If Not Blank): operated in two distinct and integrated capacities as the surgeon and pathologist.
Mohs Histo Method Verbiage: Each section was then chromacoded and processed in the Mohs lab using the Mohs protocol and submitted for frozen section.
Subsequent Stages Histo Method Verbiage: Using a similar technique to that described above, a thin layer of tissue was removed from all areas where tumor was visible on the previous stage.  The tissue was again oriented, mapped, dyed, and processed as above.
Mohs Rapid Report Verbiage: The area of clinically evident tumor was marked with skin marking ink and appropriately hatched.  The initial incision was made following the Mohs approach through the skin.  The specimen was taken to the lab, divided into the necessary number of pieces, chromacoded and processed according to the Mohs protocol.  This was repeated in successive stages until a tumor free defect was achieved.
Complex Repair Preamble Text (Leave Blank If You Do Not Want): Extensive wide undermining was performed at least 2 cm in all directions.
Intermediate Repair Preamble Text (Leave Blank If You Do Not Want): Undermining was performed with blunt dissection.
M-Plasty Complex Repair Preamble Text (Leave Blank If You Do Not Want): Extensive wide undermining was performed.
Non-Graft Cartilage Fenestration Text: The cartilage was fenestrated with a 2mm punch biopsy to help facilitate healing.
Graft Cartilage Fenestration Text: The cartilage was fenestrated with a 2mm punch biopsy to help facilitate graft survival and healing.
Secondary Intention Text (Leave Blank If You Do Not Want): The defect will heal with secondary intention.
No Repair - Repaired With Adjacent Surgical Defect Text (Leave Blank If You Do Not Want): After obtaining clear surgical margins the defect was repaired concurrently with another surgical defect which was in close approximation.
Adjacent Tissue Transfer Text: The defect edges were debeveled with a #15 scalpel blade.  Given the location of the defect and the proximity to free margins an adjacent tissue transfer was deemed most appropriate.  Using a sterile surgical marker, an appropriate flap was drawn incorporating the defect and placing the expected incisions within the relaxed skin tension lines where possible.    The area thus outlined was incised deep to adipose tissue with a #15 scalpel blade.  The skin margins were undermined to an appropriate distance in all directions utilizing iris scissors.
Advancement Flap (Single) Text: The defect edges were debeveled with a #15 scalpel blade.  Given the location of the defect and the proximity to free margins a single advancement flap was deemed most appropriate.  Using a sterile surgical marker, an appropriate advancement flap was drawn incorporating the defect and placing the expected incisions within the relaxed skin tension lines where possible.    The area thus outlined was incised deep to adipose tissue with a #15 scalpel blade.  The skin margins were undermined to an appropriate distance in all directions utilizing iris scissors.
Advancement Flap (Double) Text: The defect edges were debeveled with a #15 scalpel blade.  Given the location of the defect and the proximity to free margins a double advancement flap was deemed most appropriate.  Using a sterile surgical marker, the appropriate advancement flaps were drawn incorporating the defect and placing the expected incisions within the relaxed skin tension lines where possible.    The area thus outlined was incised deep to adipose tissue with a #15 scalpel blade.  The skin margins were undermined to an appropriate distance in all directions utilizing iris scissors.
Burow's Advancement Flap Text: The defect edges were debeveled with a #15 scalpel blade.  Given the location of the defect and the proximity to free margins a Burow's advancement flap was deemed most appropriate.  Using a sterile surgical marker, the appropriate advancement flap was drawn incorporating the defect and placing the expected incisions within the relaxed skin tension lines where possible.    The area thus outlined was incised deep to adipose tissue with a #15 scalpel blade.  The skin margins were undermined to an appropriate distance in all directions utilizing iris scissors.
Chonodrocutaneous Helical Advancement Flap Text: The defect edges were debeveled with a #15 scalpel blade.  Given the location of the defect and the proximity to free margins a chondrocutaneous helical advancement flap was deemed most appropriate.  Using a sterile surgical marker, the appropriate advancement flap was drawn incorporating the defect and placing the expected incisions within the relaxed skin tension lines where possible.    The area thus outlined was incised deep to adipose tissue with a #15 scalpel blade.  The skin margins were undermined to an appropriate distance in all directions utilizing iris scissors.
Crescentic Advancement Flap Text: The defect edges were debeveled with a #15 scalpel blade.  Given the location of the defect and the proximity to free margins a crescentic advancement flap was deemed most appropriate.  Using a sterile surgical marker, the appropriate advancement flap was drawn incorporating the defect and placing the expected incisions within the relaxed skin tension lines where possible.    The area thus outlined was incised deep to adipose tissue with a #15 scalpel blade.  The skin margins were undermined to an appropriate distance in all directions utilizing iris scissors.
A-T Advancement Flap Text: The defect edges were debeveled with a #15 scalpel blade.  Given the location of the defect, shape of the defect and the proximity to free margins an A-T advancement flap was deemed most appropriate.  Using a sterile surgical marker, an appropriate advancement flap was drawn incorporating the defect and placing the expected incisions within the relaxed skin tension lines where possible.    The area thus outlined was incised deep to adipose tissue with a #15 scalpel blade.  The skin margins were undermined to an appropriate distance in all directions utilizing iris scissors.
O-T Advancement Flap Text: The defect edges were debeveled with a #15 scalpel blade.  Given the location of the defect, shape of the defect and the proximity to free margins an O-T advancement flap was deemed most appropriate.  Using a sterile surgical marker, an appropriate advancement flap was drawn incorporating the defect and placing the expected incisions within the relaxed skin tension lines where possible.    The area thus outlined was incised deep to adipose tissue with a #15 scalpel blade.  The skin margins were undermined to an appropriate distance in all directions utilizing iris scissors.
O-L Flap Text: The defect edges were debeveled with a #15 scalpel blade.  Given the location of the defect, shape of the defect and the proximity to free margins an O-L flap was deemed most appropriate.  Using a sterile surgical marker, an appropriate advancement flap was drawn incorporating the defect and placing the expected incisions within the relaxed skin tension lines where possible.    The area thus outlined was incised deep to adipose tissue with a #15 scalpel blade.  The skin margins were undermined to an appropriate distance in all directions utilizing iris scissors.
O-Z Flap Text: The defect edges were debeveled with a #15 scalpel blade.  Given the location of the defect, shape of the defect and the proximity to free margins an O-Z flap was deemed most appropriate.  Using a sterile surgical marker, an appropriate transposition flap was drawn incorporating the defect and placing the expected incisions within the relaxed skin tension lines where possible. The area thus outlined was incised deep to adipose tissue with a #15 scalpel blade.  The skin margins were undermined to an appropriate distance in all directions utilizing iris scissors.
Double O-Z Flap Text: The defect edges were debeveled with a #15 scalpel blade.  Given the location of the defect, shape of the defect and the proximity to free margins a Double O-Z flap was deemed most appropriate.  Using a sterile surgical marker, an appropriate transposition flap was drawn incorporating the defect and placing the expected incisions within the relaxed skin tension lines where possible. The area thus outlined was incised deep to adipose tissue with a #15 scalpel blade.  The skin margins were undermined to an appropriate distance in all directions utilizing iris scissors.
V-Y Flap Text: The defect edges were debeveled with a #15 scalpel blade.  Given the location of the defect, shape of the defect and the proximity to free margins a V-Y flap was deemed most appropriate.  Using a sterile surgical marker, an appropriate advancement flap was drawn incorporating the defect and placing the expected incisions within the relaxed skin tension lines where possible.    The area thus outlined was incised deep to adipose tissue with a #15 scalpel blade.  The skin margins were undermined to an appropriate distance in all directions utilizing iris scissors.
Advancement-Rotation Flap Text: The defect edges were debeveled with a #15 scalpel blade.  Given the location of the defect, shape of the defect and the proximity to free margins an advancement-rotation flap was deemed most appropriate.  Using a sterile surgical marker, an appropriate flap was drawn incorporating the defect and placing the expected incisions within the relaxed skin tension lines where possible. The area thus outlined was incised deep to adipose tissue with a #15 scalpel blade.  The skin margins were undermined to an appropriate distance in all directions utilizing iris scissors.
Mercedes Flap Text: The defect edges were debeveled with a #15 scalpel blade.  Given the location of the defect, shape of the defect and the proximity to free margins a Mercedes flap was deemed most appropriate.  Using a sterile surgical marker, an appropriate advancement flap was drawn incorporating the defect and placing the expected incisions within the relaxed skin tension lines where possible. The area thus outlined was incised deep to adipose tissue with a #15 scalpel blade.  The skin margins were undermined to an appropriate distance in all directions utilizing iris scissors.
Modified Advancement Flap Text: The defect edges were debeveled with a #15 scalpel blade.  Given the location of the defect, shape of the defect and the proximity to free margins a modified advancement flap was deemed most appropriate.  Using a sterile surgical marker, an appropriate advancement flap was drawn incorporating the defect and placing the expected incisions within the relaxed skin tension lines where possible.    The area thus outlined was incised deep to adipose tissue with a #15 scalpel blade.  The skin margins were undermined to an appropriate distance in all directions utilizing iris scissors.
Mucosal Advancement Flap Text: Given the location of the defect, shape of the defect and the proximity to free margins a mucosal advancement flap was deemed most appropriate. Incisions were made with a 15 blade scalpel in the appropriate fashion along the cutaneous vermilion border and the mucosal lip. The remaining actinically damaged mucosal tissue was excised.  The mucosal advancement flap was then elevated to the gingival sulcus with care taken to preserve the neurovascular structures and advanced into the primary defect. Care was taken to ensure that precise realignment of the vermilion border was achieved.
Peng Advancement Flap Text: The defect edges were debeveled with a #15 scalpel blade.  Given the location of the defect, shape of the defect and the proximity to free margins a Peng advancement flap was deemed most appropriate.  Using a sterile surgical marker, an appropriate advancement flap was drawn incorporating the defect and placing the expected incisions within the relaxed skin tension lines where possible. The area thus outlined was incised deep to adipose tissue with a #15 scalpel blade.  The skin margins were undermined to an appropriate distance in all directions utilizing iris scissors.
Hatchet Flap Text: The defect edges were debeveled with a #15 scalpel blade.  Given the location of the defect, shape of the defect and the proximity to free margins a hatchet flap based from the glabella was deemed most appropriate.  Using a sterile surgical marker, an appropriate glabellar hatchet flap was drawn incorporating the defect and placing the expected incisions within the relaxed skin tension lines where possible.    The area thus outlined was incised deep to adipose tissue with a #15 scalpel blade.  The skin margins were undermined to an appropriate distance in all directions utilizing iris scissors.
Rotation Flap Text: The defect edges were debeveled with a #15 scalpel blade.  Given the location of the defect, shape of the defect and the proximity to free margins a rotation flap was deemed most appropriate.  Using a sterile surgical marker, an appropriate rotation flap was drawn incorporating the defect and placing the expected incisions within the relaxed skin tension lines where possible.    The area thus outlined was incised deep to adipose tissue with a #15 scalpel blade.  The skin margins were undermined to an appropriate distance in all directions utilizing iris scissors.
Spiral Flap Text: The defect edges were debeveled with a #15 scalpel blade.  Given the location of the defect, shape of the defect and the proximity to free margins a spiral flap was deemed most appropriate.  Using a sterile surgical marker, an appropriate rotation flap was drawn incorporating the defect and placing the expected incisions within the relaxed skin tension lines where possible. The area thus outlined was incised deep to adipose tissue with a #15 scalpel blade.  The skin margins were undermined to an appropriate distance in all directions utilizing iris scissors.
Staged Advancement Flap Text: The defect edges were debeveled with a #15 scalpel blade.  Given the location of the defect, shape of the defect and the proximity to free margins a staged advancement flap was deemed most appropriate.  Using a sterile surgical marker, an appropriate advancement flap was drawn incorporating the defect and placing the expected incisions within the relaxed skin tension lines where possible. The area thus outlined was incised deep to adipose tissue with a #15 scalpel blade.  The skin margins were undermined to an appropriate distance in all directions utilizing iris scissors.
Star Wedge Flap Text: The defect edges were debeveled with a #15 scalpel blade.  Given the location of the defect, shape of the defect and the proximity to free margins a star wedge flap was deemed most appropriate.  Using a sterile surgical marker, an appropriate rotation flap was drawn incorporating the defect and placing the expected incisions within the relaxed skin tension lines where possible. The area thus outlined was incised deep to adipose tissue with a #15 scalpel blade.  The skin margins were undermined to an appropriate distance in all directions utilizing iris scissors.
Transposition Flap Text: The defect edges were debeveled with a #15 scalpel blade.  Given the location of the defect and the proximity to free margins a transposition flap was deemed most appropriate.  Using a sterile surgical marker, an appropriate transposition flap was drawn incorporating the defect.    The area thus outlined was incised deep to adipose tissue with a #15 scalpel blade.  The skin margins were undermined to an appropriate distance in all directions utilizing iris scissors.
Muscle Hinge Flap Text: The defect edges were debeveled with a #15 scalpel blade.  Given the size, depth and location of the defect and the proximity to free margins a muscle hinge flap was deemed most appropriate.  Using a sterile surgical marker, an appropriate hinge flap was drawn incorporating the defect. The area thus outlined was incised with a #15 scalpel blade.  The skin margins were undermined to an appropriate distance in all directions utilizing iris scissors.
Mustarde Flap Text: The defect edges were debeveled with a #15 scalpel blade.  Given the size, depth and location of the defect and the proximity to free margins a Mustarde flap was deemed most appropriate.  Using a sterile surgical marker, an appropriate flap was drawn incorporating the defect. The area thus outlined was incised with a #15 scalpel blade.  The skin margins were undermined to an appropriate distance in all directions utilizing iris scissors.
Nasal Turnover Hinge Flap Text: The defect edges were debeveled with a #15 scalpel blade.  Given the size, depth, location of the defect and the defect being full thickness a nasal turnover hinge flap was deemed most appropriate.  Using a sterile surgical marker, an appropriate hinge flap was drawn incorporating the defect. The area thus outlined was incised with a #15 scalpel blade. The flap was designed to recreate the nasal mucosal lining and the alar rim. The skin margins were undermined to an appropriate distance in all directions utilizing iris scissors.
Nasalis-Muscle-Based Myocutaneous Island Pedicle Flap Text: Using a #15 blade, an incision was made around the donor flap to the level of the nasalis muscle. Wide lateral undermining was then performed in both the subcutaneous plane above the nasalis muscle, and in a submuscular plane just above periosteum. This allowed the formation of a free nasalis muscle axial pedicle (based on the angular artery) which was still attached to the actual cutaneous flap, increasing its mobility and vascular viability. Hemostasis was obtained with pinpoint electrocoagulation. The flap was mobilized into position and the pivotal anchor points positioned and stabilized with buried interrupted sutures. Subcutaneous and dermal tissues were closed in a multilayered fashion with sutures. Tissue redundancies were excised, and the epidermal edges were apposed without significant tension and sutured with sutures.
Orbicularis Oris Muscle Flap Text: The defect edges were debeveled with a #15 scalpel blade.  Given that the defect affected the competency of the oral sphincter an orbicularis oris muscle flap was deemed most appropriate to restore this competency and normal muscle function.  Using a sterile surgical marker, an appropriate flap was drawn incorporating the defect. The area thus outlined was incised with a #15 scalpel blade.
Melolabial Transposition Flap Text: The defect edges were debeveled with a #15 scalpel blade.  Given the location of the defect and the proximity to free margins a melolabial flap was deemed most appropriate.  Using a sterile surgical marker, an appropriate melolabial transposition flap was drawn incorporating the defect.    The area thus outlined was incised deep to adipose tissue with a #15 scalpel blade.  The skin margins were undermined to an appropriate distance in all directions utilizing iris scissors.
Rhombic Flap Text: The defect edges were debeveled with a #15 scalpel blade.  Given the location of the defect and the proximity to free margins a rhombic flap was deemed most appropriate.  Using a sterile surgical marker, an appropriate rhombic flap was drawn incorporating the defect.    The area thus outlined was incised deep to adipose tissue with a #15 scalpel blade.  The skin margins were undermined to an appropriate distance in all directions utilizing iris scissors.
Rhomboid Transposition Flap Text: The defect edges were debeveled with a #15 scalpel blade.  Given the location of the defect and the proximity to free margins a rhomboid transposition flap was deemed most appropriate.  Using a sterile surgical marker, an appropriate rhomboid flap was drawn incorporating the defect.    The area thus outlined was incised deep to adipose tissue with a #15 scalpel blade.  The skin margins were undermined to an appropriate distance in all directions utilizing iris scissors.
Bi-Rhombic Flap Text: The defect edges were debeveled with a #15 scalpel blade.  Given the location of the defect and the proximity to free margins a bi-rhombic flap was deemed most appropriate.  Using a sterile surgical marker, an appropriate rhombic flap was drawn incorporating the defect. The area thus outlined was incised deep to adipose tissue with a #15 scalpel blade.  The skin margins were undermined to an appropriate distance in all directions utilizing iris scissors.
Helical Rim Advancement Flap Text: The defect edges were debeveled with a #15 blade scalpel.  Given the location of the defect and the proximity to free margins (helical rim) a double helical rim advancement flap was deemed most appropriate.  Using a sterile surgical marker, the appropriate advancement flaps were drawn incorporating the defect and placing the expected incisions between the helical rim and antihelix where possible.  The area thus outlined was incised through and through with a #15 scalpel blade.  With a skin hook and iris scissors, the flaps were gently and sharply undermined and freed up.
Bilateral Helical Rim Advancement Flap Text: The defect edges were debeveled with a #15 blade scalpel.  Given the location of the defect and the proximity to free margins (helical rim) a bilateral helical rim advancement flap was deemed most appropriate.  Using a sterile surgical marker, the appropriate advancement flaps were drawn incorporating the defect and placing the expected incisions between the helical rim and antihelix where possible.  The area thus outlined was incised through and through with a #15 scalpel blade.  With a skin hook and iris scissors, the flaps were gently and sharply undermined and freed up.
Ear Star Wedge Flap Text: The defect edges were debeveled with a #15 blade scalpel.  Given the location of the defect and the proximity to free margins (helical rim) an ear star wedge flap was deemed most appropriate.  Using a sterile surgical marker, the appropriate flap was drawn incorporating the defect and placing the expected incisions between the helical rim and antihelix where possible.  The area thus outlined was incised through and through with a #15 scalpel blade.
Banner Transposition Flap Text: The defect edges were debeveled with a #15 scalpel blade.  Given the location of the defect and the proximity to free margins a Banner transposition flap was deemed most appropriate.  Using a sterile surgical marker, an appropriate flap drawn around the defect. The area thus outlined was incised deep to adipose tissue with a #15 scalpel blade.  The skin margins were undermined to an appropriate distance in all directions utilizing iris scissors.
Bilobed Flap Text: The defect edges were debeveled with a #15 scalpel blade.  Given the location of the defect and the proximity to free margins a bilobe flap was deemed most appropriate.  Using a sterile surgical marker, an appropriate bilobe flap drawn around the defect.    The area thus outlined was incised deep to adipose tissue with a #15 scalpel blade.  The skin margins were undermined to an appropriate distance in all directions utilizing iris scissors.
Bilobed Transposition Flap Text: The defect edges were debeveled with a #15 scalpel blade.  Given the location of the defect and the proximity to free margins a bilobed transposition flap was deemed most appropriate.  Using a sterile surgical marker, an appropriate bilobe flap drawn around the defect.    The area thus outlined was incised deep to adipose tissue with a #15 scalpel blade.  The skin margins were undermined to an appropriate distance in all directions utilizing iris scissors.
Trilobed Flap Text: The defect edges were debeveled with a #15 scalpel blade.  Given the location of the defect and the proximity to free margins a trilobed flap was deemed most appropriate.  Using a sterile surgical marker, an appropriate trilobed flap drawn around the defect.    The area thus outlined was incised deep to adipose tissue with a #15 scalpel blade.  The skin margins were undermined to an appropriate distance in all directions utilizing iris scissors.
Dorsal Nasal Flap Text: The defect edges were debeveled with a #15 scalpel blade.  Given the location of the defect and the proximity to free margins a dorsal nasal flap,based upon the glabellar folds, was deemed most appropriate.  Using a sterile surgical marker, an appropriate dorsal nasal flap was drawn around the defect.    The area thus outlined was incised deep to adipose tissue with a #15 scalpel blade.  The skin margins were undermined to an appropriate distance in all directions utilizing iris scissors.
Island Pedicle Flap Text: The defect edges were debeveled with a #15 scalpel blade.  Given the location of the defect, shape of the defect and the proximity to free margins an island pedicle advancement flap was deemed most appropriate.  Using a sterile surgical marker, an appropriate advancement flap was drawn incorporating the defect, outlining the appropriate donor tissue and placing the expected incisions within the relaxed skin tension lines where possible.    The area thus outlined was incised deep to adipose tissue with a #15 scalpel blade.  The skin margins were undermined to an appropriate distance in all directions around the primary defect and laterally outward around the island pedicle utilizing iris scissors.  There was minimal undermining beneath the pedicle flap.
Island Pedicle Flap With Canthal Suspension Text: The defect edges were debeveled with a #15 scalpel blade.  Given the location of the defect, shape of the defect and the proximity to free margins an island pedicle advancement flap was deemed most appropriate.  Using a sterile surgical marker, an appropriate advancement flap was drawn incorporating the defect, outlining the appropriate donor tissue and placing the expected incisions within the relaxed skin tension lines where possible. The area thus outlined was incised deep to adipose tissue with a #15 scalpel blade.  The skin margins were undermined to an appropriate distance in all directions around the primary defect and laterally outward around the island pedicle utilizing iris scissors.  There was minimal undermining beneath the pedicle flap. A suspension suture was placed in the canthal tendon to prevent tension and prevent ectropion.
Alar Island Pedicle Flap Text: The defect edges were debeveled with a #15 scalpel blade.  Given the location of the defect, shape of the defect and the proximity to the alar rim an island pedicle advancement flap was deemed most appropriate.  Using a sterile surgical marker, an appropriate advancement flap was drawn incorporating the defect, outlining the appropriate donor tissue and placing the expected incisions within the nasal ala running parallel to the alar rim. The area thus outlined was incised with a #15 scalpel blade.  The skin margins were undermined minimally to an appropriate distance in all directions around the primary defect and laterally outward around the island pedicle utilizing iris scissors.  There was minimal undermining beneath the pedicle flap.
Double Island Pedicle Flap Text: The defect edges were debeveled with a #15 scalpel blade.  Given the location of the defect, shape of the defect and the proximity to free margins a double island pedicle advancement flap was deemed most appropriate.  Using a sterile surgical marker, an appropriate advancement flap was drawn incorporating the defect, outlining the appropriate donor tissue and placing the expected incisions within the relaxed skin tension lines where possible.    The area thus outlined was incised deep to adipose tissue with a #15 scalpel blade.  The skin margins were undermined to an appropriate distance in all directions around the primary defect and laterally outward around the island pedicle utilizing iris scissors.  There was minimal undermining beneath the pedicle flap.
Island Pedicle Flap-Requiring Vessel Identification Text: The defect edges were debeveled with a #15 scalpel blade.  Given the location of the defect, shape of the defect and the proximity to free margins an island pedicle advancement flap was deemed most appropriate.  Using a sterile surgical marker, an appropriate advancement flap was drawn, based on the axial vessel mentioned above, incorporating the defect, outlining the appropriate donor tissue and placing the expected incisions within the relaxed skin tension lines where possible.    The area thus outlined was incised deep to adipose tissue with a #15 scalpel blade.  The skin margins were undermined to an appropriate distance in all directions around the primary defect and laterally outward around the island pedicle utilizing iris scissors.  There was minimal undermining beneath the pedicle flap.
Keystone Flap Text: The defect edges were debeveled with a #15 scalpel blade.  Given the location of the defect, shape of the defect a keystone flap was deemed most appropriate.  Using a sterile surgical marker, an appropriate keystone flap was drawn incorporating the defect, outlining the appropriate donor tissue and placing the expected incisions within the relaxed skin tension lines where possible. The area thus outlined was incised deep to adipose tissue with a #15 scalpel blade.  The skin margins were undermined to an appropriate distance in all directions around the primary defect and laterally outward around the flap utilizing iris scissors.
O-T Plasty Text: The defect edges were debeveled with a #15 scalpel blade.  Given the location of the defect, shape of the defect and the proximity to free margins an O-T plasty was deemed most appropriate.  Using a sterile surgical marker, an appropriate O-T plasty was drawn incorporating the defect and placing the expected incisions within the relaxed skin tension lines where possible.    The area thus outlined was incised deep to adipose tissue with a #15 scalpel blade.  The skin margins were undermined to an appropriate distance in all directions utilizing iris scissors.
O-Z Plasty Text: The defect edges were debeveled with a #15 scalpel blade.  Given the location of the defect, shape of the defect and the proximity to free margins an O-Z plasty (double transposition flap) was deemed most appropriate.  Using a sterile surgical marker, the appropriate transposition flaps were drawn incorporating the defect and placing the expected incisions within the relaxed skin tension lines where possible.    The area thus outlined was incised deep to adipose tissue with a #15 scalpel blade.  The skin margins were undermined to an appropriate distance in all directions utilizing iris scissors.  Hemostasis was achieved with electrocautery.  The flaps were then transposed into place, one clockwise and the other counterclockwise, and anchored with interrupted buried subcutaneous sutures.
Double O-Z Plasty Text: The defect edges were debeveled with a #15 scalpel blade.  Given the location of the defect, shape of the defect and the proximity to free margins a Double O-Z plasty (double transposition flap) was deemed most appropriate.  Using a sterile surgical marker, the appropriate transposition flaps were drawn incorporating the defect and placing the expected incisions within the relaxed skin tension lines where possible. The area thus outlined was incised deep to adipose tissue with a #15 scalpel blade.  The skin margins were undermined to an appropriate distance in all directions utilizing iris scissors.  Hemostasis was achieved with electrocautery.  The flaps were then transposed into place, one clockwise and the other counterclockwise, and anchored with interrupted buried subcutaneous sutures.
V-Y Plasty Text: The defect edges were debeveled with a #15 scalpel blade.  Given the location of the defect, shape of the defect and the proximity to free margins an V-Y advancement flap was deemed most appropriate.  Using a sterile surgical marker, an appropriate advancement flap was drawn incorporating the defect and placing the expected incisions within the relaxed skin tension lines where possible.    The area thus outlined was incised deep to adipose tissue with a #15 scalpel blade.  The skin margins were undermined to an appropriate distance in all directions utilizing iris scissors.
H Plasty Text: Given the location of the defect, shape of the defect and the proximity to free margins a H-plasty was deemed most appropriate for repair.  Using a sterile surgical marker, the appropriate advancement arms of the H-plasty were drawn incorporating the defect and placing the expected incisions within the relaxed skin tension lines where possible. The area thus outlined was incised deep to adipose tissue with a #15 scalpel blade. The skin margins were undermined to an appropriate distance in all directions utilizing iris scissors.  The opposing advancement arms were then advanced into place in opposite direction and anchored with interrupted buried subcutaneous sutures.
W Plasty Text: The lesion was extirpated to the level of the fat with a #15 scalpel blade.  Given the location of the defect, shape of the defect and the proximity to free margins a W-plasty was deemed most appropriate for repair.  Using a sterile surgical marker, the appropriate transposition arms of the W-plasty were drawn incorporating the defect and placing the expected incisions within the relaxed skin tension lines where possible.    The area thus outlined was incised deep to adipose tissue with a #15 scalpel blade.  The skin margins were undermined to an appropriate distance in all directions utilizing iris scissors.  The opposing transposition arms were then transposed into place in opposite direction and anchored with interrupted buried subcutaneous sutures.
Z Plasty Text: The lesion was extirpated to the level of the fat with a #15 scalpel blade.  Given the location of the defect, shape of the defect and the proximity to free margins a Z-plasty was deemed most appropriate for repair.  Using a sterile surgical marker, the appropriate transposition arms of the Z-plasty were drawn incorporating the defect and placing the expected incisions within the relaxed skin tension lines where possible.    The area thus outlined was incised deep to adipose tissue with a #15 scalpel blade.  The skin margins were undermined to an appropriate distance in all directions utilizing iris scissors.  The opposing transposition arms were then transposed into place in opposite direction and anchored with interrupted buried subcutaneous sutures.
Zygomaticofacial Flap Text: Given the location of the defect, shape of the defect and the proximity to free margins a zygomaticofacial flap was deemed most appropriate for repair.  Using a sterile surgical marker, the appropriate flap was drawn incorporating the defect and placing the expected incisions within the relaxed skin tension lines where possible. The area thus outlined was incised deep to adipose tissue with a #15 scalpel blade with preservation of a vascular pedicle.  The skin margins were undermined to an appropriate distance in all directions utilizing iris scissors.  The flap was then placed into the defect and anchored with interrupted buried subcutaneous sutures.
Cheek Interpolation Flap Text: A decision was made to reconstruct the defect utilizing an interpolation axial flap and a staged reconstruction.  A telfa template was made of the defect.  This telfa template was then used to outline the Cheek Interpolation flap.  The donor area for the pedicle flap was then injected with anesthesia.  The flap was excised through the skin and subcutaneous tissue down to the layer of the underlying musculature.  The interpolation flap was carefully excised within this deep plane to maintain its blood supply.  The edges of the donor site were undermined.   The donor site was closed in a primary fashion.  The pedicle was then rotated into position and sutured.  Once the tube was sutured into place, adequate blood supply was confirmed with blanching and refill.  The pedicle was then wrapped with xeroform gauze and dressed appropriately with a telfa and gauze bandage to ensure continued blood supply and protect the attached pedicle.
Cheek-To-Nose Interpolation Flap Text: A decision was made to reconstruct the defect utilizing an interpolation axial flap and a staged reconstruction.  A telfa template was made of the defect.  This telfa template was then used to outline the Cheek-To-Nose Interpolation flap.  The donor area for the pedicle flap was then injected with anesthesia.  The flap was excised through the skin and subcutaneous tissue down to the layer of the underlying musculature.  The interpolation flap was carefully excised within this deep plane to maintain its blood supply.  The edges of the donor site were undermined.   The donor site was closed in a primary fashion.  The pedicle was then rotated into position and sutured.  Once the tube was sutured into place, adequate blood supply was confirmed with blanching and refill.  The pedicle was then wrapped with xeroform gauze and dressed appropriately with a telfa and gauze bandage to ensure continued blood supply and protect the attached pedicle.
Interpolation Flap Text: A decision was made to reconstruct the defect utilizing an interpolation axial flap and a staged reconstruction.  A telfa template was made of the defect.  This telfa template was then used to outline the interpolation flap.  The donor area for the pedicle flap was then injected with anesthesia.  The flap was excised through the skin and subcutaneous tissue down to the layer of the underlying musculature.  The interpolation flap was carefully excised within this deep plane to maintain its blood supply.  The edges of the donor site were undermined.   The donor site was closed in a primary fashion.  The pedicle was then rotated into position and sutured.  Once the tube was sutured into place, adequate blood supply was confirmed with blanching and refill.  The pedicle was then wrapped with xeroform gauze and dressed appropriately with a telfa and gauze bandage to ensure continued blood supply and protect the attached pedicle.
Melolabial Interpolation Flap Text: A decision was made to reconstruct the defect utilizing an interpolation axial flap and a staged reconstruction.  A telfa template was made of the defect.  This telfa template was then used to outline the melolabial interpolation flap.  The donor area for the pedicle flap was then injected with anesthesia.  The flap was excised through the skin and subcutaneous tissue down to the layer of the underlying musculature.  The pedicle flap was carefully excised within this deep plane to maintain its blood supply.  The edges of the donor site were undermined.   The donor site was closed in a primary fashion.  The pedicle was then rotated into position and sutured.  Once the tube was sutured into place, adequate blood supply was confirmed with blanching and refill.  The pedicle was then wrapped with xeroform gauze and dressed appropriately with a telfa and gauze bandage to ensure continued blood supply and protect the attached pedicle.
Mastoid Interpolation Flap Text: A decision was made to reconstruct the defect utilizing an interpolation axial flap and a staged reconstruction.  A telfa template was made of the defect.  This telfa template was then used to outline the mastoid interpolation flap.  The donor area for the pedicle flap was then injected with anesthesia.  The flap was excised through the skin and subcutaneous tissue down to the layer of the underlying musculature.  The pedicle flap was carefully excised within this deep plane to maintain its blood supply.  The edges of the donor site were undermined.   The donor site was closed in a primary fashion.  The pedicle was then rotated into position and sutured.  Once the tube was sutured into place, adequate blood supply was confirmed with blanching and refill.  The pedicle was then wrapped with xeroform gauze and dressed appropriately with a telfa and gauze bandage to ensure continued blood supply and protect the attached pedicle.
Posterior Auricular Interpolation Flap Text: A decision was made to reconstruct the defect utilizing an interpolation axial flap and a staged reconstruction.  A telfa template was made of the defect.  This telfa template was then used to outline the posterior auricular interpolation flap.  The donor area for the pedicle flap was then injected with anesthesia.  The flap was excised through the skin and subcutaneous tissue down to the layer of the underlying musculature.  The pedicle flap was carefully excised within this deep plane to maintain its blood supply.  The edges of the donor site were undermined.   The donor site was closed in a primary fashion.  The pedicle was then rotated into position and sutured.  Once the tube was sutured into place, adequate blood supply was confirmed with blanching and refill.  The pedicle was then wrapped with xeroform gauze and dressed appropriately with a telfa and gauze bandage to ensure continued blood supply and protect the attached pedicle.
Paramedian Forehead Flap Text: A decision was made to reconstruct the defect utilizing an interpolation axial flap and a staged reconstruction.  A telfa template was made of the defect.  This telfa template was then used to outline the paramedian forehead pedicle flap.  The donor area for the pedicle flap was then injected with anesthesia.  The flap was excised through the skin and subcutaneous tissue down to the layer of the underlying musculature.  The pedicle flap was carefully excised within this deep plane to maintain its blood supply.  The edges of the donor site were undermined.   The donor site was closed in a primary fashion.  The pedicle was then rotated into position and sutured.  Once the tube was sutured into place, adequate blood supply was confirmed with blanching and refill.  The pedicle was then wrapped with xeroform gauze and dressed appropriately with a telfa and gauze bandage to ensure continued blood supply and protect the attached pedicle.
Cheiloplasty (Less Than 50%) Text: A decision was made to reconstruct the defect with a  cheiloplasty.  The defect was undermined extensively.  Additional obicularis oris muscle was excised with a 15 blade scalpel.  The defect was converted into a full thickness wedge, of less than 50% of the vertical height of the lip, to facilite a better cosmetic result.  Small vessels were then tied off with 5-0 monocyrl. The obicularis oris, superficial fascia, adipose and dermis were then reapproximated.  After the deeper layers were approximated the epidermis was reapproximated with particular care given to realign the vermilion border.
Cheiloplasty (Complex) Text: A decision was made to reconstruct the defect with a  cheiloplasty.  The defect was undermined extensively.  Additional obicularis oris muscle was excised with a 15 blade scalpel.  The defect was converted into a full thickness wedge to facilite a better cosmetic result.  Small vessels were then tied off with 5-0 monocyrl. The obicularis oris, superficial fascia, adipose and dermis were then reapproximated.  After the deeper layers were approximated the epidermis was reapproximated with particular care given to realign the vermilion border.
Ear Wedge Repair Text: A wedge excision was completed by carrying down an excision through the full thickness of the ear and cartilage with an inward facing Burow's triangle. The wound was then closed in a layered fashion.
Full Thickness Lip Wedge Repair (Flap) Text: Given the location of the defect and the proximity to free margins a full thickness wedge repair was deemed most appropriate.  Using a sterile surgical marker, the appropriate repair was drawn incorporating the defect and placing the expected incisions perpendicular to the vermilion border.  The vermilion border was also meticulously outlined to ensure appropriate reapproximation during the repair.  The area thus outlined was incised through and through with a #15 scalpel blade.  The muscularis and dermis were reaproximated with deep sutures following hemostasis. Care was taken to realign the vermilion border before proceeding with the superficial closure.  Once the vermilion was realigned the superfical and mucosal closure was finished.
Ftsg Text: The defect edges were debeveled with a #15 scalpel blade.  Given the location of the defect, shape of the defect and the proximity to free margins a full thickness skin graft was deemed most appropriate.  Using a sterile surgical marker, the primary defect shape was transferred to the donor site. The area thus outlined was incised deep to adipose tissue with a #15 scalpel blade.  The harvested graft was then trimmed of adipose tissue until only dermis and epidermis was left.  The skin margins of the secondary defect were undermined to an appropriate distance in all directions utilizing iris scissors.  The secondary defect was closed with interrupted buried subcutaneous sutures.  The skin edges were then re-apposed with running  sutures.  The skin graft was then placed in the primary defect and oriented appropriately.
Split-Thickness Skin Graft Text: The defect edges were debeveled with a #15 scalpel blade.  Given the location of the defect, shape of the defect and the proximity to free margins a split thickness skin graft was deemed most appropriate.  Using a sterile surgical marker, the primary defect shape was transferred to the donor site. The split thickness graft was then harvested.  The skin graft was then placed in the primary defect and oriented appropriately.
Burow's Graft Text: The defect edges were debeveled with a #15 scalpel blade.  Given the location of the defect, shape of the defect, the proximity to free margins and the presence of a standing cone deformity a Burow's skin graft was deemed most appropriate. The standing cone was removed and this tissue was then trimmed to the shape of the primary defect. The adipose tissue was also removed until only dermis and epidermis were left.  The skin margins of the secondary defect were undermined to an appropriate distance in all directions utilizing iris scissors.  The secondary defect was closed with interrupted buried subcutaneous sutures.  The skin edges were then re-apposed with running  sutures.  The skin graft was then placed in the primary defect and oriented appropriately.
Cartilage Graft Text: The defect edges were debeveled with a #15 scalpel blade.  Given the location of the defect, shape of the defect, the fact the defect involved a full thickness cartilage defect a cartilage graft was deemed most appropriate.  An appropriate donor site was identified, cleansed, and anesthetized. The cartilage graft was then harvested and transferred to the recipient site, oriented appropriately and then sutured into place.  The secondary defect was then repaired using a primary closure.
Composite Graft Text: The defect edges were debeveled with a #15 scalpel blade.  Given the location of the defect, shape of the defect, the proximity to free margins and the fact the defect was full thickness a composite graft was deemed most appropriate.  The defect was outline and then transferred to the donor site.  A full thickness graft was then excised from the donor site. The graft was then placed in the primary defect, oriented appropriately and then sutured into place.  The secondary defect was then repaired using a primary closure.
Epidermal Autograft Text: The defect edges were debeveled with a #15 scalpel blade.  Given the location of the defect, shape of the defect and the proximity to free margins an epidermal autograft was deemed most appropriate.  Using a sterile surgical marker, the primary defect shape was transferred to the donor site. The epidermal graft was then harvested.  The skin graft was then placed in the primary defect and oriented appropriately.
Dermal Autograft Text: The defect edges were debeveled with a #15 scalpel blade.  Given the location of the defect, shape of the defect and the proximity to free margins a dermal autograft was deemed most appropriate.  Using a sterile surgical marker, the primary defect shape was transferred to the donor site. The area thus outlined was incised deep to adipose tissue with a #15 scalpel blade.  The harvested graft was then trimmed of adipose and epidermal tissue until only dermis was left.  The skin graft was then placed in the primary defect and oriented appropriately.
Skin Substitute Text: The defect edges were debeveled with a #15 scalpel blade.  Given the location of the defect, shape of the defect and the proximity to free margins a skin substitute graft was deemed most appropriate.  The graft material was trimmed to fit the size of the defect. The graft was then placed in the primary defect and oriented appropriately.
Tissue Cultured Epidermal Autograft Text: The defect edges were debeveled with a #15 scalpel blade.  Given the location of the defect, shape of the defect and the proximity to free margins a tissue cultured epidermal autograft was deemed most appropriate.  The graft was then trimmed to fit the size of the defect.  The graft was then placed in the primary defect and oriented appropriately.
Xenograft Text: The defect edges were debeveled with a #15 scalpel blade.  Given the location of the defect, shape of the defect and the proximity to free margins a xenograft was deemed most appropriate.  The graft was then trimmed to fit the size of the defect.  The graft was then placed in the primary defect and oriented appropriately.
Purse String (Simple) Text: Given the location of the defect and the characteristics of the surrounding skin a purse string closure was deemed most appropriate.  Undermining was performed circumfirentially around the surgical defect.  A purse string suture was then placed and tightened.
Purse String (Intermediate) Text: Given the location of the defect and the characteristics of the surrounding skin a purse string intermediate closure was deemed most appropriate.  Undermining was performed circumfirentially around the surgical defect.  A purse string suture was then placed and tightened.
Partial Purse String (Simple) Text: Given the location of the defect and the characteristics of the surrounding skin a simple purse string closure was deemed most appropriate.  Undermining was performed circumfirentially around the surgical defect.  A purse string suture was then placed and tightened. Wound tension only allowed a partial closure of the circular defect.
Partial Purse String (Intermediate) Text: Given the location of the defect and the characteristics of the surrounding skin an intermediate purse string closure was deemed most appropriate.  Undermining was performed circumfirentially around the surgical defect.  A purse string suture was then placed and tightened. Wound tension only allowed a partial closure of the circular defect.
Localized Dermabrasion With Wire Brush Text: The patient was draped in routine manner.  Localized dermabrasion using 3 x 17 mm wire brush was performed in routine manner to papillary dermis. This spot dermabrasion is being performed to complete skin cancer reconstruction. It also will eliminate the other sun damaged precancerous cells that are known to be part of the regional effect of a lifetime's worth of sun exposure. This localized dermabrasion is therapeutic and should not be considered cosmetic in any regard.
Tarsorrhaphy Text: A tarsorrhaphy was performed using Frost sutures.
Complex Repair And Flap Additional Text (Will Appearing After The Standard Complex Repair Text): The complex repair was not sufficient to completely close the primary defect. The remaining additional defect was repaired with the flap mentioned below.
Complex Repair And Graft Additional Text (Will Appearing After The Standard Complex Repair Text): The complex repair was not sufficient to completely close the primary defect. The remaining additional defect was repaired with the graft mentioned below.
Unique Flap 1 Name: Myocutaneous Island pedicle Flap
Unique Flap 2 Name: Peng Flap
Unique Flap 3 Name: Mercedes Flap
Unique Flap 4 Name: Banner Flap
Unique Flap 5 Name: tunneled myocutaneous flap
Unique Flap 6 Name: Cordelia-B?ch flap
Unique Flap 7 Name: Mustarde flap
Unique Flap 8 Name: East to West Flap
Unique Flap 1 Text: A decision was made to reconstruct the defect utilizing a myocutaneous Island pedicle Flap based on the levator labii superioris muscle.  A telfa template was made of the defect.  This telfa template was then used to outline the myocutaneous flap, based along the meilolabial fold.  The donor area for the pedicle flap was then injected with anesthesia.  The flap was excised through the skin and subcutaneous tissue down to the layer of the underlying musculature.  The myocutaneous flap was carefully excised within this deep plane to maintain its blood supply. Based on the muscle. The edges of the donor site were undermined.   The donor site was closed in a primary fashion to the point of transposition.  The pedicle was then transposed into position and sutured.  Once the flap was sutured into place, adequate blood supply was confirmed with blanching and refill.
Unique Flap 2 Text: A decision was made to reconstruct the defect utilizing a Peng Flap (Bilateral Advancement Rotation Flap). Given the location of the defect and the proximity to free margins, this flap was deemed most appropriate.  Using a sterile surgical marker, the appropriate rotation flaps were drawn incorporating the defect and placing the expected incisions within the relaxed skin tension lines where possible.    The area thus outlined was incised deep to adipose tissue with a #15 scalpel blade.  The skin margins were undermined to an appropriate distance in all directions utilizing iris scissors.
Unique Flap 3 Text: The defect edges were debeveled with a #15 scalpel blade.  Given the location of the defect, shape of the defect and the proximity to free margins a Mercedes (double advancement flap) was deemed most appropriate.  Using a sterile surgical marker, the appropriate transposition flaps were drawn incorporating the defect and placing the expected incisions within the relaxed skin tension lines where possible.    The area thus outlined was incised deep to adipose tissue with a #15 scalpel blade.  The skin margins were undermined to an appropriate distance in all directions utilizing iris scissors.  Hemostasis was achieved with electrocautery.  The flaps were then advanced into the defect and anchored with interrupted buried subcutaneous sutures.
Unique Flap 4 Text: The defect edges were debeveled with a #15 scalpel blade.  Given the location of the defect and the proximity to free margins a Banner transposition flap was deemed most appropriate.  Using a sterile surgical marker, an appropriate Banner transposition flap was drawn incorporating the defect.    The area thus outlined was incised deep to adipose tissue with a #15 scalpel blade.  The skin margins were undermined to an appropriate distance in all directions utilizing iris scissors.
Unique Flap 5 Text: A decision was made to reconstruct the defect utilizing a tunneled myocutaneous Island pedicle Flap based on the anterior auricularis muscle.  A telfa template was made of the defect.  This telfa template was then used to outline the myocutaneous flap, based along the preauricular fold.  The donor area for the pedicle flap was then injected with anesthesia.  The flap was excised through the skin and subcutaneous tissue down to the layer of the underlying musculature.  The myocutaneous flap was carefully excised within this deep plane to maintain its blood supply based on the muscle. The edges of the donor site were undermined.   The donor site was closed in a primary fashion to the point of transposition.  The pedicle was then transposed through a tunnel into position and sutured.  Once the flap was sutured into place, adequate blood supply was confirmed with blanching and refill.
Unique Flap 6 Text: A decision was made to reconstruct the defect utilizing an Anti-aging-B?ch Flap (Bilateral helical Advancement Rotation Flap). Given the location of the defect and the proximity to free margins, this flap was deemed most appropriate.  Using a sterile surgical marker, the appropriate flaps were drawn incorporating the defect and placing the expected incisions within the relaxed skin tension lines where possible.  The area thus outlined was incised deep to adipose tissue with a #15 scalpel blade.  The skin margins were undermined to an appropriate distance in all directions utilizing iris scissors. Cartilage was incorporated into the flap arms to maintain helical anatomy.
Unique Flap 7 Text: A decision was made to reconstruct the defect utilizing a Mustarde Flap (Advancement Rotation Flap). Given the location of the defect and the proximity to free margins, this flap was deemed most appropriate.  Using a sterile surgical marker, the appropriate rotation flap was drawn incorporating the defect and placing the expected incisions within the relaxed skin tension lines where possible.    The area thus outlined was incised deep to adipose tissue with a #15 scalpel blade.  The skin margins were undermined to an appropriate distance in all directions utilizing iris scissors. The flap was advanced and rotated under the eyelid with a sling created laterally to keep ectropion minimal.
Unique Flap 8 Text: A decision was made to reconstruct the defect utilizing an East to West Flap (Modified Burows Advancement Flap). Given the location of the defect and the proximity to free margins, this flap was deemed most appropriate.  Using a sterile surgical marker, the appropriate advancement flaps were drawn incorporating the defect and placing the expected incisions within the relaxed skin tension lines where possible.    The area thus outlined was incised deep to adipose tissue with a #15 scalpel blade.  The skin margins were undermined to an appropriate distance in all directions utilizing iris scissors. Minimal alar distortion was created with flap approximation.
Manual Repair Warning Statement: We plan on removing the manually selected variable below in favor of our much easier automatic structured text blocks found in the previous tab. We decided to do this to help make the flow better and give you the full power of structured data. Manual selection is never going to be ideal in our platform and I would encourage you to avoid using manual selection from this point on, especially since I will be sunsetting this feature. It is important that you do one of two things with the customized text below. First, you can save all of the text in a word file so you can have it for future reference. Second, transfer the text to the appropriate area in the Library tab. Lastly, if there is a flap or graft type which we do not have you need to let us know right away so I can add it in before the variable is hidden. No need to panic, we plan to give you roughly 6 months to make the change.
Same Histology In Subsequent Stages Text: The pattern and morphology of the tumor is as described in the first stage.
No Residual Tumor Seen Histology Text: There were no malignant cells seen in the sections examined.
Inflammation Suggestive Of Cancer Camouflage Histology Text: There was a dense lymphocytic infiltrate which prevented adequate histologic evaluation of adjacent structures.
Bcc Histology Text: There were numerous aggregates of basaloid cells.
Bcc Infiltrative Histology Text: There were numerous aggregates of basaloid cells demonstrating an infiltrative pattern.
Mart-1 - Positive Histology Text: MART-1 staining demonstrates areas of higher density and clustering of melanocytes with Pagetoid spread upwards within the epidermis. The surgical margins are positive for tumor cells.
Mart-1 - Negative Histology Text: MART-1 staining demonstrates a normal density and pattern of melanocytes along the dermal-epidermal junction. The surgical margins are negative for tumor cells.
Information: Selecting Yes will display possible errors in your note based on the variables you have selected. This validation is only offered as a suggestion for you. PLEASE NOTE THAT THE VALIDATION TEXT WILL BE REMOVED WHEN YOU FINALIZE YOUR NOTE. IF YOU WANT TO FAX A PRELIMINARY NOTE YOU WILL NEED TO TOGGLE THIS TO 'NO' IF YOU DO NOT WANT IT IN YOUR FAXED NOTE.

## 2022-02-15 ENCOUNTER — APPOINTMENT (RX ONLY)
Dept: URBAN - METROPOLITAN AREA CLINIC 36 | Facility: CLINIC | Age: 72
Setting detail: DERMATOLOGY
End: 2022-02-15

## 2022-02-15 DIAGNOSIS — Z48.817 ENCOUNTER FOR SURGICAL AFTERCARE FOLLOWING SURGERY ON THE SKIN AND SUBCUTANEOUS TISSUE: ICD-10-CM

## 2022-02-15 PROCEDURE — ? DRESSING CHANGE (GLOBAL PERIOD)

## 2022-02-15 PROCEDURE — 99024 POSTOP FOLLOW-UP VISIT: CPT

## 2022-02-15 ASSESSMENT — LOCATION DETAILED DESCRIPTION DERM: LOCATION DETAILED: LEFT TRIANGULAR FOSSA

## 2022-02-15 ASSESSMENT — LOCATION ZONE DERM: LOCATION ZONE: EAR

## 2022-02-15 ASSESSMENT — LOCATION SIMPLE DESCRIPTION DERM: LOCATION SIMPLE: LEFT EAR

## 2022-02-15 NOTE — PROCEDURE: DRESSING CHANGE (GLOBAL PERIOD)
Body Location Override (Optional - Billing Will Still Be Based On Selected Body Map Location If Applicable): left helix
Detail Level: Detailed
Add 53429 Cpt? (Important Note: In 2017 The Use Of 88400 Is Being Tracked By Cms To Determine Future Global Period Reimbursement For Global Periods): yes

## 2022-02-24 ENCOUNTER — APPOINTMENT (RX ONLY)
Dept: URBAN - METROPOLITAN AREA CLINIC 36 | Facility: CLINIC | Age: 72
Setting detail: DERMATOLOGY
End: 2022-02-24

## 2022-02-24 PROBLEM — C44.219 BASAL CELL CARCINOMA OF SKIN OF LEFT EAR AND EXTERNAL AURICULAR CANAL: Status: ACTIVE | Noted: 2022-02-24

## 2022-02-24 PROCEDURE — ? REPAIR NOTE

## 2022-02-24 PROCEDURE — 15630 DELAY FLAP EYE/NOS/EAR/LIP: CPT | Mod: 58

## 2022-02-24 NOTE — PROCEDURE: REPAIR NOTE
Anesthesia Volume In Cc: 8
Did You Provide Opioid Counseling: No
Repair Type: Interpolation Flap Takedown
Suturegard Retention Suture: 2-0 Nylon
Retention Suture Bite Size: 3 mm
Length To Time In Minutes Device Was In Place: 10
Number Of Hemigard Strips Per Side: 1
Simple / Intermediate / Complex Repair - Final Wound Length In Cm: 0
Undermining Type: Entire Wound
Debridement Text: The wound edges were debrided prior to proceeding with the closure to facilitate wound healing.
Helical Rim Text: The closure involved the helical rim.
Vermilion Border Text: The closure involved the vermilion border.
Nostril Rim Text: The closure involved the nostril rim.
Retention Suture Text: Retention sutures were placed to support the closure and prevent dehiscence.
Flap Type: Interpolation Flap
Primary Defect Length (In Cm): 3.7
Primary Defect Width (In Cm): 2.5
Secondary Defect Length (In Cm): 4
Secondary Defect Width (In Cm): 3.5
Skin Substitute: EpiFix Micronized
Suture Removal: 14 days
Type Of Previous Surgery (Optional- Ie Mohs Surgery): mohs
Detail Level: Detailed
Anesthesia Type: 0.5% lidocaine with 1:200,000 epinephrine and a 1:10 solution of 8.4% sodium bicarbonate and 408mcg clindamycin/ml
Additional Anesthesia Volume In Cc: 2
Hemostasis: Electrocautery
Estimated Blood Loss (Cc): minimal
Brow Lift Text: A midfrontal incision was made medially to the defect to allow access to the tissues just superior to the left eyebrow. Following careful dissection inferiorly in a supraperiosteal plane to the level of the left eyebrow, several 3-0 monocryl sutures were used to resuspend the eyebrow orbicularis oculi muscular unit to the superior frontal bone periosteum. This resulted in an appropriate reapproximation of static eyebrow symmetry and correction of the left brow ptosis.
Epidermal Sutures: 4-0 PDS
Epidermal Closure: simple interrupted
Wound Care: Vaseline
Dressing: pressure dressing with a bolster
Unna Boot Text: An Unna boot was placed to help immobilize the limb and facilitate more rapid healing.
Suturegard Intro: Intraoperative tissue expansion was performed, utilizing the SUTUREGARD device, in order to reduce wound tension.
Suturegard Body: The suture ends were repeatedly re-tightened and re-clamped to achieve the desired tissue expansion.
Hemigard Intro: Due to skin fragility and wound tension, it was decided to use HEMIGARD adhesive retention suture devices to permit a linear closure. The skin was cleaned and dried for a 6cm distance away from the wound. Excessive hair, if present, was removed to allow for adhesion.
Hemigard Postcare Instructions: The HEMIGARD strips are to remain completely dry for at least 5-7 days.
Graft Donor Site Bandage (Optional-Leave Blank If You Don't Want In Note): Aquaphor, Telfa, and a pressure bandage were applied to the donor site.
Closure 2 Information: This tab is for additional flaps and grafts, including complex repair and grafts and complex repair and flaps. You can also specify a different location for the additional defect, if the location is the same you do not need to select a new one. We will insert the automated text for the repair you select below just as we do for solitary flaps and grafts. Please note that at this time if you select a location with a different insurance zone you will need to override the ICD10 and CPT if appropriate.
Closure 3 Information: This tab is for additional flaps and grafts above and beyond our usual structured repairs.  Please note if you enter information here it will not currently bill and you will need to add the billing information manually.
Complex Repair Preamble Text (Leave Blank If You Do Not Want): Extensive wide undermining was performed.
Intermediate Repair Preamble Text (Leave Blank If You Do Not Want): Undermining was performed with blunt dissection.
Flap Thinning Complex Repair Preamble Text (Leave Blank If You Do Not Want): An incision was made along the previous flap suture line. Undermining was performed beneath the flap and redundant tissue was removed to restore the normal contour of the skin.
Non-Graft Cartilage Fenestration Text: The cartilage was fenestrated with a 2mm punch biopsy to help facilitate healing.
Graft Cartilage Fenestration Text: The cartilage was fenestrated with a 2mm punch biopsy to help facilitate graft survival and healing.
Preparation Of Recipient Site - Flap: The eschar was removed surgically with sharp dissection to facilitate appropriate wound healing of the following adjacent tissue rearrangement.
Preparation Of Recipient Site - Graft: The eschar was removed surgically with sharp dissection to facilitate appropriate survival of the following graft.
Preparation Of Recipient Site - Flap Takedown: The eschar and granulation tissue was removed surgically with sharp dissection to facilitate appropriate healing after division and inset of the proximal and distal interpolation flap.
Secondary Intention Text (Leave Blank If You Do Not Want): The defect will heal with secondary intention.
No Repair - Repaired With Adjacent Surgical Defect Text (Leave Blank If You Do Not Want): After obtaining clear surgical margins the defect was repaired concurrently with another surgical defect which was in close approximation.
Referred To Oculoplastics For Closure Text (Leave Blank If You Do Not Want): After obtaining clear surgical margins the patient was sent to oculoplastics for surgical repair.  The patient understands they will receive post-surgical care and follow-up from the referring physician's office.
Referred To Otolaryngology For Closure Text (Leave Blank If You Do Not Want): After obtaining clear surgical margins the patient was sent to otolaryngology for surgical repair.  The patient understands they will receive post-surgical care and follow-up from the referring physician's office.
Referred To Plastics For Closure Text (Leave Blank If You Do Not Want): After obtaining clear surgical margins the patient was sent to plastics for surgical repair.  The patient understands they will receive post-surgical care and follow-up from the referring physician's office.
Referred To Asc For Closure Text (Leave Blank If You Do Not Want): After obtaining clear surgical margins the patient was sent to an ASC for surgical repair.  The patient understands they will receive post-surgical care and follow-up from the ASC physician.
Referred To Mid-Level For Closure Text (Leave Blank If You Do Not Want): After obtaining clear surgical margins the patient was sent to a mid-level provider for surgical repair.  The patient understands they will receive post-surgical care and follow-up from the mid-level provider.
Repair Performed By Another Provider Text (Leave Blank If You Do Not Want): After obtaining clear surgical margins the defect was repaired by another provider.
Adjacent Tissue Transfer Text: The defect edges were debeveled with a #15 scalpel blade.  Given the location of the defect and the proximity to free margins an adjacent tissue transfer was deemed most appropriate.  Using a sterile surgical marker, an appropriate flap was drawn incorporating the defect and placing the expected incisions within the relaxed skin tension lines where possible.    The area thus outlined was incised deep to adipose tissue with a #15 scalpel blade.  The skin margins were undermined to an appropriate distance in all directions utilizing iris scissors.
Advancement Flap (Single) Text: The defect edges were debeveled with a #15 scalpel blade.  Given the location of the defect and the proximity to free margins a single advancement flap was deemed most appropriate.  Using a sterile surgical marker, an appropriate advancement flap was drawn incorporating the defect and placing the expected incisions within the relaxed skin tension lines where possible.    The area thus outlined was incised deep to adipose tissue with a #15 scalpel blade.  The skin margins were undermined to an appropriate distance in all directions utilizing iris scissors.
Advancement Flap (Double) Text: The defect edges were debeveled with a #15 scalpel blade.  Given the location of the defect and the proximity to free margins a double advancement flap was deemed most appropriate.  Using a sterile surgical marker, the appropriate advancement flaps were drawn incorporating the defect and placing the expected incisions within the relaxed skin tension lines where possible.    The area thus outlined was incised deep to adipose tissue with a #15 scalpel blade.  The skin margins were undermined to an appropriate distance in all directions utilizing iris scissors.
Burow's Advancement Flap Text: The defect edges were debeveled with a #15 scalpel blade.  Given the location of the defect and the proximity to free margins a Burow's advancement flap was deemed most appropriate.  Using a sterile surgical marker, the appropriate advancement flap was drawn incorporating the defect and placing the expected incisions within the relaxed skin tension lines where possible.    The area thus outlined was incised deep to adipose tissue with a #15 scalpel blade.  The skin margins were undermined to an appropriate distance in all directions utilizing iris scissors.
Chonodrocutaneous Helical Advancement Flap Text: The defect edges were debeveled with a #15 scalpel blade.  Given the location of the defect and the proximity to free margins a chondrocutaneous helical advancement flap was deemed most appropriate.  Using a sterile surgical marker, the appropriate advancement flap was drawn incorporating the defect and placing the expected incisions within the relaxed skin tension lines where possible.    The area thus outlined was incised deep to adipose tissue with a #15 scalpel blade.  The skin margins were undermined to an appropriate distance in all directions utilizing iris scissors.
Crescentic Advancement Flap Text: The defect edges were debeveled with a #15 scalpel blade.  Given the location of the defect and the proximity to free margins a crescentic advancement flap was deemed most appropriate.  Using a sterile surgical marker, the appropriate advancement flap was drawn incorporating the defect and placing the expected incisions within the relaxed skin tension lines where possible.    The area thus outlined was incised deep to adipose tissue with a #15 scalpel blade.  The skin margins were undermined to an appropriate distance in all directions utilizing iris scissors.
A-T Advancement Flap Text: The defect edges were debeveled with a #15 scalpel blade.  Given the location of the defect, shape of the defect and the proximity to free margins an A-T advancement flap was deemed most appropriate.  Using a sterile surgical marker, an appropriate advancement flap was drawn incorporating the defect and placing the expected incisions within the relaxed skin tension lines where possible.    The area thus outlined was incised deep to adipose tissue with a #15 scalpel blade.  The skin margins were undermined to an appropriate distance in all directions utilizing iris scissors.
O-T Advancement Flap Text: The defect edges were debeveled with a #15 scalpel blade.  Given the location of the defect, shape of the defect and the proximity to free margins an O-T advancement flap was deemed most appropriate.  Using a sterile surgical marker, an appropriate advancement flap was drawn incorporating the defect and placing the expected incisions within the relaxed skin tension lines where possible.    The area thus outlined was incised deep to adipose tissue with a #15 scalpel blade.  The skin margins were undermined to an appropriate distance in all directions utilizing iris scissors.
O-L Flap Text: The defect edges were debeveled with a #15 scalpel blade.  Given the location of the defect, shape of the defect and the proximity to free margins an O-L flap was deemed most appropriate.  Using a sterile surgical marker, an appropriate advancement flap was drawn incorporating the defect and placing the expected incisions within the relaxed skin tension lines where possible.    The area thus outlined was incised deep to adipose tissue with a #15 scalpel blade.  The skin margins were undermined to an appropriate distance in all directions utilizing iris scissors.
O-Z Flap Text: The defect edges were debeveled with a #15 scalpel blade.  Given the location of the defect, shape of the defect and the proximity to free margins an O-Z flap was deemed most appropriate.  Using a sterile surgical marker, an appropriate transposition flap was drawn incorporating the defect and placing the expected incisions within the relaxed skin tension lines where possible. The area thus outlined was incised deep to adipose tissue with a #15 scalpel blade.  The skin margins were undermined to an appropriate distance in all directions utilizing iris scissors.
Double O-Z Flap Text: The defect edges were debeveled with a #15 scalpel blade.  Given the location of the defect, shape of the defect and the proximity to free margins a Double O-Z flap was deemed most appropriate.  Using a sterile surgical marker, an appropriate transposition flap was drawn incorporating the defect and placing the expected incisions within the relaxed skin tension lines where possible. The area thus outlined was incised deep to adipose tissue with a #15 scalpel blade.  The skin margins were undermined to an appropriate distance in all directions utilizing iris scissors.
V-Y Flap Text: The defect edges were debeveled with a #15 scalpel blade.  Given the location of the defect, shape of the defect and the proximity to free margins a V-Y flap was deemed most appropriate.  Using a sterile surgical marker, an appropriate advancement flap was drawn incorporating the defect and placing the expected incisions within the relaxed skin tension lines where possible.    The area thus outlined was incised deep to adipose tissue with a #15 scalpel blade.  The skin margins were undermined to an appropriate distance in all directions utilizing iris scissors.
Advancement-Rotation Flap Text: The defect edges were debeveled with a #15 scalpel blade.  Given the location of the defect, shape of the defect and the proximity to free margins an advancement-rotation flap was deemed most appropriate.  Using a sterile surgical marker, an appropriate flap was drawn incorporating the defect and placing the expected incisions within the relaxed skin tension lines where possible. The area thus outlined was incised deep to adipose tissue with a #15 scalpel blade.  The skin margins were undermined to an appropriate distance in all directions utilizing iris scissors.
Mercedes Flap Text: The defect edges were debeveled with a #15 scalpel blade.  Given the location of the defect, shape of the defect and the proximity to free margins a Mercedes flap was deemed most appropriate.  Using a sterile surgical marker, an appropriate advancement flap was drawn incorporating the defect and placing the expected incisions within the relaxed skin tension lines where possible. The area thus outlined was incised deep to adipose tissue with a #15 scalpel blade.  The skin margins were undermined to an appropriate distance in all directions utilizing iris scissors.
Modified Advancement Flap Text: The defect edges were debeveled with a #15 scalpel blade.  Given the location of the defect, shape of the defect and the proximity to free margins a modified advancement flap was deemed most appropriate.  Using a sterile surgical marker, an appropriate advancement flap was drawn incorporating the defect and placing the expected incisions within the relaxed skin tension lines where possible.    The area thus outlined was incised deep to adipose tissue with a #15 scalpel blade.  The skin margins were undermined to an appropriate distance in all directions utilizing iris scissors.
Mucosal Advancement Flap Text: Given the location of the defect, shape of the defect and the proximity to free margins a mucosal advancement flap was deemed most appropriate. Incisions were made with a 15 blade scalpel in the appropriate fashion along the cutaneous vermilion border and the mucosal lip. The remaining actinically damaged mucosal tissue was excised.  The mucosal advancement flap was then elevated to the gingival sulcus with care taken to preserve the neurovascular structures and advanced into the primary defect. Care was taken to ensure that precise realignment of the vermilion border was achieved.
Peng Advancement Flap Text: The defect edges were debeveled with a #15 scalpel blade.  Given the location of the defect, shape of the defect and the proximity to free margins a Peng advancement flap was deemed most appropriate.  Using a sterile surgical marker, an appropriate advancement flap was drawn incorporating the defect and placing the expected incisions within the relaxed skin tension lines where possible. The area thus outlined was incised deep to adipose tissue with a #15 scalpel blade.  The skin margins were undermined to an appropriate distance in all directions utilizing iris scissors.
Hatchet Flap Text: The defect edges were debeveled with a #15 scalpel blade.  Given the location of the defect, shape of the defect and the proximity to free margins a hatchet flap was deemed most appropriate.  Using a sterile surgical marker, an appropriate hatchet flap was drawn incorporating the defect and placing the expected incisions within the relaxed skin tension lines where possible.    The area thus outlined was incised deep to adipose tissue with a #15 scalpel blade.  The skin margins were undermined to an appropriate distance in all directions utilizing iris scissors.
Rotation Flap Text: The defect edges were debeveled with a #15 scalpel blade.  Given the location of the defect, shape of the defect and the proximity to free margins a rotation flap was deemed most appropriate.  Using a sterile surgical marker, an appropriate rotation flap was drawn incorporating the defect and placing the expected incisions within the relaxed skin tension lines where possible.    The area thus outlined was incised deep to adipose tissue with a #15 scalpel blade.  The skin margins were undermined to an appropriate distance in all directions utilizing iris scissors.
Spiral Flap Text: The defect edges were debeveled with a #15 scalpel blade.  Given the location of the defect, shape of the defect and the proximity to free margins a spiral flap was deemed most appropriate.  Using a sterile surgical marker, an appropriate rotation flap was drawn incorporating the defect and placing the expected incisions within the relaxed skin tension lines where possible. The area thus outlined was incised deep to adipose tissue with a #15 scalpel blade.  The skin margins were undermined to an appropriate distance in all directions utilizing iris scissors.
Staged Advancement Flap Text: The defect edges were debeveled with a #15 scalpel blade.  Given the location of the defect, shape of the defect and the proximity to free margins a staged advancement flap was deemed most appropriate.  Using a sterile surgical marker, an appropriate advancement flap was drawn incorporating the defect and placing the expected incisions within the relaxed skin tension lines where possible. The area thus outlined was incised deep to adipose tissue with a #15 scalpel blade.  The skin margins were undermined to an appropriate distance in all directions utilizing iris scissors.
Star Wedge Flap Text: The defect edges were debeveled with a #15 scalpel blade.  Given the location of the defect, shape of the defect and the proximity to free margins a star wedge flap was deemed most appropriate.  Using a sterile surgical marker, an appropriate rotation flap was drawn incorporating the defect and placing the expected incisions within the relaxed skin tension lines where possible. The area thus outlined was incised deep to adipose tissue with a #15 scalpel blade.  The skin margins were undermined to an appropriate distance in all directions utilizing iris scissors.
Transposition Flap Text: The defect edges were debeveled with a #15 scalpel blade.  Given the location of the defect and the proximity to free margins a transposition flap was deemed most appropriate.  Using a sterile surgical marker, an appropriate transposition flap was drawn incorporating the defect.    The area thus outlined was incised deep to adipose tissue with a #15 scalpel blade.  The skin margins were undermined to an appropriate distance in all directions utilizing iris scissors.
Muscle Hinge Flap Text: The defect edges were debeveled with a #15 scalpel blade.  Given the size, depth and location of the defect and the proximity to free margins a muscle hinge flap was deemed most appropriate.  Using a sterile surgical marker, an appropriate hinge flap was drawn incorporating the defect. The area thus outlined was incised with a #15 scalpel blade.  The skin margins were undermined to an appropriate distance in all directions utilizing iris scissors.
Mustarde Flap Text: The defect edges were debeveled with a #15 scalpel blade.  Given the size, depth and location of the defect and the proximity to free margins a Mustarde flap was deemed most appropriate.  Using a sterile surgical marker, an appropriate flap was drawn incorporating the defect. The area thus outlined was incised with a #15 scalpel blade.  The skin margins were undermined to an appropriate distance in all directions utilizing iris scissors.
Nasal Turnover Hinge Flap Text: The defect edges were debeveled with a #15 scalpel blade.  Given the size, depth, location of the defect and the defect being full thickness a nasal turnover hinge flap was deemed most appropriate.  Using a sterile surgical marker, an appropriate hinge flap was drawn incorporating the defect. The area thus outlined was incised with a #15 scalpel blade. The flap was designed to recreate the nasal mucosal lining and the alar rim. The skin margins were undermined to an appropriate distance in all directions utilizing iris scissors.
Nasalis-Muscle-Based Myocutaneous Island Pedicle Flap Text: Using a #15 blade, an incision was made around the donor flap to the level of the nasalis muscle. Wide lateral undermining was then performed in both the subcutaneous plane above the nasalis muscle, and in a submuscular plane just above periosteum. This allowed the formation of a free nasalis muscle axial pedicle (based on the angular artery) which was still attached to the actual cutaneous flap, increasing its mobility and vascular viability. Hemostasis was obtained with pinpoint electrocoagulation. The flap was mobilized into position and the pivotal anchor points positioned and stabilized with buried interrupted sutures. Subcutaneous and dermal tissues were closed in a multilayered fashion with sutures. Tissue redundancies were excised, and the epidermal edges were apposed without significant tension and sutured with sutures.
Orbicularis Oris Muscle Flap Text: The defect edges were debeveled with a #15 scalpel blade.  Given that the defect affected the competency of the oral sphincter an obicularis oris muscle flap was deemed most appropriate to restore this competency and normal muscle function.  Using a sterile surgical marker, an appropriate flap was drawn incorporating the defect. The area thus outlined was incised with a #15 scalpel blade.
Melolabial Transposition Flap Text: The defect edges were debeveled with a #15 scalpel blade.  Given the location of the defect and the proximity to free margins a melolabial flap was deemed most appropriate.  Using a sterile surgical marker, an appropriate melolabial transposition flap was drawn incorporating the defect.    The area thus outlined was incised deep to adipose tissue with a #15 scalpel blade.  The skin margins were undermined to an appropriate distance in all directions utilizing iris scissors.
Rhombic Flap Text: The defect edges were debeveled with a #15 scalpel blade.  Given the location of the defect and the proximity to free margins a rhombic flap was deemed most appropriate.  Using a sterile surgical marker, an appropriate rhombic flap was drawn incorporating the defect.    The area thus outlined was incised deep to adipose tissue with a #15 scalpel blade.  The skin margins were undermined to an appropriate distance in all directions utilizing iris scissors.
Rhomboid Transposition Flap Text: The defect edges were debeveled with a #15 scalpel blade.  Given the location of the defect and the proximity to free margins a rhomboid transposition flap was deemed most appropriate.  Using a sterile surgical marker, an appropriate rhomboid flap was drawn incorporating the defect.    The area thus outlined was incised deep to adipose tissue with a #15 scalpel blade.  The skin margins were undermined to an appropriate distance in all directions utilizing iris scissors.
Bi-Rhombic Flap Text: The defect edges were debeveled with a #15 scalpel blade.  Given the location of the defect and the proximity to free margins a bi-rhombic flap was deemed most appropriate.  Using a sterile surgical marker, an appropriate rhombic flap was drawn incorporating the defect. The area thus outlined was incised deep to adipose tissue with a #15 scalpel blade.  The skin margins were undermined to an appropriate distance in all directions utilizing iris scissors.
Helical Rim Advancement Flap Text: The defect edges were debeveled with a #15 blade scalpel.  Given the location of the defect and the proximity to free margins (helical rim) a double helical rim advancement flap was deemed most appropriate.  Using a sterile surgical marker, the appropriate advancement flaps were drawn incorporating the defect and placing the expected incisions between the helical rim and antihelix where possible.  The area thus outlined was incised through and through with a #15 scalpel blade.  With a skin hook and iris scissors, the flaps were gently and sharply undermined and freed up.
Bilateral Helical Rim Advancement Flap Text: The defect edges were debeveled with a #15 blade scalpel.  Given the location of the defect and the proximity to free margins (helical rim) a bilateral helical rim advancement flap was deemed most appropriate.  Using a sterile surgical marker, the appropriate advancement flaps were drawn incorporating the defect and placing the expected incisions between the helical rim and antihelix where possible.  The area thus outlined was incised through and through with a #15 scalpel blade.  With a skin hook and iris scissors, the flaps were gently and sharply undermined and freed up.
Ear Star Wedge Flap Text: The defect edges were debeveled with a #15 blade scalpel.  Given the location of the defect and the proximity to free margins (helical rim) an ear star wedge flap was deemed most appropriate.  Using a sterile surgical marker, the appropriate flap was drawn incorporating the defect and placing the expected incisions between the helical rim and antihelix where possible.  The area thus outlined was incised through and through with a #15 scalpel blade.
Banner Transposition Flap Text: The defect edges were debeveled with a #15 scalpel blade.  Given the location of the defect and the proximity to free margins a Banner transposition flap was deemed most appropriate.  Using a sterile surgical marker, an appropriate flap drawn around the defect. The area thus outlined was incised deep to adipose tissue with a #15 scalpel blade.  The skin margins were undermined to an appropriate distance in all directions utilizing iris scissors.
Bilobed Flap Text: The defect edges were debeveled with a #15 scalpel blade.  Given the location of the defect and the proximity to free margins a bilobe flap was deemed most appropriate.  Using a sterile surgical marker, an appropriate bilobe flap drawn around the defect.    The area thus outlined was incised deep to adipose tissue with a #15 scalpel blade.  The skin margins were undermined to an appropriate distance in all directions utilizing iris scissors.
Bilobed Transposition Flap Text: The defect edges were debeveled with a #15 scalpel blade.  Given the location of the defect and the proximity to free margins a bilobed transposition flap was deemed most appropriate.  Using a sterile surgical marker, an appropriate bilobe flap drawn around the defect.    The area thus outlined was incised deep to adipose tissue with a #15 scalpel blade.  The skin margins were undermined to an appropriate distance in all directions utilizing iris scissors.
Trilobed Flap Text: The defect edges were debeveled with a #15 scalpel blade.  Given the location of the defect and the proximity to free margins a trilobed flap was deemed most appropriate.  Using a sterile surgical marker, an appropriate trilobed flap drawn around the defect.    The area thus outlined was incised deep to adipose tissue with a #15 scalpel blade.  The skin margins were undermined to an appropriate distance in all directions utilizing iris scissors.
Dorsal Nasal Flap Text: The defect edges were debeveled with a #15 scalpel blade.  Given the location of the defect and the proximity to free margins a dorsal nasal flap was deemed most appropriate.  Using a sterile surgical marker, an appropriate dorsal nasal flap was drawn around the defect.    The area thus outlined was incised deep to adipose tissue with a #15 scalpel blade.  The skin margins were undermined to an appropriate distance in all directions utilizing iris scissors.
Island Pedicle Flap Text: The defect edges were debeveled with a #15 scalpel blade.  Given the location of the defect, shape of the defect and the proximity to free margins an island pedicle advancement flap was deemed most appropriate.  Using a sterile surgical marker, an appropriate advancement flap was drawn incorporating the defect, outlining the appropriate donor tissue and placing the expected incisions within the relaxed skin tension lines where possible.    The area thus outlined was incised deep to adipose tissue with a #15 scalpel blade.  The skin margins were undermined to an appropriate distance in all directions around the primary defect and laterally outward around the island pedicle utilizing iris scissors.  There was minimal undermining beneath the pedicle flap.
Island Pedicle Flap With Canthal Suspension Text: The defect edges were debeveled with a #15 scalpel blade.  Given the location of the defect, shape of the defect and the proximity to free margins an island pedicle advancement flap was deemed most appropriate.  Using a sterile surgical marker, an appropriate advancement flap was drawn incorporating the defect, outlining the appropriate donor tissue and placing the expected incisions within the relaxed skin tension lines where possible. The area thus outlined was incised deep to adipose tissue with a #15 scalpel blade.  The skin margins were undermined to an appropriate distance in all directions around the primary defect and laterally outward around the island pedicle utilizing iris scissors.  There was minimal undermining beneath the pedicle flap. A suspension suture was placed in the canthal tendon to prevent tension and prevent ectropion.
Alar Island Pedicle Flap Text: The defect edges were debeveled with a #15 scalpel blade.  Given the location of the defect, shape of the defect and the proximity to the alar rim an island pedicle advancement flap was deemed most appropriate.  Using a sterile surgical marker, an appropriate advancement flap was drawn incorporating the defect, outlining the appropriate donor tissue and placing the expected incisions within the nasal ala running parallel to the alar rim. The area thus outlined was incised with a #15 scalpel blade.  The skin margins were undermined minimally to an appropriate distance in all directions around the primary defect and laterally outward around the island pedicle utilizing iris scissors.  There was minimal undermining beneath the pedicle flap.
Double Island Pedicle Flap Text: The defect edges were debeveled with a #15 scalpel blade.  Given the location of the defect, shape of the defect and the proximity to free margins a double island pedicle advancement flap was deemed most appropriate.  Using a sterile surgical marker, an appropriate advancement flap was drawn incorporating the defect, outlining the appropriate donor tissue and placing the expected incisions within the relaxed skin tension lines where possible.    The area thus outlined was incised deep to adipose tissue with a #15 scalpel blade.  The skin margins were undermined to an appropriate distance in all directions around the primary defect and laterally outward around the island pedicle utilizing iris scissors.  There was minimal undermining beneath the pedicle flap.
Island Pedicle Flap-Requiring Vessel Identification Text: The defect edges were debeveled with a #15 scalpel blade.  Given the location of the defect, shape of the defect and the proximity to free margins an island pedicle advancement flap was deemed most appropriate.  Using a sterile surgical marker, an appropriate advancement flap was drawn, based on the axial vessel mentioned above, incorporating the defect, outlining the appropriate donor tissue and placing the expected incisions within the relaxed skin tension lines where possible.    The area thus outlined was incised deep to adipose tissue with a #15 scalpel blade.  The skin margins were undermined to an appropriate distance in all directions around the primary defect and laterally outward around the island pedicle utilizing iris scissors.  There was minimal undermining beneath the pedicle flap.
Keystone Flap Text: The defect edges were debeveled with a #15 scalpel blade.  Given the location of the defect, shape of the defect a keystone flap was deemed most appropriate.  Using a sterile surgical marker, an appropriate keystone flap was drawn incorporating the defect, outlining the appropriate donor tissue and placing the expected incisions within the relaxed skin tension lines where possible. The area thus outlined was incised deep to adipose tissue with a #15 scalpel blade.  The skin margins were undermined to an appropriate distance in all directions around the primary defect and laterally outward around the flap utilizing iris scissors.
O-T Plasty Text: The defect edges were debeveled with a #15 scalpel blade.  Given the location of the defect, shape of the defect and the proximity to free margins an O-T plasty was deemed most appropriate.  Using a sterile surgical marker, an appropriate O-T plasty was drawn incorporating the defect and placing the expected incisions within the relaxed skin tension lines where possible.    The area thus outlined was incised deep to adipose tissue with a #15 scalpel blade.  The skin margins were undermined to an appropriate distance in all directions utilizing iris scissors.
O-Z Plasty Text: The defect edges were debeveled with a #15 scalpel blade.  Given the location of the defect, shape of the defect and the proximity to free margins an O-Z plasty (double transposition flap) was deemed most appropriate.  Using a sterile surgical marker, the appropriate transposition flaps were drawn incorporating the defect and placing the expected incisions within the relaxed skin tension lines where possible.    The area thus outlined was incised deep to adipose tissue with a #15 scalpel blade.  The skin margins were undermined to an appropriate distance in all directions utilizing iris scissors.  Hemostasis was achieved with electrocautery.  The flaps were then transposed into place, one clockwise and the other counterclockwise, and anchored with interrupted buried subcutaneous sutures.
Double O-Z Plasty Text: The defect edges were debeveled with a #15 scalpel blade.  Given the location of the defect, shape of the defect and the proximity to free margins a Double O-Z plasty (double transposition flap) was deemed most appropriate.  Using a sterile surgical marker, the appropriate transposition flaps were drawn incorporating the defect and placing the expected incisions within the relaxed skin tension lines where possible. The area thus outlined was incised deep to adipose tissue with a #15 scalpel blade.  The skin margins were undermined to an appropriate distance in all directions utilizing iris scissors.  Hemostasis was achieved with electrocautery.  The flaps were then transposed into place, one clockwise and the other counterclockwise, and anchored with interrupted buried subcutaneous sutures.
V-Y Plasty Text: The defect edges were debeveled with a #15 scalpel blade.  Given the location of the defect, shape of the defect and the proximity to free margins an V-Y advancement flap was deemed most appropriate.  Using a sterile surgical marker, an appropriate advancement flap was drawn incorporating the defect and placing the expected incisions within the relaxed skin tension lines where possible.    The area thus outlined was incised deep to adipose tissue with a #15 scalpel blade.  The skin margins were undermined to an appropriate distance in all directions utilizing iris scissors.
H Plasty Text: Given the location of the defect, shape of the defect and the proximity to free margins a H-plasty was deemed most appropriate for repair.  Using a sterile surgical marker, the appropriate advancement arms of the H-plasty were drawn incorporating the defect and placing the expected incisions within the relaxed skin tension lines where possible. The area thus outlined was incised deep to adipose tissue with a #15 scalpel blade. The skin margins were undermined to an appropriate distance in all directions utilizing iris scissors.  The opposing advancement arms were then advanced into place in opposite direction and anchored with interrupted buried subcutaneous sutures.
W Plasty Text: The lesion was extirpated to the level of the fat with a #15 scalpel blade.  Given the location of the defect, shape of the defect and the proximity to free margins a W-plasty was deemed most appropriate for repair.  Using a sterile surgical marker, the appropriate transposition arms of the W-plasty were drawn incorporating the defect and placing the expected incisions within the relaxed skin tension lines where possible.    The area thus outlined was incised deep to adipose tissue with a #15 scalpel blade.  The skin margins were undermined to an appropriate distance in all directions utilizing iris scissors.  The opposing transposition arms were then transposed into place in opposite direction and anchored with interrupted buried subcutaneous sutures.
Z Plasty Text: The lesion was extirpated to the level of the fat with a #15 scalpel blade.  Given the location of the defect, shape of the defect and the proximity to free margins a Z-plasty was deemed most appropriate for repair.  Using a sterile surgical marker, the appropriate transposition arms of the Z-plasty were drawn incorporating the defect and placing the expected incisions within the relaxed skin tension lines where possible.    The area thus outlined was incised deep to adipose tissue with a #15 scalpel blade.  The skin margins were undermined to an appropriate distance in all directions utilizing iris scissors.  The opposing transposition arms were then transposed into place in opposite direction and anchored with interrupted buried subcutaneous sutures.
Zygomaticofacial Flap Text: Given the location of the defect, shape of the defect and the proximity to free margins a zygomaticofacial flap was deemed most appropriate for repair.  Using a sterile surgical marker, the appropriate flap was drawn incorporating the defect and placing the expected incisions within the relaxed skin tension lines where possible. The area thus outlined was incised deep to adipose tissue with a #15 scalpel blade with preservation of a vascular pedicle.  The skin margins were undermined to an appropriate distance in all directions utilizing iris scissors.  The flap was then placed into the defect and anchored with interrupted buried subcutaneous sutures.
Cheek Interpolation Flap Text: A decision was made to reconstruct the defect utilizing an interpolation axial flap and a staged reconstruction.  A telfa template was made of the defect.  This telfa template was then used to outline the Cheek Interpolation flap.  The donor area for the pedicle flap was then injected with anesthesia.  The flap was excised through the skin and subcutaneous tissue down to the layer of the underlying musculature.  The interpolation flap was carefully excised within this deep plane to maintain its blood supply.  The edges of the donor site were undermined.   The donor site was closed in a primary fashion.  The pedicle was then rotated into position and sutured.  Once the tube was sutured into place, adequate blood supply was confirmed with blanching and refill.  The pedicle was then wrapped with xeroform gauze and dressed appropriately with a telfa and gauze bandage to ensure continued blood supply and protect the attached pedicle.
Cheek-To-Nose Interpolation Flap Text: A decision was made to reconstruct the defect utilizing an interpolation axial flap and a staged reconstruction.  A telfa template was made of the defect.  This telfa template was then used to outline the Cheek-To-Nose Interpolation flap.  The donor area for the pedicle flap was then injected with anesthesia.  The flap was excised through the skin and subcutaneous tissue down to the layer of the underlying musculature.  The interpolation flap was carefully excised within this deep plane to maintain its blood supply.  The edges of the donor site were undermined.   The donor site was closed in a primary fashion.  The pedicle was then rotated into position and sutured.  Once the tube was sutured into place, adequate blood supply was confirmed with blanching and refill.  The pedicle was then wrapped with xeroform gauze and dressed appropriately with a telfa and gauze bandage to ensure continued blood supply and protect the attached pedicle.
Interpolation Flap Text: A decision was made to reconstruct the defect utilizing an interpolation axial flap and a staged reconstruction.  A telfa template was made of the defect.  This telfa template was then used to outline the interpolation flap.  The donor area for the pedicle flap was then injected with anesthesia.  The flap was excised through the skin and subcutaneous tissue down to the layer of the underlying musculature.  The interpolation flap was carefully excised within this deep plane to maintain its blood supply.  The edges of the donor site were undermined.   The donor site was closed in a primary fashion.  The pedicle was then rotated into position and sutured.  Once the tube was sutured into place, adequate blood supply was confirmed with blanching and refill.  The pedicle was then wrapped with xeroform gauze and dressed appropriately with a telfa and gauze bandage to ensure continued blood supply and protect the attached pedicle.
Melolabial Interpolation Flap Text: A decision was made to reconstruct the defect utilizing an interpolation axial flap and a staged reconstruction.  A telfa template was made of the defect.  This telfa template was then used to outline the melolabial interpolation flap.  The donor area for the pedicle flap was then injected with anesthesia.  The flap was excised through the skin and subcutaneous tissue down to the layer of the underlying musculature.  The pedicle flap was carefully excised within this deep plane to maintain its blood supply.  The edges of the donor site were undermined.   The donor site was closed in a primary fashion.  The pedicle was then rotated into position and sutured.  Once the tube was sutured into place, adequate blood supply was confirmed with blanching and refill.  The pedicle was then wrapped with xeroform gauze and dressed appropriately with a telfa and gauze bandage to ensure continued blood supply and protect the attached pedicle.
Mastoid Interpolation Flap Text: A decision was made to reconstruct the defect utilizing an interpolation axial flap and a staged reconstruction.  A telfa template was made of the defect.  This telfa template was then used to outline the mastoid interpolation flap.  The donor area for the pedicle flap was then injected with anesthesia.  The flap was excised through the skin and subcutaneous tissue down to the layer of the underlying musculature.  The pedicle flap was carefully excised within this deep plane to maintain its blood supply.  The edges of the donor site were undermined.   The donor site was closed in a primary fashion.  The pedicle was then rotated into position and sutured.  Once the tube was sutured into place, adequate blood supply was confirmed with blanching and refill.  The pedicle was then wrapped with xeroform gauze and dressed appropriately with a telfa and gauze bandage to ensure continued blood supply and protect the attached pedicle.
Posterior Auricular Interpolation Flap Text: A decision was made to reconstruct the defect utilizing an interpolation axial flap and a staged reconstruction.  A telfa template was made of the defect.  This telfa template was then used to outline the posterior auricular interpolation flap.  The donor area for the pedicle flap was then injected with anesthesia.  The flap was excised through the skin and subcutaneous tissue down to the layer of the underlying musculature.  The pedicle flap was carefully excised within this deep plane to maintain its blood supply.  The edges of the donor site were undermined.   The donor site was closed in a primary fashion.  The pedicle was then rotated into position and sutured.  Once the tube was sutured into place, adequate blood supply was confirmed with blanching and refill.  The pedicle was then wrapped with xeroform gauze and dressed appropriately with a telfa and gauze bandage to ensure continued blood supply and protect the attached pedicle.
Paramedian Forehead Flap Text: A decision was made to reconstruct the defect utilizing an interpolation axial flap and a staged reconstruction.  A telfa template was made of the defect.  This telfa template was then used to outline the paramedian forehead pedicle flap.  The donor area for the pedicle flap was then injected with anesthesia.  The flap was excised through the skin and subcutaneous tissue down to the layer of the underlying musculature.  The pedicle flap was carefully excised within this deep plane to maintain its blood supply.  The edges of the donor site were undermined.   The donor site was closed in a primary fashion.  The pedicle was then rotated into position and sutured.  Once the tube was sutured into place, adequate blood supply was confirmed with blanching and refill.  The pedicle was then wrapped with xeroform gauze and dressed appropriately with a telfa and gauze bandage to ensure continued blood supply and protect the attached pedicle.
Cheiloplasty (Less Than 50%) Text: A decision was made to reconstruct the defect with a  cheiloplasty.  The defect was undermined extensively.  Additional obicularis oris muscle was excised with a 15 blade scalpel.  The defect was converted into a full thickness wedge, of less than 50% of the vertical height of the lip, to facilite a better cosmetic result.  Small vessels were then tied off with 5-0 monocyrl. The obicularis oris, superficial fascia, adipose and dermis were then reapproximated.  After the deeper layers were approximated the epidermis was reapproximated with particular care given to realign the vermilion border.
Cheiloplasty (Complex) Text: A decision was made to reconstruct the defect with a  cheiloplasty.  The defect was undermined extensively.  Additional obicularis oris muscle was excised with a 15 blade scalpel.  The defect was converted into a full thickness wedge to facilite a better cosmetic result.  Small vessels were then tied off with 5-0 monocyrl. The obicularis oris, superficial fascia, adipose and dermis were then reapproximated.  After the deeper layers were approximated the epidermis was reapproximated with particular care given to realign the vermilion border.
Ear Wedge Repair Text: A wedge excision was completed by carrying down an excision through the full thickness of the ear and cartilage with an inward facing Burow's triangle. The wound was then closed in a layered fashion.
Full Thickness Lip Wedge Repair (Flap) Text: Given the location of the defect and the proximity to free margins a full thickness wedge repair was deemed most appropriate.  Using a sterile surgical marker, the appropriate repair was drawn incorporating the defect and placing the expected incisions perpendicular to the vermilion border.  The vermilion border was also meticulously outlined to ensure appropriate reapproximation during the repair.  The area thus outlined was incised through and through with a #15 scalpel blade.  The muscularis and dermis were reaproximated with deep sutures following hemostasis. Care was taken to realign the vermilion border before proceeding with the superficial closure.  Once the vermilion was realigned the superfical and mucosal closure was finished.
Ftsg Text: The defect edges were debeveled with a #15 scalpel blade.  Given the location of the defect, shape of the defect and the proximity to free margins a full thickness skin graft was deemed most appropriate.  Using a sterile surgical marker, the primary defect shape was transferred to the donor site. The area thus outlined was incised deep to adipose tissue with a #15 scalpel blade.  The harvested graft was then trimmed of adipose tissue until only dermis and epidermis was left.  The skin margins of the secondary defect were undermined to an appropriate distance in all directions utilizing iris scissors.  The secondary defect was closed with interrupted buried subcutaneous sutures.  The skin edges were then re-apposed with running  sutures.  The skin graft was then placed in the primary defect and oriented appropriately.
Split-Thickness Skin Graft Text: The defect edges were debeveled with a #15 scalpel blade.  Given the location of the defect, shape of the defect and the proximity to free margins a split thickness skin graft was deemed most appropriate.  Using a sterile surgical marker, the primary defect shape was transferred to the donor site. The split thickness graft was then harvested.  The skin graft was then placed in the primary defect and oriented appropriately.
Burow's Graft Text: The defect edges were debeveled with a #15 scalpel blade.  Given the location of the defect, shape of the defect, the proximity to free margins and the presence of a standing cone deformity a Burow's skin graft was deemed most appropriate. The standing cone was removed and this tissue was then trimmed to the shape of the primary defect. The adipose tissue was also removed until only dermis and epidermis were left.  The skin margins of the secondary defect were undermined to an appropriate distance in all directions utilizing iris scissors.  The secondary defect was closed with interrupted buried subcutaneous sutures.  The skin edges were then re-apposed with running  sutures.  The skin graft was then placed in the primary defect and oriented appropriately.
Cartilage Graft Text: The defect edges were debeveled with a #15 scalpel blade.  Given the location of the defect, shape of the defect, the fact the defect involved a full thickness cartilage defect a cartilage graft was deemed most appropriate.  An appropriate donor site was identified, cleansed, and anesthetized. The cartilage graft was then harvested and transferred to the recipient site, oriented appropriately and then sutured into place.  The secondary defect was then repaired using a primary closure.
Composite Graft Text: The defect edges were debeveled with a #15 scalpel blade.  Given the location of the defect, shape of the defect, the proximity to free margins and the fact the defect was full thickness a composite graft was deemed most appropriate.  The defect was outline and then transferred to the donor site.  A full thickness graft was then excised from the donor site. The graft was then placed in the primary defect, oriented appropriately and then sutured into place.  The secondary defect was then repaired using a primary closure.
Epidermal Autograft Text: The defect edges were debeveled with a #15 scalpel blade.  Given the location of the defect, shape of the defect and the proximity to free margins an epidermal autograft was deemed most appropriate.  Using a sterile surgical marker, the primary defect shape was transferred to the donor site. The epidermal graft was then harvested.  The skin graft was then placed in the primary defect and oriented appropriately.
Dermal Autograft Text: The defect edges were debeveled with a #15 scalpel blade.  Given the location of the defect, shape of the defect and the proximity to free margins a dermal autograft was deemed most appropriate.  Using a sterile surgical marker, the primary defect shape was transferred to the donor site. The area thus outlined was incised deep to adipose tissue with a #15 scalpel blade.  The harvested graft was then trimmed of adipose and epidermal tissue until only dermis was left.  The skin graft was then placed in the primary defect and oriented appropriately.
Skin Substitute Text: The defect edges were debeveled with a #15 scalpel blade.  Given the location of the defect, shape of the defect and the proximity to free margins a skin substitute graft was deemed most appropriate.  The graft material was trimmed to fit the size of the defect. The graft was then placed in the primary defect and oriented appropriately.
Skin Substitute Paste Text: The defect edges were debeveled with a #15 scalpel blade.  Given the location of the defect, shape of the defect and the proximity to free margins a skin substitute micronized graft was deemed most appropriate.  The entire vial contents were admixed with 0.5ccs of sterile saline, formed into a paste and then evenly spread over the entire wound bed.
Skin Substitute Injection Text: The defect edges were debeveled with a #15 scalpel blade.  Given the location of the defect, shape of the defect and the proximity to free margins a skin substitute micronized graft was deemed most appropriate.  The entire vial contents were admixed with 3.0ccs of sterile saline and then injected subcutaneously throughout the entire wound bed.
Tissue Cultured Epidermal Autograft Text: The defect edges were debeveled with a #15 scalpel blade.  Given the location of the defect, shape of the defect and the proximity to free margins a tissue cultured epidermal autograft was deemed most appropriate.  The graft was then trimmed to fit the size of the defect.  The graft was then placed in the primary defect and oriented appropriately.
Xenograft Text: The defect edges were debeveled with a #15 scalpel blade.  Given the location of the defect, shape of the defect and the proximity to free margins a xenograft was deemed most appropriate.  The graft was then trimmed to fit the size of the defect.  The graft was then placed in the primary defect and oriented appropriately.
Purse String (Simple) Text: Given the location of the defect and the characteristics of the surrounding skin a purse string closure was deemed most appropriate.  Undermining was performed circumfirentially around the surgical defect.  A purse string suture was then placed and tightened.
Purse String (Intermediate) Text: Given the location of the defect and the characteristics of the surrounding skin a purse string intermediate closure was deemed most appropriate.  Undermining was performed circumfirentially around the surgical defect.  A purse string suture was then placed and tightened.
Partial Purse String (Simple) Text: Given the location of the defect and the characteristics of the surrounding skin a simple purse string closure was deemed most appropriate.  Undermining was performed circumfirentially around the surgical defect.  A purse string suture was then placed and tightened. Wound tension only allowed a partial closure of the circular defect.
Partial Purse String (Intermediate) Text: Given the location of the defect and the characteristics of the surrounding skin an intermediate purse string closure was deemed most appropriate.  Undermining was performed circumfirentially around the surgical defect.  A purse string suture was then placed and tightened. Wound tension only allowed a partial closure of the circular defect.
Localized Dermabrasion With Wire Brush Text: The patient was draped in routine manner.  Localized dermabrasion using 3 x 17 mm wire brush was performed in routine manner to papillary dermis. This spot dermabrasion is being performed to complete skin cancer reconstruction. It also will eliminate the other sun damaged precancerous cells that are known to be part of the regional effect of a lifetime's worth of sun exposure. This localized dermabrasion is therapeutic and should not be considered cosmetic in any regard.
Tarsorrhaphy Text: A tarsorrhaphy was performed using Frost sutures.
Complex Repair And Flap Additional Text (Will Appearing After The Standard Complex Repair Text): The complex repair was not sufficient to completely close the primary defect. The remaining additional defect was repaired with the flap mentioned below.
Complex Repair And Graft Additional Text (Will Appearing After The Standard Complex Repair Text): The complex repair was not sufficient to completely close the primary defect. The remaining additional defect was repaired with the graft mentioned below.
Cheek Interpolation Flap Division And Inset Text: Division and inset of the cheek interpolation flap was performed to achieve optimal aesthetic result, restore normal anatomic appearance and avoid distortion of normal anatomy, expedite and facilitate wound healing, achieve optimal functional result and because linear closure either not possible or would produce suboptimal result. The patient was prepped and draped in the usual manner. The pedicle was infiltrated with local anesthesia. The pedicle was sectioned with a #15 blade. The pedicle was de-bulked and trimmed to match the shape of the defect. Hemostasis was achieved. The flap donor site and free margin of the flap were secured with deep buried sutures and the wound edges were re-approximated.
Cheek To Nose Interpolation Flap Division And Inset Text: Division and inset of the cheek to nose interpolation flap was performed to achieve optimal aesthetic result, restore normal anatomic appearance and avoid distortion of normal anatomy, expedite and facilitate wound healing, achieve optimal functional result and because linear closure either not possible or would produce suboptimal result. The patient was prepped and draped in the usual manner. The pedicle was infiltrated with local anesthesia. The pedicle was sectioned with a #15 blade. The pedicle was de-bulked and trimmed to match the shape of the defect. Hemostasis was achieved. The flap donor site and free margin of the flap were secured with deep buried sutures and the wound edges were re-approximated.
Melolabial Interpolation Flap Division And Inset Text: Division and inset of the melolabial interpolation flap was performed to achieve optimal aesthetic result, restore normal anatomic appearance and avoid distortion of normal anatomy, expedite and facilitate wound healing, achieve optimal functional result and because linear closure either not possible or would produce suboptimal result. The patient was prepped and draped in the usual manner. The pedicle was infiltrated with local anesthesia. The pedicle was sectioned with a #15 blade. The pedicle was de-bulked and trimmed to match the shape of the defect. Hemostasis was achieved. The flap donor site and free margin of the flap were secured with deep buried sutures and the wound edges were re-approximated.
Mastoid Interpolation Flap Division And Inset Text: Division and inset of the mastoid interpolation flap was performed to achieve optimal aesthetic result, restore normal anatomic appearance and avoid distortion of normal anatomy, expedite and facilitate wound healing, achieve optimal functional result and because linear closure either not possible or would produce suboptimal result. The patient was prepped and draped in the usual manner. The pedicle was infiltrated with local anesthesia. The pedicle was sectioned with a #15 blade. The pedicle was de-bulked and trimmed to match the shape of the defect. Hemostasis was achieved. The flap donor site and free margin of the flap were secured with deep buried sutures and the wound edges were re-approximated.
Paramedian Forehead Flap Division And Inset Text: Division and inset of the paramedian forehead flap was performed to achieve optimal aesthetic result, restore normal anatomic appearance and avoid distortion of normal anatomy, expedite and facilitate wound healing, achieve optimal functional result and because linear closure either not possible or would produce suboptimal result. The patient was prepped and draped in the usual manner. The pedicle was infiltrated with local anesthesia. The pedicle was sectioned with a #15 blade. The pedicle was de-bulked and trimmed to match the shape of the defect. Hemostasis was achieved. The flap donor site and free margin of the flap were secured with deep buried sutures and the wound edges were re-approximated.
Posterior Auricular Interpolation Flap Division And Inset Text: Division and inset of the posterior auricular interpolation flap was performed to achieve optimal aesthetic result, restore normal anatomic appearance and avoid distortion of normal anatomy, expedite and facilitate wound healing, achieve optimal functional result and because linear closure either not possible or would produce suboptimal result. The patient was prepped and draped in the usual manner. The pedicle was infiltrated with local anesthesia. The pedicle was sectioned with a #15 blade. The pedicle was de-bulked and trimmed to match the shape of the defect. Hemostasis was achieved. The flap donor site and free margin of the flap were secured with deep buried sutures and the wound edges were re-approximated.
Manual Repair Warning Statement: We plan on removing the manually selected variable below in favor of our much easier automatic structured text blocks found in the previous tab. We decided to do this to help make the flow better and give you the full power of structured data. Manual selection is never going to be ideal in our platform and I would encourage you to avoid using manual selection from this point on, especially since I will be sunsetting this feature. It is important that you do one of two things with the customized text below. First, you can save all of the text in a word file so you can have it for future reference. Second, transfer the text to the appropriate area in the Library tab. Lastly, if there is a flap or graft type which we do not have you need to let us know right away so I can add it in before the variable is hidden. No need to panic, we plan to give you roughly 6 months to make the change.
Consent: The rationale for the repair was explained to the patient and consent was obtained. The risks, benefits and alternatives to therapy were discussed in detail. Specifically, the risks of infection, scarring, bleeding, prolonged wound healing, incomplete removal, allergy to anesthesia, nerve injury and recurrence were addressed. Prior to the procedure, the treatment site was clearly identified and confirmed by the patient. All components of Universal Protocol/PAUSE Rule completed.
Post-Care Instructions: I reviewed with the patient in detail post-care instructions. Patient is not to engage in any heavy lifting, exercise, or swimming for the next 14 days. Should the patient develop any fevers, chills, bleeding, severe pain patient will contact the office immediately.
Pain Refusal Text: I offered to prescribe pain medication but the patient refused to take this medication.
Show Asc Variables: Yes
Where Do You Want The Question To Include Opioid Counseling Located?: Case Summary Tab
Information: Selecting Yes will display possible errors in your note based on the variables you have selected. This validation is only offered as a suggestion for you. PLEASE NOTE THAT THE VALIDATION TEXT WILL BE REMOVED WHEN YOU FINALIZE YOUR NOTE. IF YOU WANT TO FAX A PRELIMINARY NOTE YOU WILL NEED TO TOGGLE THIS TO 'NO' IF YOU DO NOT WANT IT IN YOUR FAXED NOTE.

## 2022-03-02 ENCOUNTER — APPOINTMENT (RX ONLY)
Dept: URBAN - METROPOLITAN AREA CLINIC 36 | Facility: CLINIC | Age: 72
Setting detail: DERMATOLOGY
End: 2022-03-02

## 2022-03-02 DIAGNOSIS — Z48.02 ENCOUNTER FOR REMOVAL OF SUTURES: ICD-10-CM

## 2022-03-02 PROCEDURE — 99024 POSTOP FOLLOW-UP VISIT: CPT

## 2022-03-02 PROCEDURE — ? SUTURE REMOVAL (GLOBAL PERIOD)

## 2022-03-02 ASSESSMENT — LOCATION ZONE DERM: LOCATION ZONE: EAR

## 2022-03-02 ASSESSMENT — LOCATION SIMPLE DESCRIPTION DERM: LOCATION SIMPLE: LEFT EAR

## 2022-03-02 ASSESSMENT — LOCATION DETAILED DESCRIPTION DERM: LOCATION DETAILED: LEFT SUPERIOR CRUS OF ANTIHELIX

## 2022-03-02 NOTE — PROCEDURE: SUTURE REMOVAL (GLOBAL PERIOD)
Detail Level: Detailed
Add 79300 Cpt? (Important Note: In 2017 The Use Of 91998 Is Being Tracked By Cms To Determine Future Global Period Reimbursement For Global Periods): yes

## 2022-03-09 ENCOUNTER — APPOINTMENT (RX ONLY)
Dept: URBAN - METROPOLITAN AREA CLINIC 36 | Facility: CLINIC | Age: 72
Setting detail: DERMATOLOGY
End: 2022-03-09

## 2022-03-09 DIAGNOSIS — Z48.02 ENCOUNTER FOR REMOVAL OF SUTURES: ICD-10-CM

## 2022-03-09 PROCEDURE — ? SUTURE REMOVAL (GLOBAL PERIOD)

## 2022-03-09 PROCEDURE — 99024 POSTOP FOLLOW-UP VISIT: CPT

## 2022-03-09 ASSESSMENT — LOCATION DETAILED DESCRIPTION DERM: LOCATION DETAILED: LEFT INFERIOR FRONTAL SCALP

## 2022-03-09 ASSESSMENT — LOCATION SIMPLE DESCRIPTION DERM: LOCATION SIMPLE: SCALP

## 2022-03-09 ASSESSMENT — LOCATION ZONE DERM: LOCATION ZONE: SCALP

## 2022-03-09 NOTE — PROCEDURE: SUTURE REMOVAL (GLOBAL PERIOD)
Detail Level: Detailed
Add 00336 Cpt? (Important Note: In 2017 The Use Of 97394 Is Being Tracked By Cms To Determine Future Global Period Reimbursement For Global Periods): yes

## 2022-04-05 ENCOUNTER — APPOINTMENT (RX ONLY)
Dept: URBAN - METROPOLITAN AREA CLINIC 36 | Facility: CLINIC | Age: 72
Setting detail: DERMATOLOGY
End: 2022-04-05

## 2022-04-05 DIAGNOSIS — Z48.817 ENCOUNTER FOR SURGICAL AFTERCARE FOLLOWING SURGERY ON THE SKIN AND SUBCUTANEOUS TISSUE: ICD-10-CM

## 2022-04-05 PROCEDURE — ? POST-OP WOUND CHECK

## 2022-04-05 ASSESSMENT — LOCATION DETAILED DESCRIPTION DERM: LOCATION DETAILED: LEFT POSTERIOR EAR

## 2022-04-05 ASSESSMENT — LOCATION SIMPLE DESCRIPTION DERM: LOCATION SIMPLE: LEFT EAR

## 2022-04-05 ASSESSMENT — LOCATION ZONE DERM: LOCATION ZONE: EAR

## 2022-05-04 ENCOUNTER — APPOINTMENT (RX ONLY)
Dept: URBAN - METROPOLITAN AREA CLINIC 4 | Facility: CLINIC | Age: 72
Setting detail: DERMATOLOGY
End: 2022-05-04

## 2022-05-04 DIAGNOSIS — L57.0 ACTINIC KERATOSIS: ICD-10-CM

## 2022-05-04 DIAGNOSIS — L81.4 OTHER MELANIN HYPERPIGMENTATION: ICD-10-CM

## 2022-05-04 DIAGNOSIS — Z85.828 PERSONAL HISTORY OF OTHER MALIGNANT NEOPLASM OF SKIN: ICD-10-CM

## 2022-05-04 DIAGNOSIS — L82.1 OTHER SEBORRHEIC KERATOSIS: ICD-10-CM

## 2022-05-04 DIAGNOSIS — D22 MELANOCYTIC NEVI: ICD-10-CM

## 2022-05-04 DIAGNOSIS — D18.0 HEMANGIOMA: ICD-10-CM

## 2022-05-04 PROBLEM — D22.39 MELANOCYTIC NEVI OF OTHER PARTS OF FACE: Status: ACTIVE | Noted: 2022-05-04

## 2022-05-04 PROBLEM — D18.01 HEMANGIOMA OF SKIN AND SUBCUTANEOUS TISSUE: Status: ACTIVE | Noted: 2022-05-04

## 2022-05-04 PROBLEM — D22.5 MELANOCYTIC NEVI OF TRUNK: Status: ACTIVE | Noted: 2022-05-04

## 2022-05-04 PROBLEM — D04.5 CARCINOMA IN SITU OF SKIN OF TRUNK: Status: ACTIVE | Noted: 2022-05-04

## 2022-05-04 PROBLEM — D48.5 NEOPLASM OF UNCERTAIN BEHAVIOR OF SKIN: Status: ACTIVE | Noted: 2022-05-04

## 2022-05-04 PROBLEM — D04.39 CARCINOMA IN SITU OF SKIN OF OTHER PARTS OF FACE: Status: ACTIVE | Noted: 2022-05-04

## 2022-05-04 PROBLEM — D22.62 MELANOCYTIC NEVI OF LEFT UPPER LIMB, INCLUDING SHOULDER: Status: ACTIVE | Noted: 2022-05-04

## 2022-05-04 PROBLEM — D22.61 MELANOCYTIC NEVI OF RIGHT UPPER LIMB, INCLUDING SHOULDER: Status: ACTIVE | Noted: 2022-05-04

## 2022-05-04 PROCEDURE — ? LIQUID NITROGEN

## 2022-05-04 PROCEDURE — ? OBSERVATION

## 2022-05-04 PROCEDURE — ? COUNSELING

## 2022-05-04 PROCEDURE — ? BIOPSY BY SHAVE METHOD

## 2022-05-04 PROCEDURE — 11103 TANGNTL BX SKIN EA SEP/ADDL: CPT | Mod: 79

## 2022-05-04 PROCEDURE — 17003 DESTRUCT PREMALG LES 2-14: CPT

## 2022-05-04 PROCEDURE — 17000 DESTRUCT PREMALG LESION: CPT | Mod: 59,79

## 2022-05-04 PROCEDURE — 99213 OFFICE O/P EST LOW 20 MIN: CPT | Mod: 25,24

## 2022-05-04 PROCEDURE — ? DIAGNOSIS COMMENT

## 2022-05-04 PROCEDURE — 11102 TANGNTL BX SKIN SINGLE LES: CPT | Mod: 79

## 2022-05-04 ASSESSMENT — LOCATION ZONE DERM
LOCATION ZONE: EAR
LOCATION ZONE: ARM
LOCATION ZONE: NECK
LOCATION ZONE: FACE
LOCATION ZONE: TRUNK
LOCATION ZONE: LEG

## 2022-05-04 ASSESSMENT — LOCATION DETAILED DESCRIPTION DERM
LOCATION DETAILED: RIGHT DISTAL PRETIBIAL REGION
LOCATION DETAILED: INFERIOR THORACIC SPINE
LOCATION DETAILED: INFERIOR MID FOREHEAD
LOCATION DETAILED: LEFT INFERIOR MEDIAL FOREHEAD
LOCATION DETAILED: RIGHT PROXIMAL POSTERIOR UPPER ARM
LOCATION DETAILED: RIGHT CENTRAL MALAR CHEEK
LOCATION DETAILED: LEFT PROXIMAL POSTERIOR UPPER ARM
LOCATION DETAILED: LEFT MEDIAL UPPER BACK
LOCATION DETAILED: LEFT INFERIOR MEDIAL MALAR CHEEK
LOCATION DETAILED: LEFT MID-UPPER BACK
LOCATION DETAILED: RIGHT DISTAL POSTERIOR UPPER ARM
LOCATION DETAILED: LEFT SUPERIOR HELIX
LOCATION DETAILED: LEFT DISTAL POSTERIOR UPPER ARM
LOCATION DETAILED: LEFT CENTRAL LATERAL NECK
LOCATION DETAILED: RIGHT SUPERIOR LATERAL MIDBACK
LOCATION DETAILED: RIGHT MEDIAL SUPERIOR CHEST
LOCATION DETAILED: LEFT SUPERIOR UPPER BACK
LOCATION DETAILED: LEFT SUPERIOR LATERAL UPPER BACK
LOCATION DETAILED: SUPERIOR THORACIC SPINE
LOCATION DETAILED: LEFT MEDIAL ZYGOMA

## 2022-05-04 ASSESSMENT — LOCATION SIMPLE DESCRIPTION DERM
LOCATION SIMPLE: LEFT UPPER BACK
LOCATION SIMPLE: RIGHT UPPER ARM
LOCATION SIMPLE: CHEST
LOCATION SIMPLE: RIGHT LOWER BACK
LOCATION SIMPLE: RIGHT CHEEK
LOCATION SIMPLE: LEFT EAR
LOCATION SIMPLE: NECK
LOCATION SIMPLE: UPPER BACK
LOCATION SIMPLE: INFERIOR FOREHEAD
LOCATION SIMPLE: RIGHT PRETIBIAL REGION
LOCATION SIMPLE: LEFT ZYGOMA
LOCATION SIMPLE: LEFT FOREHEAD
LOCATION SIMPLE: LEFT CHEEK
LOCATION SIMPLE: LEFT UPPER ARM

## 2022-05-04 NOTE — PROCEDURE: BIOPSY BY SHAVE METHOD
Detail Level: Detailed
Depth Of Biopsy: dermis
Was A Bandage Applied: Yes
Size Of Lesion In Cm: 0
Anticipated Plan (Based On Presumed Biopsy Results): Follow up
Biopsy Type: H and E
Biopsy Method: Personna blade
Anesthesia Type: 1% lidocaine with epinephrine and a 1:10 solution of 8.4% sodium bicarbonate
Anesthesia Volume In Cc: 0.5
Hemostasis: Drysol and Electrocautery
Wound Care: Vaseline
Dressing: Band-Aid
Type Of Destruction Used: Curettage
Curettage Text: The wound bed was treated with curettage after the biopsy was performed.
Electrodesiccation Text: The wound bed was treated with electrodesiccation after the biopsy was performed.
Electrodesiccation And Curettage Text: The wound bed was treated with electrodesiccation and curettage after the biopsy was performed.
Lab: [id number]
Lab Facility: 
Render Path Notes In Note?: No
Consent: Verbal consent was obtained and risks were reviewed including but not limited to scarring, infection, bleeding, scabbing, incomplete removal, nerve damage and allergy to anesthesia.
Post-Care Instructions: I reviewed with the patient in detail post-care instructions. Patient is to keep the biopsy site dry overnight, and then apply vasaline twice daily until healed.
Notification Instructions: Patient will be notified of biopsy results. However, patient instructed to call the office if not contacted within 2 weeks.
Billing Type: Third-Party Bill
Information: Selecting Yes will display possible errors in your note based on the variables you have selected. This validation is only offered as a suggestion for you. PLEASE NOTE THAT THE VALIDATION TEXT WILL BE REMOVED WHEN YOU FINALIZE YOUR NOTE. IF YOU WANT TO FAX A PRELIMINARY NOTE YOU WILL NEED TO TOGGLE THIS TO 'NO' IF YOU DO NOT WANT IT IN YOUR FAXED NOTE.

## 2022-06-01 ENCOUNTER — APPOINTMENT (RX ONLY)
Dept: URBAN - METROPOLITAN AREA CLINIC 36 | Facility: CLINIC | Age: 72
Setting detail: DERMATOLOGY
End: 2022-06-01

## 2022-06-01 DIAGNOSIS — L90.5 SCAR CONDITIONS AND FIBROSIS OF SKIN: ICD-10-CM

## 2022-06-01 PROCEDURE — 99024 POSTOP FOLLOW-UP VISIT: CPT

## 2022-06-01 PROCEDURE — ? ADDITIONAL NOTES

## 2022-06-01 NOTE — PROCEDURE: ADDITIONAL NOTES
Detail Level: Simple
Render Risk Assessment In Note?: no
Additional Notes: Long discussion with patient. He’s quite upset with how the flap now looks and is considering legal action against us because of the cosmetic result. He also states he has filed a complaint with the medical board because of my incompetence.\\n\\nLooking objectively at the flap I’m unclear as to why it has atrophied so much. I think it can be improved with a small touch up procedure to give volume to the newly created helical rim and to fix the notch, but it has a concerning late complication aspect which is worrisome going forward. I discussed this with the patient and told him I was willing to do it for him but at this point he is unclear whether he wants to continue in my care. I’m disappointed at the result but do think we can improve it. I told him this and apologized for the outcome. That said, the S/R photos looked so good- not sure why 2 months later it has atrophied.

## 2022-06-29 ENCOUNTER — APPOINTMENT (RX ONLY)
Dept: URBAN - METROPOLITAN AREA CLINIC 4 | Facility: CLINIC | Age: 72
Setting detail: DERMATOLOGY
End: 2022-06-29

## 2022-06-29 DIAGNOSIS — L81.4 OTHER MELANIN HYPERPIGMENTATION: ICD-10-CM

## 2022-06-29 DIAGNOSIS — D18.0 HEMANGIOMA: ICD-10-CM

## 2022-06-29 DIAGNOSIS — L57.0 ACTINIC KERATOSIS: ICD-10-CM

## 2022-06-29 DIAGNOSIS — D22 MELANOCYTIC NEVI: ICD-10-CM

## 2022-06-29 DIAGNOSIS — L82.1 OTHER SEBORRHEIC KERATOSIS: ICD-10-CM

## 2022-06-29 DIAGNOSIS — Z85.828 PERSONAL HISTORY OF OTHER MALIGNANT NEOPLASM OF SKIN: ICD-10-CM

## 2022-06-29 PROBLEM — C44.612 BASAL CELL CARCINOMA OF SKIN OF RIGHT UPPER LIMB, INCLUDING SHOULDER: Status: ACTIVE | Noted: 2022-06-29

## 2022-06-29 PROBLEM — D04.5 CARCINOMA IN SITU OF SKIN OF TRUNK: Status: ACTIVE | Noted: 2022-06-29

## 2022-06-29 PROBLEM — D22.5 MELANOCYTIC NEVI OF TRUNK: Status: ACTIVE | Noted: 2022-06-29

## 2022-06-29 PROBLEM — C44.329 SQUAMOUS CELL CARCINOMA OF SKIN OF OTHER PARTS OF FACE: Status: ACTIVE | Noted: 2022-06-29

## 2022-06-29 PROBLEM — D04.39 CARCINOMA IN SITU OF SKIN OF OTHER PARTS OF FACE: Status: ACTIVE | Noted: 2022-06-29

## 2022-06-29 PROBLEM — D18.01 HEMANGIOMA OF SKIN AND SUBCUTANEOUS TISSUE: Status: ACTIVE | Noted: 2022-06-29

## 2022-06-29 PROBLEM — D22.39 MELANOCYTIC NEVI OF OTHER PARTS OF FACE: Status: ACTIVE | Noted: 2022-06-29

## 2022-06-29 PROBLEM — D22.62 MELANOCYTIC NEVI OF LEFT UPPER LIMB, INCLUDING SHOULDER: Status: ACTIVE | Noted: 2022-06-29

## 2022-06-29 PROBLEM — D48.5 NEOPLASM OF UNCERTAIN BEHAVIOR OF SKIN: Status: ACTIVE | Noted: 2022-06-29

## 2022-06-29 PROBLEM — D22.61 MELANOCYTIC NEVI OF RIGHT UPPER LIMB, INCLUDING SHOULDER: Status: ACTIVE | Noted: 2022-06-29

## 2022-06-29 PROCEDURE — 11103 TANGNTL BX SKIN EA SEP/ADDL: CPT

## 2022-06-29 PROCEDURE — ? OBSERVATION

## 2022-06-29 PROCEDURE — ? BIOPSY BY SHAVE METHOD

## 2022-06-29 PROCEDURE — 11102 TANGNTL BX SKIN SINGLE LES: CPT

## 2022-06-29 PROCEDURE — 17003 DESTRUCT PREMALG LES 2-14: CPT

## 2022-06-29 PROCEDURE — 99213 OFFICE O/P EST LOW 20 MIN: CPT | Mod: 25

## 2022-06-29 PROCEDURE — ? LIQUID NITROGEN

## 2022-06-29 PROCEDURE — 17000 DESTRUCT PREMALG LESION: CPT | Mod: 59

## 2022-06-29 PROCEDURE — ? DIAGNOSIS COMMENT

## 2022-06-29 PROCEDURE — ? DEFER

## 2022-06-29 ASSESSMENT — LOCATION SIMPLE DESCRIPTION DERM
LOCATION SIMPLE: LEFT UPPER BACK
LOCATION SIMPLE: LEFT FOREHEAD
LOCATION SIMPLE: UPPER BACK
LOCATION SIMPLE: LEFT SHOULDER
LOCATION SIMPLE: RIGHT UPPER ARM
LOCATION SIMPLE: CHEST
LOCATION SIMPLE: LEFT UPPER ARM
LOCATION SIMPLE: INFERIOR FOREHEAD
LOCATION SIMPLE: RIGHT UPPER BACK
LOCATION SIMPLE: LEFT EAR

## 2022-06-29 ASSESSMENT — LOCATION DETAILED DESCRIPTION DERM
LOCATION DETAILED: RIGHT MEDIAL SUPERIOR CHEST
LOCATION DETAILED: RIGHT DISTAL POSTERIOR UPPER ARM
LOCATION DETAILED: LEFT POSTERIOR SHOULDER
LOCATION DETAILED: LEFT MID-UPPER BACK
LOCATION DETAILED: INFERIOR THORACIC SPINE
LOCATION DETAILED: LEFT SUPERIOR HELIX
LOCATION DETAILED: LEFT MEDIAL UPPER BACK
LOCATION DETAILED: LEFT SUPERIOR LATERAL UPPER BACK
LOCATION DETAILED: INFERIOR MID FOREHEAD
LOCATION DETAILED: RIGHT PROXIMAL POSTERIOR UPPER ARM
LOCATION DETAILED: RIGHT SUPERIOR UPPER BACK
LOCATION DETAILED: LEFT DISTAL POSTERIOR UPPER ARM
LOCATION DETAILED: SUPERIOR THORACIC SPINE
LOCATION DETAILED: LEFT PROXIMAL POSTERIOR UPPER ARM
LOCATION DETAILED: LEFT INFERIOR MEDIAL FOREHEAD

## 2022-06-29 ASSESSMENT — LOCATION ZONE DERM
LOCATION ZONE: ARM
LOCATION ZONE: EAR
LOCATION ZONE: TRUNK
LOCATION ZONE: FACE

## 2022-06-29 NOTE — PROCEDURE: LIQUID NITROGEN
Post-Care Instructions: I reviewed with the patient in detail post-care instructions. Patient is to wear sunprotection, and avoid picking at any of the treated lesions. Pt may apply Vaseline to crusted or scabbing areas.
Duration Of Freeze Thaw-Cycle (Seconds): 5
Show Applicator Variable?: Yes
Render Note In Bullet Format When Appropriate: No
Detail Level: Detailed
Consent: The patient's consent was obtained including but not limited to risks of crusting, scabbing, blistering, scarring, darker or lighter pigmentary change, recurrence, incomplete removal and infection.
Number Of Freeze-Thaw Cycles: 1 freeze-thaw cycle

## 2022-06-29 NOTE — PROCEDURE: BIOPSY BY SHAVE METHOD
Detail Level: Detailed
Depth Of Biopsy: dermis
Was A Bandage Applied: Yes
Size Of Lesion In Cm: 0
Anticipated Plan (Based On Presumed Biopsy Results): Follow up as scheduled 8/17/22.
Biopsy Type: H and E
Biopsy Method: Personna blade
Anesthesia Type: 2% lidocaine with epinephrine and a 1:10 solution of 8.4% sodium bicarbonate
Anesthesia Volume In Cc: 2
Hemostasis: Drysol and Electrocautery
Wound Care: Vaseline
Dressing: Band-Aid
Type Of Destruction Used: Curettage
Curettage Text: The wound bed was treated with curettage after the biopsy was performed.
Electrodesiccation Text: The wound bed was treated with electrodesiccation after the biopsy was performed.
Electrodesiccation And Curettage Text: The wound bed was treated with electrodesiccation and curettage after the biopsy was performed.
Lab: [id number]
Lab Facility: 
Render Path Notes In Note?: No
Consent: Verbal consent was obtained and risks were reviewed including but not limited to scarring, infection, bleeding, scabbing, incomplete removal, nerve damage and allergy to anesthesia.
Post-Care Instructions: I reviewed with the patient in detail post-care instructions. Patient is to keep the biopsy site dry overnight, and then apply vasaline twice daily until healed.
Notification Instructions: Patient will be notified of biopsy results. However, patient instructed to call the office if not contacted within 2 weeks.
Billing Type: Third-Party Bill
Information: Selecting Yes will display possible errors in your note based on the variables you have selected. This validation is only offered as a suggestion for you. PLEASE NOTE THAT THE VALIDATION TEXT WILL BE REMOVED WHEN YOU FINALIZE YOUR NOTE. IF YOU WANT TO FAX A PRELIMINARY NOTE YOU WILL NEED TO TOGGLE THIS TO 'NO' IF YOU DO NOT WANT IT IN YOUR FAXED NOTE.
Anticipated Plan (Based On Presumed Biopsy Results): Follow up as scheduled 8/17/22.  May consider Imiquimod.

## 2022-06-29 NOTE — PROCEDURE: DEFER
Procedure To Be Performed At Next Visit: Cryotherapy
Instructions (Optional): Will treat lesions on face at next visit
Introduction Text (Please End With A Colon): The following procedure was deferred:
Detail Level: Detailed

## 2022-08-17 ENCOUNTER — APPOINTMENT (RX ONLY)
Dept: URBAN - METROPOLITAN AREA CLINIC 4 | Facility: CLINIC | Age: 72
Setting detail: DERMATOLOGY
End: 2022-08-17

## 2022-08-17 DIAGNOSIS — L72.0 EPIDERMAL CYST: ICD-10-CM

## 2022-08-17 DIAGNOSIS — L81.4 OTHER MELANIN HYPERPIGMENTATION: ICD-10-CM

## 2022-08-17 DIAGNOSIS — L57.0 ACTINIC KERATOSIS: ICD-10-CM

## 2022-08-17 DIAGNOSIS — D18.0 HEMANGIOMA: ICD-10-CM

## 2022-08-17 DIAGNOSIS — Z85.828 PERSONAL HISTORY OF OTHER MALIGNANT NEOPLASM OF SKIN: ICD-10-CM

## 2022-08-17 DIAGNOSIS — D22 MELANOCYTIC NEVI: ICD-10-CM

## 2022-08-17 DIAGNOSIS — L82.1 OTHER SEBORRHEIC KERATOSIS: ICD-10-CM

## 2022-08-17 PROBLEM — C44.529 SQUAMOUS CELL CARCINOMA OF SKIN OF OTHER PART OF TRUNK: Status: ACTIVE | Noted: 2022-08-17

## 2022-08-17 PROBLEM — D22.39 MELANOCYTIC NEVI OF OTHER PARTS OF FACE: Status: ACTIVE | Noted: 2022-08-17

## 2022-08-17 PROBLEM — D48.5 NEOPLASM OF UNCERTAIN BEHAVIOR OF SKIN: Status: ACTIVE | Noted: 2022-08-17

## 2022-08-17 PROBLEM — C44.619 BASAL CELL CARCINOMA OF SKIN OF LEFT UPPER LIMB, INCLUDING SHOULDER: Status: ACTIVE | Noted: 2022-08-17

## 2022-08-17 PROBLEM — D18.01 HEMANGIOMA OF SKIN AND SUBCUTANEOUS TISSUE: Status: ACTIVE | Noted: 2022-08-17

## 2022-08-17 PROBLEM — D22.62 MELANOCYTIC NEVI OF LEFT UPPER LIMB, INCLUDING SHOULDER: Status: ACTIVE | Noted: 2022-08-17

## 2022-08-17 PROBLEM — D22.61 MELANOCYTIC NEVI OF RIGHT UPPER LIMB, INCLUDING SHOULDER: Status: ACTIVE | Noted: 2022-08-17

## 2022-08-17 PROBLEM — C44.329 SQUAMOUS CELL CARCINOMA OF SKIN OF OTHER PARTS OF FACE: Status: ACTIVE | Noted: 2022-08-17

## 2022-08-17 PROBLEM — D22.5 MELANOCYTIC NEVI OF TRUNK: Status: ACTIVE | Noted: 2022-08-17

## 2022-08-17 PROBLEM — C44.519 BASAL CELL CARCINOMA OF SKIN OF OTHER PART OF TRUNK: Status: ACTIVE | Noted: 2022-08-17

## 2022-08-17 PROBLEM — C44.612 BASAL CELL CARCINOMA OF SKIN OF RIGHT UPPER LIMB, INCLUDING SHOULDER: Status: ACTIVE | Noted: 2022-08-17

## 2022-08-17 PROCEDURE — ? BIOPSY BY SHAVE METHOD

## 2022-08-17 PROCEDURE — 11102 TANGNTL BX SKIN SINGLE LES: CPT

## 2022-08-17 PROCEDURE — ? DIAGNOSIS COMMENT

## 2022-08-17 PROCEDURE — ? COUNSELING

## 2022-08-17 PROCEDURE — 17000 DESTRUCT PREMALG LESION: CPT | Mod: 59

## 2022-08-17 PROCEDURE — 17003 DESTRUCT PREMALG LES 2-14: CPT

## 2022-08-17 PROCEDURE — ? OBSERVATION

## 2022-08-17 PROCEDURE — 11103 TANGNTL BX SKIN EA SEP/ADDL: CPT

## 2022-08-17 PROCEDURE — 99213 OFFICE O/P EST LOW 20 MIN: CPT | Mod: 25

## 2022-08-17 PROCEDURE — ? LIQUID NITROGEN

## 2022-08-17 PROCEDURE — ? ADDITIONAL NOTES

## 2022-08-17 ASSESSMENT — LOCATION DETAILED DESCRIPTION DERM
LOCATION DETAILED: RIGHT PROXIMAL POSTERIOR UPPER ARM
LOCATION DETAILED: LEFT MID-UPPER BACK
LOCATION DETAILED: LEFT MEDIAL UPPER BACK
LOCATION DETAILED: RIGHT INFERIOR CENTRAL MALAR CHEEK
LOCATION DETAILED: LEFT ANTERIOR SHOULDER
LOCATION DETAILED: LEFT PROXIMAL RADIAL DORSAL FOREARM
LOCATION DETAILED: SUPERIOR THORACIC SPINE
LOCATION DETAILED: RIGHT SUPERIOR LATERAL LOWER BACK
LOCATION DETAILED: RIGHT DISTAL POSTERIOR UPPER ARM
LOCATION DETAILED: INFERIOR THORACIC SPINE
LOCATION DETAILED: LEFT POSTERIOR SHOULDER
LOCATION DETAILED: RIGHT SUPERIOR FOREHEAD
LOCATION DETAILED: LEFT SUPERIOR LATERAL BUCCAL CHEEK
LOCATION DETAILED: RIGHT LATERAL SUPERIOR CHEST
LOCATION DETAILED: LEFT PROXIMAL POSTERIOR UPPER ARM
LOCATION DETAILED: LEFT DISTAL POSTERIOR UPPER ARM
LOCATION DETAILED: LEFT CENTRAL MALAR CHEEK
LOCATION DETAILED: LEFT INFERIOR MEDIAL FOREHEAD
LOCATION DETAILED: LEFT CLAVICULAR SKIN
LOCATION DETAILED: INFERIOR MID FOREHEAD

## 2022-08-17 ASSESSMENT — LOCATION SIMPLE DESCRIPTION DERM
LOCATION SIMPLE: LEFT UPPER ARM
LOCATION SIMPLE: RIGHT UPPER ARM
LOCATION SIMPLE: LEFT FOREARM
LOCATION SIMPLE: INFERIOR FOREHEAD
LOCATION SIMPLE: CHEST
LOCATION SIMPLE: RIGHT LOWER BACK
LOCATION SIMPLE: RIGHT CHEEK
LOCATION SIMPLE: UPPER BACK
LOCATION SIMPLE: LEFT SHOULDER
LOCATION SIMPLE: LEFT CLAVICULAR SKIN
LOCATION SIMPLE: RIGHT FOREHEAD
LOCATION SIMPLE: LEFT FOREHEAD
LOCATION SIMPLE: LEFT UPPER BACK
LOCATION SIMPLE: LEFT CHEEK

## 2022-08-17 ASSESSMENT — LOCATION ZONE DERM
LOCATION ZONE: FACE
LOCATION ZONE: TRUNK
LOCATION ZONE: ARM

## 2022-08-17 NOTE — PROCEDURE: ADDITIONAL NOTES
Detail Level: Zone
Render Risk Assessment In Note?: no
Additional Notes: Advised probably early lesions and discussed possible treatments which include Erivedge or Imiquimod.  He declines both treatments at this time.

## 2022-08-17 NOTE — PROCEDURE: BIOPSY BY SHAVE METHOD
Detail Level: Detailed
Depth Of Biopsy: dermis
Was A Bandage Applied: Yes
Size Of Lesion In Cm: 0
Anticipated Plan (Based On Presumed Biopsy Results): Follow up scheduled for 11/2/22
Biopsy Type: H and E
Biopsy Method: Personna blade
Anesthesia Type: 1% lidocaine with epinephrine and a 1:10 solution of 8.4% sodium bicarbonate
Anesthesia Volume In Cc: 3
Hemostasis: Drysol and Electrocautery
Wound Care: Vaseline
Dressing: Band-Aid
Type Of Destruction Used: Curettage
Curettage Text: The wound bed was treated with curettage after the biopsy was performed.
Electrodesiccation Text: The wound bed was treated with electrodesiccation after the biopsy was performed.
Electrodesiccation And Curettage Text: The wound bed was treated with electrodesiccation and curettage after the biopsy was performed.
Lab: [id number]
Lab Facility: 
Render Path Notes In Note?: No
Consent: Verbal consent was obtained and risks were reviewed including but not limited to scarring, infection, bleeding, scabbing, incomplete removal, nerve damage and allergy to anesthesia.
Post-Care Instructions: I reviewed with the patient in detail post-care instructions. Patient is to keep the biopsy site dry overnight, and then apply vasaline twice daily until healed.
Notification Instructions: Patient will be notified of biopsy results. However, patient instructed to call the office if not contacted within 2 weeks.
Billing Type: Third-Party Bill
Information: Selecting Yes will display possible errors in your note based on the variables you have selected. This validation is only offered as a suggestion for you. PLEASE NOTE THAT THE VALIDATION TEXT WILL BE REMOVED WHEN YOU FINALIZE YOUR NOTE. IF YOU WANT TO FAX A PRELIMINARY NOTE YOU WILL NEED TO TOGGLE THIS TO 'NO' IF YOU DO NOT WANT IT IN YOUR FAXED NOTE.

## 2022-08-17 NOTE — PROCEDURE: LIQUID NITROGEN
Render Note In Bullet Format When Appropriate: No
Consent: The patient's consent was obtained including but not limited to risks of crusting, scabbing, blistering, scarring, darker or lighter pigmentary change, recurrence, incomplete removal and infection.
Post-Care Instructions: I reviewed with the patient in detail post-care instructions. Patient is to wear sunprotection, and avoid picking at any of the treated lesions. Pt may apply Vaseline to crusted or scabbing areas.
Duration Of Freeze Thaw-Cycle (Seconds): 5
Number Of Freeze-Thaw Cycles: 1 freeze-thaw cycle
Detail Level: Detailed
Show Applicator Variable?: Yes

## 2022-10-12 ENCOUNTER — APPOINTMENT (RX ONLY)
Dept: URBAN - METROPOLITAN AREA CLINIC 4 | Facility: CLINIC | Age: 72
Setting detail: DERMATOLOGY
End: 2022-10-12

## 2022-10-12 DIAGNOSIS — L81.4 OTHER MELANIN HYPERPIGMENTATION: ICD-10-CM

## 2022-10-12 DIAGNOSIS — Z85.828 PERSONAL HISTORY OF OTHER MALIGNANT NEOPLASM OF SKIN: ICD-10-CM

## 2022-10-12 DIAGNOSIS — L57.0 ACTINIC KERATOSIS: ICD-10-CM

## 2022-10-12 PROBLEM — C44.629 SQUAMOUS CELL CARCINOMA OF SKIN OF LEFT UPPER LIMB, INCLUDING SHOULDER: Status: ACTIVE | Noted: 2022-10-12

## 2022-10-12 PROBLEM — D48.5 NEOPLASM OF UNCERTAIN BEHAVIOR OF SKIN: Status: ACTIVE | Noted: 2022-10-12

## 2022-10-12 PROCEDURE — ? OBSERVATION

## 2022-10-12 PROCEDURE — ? BIOPSY BY SHAVE METHOD

## 2022-10-12 PROCEDURE — ? DIAGNOSIS COMMENT

## 2022-10-12 PROCEDURE — 99213 OFFICE O/P EST LOW 20 MIN: CPT | Mod: 25

## 2022-10-12 PROCEDURE — 11102 TANGNTL BX SKIN SINGLE LES: CPT

## 2022-10-12 ASSESSMENT — LOCATION ZONE DERM
LOCATION ZONE: FACE
LOCATION ZONE: TRUNK
LOCATION ZONE: ARM

## 2022-10-12 ASSESSMENT — LOCATION DETAILED DESCRIPTION DERM
LOCATION DETAILED: RIGHT PROXIMAL POSTERIOR UPPER ARM
LOCATION DETAILED: INFERIOR MID FOREHEAD
LOCATION DETAILED: LEFT PROXIMAL POSTERIOR UPPER ARM
LOCATION DETAILED: LEFT MEDIAL UPPER BACK
LOCATION DETAILED: LEFT MEDIAL SUPERIOR CHEST

## 2022-10-12 ASSESSMENT — LOCATION SIMPLE DESCRIPTION DERM
LOCATION SIMPLE: INFERIOR FOREHEAD
LOCATION SIMPLE: RIGHT UPPER ARM
LOCATION SIMPLE: LEFT UPPER ARM
LOCATION SIMPLE: LEFT UPPER BACK
LOCATION SIMPLE: CHEST

## 2022-10-12 NOTE — PROCEDURE: BIOPSY BY SHAVE METHOD
Detail Level: Detailed
Depth Of Biopsy: dermis
Was A Bandage Applied: Yes
Size Of Lesion In Cm: 0
Anticipated Plan (Based On Presumed Biopsy Results): Follow up in 2 months.  Scheduled for 12/14/22.
Biopsy Type: H and E
Biopsy Method: Personna blade
Anesthesia Type: 1% lidocaine with epinephrine and a 1:10 solution of 8.4% sodium bicarbonate
Anesthesia Volume In Cc: 3
Hemostasis: Drysol and Electrocautery
Wound Care: Vaseline
Dressing: Band-Aid
Type Of Destruction Used: Curettage
Curettage Text: The wound bed was treated with curettage after the biopsy was performed.
Electrodesiccation Text: The wound bed was treated with electrodesiccation after the biopsy was performed.
Electrodesiccation And Curettage Text: The wound bed was treated with electrodesiccation and curettage after the biopsy was performed.
Lab: [id number]
Lab Facility: 
Render Path Notes In Note?: No
Consent: Verbal consent was obtained and risks were reviewed including but not limited to scarring, infection, bleeding, scabbing, incomplete removal, nerve damage and allergy to anesthesia.
Post-Care Instructions: I reviewed with the patient in detail post-care instructions. Patient is to keep the biopsy site dry overnight, and then apply vasaline twice daily until healed.
Notification Instructions: Patient will be notified of biopsy results. However, patient instructed to call the office if not contacted within 2 weeks.
Billing Type: Third-Party Bill
Information: Selecting Yes will display possible errors in your note based on the variables you have selected. This validation is only offered as a suggestion for you. PLEASE NOTE THAT THE VALIDATION TEXT WILL BE REMOVED WHEN YOU FINALIZE YOUR NOTE. IF YOU WANT TO FAX A PRELIMINARY NOTE YOU WILL NEED TO TOGGLE THIS TO 'NO' IF YOU DO NOT WANT IT IN YOUR FAXED NOTE.

## 2022-12-14 ENCOUNTER — APPOINTMENT (RX ONLY)
Dept: URBAN - METROPOLITAN AREA CLINIC 4 | Facility: CLINIC | Age: 72
Setting detail: DERMATOLOGY
End: 2022-12-14

## 2022-12-14 DIAGNOSIS — L57.0 ACTINIC KERATOSIS: ICD-10-CM

## 2022-12-14 DIAGNOSIS — L81.4 OTHER MELANIN HYPERPIGMENTATION: ICD-10-CM

## 2022-12-14 DIAGNOSIS — Z85.828 PERSONAL HISTORY OF OTHER MALIGNANT NEOPLASM OF SKIN: ICD-10-CM

## 2022-12-14 PROBLEM — D48.5 NEOPLASM OF UNCERTAIN BEHAVIOR OF SKIN: Status: ACTIVE | Noted: 2022-12-14

## 2022-12-14 PROCEDURE — ? ADDITIONAL NOTES

## 2022-12-14 PROCEDURE — 17000 DESTRUCT PREMALG LESION: CPT | Mod: 59

## 2022-12-14 PROCEDURE — ? LIQUID NITROGEN

## 2022-12-14 PROCEDURE — ? BIOPSY BY SHAVE METHOD

## 2022-12-14 PROCEDURE — ? COUNSELING

## 2022-12-14 PROCEDURE — ? OBSERVATION

## 2022-12-14 PROCEDURE — 99213 OFFICE O/P EST LOW 20 MIN: CPT | Mod: 25

## 2022-12-14 PROCEDURE — 17003 DESTRUCT PREMALG LES 2-14: CPT

## 2022-12-14 PROCEDURE — ? DIAGNOSIS COMMENT

## 2022-12-14 PROCEDURE — 11102 TANGNTL BX SKIN SINGLE LES: CPT

## 2022-12-14 ASSESSMENT — LOCATION DETAILED DESCRIPTION DERM
LOCATION DETAILED: INFERIOR MID FOREHEAD
LOCATION DETAILED: RIGHT CLAVICULAR NECK
LOCATION DETAILED: RIGHT PROXIMAL POSTERIOR UPPER ARM
LOCATION DETAILED: LEFT POSTERIOR SHOULDER
LOCATION DETAILED: LEFT MEDIAL UPPER BACK
LOCATION DETAILED: LEFT ANTERIOR SHOULDER
LOCATION DETAILED: SUPERIOR MID FOREHEAD
LOCATION DETAILED: LEFT PROXIMAL DORSAL FOREARM
LOCATION DETAILED: LEFT PROXIMAL POSTERIOR UPPER ARM
LOCATION DETAILED: RIGHT DISTAL LATERAL POSTERIOR UPPER ARM
LOCATION DETAILED: LEFT DISTAL DORSAL FOREARM
LOCATION DETAILED: LEFT PROXIMAL RADIAL DORSAL FOREARM

## 2022-12-14 ASSESSMENT — LOCATION ZONE DERM
LOCATION ZONE: NECK
LOCATION ZONE: FACE
LOCATION ZONE: ARM
LOCATION ZONE: TRUNK

## 2022-12-14 ASSESSMENT — LOCATION SIMPLE DESCRIPTION DERM
LOCATION SIMPLE: RIGHT ANTERIOR NECK
LOCATION SIMPLE: RIGHT UPPER ARM
LOCATION SIMPLE: LEFT UPPER BACK
LOCATION SIMPLE: LEFT SHOULDER
LOCATION SIMPLE: LEFT UPPER ARM
LOCATION SIMPLE: LEFT FOREARM
LOCATION SIMPLE: INFERIOR FOREHEAD
LOCATION SIMPLE: SUPERIOR FOREHEAD

## 2022-12-14 NOTE — PROCEDURE: BIOPSY BY SHAVE METHOD
Detail Level: Detailed
Depth Of Biopsy: dermis
Was A Bandage Applied: Yes
Size Of Lesion In Cm: 0
Anticipated Plan (Based On Presumed Biopsy Results): 2 month follow up
Biopsy Type: H and E
Biopsy Method: Personna blade
Anesthesia Type: 0.5% lidocaine without epinephrine
Anesthesia Volume In Cc: 0.5
Hemostasis: Drysol and Electrocautery
Wound Care: Vaseline
Dressing: Band-Aid
Type Of Destruction Used: Curettage
Curettage Text: The wound bed was treated with curettage after the biopsy was performed.
Electrodesiccation Text: The wound bed was treated with electrodesiccation after the biopsy was performed.
Electrodesiccation And Curettage Text: The wound bed was treated with electrodesiccation and curettage after the biopsy was performed.
Lab: [id number]
Lab Facility: 
Render Path Notes In Note?: No
Consent: Verbal consent was obtained and risks were reviewed including but not limited to scarring, infection, bleeding, scabbing, incomplete removal, nerve damage and allergy to anesthesia.
Post-Care Instructions: I reviewed with the patient in detail post-care instructions. Patient is to keep the biopsy site dry overnight, and then apply vasaline twice daily until healed.
Notification Instructions: Patient will be notified of biopsy results. However, patient instructed to call the office if not contacted within 2 weeks.
Billing Type: Third-Party Bill
Information: Selecting Yes will display possible errors in your note based on the variables you have selected. This validation is only offered as a suggestion for you. PLEASE NOTE THAT THE VALIDATION TEXT WILL BE REMOVED WHEN YOU FINALIZE YOUR NOTE. IF YOU WANT TO FAX A PRELIMINARY NOTE YOU WILL NEED TO TOGGLE THIS TO 'NO' IF YOU DO NOT WANT IT IN YOUR FAXED NOTE.

## 2022-12-14 NOTE — PROCEDURE: ADDITIONAL NOTES
Additional Notes: Pt declines LN2 on face.
Render Risk Assessment In Note?: no
Detail Level: Generalized

## 2022-12-14 NOTE — PROCEDURE: LIQUID NITROGEN
Number Of Freeze-Thaw Cycles: 1 freeze-thaw cycle
Duration Of Freeze Thaw-Cycle (Seconds): 5
Show Applicator Variable?: Yes
Render Note In Bullet Format When Appropriate: No
Detail Level: Detailed
Post-Care Instructions: I reviewed with the patient in detail post-care instructions. Patient is to wear sunprotection, and avoid picking at any of the treated lesions. Pt may apply Vaseline to crusted or scabbing areas.
Consent: The patient's consent was obtained including but not limited to risks of crusting, scabbing, blistering, scarring, darker or lighter pigmentary change, recurrence, incomplete removal and infection.

## 2023-03-24 NOTE — CARE PLAN
The patient is Stable - Low risk of patient condition declining or worsening    Shift Goals  Clinical Goals: Complete dialysis today  Patient Goals: Rest  Family Goals: NAVID    Progress made toward(s) clinical / shift goals:  Pt down in dialysis at this time.     Patient is not progressing towards the following goals:       No

## 2023-07-21 ENCOUNTER — APPOINTMENT (RX ONLY)
Dept: URBAN - METROPOLITAN AREA CLINIC 4 | Facility: CLINIC | Age: 73
Setting detail: DERMATOLOGY
End: 2023-07-21

## 2023-07-21 DIAGNOSIS — L82.1 OTHER SEBORRHEIC KERATOSIS: ICD-10-CM

## 2023-07-21 DIAGNOSIS — D22 MELANOCYTIC NEVI: ICD-10-CM

## 2023-07-21 DIAGNOSIS — D18.0 HEMANGIOMA: ICD-10-CM

## 2023-07-21 DIAGNOSIS — Z85.828 PERSONAL HISTORY OF OTHER MALIGNANT NEOPLASM OF SKIN: ICD-10-CM

## 2023-07-21 DIAGNOSIS — Z71.89 OTHER SPECIFIED COUNSELING: ICD-10-CM

## 2023-07-21 DIAGNOSIS — L81.4 OTHER MELANIN HYPERPIGMENTATION: ICD-10-CM

## 2023-07-21 DIAGNOSIS — I87.2 VENOUS INSUFFICIENCY (CHRONIC) (PERIPHERAL): ICD-10-CM

## 2023-07-21 PROBLEM — D18.01 HEMANGIOMA OF SKIN AND SUBCUTANEOUS TISSUE: Status: ACTIVE | Noted: 2023-07-21

## 2023-07-21 PROBLEM — D48.5 NEOPLASM OF UNCERTAIN BEHAVIOR OF SKIN: Status: ACTIVE | Noted: 2023-07-21

## 2023-07-21 PROBLEM — D22.9 MELANOCYTIC NEVI, UNSPECIFIED: Status: ACTIVE | Noted: 2023-07-21

## 2023-07-21 PROCEDURE — 99213 OFFICE O/P EST LOW 20 MIN: CPT | Mod: 25

## 2023-07-21 PROCEDURE — ? BIOPSY BY SHAVE METHOD

## 2023-07-21 PROCEDURE — 11103 TANGNTL BX SKIN EA SEP/ADDL: CPT

## 2023-07-21 PROCEDURE — 11102 TANGNTL BX SKIN SINGLE LES: CPT

## 2023-07-21 PROCEDURE — ? COUNSELING

## 2023-07-21 PROCEDURE — ? BIOPSY BY SHAVE METHOD AND DESTRUCTION

## 2023-07-21 NOTE — PROCEDURE: BIOPSY BY SHAVE METHOD AND DESTRUCTION
Detail Level: Detailed
Biopsy Type: H and E
Bill As?: Biopsy by Shave Method
Size Of Lesion In Cm (Optional): 0.5
Size Of Lesion After Curettage: 0.7
Anesthesia Type: 1% lidocaine with epinephrine
Anesthesia Volume In Cc: 2
Hemostasis: Drysol
Destruction Type: electrodesiccation
Number Of Curettages: 3
Wound Care: Petrolatum
Lab: [id number]
Lab Facility: 
Render Path Notes In Note?: No
Consent: Written consent was obtained and risks were reviewed including but not limited to scarring, infection, bleeding, scabbing, incomplete removal, nerve damage and allergy to anesthesia.
Post-Care Instructions: I reviewed with the patient in detail post-care instructions. Patient is to keep the biopsy site dry overnight, and then apply bacitracin twice daily until healed. Patient may apply hydrogen peroxide soaks to remove any crusting.
Notification Instructions: Patient will be notified of biopsy results. However, patient instructed to call the office if not contacted within 2 weeks.
Billing Type: Third-Party Bill
Size Of Lesion After Curettage: 0.8

## 2023-07-21 NOTE — PROCEDURE: BIOPSY BY SHAVE METHOD
Detail Level: Detailed
Depth Of Biopsy: dermis
Was A Bandage Applied: Yes
Size Of Lesion In Cm: 0.5
X Size Of Lesion In Cm: 0
Biopsy Type: H and E
Biopsy Method: Personna blade
Anesthesia Type: 1% lidocaine with epinephrine
Anesthesia Volume In Cc: 2
Hemostasis: Drysol
Wound Care: Bacitracin
Dressing: bandage
Destruction After The Procedure: No
Type Of Destruction Used: Curettage
Curettage Text: The wound bed was treated with curettage after the biopsy was performed.
Cryotherapy Text: The wound bed was treated with cryotherapy after the biopsy was performed.
Electrodesiccation Text: The wound bed was treated with electrodesiccation after the biopsy was performed.
Electrodesiccation And Curettage Text: The wound bed was treated with electrodesiccation and curettage after the biopsy was performed.
Silver Nitrate Text: The wound bed was treated with silver nitrate after the biopsy was performed.
Lab: [id number]
Lab Facility: 
Consent: Written consent was obtained and risks were reviewed including but not limited to scarring, infection, bleeding, scabbing, incomplete removal, nerve damage and allergy to anesthesia.
Post-Care Instructions: I reviewed with the patient in detail post-care instructions. Patient is to keep the biopsy site dry overnight, and then apply bacitracin twice daily until healed. Patient may apply hydrogen peroxide soaks to remove any crusting.
Notification Instructions: Patient will be notified of biopsy results. However, patient instructed to call the office if not contacted within 2 weeks.
Billing Type: Third-Party Bill
Information: Selecting Yes will display possible errors in your note based on the variables you have selected. This validation is only offered as a suggestion for you. PLEASE NOTE THAT THE VALIDATION TEXT WILL BE REMOVED WHEN YOU FINALIZE YOUR NOTE. IF YOU WANT TO FAX A PRELIMINARY NOTE YOU WILL NEED TO TOGGLE THIS TO 'NO' IF YOU DO NOT WANT IT IN YOUR FAXED NOTE.
Size Of Lesion In Cm: 0.4

## 2024-01-24 ENCOUNTER — HOSPITAL ENCOUNTER (EMERGENCY)
Facility: MEDICAL CENTER | Age: 74
End: 2024-01-24
Attending: STUDENT IN AN ORGANIZED HEALTH CARE EDUCATION/TRAINING PROGRAM
Payer: MEDICARE

## 2024-01-24 ENCOUNTER — OFFICE VISIT (OUTPATIENT)
Dept: URGENT CARE | Facility: CLINIC | Age: 74
End: 2024-01-24
Payer: MEDICARE

## 2024-01-24 ENCOUNTER — APPOINTMENT (OUTPATIENT)
Dept: RADIOLOGY | Facility: MEDICAL CENTER | Age: 74
End: 2024-01-24
Attending: STUDENT IN AN ORGANIZED HEALTH CARE EDUCATION/TRAINING PROGRAM
Payer: MEDICARE

## 2024-01-24 VITALS
OXYGEN SATURATION: 96 % | TEMPERATURE: 98.5 F | DIASTOLIC BLOOD PRESSURE: 70 MMHG | BODY MASS INDEX: 24.05 KG/M2 | RESPIRATION RATE: 14 BRPM | HEIGHT: 70 IN | HEART RATE: 77 BPM | SYSTOLIC BLOOD PRESSURE: 112 MMHG | WEIGHT: 168 LBS

## 2024-01-24 VITALS
SYSTOLIC BLOOD PRESSURE: 140 MMHG | DIASTOLIC BLOOD PRESSURE: 77 MMHG | BODY MASS INDEX: 23.29 KG/M2 | HEIGHT: 70 IN | OXYGEN SATURATION: 95 % | RESPIRATION RATE: 16 BRPM | TEMPERATURE: 98.8 F | HEART RATE: 79 BPM | WEIGHT: 162.7 LBS

## 2024-01-24 DIAGNOSIS — L24.A9 WOUND DRAINAGE: ICD-10-CM

## 2024-01-24 DIAGNOSIS — L08.9 WOUND INFECTION: ICD-10-CM

## 2024-01-24 DIAGNOSIS — Z51.89 ENCOUNTER FOR WOUND RE-CHECK: ICD-10-CM

## 2024-01-24 DIAGNOSIS — L03.116 CELLULITIS OF LEFT LOWER EXTREMITY: ICD-10-CM

## 2024-01-24 DIAGNOSIS — T14.8XXA WOUND INFECTION: ICD-10-CM

## 2024-01-24 LAB
ALBUMIN SERPL BCP-MCNC: 4 G/DL (ref 3.2–4.9)
ALBUMIN/GLOB SERPL: 1.1 G/DL
ALP SERPL-CCNC: 62 U/L (ref 30–99)
ALT SERPL-CCNC: 11 U/L (ref 2–50)
ANION GAP SERPL CALC-SCNC: 15 MMOL/L (ref 7–16)
AST SERPL-CCNC: 21 U/L (ref 12–45)
BASOPHILS # BLD AUTO: 0.9 % (ref 0–1.8)
BASOPHILS # BLD: 0.06 K/UL (ref 0–0.12)
BILIRUB SERPL-MCNC: 0.3 MG/DL (ref 0.1–1.5)
BUN SERPL-MCNC: 22 MG/DL (ref 8–22)
CALCIUM ALBUM COR SERPL-MCNC: 9.2 MG/DL (ref 8.5–10.5)
CALCIUM SERPL-MCNC: 9.2 MG/DL (ref 8.5–10.5)
CHLORIDE SERPL-SCNC: 100 MMOL/L (ref 96–112)
CO2 SERPL-SCNC: 22 MMOL/L (ref 20–33)
CREAT SERPL-MCNC: 1.38 MG/DL (ref 0.5–1.4)
CRP SERPL HS-MCNC: 1.06 MG/DL (ref 0–0.75)
EOSINOPHIL # BLD AUTO: 0.2 K/UL (ref 0–0.51)
EOSINOPHIL NFR BLD: 3.2 % (ref 0–6.9)
ERYTHROCYTE [DISTWIDTH] IN BLOOD BY AUTOMATED COUNT: 42 FL (ref 35.9–50)
ERYTHROCYTE [SEDIMENTATION RATE] IN BLOOD BY WESTERGREN METHOD: 33 MM/HOUR (ref 0–20)
GFR SERPLBLD CREATININE-BSD FMLA CKD-EPI: 53 ML/MIN/1.73 M 2
GLOBULIN SER CALC-MCNC: 3.6 G/DL (ref 1.9–3.5)
GLUCOSE SERPL-MCNC: 95 MG/DL (ref 65–99)
HCT VFR BLD AUTO: 35.9 % (ref 42–52)
HGB BLD-MCNC: 11.9 G/DL (ref 14–18)
IMM GRANULOCYTES # BLD AUTO: 0.02 K/UL (ref 0–0.11)
IMM GRANULOCYTES NFR BLD AUTO: 0.3 % (ref 0–0.9)
LYMPHOCYTES # BLD AUTO: 1.23 K/UL (ref 1–4.8)
LYMPHOCYTES NFR BLD: 19.4 % (ref 22–41)
MCH RBC QN AUTO: 28.3 PG (ref 27–33)
MCHC RBC AUTO-ENTMCNC: 33.1 G/DL (ref 32.3–36.5)
MCV RBC AUTO: 85.5 FL (ref 81.4–97.8)
MONOCYTES # BLD AUTO: 0.57 K/UL (ref 0–0.85)
MONOCYTES NFR BLD AUTO: 9 % (ref 0–13.4)
NEUTROPHILS # BLD AUTO: 4.25 K/UL (ref 1.82–7.42)
NEUTROPHILS NFR BLD: 67.2 % (ref 44–72)
NRBC # BLD AUTO: 0 K/UL
NRBC BLD-RTO: 0 /100 WBC (ref 0–0.2)
PLATELET # BLD AUTO: 258 K/UL (ref 164–446)
PMV BLD AUTO: 9.1 FL (ref 9–12.9)
POTASSIUM SERPL-SCNC: 4 MMOL/L (ref 3.6–5.5)
PROT SERPL-MCNC: 7.6 G/DL (ref 6–8.2)
RBC # BLD AUTO: 4.2 M/UL (ref 4.7–6.1)
SODIUM SERPL-SCNC: 137 MMOL/L (ref 135–145)
WBC # BLD AUTO: 6.3 K/UL (ref 4.8–10.8)

## 2024-01-24 PROCEDURE — 99204 OFFICE O/P NEW MOD 45 MIN: CPT | Performed by: PHYSICIAN ASSISTANT

## 2024-01-24 PROCEDURE — 36415 COLL VENOUS BLD VENIPUNCTURE: CPT

## 2024-01-24 PROCEDURE — 3074F SYST BP LT 130 MM HG: CPT | Performed by: PHYSICIAN ASSISTANT

## 2024-01-24 PROCEDURE — 99284 EMERGENCY DEPT VISIT MOD MDM: CPT

## 2024-01-24 PROCEDURE — 86140 C-REACTIVE PROTEIN: CPT

## 2024-01-24 PROCEDURE — 3078F DIAST BP <80 MM HG: CPT | Performed by: PHYSICIAN ASSISTANT

## 2024-01-24 PROCEDURE — 80053 COMPREHEN METABOLIC PANEL: CPT

## 2024-01-24 PROCEDURE — 85652 RBC SED RATE AUTOMATED: CPT

## 2024-01-24 PROCEDURE — 85025 COMPLETE CBC W/AUTO DIFF WBC: CPT

## 2024-01-24 PROCEDURE — 73590 X-RAY EXAM OF LOWER LEG: CPT | Mod: LT

## 2024-01-24 ASSESSMENT — ENCOUNTER SYMPTOMS
GASTROINTESTINAL NEGATIVE: 1
NEUROLOGICAL NEGATIVE: 1
MUSCULOSKELETAL NEGATIVE: 1
CONSTITUTIONAL NEGATIVE: 1

## 2024-01-24 NOTE — PROGRESS NOTES
Subjective     Zachary Moore is a very pleasant 73 y.o. male who presents with Wound Check (L calf, MOHS surgery on 11/16/23, x 2.5 month )            HPI  Ongoing left lower leg swelling, tenderness and cellulitis.  Patient had Mohs surgery completed on 11/16/2023.  He states the initial grafting material became infected and since then it has been an ongoing problem.  He was given 2 courses of oral antibiotic by his dermatologist.  He knows 1 was doxycycline, unclear of the other medication.  He has not been on meds for 2 to 3 weeks.  He continues to have worsening redness, swelling, pain and warmth.  There is a foul smell coming from the wounds.  There is overlying discharge with granulation tissue, swelling.  He does have a TKA on his left side but denies any knee or joint pain.  No systemic symptoms to this point.      PMH:  has a past medical history of Arthritis, Cancer (HCC), and Hypertension.  MEDS:   Current Outpatient Medications:     amLODIPine (NORVASC) 10 MG Tab, Take 1 Tablet by mouth every day., Disp: 30 Tablet, Rfl:     hydrALAZINE (APRESOLINE) 25 MG Tab, Take 1 Tablet by mouth every 8 hours., Disp: 90 Tablet, Rfl:     benzocaine-menthol (CEPACOL) 15-3.6 MG Lozenge, Dissolve 1 Lozenge in the mouth every 2 hours as needed., Disp: , Rfl:     mag hydrox-al hydrox-simeth (MAALOX PLUS ES OR MYLANTA DS) 400-400-40 MG/5ML Suspension, Take 10 mL by mouth 4 times a day as needed (Heartburn)., Disp: 560 mL, Rfl:     omeprazole (PRILOSEC) 20 MG delayed-release capsule, Take 1 Capsule by mouth 2 times a day., Disp: 30 Capsule, Rfl:     sodium bicarbonate (SODIUM BICARBONATE) 650 MG Tab, Take 2 Tablets by mouth 4 times a day., Disp: 120 Tablet, Rfl: 3    sore throat spray (CHLORASEPTIC) 1.4 % Liquid, Use 1 Spray in the mouth or throat every 2 hours as needed., Disp: , Rfl:     buprenorphine (SUBUTEX) 8 MG SL Tab, Place  under tongue every day., Disp: , Rfl:     tizanidine (ZANAFLEX) 4 MG Tab, TAKE 1 TO 2  "TABLETS BY MOUTH AT BEDTIME AS NEEDED, Disp: , Rfl: 1    Multiple Vitamin (MULTI VITAMIN DAILY PO), Take  by mouth., Disp: , Rfl:   ALLERGIES: No Known Allergies  SURGHX:   Past Surgical History:   Procedure Laterality Date    FUSION, SPINE, LUMBAR, PLIF  1/28/2014    Performed by Elder Chopra M.D. at SURGERY [facility] [place]    KNEE ARTHROPLASTY TOTAL  2013    Right knee-Dr. Boggs    KNEE ARTHROPLASTY TOTAL  2009    left knee--Dr. Snider    SHOULDER SURGERY      right-sided     SOCHX:  reports that he has never smoked. He has never used smokeless tobacco. He reports current alcohol use. He reports that he does not use drugs.  FH: family history is not on file.        Review of Systems   Constitutional: Negative.    Gastrointestinal: Negative.    Musculoskeletal: Negative.    Skin:         Left lower leg cellulitis   Neurological: Negative.        Medications, Allergies, and current problem list reviewed today in Epic             Objective     /70   Pulse 77   Temp 36.9 °C (98.5 °F)   Resp 14   Ht 1.778 m (5' 10\")   Wt 76.2 kg (168 lb)   SpO2 96%   BMI 24.11 kg/m²      Physical Exam  Vitals and nursing note reviewed.   Constitutional:       General: He is not in acute distress.     Appearance: Normal appearance. He is well-developed. He is not diaphoretic.   HENT:      Head: Normocephalic.      Right Ear: Hearing and external ear normal.      Left Ear: Hearing and external ear normal.      Nose: Nose normal.      Mouth/Throat:      Mouth: Mucous membranes are moist.   Eyes:      General:         Right eye: No discharge.         Left eye: No discharge.      Conjunctiva/sclera: Conjunctivae normal.   Cardiovascular:      Rate and Rhythm: Normal rate and regular rhythm.   Pulmonary:      Effort: Pulmonary effort is normal. No respiratory distress.      Breath sounds: Normal breath sounds.   Musculoskeletal:      Cervical back: Normal range of motion and neck supple.   Skin:     General: Skin is warm " and dry.      Findings: Rash present.             Comments: Left lower extremity swelling.  There are 2 large open wounds on the anterior left lower leg with overlying granulation tissue and discharge.  There is significant surrounding erythema, edema, warmth and soft tissue tenderness.  A small amount of granulation tissue was debrided and a foul smell was immediately noted.  The redness extends from his foot to his knee.   Neurological:      General: No focal deficit present.      Mental Status: He is alert and oriented to person, place, and time. Mental status is at baseline.   Psychiatric:         Mood and Affect: Mood normal.         Behavior: Behavior normal.         Thought Content: Thought content normal.         Judgment: Judgment normal.                             Assessment & Plan        1. Wound infection        2. Cellulitis of left lower extremity           This is an extremely pleasant 73-year-old male presenting with ongoing wound infection and cellulitis of his left lower leg since Mohs surgery on 11/16/2024.  2 courses of oral antibiotic were unhelpful and he continues to have worsening symptoms including redness, swelling, warmth and a foul smell.  He does have a TKA on the left side but denies joint pain or systemic symptoms.  Given age, risk factors and spreading cellulitis towards his TKA I did recommend transfer to ER for higher level of care.  He is agreeable with the plan and will go now by private vehicle to the main ER.      Please note that this dictation was created using voice recognition software. I have made every reasonable attempt to correct obvious errors, but I expect that there are errors of grammar and possibly content that I did not discover before finalizing the note.

## 2024-01-24 NOTE — ED TRIAGE NOTES
.  Chief Complaint   Patient presents with    Wound Check     Left leg MOHS procedure left leg nov 23, done in [place].    Ambulated to triage with c/o non healing wound to left leg.

## 2024-01-25 NOTE — ED NOTES
Patient provided discharge instructions and medication information. Patient verbalizes understanding and denies any further questions. Patient instructed to follow up with wound clinic and return if condition worsens. No distress noted. Patient ambulated to the front lobby with a steady gait and all belongings.

## 2024-01-25 NOTE — DISCHARGE INSTRUCTIONS
Continue to perform your wound care as you have been as I feel your wounds are healing appropriately.  You should also apply a small amount of antibiotic ointment overlying your wounds twice daily to help stave off infection.    You can call the attached number if you do not hear from the wound clinic to schedule an appointment.

## 2024-01-25 NOTE — ED PROVIDER NOTES
ED Provider Note    CHIEF COMPLAINT  Chief Complaint   Patient presents with    Wound Check     Left leg MOHS procedure left leg nov 23, done in [place].        EXTERNAL RECORDS REVIEWED  Outpatient Notes patient was seen by family practitioner today prior to arrival who then sent him to the emergency department.  He presented with left lower leg swelling erythema and tenderness and concern for untreated cellulitis despite multiple courses of oral antibiotics provided by his dermatologist.  Notably patient had a Mohs surgical procedure performed to the area on 11/16/2023.    HPI/ROS  LIMITATION TO HISTORY   Select: : None      Zachary Moore is a 73 y.o. male who presents to the emergency department for evaluation of a wound check to his left leg.  Patient reports that despite taking 2 courses of antibiotics he continues to have abnormal appearance of 2 sites of his prior surgical procedure with occasional drainage of clear yellow fluid and heaped up tissue.  He otherwise denies any significant associated pain or swelling, fevers or chills.  He denies any change in the coloration of his leg but does report that the leg itself has become itchy since using topicalized vinegar soaks recommended by his dermatologist.  He also states he is using topical antifungal cream.  He states that overall the size of the wounds has decreased.     PAST MEDICAL HISTORY   has a past medical history of Arthritis, Cancer (HCC), and Hypertension.    SURGICAL HISTORY   has a past surgical history that includes shoulder surgery; knee arthroplasty total (2009); knee arthroplasty total (2013); and fusion, spine, lumbar, plif (1/28/2014).    FAMILY HISTORY  No family history on file.    SOCIAL HISTORY  Social History     Tobacco Use    Smoking status: Never    Smokeless tobacco: Never   Substance and Sexual Activity    Alcohol use: Yes     Comment: daily    Drug use: No    Sexual activity: Yes     Partners: Female     Birth  "control/protection: Post-Menopausal       CURRENT MEDICATIONS  Home Medications       Reviewed by Mary Gaspar R.N. (Registered Nurse) on 01/24/24 at 1521  Med List Status: Partial     Medication Last Dose Status   amLODIPine (NORVASC) 10 MG Tab  Active   benzocaine-menthol (CEPACOL) 15-3.6 MG Lozenge  Active   buprenorphine (SUBUTEX) 8 MG SL Tab  Active   hydrALAZINE (APRESOLINE) 25 MG Tab  Active   mag hydrox-al hydrox-simeth (MAALOX PLUS ES OR MYLANTA DS) 400-400-40 MG/5ML Suspension  Active   Multiple Vitamin (MULTI VITAMIN DAILY PO)  Active   omeprazole (PRILOSEC) 20 MG delayed-release capsule  Active   sodium bicarbonate (SODIUM BICARBONATE) 650 MG Tab  Active   sore throat spray (CHLORASEPTIC) 1.4 % Liquid  Active   tizanidine (ZANAFLEX) 4 MG Tab  Active                    ALLERGIES  No Known Allergies    PHYSICAL EXAM  VITAL SIGNS: BP (!) 150/72   Pulse 86   Temp 36.3 °C (97.4 °F) (Temporal)   Resp 18   Ht 1.778 m (5' 10\")   Wt 73.8 kg (162 lb 11.2 oz)   SpO2 94%   BMI 23.34 kg/m²    Constitutional: No acute distress, well-appearing  HEENT: Atraumatic, normocephalic, pupils are equal round reactive to light, nose normal, mouth shows moist mucous membranes  Neck: Supple, no JVD, no tracheal deviation  Cardiovascular: Regular rate and rhythm, no murmur, rub or gallop, 2+ pulses peripherally x4  Thorax & Lungs: No respiratory distress, no wheezes, rales or rhonchi, no chest wall tenderness.  GI: Soft, non-distended, non-tender, no rebound  Skin: Warm, dry, anterior left leg with 2 circular chronic wounds with overlying heaped up granulomatous tissue and some overlying necrotic sloughing.  Wound edges are clearly demarcated and appear to be shrinking, there is dry pinkish excoriated skin surrounding the wounds but the areas not frankly erythematous nor tender or edematous.  There is no purulent discharge.  Musculoskeletal: Moving all extremities, no acute deformity, no edema, no " tenderness  Neurologic: A&Ox3, at baseline mentation, 5 out of 5 strength and normal sensation to left lower extremity.  Psychiatric: Appropriate affect for situation at this time          DIAGNOSTIC STUDIES / PROCEDURES    LABS  Labs Reviewed   CBC WITH DIFFERENTIAL - Abnormal; Notable for the following components:       Result Value    RBC 4.20 (*)     Hemoglobin 11.9 (*)     Hematocrit 35.9 (*)     Lymphocytes 19.40 (*)     All other components within normal limits   COMP METABOLIC PANEL - Abnormal; Notable for the following components:    Globulin 3.6 (*)     All other components within normal limits   CRP QUANTITIVE (NON-CARDIAC) - Abnormal; Notable for the following components:    Stat C-Reactive Protein 1.06 (*)     All other components within normal limits   ESTIMATED GFR - Abnormal; Notable for the following components:    GFR (CKD-EPI) 53 (*)     All other components within normal limits   SED RATE         RADIOLOGY  I have independently interpreted the diagnostic imaging associated with this visit and am waiting the final reading from the radiologist.   My preliminary interpretation is as follows: No underlying bony abnormality consistent with osteomyelitis  Radiologist interpretation:   DX-TIBIA AND FIBULA LEFT   Final Result      1. Anterior left lower leg soft tissue defect with no underlying bony involvement.   2. Post surgical changes in the left knee.            COURSE & MEDICAL DECISION MAKING    ED Observation Status? No; Patient does not meet criteria for ED Observation.     INITIAL ASSESSMENT, COURSE AND PLAN  Care Narrative:     Patient evaluated by me for a wound check as sent in by family practitioner.  Recently completed 2 courses of antibiotics in the setting of cellulitis complicating a Mohs procedure.  At this point given absence of fever or chills no kendall erythema or purulent discharge, tenderness and overall slow but steady improvement in size of wounds I am not inclined to believe  that they are acutely infected.  They actually appear to be healing well albeit slowly.  There is certainly some overlying necrotic tissue associated with healing granular tissue which is prone to infection however I believe that his topical vinegar soaks and antifungal agents are helping to prevent associated infection.  Will obtain an x-ray to evaluate for underlying bony erosion and basic labs to further screen for significant inflammation as would indicate worsening infection though at this point I do not feel that patient necessitates IV antimicrobial therapy or even oral antimicrobial therapy.    Emergency department workup is largely unremarkable with no significant leukocytosis or left shift and nonsignificantly elevated inflammatory markers.  CMP otherwise at patient's baseline.  X-ray with no underlying evidence of osteomyelitis.  Given such as above I feel that wound infection is quite unlikely.  Will prescribe topical mupirocin ointment to help mitigate future infections and otherwise refer the patient for outpatient wound care.  Patient comfortable with this treatment plan.  Return precautions discussed all questions answered and he was discharged in stable condition.      ADDITIONAL PROBLEM LIST  Neuropathy    DISPOSITION AND DISCUSSIONS  I have discussed management of the patient with the following physicians and JIMENA's: None    Discussion of management with other QHP or appropriate source(s): None     Escalation of care considered, and ultimately not performed:acute inpatient care management, however at this time, the patient is most appropriate for outpatient management      Decision tools and prescription drugs considered including, but not limited to: Antibiotics topical mupirocin .    FINAL IMPRESSION  1. Encounter for wound re-check    2. Wound drainage        PRESCRIPTIONS  New Prescriptions    MUPIROCIN (BACTROBAN) 2 % OINTMENT    Apply 1 Application topically 2 times a day.       FOLLOW  UP  Gerhard Carrillo M.D.  [address] [facility] [address]  [phone]    Go in 3 days      [facility]  [address] [facility] [address]  [facility] [address]  [phone]  Schedule an appointment as soon as possible for a visit           -DISCHARGE-    Electronically signed by: Fredi Olsen M.D., 1/24/2024 4:25 PM

## 2024-02-19 ENCOUNTER — NON-PROVIDER VISIT (OUTPATIENT)
Dept: WOUND CARE | Facility: MEDICAL CENTER | Age: 74
End: 2024-02-19
Attending: STUDENT IN AN ORGANIZED HEALTH CARE EDUCATION/TRAINING PROGRAM
Payer: MEDICARE

## 2024-02-19 PROCEDURE — 99211 OFF/OP EST MAY X REQ PHY/QHP: CPT

## 2024-02-19 PROCEDURE — 97602 WOUND(S) CARE NON-SELECTIVE: CPT

## 2024-02-19 NOTE — PATIENT INSTRUCTIONS
-Keep your wound dressing clean, dry, and intact.    -Change your dressing if it becomes soiled, soaked, or falls off.    -Remove your compression sock if you have severe pain, severe swelling, numbness, color change, or temperature change in your toes. If you need to remove your compression wrap, do so by unrolling it. Do not cut the compression wrap off to prevent cutting yourself on accident.    -Should you experience any significant changes in your wound(s), such as infection (redness, swelling, localized heat, increased pain, fever > 101 F, chills) or have any questions regarding your home care instructions, please contact the wound center at [phone]. If after hours, contact your primary care physician or go to the hospital emergency room.

## 2024-02-19 NOTE — CERTIFICATION
"Non Provider Encounter- Full Thickness wound    HISTORY OF PRESENT ILLNESS  Wound History:    START OF CARE IN CLINIC: 2/19/2024    REFERRING PROVIDER: Fredi Olsen M.D     WOUND- Full Thickness Wound   LOCATION: LLE, anterior proximal                                  LLE, anterior distal   HISTORY: 11/16/2023 patient had a punch biopsy and Moh's procedure done at Dermatologist in [place], [place].   Patient has had two courses of antibiotics, been treating hypergranulation tissue with acetic acid soaks (per Dermatology) and topical Mupirocin ointment.     Patient arrives to clinic today with telfa pad over wounds, moderate serous-serosanguinous drainage. Patient had been wrapping below bandages with gauze roll to catch the drainage.     Pertinent Labs and Diagnostics:    Labs:     A1c: No results found for: \"HBA1C\"       IMAGING: n/a    VASCULAR STUDIES: n/a    LAST  WOUND CULTURE:  DATE : n/a             Patient allergies and medications reviewed via Epic.     Wound Assessment:        Wound 02/19/24 Full Thickness Wound Leg Lower;Anterior;Proximal Left (Active)   Wound Image   02/19/24 1500   Site Assessment Red;Yellow;Hypergranulation;Slough 02/19/24 1500   Periwound Assessment Edema;Fragile;Maceration 02/19/24 1500   Margins Attached edges 02/19/24 1500   Drainage Amount Moderate 02/19/24 1500   Drainage Description Serosanguineous;Serous 02/19/24 1500   Treatments Cleansed;Nonselective debridement;Site care 02/19/24 1500   Wound Cleansing Hypochlorus Acid 02/19/24 1500   Periwound Protectant Barrier Paste;No-sting Skin Prep 02/19/24 1500   Dressing Status Intact;Old drainage 02/19/24 1500   Dressing Changed New 02/19/24 1500   Dressing Cleansing/Solutions Not Applicable 02/19/24 1500   Dressing Options Hydrofera Blue Ready;Honey Alginate;Hydrofiber;Silicone Adhesive Foam;Tubigrip 02/19/24 1500   Dressing Change/Treatment Frequency Every 72 hrs, and As Needed 02/19/24 1500   Wound Team Following Weekly " 02/19/24 1500   Non-staged Wound Description Full thickness 02/19/24 1500   Wound Length (cm) 2.6 cm 02/19/24 1500   Wound Width (cm) 2.7 cm 02/19/24 1500   Wound Depth (cm) 0.3 cm 02/19/24 1500   Wound Surface Area (cm^2) 7.02 cm^2 02/19/24 1500   Wound Volume (cm^3) 2.106 cm^3 02/19/24 1500   Tunneling (cm) 0 cm 02/19/24 1500   Undermining (cm) 0 cm 02/19/24 1500   Wound Odor Mild 02/19/24 1500   Pulses Left;2+;DP;PT 02/19/24 1500       Wound 02/19/24 Full Thickness Wound Leg Lower;Anterior;Distal Left (Active)   Wound Image   02/19/24 1500   Site Assessment Red;Yellow;Hypergranulation;Slough 02/19/24 1500   Periwound Assessment Edema;Fragile;Maceration 02/19/24 1500   Margins Attached edges 02/19/24 1500   Drainage Amount Moderate 02/19/24 1500   Drainage Description Serosanguineous 02/19/24 1500   Treatments Cleansed;Nonselective debridement;Site care 02/19/24 1500   Wound Cleansing Hypochlorus Acid 02/19/24 1500   Periwound Protectant Barrier Paste;No-sting Skin Prep 02/19/24 1500   Dressing Status Intact;Old drainage 02/19/24 1500   Dressing Changed New 02/19/24 1500   Dressing Cleansing/Solutions Not Applicable 02/19/24 1500   Dressing Options Hydrofera Blue Ready;Honey Alginate;Hydrofiber;Silicone Adhesive Foam;Tubigrip 02/19/24 1500   Dressing Change/Treatment Frequency Every 72 hrs, and As Needed 02/19/24 1500   Wound Team Following Weekly 02/19/24 1500   Non-staged Wound Description Full thickness 02/19/24 1500   Wound Length (cm) 4.4 cm 02/19/24 1500   Wound Width (cm) 3.6 cm 02/19/24 1500   Wound Depth (cm) 0.4 cm 02/19/24 1500   Wound Surface Area (cm^2) 15.84 cm^2 02/19/24 1500   Wound Volume (cm^3) 6.336 cm^3 02/19/24 1500   Tunneling (cm) 0 cm 02/19/24 1500   Undermining (cm) 0 cm 02/19/24 1500   Wound Odor Mild 02/19/24 1500   Pulses Left;2+;DP;PT 02/19/24 1500            Pre-debridement Photo          Procedures:    -Non-selective debridement to wound and elly-wound using normal saline and gauze  to remove biofilm and non-viable tissue.  -Refer to flowsheet for wound care details.       PATIENT EDUCATION  -Patient educated on moist wound healing, wound clinic goals for care and debridement. Nurse did not perform sharp debridement r/t risk for worsening hypergranulation without confirmation of clear margins from Moh's procedure.   -Educated on dressing selection: HFBR for hypergranulation tissue and antibacterial coverage, MediHoney Alginate to promote autolytic debridement for slough covered areas, plain hydrofiber for drainage management and Tubigrip for edema management  -Advised to go to ER for any increased redness, swelling, drainage or odor, or if patient develops fever, chills, nausea or vomiting.  -Importance of adequate nutrition for wound healing  -Increase protein intake (unless contraindicated by renal status)

## 2024-02-27 ENCOUNTER — HOSPITAL ENCOUNTER (OUTPATIENT)
Facility: MEDICAL CENTER | Age: 74
End: 2024-02-27
Attending: NURSE PRACTITIONER
Payer: MEDICARE

## 2024-02-27 ENCOUNTER — OFFICE VISIT (OUTPATIENT)
Dept: WOUND CARE | Facility: MEDICAL CENTER | Age: 74
End: 2024-02-27
Attending: STUDENT IN AN ORGANIZED HEALTH CARE EDUCATION/TRAINING PROGRAM
Payer: MEDICARE

## 2024-02-27 VITALS
DIASTOLIC BLOOD PRESSURE: 65 MMHG | RESPIRATION RATE: 16 BRPM | OXYGEN SATURATION: 93 % | TEMPERATURE: 97.9 F | HEART RATE: 77 BPM | SYSTOLIC BLOOD PRESSURE: 138 MMHG

## 2024-02-27 DIAGNOSIS — L08.9 WOUND INFECTION: ICD-10-CM

## 2024-02-27 DIAGNOSIS — I87.2 CHRONIC VENOUS INSUFFICIENCY OF LOWER EXTREMITY: ICD-10-CM

## 2024-02-27 DIAGNOSIS — S81.802D OPEN WOUND OF LEFT LOWER LEG, SUBSEQUENT ENCOUNTER: Primary | ICD-10-CM

## 2024-02-27 DIAGNOSIS — S81.802D OPEN WOUND OF LEFT LOWER LEG, SUBSEQUENT ENCOUNTER: ICD-10-CM

## 2024-02-27 DIAGNOSIS — Z85.828 HISTORY OF SKIN CANCER: ICD-10-CM

## 2024-02-27 DIAGNOSIS — R60.0 EDEMA OF BOTH LOWER LEGS: ICD-10-CM

## 2024-02-27 DIAGNOSIS — T14.8XXA WOUND INFECTION: ICD-10-CM

## 2024-02-27 PROCEDURE — 11042 DBRDMT SUBQ TIS 1ST 20SQCM/<: CPT | Performed by: NURSE PRACTITIONER

## 2024-02-27 PROCEDURE — 11042 DBRDMT SUBQ TIS 1ST 20SQCM/<: CPT

## 2024-02-27 PROCEDURE — 87077 CULTURE AEROBIC IDENTIFY: CPT

## 2024-02-27 PROCEDURE — 3078F DIAST BP <80 MM HG: CPT | Performed by: NURSE PRACTITIONER

## 2024-02-27 PROCEDURE — 11045 DBRDMT SUBQ TISS EACH ADDL: CPT

## 2024-02-27 PROCEDURE — 87186 SC STD MICRODIL/AGAR DIL: CPT

## 2024-02-27 PROCEDURE — 99213 OFFICE O/P EST LOW 20 MIN: CPT | Mod: 25 | Performed by: NURSE PRACTITIONER

## 2024-02-27 PROCEDURE — 3075F SYST BP GE 130 - 139MM HG: CPT | Performed by: NURSE PRACTITIONER

## 2024-02-27 PROCEDURE — 87147 CULTURE TYPE IMMUNOLOGIC: CPT

## 2024-02-27 PROCEDURE — 87205 SMEAR GRAM STAIN: CPT

## 2024-02-27 PROCEDURE — 99213 OFFICE O/P EST LOW 20 MIN: CPT | Mod: 25

## 2024-02-27 PROCEDURE — 87070 CULTURE OTHR SPECIMN AEROBIC: CPT

## 2024-02-27 PROCEDURE — 11045 DBRDMT SUBQ TISS EACH ADDL: CPT | Performed by: NURSE PRACTITIONER

## 2024-02-27 ASSESSMENT — ENCOUNTER SYMPTOMS
CHILLS: 0
DIARRHEA: 0
CLAUDICATION: 0
FEVER: 0
VOMITING: 0
CONSTIPATION: 0
COUGH: 0
SHORTNESS OF BREATH: 0
NAUSEA: 0

## 2024-02-27 NOTE — PROGRESS NOTES
Provider Encounter- Full Thickness wound    HISTORY OF PRESENT ILLNESS  Wound History:   START OF CARE IN CLINIC: 2/19/2024               REFERRING PROVIDER: Fredi Olsen M.D                 WOUND- Full Thickness Wound              LOCATION: LLE, anterior proximal                                  LLE, anterior distal              HISTORY: Patient underwent punch biopsy and Moh's procedure of lesions to his left lower leg on 11/16/2023, done at Dermatologist in [place], [place].  He was told that margins were clear.  Since his procedure, his wounds have failed to heal, wound beds have become hypertrophic.  He has had two courses of antibiotics, was instructed to treat hypergranulation tissue with acetic acid soaks (per Dermatology) and topical Mupirocin ointment.   He now lives in [place] and is reluctant to travel back and forth to [place].      Pertinent Medical History: Skin cancer, chronic pain,    TOBACCO USE: Never smoked use smokeless tobacco    Patient's problem list, allergies, and current medications reviewed and updated in Epic    Interval History:  2/27/2024 : Initial provider visit with OANH Dave, PATRICIA-BC, CWCARLOSN, CFADONIS.  Patient states he is feeling well overall, denies much pain from these wounds.   He does have quite a bit of periwound redness, especially in comparison to opposite leg.  He states he finished his last course of antibiotics a month ago, and feels they did not help decrease the redness.  Wound culture collected in clinic today, will follow results.   Will also request pathology report from patient's dermatologist.  Release of information form signed by patient in clinic today.        REVIEW OF SYSTEMS:   Review of Systems   Constitutional:  Negative for chills and fever.   Respiratory:  Negative for cough and shortness of breath.    Cardiovascular:  Positive for leg swelling. Negative for chest pain and claudication.   Gastrointestinal:  Negative for constipation,  diarrhea, nausea and vomiting.   Musculoskeletal:  Positive for joint pain.        Arthritis pain       PHYSICAL EXAMINATION:   /65   Pulse 77   Temp 36.6 °C (97.9 °F) (Temporal)   Resp 16   SpO2 93%     Physical Exam  Constitutional:       Appearance: He is normal weight.   Cardiovascular:      Rate and Rhythm: Normal rate.      Pulses: Normal pulses.      Comments: Strong DP and PT pulses, +2  Varicose veins bilateral lower extremities  Pulmonary:      Effort: Pulmonary effort is normal.   Musculoskeletal:      Right lower leg: Edema present.      Left lower leg: Edema present.      Comments:  +2 edema to bilateral lower extremities   Skin:     Comments: Full-thickness x 2 left anterior lower leg-wound edges diffuse, similar slough to wound bed, moderate serosanguineous drainage.  Erythema to lower leg from proximal shin/calf to toes.    Hemosiderin staining, chronic edema, varicose veins, and skin changes-findings consistent with CVI   Neurological:      Mental Status: He is alert.         WOUND ASSESSMENT  Wound 02/19/24 Full Thickness Wound Leg Lower;Anterior;Proximal Left (Active)   Wound Image    02/27/24 1300   Site Assessment Red;Yellow;Hypergranulation 02/27/24 1300   Periwound Assessment Maceration;Edema;Flaky;Blanchable erythema 02/27/24 1300   Margins Attached edges 02/27/24 1300   Drainage Amount Moderate 02/27/24 1300   Drainage Description Serosanguineous 02/27/24 1300   Treatments Cleansed;Topical Lidocaine;Provider debridement 02/27/24 1300   Wound Cleansing Hypochlorus Acid 02/27/24 1300   Periwound Protectant Barrier Paste;Skin Moisturizer 02/27/24 1300   Dressing Status Intact;Old drainage 02/19/24 1500   Dressing Changed New 02/27/24 1300   Dressing Cleansing/Solutions Not Applicable 02/27/24 1300   Dressing Options Hydrofera Blue Ready;Silicone Adhesive Foam;Tubigrip 02/27/24 1300   Dressing Change/Treatment Frequency Every 72 hrs, and As Needed 02/27/24 1300   Wound Team Following  Weekly 02/27/24 1300   Non-staged Wound Description Full thickness 02/27/24 1300   Wound Length (cm) 2.6 cm 02/27/24 1300   Wound Width (cm) 2.4 cm 02/27/24 1300   Wound Depth (cm) 0.3 cm 02/19/24 1500   Wound Surface Area (cm^2) 6.24 cm^2 02/27/24 1300   Wound Volume (cm^3) 2.106 cm^3 02/19/24 1500   Post-Procedure Length (cm) 2.6 cm 02/27/24 1300   Post-Procedure Width (cm) 2.4 cm 02/27/24 1300   Post-Procedure Surface Area (cm^2) 6.24 cm^2 02/27/24 1300   Tunneling (cm) 0 cm 02/27/24 1300   Undermining (cm) 0 cm 02/27/24 1300   Wound Odor None 02/27/24 1300   Pulses Left;2+;DP;PT 02/19/24 1500   Number of days: 8       Wound 02/19/24 Full Thickness Wound Leg Lower;Anterior;Distal Left (Active)   Wound Image    02/27/24 1300   Site Assessment Red;Yellow;Hypergranulation 02/27/24 1300   Periwound Assessment Blanchable erythema;Flaky;Edema 02/27/24 1300   Margins Attached edges 02/27/24 1300   Drainage Amount Moderate 02/27/24 1300   Drainage Description Serosanguineous 02/27/24 1300   Treatments Cleansed;Topical Lidocaine;Provider debridement;Irrigation;Wound culture 02/27/24 1300   Wound Cleansing Normal Saline Irrigation 02/27/24 1300   Periwound Protectant Skin Moisturizer;Barrier Paste 02/27/24 1300   Dressing Status Intact;Old drainage 02/19/24 1500   Dressing Changed New 02/27/24 1300   Dressing Cleansing/Solutions Not Applicable 02/27/24 1300   Dressing Options Hydrofera Blue Ready;Silicone Adhesive Foam;Tubigrip 02/27/24 1300   Dressing Change/Treatment Frequency Every 72 hrs, and As Needed 02/27/24 1300   Wound Team Following Weekly 02/27/24 1300   Non-staged Wound Description Full thickness 02/27/24 1300   Wound Length (cm) 4.1 cm 02/27/24 1300   Wound Width (cm) 3.5 cm 02/27/24 1300   Wound Depth (cm) 0.4 cm 02/19/24 1500   Wound Surface Area (cm^2) 14.35 cm^2 02/27/24 1300   Wound Volume (cm^3) 6.336 cm^3 02/19/24 1500   Post-Procedure Length (cm) 4.4 cm 02/27/24 1300   Post-Procedure Width (cm) 3.5 cm  "02/27/24 1300   Post-Procedure Surface Area (cm^2) 15.4 cm^2 02/27/24 1300   Tunneling (cm) 0 cm 02/27/24 1300   Undermining (cm) 0 cm 02/27/24 1300   Wound Odor None 02/27/24 1300   Pulses Left;2+;DP;PT 02/19/24 1500   Number of days: 8       PROCEDURE: Excisional debridement of left anterior lower leg wounds x 2  -2% viscous lidocaine applied topically to wound bed for approximately 5 minutes prior to debridement  -Curette used to debride wound beds.  Excisional debridement was performed to remove devitalized tissue until healthy, bleeding tissue was visualized.   Entire surface of wounds, 21.64 cm² debrided.  Tissue debrided into the subcutaneous layer.    -Bleeding controlled with manual pressure.    -Wound care completed by wound RN, refer to flowsheet  -Patient tolerated the procedure well, without c/o pain or discomfort.       Pertinent Labs and Diagnostics:    Labs:     A1c: No results found for: \"HBA1C\"       IMAGING: None found in epic    VASCULAR STUDIES: None        LAST  WOUND CULTURE:  DATE :   Lab Results   Component Value Date/Time    CULTRSULT No growth at 48 hours. 11/03/2021 03:23 AM         ASSESSMENT AND PLAN:     1. Open wound of left lower leg, subsequent encounter    2/27/2024: Full-thickness wounds x 2 following Mohs procedure by dermatologist in [place].  -Wound area has not changed significantly, hypergranulation of wound bed.  Thin layer of slough  -Excisional debridement of wound in clinic today, medically necessary to promote wound healing.  -Patient to return to clinic weekly for assessment and debridement  -Patient to change dressing 1-2 times per week in between clinic visits.  He is not eligible for home health as he is still working      Wound care: Medihoney over areas of slough, Hydrofera Blue to manage exudate and bioburden, foam cover dressing, Hypafix tape     2. History of skin cancer    2/27/2024: Patient's wounds are due to Mohs procedure performed in November 2023.  He " does not recall what kind of cancer these were.  He was told he got clean margins  -Request for pathology report sent to patient's dermatologist    3.  Chronic venous insufficiency of lower extremity  4. Edema of both lower legs    2/27/2024: Bilateral lower extremity edema, hemosiderin staining, varicose veins and skin changes consistent with CVI.  Healing compromised by uncontrolled lower extremity edema  -Etiology of chronic venous insufficiency discussed with patient and clinic.  Patient would benefit from lifelong compression  -Tubigrip for now  -Consider increasing to multilayer wraps.  Will await culture results  -Measured for compression stockings.    5.  Wound infection    2/27/2024: Left lower extremity erythema, from proximal shin to ankle.  Different in coloration from right lower extremity.  Patient states he was prescribed 2 courses of antibiotics, does not recall what they were, finished last course about a month ago.  -Wound culture collected in clinic, will follow results      PATIENT EDUCATION  - Importance of adequate nutrition for wound healing  -Advised to go to ER for any increased redness, swelling, drainage, or odor, or if patient develops fever, chills, nausea or vomiting.     My total time spent caring for the patient on the day of the encounter was 20 minutes.   This does not include time spent on separately billable procedures/tests.       Please note that this note may have been created using voice recognition software. I have worked with technical experts from [employer] to optimize the interface.  I have made every reasonable attempt to correct obvious errors, but there may be errors of grammar and possibly content that I did not discover before finalizing the note.    N

## 2024-02-28 LAB
GRAM STN SPEC: NORMAL
SIGNIFICANT IND 70042: NORMAL
SITE SITE: NORMAL
SOURCE SOURCE: NORMAL

## 2024-02-28 NOTE — PATIENT INSTRUCTIONS
-Keep your wound dressing clean, dry, and intact. Only change dressing if it's over saturated, soiled or falls off.     -Should you experience any significant changes in your wound(s), such as infection (redness, swelling, localized heat, increased pain, fever > 101 F, chills) or have any questions regarding your home care instructions, please contact the wound center at [phone]. If after hours, contact your primary care physician or go to the hospital emergency room.

## 2024-02-28 NOTE — PROGRESS NOTES
Pt states he was told clear margins were obtained after Mohs procedure in November 2023. Called SHONNA Romero M.D.. [facility] office and spoke to Kirk; asked if she could fax Progress Note from Mohs procedure indicating if clear margins were contained. Stated she would fax the note to clinic. This RN faxed release of information form to their office at fax number [phone] that was signed by the patient during today's visit.     1657: Wound culture collected from left anterior LE distal wound sent to lab via tube system.

## 2024-02-29 ENCOUNTER — TELEPHONE (OUTPATIENT)
Dept: WOUND CARE | Facility: MEDICAL CENTER | Age: 74
End: 2024-02-29
Payer: MEDICARE

## 2024-02-29 NOTE — TELEPHONE ENCOUNTER
Clinic has still not received note from [facility] dermatologist office. This RN called the office and requested refax, stated they would fax it.

## 2024-02-29 NOTE — TELEPHONE ENCOUNTER
Unable to order wound care supplies and 20-30 mmHg dual layer compression stocking for patient at this time; address on file for patient is a P.O. box. This RN called patient for shipping address, however reached voicemail box. Left voicemail requesting call back to clinic. If patient does not call back before visit next week and compression needs to be ordered next week, left lower extremity measurements are as follows:    Calf circumfrence: 35 cm  Ankle circumfrence: 26 cm  Length: 34 cm

## 2024-03-01 DIAGNOSIS — T14.8XXA WOUND INFECTION: ICD-10-CM

## 2024-03-01 DIAGNOSIS — L08.9 WOUND INFECTION: ICD-10-CM

## 2024-03-01 LAB
BACTERIA WND AEROBE CULT: ABNORMAL
BACTERIA WND AEROBE CULT: ABNORMAL
GRAM STN SPEC: ABNORMAL
SIGNIFICANT IND 70042: ABNORMAL
SITE SITE: ABNORMAL
SOURCE SOURCE: ABNORMAL

## 2024-03-01 RX ORDER — AMOXICILLIN AND CLAVULANATE POTASSIUM 875; 125 MG/1; MG/1
1 TABLET, FILM COATED ORAL 2 TIMES DAILY
Qty: 20 TABLET | Refills: 0 | Status: SHIPPED | OUTPATIENT
Start: 2024-03-01 | End: 2024-03-11

## 2024-03-02 NOTE — PROGRESS NOTES
Wound culture results reviewed.  Discussed with patient over telephone, verified pharmacy information.  Rx for Augmentin sent to patient's pharmacy

## 2024-03-04 ENCOUNTER — OFFICE VISIT (OUTPATIENT)
Dept: WOUND CARE | Facility: MEDICAL CENTER | Age: 74
End: 2024-03-04
Attending: STUDENT IN AN ORGANIZED HEALTH CARE EDUCATION/TRAINING PROGRAM
Payer: MEDICARE

## 2024-03-04 VITALS
SYSTOLIC BLOOD PRESSURE: 127 MMHG | HEART RATE: 81 BPM | RESPIRATION RATE: 18 BRPM | OXYGEN SATURATION: 90 % | TEMPERATURE: 98 F | DIASTOLIC BLOOD PRESSURE: 57 MMHG

## 2024-03-04 DIAGNOSIS — L08.9 WOUND INFECTION: ICD-10-CM

## 2024-03-04 DIAGNOSIS — T14.8XXA WOUND INFECTION: ICD-10-CM

## 2024-03-04 DIAGNOSIS — I87.2 CHRONIC VENOUS INSUFFICIENCY OF LOWER EXTREMITY: ICD-10-CM

## 2024-03-04 DIAGNOSIS — R60.0 EDEMA OF BOTH LOWER LEGS: ICD-10-CM

## 2024-03-04 DIAGNOSIS — S81.802D OPEN WOUND OF LEFT LOWER LEG, SUBSEQUENT ENCOUNTER: ICD-10-CM

## 2024-03-04 DIAGNOSIS — Z85.828 HISTORY OF SKIN CANCER: ICD-10-CM

## 2024-03-04 PROCEDURE — 11042 DBRDMT SUBQ TIS 1ST 20SQCM/<: CPT | Performed by: NURSE PRACTITIONER

## 2024-03-04 PROCEDURE — 3078F DIAST BP <80 MM HG: CPT | Performed by: NURSE PRACTITIONER

## 2024-03-04 PROCEDURE — 3074F SYST BP LT 130 MM HG: CPT | Performed by: NURSE PRACTITIONER

## 2024-03-04 PROCEDURE — 11042 DBRDMT SUBQ TIS 1ST 20SQCM/<: CPT

## 2024-03-04 NOTE — PROGRESS NOTES
Provider Encounter- Full Thickness wound    HISTORY OF PRESENT ILLNESS  Wound History:   START OF CARE IN CLINIC: 2/19/2024               REFERRING PROVIDER: Fredi Olsen M.D                 WOUND- Full Thickness Wound              LOCATION: LLE, anterior proximal                                  LLE, anterior distal              HISTORY: Patient underwent punch biopsy and Moh's procedure of lesions to his left lower leg on 11/16/2023, done at Dermatologist in [place], [place].  He was told that margins were clear.  Since his procedure, his wounds have failed to heal, wound beds have become hypertrophic.  He has had two courses of antibiotics, was instructed to treat hypergranulation tissue with acetic acid soaks (per Dermatology) and topical Mupirocin ointment.   He now lives in [place] and is reluctant to travel back and forth to [place].      Pertinent Medical History: Skin cancer, chronic pain,    TOBACCO USE: Never smoked use smokeless tobacco    Patient's problem list, allergies, and current medications reviewed and updated in Epic    Interval History:  2/27/2024 : Initial provider visit with OANH Dave, MARIE, RUTHN, CFADONIS.  Patient states he is feeling well overall, denies much pain from these wounds.   He does have quite a bit of periwound redness, especially in comparison to opposite leg.  He states he finished his last course of antibiotics a month ago, and feels they did not help decrease the redness.  Wound culture collected in clinic today, will follow results.   Will also request pathology report from patient's dermatologist.  Release of information form signed by patient in clinic today.      3/4/24: Clinic visit with Joslyn HUGO, MARIE, GEORGE, CFADONIS.  Pt denies fevers, chills, nausea, vomiting.  Both ulcers have decreased in size.  He has not picked up his prescription for Augmentin yet.  Have not received pathology from his dermatologist in [place].  RN to follow-up  with dermatology office.        REVIEW OF SYSTEMS:   Unchanged from previous wound clinic assessment on 2/27/24, except as noted in interval history above      PHYSICAL EXAMINATION:   /57   Pulse 81   Temp 36.7 °C (98 °F)   Resp 18   SpO2 90%     Physical Exam  Constitutional:       Appearance: He is normal weight.   Cardiovascular:      Rate and Rhythm: Normal rate.      Pulses: Normal pulses.      Comments: Strong DP and PT pulses, +2  Varicose veins bilateral lower extremities  Pulmonary:      Effort: Pulmonary effort is normal.   Musculoskeletal:      Right lower leg: Edema present.      Left lower leg: Edema present.      Comments:  +2 edema to bilateral lower extremities   Skin:     Comments:  left anterior lower leg proximal-ulcer has decreased in size, new epithelial tissue, minimal serosanguineous drainage, minimal erythema to periwound    Left anterior distal leg ulcer: Full-thickness, wound edges diffuse, tissue raised moderate slough to wound bed, moderate serosanguineous drainage.  New epithelial tissue noted medial edge.  Minimal erythema to periwound    Hemosiderin staining, chronic edema, varicose veins, and skin changes-findings consistent with CVI   Neurological:      Mental Status: He is alert.         WOUND ASSESSMENT  Wound 02/19/24 Full Thickness Wound Leg Lower;Anterior;Proximal Left (Active)   Wound Image    03/04/24 1300   Site Assessment Red;Yellow;Hypergranulation 03/04/24 1300   Periwound Assessment Maceration;Edema;Hemosiderin Staining 03/04/24 1300   Margins Attached edges 03/04/24 1300   Drainage Amount Moderate 03/04/24 1300   Drainage Description Serosanguineous 03/04/24 1300   Treatments Cleansed;Topical Lidocaine;Provider debridement;Site care 03/04/24 1300   Wound Cleansing Foam Cleanser/Washcloth 03/04/24 1300   Periwound Protectant Skin Protectant Wipes to Periwound;Skin Moisturizer 03/04/24 1300   Dressing Status Intact;Old drainage 02/19/24 1500   Dressing Changed  Changed 03/04/24 1300   Dressing Cleansing/Solutions Not Applicable 03/04/24 1300   Dressing Options Hydrofera Blue Ready;Super Absorbent Pad;Soft Conforming Roll;Tubigrip 03/04/24 1300   Dressing Change/Treatment Frequency Every 72 hrs, and As Needed 03/04/24 1300   Wound Team Following Weekly 03/04/24 1300   Non-staged Wound Description Full thickness 03/04/24 1300   Wound Length (cm) 2 cm 03/04/24 1300   Wound Width (cm) 2.2 cm 03/04/24 1300   Wound Depth (cm) 0.1 cm 03/04/24 1300   Wound Surface Area (cm^2) 4.4 cm^2 03/04/24 1300   Wound Volume (cm^3) 0.44 cm^3 03/04/24 1300   Post-Procedure Length (cm) 2 cm 03/04/24 1300   Post-Procedure Width (cm) 2.2 cm 03/04/24 1300   Post-Procedure Depth (cm) 0.1 cm 03/04/24 1300   Post-Procedure Surface Area (cm^2) 4.4 cm^2 03/04/24 1300   Post-Procedure Volume (cm^3) 0.44 cm^3 03/04/24 1300   Wound Healing % 79 03/04/24 1300   Tunneling (cm) 0 cm 03/04/24 1300   Undermining (cm) 0 cm 03/04/24 1300   Wound Odor None 03/04/24 1300   Pulses Left;2+;DP;PT 02/19/24 1500   Number of days: 14       Wound 02/19/24 Full Thickness Wound Leg Lower;Anterior;Distal Left (Active)   Wound Image    03/04/24 1300   Site Assessment Red;Yellow 03/04/24 1300   Periwound Assessment Hemosiderin Staining;Edema;Maceration 03/04/24 1300   Margins Attached edges 03/04/24 1300   Drainage Amount Moderate 03/04/24 1300   Drainage Description Serosanguineous 03/04/24 1300   Treatments Cleansed;Topical Lidocaine;Provider debridement;Site care 03/04/24 1300   Wound Cleansing Foam Cleanser/Washcloth 03/04/24 1300   Periwound Protectant Skin Protectant Wipes to Periwound;Skin Moisturizer 03/04/24 1300   Dressing Status Intact;Old drainage 03/04/24 1300   Dressing Changed Changed 03/04/24 1300   Dressing Cleansing/Solutions Not Applicable 03/04/24 1300   Dressing Options Hydrofera Blue Ready;Super Absorbent Pad;Soft Conforming Roll;Tubigrip 03/04/24 1300   Dressing Change/Treatment Frequency Every 72 hrs,  "and As Needed 03/04/24 1300   Wound Team Following Weekly 03/04/24 1300   Non-staged Wound Description Full thickness 03/04/24 1300   Wound Length (cm) 4 cm 03/04/24 1300   Wound Width (cm) 3 cm 03/04/24 1300   Wound Depth (cm) 0.1 cm 03/04/24 1300   Wound Surface Area (cm^2) 12 cm^2 03/04/24 1300   Wound Volume (cm^3) 1.2 cm^3 03/04/24 1300   Post-Procedure Length (cm) 4 cm 03/04/24 1300   Post-Procedure Width (cm) 3 cm 03/04/24 1300   Post-Procedure Depth (cm) 0.1 cm 03/04/24 1300   Post-Procedure Surface Area (cm^2) 12 cm^2 03/04/24 1300   Post-Procedure Volume (cm^3) 1.2 cm^3 03/04/24 1300   Wound Healing % 81 03/04/24 1300   Tunneling (cm) 0 cm 03/04/24 1300   Undermining (cm) 0 cm 03/04/24 1300   Wound Odor None 03/04/24 1300   Pulses Left;2+;DP;PT 02/19/24 1500   Number of days: 14       PROCEDURE: Excisional debridement of left anterior lower leg wounds x 2  -2% viscous lidocaine applied topically to wound bed for approximately 5 minutes prior to debridement  -Curette used to debride wound beds.  Excisional debridement was performed to remove devitalized tissue until healthy, bleeding tissue was visualized.   Entire surface of wounds, 16.4 cm² debrided.  Tissue debrided into the subcutaneous layer.    -Bleeding controlled with manual pressure.    -Wound care completed by wound RN, refer to flowsheet  -Patient tolerated the procedure well, without c/o pain or discomfort.       Pertinent Labs and Diagnostics:    Labs:     A1c: No results found for: \"HBA1C\"       IMAGING: None found in epic    VASCULAR STUDIES: None        LAST  WOUND CULTURE:  DATE :   Lab Results   Component Value Date/Time    CULTRSULT Light growth mixed enteric puma. (A) 02/27/2024 02:00 PM    CULTRSULT (A) 02/27/2024 02:00 PM     Staphylococcus aureus  Moderate growth  Methicillin sensitive by screening method           ASSESSMENT AND PLAN:     1. Open wound of left lower leg, subsequent encounter  Full-thickness wounds x 2 following Mohs " procedure by dermatologist in [place].    3/4/2024: Ulcers have decreased in size.  Raised tissue to distal wound.  -Excisional debridement of wound in clinic today, medically necessary to promote wound healing.  -Patient to return to clinic weekly for assessment and debridement  -Patient to change dressing 1-2 times per week in between clinic visits.  He is not eligible for home health as he is still working  -Wound care supplies ordered through DME.    Wound care: Hydrofera Blue to manage exudate and bioburden, absorbent pad, soft roll, double layer Tubigrip       2. History of skin cancer    3/4/2024: Patient's wounds are due to Mohs procedure performed in November 2023.  He does not recall what kind of cancer these were.  He was told he has clean margins  -Request for pathology report sent to patient's dermatologist.  Have not received information back.  RN to follow-up.    3.  Chronic venous insufficiency of lower extremity  4. Edema of both lower legs    3/4/2024: Bilateral lower extremity edema, hemosiderin staining, varicose veins and skin changes consistent with CVI.  Healing compromised by uncontrolled lower extremity edema  -Etiology of chronic venous insufficiency discussed with patient and clinic.  Patient would benefit from lifelong compression  -Does have personal compression socks that are several months old.  Compression sock with decreased elasticity, no longer supportive.  -Consider increasing to multilayer wraps once he has started his antibiotics.  -Measured for compression stockings dual layer at previous visit. Compression stockings ordered through [facility] today    5.  Wound infection    3/4/2024: Left lower extremity erythema, minimal.    -Wound culture last week positive for MSSA.  Prescribed Augmentin 3/1/24.  Has not picked up yet.  Will do so following this appointment.      PATIENT EDUCATION  - Importance of adequate nutrition for wound healing  -Advised to go to ER for any  increased redness, swelling, drainage, or odor, or if patient develops fever, chills, nausea or vomiting.           Please note that this note may have been created using voice recognition software. I have worked with technical experts from [facility] to optimize the interface.  I have made every reasonable attempt to correct obvious errors, but there may be errors of grammar and possibly content that I did not discover before finalizing the note.    N

## 2024-03-04 NOTE — PROGRESS NOTES
Advanced Wound Care   [facility]   [address]   [address]   [place] [id number]   [phone] Fax: [phone]        Duration of Supply Order: 90 Days  Dispense as written.      Wound 02/19/24 Full Thickness Wound Leg Lower;Anterior;Proximal Left (Active)   Wound Image    03/04/24 1300   Site Assessment Red;Yellow;Hypergranulation 03/04/24 1300   Periwound Assessment Maceration;Edema;Hemosiderin Staining 03/04/24 1300   Margins Attached edges 03/04/24 1300   Drainage Amount Moderate 03/04/24 1300   Drainage Description Serosanguineous 03/04/24 1300   Treatments Cleansed;Topical Lidocaine;Provider debridement;Site care 03/04/24 1300   Wound Cleansing Foam Cleanser/Washcloth 03/04/24 1300   Periwound Protectant Skin Protectant Wipes to Periwound;Skin Moisturizer 03/04/24 1300   Dressing Status Intact;Old drainage 02/19/24 1500   Dressing Changed Changed 03/04/24 1300   Dressing Cleansing/Solutions Not Applicable 03/04/24 1300   Dressing Options Hydrofera Blue Ready;Super Absorbent Pad;Soft Conforming Roll;Tubigrip 03/04/24 1300   Dressing Change/Treatment Frequency Every 72 hrs, and As Needed 03/04/24 1300   Wound Team Following Weekly 03/04/24 1300   Non-staged Wound Description Full thickness 03/04/24 1300   Wound Length (cm) 2 cm 03/04/24 1300   Wound Width (cm) 2.2 cm 03/04/24 1300   Wound Depth (cm) 0.1 cm 03/04/24 1300   Wound Surface Area (cm^2) 4.4 cm^2 03/04/24 1300   Wound Volume (cm^3) 0.44 cm^3 03/04/24 1300   Post-Procedure Length (cm) 2 cm 03/04/24 1300   Post-Procedure Width (cm) 2.2 cm 03/04/24 1300   Post-Procedure Depth (cm) 0.1 cm 03/04/24 1300   Post-Procedure Surface Area (cm^2) 4.4 cm^2 03/04/24 1300   Post-Procedure Volume (cm^3) 0.44 cm^3 03/04/24 1300   Wound Healing % 79 03/04/24 1300   Tunneling (cm) 0 cm 03/04/24 1300   Undermining (cm) 0 cm 03/04/24 1300   Wound Odor None 03/04/24 1300   Pulses Left;2+;DP;PT 02/19/24 1500       Wound 02/19/24 Full  Thickness Wound Leg Lower;Anterior;Distal Left (Active)   Wound Image    03/04/24 1300   Site Assessment Red;Yellow 03/04/24 1300   Periwound Assessment Hemosiderin Staining;Edema;Maceration 03/04/24 1300   Margins Attached edges 03/04/24 1300   Drainage Amount Moderate 03/04/24 1300   Drainage Description Serosanguineous 03/04/24 1300   Treatments Cleansed;Topical Lidocaine;Provider debridement;Site care 03/04/24 1300   Wound Cleansing Foam Cleanser/Washcloth 03/04/24 1300   Periwound Protectant Skin Protectant Wipes to Periwound;Skin Moisturizer 03/04/24 1300   Dressing Status Intact;Old drainage 03/04/24 1300   Dressing Changed Changed 03/04/24 1300   Dressing Cleansing/Solutions Not Applicable 03/04/24 1300   Dressing Options Hydrofera Blue Ready;Super Absorbent Pad;Soft Conforming Roll;Tubigrip 03/04/24 1300   Dressing Change/Treatment Frequency Every 72 hrs, and As Needed 03/04/24 1300   Wound Team Following Weekly 03/04/24 1300   Non-staged Wound Description Full thickness 03/04/24 1300   Wound Length (cm) 4 cm 03/04/24 1300   Wound Width (cm) 3 cm 03/04/24 1300   Wound Depth (cm) 0.1 cm 03/04/24 1300   Wound Surface Area (cm^2) 12 cm^2 03/04/24 1300   Wound Volume (cm^3) 1.2 cm^3 03/04/24 1300   Post-Procedure Length (cm) 4 cm 03/04/24 1300   Post-Procedure Width (cm) 3 cm 03/04/24 1300   Post-Procedure Depth (cm) 0.1 cm 03/04/24 1300   Post-Procedure Surface Area (cm^2) 12 cm^2 03/04/24 1300   Post-Procedure Volume (cm^3) 1.2 cm^3 03/04/24 1300   Wound Healing % 81 03/04/24 1300   Tunneling (cm) 0 cm 03/04/24 1300   Undermining (cm) 0 cm 03/04/24 1300   Wound Odor None 03/04/24 1300   Pulses Left;2+;DP;PT 02/19/24 1500            PROCEDURE: LLE    2% topical Lidocaine applied prior to debridement with ~5 minute dwell time. Conservative sharp wound debridement using curette to remove ~1 to 2cm2 nonviable tissue from **location** wound bed. Patient tolerated well; denied pain.    Supply orders sent to [facility]      I agree with current plan of care.    SIGNATURE:_______________________________________ DATE:________________    PROVIDER NAME:__________________________________

## 2024-03-05 NOTE — PATIENT INSTRUCTIONS
-Keep your wound dressing clean, dry, and intact. Only change dressing if it's over saturated, soiled or falls off.     -Change your dressing if it becomes soiled, soaked, or falls off.    -Remove your compression sock from your Left Lower Extremity if you have severe pain, severe swelling, numbness, color change, or temperature change in your toes. If you need to remove your compression wrap, do so by unrolling it. Do not cut the compression wrap off to prevent cutting yourself on accident.    -Should you experience any significant changes in your wound(s), such as infection (redness, swelling, localized heat, increased pain, fever > 101 F, chills) or have any questions regarding your home care instructions, please contact the wound center at [phone]. If after hours, contact your primary care physician or go to the hospital emergency room.

## 2024-03-11 ENCOUNTER — NON-PROVIDER VISIT (OUTPATIENT)
Dept: WOUND CARE | Facility: MEDICAL CENTER | Age: 74
End: 2024-03-11
Attending: STUDENT IN AN ORGANIZED HEALTH CARE EDUCATION/TRAINING PROGRAM
Payer: MEDICARE

## 2024-03-11 PROCEDURE — 97602 WOUND(S) CARE NON-SELECTIVE: CPT

## 2024-03-11 NOTE — PROGRESS NOTES
Non-selective debridement to LLE wounds with moist gauze to remove macerated non-viable tissue & slough from wound beds.

## 2024-03-11 NOTE — PATIENT INSTRUCTIONS
Avoid prolonged standing or sitting without elevating your legs.  - Apply tubigrip to your legs ending 2 fingers below back of knee without wrinkles.   - Knee-high gradient compression to legs  - Only remove if temperature or sensation changes.   If compression needs to be removed, un-wrap it do not cut it off.     Should you experience any significant changes in your wound(s), such as infection (redness, swelling, localized heat, increased pain, fever > 101 F, chills) or have any questions regarding your home care instructions, please contact the wound center at [phone]. If after hours, contact your primary care physician or go to the hospital emergency room.   Keep dressing clean, dry and covered while bathing. Only change dressing if it becomes over saturated, soiled or falls off or every 2-3 days.

## 2024-03-18 ENCOUNTER — OFFICE VISIT (OUTPATIENT)
Dept: WOUND CARE | Facility: MEDICAL CENTER | Age: 74
End: 2024-03-18
Attending: STUDENT IN AN ORGANIZED HEALTH CARE EDUCATION/TRAINING PROGRAM
Payer: MEDICARE

## 2024-03-18 VITALS
RESPIRATION RATE: 16 BRPM | SYSTOLIC BLOOD PRESSURE: 133 MMHG | TEMPERATURE: 97.6 F | DIASTOLIC BLOOD PRESSURE: 64 MMHG | OXYGEN SATURATION: 95 % | HEART RATE: 80 BPM

## 2024-03-18 DIAGNOSIS — R60.0 EDEMA OF BOTH LOWER LEGS: ICD-10-CM

## 2024-03-18 DIAGNOSIS — Z85.828 HISTORY OF SKIN CANCER: ICD-10-CM

## 2024-03-18 DIAGNOSIS — I87.2 CHRONIC VENOUS INSUFFICIENCY OF LOWER EXTREMITY: ICD-10-CM

## 2024-03-18 DIAGNOSIS — L08.9 WOUND INFECTION: ICD-10-CM

## 2024-03-18 DIAGNOSIS — S81.802D OPEN WOUND OF LEFT LOWER LEG, SUBSEQUENT ENCOUNTER: ICD-10-CM

## 2024-03-18 DIAGNOSIS — T14.8XXA WOUND INFECTION: ICD-10-CM

## 2024-03-18 PROCEDURE — 99213 OFFICE O/P EST LOW 20 MIN: CPT

## 2024-03-18 PROCEDURE — 11042 DBRDMT SUBQ TIS 1ST 20SQCM/<: CPT

## 2024-03-18 PROCEDURE — 3075F SYST BP GE 130 - 139MM HG: CPT | Performed by: NURSE PRACTITIONER

## 2024-03-18 PROCEDURE — 11042 DBRDMT SUBQ TIS 1ST 20SQCM/<: CPT | Performed by: NURSE PRACTITIONER

## 2024-03-18 PROCEDURE — 99213 OFFICE O/P EST LOW 20 MIN: CPT | Mod: 25 | Performed by: NURSE PRACTITIONER

## 2024-03-18 PROCEDURE — 3078F DIAST BP <80 MM HG: CPT | Performed by: NURSE PRACTITIONER

## 2024-03-18 NOTE — PROGRESS NOTES
Advanced Wound Care   [facility]   [address]   [address]   [place] [address]   [phone] Fax: [phone]        Duration of Supply Order: 30 Days  Dispense as written.  DME: [facility]        Wound 02/19/24 Full Thickness Wound Leg Lower;Anterior;Proximal Left (Active)   Wound Image    03/18/24 1000   Site Assessment Red;Early/partial granulation;Epithelialization 03/18/24 1000   Periwound Assessment Maceration;Hemosiderin Staining;Edema 03/18/24 1000   Margins Attached edges 03/18/24 1000   Drainage Amount Small 03/18/24 1000   Drainage Description Serosanguineous 03/18/24 1000   Treatments Cleansed;Topical Lidocaine;Provider debridement;Site care 03/18/24 1000   Wound Cleansing Foam Cleanser/Washcloth 03/18/24 1000   Periwound Protectant Skin Moisturizer 03/18/24 1000   Dressing Status Intact;Old drainage 02/19/24 1500   Dressing Changed Changed 03/18/24 1000   Dressing Cleansing/Solutions Normal Saline 03/18/24 1000   Dressing Options Triad Hydro;Adaptic;Soft Conforming Roll;Hypafix Tape;Tubigrip 03/18/24 1000   Dressing Change/Treatment Frequency Every 48 hrs, and As Needed 03/18/24 1000   Wound Team Following Weekly 03/18/24 1000   Non-staged Wound Description Full thickness 03/18/24 1000   Wound Length (cm) 0.3 cm 03/18/24 1000   Wound Width (cm) 0.5 cm 03/18/24 1000   Wound Depth (cm) 0.1 cm 03/18/24 1000   Wound Surface Area (cm^2) 0.15 cm^2 03/18/24 1000   Wound Volume (cm^3) 0.015 cm^3 03/18/24 1000   Post-Procedure Length (cm) 0.4 cm 03/18/24 1000   Post-Procedure Width (cm) 0.7 cm 03/18/24 1000   Post-Procedure Depth (cm) 0.1 cm 03/18/24 1000   Post-Procedure Surface Area (cm^2) 0.28 cm^2 03/18/24 1000   Post-Procedure Volume (cm^3) 0.028 cm^3 03/18/24 1000   Wound Healing % 99 03/18/24 1000   Wound Bed Granulation (%) 80 % 03/11/24 1400   Wound Bed Slough (%) 20 % 03/11/24 1400   Tunneling (cm) 0 cm 03/18/24 1000   Undermining (cm) 0 cm 03/18/24 1000    Wound Odor None 03/18/24 1000   Pulses Left;2+;DP 03/11/24 1400       Wound 02/19/24 Full Thickness Wound Leg Lower;Anterior;Distal Left (Active)   Wound Image    03/18/24 1000   Site Assessment Red;Granulation tissue;Hypergranulation 03/18/24 1000   Periwound Assessment Maceration;Hemosiderin Staining;Edema 03/18/24 1000   Margins Attached edges 03/18/24 1000   Drainage Amount Small 03/18/24 1000   Drainage Description Serosanguineous 03/18/24 1000   Treatments Cleansed;Topical Lidocaine;Provider debridement;Site care 03/18/24 1000   Wound Cleansing Hypochlorus Acid 03/18/24 1000   Periwound Protectant Skin Moisturizer 03/18/24 1000   Dressing Status Old drainage;Intact 03/18/24 1000   Dressing Changed Changed 03/18/24 1000   Dressing Cleansing/Solutions Normal Saline 03/18/24 1000   Dressing Options Triad Hydro;Adaptic;Soft Conforming Roll;Hypafix Tape;Tubigrip 03/18/24 1000   Dressing Change/Treatment Frequency Every 48 hrs, and As Needed 03/18/24 1000   Wound Team Following Weekly 03/18/24 1000   Non-staged Wound Description Full thickness 03/18/24 1000   Wound Length (cm) 3.4 cm 03/18/24 1000   Wound Width (cm) 1.8 cm 03/18/24 1000   Wound Depth (cm) 0.1 cm 03/18/24 1000   Wound Surface Area (cm^2) 6.12 cm^2 03/18/24 1000   Wound Volume (cm^3) 0.612 cm^3 03/18/24 1000   Post-Procedure Length (cm) 3.5 cm 03/18/24 1000   Post-Procedure Width (cm) 2 cm 03/18/24 1000   Post-Procedure Depth (cm) 0.2 cm 03/18/24 1000   Post-Procedure Surface Area (cm^2) 7 cm^2 03/18/24 1000   Post-Procedure Volume (cm^3) 1.4 cm^3 03/18/24 1000   Wound Healing % 90 03/18/24 1000   Wound Bed Granulation (%) 70 % 03/11/24 1400   Wound Bed Slough (%) 30 % 03/11/24 1400   Tunneling (cm) 0 cm 03/18/24 1000   Undermining (cm) 0 cm 03/18/24 1000   Wound Odor None 03/18/24 1000   Pulses Left;2+;DP 03/11/24 1400            PROCEDURE: Provider debridement.    2% topical Lidocaine applied prior to debridement with ~5 minute dwell time.  Conservative sharp wound debridement using curette to remove ~1 to 2cm2 nonviable tissue from **location** wound bed. Patient tolerated well; denied pain.

## 2024-03-18 NOTE — PROGRESS NOTES
Provider Encounter- Full Thickness wound    HISTORY OF PRESENT ILLNESS  Wound History:   START OF CARE IN CLINIC: 2/19/2024               REFERRING PROVIDER: Fredi Olsen M.D                 WOUND- Full Thickness Wound              LOCATION: LLE, anterior proximal                                  LLE, anterior distal              HISTORY: Patient underwent punch biopsy and Moh's procedure of lesions to his left lower leg on 11/16/2023, done at Dermatologist in [place], [place].  He was told that margins were clear.  Since his procedure, his wounds have failed to heal, wound beds have become hypertrophic.  He has had two courses of antibiotics, was instructed to treat hypergranulation tissue with acetic acid soaks (per Dermatology) and topical Mupirocin ointment.   He now lives in [place] and is reluctant to travel back and forth to [place].      Pertinent Medical History: Skin cancer, chronic pain,    TOBACCO USE: Never smoked use smokeless tobacco    Patient's problem list, allergies, and current medications reviewed and updated in Epic    Interval History:  2/27/2024 : Initial provider visit with OANH Dave, MARIE, RUTHN, CFADONIS.  Patient states he is feeling well overall, denies much pain from these wounds.   He does have quite a bit of periwound redness, especially in comparison to opposite leg.  He states he finished his last course of antibiotics a month ago, and feels they did not help decrease the redness.  Wound culture collected in clinic today, will follow results.   Will also request pathology report from patient's dermatologist.  Release of information form signed by patient in clinic today.      3/4/24: Clinic visit with Joslyn HUGO, MARIE, GEORGE, CFADONIS.  Pt denies fevers, chills, nausea, vomiting.  Both ulcers have decreased in size.  He has not picked up his prescription for Augmentin yet.  Have not received pathology from his dermatologist in [place].  RN to follow-up  with dermatology office.    3/18/2024:Clinic visit with Joslyn HUGO, PATRICIA-BC, CWON, CFCN.  Pt denies fevers, chills, nausea, vomiting.  Received pathology records from dermatology.  Left lower leg ulcers x 2 continue to decrease in size.  He has received his new dual layer compression socks.  Asked patient to bring into next visit.  He continues to wear double layer Tubigrip.  He does remove when sleeping at night.  There is increased edema around the wounds.  Recommend patient continue to wear double layer Tubigrip at all times.    REVIEW OF SYSTEMS:   Unchanged from previous wound clinic assessment on 3/4/24, except as noted in interval history above      PHYSICAL EXAMINATION:   /64   Pulse 80   Temp 36.4 °C (97.6 °F) (Temporal)   Resp 16   SpO2 95%     Physical Exam  Constitutional:       Appearance: He is normal weight.   Cardiovascular:      Rate and Rhythm: Normal rate.      Pulses: Normal pulses.      Comments: Strong DP and PT pulses, +2  Varicose veins bilateral lower extremities  Pulmonary:      Effort: Pulmonary effort is normal.   Musculoskeletal:      Right lower leg: Edema present.      Left lower leg: Edema present.      Comments:  +2 edema to bilateral lower extremities  Increased edema around wounds to left lower leg   Skin:     Comments:  left anterior lower leg proximal-ulcer has decreased in size, new epithelial tissue, minimal serosanguineous drainage, minimal slough, No periwound erythema, moderate edema    Left anterior distal leg ulcer: Full-thickness, new epithelial tissue, ulcer C-shaped on the lateral aspect, minimal slough, no periwound erythema, moderate edema    Hemosiderin staining, chronic edema, varicose veins, and skin changes-findings consistent with CVI   Neurological:      Mental Status: He is alert.       WOUND ASSESSMENT  Wound 02/19/24 Full Thickness Wound Leg Lower;Anterior;Proximal Left (Active)   Wound Image    03/18/24 1000   Site Assessment  Red;Early/partial granulation;Epithelialization 03/18/24 1000   Periwound Assessment Maceration;Hemosiderin Staining;Edema 03/18/24 1000   Margins Attached edges 03/18/24 1000   Drainage Amount Small 03/18/24 1000   Drainage Description Serosanguineous 03/18/24 1000   Treatments Cleansed;Topical Lidocaine;Provider debridement;Site care 03/18/24 1000   Wound Cleansing Foam Cleanser/Washcloth 03/18/24 1000   Periwound Protectant Skin Moisturizer 03/18/24 1000   Dressing Status Intact;Old drainage 02/19/24 1500   Dressing Changed Changed 03/18/24 1000   Dressing Cleansing/Solutions Normal Saline 03/18/24 1000   Dressing Options Triad Hydro;Adaptic;Soft Conforming Roll;Hypafix Tape;Tubigrip 03/18/24 1000   Dressing Change/Treatment Frequency Every 48 hrs, and As Needed 03/18/24 1000   Wound Team Following Weekly 03/18/24 1000   Non-staged Wound Description Full thickness 03/18/24 1000   Wound Length (cm) 0.3 cm 03/18/24 1000   Wound Width (cm) 0.5 cm 03/18/24 1000   Wound Depth (cm) 0.1 cm 03/18/24 1000   Wound Surface Area (cm^2) 0.15 cm^2 03/18/24 1000   Wound Volume (cm^3) 0.015 cm^3 03/18/24 1000   Post-Procedure Length (cm) 0.4 cm 03/18/24 1000   Post-Procedure Width (cm) 0.7 cm 03/18/24 1000   Post-Procedure Depth (cm) 0.1 cm 03/18/24 1000   Post-Procedure Surface Area (cm^2) 0.28 cm^2 03/18/24 1000   Post-Procedure Volume (cm^3) 0.028 cm^3 03/18/24 1000   Wound Healing % 99 03/18/24 1000   Wound Bed Granulation (%) 80 % 03/11/24 1400   Wound Bed Slough (%) 20 % 03/11/24 1400   Tunneling (cm) 0 cm 03/18/24 1000   Undermining (cm) 0 cm 03/18/24 1000   Wound Odor None 03/18/24 1000   Pulses Left;2+;DP 03/11/24 1400   Number of days: 28       Wound 02/19/24 Full Thickness Wound Leg Lower;Anterior;Distal Left (Active)   Wound Image    03/18/24 1000   Site Assessment Red;Granulation tissue;Hypergranulation 03/18/24 1000   Periwound Assessment Maceration;Hemosiderin Staining;Edema 03/18/24 1000   Margins Attached edges  03/18/24 1000   Drainage Amount Small 03/18/24 1000   Drainage Description Serosanguineous 03/18/24 1000   Treatments Cleansed;Topical Lidocaine;Provider debridement;Site care 03/18/24 1000   Wound Cleansing Hypochlorus Acid 03/18/24 1000   Periwound Protectant Skin Moisturizer 03/18/24 1000   Dressing Status Old drainage;Intact 03/18/24 1000   Dressing Changed Changed 03/18/24 1000   Dressing Cleansing/Solutions Normal Saline 03/18/24 1000   Dressing Options Triad Hydro;Adaptic;Soft Conforming Roll;Hypafix Tape;Tubigrip 03/18/24 1000   Dressing Change/Treatment Frequency Every 48 hrs, and As Needed 03/18/24 1000   Wound Team Following Weekly 03/18/24 1000   Non-staged Wound Description Full thickness 03/18/24 1000   Wound Length (cm) 3.4 cm 03/18/24 1000   Wound Width (cm) 1.8 cm 03/18/24 1000   Wound Depth (cm) 0.1 cm 03/18/24 1000   Wound Surface Area (cm^2) 6.12 cm^2 03/18/24 1000   Wound Volume (cm^3) 0.612 cm^3 03/18/24 1000   Post-Procedure Length (cm) 3.5 cm 03/18/24 1000   Post-Procedure Width (cm) 2 cm 03/18/24 1000   Post-Procedure Depth (cm) 0.2 cm 03/18/24 1000   Post-Procedure Surface Area (cm^2) 7 cm^2 03/18/24 1000   Post-Procedure Volume (cm^3) 1.4 cm^3 03/18/24 1000   Wound Healing % 90 03/18/24 1000   Wound Bed Granulation (%) 70 % 03/11/24 1400   Wound Bed Slough (%) 30 % 03/11/24 1400   Tunneling (cm) 0 cm 03/18/24 1000   Undermining (cm) 0 cm 03/18/24 1000   Wound Odor None 03/18/24 1000   Pulses Left;2+;DP 03/11/24 1400   Number of days: 28       PROCEDURE: Excisional debridement of left anterior lower leg wounds x 2  -2% viscous lidocaine applied topically to wound bed for approximately 5 minutes prior to debridement  -Curette used to debride wound beds and periwound calluses.  Excisional debridement was performed to remove devitalized tissue until healthy, bleeding tissue was visualized.   Entire surface of wounds,7.28 cm² debrided.  Tissue debrided into the subcutaneous layer.  Total area  "of periwound callus debrided with curette approximately 4 cm² from both wounds.  -Bleeding controlled with manual pressure.    -Wound care completed by wound RN, refer to flowsheet  -Patient tolerated the procedure well, without c/o pain or discomfort.       Pertinent Labs and Diagnostics:    Labs:     A1c: No results found for: \"HBA1C\"       IMAGING: None found in epic    VASCULAR STUDIES: None        LAST  WOUND CULTURE:  DATE :   Lab Results   Component Value Date/Time    CULTRSULT Light growth mixed enteric puma. (A) 02/27/2024 02:00 PM    CULTRSULT (A) 02/27/2024 02:00 PM     Staphylococcus aureus  Moderate growth  Methicillin sensitive by screening method           ASSESSMENT AND PLAN:     1. Open wound of left lower leg, subsequent encounter  Full-thickness wounds x 2 following Mohs procedure by dermatologist in [place].    3/18/2024: Ulcers have decreased in size significantly.  Raised tissue to distal wound has improved.  -Excisional debridement of wound in clinic today, medically necessary to promote wound healing.  -Patient to return to clinic weekly for assessment and debridement  -Patient to change dressing 2-3 times per week in between clinic visits.  He is not eligible for home health as he is still working  - Receiving wound care supplies through DME.  New order for wound care supplies sent.  -Discontinue Hydrofera Blue classic, adhering to new epithelial tissue.  Initiate Triad    Wound care: Triad, Adaptic, soft roll, double layer Tubigrip       2. History of skin cancer    3/18/2024: Patient's wounds are due to Mohs procedure performed in November 2023.  He does not recall what kind of cancer these were.  He was told he has clean margins to left lower leg ulcers x 2.  Pathology was requested from dermatology last visit.  -Received pathology from dermatologist in [place].  Reports reviewed.  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  Per Mohs report, clear margins were " obtained.      3.  Chronic venous insufficiency of lower extremity  4. Edema of both lower legs    3/18/2024: Bilateral lower extremity edema, hemosiderin staining, varicose veins and skin changes consistent with CVI.  Healing compromised by uncontrolled lower extremity edema  -Etiology of chronic venous insufficiency discussed with patient and clinic.  Patient would benefit from lifelong compression  -Does have personal compression socks that are several months old.  Compression sock with decreased elasticity, no longer supportive.  -Consider increasing to multilayer wraps if not improving  - Received dual layer compression stockings.  Asked that patient bring into his next appointment.  - Patient to wear his double layer Tubigrip at all time.  Currently removing at night.  He does have moderate edema surrounding the ulcers to left lower leg.      5.  Wound infection    3/18/2024: Left lower extremity erythema resolved.  Was prescribed Augmentin 3/1/2024 that he started on 3/4/2024 for positive wound culture of MSSA.        PATIENT EDUCATION  - Importance of adequate nutrition for wound healing  -Advised to go to ER for any increased redness, swelling, drainage, or odor, or if patient develops fever, chills, nausea or vomiting.       My total time spent caring for the patient on the day of the encounter was 20 minutes.   This does not include time spent on separately billable procedures/tests.      Please note that this note may have been created using voice recognition software. I have worked with technical experts from [employer] to optimize the interface.  I have made every reasonable attempt to correct obvious errors, but there may be errors of grammar and possibly content that I did not discover before finalizing the note.    N

## 2024-03-18 NOTE — PATIENT INSTRUCTIONS
-Keep your wound dressing clean, dry, and intact. Only change dressing if it's over saturated, soiled or falls off.     -Change your dressing if it becomes soiled, soaked, or falls off.    -Remove your compression stockings if you have severe pain, severe swelling, numbness, color change, or temperature change in your toes. If you need to remove your compression wrap, do so by unrolling it. Do not cut the compression wrap off to prevent cutting yourself on accident.    -Should you experience any significant changes in your wound(s), such as infection (redness, swelling, localized heat, increased pain, fever > 101 F, chills) or have any questions regarding your home care instructions, please contact the wound center at [phone]. If after hours, contact your primary care physician or go to the hospital emergency room.

## 2024-03-25 ENCOUNTER — NON-PROVIDER VISIT (OUTPATIENT)
Dept: WOUND CARE | Facility: MEDICAL CENTER | Age: 74
End: 2024-03-25
Attending: STUDENT IN AN ORGANIZED HEALTH CARE EDUCATION/TRAINING PROGRAM
Payer: MEDICARE

## 2024-03-25 PROCEDURE — 97597 DBRDMT OPN WND 1ST 20 CM/<: CPT

## 2024-03-25 NOTE — PROGRESS NOTES
2% viscous lidocaine applied topically to wound bed for approximately 10 minutes prior to debridement.  Conservative sharp wound debridement with curette to debride wound bed and periwound of non-viable tissue. Entire surface of wound, < 20 cm2 debrided. Refer to flow sheet for wound care details. Patient tolerated the procedure well.

## 2024-04-01 ENCOUNTER — OFFICE VISIT (OUTPATIENT)
Dept: WOUND CARE | Facility: MEDICAL CENTER | Age: 74
End: 2024-04-01
Payer: MEDICARE

## 2024-04-01 VITALS
SYSTOLIC BLOOD PRESSURE: 135 MMHG | HEART RATE: 69 BPM | RESPIRATION RATE: 16 BRPM | OXYGEN SATURATION: 95 % | DIASTOLIC BLOOD PRESSURE: 69 MMHG | TEMPERATURE: 97.3 F

## 2024-04-01 DIAGNOSIS — Z85.828 HISTORY OF SKIN CANCER: ICD-10-CM

## 2024-04-01 DIAGNOSIS — S81.802D OPEN WOUND OF LEFT LOWER LEG, SUBSEQUENT ENCOUNTER: ICD-10-CM

## 2024-04-01 DIAGNOSIS — R60.0 EDEMA OF BOTH LOWER LEGS: ICD-10-CM

## 2024-04-01 DIAGNOSIS — I87.2 CHRONIC VENOUS INSUFFICIENCY OF LOWER EXTREMITY: ICD-10-CM

## 2024-04-01 DIAGNOSIS — T14.8XXA WOUND INFECTION: ICD-10-CM

## 2024-04-01 DIAGNOSIS — L08.9 WOUND INFECTION: ICD-10-CM

## 2024-04-01 PROCEDURE — 11042 DBRDMT SUBQ TIS 1ST 20SQCM/<: CPT | Performed by: NURSE PRACTITIONER

## 2024-04-01 PROCEDURE — 11042 DBRDMT SUBQ TIS 1ST 20SQCM/<: CPT

## 2024-04-01 PROCEDURE — 3075F SYST BP GE 130 - 139MM HG: CPT | Performed by: NURSE PRACTITIONER

## 2024-04-01 PROCEDURE — 3078F DIAST BP <80 MM HG: CPT | Performed by: NURSE PRACTITIONER

## 2024-04-01 NOTE — PATIENT INSTRUCTIONS
Avoid prolonged standing or sitting without elevating your legs.  - Apply tubigrip to your legs ending 2 fingers below back of knee without wrinkles.   - Knee-high gradient compression to legs  -Only remove if temperature or sensation changes.   If compression needs to be removed, un-wrap it do not cut it off.     Should you experience any significant changes in your wound(s), such as infection (redness, swelling, localized heat, increased pain, fever > 101 F, chills) or have any questions regarding your home care instructions, please contact the wound center at [phone]. If after hours, contact your primary care physician or go to the hospital emergency room.   Keep dressing clean, dry and covered while bathing. Only change dressing if it becomes over saturated, soiled or falls off or every 48 hours.

## 2024-04-01 NOTE — PROGRESS NOTES
Provider Encounter- Full Thickness wound    HISTORY OF PRESENT ILLNESS  Wound History:   START OF CARE IN CLINIC: 2/19/2024               REFERRING PROVIDER: Fredi Olsen M.D                 WOUND- Full Thickness Wound              LOCATION: LLE, anterior proximal                                  LLE, anterior distal              HISTORY: Patient underwent punch biopsy and Moh's procedure of lesions to his left lower leg on 11/16/2023, done at Dermatologist in [place], [place].  He was told that margins were clear.  Since his procedure, his wounds have failed to heal, wound beds have become hypertrophic.  He has had two courses of antibiotics, was instructed to treat hypergranulation tissue with acetic acid soaks (per Dermatology) and topical Mupirocin ointment.   He now lives in [place] and is reluctant to travel back and forth to [place].      Pertinent Medical History: Skin cancer, chronic pain,    TOBACCO USE: Never smoked use smokeless tobacco    Patient's problem list, allergies, and current medications reviewed and updated in Epic    Interval History:  2/27/2024 : Initial provider visit with OANH Dave, MARIE, RUTHN, CFADONIS.  Patient states he is feeling well overall, denies much pain from these wounds.   He does have quite a bit of periwound redness, especially in comparison to opposite leg.  He states he finished his last course of antibiotics a month ago, and feels they did not help decrease the redness.  Wound culture collected in clinic today, will follow results.   Will also request pathology report from patient's dermatologist.  Release of information form signed by patient in clinic today.      3/4/24: Clinic visit with Joslyn HUGO, MARIE, GEORGE, CFADONIS.  Pt denies fevers, chills, nausea, vomiting.  Both ulcers have decreased in size.  He has not picked up his prescription for Augmentin yet.  Have not received pathology from his dermatologist in [place].  RN to follow-up  with dermatology office.    3/18/2024:Clinic visit with MARIE Siu, GEORGE, ELBA.  Pt denies fevers, chills, nausea, vomiting.  Received pathology records from dermatology.  Left lower leg ulcers x 2 continue to decrease in size.  He has received his new dual layer compression socks.  Asked patient to bring into next visit.  He continues to wear double layer Tubigrip.  He does remove when sleeping at night.  There is increased edema around the wounds.  Recommend patient continue to wear double layer Tubigrip at all times.    4/1/24: Clinic visit with MARIE Siu, GEORGE, ELBA.  Pt denies fevers, chills, nausea, vomiting.  Proximal ulcer resolved.  Distal ulcer close to resolution new epithelium to distal aspect.      REVIEW OF SYSTEMS:   Unchanged from previous wound clinic assessment on 3/18/24, except as noted in interval history above      PHYSICAL EXAMINATION:   /69   Pulse 69   Temp 36.3 °C (97.3 °F) (Temporal)   Resp 16   SpO2 95%     Physical Exam  Constitutional:       Appearance: He is normal weight.   Cardiovascular:      Rate and Rhythm: Normal rate.      Pulses: Normal pulses.      Comments: Strong DP and PT pulses, +2  Varicose veins bilateral lower extremities  Pulmonary:      Effort: Pulmonary effort is normal.   Musculoskeletal:      Right lower leg: Edema present.      Left lower leg: Edema present.      Comments:  +2 edema to bilateral lower extremities  edema remains +2 to distal periwound   Skin:     Comments:  left anterior lower leg proximal-resolved  Left anterior distal leg ulcer: Full-thickness, new to distal aspect with thin crust, lifting at edges.  No evidence of infection, increased red granular tissue to proximal aspect of distal wound, minimal serosanguineous drainage    Hemosiderin staining, chronic edema, varicose veins, and skin changes-findings consistent with CVI   Neurological:      Mental Status: He is alert.         WOUND  ASSESSMENT  Wound 02/19/24 Full Thickness Wound Leg Lower;Anterior;Distal Left (Active)   Wound Image    04/01/24 1000   Site Assessment Red;Granulation tissue 04/01/24 1000   Periwound Assessment Scar tissue 04/01/24 1000   Margins Attached edges 04/01/24 1000   Drainage Amount Scant 04/01/24 1000   Drainage Description Serous 04/01/24 1000   Treatments Cleansed;Topical Lidocaine;Provider debridement 04/01/24 1000   Wound Cleansing Hypochlorus Acid 04/01/24 1000   Periwound Protectant Skin Moisturizer 04/01/24 1000   Dressing Status Old drainage 04/01/24 1000   Dressing Changed Changed 04/01/24 1000   Dressing Cleansing/Solutions Not Applicable 04/01/24 1000   Dressing Options Triad Hydro;Adaptic;Other (Comments);Agnes;Hypafix Tape 04/01/24 1000   Dressing Change/Treatment Frequency Every 48 hrs, and As Needed 04/01/24 1000   Wound Team Following Weekly 04/01/24 1000   Non-staged Wound Description Full thickness 04/01/24 1000   Wound Length (cm) 0.4 cm 04/01/24 1000   Wound Width (cm) 0.4 cm 04/01/24 1000   Wound Depth (cm) 0.1 cm 04/01/24 1000   Wound Surface Area (cm^2) 0.16 cm^2 04/01/24 1000   Wound Volume (cm^3) 0.016 cm^3 04/01/24 1000   Post-Procedure Length (cm) 0.4 cm 04/01/24 1000   Post-Procedure Width (cm) 0.5 cm 04/01/24 1000   Post-Procedure Depth (cm) 0.1 cm 04/01/24 1000   Post-Procedure Surface Area (cm^2) 0.2 cm^2 04/01/24 1000   Post-Procedure Volume (cm^3) 0.02 cm^3 04/01/24 1000   Wound Healing % 100 04/01/24 1000   Wound Bed Granulation (%) 70 % 03/11/24 1400   Wound Bed Slough (%) 30 % 03/11/24 1400   Tunneling (cm) 0 cm 04/01/24 1000   Undermining (cm) 0 cm 04/01/24 1000   Wound Odor None 04/01/24 1000   Pulses Left;2+;DP 03/11/24 1400   Number of days: 42       PROCEDURE: Excisional debridement of left distal anterior lower leg wound  -2% viscous lidocaine applied topically to wound bed for approximately 5 minutes prior to debridement  -Curette used to debride wound bed.  Scissors and  "forceps used to trim crust.  Excisional debridement was performed to remove devitalized tissue until healthy, bleeding tissue was visualized.   Entire surface of wounds, 0.2cm² debrided.  Tissue debrided into the subcutaneous layer.  Total area of crust debrided approximately 1 cm².  -Bleeding controlled with manual pressure.    -Wound care completed by wound RN, refer to flowsheet  -Patient tolerated the procedure well, without c/o pain or discomfort.       Pertinent Labs and Diagnostics:    Labs:     A1c: No results found for: \"HBA1C\"       IMAGING: None found in epic    VASCULAR STUDIES: None        LAST  WOUND CULTURE:  DATE :   Lab Results   Component Value Date/Time    CULTRSULT Light growth mixed enteric puma. (A) 02/27/2024 02:00 PM    CULTRSULT (A) 02/27/2024 02:00 PM     Staphylococcus aureus  Moderate growth  Methicillin sensitive by screening method           ASSESSMENT AND PLAN:     1. Open wound of left lower leg, subsequent encounter  Full-thickness wounds x 2 following Mohs procedure by dermatologist in [place].    4/1/2024: Proximal ulcer resolved.  Distal ulcer close to resolution.  New epithelium distal aspect.  -Excisional debridement of wound in clinic today, medically necessary to promote wound healing.  -Patient to return to clinic weekly for assessment and debridement  -Patient to change dressing 2-3 times per week in between clinic visits.  He is not eligible for home health as he is still working  - Receiving wound care supplies [facility]  New order for wound care supplies sent.  - Continue Triad    Wound care: Triad, Adaptic, soft roll, personal compression sock      2. History of skin cancer    4/1/2024: Patient's wounds are due to Mohs procedure performed in November 2023.  He does not recall what kind of cancer these were.  He was told he has clean margins to left lower leg ulcers x 2.  Pathology was requested from dermatology last visit.  -Received pathology from dermatologist " in [place].  Reports reviewed.  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  Per Mohs report, clear margins were obtained.      3.  Chronic venous insufficiency of lower extremity  4. Edema of both lower legs    /1/2024: Bilateral lower extremity edema, hemosiderin staining, varicose veins and skin changes consistent with CVI.  Healing compromised by uncontrolled lower extremity edema  -Etiology of chronic venous insufficiency discussed with patient and clinic.  Patient would benefit from lifelong compression  -Continue personal compression sock.  He does remove at nighttime.  - Due to periwound edema to distal wound, recommend patient wear Tubigrip at night to continue with compression at all times.      5.  Wound infection    4/1/2024: Left lower extremity erythema resolved.  Completed Augmentin positive wound culture of MSSA.        PATIENT EDUCATION  - Importance of adequate nutrition for wound healing  -Advised to go to ER for any increased redness, swelling, drainage, or odor, or if patient develops fever, chills, nausea or vomiting.             Please note that this note may have been created using voice recognition software. I have worked with technical experts from [facility] to optimize the interface.  I have made every reasonable attempt to correct obvious errors, but there may be errors of grammar and possibly content that I did not discover before finalizing the note.    N

## 2024-04-01 NOTE — PROGRESS NOTES
"         Advanced Wound Care   [facility]   [address]   [address]   [place] [id number]   [phone] Fax: [phone]    Duration of Supply Order: 90 Days  [facility] to Dispense as written.   Wound 02/19/24 Full Thickness Wound Leg Lower;Anterior;Distal Left (Active)   Wound Image    04/01/24 1000   Site Assessment Red;Granulation tissue 04/01/24 1000   Periwound Assessment Scar tissue 04/01/24 1000   Margins Attached edges 04/01/24 1000   Drainage Amount Scant 04/01/24 1000   Drainage Description Serous 04/01/24 1000   Treatments Cleansed;Topical Lidocaine;Provider debridement 04/01/24 1000   Wound Cleansing Hypochlorus Acid 04/01/24 1000   Periwound Protectant Skin Moisturizer 04/01/24 1000   Dressing Status Old drainage 04/01/24 1000   Dressing Changed Changed 04/01/24 1000   Dressing Cleansing/Solutions Not Applicable 04/01/24 1000   Dressing Options Triad Hydro;Adaptic;4\" Agnes stretch roll gauze;Hypafix Tape 04/01/24 1000   Dressing Change/Treatment Frequency Every 48 hrs, and As Needed 04/01/24 1000   Wound Team Following Weekly 04/01/24 1000   Non-staged Wound Description Full thickness 04/01/24 1000   Wound Length (cm) 0.4 cm 04/01/24 1000   Wound Width (cm) 0.4 cm 04/01/24 1000   Wound Depth (cm) 0.1 cm 04/01/24 1000   Wound Surface Area (cm^2) 0.16 cm^2 04/01/24 1000   Wound Volume (cm^3) 0.016 cm^3 04/01/24 1000   Post-Procedure Length (cm) 0.4 cm 04/01/24 1000   Post-Procedure Width (cm) 0.5 cm 04/01/24 1000   Post-Procedure Depth (cm) 0.1 cm 04/01/24 1000   Post-Procedure Surface Area (cm^2) 0.2 cm^2 04/01/24 1000   Post-Procedure Volume (cm^3) 0.02 cm^3 04/01/24 1000   Wound Healing % 100 04/01/24 1000   Wound Bed Granulation (%) 70 % 03/11/24 1400   Wound Bed Slough (%) 30 % 03/11/24 1400   Tunneling (cm) 0 cm 04/01/24 1000   Undermining (cm) 0 cm 04/01/24 1000   Wound Odor None 04/01/24 1000   Pulses Left;2+;DP 03/11/24 1400     I agree with current plan of " care.    SIGNATURE:_______________________________________ DATE:________________    PROVIDER NAME:__________________________________

## 2024-04-08 ENCOUNTER — NON-PROVIDER VISIT (OUTPATIENT)
Dept: WOUND CARE | Facility: MEDICAL CENTER | Age: 74
End: 2024-04-08
Payer: MEDICARE

## 2024-04-08 PROCEDURE — 97602 WOUND(S) CARE NON-SELECTIVE: CPT

## 2024-04-08 NOTE — PATIENT INSTRUCTIONS
After Visit Summary Wound Care Instructions    Nutrition - Patient instructed increased protein diet unless contraindicated in renal failure (meat, eggs, fish, yogurt, cottage cheese, beans), use of multivitamin with minerals and Arginaid supplementation (check if ok with Primary Care Provider first).    Infection -  instructed signs and symptoms of infection, increased redness and swelling, localized heat over wound and surrounding area/fever/chills/nausea and vomiting, when to call doctor or go to Emergency Room.     Dressing Changes - Instructed patient rationale for wound care products. Instructed to keep dressings clean and dry, shower on clinic days right before coming in. Change your dressing if it becomes soiled, soaked, or falls off.    Discharge Instructions - discharge today as wound is resolved; new skin tissue over wound area will be fragile for a few days, bathe and dry area gently, only ever regains a maximum of 80% of the tensile strength of the surrounding skin, remodeling of scar can continue for 6 months to a year. Contact Primary Care Provider for a referral back here if any problems with area opening and draining again.    Questions - should you have any questions regarding your home care instructions, please contact the wound center at [phone]. If after hours, contact your primary care physician or go to the hospital emergency room.

## 2024-04-08 NOTE — PROGRESS NOTES
Non-selective debridement using dry gauze and normal saline to remove biofim from wound bed.  [place] is leaving the country [place] and prefers to be discharged from the clinic.

## 2024-05-01 ENCOUNTER — APPOINTMENT (RX ONLY)
Dept: URBAN - METROPOLITAN AREA CLINIC 4 | Facility: CLINIC | Age: 74
Setting detail: DERMATOLOGY
End: 2024-05-01

## 2024-05-01 DIAGNOSIS — L57.0 ACTINIC KERATOSIS: ICD-10-CM

## 2024-05-01 DIAGNOSIS — I87.2 VENOUS INSUFFICIENCY (CHRONIC) (PERIPHERAL): ICD-10-CM

## 2024-05-01 DIAGNOSIS — Z71.89 OTHER SPECIFIED COUNSELING: ICD-10-CM

## 2024-05-01 PROBLEM — D48.5 NEOPLASM OF UNCERTAIN BEHAVIOR OF SKIN: Status: ACTIVE | Noted: 2024-05-01

## 2024-05-01 PROCEDURE — ? SUNSCREEN RECOMMENDATIONS

## 2024-05-01 PROCEDURE — ? COUNSELING

## 2024-05-01 PROCEDURE — ? DEFER

## 2024-05-01 PROCEDURE — ? PRESCRIPTION

## 2024-05-01 PROCEDURE — ? ADDITIONAL NOTES

## 2024-05-01 PROCEDURE — 99214 OFFICE O/P EST MOD 30 MIN: CPT

## 2024-05-01 RX ORDER — TRIAMCINOLONE ACETONIDE 1 MG/G
CREAM TOPICAL BID
Qty: 60 | Refills: 0 | Status: ERX

## 2024-05-01 RX ORDER — IMIQUIMOD 12.5 MG/.25G
1 CREAM TOPICAL QHS
Qty: 24 | Refills: 2 | Status: ERX | COMMUNITY
Start: 2024-05-01

## 2024-05-01 RX ADMIN — IMIQUIMOD 1: 12.5 CREAM TOPICAL at 00:00

## 2024-05-01 ASSESSMENT — LOCATION SIMPLE DESCRIPTION DERM
LOCATION SIMPLE: RIGHT HAND
LOCATION SIMPLE: RIGHT PRETIBIAL REGION
LOCATION SIMPLE: LEFT HAND
LOCATION SIMPLE: LEFT PRETIBIAL REGION
LOCATION SIMPLE: NOSE

## 2024-05-01 ASSESSMENT — LOCATION ZONE DERM
LOCATION ZONE: NOSE
LOCATION ZONE: LEG
LOCATION ZONE: HAND

## 2024-05-01 ASSESSMENT — LOCATION DETAILED DESCRIPTION DERM
LOCATION DETAILED: LEFT PROXIMAL PRETIBIAL REGION
LOCATION DETAILED: LEFT RADIAL DORSAL HAND
LOCATION DETAILED: RIGHT ULNAR DORSAL HAND
LOCATION DETAILED: NASAL DORSUM
LOCATION DETAILED: RIGHT MEDIAL DISTAL PRETIBIAL REGION

## 2024-05-22 NOTE — PROCEDURE: ADDITIONAL NOTES
Spoke to patient in reference to Hematology referral from Liz Valdez PA-C. Pt denies CP, SOB, dizziness, and blurred vision; instructed to report to nearest ER if s/s should appear. Pt v/u. Appointment scheduled per patient's request next available next available.  Appointment notice via pt portal.   
Detail Level: Simple
Additional Notes: Due to # of BCC’S, will perform a trial of Aldara on an  area qd for 6 weeks to see if this is an effective/viable option. We discussed the risks and benefits with treating without prior biopsy of these areas to confirm their nature.
Render Risk Assessment In Note?: no
Soft, non-tender, no hepatosplenomegaly, normal bowel sounds

## 2024-05-30 ENCOUNTER — TELEPHONE (OUTPATIENT)
Dept: WOUND CARE | Facility: MEDICAL CENTER | Age: 74
End: 2024-05-30

## 2024-06-03 ENCOUNTER — OFFICE VISIT (OUTPATIENT)
Dept: URGENT CARE | Facility: CLINIC | Age: 74
End: 2024-06-03
Payer: MEDICARE

## 2024-06-03 VITALS
RESPIRATION RATE: 14 BRPM | HEIGHT: 70 IN | TEMPERATURE: 98.1 F | BODY MASS INDEX: 23.34 KG/M2 | SYSTOLIC BLOOD PRESSURE: 128 MMHG | HEART RATE: 76 BPM | DIASTOLIC BLOOD PRESSURE: 50 MMHG | WEIGHT: 163 LBS | OXYGEN SATURATION: 92 %

## 2024-06-03 DIAGNOSIS — I83.013 VENOUS STASIS ULCER OF RIGHT ANKLE WITH FAT LAYER EXPOSED WITH VARICOSE VEINS (HCC): Primary | ICD-10-CM

## 2024-06-03 DIAGNOSIS — L97.312 VENOUS STASIS ULCER OF RIGHT ANKLE WITH FAT LAYER EXPOSED WITH VARICOSE VEINS (HCC): Primary | ICD-10-CM

## 2024-06-03 PROCEDURE — 3078F DIAST BP <80 MM HG: CPT

## 2024-06-03 PROCEDURE — 99212 OFFICE O/P EST SF 10 MIN: CPT

## 2024-06-03 PROCEDURE — 3074F SYST BP LT 130 MM HG: CPT

## 2024-06-03 ASSESSMENT — FIBROSIS 4 INDEX: FIB4 SCORE: 1.84

## 2024-06-03 NOTE — PROGRESS NOTES
Subjective:   Zachary Moore is a 74 y.o. male who presents for Ankle Injury (Skin is leaking on RT ankle x7 days. Requesting referral for Advanced Wound care. Had same issue on LT leg. )          I introduced myself to the patient and informed them that I am a Family Nurse Practitioner.    HPI: Cuauhtemoc is a 74-year-old male comes in today c/o small open wound that is constantly draining on right ankle. Onset was approximately 1 week ago.  Patient describes symptoms as constant. They describe the pain as none. Aggravating factors include swelling gets worse towards the end of the day. Relieving factors include elevating the leg. Treatments tried at home include he has been using some Hydrofera Blue that he has had at home from a previous ulceration, as well as wearing compression stockings. They describe their symptoms as moderate.  He denies any fever, chills, nausea or vomiting, lethargy.  He has been seen by advanced wound care in the past for ulcerations to his left lower extremity, and has history of chronic venous insufficiency of lower extremities.  He also has a history of skin cancer to his left lower extremity in the past did undergo punch biopsy and Mohs procedure by dermatology, has been told that the margins are clear.  Wounds on left lower extremity have since healed.      Review of Systems   Constitutional:  Negative for chills, fever and malaise/fatigue.   HENT:  Negative for congestion, ear pain and sore throat.    Eyes:  Negative for pain, discharge and redness.   Respiratory:  Negative for cough, sputum production, shortness of breath, wheezing and stridor.    Cardiovascular:  Negative for chest pain and palpitations.   Gastrointestinal:  Negative for abdominal pain, diarrhea, nausea and vomiting.   Genitourinary:  Negative for dysuria.   Musculoskeletal:  Negative for myalgias.   Skin:  Negative for rash.        Positive for open draining wound to right ankle   Neurological:  Negative for  "dizziness and headaches.       Medications: amLODIPine Tabs  benzocaine-menthol Lozg  buprenorphine Subl  hydrALAZINE Tabs  mag hydrox-al hydrox-simeth Susp  MULTI VITAMIN DAILY PO  omeprazole  sodium bicarbonate Tabs  sore throat spray Liqd  tizanidine Tabs     Allergies: Patient has no known allergies.    Problem List: does not have any pertinent problems on file.    Surgical History:  Past Surgical History:   Procedure Laterality Date    FUSION, SPINE, LUMBAR, PLIF  1/28/2014    Performed by Elder Chopra M.D. at SURGERY [facility]    KNEE ARTHROPLASTY TOTAL  2013    Right knee-Dr. Boggs    KNEE ARTHROPLASTY TOTAL  2009    left knee--Dr. Snider    SHOULDER SURGERY      right-sided       Past Social Hx:   reports that he has never smoked. He has never used smokeless tobacco. He reports current alcohol use. He reports that he does not use drugs.     Past Family Hx:   family history is not on file.     Problem list, medications, and allergies reviewed by myself today in Epic.   I have documented what I find to be significant in regards to past medical, social, family and surgical history  in my HPI or under PMH/PSH/FH review section, otherwise it is noncontributory     Objective:     /50 (BP Location: Left arm, Patient Position: Sitting, BP Cuff Size: Adult)   Pulse 76   Temp 36.7 °C (98.1 °F) (Temporal)   Resp 14   Ht 1.778 m (5' 10\") Comment: Pt reported  Wt 73.9 kg (163 lb)   SpO2 92%   BMI 23.39 kg/m²     During this visit, appropriate PPE was worn, and hand hygiene was performed.    Physical Exam  Vitals reviewed.   Constitutional:       General: He is not in acute distress.     Appearance: Normal appearance. He is not ill-appearing or toxic-appearing.   HENT:      Head: Normocephalic and atraumatic.      Right Ear: External ear normal.      Left Ear: External ear normal.      Nose: No congestion or rhinorrhea.   Eyes:      General: No scleral icterus.        Right eye: No discharge.       "   Left eye: No discharge.      Extraocular Movements: Extraocular movements intact.      Conjunctiva/sclera: Conjunctivae normal.   Cardiovascular:      Rate and Rhythm: Normal rate.   Pulmonary:      Effort: Pulmonary effort is normal.   Abdominal:      General: There is no distension.      Palpations: Abdomen is soft.   Musculoskeletal:         General: Normal range of motion.      Cervical back: Normal range of motion. No rigidity.      Right lower leg: No edema.      Left lower leg: No edema.   Skin:     General: Skin is warm and dry.      Capillary Refill: Capillary refill takes 2 to 3 seconds.      Coloration: Skin is not jaundiced.      Comments: There are multiple small pinpoint scattered open areas of right medial ankle area, the largest of which measures 0.8 x 0.3 x 0.2 with viable and non viable tissue.  Suspect incompetent  vein in this vicinity as a contributory factor to ulceration.  There are varicosities apparent of the lower extremity, as well as hemosiderin staining and scar tissue evidence of previous ulcerations.  There is some nonpitting edema of the lower extremity.  NVID with pedal pulses PT/DP 2+, cap refill to distal digits less than 3 seconds, and sensation is intact to hard and soft.  There are no signs of associated cellulitis presently   Neurological:      General: No focal deficit present.      Mental Status: He is alert and oriented to person, place, and time. Mental status is at baseline.      Gait: Gait normal.   Psychiatric:         Mood and Affect: Mood normal.         Behavior: Behavior normal.         Thought Content: Thought content normal.         Judgment: Judgment normal.         Assessment/Plan:     Diagnosis and associated orders:     1. Venous stasis ulcer of right ankle with fat layer exposed with varicose veins (HCC)           Comments/MDM:     1. Venous stasis ulcer of right ankle with fat layer exposed with varicose veins (HCC)  Discussed with patient Dx,   DDx, management options (risks,benefits, and alternatives to planned treatment), natural progression.   Supportive care measures were discussed.   Questions were encouraged and answered.   Written information was provided and I did go over this with the patient in clinic today.  Wound care was performed in clinic today, area was cleansed with normal saline, dried with gauze, Xeroform applied, DSD, gauze roll and Ace wrap for compression from just behind toes to left below-knee.  Patient states he usually wears compression stockings, has these at home did not wear them into clinic today.  Instructed him to continue to wear compression, don the stockings first thing in the morning, may remove them at bedtime if desired  Discussed with patient I have made an urgent referral to advanced wound care for follow-up  Instructed him to continue with wound care at home with daily and as needed dressing changes.  He states he understands states he does have some wound care supplies at home from his previous episode at the wound clinic and is familiar with dressing changes, infection control, handwashing  Instructed patient regarding red flags and to return to urgent care prn if new or worsening sx or if there is no improvement in condition prn.    Advised the patient to follow-up with the primary care physician for recheck, reevaluation, and consideration of further management.  I did instruct patient regarding medications prescribed, purpose, side effects, precautions.  Instructed patient to get a pharmacy consult when picking up any prescribed medications.  Strict ER precautions discussed for any redness, swelling, warmth to touch, pain, especially in the setting of fever, chills, nausea or vomiting   Patient states they have good understanding and they are agreeable with the plan of care.     - Referral to Wound Clinic         Pt is clinically stable at today's acute urgent care visit. Vital signs are normal and reassuring.   No acute distress noted. Appropriate for outpatient management at this time.        I personally reviewed prior external notes and test results pertinent to today's visit.  I have independently reviewed and interpreted all diagnostics ordered during this urgent care acute visit.        Please note that this dictation was created using voice recognition software. I have made a reasonable attempt to correct obvious errors, but I expect that there are errors of grammar and possibly content that I did not discover before finalizing the note.    This note was electronically signed by Amilcar HUGO, MARIE, SEEMA, ELBA

## 2024-06-06 ASSESSMENT — ENCOUNTER SYMPTOMS
EYE REDNESS: 0
MYALGIAS: 0
FEVER: 0
SPUTUM PRODUCTION: 0
WHEEZING: 0
STRIDOR: 0
SORE THROAT: 0
DIZZINESS: 0
ABDOMINAL PAIN: 0
NAUSEA: 0
EYE DISCHARGE: 0
HEADACHES: 0
VOMITING: 0
DIARRHEA: 0
EYE PAIN: 0
COUGH: 0
PALPITATIONS: 0
CHILLS: 0
SHORTNESS OF BREATH: 0

## 2024-06-07 ENCOUNTER — NON-PROVIDER VISIT (OUTPATIENT)
Dept: WOUND CARE | Facility: MEDICAL CENTER | Age: 74
End: 2024-06-07
Payer: MEDICARE

## 2024-06-07 PROCEDURE — 99211 OFF/OP EST MAY X REQ PHY/QHP: CPT

## 2024-06-07 PROCEDURE — 97602 WOUND(S) CARE NON-SELECTIVE: CPT

## 2024-06-07 RX ORDER — CELECOXIB 200 MG/1
200 CAPSULE ORAL
COMMUNITY

## 2024-06-07 RX ORDER — GABAPENTIN 600 MG/1
600 TABLET ORAL 4 TIMES DAILY
COMMUNITY

## 2024-06-07 RX ORDER — DULOXETIN HYDROCHLORIDE 30 MG/1
30 CAPSULE, DELAYED RELEASE ORAL 2 TIMES DAILY
COMMUNITY

## 2024-06-07 NOTE — PATIENT INSTRUCTIONS
-Keep your wound dressing clean, dry, and intact. Only change dressing if it's over saturated, soiled or falls off.     -Remove your compression wrap if you have severe pain, severe swelling, numbness, color change, or temperature change in your toes. If you need to remove your compression wrap, do so by unrolling it. Do not cut the compression wrap off to prevent cutting yourself on accident.    -Should you experience any significant changes in your wound(s), such as infection (redness, swelling, localized heat, increased pain, fever > 101 F, chills) or have any questions regarding your home care instructions, please contact the wound center at [phone]. If after hours, contact your primary care physician or go to the hospital emergency room.

## 2024-06-07 NOTE — CERTIFICATION
Non Provider Encounter- Lower Extremity Ulcer    HISTORY OF PRESENT ILLNESS  Wound History:    START OF CARE IN CLINIC: 6/7/24    REFERRING PROVIDER: ANSHUL Snell   WOUND ETIOLOGY: Venous   LOCATION: Right Medial Ankle   HISTORY: Patient is a pleasant 74 y.o. male who presents to clinic for right medial ankle wound that has been present for approximately a month and a half, however became worse the end of May. Patient stated that it just started weeping constantly, and stated that he has been wearing his compression socks off and on since he was discharged from the clinic. Patient has been applying xeroform gauze and triad paste with a super absorbant dressing over the wound and has been wearing compression socks continually. Patient was seen by urgent care on 6/3/24 where he received a referral to come here.    Pertinent Medical History: Skin cancer, chronic pain   ETIOLOGY HISTORY: Diagnosed never. Vascular Surgeon: None. Previous vascular procedures None. Compression - socks. Varicose Veins Yes        TOBACCO USE: Never smoker      MOST RECENT VASCULAR STUDIES: None      Patient allergies and medications reviewed via Epic.     WOUND ASSESSMENT        Wound 06/07/24 Venous Ulcer Ankle Medial Right (Active)   Wound Image   06/07/24 1300   Site Assessment Pink;Yellow 06/07/24 1300   Periwound Assessment Denuded;Edema;Hemosiderin staining 06/07/24 1300   Margins Attached edges 06/07/24 1300   Drainage Amount Moderate 06/07/24 1300   Drainage Description Serous 06/07/24 1300   Treatments Cleansed;Nonselective debridement 06/07/24 1300   Wound Cleansing Foam Cleanser/Washcloth 06/07/24 1300   Periwound Protectant Barrier Paste 06/07/24 1300   Wound Team Following Weekly 06/07/24 1300   Non-staged Wound Description Full thickness 06/07/24 1300   Wound Length (cm) 1.7 cm 06/07/24 1300   Wound Width (cm) 1.9 cm 06/07/24 1300   Wound Depth (cm) 0.1 cm 06/07/24 1300   Wound Surface Area (cm^2) 3.23 cm^2 06/07/24  1300   Wound Volume (cm^3) 0.323 cm^3 06/07/24 1300   Tunneling (cm) 0 cm 06/07/24 1300   Undermining (cm) 0 cm 06/07/24 1300   Wound Odor None 06/07/24 1300   Pulses Right;2+;DP;Doppler 06/07/24 1300   Exposed Structures None 06/07/24 1300       Procedures:      Non-selective debridement to wound and periwound using puracyn spray and gauze to remove nonviable tissue and biofilm.    -Refer to flowsheet for wound care details.       PATIENT EDUCATION  - Etiology of venous ulceration discussed with patient  - Importance of managing edema for healing of ulcer, and for prevention of new ulcer development  -Need for lifelong compression of lower legs   -Elevate legs above the level of the heart periodically throughout the day.  - Importance of adequate nutrition for wound healing  -Increase protein intake   -Advised to go to ER for any increased redness, swelling, drainage or odor, or if patient develops fever, chills, nausea or vomiting.    Patient may benefit from venous ultrasounds; requested patient be scheduled with a provider in the near future.

## 2024-06-13 ENCOUNTER — HOSPITAL ENCOUNTER (OUTPATIENT)
Facility: MEDICAL CENTER | Age: 74
End: 2024-06-13
Attending: PHYSICIAN ASSISTANT
Payer: MEDICARE

## 2024-06-13 ENCOUNTER — NON-PROVIDER VISIT (OUTPATIENT)
Dept: WOUND CARE | Facility: MEDICAL CENTER | Age: 74
End: 2024-06-13
Payer: MEDICARE

## 2024-06-13 LAB
APPEARANCE FLD: NORMAL
BODY FLD TYPE: NORMAL
COLOR FLD: NORMAL
CRYSTALS FLD MICRO: NORMAL
GRAM STN SPEC: NORMAL
LYMPHOCYTES NFR FLD: 3 %
MONOS+MACROS NFR FLD MANUAL: 5 %
NEUTROPHILS NFR FLD: 92 %
NUC CELL # FLD: NORMAL CELLS/UL
RBC # FLD: NORMAL CELLS/UL
SIGNIFICANT IND 70042: NORMAL
SITE SITE: NORMAL
SOURCE SOURCE: NORMAL

## 2024-06-13 PROCEDURE — 89051 BODY FLUID CELL COUNT: CPT

## 2024-06-13 PROCEDURE — 89060 EXAM SYNOVIAL FLUID CRYSTALS: CPT

## 2024-06-13 PROCEDURE — 87205 SMEAR GRAM STAIN: CPT

## 2024-06-13 PROCEDURE — 87077 CULTURE AEROBIC IDENTIFY: CPT

## 2024-06-13 PROCEDURE — 87186 SC STD MICRODIL/AGAR DIL: CPT

## 2024-06-13 PROCEDURE — 87075 CULTR BACTERIA EXCEPT BLOOD: CPT

## 2024-06-13 PROCEDURE — 87147 CULTURE TYPE IMMUNOLOGIC: CPT

## 2024-06-13 PROCEDURE — 87070 CULTURE OTHR SPECIMN AEROBIC: CPT

## 2024-06-14 ENCOUNTER — APPOINTMENT (OUTPATIENT)
Dept: RADIOLOGY | Facility: MEDICAL CENTER | Age: 74
DRG: 486 | End: 2024-06-14
Attending: EMERGENCY MEDICINE
Payer: MEDICARE

## 2024-06-14 ENCOUNTER — HOSPITAL ENCOUNTER (INPATIENT)
Facility: MEDICAL CENTER | Age: 74
LOS: 7 days | DRG: 486 | End: 2024-06-21
Attending: EMERGENCY MEDICINE | Admitting: HOSPITALIST
Payer: MEDICARE

## 2024-06-14 DIAGNOSIS — R78.81 POSITIVE BLOOD CULTURE: ICD-10-CM

## 2024-06-14 DIAGNOSIS — M00.062 STAPHYLOCOCCAL ARTHRITIS OF LEFT KNEE (HCC): ICD-10-CM

## 2024-06-14 DIAGNOSIS — M00.9 PYOGENIC ARTHRITIS OF LEFT KNEE JOINT, DUE TO UNSPECIFIED ORGANISM (HCC): ICD-10-CM

## 2024-06-14 PROBLEM — E87.6 HYPOKALEMIA: Status: ACTIVE | Noted: 2024-06-14

## 2024-06-14 PROBLEM — N18.31 STAGE 3A CHRONIC KIDNEY DISEASE: Status: ACTIVE | Noted: 2024-06-14

## 2024-06-14 PROBLEM — Z66 DNR (DO NOT RESUSCITATE): Status: ACTIVE | Noted: 2024-06-14

## 2024-06-14 PROBLEM — D72.829 LEUKOCYTOSIS: Status: ACTIVE | Noted: 2024-06-14

## 2024-06-14 PROBLEM — T84.54XA INFECTION AND INFLAMMATORY REACTION DUE TO INTERNAL LEFT KNEE PROSTHESIS, INITIAL ENCOUNTER (HCC): Status: ACTIVE | Noted: 2024-06-14

## 2024-06-14 LAB
ALBUMIN SERPL BCP-MCNC: 4 G/DL (ref 3.2–4.9)
ALBUMIN/GLOB SERPL: 1 G/DL
ALP SERPL-CCNC: 81 U/L (ref 30–99)
ALT SERPL-CCNC: 10 U/L (ref 2–50)
ANION GAP SERPL CALC-SCNC: 15 MMOL/L (ref 7–16)
APPEARANCE UR: CLEAR
AST SERPL-CCNC: 24 U/L (ref 12–45)
BACTERIA #/AREA URNS HPF: ABNORMAL /HPF
BASOPHILS # BLD AUTO: 0.2 % (ref 0–1.8)
BASOPHILS # BLD: 0.03 K/UL (ref 0–0.12)
BILIRUB SERPL-MCNC: 0.5 MG/DL (ref 0.1–1.5)
BILIRUB UR QL STRIP.AUTO: NEGATIVE
BUN SERPL-MCNC: 18 MG/DL (ref 8–22)
CALCIUM ALBUM COR SERPL-MCNC: 9 MG/DL (ref 8.5–10.5)
CALCIUM SERPL-MCNC: 9 MG/DL (ref 8.4–10.2)
CHLORIDE SERPL-SCNC: 91 MMOL/L (ref 96–112)
CO2 SERPL-SCNC: 21 MMOL/L (ref 20–33)
COLOR UR: YELLOW
CREAT SERPL-MCNC: 1.47 MG/DL (ref 0.5–1.4)
CRP SERPL HS-MCNC: 32.36 MG/DL (ref 0–0.75)
EOSINOPHIL # BLD AUTO: 0.02 K/UL (ref 0–0.51)
EOSINOPHIL NFR BLD: 0.1 % (ref 0–6.9)
EPI CELLS #/AREA URNS HPF: NEGATIVE /HPF
ERYTHROCYTE [DISTWIDTH] IN BLOOD BY AUTOMATED COUNT: 44.7 FL (ref 35.9–50)
GFR SERPLBLD CREATININE-BSD FMLA CKD-EPI: 49 ML/MIN/1.73 M 2
GLOBULIN SER CALC-MCNC: 3.9 G/DL (ref 1.9–3.5)
GLUCOSE SERPL-MCNC: 117 MG/DL (ref 65–99)
GLUCOSE UR STRIP.AUTO-MCNC: NEGATIVE MG/DL
HCT VFR BLD AUTO: 36.5 % (ref 42–52)
HGB BLD-MCNC: 12.2 G/DL (ref 14–18)
IMM GRANULOCYTES # BLD AUTO: 0.1 K/UL (ref 0–0.11)
IMM GRANULOCYTES NFR BLD AUTO: 0.7 % (ref 0–0.9)
KETONES UR STRIP.AUTO-MCNC: NEGATIVE MG/DL
LACTATE SERPL-SCNC: 2.1 MMOL/L (ref 0.5–2)
LACTATE SERPL-SCNC: 2.1 MMOL/L (ref 0.5–2)
LEUKOCYTE ESTERASE UR QL STRIP.AUTO: NEGATIVE
LYMPHOCYTES # BLD AUTO: 0.84 K/UL (ref 1–4.8)
LYMPHOCYTES NFR BLD: 5.7 % (ref 22–41)
MAGNESIUM SERPL-MCNC: 2.1 MG/DL (ref 1.5–2.5)
MCH RBC QN AUTO: 29.2 PG (ref 27–33)
MCHC RBC AUTO-ENTMCNC: 33.4 G/DL (ref 32.3–36.5)
MCV RBC AUTO: 87.3 FL (ref 81.4–97.8)
MICRO URNS: ABNORMAL
MONOCYTES # BLD AUTO: 0.94 K/UL (ref 0–0.85)
MONOCYTES NFR BLD AUTO: 6.3 % (ref 0–13.4)
NEUTROPHILS # BLD AUTO: 12.93 K/UL (ref 1.82–7.42)
NEUTROPHILS NFR BLD: 87 % (ref 44–72)
NITRITE UR QL STRIP.AUTO: POSITIVE
NRBC # BLD AUTO: 0 K/UL
NRBC BLD-RTO: 0 /100 WBC (ref 0–0.2)
PH UR STRIP.AUTO: 6 [PH] (ref 5–8)
PLATELET # BLD AUTO: 178 K/UL (ref 164–446)
PMV BLD AUTO: 10.7 FL (ref 9–12.9)
POTASSIUM SERPL-SCNC: 3.5 MMOL/L (ref 3.6–5.5)
PROCALCITONIN SERPL-MCNC: 1.02 NG/ML
PROT SERPL-MCNC: 7.9 G/DL (ref 6–8.2)
PROT UR QL STRIP: 30 MG/DL
RBC # BLD AUTO: 4.18 M/UL (ref 4.7–6.1)
RBC # URNS HPF: ABNORMAL /HPF
RBC UR QL AUTO: ABNORMAL
SODIUM SERPL-SCNC: 127 MMOL/L (ref 135–145)
SP GR UR STRIP.AUTO: 1.01
UNIDENT CRYS URNS QL MICRO: ABNORMAL /HPF
WBC # BLD AUTO: 14.9 K/UL (ref 4.8–10.8)
WBC #/AREA URNS HPF: ABNORMAL /HPF

## 2024-06-14 PROCEDURE — 99285 EMERGENCY DEPT VISIT HI MDM: CPT

## 2024-06-14 PROCEDURE — A9270 NON-COVERED ITEM OR SERVICE: HCPCS | Performed by: HOSPITALIST

## 2024-06-14 PROCEDURE — 700102 HCHG RX REV CODE 250 W/ 637 OVERRIDE(OP): Performed by: HOSPITALIST

## 2024-06-14 PROCEDURE — 87040 BLOOD CULTURE FOR BACTERIA: CPT | Mod: 91

## 2024-06-14 PROCEDURE — 36415 COLL VENOUS BLD VENIPUNCTURE: CPT

## 2024-06-14 PROCEDURE — 71045 X-RAY EXAM CHEST 1 VIEW: CPT

## 2024-06-14 PROCEDURE — 700105 HCHG RX REV CODE 258: Performed by: HOSPITALIST

## 2024-06-14 PROCEDURE — 770001 HCHG ROOM/CARE - MED/SURG/GYN PRIV*

## 2024-06-14 PROCEDURE — 85025 COMPLETE CBC W/AUTO DIFF WBC: CPT

## 2024-06-14 PROCEDURE — 96368 THER/DIAG CONCURRENT INF: CPT

## 2024-06-14 PROCEDURE — 86140 C-REACTIVE PROTEIN: CPT

## 2024-06-14 PROCEDURE — 700111 HCHG RX REV CODE 636 W/ 250 OVERRIDE (IP): Mod: JZ | Performed by: HOSPITALIST

## 2024-06-14 PROCEDURE — 700111 HCHG RX REV CODE 636 W/ 250 OVERRIDE (IP): Performed by: EMERGENCY MEDICINE

## 2024-06-14 PROCEDURE — 99223 1ST HOSP IP/OBS HIGH 75: CPT | Mod: AI | Performed by: HOSPITALIST

## 2024-06-14 PROCEDURE — 96366 THER/PROPH/DIAG IV INF ADDON: CPT

## 2024-06-14 PROCEDURE — 87086 URINE CULTURE/COLONY COUNT: CPT

## 2024-06-14 PROCEDURE — 84145 PROCALCITONIN (PCT): CPT

## 2024-06-14 PROCEDURE — 83605 ASSAY OF LACTIC ACID: CPT

## 2024-06-14 PROCEDURE — 81001 URINALYSIS AUTO W/SCOPE: CPT

## 2024-06-14 PROCEDURE — 96365 THER/PROPH/DIAG IV INF INIT: CPT

## 2024-06-14 PROCEDURE — 80053 COMPREHEN METABOLIC PANEL: CPT

## 2024-06-14 PROCEDURE — 83735 ASSAY OF MAGNESIUM: CPT

## 2024-06-14 RX ORDER — AMOXICILLIN 250 MG
2 CAPSULE ORAL EVERY EVENING
Status: DISCONTINUED | OUTPATIENT
Start: 2024-06-14 | End: 2024-06-17

## 2024-06-14 RX ORDER — ONDANSETRON 4 MG/1
4 TABLET, ORALLY DISINTEGRATING ORAL EVERY 4 HOURS PRN
Status: DISCONTINUED | OUTPATIENT
Start: 2024-06-14 | End: 2024-06-14

## 2024-06-14 RX ORDER — LABETALOL HYDROCHLORIDE 5 MG/ML
10 INJECTION, SOLUTION INTRAVENOUS EVERY 4 HOURS PRN
Status: DISCONTINUED | OUTPATIENT
Start: 2024-06-14 | End: 2024-06-21 | Stop reason: HOSPADM

## 2024-06-14 RX ORDER — OMEPRAZOLE 20 MG/1
40 CAPSULE, DELAYED RELEASE ORAL DAILY
Status: DISCONTINUED | OUTPATIENT
Start: 2024-06-15 | End: 2024-06-17

## 2024-06-14 RX ORDER — ACETAMINOPHEN 325 MG/1
650 TABLET ORAL EVERY 6 HOURS PRN
Status: DISCONTINUED | OUTPATIENT
Start: 2024-06-14 | End: 2024-06-21 | Stop reason: HOSPADM

## 2024-06-14 RX ORDER — POTASSIUM CHLORIDE 20 MEQ/1
20 TABLET, EXTENDED RELEASE ORAL DAILY
Status: DISCONTINUED | OUTPATIENT
Start: 2024-06-14 | End: 2024-06-20

## 2024-06-14 RX ORDER — LINEZOLID 600 MG/1
600 TABLET, FILM COATED ORAL EVERY 12 HOURS
Status: DISCONTINUED | OUTPATIENT
Start: 2024-06-14 | End: 2024-06-15

## 2024-06-14 RX ORDER — SODIUM CHLORIDE 9 MG/ML
INJECTION, SOLUTION INTRAVENOUS CONTINUOUS
Status: ACTIVE | OUTPATIENT
Start: 2024-06-15 | End: 2024-06-15

## 2024-06-14 RX ORDER — OXYCODONE HYDROCHLORIDE 5 MG/1
2.5 TABLET ORAL
Status: DISCONTINUED | OUTPATIENT
Start: 2024-06-14 | End: 2024-06-16

## 2024-06-14 RX ORDER — ONDANSETRON 2 MG/ML
4 INJECTION INTRAMUSCULAR; INTRAVENOUS EVERY 4 HOURS PRN
Status: DISCONTINUED | OUTPATIENT
Start: 2024-06-14 | End: 2024-06-14

## 2024-06-14 RX ORDER — BUPRENORPHINE 8 MG/1
8 TABLET SUBLINGUAL 3 TIMES DAILY
Status: DISCONTINUED | OUTPATIENT
Start: 2024-06-14 | End: 2024-06-14

## 2024-06-14 RX ORDER — CELECOXIB 200 MG/1
200 CAPSULE ORAL DAILY
Status: DISCONTINUED | OUTPATIENT
Start: 2024-06-15 | End: 2024-06-21 | Stop reason: HOSPADM

## 2024-06-14 RX ORDER — POLYETHYLENE GLYCOL 3350 17 G/17G
1 POWDER, FOR SOLUTION ORAL
Status: DISCONTINUED | OUTPATIENT
Start: 2024-06-14 | End: 2024-06-17

## 2024-06-14 RX ORDER — OXYCODONE HYDROCHLORIDE 5 MG/1
5 TABLET ORAL
Status: DISCONTINUED | OUTPATIENT
Start: 2024-06-14 | End: 2024-06-16

## 2024-06-14 RX ORDER — AMLODIPINE BESYLATE 5 MG/1
10 TABLET ORAL DAILY
Status: DISCONTINUED | OUTPATIENT
Start: 2024-06-15 | End: 2024-06-21 | Stop reason: HOSPADM

## 2024-06-14 RX ORDER — ONDANSETRON 4 MG/1
4 TABLET, ORALLY DISINTEGRATING ORAL EVERY 8 HOURS PRN
Status: DISCONTINUED | OUTPATIENT
Start: 2024-06-14 | End: 2024-06-21 | Stop reason: HOSPADM

## 2024-06-14 RX ORDER — DULOXETIN HYDROCHLORIDE 30 MG/1
30 CAPSULE, DELAYED RELEASE ORAL 2 TIMES DAILY
Status: DISCONTINUED | OUTPATIENT
Start: 2024-06-14 | End: 2024-06-21 | Stop reason: HOSPADM

## 2024-06-14 RX ORDER — HYDRALAZINE HYDROCHLORIDE 25 MG/1
25 TABLET, FILM COATED ORAL 2 TIMES DAILY
COMMUNITY

## 2024-06-14 RX ORDER — ONDANSETRON 2 MG/ML
4 INJECTION INTRAMUSCULAR; INTRAVENOUS EVERY 8 HOURS PRN
Status: DISCONTINUED | OUTPATIENT
Start: 2024-06-14 | End: 2024-06-21 | Stop reason: HOSPADM

## 2024-06-14 RX ORDER — HYDRALAZINE HYDROCHLORIDE 25 MG/1
25 TABLET, FILM COATED ORAL 2 TIMES DAILY
Status: DISCONTINUED | OUTPATIENT
Start: 2024-06-14 | End: 2024-06-21 | Stop reason: HOSPADM

## 2024-06-14 RX ORDER — GABAPENTIN 300 MG/1
600 CAPSULE ORAL 3 TIMES DAILY
Status: DISCONTINUED | OUTPATIENT
Start: 2024-06-14 | End: 2024-06-21 | Stop reason: HOSPADM

## 2024-06-14 RX ORDER — HYDROMORPHONE HYDROCHLORIDE 1 MG/ML
0.25 INJECTION, SOLUTION INTRAMUSCULAR; INTRAVENOUS; SUBCUTANEOUS
Status: DISCONTINUED | OUTPATIENT
Start: 2024-06-14 | End: 2024-06-16

## 2024-06-14 RX ADMIN — ACETAMINOPHEN 650 MG: 325 TABLET ORAL at 20:22

## 2024-06-14 RX ADMIN — SODIUM CHLORIDE: 9 INJECTION, SOLUTION INTRAVENOUS at 19:58

## 2024-06-14 RX ADMIN — AMPICILLIN AND SULBACTAM 3 G: 1; 2 INJECTION, POWDER, FOR SOLUTION INTRAMUSCULAR; INTRAVENOUS at 18:55

## 2024-06-14 RX ADMIN — POTASSIUM CHLORIDE 20 MEQ: 1500 TABLET, EXTENDED RELEASE ORAL at 18:56

## 2024-06-14 RX ADMIN — GABAPENTIN 600 MG: 300 CAPSULE ORAL at 18:56

## 2024-06-14 RX ADMIN — DULOXETINE HYDROCHLORIDE 30 MG: 30 CAPSULE, DELAYED RELEASE ORAL at 18:55

## 2024-06-14 RX ADMIN — VANCOMYCIN HYDROCHLORIDE 1750 MG: 1 INJECTION, POWDER, LYOPHILIZED, FOR SOLUTION INTRAVENOUS at 16:55

## 2024-06-14 RX ADMIN — LINEZOLID 600 MG: 600 TABLET, FILM COATED ORAL at 18:56

## 2024-06-14 RX ADMIN — HYDRALAZINE HYDROCHLORIDE 25 MG: 25 TABLET ORAL at 18:56

## 2024-06-14 SDOH — ECONOMIC STABILITY: TRANSPORTATION INSECURITY
IN THE PAST 12 MONTHS, HAS THE LACK OF TRANSPORTATION KEPT YOU FROM MEDICAL APPOINTMENTS OR FROM GETTING MEDICATIONS?: NO

## 2024-06-14 SDOH — ECONOMIC STABILITY: TRANSPORTATION INSECURITY
IN THE PAST 12 MONTHS, HAS LACK OF RELIABLE TRANSPORTATION KEPT YOU FROM MEDICAL APPOINTMENTS, MEETINGS, WORK OR FROM GETTING THINGS NEEDED FOR DAILY LIVING?: NO

## 2024-06-14 ASSESSMENT — ENCOUNTER SYMPTOMS
BRUISES/BLEEDS EASILY: 0
CONSTIPATION: 0
DIAPHORESIS: 0
DIZZINESS: 0
LOSS OF CONSCIOUSNESS: 0
HEMOPTYSIS: 0
DIARRHEA: 0
NAUSEA: 0
SEIZURES: 0
FEVER: 0
VOMITING: 0
DOUBLE VISION: 0
PALPITATIONS: 0
PSYCHIATRIC NEGATIVE: 1
CHILLS: 1
ABDOMINAL PAIN: 0
NERVOUS/ANXIOUS: 0
NEUROLOGICAL NEGATIVE: 1
MUSCULOSKELETAL NEGATIVE: 1
WHEEZING: 0
EYES NEGATIVE: 1
RESPIRATORY NEGATIVE: 1
HEARTBURN: 0
CARDIOVASCULAR NEGATIVE: 1
GASTROINTESTINAL NEGATIVE: 1
BLOOD IN STOOL: 0
COUGH: 0
FOCAL WEAKNESS: 0
HEADACHES: 0

## 2024-06-14 ASSESSMENT — COGNITIVE AND FUNCTIONAL STATUS - GENERAL
CLIMB 3 TO 5 STEPS WITH RAILING: A LOT
SUGGESTED CMS G CODE MODIFIER MOBILITY: CL
HELP NEEDED FOR BATHING: A LITTLE
TURNING FROM BACK TO SIDE WHILE IN FLAT BAD: A LITTLE
TOILETING: A LITTLE
MOVING TO AND FROM BED TO CHAIR: A LOT
DRESSING REGULAR UPPER BODY CLOTHING: A LITTLE
STANDING UP FROM CHAIR USING ARMS: A LOT
MOVING FROM LYING ON BACK TO SITTING ON SIDE OF FLAT BED: A LITTLE
PERSONAL GROOMING: A LITTLE
SUGGESTED CMS G CODE MODIFIER DAILY ACTIVITY: CK
MOBILITY SCORE: 14
DRESSING REGULAR LOWER BODY CLOTHING: A LITTLE
WALKING IN HOSPITAL ROOM: A LOT
EATING MEALS: A LITTLE
DAILY ACTIVITIY SCORE: 18

## 2024-06-14 ASSESSMENT — LIFESTYLE VARIABLES
EVER HAD A DRINK FIRST THING IN THE MORNING TO STEADY YOUR NERVES TO GET RID OF A HANGOVER: NO
TOTAL SCORE: 0
HAVE YOU EVER FELT YOU SHOULD CUT DOWN ON YOUR DRINKING: NO
CONSUMPTION TOTAL: NEGATIVE
ON A TYPICAL DAY WHEN YOU DRINK ALCOHOL HOW MANY DRINKS DO YOU HAVE: 2
HAVE PEOPLE ANNOYED YOU BY CRITICIZING YOUR DRINKING: NO
EVER FELT BAD OR GUILTY ABOUT YOUR DRINKING: NO
TOTAL SCORE: 0
TOTAL SCORE: 0
HOW MANY TIMES IN THE PAST YEAR HAVE YOU HAD 5 OR MORE DRINKS IN A DAY: 0
ALCOHOL_USE: YES
DOES PATIENT WANT TO STOP DRINKING: NO
AVERAGE NUMBER OF DAYS PER WEEK YOU HAVE A DRINK CONTAINING ALCOHOL: 5

## 2024-06-14 ASSESSMENT — SOCIAL DETERMINANTS OF HEALTH (SDOH)
WITHIN THE PAST 12 MONTHS, THE FOOD YOU BOUGHT JUST DIDN'T LAST AND YOU DIDN'T HAVE MONEY TO GET MORE: NEVER TRUE
WITHIN THE LAST YEAR, HAVE YOU BEEN AFRAID OF YOUR PARTNER OR EX-PARTNER?: NO
WITHIN THE PAST 12 MONTHS, YOU WORRIED THAT YOUR FOOD WOULD RUN OUT BEFORE YOU GOT THE MONEY TO BUY MORE: NEVER TRUE
IN THE PAST 12 MONTHS, HAS THE ELECTRIC, GAS, OIL, OR WATER COMPANY THREATENED TO SHUT OFF SERVICE IN YOUR HOME?: NO
WITHIN THE LAST YEAR, HAVE YOU BEEN KICKED, HIT, SLAPPED, OR OTHERWISE PHYSICALLY HURT BY YOUR PARTNER OR EX-PARTNER?: NO
WITHIN THE LAST YEAR, HAVE YOU BEEN HUMILIATED OR EMOTIONALLY ABUSED IN OTHER WAYS BY YOUR PARTNER OR EX-PARTNER?: NO
WITHIN THE LAST YEAR, HAVE TO BEEN RAPED OR FORCED TO HAVE ANY KIND OF SEXUAL ACTIVITY BY YOUR PARTNER OR EX-PARTNER?: NO

## 2024-06-14 ASSESSMENT — PATIENT HEALTH QUESTIONNAIRE - PHQ9
SUM OF ALL RESPONSES TO PHQ9 QUESTIONS 1 AND 2: 0
2. FEELING DOWN, DEPRESSED, IRRITABLE, OR HOPELESS: NOT AT ALL
1. LITTLE INTEREST OR PLEASURE IN DOING THINGS: NOT AT ALL

## 2024-06-14 ASSESSMENT — FIBROSIS 4 INDEX
FIB4 SCORE: 3.2
FIB4 SCORE: 1.84

## 2024-06-14 ASSESSMENT — PAIN DESCRIPTION - PAIN TYPE: TYPE: ACUTE PAIN

## 2024-06-14 NOTE — ED NOTES
Med Rec completed per patient's wife   Allergies reviewed    Patient started a 7 day course of Keflex on 6/13/2024  Patient started a 10 day course of Bactrim DS on 6/13/2024     Patient is not taking anticoagulants

## 2024-06-14 NOTE — ED TRIAGE NOTES
"Chief Complaint   Patient presents with    Sent by MD     Pt. Reports he was sent here by MD for L knee infection. Reports he is \"supposed to have surgery to clean it\". Pt. Reports chills.     Physical Exam  Pulmonary:      Effort: Pulmonary effort is normal.   Skin:     General: Skin is warm and dry.   Neurological:      Mental Status: He is alert.         "

## 2024-06-14 NOTE — ED PROVIDER NOTES
"ED Provider Note    CHIEF COMPLAINT  Chief Complaint   Patient presents with    Sent by MD     Pt. Reports he was sent here by MD for L knee infection. Reports he is \"supposed to have surgery to clean it\". Pt. Reports chills.       EXTERNAL RECORDS REVIEWED  Outpatient Notes [facility], yesterday, started on antibiotics for cellulitis and septic arthritis    HPI/ROS  LIMITATION TO HISTORY   Select: : None  OUTSIDE HISTORIAN(S):  Significant other wife at bedside    Zachary Moore is a 74 y.o. male who presents to the ED with knee infection.  The patient was seen yesterday at the [facility], states that he has been having pain in his knee for the last several days.  Subjective fevers, swelling, difficulty ambulating.  Was seen at [facility] and had a arthrocentesis that showed a large amount of white blood cell count.  The patient was sent here to get surgery tomorrow.  The patient has already started Keflex and Bactrim.  Patient also has a cellulitis of the right lower leg    PAST MEDICAL HISTORY   has a past medical history of Arthritis, Cancer (HCC), and Hypertension.    SURGICAL HISTORY   has a past surgical history that includes shoulder surgery; knee arthroplasty total (2009); knee arthroplasty total (2013); and fusion, spine, lumbar, plif (1/28/2014).    FAMILY HISTORY  No family history on file.    SOCIAL HISTORY  Social History     Tobacco Use    Smoking status: Never    Smokeless tobacco: Never   Vaping Use    Vaping status: Never Used   Substance and Sexual Activity    Alcohol use: Yes     Comment: daily    Drug use: No    Sexual activity: Yes     Partners: Female     Birth control/protection: Post-Menopausal       CURRENT MEDICATIONS  Home Medications       Reviewed by Marianne Gutierrez (Pharmacy Tech) on 06/14/24 at 1601  Med List Status: Complete     Medication Last Dose Status   amLODIPine (NORVASC) 10 MG Tab 6/14/2024 Active   buprenorphine (SUBUTEX) 8 MG SL Tab " "6/14/2024 Active   celecoxib (CELEBREX) 200 MG Cap 6/14/2024 Active   cephALEXin (KEFLEX) 500 MG Cap 6/14/2024 Active   DULoxetine (CYMBALTA) 30 MG Cap DR Particles 6/14/2024 Active   gabapentin (NEURONTIN) 600 MG tablet 6/14/2024 Active   hydrALAZINE (APRESOLINE) 25 MG Tab 6/14/2024 Active   omeprazole (PRILOSEC) 40 MG delayed-release capsule 6/13/2024 Active   sulfamethoxazole-trimethoprim (BACTRIM DS) 800-160 MG tablet 6/14/2024 Active                  Audit from Redirected Encounters    **Home medications have not yet been reviewed for this encounter**         ALLERGIES  No Known Allergies    PHYSICAL EXAM  VITAL SIGNS: BP (!) 145/68   Pulse 85   Temp 37.9 °C (100.3 °F) (Temporal)   Resp 14   Ht 1.753 m (5' 9\")   Wt 73.5 kg (162 lb 0.6 oz)   SpO2 92%   BMI 23.93 kg/m²    Well-developed and her 74-year-old male who appears in mild distress  Atraumatic, normocephalic, oropharynx is clear  Neck is supple  Clear to auscultation  Regular rhythm  Left knee with increased warmth, swelling, effusion, tenderness palpation, decreased range of motion, the patient also has a wrap around his right ankle with some cellulitic changes.    EKG/LABS  Results for orders placed or performed during the hospital encounter of 06/14/24   Lactic Acid   Result Value Ref Range    Lactic Acid 2.1 (H) 0.5 - 2.0 mmol/L   CBC with Differential   Result Value Ref Range    WBC 14.9 (H) 4.8 - 10.8 K/uL    RBC 4.18 (L) 4.70 - 6.10 M/uL    Hemoglobin 12.2 (L) 14.0 - 18.0 g/dL    Hematocrit 36.5 (L) 42.0 - 52.0 %    MCV 87.3 81.4 - 97.8 fL    MCH 29.2 27.0 - 33.0 pg    MCHC 33.4 32.3 - 36.5 g/dL    RDW 44.7 35.9 - 50.0 fL    Platelet Count 178 164 - 446 K/uL    MPV 10.7 9.0 - 12.9 fL    Neutrophils-Polys 87.00 (H) 44.00 - 72.00 %    Lymphocytes 5.70 (L) 22.00 - 41.00 %    Monocytes 6.30 0.00 - 13.40 %    Eosinophils 0.10 0.00 - 6.90 %    Basophils 0.20 0.00 - 1.80 %    Immature Granulocytes 0.70 0.00 - 0.90 %    Nucleated RBC 0.00 0.00 - " 0.20 /100 WBC    Neutrophils (Absolute) 12.93 (H) 1.82 - 7.42 K/uL    Lymphs (Absolute) 0.84 (L) 1.00 - 4.80 K/uL    Monos (Absolute) 0.94 (H) 0.00 - 0.85 K/uL    Eos (Absolute) 0.02 0.00 - 0.51 K/uL    Baso (Absolute) 0.03 0.00 - 0.12 K/uL    Immature Granulocytes (abs) 0.10 0.00 - 0.11 K/uL    NRBC (Absolute) 0.00 K/uL   Complete Metabolic Panel   Result Value Ref Range    Sodium 127 (L) 135 - 145 mmol/L    Potassium 3.5 (L) 3.6 - 5.5 mmol/L    Chloride 91 (L) 96 - 112 mmol/L    Co2 21 20 - 33 mmol/L    Anion Gap 15.0 7.0 - 16.0    Glucose 117 (H) 65 - 99 mg/dL    Bun 18 8 - 22 mg/dL    Creatinine 1.47 (H) 0.50 - 1.40 mg/dL    Calcium 9.0 8.4 - 10.2 mg/dL    Correct Calcium 9.0 8.5 - 10.5 mg/dL    AST(SGOT) 24 12 - 45 U/L    ALT(SGPT) 10 2 - 50 U/L    Alkaline Phosphatase 81 30 - 99 U/L    Total Bilirubin 0.5 0.1 - 1.5 mg/dL    Albumin 4.0 3.2 - 4.9 g/dL    Total Protein 7.9 6.0 - 8.2 g/dL    Globulin 3.9 (H) 1.9 - 3.5 g/dL    A-G Ratio 1.0 g/dL   Urinalysis    Specimen: Urine   Result Value Ref Range    Micro Urine Req Microscopic    ESTIMATED GFR   Result Value Ref Range    GFR (CKD-EPI) 49 (A) >60 mL/min/1.73 m 2      I have independently interpreted this EKG    RADIOLOGY/PROCEDURES   I have independently interpreted the diagnostic imaging associated with this visit and am waiting the final reading from the radiologist.   My preliminary interpretation is as follows:  No pneumonia    Radiologist interpretation:  DX-CHEST-PORTABLE (1 VIEW)    (Results Pending)       COURSE & MEDICAL DECISION MAKING    ASSESSMENT, COURSE AND PLAN  Care Narrative: Patient is coming in secondary to infected left knee, reviewed the notes from outpatient, discussed case with pharmacy, will put the patient on vancomycin.  Patient will need to be admitted to the hospital, discussed case with Dr. Thorne for hospitalization.       DISPOSITION AND DISCUSSIONS  I have discussed management of the patient with the following physicians and  JIMENA's: Dr. Thorne    Discussion of management with other QHP or appropriate source(s):  Pharmacy for antibiotic choice.     FINAL DIAGNOSIS  1. Pyogenic arthritis of left knee joint, due to unspecified organism (HCC)           Electronically signed by: Maxwell Potts M.D., 6/14/2024 4:00 PM

## 2024-06-15 ENCOUNTER — ANESTHESIA (OUTPATIENT)
Dept: SURGERY | Facility: MEDICAL CENTER | Age: 74
DRG: 486 | End: 2024-06-15
Payer: MEDICARE

## 2024-06-15 ENCOUNTER — ANESTHESIA EVENT (OUTPATIENT)
Dept: SURGERY | Facility: MEDICAL CENTER | Age: 74
DRG: 486 | End: 2024-06-15
Payer: MEDICARE

## 2024-06-15 ENCOUNTER — APPOINTMENT (OUTPATIENT)
Dept: RADIOLOGY | Facility: MEDICAL CENTER | Age: 74
DRG: 486 | End: 2024-06-15
Payer: MEDICARE

## 2024-06-15 LAB
ANION GAP SERPL CALC-SCNC: 16 MMOL/L (ref 7–16)
BACTERIA FLD AEROBE CULT: ABNORMAL
BACTERIA FLD AEROBE CULT: ABNORMAL
BACTERIA SPEC ANAEROBE CULT: NORMAL
BUN SERPL-MCNC: 20 MG/DL (ref 8–22)
CALCIUM SERPL-MCNC: 8.2 MG/DL (ref 8.4–10.2)
CHLORIDE SERPL-SCNC: 95 MMOL/L (ref 96–112)
CO2 SERPL-SCNC: 18 MMOL/L (ref 20–33)
CREAT SERPL-MCNC: 1.47 MG/DL (ref 0.5–1.4)
ERYTHROCYTE [DISTWIDTH] IN BLOOD BY AUTOMATED COUNT: 47.9 FL (ref 35.9–50)
ERYTHROCYTE [SEDIMENTATION RATE] IN BLOOD BY WESTERGREN METHOD: 85 MM/HOUR (ref 0–20)
FUNGUS SPEC FUNGUS STN: NORMAL
GFR SERPLBLD CREATININE-BSD FMLA CKD-EPI: 49 ML/MIN/1.73 M 2
GLUCOSE SERPL-MCNC: 122 MG/DL (ref 65–99)
GRAM STN SPEC: ABNORMAL
GRAM STN SPEC: NORMAL
HCT VFR BLD AUTO: 35.7 % (ref 42–52)
HGB BLD-MCNC: 11.2 G/DL (ref 14–18)
LACTATE SERPL-SCNC: 1.4 MMOL/L (ref 0.5–2)
MCH RBC QN AUTO: 29.6 PG (ref 27–33)
MCHC RBC AUTO-ENTMCNC: 31.4 G/DL (ref 32.3–36.5)
MCV RBC AUTO: 94.2 FL (ref 81.4–97.8)
PLATELET # BLD AUTO: 163 K/UL (ref 164–446)
PMV BLD AUTO: 9.5 FL (ref 9–12.9)
POTASSIUM SERPL-SCNC: 4.3 MMOL/L (ref 3.6–5.5)
RBC # BLD AUTO: 3.79 M/UL (ref 4.7–6.1)
SIGNIFICANT IND 70042: ABNORMAL
SIGNIFICANT IND 70042: NORMAL
SITE SITE: ABNORMAL
SITE SITE: NORMAL
SODIUM SERPL-SCNC: 129 MMOL/L (ref 135–145)
SOURCE SOURCE: ABNORMAL
SOURCE SOURCE: NORMAL
WBC # BLD AUTO: 10.8 K/UL (ref 4.8–10.8)

## 2024-06-15 PROCEDURE — 502000 HCHG MISC OR IMPLANTS RC 0278: Performed by: ORTHOPAEDIC SURGERY

## 2024-06-15 PROCEDURE — 87040 BLOOD CULTURE FOR BACTERIA: CPT | Mod: 91

## 2024-06-15 PROCEDURE — 87102 FUNGUS ISOLATION CULTURE: CPT

## 2024-06-15 PROCEDURE — 700111 HCHG RX REV CODE 636 W/ 250 OVERRIDE (IP): Performed by: INTERNAL MEDICINE

## 2024-06-15 PROCEDURE — 87150 DNA/RNA AMPLIFIED PROBE: CPT

## 2024-06-15 PROCEDURE — 99222 1ST HOSP IP/OBS MODERATE 55: CPT | Mod: 57 | Performed by: ORTHOPAEDIC SURGERY

## 2024-06-15 PROCEDURE — 700101 HCHG RX REV CODE 250: Performed by: ANESTHESIOLOGY

## 2024-06-15 PROCEDURE — A9270 NON-COVERED ITEM OR SERVICE: HCPCS

## 2024-06-15 PROCEDURE — 73560 X-RAY EXAM OF KNEE 1 OR 2: CPT | Mod: LT

## 2024-06-15 PROCEDURE — 700111 HCHG RX REV CODE 636 W/ 250 OVERRIDE (IP): Mod: JZ | Performed by: INTERNAL MEDICINE

## 2024-06-15 PROCEDURE — 700102 HCHG RX REV CODE 250 W/ 637 OVERRIDE(OP): Performed by: ANESTHESIOLOGY

## 2024-06-15 PROCEDURE — 700111 HCHG RX REV CODE 636 W/ 250 OVERRIDE (IP): Mod: JZ | Performed by: ANESTHESIOLOGY

## 2024-06-15 PROCEDURE — 87206 SMEAR FLUORESCENT/ACID STAI: CPT

## 2024-06-15 PROCEDURE — A9270 NON-COVERED ITEM OR SERVICE: HCPCS | Performed by: ANESTHESIOLOGY

## 2024-06-15 PROCEDURE — 160048 HCHG OR STATISTICAL LEVEL 1-5: Performed by: ORTHOPAEDIC SURGERY

## 2024-06-15 PROCEDURE — 85027 COMPLETE CBC AUTOMATED: CPT

## 2024-06-15 PROCEDURE — C1776 JOINT DEVICE (IMPLANTABLE): HCPCS | Performed by: ORTHOPAEDIC SURGERY

## 2024-06-15 PROCEDURE — 160036 HCHG PACU - EA ADDL 30 MINS PHASE I: Performed by: ORTHOPAEDIC SURGERY

## 2024-06-15 PROCEDURE — 160035 HCHG PACU - 1ST 60 MINS PHASE I: Performed by: ORTHOPAEDIC SURGERY

## 2024-06-15 PROCEDURE — 87186 SC STD MICRODIL/AGAR DIL: CPT

## 2024-06-15 PROCEDURE — 87147 CULTURE TYPE IMMUNOLOGIC: CPT | Mod: 91

## 2024-06-15 PROCEDURE — 700111 HCHG RX REV CODE 636 W/ 250 OVERRIDE (IP): Mod: JZ | Performed by: ORTHOPAEDIC SURGERY

## 2024-06-15 PROCEDURE — 700102 HCHG RX REV CODE 250 W/ 637 OVERRIDE(OP)

## 2024-06-15 PROCEDURE — 700102 HCHG RX REV CODE 250 W/ 637 OVERRIDE(OP): Performed by: HOSPITALIST

## 2024-06-15 PROCEDURE — 99232 SBSQ HOSP IP/OBS MODERATE 35: CPT | Performed by: INTERNAL MEDICINE

## 2024-06-15 PROCEDURE — 87075 CULTR BACTERIA EXCEPT BLOOD: CPT | Mod: 91

## 2024-06-15 PROCEDURE — 87070 CULTURE OTHR SPECIMN AEROBIC: CPT | Mod: 91

## 2024-06-15 PROCEDURE — 94760 N-INVAS EAR/PLS OXIMETRY 1: CPT

## 2024-06-15 PROCEDURE — 36415 COLL VENOUS BLD VENIPUNCTURE: CPT

## 2024-06-15 PROCEDURE — 160002 HCHG RECOVERY MINUTES (STAT): Performed by: ORTHOPAEDIC SURGERY

## 2024-06-15 PROCEDURE — 770001 HCHG ROOM/CARE - MED/SURG/GYN PRIV*

## 2024-06-15 PROCEDURE — 27486 REVISE/REPLACE KNEE JOINT: CPT | Mod: LT | Performed by: ORTHOPAEDIC SURGERY

## 2024-06-15 PROCEDURE — 700105 HCHG RX REV CODE 258: Performed by: HOSPITALIST

## 2024-06-15 PROCEDURE — 700111 HCHG RX REV CODE 636 W/ 250 OVERRIDE (IP): Mod: JZ | Performed by: HOSPITALIST

## 2024-06-15 PROCEDURE — 700101 HCHG RX REV CODE 250: Performed by: ORTHOPAEDIC SURGERY

## 2024-06-15 PROCEDURE — 3E0T3BZ INTRODUCTION OF ANESTHETIC AGENT INTO PERIPHERAL NERVES AND PLEXI, PERCUTANEOUS APPROACH: ICD-10-PCS | Performed by: ANESTHESIOLOGY

## 2024-06-15 PROCEDURE — 700105 HCHG RX REV CODE 258: Performed by: INTERNAL MEDICINE

## 2024-06-15 PROCEDURE — 700111 HCHG RX REV CODE 636 W/ 250 OVERRIDE (IP): Performed by: ANESTHESIOLOGY

## 2024-06-15 PROCEDURE — 87077 CULTURE AEROBIC IDENTIFY: CPT

## 2024-06-15 PROCEDURE — 160031 HCHG SURGERY MINUTES - 1ST 30 MINS LEVEL 5: Performed by: ORTHOPAEDIC SURGERY

## 2024-06-15 PROCEDURE — 27486 REVISE/REPLACE KNEE JOINT: CPT | Mod: ASROC,LT

## 2024-06-15 PROCEDURE — 87116 MYCOBACTERIA CULTURE: CPT

## 2024-06-15 PROCEDURE — 160042 HCHG SURGERY MINUTES - EA ADDL 1 MIN LEVEL 5: Performed by: ORTHOPAEDIC SURGERY

## 2024-06-15 PROCEDURE — A9270 NON-COVERED ITEM OR SERVICE: HCPCS | Performed by: HOSPITALIST

## 2024-06-15 PROCEDURE — 87015 SPECIMEN INFECT AGNT CONCNTJ: CPT | Mod: 91

## 2024-06-15 PROCEDURE — 80048 BASIC METABOLIC PNL TOTAL CA: CPT

## 2024-06-15 PROCEDURE — 83605 ASSAY OF LACTIC ACID: CPT

## 2024-06-15 PROCEDURE — 700105 HCHG RX REV CODE 258: Performed by: ORTHOPAEDIC SURGERY

## 2024-06-15 PROCEDURE — C1755 CATHETER, INTRASPINAL: HCPCS | Performed by: ORTHOPAEDIC SURGERY

## 2024-06-15 PROCEDURE — 0SPD09Z REMOVAL OF LINER FROM LEFT KNEE JOINT, OPEN APPROACH: ICD-10-PCS | Performed by: ORTHOPAEDIC SURGERY

## 2024-06-15 PROCEDURE — 160009 HCHG ANES TIME/MIN: Performed by: ORTHOPAEDIC SURGERY

## 2024-06-15 PROCEDURE — 85652 RBC SED RATE AUTOMATED: CPT

## 2024-06-15 PROCEDURE — 0SUW09Z SUPPLEMENT LEFT KNEE JOINT, TIBIAL SURFACE WITH LINER, OPEN APPROACH: ICD-10-PCS | Performed by: ORTHOPAEDIC SURGERY

## 2024-06-15 PROCEDURE — 87205 SMEAR GRAM STAIN: CPT | Mod: 91

## 2024-06-15 DEVICE — IMPLANTABLE DEVICE: Type: IMPLANTABLE DEVICE | Status: FUNCTIONAL

## 2024-06-15 RX ORDER — CEFAZOLIN SODIUM 1 G/3ML
2 INJECTION, POWDER, FOR SOLUTION INTRAMUSCULAR; INTRAVENOUS ONCE
Status: DISCONTINUED | OUTPATIENT
Start: 2024-06-15 | End: 2024-06-15 | Stop reason: HOSPADM

## 2024-06-15 RX ORDER — ONDANSETRON 2 MG/ML
4 INJECTION INTRAMUSCULAR; INTRAVENOUS EVERY 4 HOURS PRN
Status: DISCONTINUED | OUTPATIENT
Start: 2024-06-15 | End: 2024-06-21 | Stop reason: HOSPADM

## 2024-06-15 RX ORDER — OXYCODONE HCL 5 MG/5 ML
5 SOLUTION, ORAL ORAL
Status: COMPLETED | OUTPATIENT
Start: 2024-06-15 | End: 2024-06-15

## 2024-06-15 RX ORDER — SCOLOPAMINE TRANSDERMAL SYSTEM 1 MG/1
1 PATCH, EXTENDED RELEASE TRANSDERMAL
Status: DISCONTINUED | OUTPATIENT
Start: 2024-06-15 | End: 2024-06-21 | Stop reason: HOSPADM

## 2024-06-15 RX ORDER — ENOXAPARIN SODIUM 100 MG/ML
40 INJECTION SUBCUTANEOUS DAILY
Status: DISCONTINUED | OUTPATIENT
Start: 2024-06-16 | End: 2024-06-18

## 2024-06-15 RX ORDER — DOCUSATE SODIUM 100 MG/1
100 CAPSULE, LIQUID FILLED ORAL 2 TIMES DAILY
Status: DISCONTINUED | OUTPATIENT
Start: 2024-06-15 | End: 2024-06-21 | Stop reason: HOSPADM

## 2024-06-15 RX ORDER — DEXAMETHASONE SODIUM PHOSPHATE 4 MG/ML
4 INJECTION, SOLUTION INTRA-ARTICULAR; INTRALESIONAL; INTRAMUSCULAR; INTRAVENOUS; SOFT TISSUE ONCE
Status: COMPLETED | OUTPATIENT
Start: 2024-06-16 | End: 2024-06-16

## 2024-06-15 RX ORDER — EPHEDRINE SULFATE 50 MG/ML
5 INJECTION, SOLUTION INTRAVENOUS
Status: DISCONTINUED | OUTPATIENT
Start: 2024-06-15 | End: 2024-06-15 | Stop reason: HOSPADM

## 2024-06-15 RX ORDER — DEXAMETHASONE SODIUM PHOSPHATE 4 MG/ML
4 INJECTION, SOLUTION INTRA-ARTICULAR; INTRALESIONAL; INTRAMUSCULAR; INTRAVENOUS; SOFT TISSUE
Status: DISCONTINUED | OUTPATIENT
Start: 2024-06-15 | End: 2024-06-21 | Stop reason: HOSPADM

## 2024-06-15 RX ORDER — KETOROLAC TROMETHAMINE 30 MG/ML
INJECTION, SOLUTION INTRAMUSCULAR; INTRAVENOUS
Status: DISCONTINUED | OUTPATIENT
Start: 2024-06-15 | End: 2024-06-15 | Stop reason: HOSPADM

## 2024-06-15 RX ORDER — AMOXICILLIN 250 MG
1 CAPSULE ORAL
Status: DISCONTINUED | OUTPATIENT
Start: 2024-06-15 | End: 2024-06-21 | Stop reason: HOSPADM

## 2024-06-15 RX ORDER — SODIUM CHLORIDE, SODIUM LACTATE, POTASSIUM CHLORIDE, CALCIUM CHLORIDE 600; 310; 30; 20 MG/100ML; MG/100ML; MG/100ML; MG/100ML
INJECTION, SOLUTION INTRAVENOUS CONTINUOUS
Status: ACTIVE | OUTPATIENT
Start: 2024-06-15 | End: 2024-06-15

## 2024-06-15 RX ORDER — BISACODYL 10 MG
10 SUPPOSITORY, RECTAL RECTAL
Status: DISCONTINUED | OUTPATIENT
Start: 2024-06-15 | End: 2024-06-21 | Stop reason: HOSPADM

## 2024-06-15 RX ORDER — ACETAMINOPHEN 500 MG
1000 TABLET ORAL EVERY 6 HOURS PRN
Status: DISCONTINUED | OUTPATIENT
Start: 2024-06-20 | End: 2024-06-21 | Stop reason: HOSPADM

## 2024-06-15 RX ORDER — OXYCODONE HYDROCHLORIDE 5 MG/1
5 TABLET ORAL
Status: DISCONTINUED | OUTPATIENT
Start: 2024-06-15 | End: 2024-06-16

## 2024-06-15 RX ORDER — HYDRALAZINE HYDROCHLORIDE 20 MG/ML
5 INJECTION INTRAMUSCULAR; INTRAVENOUS
Status: DISCONTINUED | OUTPATIENT
Start: 2024-06-15 | End: 2024-06-15 | Stop reason: HOSPADM

## 2024-06-15 RX ORDER — DIPHENHYDRAMINE HYDROCHLORIDE 50 MG/ML
25 INJECTION INTRAMUSCULAR; INTRAVENOUS EVERY 6 HOURS PRN
Status: DISCONTINUED | OUTPATIENT
Start: 2024-06-15 | End: 2024-06-21 | Stop reason: HOSPADM

## 2024-06-15 RX ORDER — SODIUM CHLORIDE 9 MG/ML
INJECTION, SOLUTION INTRAMUSCULAR; INTRAVENOUS; SUBCUTANEOUS
Status: DISCONTINUED | OUTPATIENT
Start: 2024-06-15 | End: 2024-06-15 | Stop reason: HOSPADM

## 2024-06-15 RX ORDER — ENEMA 19; 7 G/133ML; G/133ML
1 ENEMA RECTAL
Status: DISCONTINUED | OUTPATIENT
Start: 2024-06-15 | End: 2024-06-21 | Stop reason: HOSPADM

## 2024-06-15 RX ORDER — AMOXICILLIN 250 MG
1 CAPSULE ORAL NIGHTLY
Status: DISCONTINUED | OUTPATIENT
Start: 2024-06-15 | End: 2024-06-21 | Stop reason: HOSPADM

## 2024-06-15 RX ORDER — ONDANSETRON 2 MG/ML
INJECTION INTRAMUSCULAR; INTRAVENOUS PRN
Status: DISCONTINUED | OUTPATIENT
Start: 2024-06-15 | End: 2024-06-15 | Stop reason: SURG

## 2024-06-15 RX ORDER — DEXAMETHASONE SODIUM PHOSPHATE 4 MG/ML
INJECTION, SOLUTION INTRA-ARTICULAR; INTRALESIONAL; INTRAMUSCULAR; INTRAVENOUS; SOFT TISSUE PRN
Status: DISCONTINUED | OUTPATIENT
Start: 2024-06-15 | End: 2024-06-15 | Stop reason: SURG

## 2024-06-15 RX ORDER — VANCOMYCIN HYDROCHLORIDE 1 G/20ML
INJECTION, POWDER, LYOPHILIZED, FOR SOLUTION INTRAVENOUS
Status: COMPLETED | OUTPATIENT
Start: 2024-06-15 | End: 2024-06-15

## 2024-06-15 RX ORDER — HALOPERIDOL 5 MG/ML
1 INJECTION INTRAMUSCULAR EVERY 6 HOURS PRN
Status: DISCONTINUED | OUTPATIENT
Start: 2024-06-15 | End: 2024-06-21 | Stop reason: HOSPADM

## 2024-06-15 RX ORDER — TRANEXAMIC ACID 100 MG/ML
INJECTION, SOLUTION INTRAVENOUS PRN
Status: DISCONTINUED | OUTPATIENT
Start: 2024-06-15 | End: 2024-06-15 | Stop reason: SURG

## 2024-06-15 RX ORDER — OXYCODONE HYDROCHLORIDE 10 MG/1
10 TABLET ORAL
Status: DISCONTINUED | OUTPATIENT
Start: 2024-06-15 | End: 2024-06-16

## 2024-06-15 RX ORDER — BUPIVACAINE HYDROCHLORIDE AND EPINEPHRINE 2.5; 5 MG/ML; UG/ML
INJECTION, SOLUTION EPIDURAL; INFILTRATION; INTRACAUDAL; PERINEURAL
Status: DISCONTINUED | OUTPATIENT
Start: 2024-06-15 | End: 2024-06-15 | Stop reason: HOSPADM

## 2024-06-15 RX ORDER — ONDANSETRON 2 MG/ML
4 INJECTION INTRAMUSCULAR; INTRAVENOUS
Status: DISCONTINUED | OUTPATIENT
Start: 2024-06-15 | End: 2024-06-15 | Stop reason: HOSPADM

## 2024-06-15 RX ORDER — CEFAZOLIN SODIUM 1 G/3ML
INJECTION, POWDER, FOR SOLUTION INTRAMUSCULAR; INTRAVENOUS PRN
Status: DISCONTINUED | OUTPATIENT
Start: 2024-06-15 | End: 2024-06-15 | Stop reason: SURG

## 2024-06-15 RX ORDER — HYDROMORPHONE HYDROCHLORIDE 1 MG/ML
0.5 INJECTION, SOLUTION INTRAMUSCULAR; INTRAVENOUS; SUBCUTANEOUS
Status: DISCONTINUED | OUTPATIENT
Start: 2024-06-15 | End: 2024-06-16

## 2024-06-15 RX ORDER — OXYCODONE HCL 5 MG/5 ML
10 SOLUTION, ORAL ORAL
Status: COMPLETED | OUTPATIENT
Start: 2024-06-15 | End: 2024-06-15

## 2024-06-15 RX ORDER — LIDOCAINE HYDROCHLORIDE 20 MG/ML
INJECTION, SOLUTION EPIDURAL; INFILTRATION; INTRACAUDAL; PERINEURAL PRN
Status: DISCONTINUED | OUTPATIENT
Start: 2024-06-15 | End: 2024-06-15 | Stop reason: SURG

## 2024-06-15 RX ORDER — ACETAMINOPHEN 500 MG
1000 TABLET ORAL EVERY 6 HOURS
Status: DISPENSED | OUTPATIENT
Start: 2024-06-15 | End: 2024-06-20

## 2024-06-15 RX ORDER — ENOXAPARIN SODIUM 100 MG/ML
40 INJECTION SUBCUTANEOUS DAILY
Status: DISCONTINUED | OUTPATIENT
Start: 2024-06-15 | End: 2024-06-16

## 2024-06-15 RX ORDER — POLYETHYLENE GLYCOL 3350 17 G/17G
1 POWDER, FOR SOLUTION ORAL 2 TIMES DAILY PRN
Status: DISCONTINUED | OUTPATIENT
Start: 2024-06-15 | End: 2024-06-21 | Stop reason: HOSPADM

## 2024-06-15 RX ORDER — SODIUM CHLORIDE, SODIUM LACTATE, POTASSIUM CHLORIDE, CALCIUM CHLORIDE 600; 310; 30; 20 MG/100ML; MG/100ML; MG/100ML; MG/100ML
INJECTION, SOLUTION INTRAVENOUS CONTINUOUS
Status: DISCONTINUED | OUTPATIENT
Start: 2024-06-15 | End: 2024-06-15 | Stop reason: HOSPADM

## 2024-06-15 RX ADMIN — FENTANYL CITRATE 50 MCG: 50 INJECTION, SOLUTION INTRAMUSCULAR; INTRAVENOUS at 07:49

## 2024-06-15 RX ADMIN — ENOXAPARIN SODIUM 40 MG: 100 INJECTION SUBCUTANEOUS at 17:21

## 2024-06-15 RX ADMIN — VANCOMYCIN HYDROCHLORIDE 1000 MG: 5 INJECTION, POWDER, LYOPHILIZED, FOR SOLUTION INTRAVENOUS at 06:55

## 2024-06-15 RX ADMIN — FENTANYL CITRATE 50 MCG: 50 INJECTION, SOLUTION INTRAMUSCULAR; INTRAVENOUS at 10:01

## 2024-06-15 RX ADMIN — SENNOSIDES AND DOCUSATE SODIUM 1 TABLET: 50; 8.6 TABLET ORAL at 20:27

## 2024-06-15 RX ADMIN — AMPICILLIN AND SULBACTAM 3 G: 1; 2 INJECTION, POWDER, FOR SOLUTION INTRAMUSCULAR; INTRAVENOUS at 05:20

## 2024-06-15 RX ADMIN — ONDANSETRON 4 MG: 2 INJECTION INTRAMUSCULAR; INTRAVENOUS at 08:37

## 2024-06-15 RX ADMIN — OXYCODONE HYDROCHLORIDE 10 MG: 5 SOLUTION ORAL at 09:41

## 2024-06-15 RX ADMIN — DULOXETINE HYDROCHLORIDE 30 MG: 30 CAPSULE, DELAYED RELEASE ORAL at 17:21

## 2024-06-15 RX ADMIN — CEFAZOLIN 2 G: 1 INJECTION, POWDER, FOR SOLUTION INTRAMUSCULAR; INTRAVENOUS at 07:40

## 2024-06-15 RX ADMIN — CEFAZOLIN 2 G: 2 INJECTION, POWDER, FOR SOLUTION INTRAMUSCULAR; INTRAVENOUS at 17:26

## 2024-06-15 RX ADMIN — LIDOCAINE HYDROCHLORIDE 60 MG: 20 INJECTION, SOLUTION EPIDURAL; INFILTRATION; INTRACAUDAL at 07:38

## 2024-06-15 RX ADMIN — PROPOFOL 120 MG: 10 INJECTION, EMULSION INTRAVENOUS at 07:38

## 2024-06-15 RX ADMIN — DEXAMETHASONE SODIUM PHOSPHATE 4 MG: 4 INJECTION INTRA-ARTICULAR; INTRALESIONAL; INTRAMUSCULAR; INTRAVENOUS; SOFT TISSUE at 07:40

## 2024-06-15 RX ADMIN — SODIUM CHLORIDE, POTASSIUM CHLORIDE, SODIUM LACTATE AND CALCIUM CHLORIDE: 600; 310; 30; 20 INJECTION, SOLUTION INTRAVENOUS at 06:51

## 2024-06-15 RX ADMIN — TRANEXAMIC ACID 1000 MG: 100 INJECTION, SOLUTION INTRAVENOUS at 07:40

## 2024-06-15 RX ADMIN — ROCURONIUM BROMIDE 50 MG: 10 INJECTION, SOLUTION INTRAVENOUS at 07:38

## 2024-06-15 RX ADMIN — FENTANYL CITRATE 50 MCG: 50 INJECTION, SOLUTION INTRAMUSCULAR; INTRAVENOUS at 07:38

## 2024-06-15 RX ADMIN — ACETAMINOPHEN 1000 MG: 500 TABLET, FILM COATED ORAL at 23:39

## 2024-06-15 RX ADMIN — GABAPENTIN 600 MG: 300 CAPSULE ORAL at 20:28

## 2024-06-15 RX ADMIN — FENTANYL CITRATE 50 MCG: 50 INJECTION, SOLUTION INTRAMUSCULAR; INTRAVENOUS at 08:49

## 2024-06-15 RX ADMIN — SUGAMMADEX 200 MG: 100 INJECTION, SOLUTION INTRAVENOUS at 08:44

## 2024-06-15 RX ADMIN — ACETAMINOPHEN 1000 MG: 500 TABLET, FILM COATED ORAL at 17:20

## 2024-06-15 RX ADMIN — GABAPENTIN 600 MG: 300 CAPSULE ORAL at 15:44

## 2024-06-15 RX ADMIN — CEFAZOLIN 2 G: 2 INJECTION, POWDER, FOR SOLUTION INTRAMUSCULAR; INTRAVENOUS at 21:48

## 2024-06-15 RX ADMIN — AMPICILLIN AND SULBACTAM 3 G: 1; 2 INJECTION, POWDER, FOR SOLUTION INTRAMUSCULAR; INTRAVENOUS at 00:24

## 2024-06-15 RX ADMIN — FENTANYL CITRATE 50 MCG: 50 INJECTION, SOLUTION INTRAMUSCULAR; INTRAVENOUS at 09:51

## 2024-06-15 ASSESSMENT — ENCOUNTER SYMPTOMS
NAUSEA: 0
VOMITING: 0

## 2024-06-15 ASSESSMENT — PAIN DESCRIPTION - PAIN TYPE
TYPE: SURGICAL PAIN

## 2024-06-15 NOTE — OP REPORT
Operative Report    Date: 6/15/2024    Surgeon: Fritz Boggs M.D.    Assistant: CANDACE Martinez    Anesthesiologist: Iván Rodriguez M.D.     Anesthesia Type: General     Pre-operative Diagnosis: Infected left total knee replacement    Post-operative Diagnosis: Infected left total knee replacement    Procedure: Irrigation and debridement with complete synovectomy and revision Left  Total Knee Arthroplasty-tibial poly exchange only    EBL: 300    Drains: none    Implants: Removed a [employer] size 5-6 by 9 mm CR tibia inserted and replaced with a 5-6 and 11 mm CR insert      Indications: Patient has several days of acute pain and swelling in the left knee.  Previous knee replacement 15 years ago.  Aspiration is positive for high white cells and Staph aureus on culture.  He was therefore indicated for debridement and irrigation of the left knee.    A walker was provided to assist with ambulation following the above surgery. The patient understands the importance of using the device to safely ambulate until they regain strength and stability.     Findings:  No gross purulence in the knee joint.  Well-fixed components.    Procedure in detail: Patient was brought to the operating room and anesthesia was administered.  Antibiotics and tranexamic acid were given IV.  The knee was prepped and draped in the usual sterile manner, leg was exsanguinated and the tourniquet inflated.  Timeout was called.    We used the previous anterior incision which was centered a little bit lateral.  We elevated full-thickness skin flaps Enedina medial parapatellar arthrotomy.  He had a previous patellectomy but we just went along approximate line there.  There is gross purulence in the joint 3 synovial cultures were sent.  Time was taken to a complete synovectomy and suprapatellar pouch medial lateral gutters.  The polyethylene was removed and posterior debridement was carried out.    We did multiple areas cases with many  liters of saline followed by multiple debridements to remove necrotic purulent tissue.  The components are all well-seated with intact cement mantle.    We did soak with dilute Betadine several times.  Ultimately we released the tourniquet I took time to stop all the bleeders from her debridement.  Another Betadine soaked debridement and irrigation was carried out.  After trialing and felt the 11 mm insert provided the best stability and that it was then locked into position.    We did use Irrisept to do the final irrigation and left that in contact with the tissues.  2 g of vancomycin powder were placed.  We closed the tendon with interrupted #1 Vicryl and also a running #2 Quill.  The knee was then placed in flexion.  We injected local anesthetic with tranexamic acid intra-articularly.  The skin was closed with interrupted 2-0 Monocryl and staples.  A Prevena dressing with a compression wrap and the mobilizer was placed.    Patient was stable during the procedure and at the time of this dictation.

## 2024-06-15 NOTE — PROGRESS NOTES
4 Eyes Skin Assessment Completed by Bladimir RN and Oliver RN.    Head WDL  Ears WDL  Nose WDL  Mouth WDL  Neck WDL  Breast/Chest Scab  Shoulder Blades WDL  Spine WDL  (R) Arm/Elbow/Hand WDL  (L) Arm/Elbow/Hand WDL  Abdomen Scab  Groin WDL  Scrotum/Coccyx/Buttocks WDL  (R) Leg Scar Healing wound from dermatology surgery, discoloration  (L) Leg Redness, Blanching, and Edema Puncture on knee from fluid extraction, discoloration.  (R) Heel/Foot/Toe Discoloration  (L) Heel/Foot/Toe Discoloration          Devices In Places nasal cannula, BP cuff, pulse ox,       Interventions In Place NC W/Ear Foams and Pillows    Possible Skin Injury No    Pictures Uploaded Into Epic Yes  Wound Consult Placed N/A  RN Wound Prevention Protocol Ordered No

## 2024-06-15 NOTE — H&P
"Hospital Medicine History & Physical Note    Date of Service  6/14/2024    Primary Care Physician  Gerhard Carrillo M.D.    Consultants  orthopedics    Specialist Names: Dr. Fritz Boggs    Code Status  DNAR/DNI    Chief Complaint  Chief Complaint   Patient presents with    Sent by MD     Pt. Reports he was sent here by MD for L knee infection. Reports he is \"supposed to have surgery to clean it\". Pt. Reports chills.       History of Presenting Illness  Zachary Moore is a 74 y.o. male who presented 6/14/2024 with abnormal findings of arthrocentesis on a joint aspirate done yesterday.  Patient went to the [facility] yesterday where he was evaluated and fluid was obtained from the left knee because of pain and swelling.  The fluid shows an infection and thus he was told to come to the emergency room today to be admitted for antibiotics and he is planned to have surgery tomorrow.  He will be n.p.o. at midnight.  Will start him on Unasyn and Zyvox.  Will continue at this point with pain management.  Will monitor renal functions and avoid nephrotoxic medications and hydrate him.  Sodium was also low at 127 so he will need hydration and monitoring of sodium levels..    I discussed the plan of care with patient, family, bedside RN, pharmacy, and emergency room physician Dr. Hermilo Potts .    Review of Systems  Review of Systems   Constitutional:  Positive for chills. Negative for diaphoresis and fever.   HENT: Negative.     Eyes: Negative.  Negative for double vision.   Respiratory: Negative.  Negative for cough, hemoptysis and wheezing.    Cardiovascular: Negative.  Negative for chest pain, palpitations and leg swelling.   Gastrointestinal: Negative.  Negative for abdominal pain, blood in stool, constipation, diarrhea, heartburn, nausea and vomiting.   Genitourinary: Negative.  Negative for frequency, hematuria and urgency.   Musculoskeletal: Negative.  Negative for joint pain (Left knee).   Skin: Negative.  Negative for " itching and rash.   Neurological: Negative.  Negative for dizziness, focal weakness, seizures, loss of consciousness and headaches.   Endo/Heme/Allergies: Negative.  Does not bruise/bleed easily.   Psychiatric/Behavioral: Negative.  Negative for suicidal ideas. The patient is not nervous/anxious.    All other systems reviewed and are negative.      Past Medical History   has a past medical history of Arthritis, Cancer (HCC), and Hypertension.    Surgical History   has a past surgical history that includes shoulder surgery; knee arthroplasty total (2009); knee arthroplasty total (2013); and fusion, spine, lumbar, plif (1/28/2014).     Family History  family history is not on file.   Family history reviewed with patient. There is no family history that is pertinent to the chief complaint.     Social History   reports that he has never smoked. He has never used smokeless tobacco. He reports current alcohol use. He reports that he does not use drugs.    Allergies  No Known Allergies    Medications  Prior to Admission Medications   Prescriptions Last Dose Informant Patient Reported? Taking?   DULoxetine (CYMBALTA) 30 MG Cap DR Particles 6/14/2024 at AM Significant Other Yes Yes   Sig: Take 30 mg by mouth 2 times a day.   amLODIPine (NORVASC) 10 MG Tab 6/14/2024 at AM Significant Other No Yes   Sig: Take 1 Tablet by mouth every day.   buprenorphine (SUBUTEX) 8 MG SL Tab 6/14/2024 at AFTERNOON Significant Other Yes Yes   Sig: Place 8 mg under the tongue in the morning, at noon, and at bedtime.   celecoxib (CELEBREX) 200 MG Cap 6/14/2024 at AM Significant Other Yes Yes   Sig: Take 200 mg by mouth every day.   cephALEXin (KEFLEX) 500 MG Cap 6/14/2024 at AFTERNOON Significant Other No Yes   Sig: Take 1 Capsule by mouth 4 times a day for 7 days.   gabapentin (NEURONTIN) 600 MG tablet 6/14/2024 at AFTERNOON Significant Other Yes Yes   Sig: Take 600 mg by mouth 4 times a day.   hydrALAZINE (APRESOLINE) 25 MG Tab 6/14/2024 at AM  Significant Other Yes Yes   Sig: Take 25 mg by mouth 2 times a day.   omeprazole (PRILOSEC) 40 MG delayed-release capsule 6/13/2024 at AM Significant Other Yes Yes   Sig: Take 40 mg by mouth every day.   sulfamethoxazole-trimethoprim (BACTRIM DS) 800-160 MG tablet 6/14/2024 at AM Significant Other No Yes   Sig: Take 1 Tablet by mouth 2 times a day for 10 days.      Facility-Administered Medications: None       Physical Exam  Temp:  [37.9 °C (100.3 °F)] 37.9 °C (100.3 °F)  Pulse:  [85] 85  Resp:  [14] 14  BP: (145)/(68) 145/68  SpO2:  [92 %] 92 %  Blood Pressure : (!) 145/68   Temperature: 37.9 °C (100.3 °F)   Pulse: 85   Respiration: 14   Pulse Oximetry: 92 %       Physical Exam  Vitals and nursing note reviewed. Exam conducted with a chaperone present.   Constitutional:       General: He is not in acute distress.     Appearance: Normal appearance. He is well-developed and normal weight. He is not ill-appearing, toxic-appearing or diaphoretic.   HENT:      Head: Normocephalic and atraumatic.      Right Ear: External ear normal.      Left Ear: External ear normal.      Nose: Nose normal.      Mouth/Throat:      Mouth: Mucous membranes are moist.      Pharynx: Oropharynx is clear.   Eyes:      Extraocular Movements: Extraocular movements intact.      Conjunctiva/sclera: Conjunctivae normal.      Pupils: Pupils are equal, round, and reactive to light.   Neck:      Thyroid: No thyromegaly.      Vascular: No JVD.   Cardiovascular:      Rate and Rhythm: Normal rate and regular rhythm.      Pulses: Normal pulses.      Heart sounds: Normal heart sounds.   Pulmonary:      Effort: Pulmonary effort is normal.      Breath sounds: Normal breath sounds.   Chest:      Chest wall: No tenderness.   Abdominal:      General: Abdomen is flat. Bowel sounds are normal. There is no distension.      Palpations: Abdomen is soft. There is no mass.      Tenderness: There is no abdominal tenderness. There is no guarding or rebound.  "  Musculoskeletal:      Cervical back: Normal range of motion and neck supple.      Left knee: Swelling and effusion present. Decreased range of motion. Tenderness present.      Right lower leg: No edema.      Left lower leg: No edema.      Comments: Knee is warm to the touch   Lymphadenopathy:      Cervical: No cervical adenopathy.   Skin:     General: Skin is warm and dry.      Capillary Refill: Capillary refill takes more than 3 seconds.      Findings: No rash.   Neurological:      General: No focal deficit present.      Mental Status: He is alert and oriented to person, place, and time. Mental status is at baseline.      GCS: GCS eye subscore is 4. GCS verbal subscore is 5. GCS motor subscore is 6.      Cranial Nerves: No cranial nerve deficit.      Deep Tendon Reflexes: Reflexes are normal and symmetric.   Psychiatric:         Mood and Affect: Mood normal.         Behavior: Behavior normal.         Thought Content: Thought content normal.         Judgment: Judgment normal.         Laboratory:  Recent Labs     06/14/24  1548   WBC 14.9*   RBC 4.18*   HEMOGLOBIN 12.2*   HEMATOCRIT 36.5*   MCV 87.3   MCH 29.2   MCHC 33.4   RDW 44.7   PLATELETCT 178   MPV 10.7     Recent Labs     06/14/24  1548   SODIUM 127*   POTASSIUM 3.5*   CHLORIDE 91*   CO2 21   GLUCOSE 117*   BUN 18   CREATININE 1.47*   CALCIUM 9.0     Recent Labs     06/14/24  1548   ALTSGPT 10   ASTSGOT 24   ALKPHOSPHAT 81   TBILIRUBIN 0.5   GLUCOSE 117*         No results for input(s): \"NTPROBNP\" in the last 72 hours.      No results for input(s): \"TROPONINT\" in the last 72 hours.    Imaging:  DX-CHEST-PORTABLE (1 VIEW)   Final Result      Mild, diffuse interstitial prominence.          X-Ray:  I have personally reviewed the images and compared with prior images.  EKG:  I have personally reviewed the images and compared with prior images.    Assessment/Plan:  Justification for Admission Status  I anticipate this patient will require at least two midnights " for appropriate medical management, necessitating inpatient admission because patient has septic arthritis of the left knee and will require at least 48 hours of inpatient management including antibiotics and surgery    Patient will need a Med/Surg bed on MEDICAL service .  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    * Pyogenic arthritis of left knee joint (HCC)- (present on admission)  Assessment & Plan  Patient has bilateral prosthetic knees.  His left knee has been acting up and giving him a hard time so he went to the [facility] yesterday, they did an arthrocentesis which is positive for MSSA.  Dr. Boggs told him to come to the hospital and plan is to do intraoperative management tomorrow.  N.p.o. at midnight  Fluid resuscitation  Initiate antibiotics with Unasyn and Zyvox, avoid nephrotoxic medications like vancomycin, patient's kidney functions are chronically abnormal with stage IIIa kidney injury.  Pain management as needed  PT OT evaluation after surgery    Leukocytosis- (present on admission)  Assessment & Plan  Patient's white blood cell count currently 14,900.  This is secondary to the current infection.  This should improve as antibiotics and surgical intervention cleared the infection.    Hypokalemia- (present on admission)  Assessment & Plan  Hypokalemia at this point at 3.5  Give potassium supplementation    Alcohol use disorder, moderate, dependence (HCC)- (present on admission)  Assessment & Plan  Monitor alcohol withdrawal  He says he drinks mildly at home but daily    Hyponatremia- (present on admission)  Assessment & Plan  Moderate hyponatremia at 127, give fluid resuscitation monitor sodium levels    Hypertension- (present on admission)  Assessment & Plan  Optimize blood pressure management keep systolic blood pressure less than 140 diastolic under 90  Norvasc 10 mg daily, hydralazine 25 mg twice daily, labetalol as needed    DNR (do not resuscitate)- (present on  admission)  Assessment & Plan  Discussed advance directives with the patient he wishes to be DO NOT RESUSCITATE CODE STATUS.    Stage 3a chronic kidney disease- (present on admission)  Assessment & Plan  Monitor renal functions and avoid nephrotoxic medications  Give fluid resuscitation as he has recently bumped up his kidney function    Normochromic normocytic anemia- (present on admission)  Assessment & Plan  Monitor H&H if drops below 7 or 21 transfuse    Vitamin D deficiency disease- (present on admission)  Assessment & Plan  Vitamin D supplementation        VTE prophylaxis: SCDs/TEDs

## 2024-06-15 NOTE — ANESTHESIA PROCEDURE NOTES
Airway    Date/Time: 6/15/2024 7:39 AM    Performed by: Iván Rodriguez M.D.  Authorized by: Iván Rodriguez M.D.    Location:  OR  Urgency:  Elective  Difficult Airway: No    Indications for Airway Management:  Anesthesia      Spontaneous Ventilation: absent    Sedation Level:  Deep  Preoxygenated: Yes    Patient Position:  Sniffing  Mask Difficulty Assessment:  1 - vent by mask  Final Airway Type:  Endotracheal airway  Final Endotracheal Airway:  ETT  Cuffed: Yes    Technique Used for Successful ETT Placement:  Direct laryngoscopy    Insertion Site:  Oral  Blade Type:  Messer  Laryngoscope Blade/Videolaryngoscope Blade Size:  2  ETT Size (mm):  7.5  Measured from:  Teeth  ETT to Teeth (cm):  23  Placement Verified by: auscultation and capnometry    Cormack-Lehane Classification:  Grade IIa - partial view of glottis  Number of Attempts at Approach:  1

## 2024-06-15 NOTE — ASSESSMENT & PLAN NOTE
Cultures 6/13 was MSSA  ID recommending continue his home infusion, checking into insurance coverage  Blood culture from 2:00 on the 17th just turned positive, therefore will recheck blood cultures-line can likely not be placed until 6/20 a.m. at the earliest if these blood cultures are negative  Outpatient infusion center not an option in [place] because patient lives in [place]. [place]  If he cannot have home infusion, can check to see if his primary care provider can order outpatient infusion through the hospital in [place].  Option 3 would be SNF but would like to avoid this if at all possible    6/18: ID following patient, we appreciate further recommendations    6/19: ID following the patient, they have placed orders for outpatient infusion most likely at home with PICC line.  Pending placement, we appreciate further recommendations by case management.    6/20: Pending placement for continued antibiotics.  While hospitalized continue antibiotics as per ID recommendation.

## 2024-06-15 NOTE — PROGRESS NOTES
4 Eyes Skin Assessment Completed by Taylor RN and Julita RN.    Head WDL  Ears WDL  Nose WDL  Mouth WDL  Neck WDL  Breast/Chest WDL  Shoulder Blades WDL  Spine WDL  (R) Arm/Elbow/Hand WDL  (L) Arm/Elbow/Hand WDL  Abdomen Scab  Groin WDL  Scrotum/Coccyx/Buttocks WDL  (R) Leg Incision, dermatology procedure  (L) Leg Incision, prevena and immobilizer in place  (R) Heel/Foot/Toe Discoloration  (L) Heel/Foot/Toe Discoloration          Devices In Places Blood Pressure Cuff and Pulse Ox      Interventions In Place Pillows    Possible Skin Injury No    Pictures Uploaded Into Epic N/A  Wound Consult Placed N/A  RN Wound Prevention Protocol Ordered No

## 2024-06-15 NOTE — CONSULTS
Surgery Orthopedic Consultation Note    Date  6/15/2024    Primary Care Physician  Gerhard Carrillo M.D.    CC  Left knee pain    HPI  This is a 74 y.o. male who presented with acute swelling and pain in left knee approximately 4 days ago.  He was seen 2 days ago in the urgent care [facility] where aspiration was done.  Within a day he had grown Staph aureus.  His left knee had been replaced in 2009 and as a young adult he has a papillectomy performed.  His knee was performing regularly without issues couple weeks ago.    He has had a spontaneous sore on the right foot and ankle that is been treated in wound care.  He has dressing on that.  He is also had some recent Mohs surgery that became infected but did ultimately heal on his left leg.    He lives up and [place] and has been active doing things.  I did replace his right knee about 10 years ago and that knee is doing fine right now    Past Medical History:   Diagnosis Date    Arthritis     knees and spine    Cancer (HCC)     skin cancer ongoing    Hypertension        Past Surgical History:   Procedure Laterality Date    FUSION, SPINE, LUMBAR, PLIF  1/28/2014    Performed by Elder Chopra M.D. at SURGERY [facility]    KNEE ARTHROPLASTY TOTAL  2013    Right knee-Dr. Boggs    KNEE ARTHROPLASTY TOTAL  2009    left knee--Dr. Snider    SHOULDER SURGERY      right-sided       Current Facility-Administered Medications   Medication Dose Route Frequency Provider Last Rate Last Admin    ceFAZolin (Ancef) injection 2 g  2 g Intravenous Once Fritz Boggs M.D.        vancomycin 1000 mg in 250 mL NS IVPB Premix  1,000 mg Intravenous Once PRN Fritz Boggs M.D.        [MAR Hold] amLODIPine (Norvasc) tablet 10 mg  10 mg Oral DAILY Rosalba Thorne M.D.        [MAR Hold] celecoxib (CeleBREX) capsule 200 mg  200 mg Oral DAILY Rosalba Thorne M.D.        [MAR Hold] DULoxetine (Cymbalta) capsule 30 mg  30 mg Oral BID Rosalba Thorne M.D.   30 mg at 06/14/24 9079     [MAR Hold] gabapentin (Neurontin) capsule 600 mg  600 mg Oral TID Rosalba Thorne M.D.   600 mg at 06/14/24 1856    [MAR Hold] hydrALAZINE (Apresoline) tablet 25 mg  25 mg Oral BID Rosalba Thorne M.D.   25 mg at 06/14/24 1856    [MAR Hold] omeprazole (PriLOSEC) capsule 40 mg  40 mg Oral DAILY Rosalba Thorne M.D.        NS infusion   Intravenous Continuous Rosalba Thorne M.D.   Continue to Floor at 06/15/24 0000    [MAR Hold] acetaminophen (Tylenol) tablet 650 mg  650 mg Oral Q6HRS PRN Rosalba Thorne M.D.   650 mg at 06/14/24 2022    [MAR Hold] Pharmacy Consult Request ...Pain Management Review 1 Each  1 Each Other PHARMACY TO DOSE Rosalba Thorne M.D.        [MAR Hold] oxyCODONE immediate-release (Roxicodone) tablet 2.5 mg  2.5 mg Oral Q3HRS PRN Rosalba Thorne M.D.        Or    [MAR Hold] oxyCODONE immediate-release (Roxicodone) tablet 5 mg  5 mg Oral Q3HRS PRN Rosalba Thorne M.D.        Or    [MAR Hold] HYDROmorphone (Dilaudid) injection 0.25 mg  0.25 mg Intravenous Q3HRS PRN Rosalba Thorne M.D.        [MAR Hold] labetalol (Normodyne/Trandate) injection 10 mg  10 mg Intravenous Q4HRS PRN Rosalba Thorne M.D.        [MAR Hold] senna-docusate (Pericolace Or Senokot S) 8.6-50 MG per tablet 2 Tablet  2 Tablet Oral Q EVENING Rosalba Thorne M.D.        And    [MAR Hold] polyethylene glycol/lytes (Miralax) Packet 1 Packet  1 Packet Oral QDAY PRN Rosalba Thorne M.D.        [MAR Hold] ampicillin/sulbactam (Unasyn) 3 g in  mL IVPB  3 g Intravenous Q6HRS Rosalba Thorne M.D. 200 mL/hr at 06/15/24 0520 3 g at 06/15/24 0520    [MAR Hold] linezolid (Zyvox) tablet 600 mg  600 mg Oral Q12HRS Rosalba Thorne M.D.   600 mg at 06/14/24 1856    [MAR Hold] ondansetron (Zofran) syringe/vial injection 4 mg  4 mg Intravenous Q8HRS PRN Rosalba Thorne M.D.        [MAR Hold] ondansetron (Zofran ODT) dispertab 4 mg  4 mg Oral Q8HRS PRN Rosalba Thorne M.D.        [MAR Hold] potassium chloride SA (Kdur) tablet 20 mEq  20 mEq Oral DAILY Rosalba  KEVIN Thorne   20 mEq at 06/14/24 1856       Social History     Socioeconomic History    Marital status:      Spouse name: Not on file    Number of children: Not on file    Years of education: Not on file    Highest education level: Not on file   Occupational History    Not on file   Tobacco Use    Smoking status: Never    Smokeless tobacco: Never   Vaping Use    Vaping status: Never Used   Substance and Sexual Activity    Alcohol use: Yes     Comment: daily    Drug use: No    Sexual activity: Yes     Partners: Female     Birth control/protection: Post-Menopausal   Other Topics Concern    Not on file   Social History Narrative    Not on file     Social Determinants of Health     Financial Resource Strain: Not on file   Food Insecurity: No Food Insecurity (6/14/2024)    Hunger Vital Sign     Worried About Running Out of Food in the Last Year: Never true     Ran Out of Food in the Last Year: Never true   Transportation Needs: No Transportation Needs (6/14/2024)    PRAPARE - Transportation     Lack of Transportation (Medical): No     Lack of Transportation (Non-Medical): No   Physical Activity: Not on file   Stress: Not on file   Social Connections: Not on file   Intimate Partner Violence: Not At Risk (6/14/2024)    Humiliation, Afraid, Rape, and Kick questionnaire     Fear of Current or Ex-Partner: No     Emotionally Abused: No     Physically Abused: No     Sexually Abused: No   Housing Stability: Low Risk  (6/14/2024)    Housing Stability Vital Sign     Unable to Pay for Housing in the Last Year: No     Number of Places Lived in the Last Year: 1     Unstable Housing in the Last Year: No       No family history on file.    Allergies  Patient has no known allergies.    Review of Systems  Negative except for open wound right medial foot and ankle    Physical Exam  Constitutional:       General: He is not in acute distress.     Appearance: Normal appearance. He is normal weight.   HENT:      Head: Normocephalic.    Eyes:      Extraocular Movements: Extraocular movements intact.   Cardiovascular:      Rate and Rhythm: Normal rate and regular rhythm.   Pulmonary:      Effort: Pulmonary effort is normal.   Musculoskeletal:      Comments: Left knee has well-healed anterior longitudinal incision and transverse incision.  It is warm with a very large effusion but no cellulitic type changes no draining ulcers.  Any attempts at range of motion because of pain.  Ligament stability is grossly fine.  No pain with range of motion of the left hip.    He has a lot of skin lesions throughout his body.  And there is a superficial nonhealing wound right foot and ankle.    I cannot palpate pulses in either foot but his toes are warm and pink with decent capillary refill.   Skin:     General: Skin is warm.      Comments: Many lesions throughout his whole body   Neurological:      General: No focal deficit present.      Mental Status: He is alert.   Psychiatric:         Mood and Affect: Mood normal.         Vital Signs  Blood Pressure : 133/58   Temperature: 36.9 °C (98.5 °F)   Pulse: 83   Respiration: 20   Pulse Oximetry: (!) 86 %       Labs:  Recent Labs     06/14/24  1548 06/15/24  0136   WBC 14.9* 10.8   RBC 4.18* 3.79*   HEMOGLOBIN 12.2* 11.2*   HEMATOCRIT 36.5* 35.7*   MCV 87.3 94.2   MCH 29.2 29.6   MCHC 33.4 31.4*   RDW 44.7 47.9   PLATELETCT 178 163*   MPV 10.7 9.5     Recent Labs     06/14/24  1548 06/15/24  0136   SODIUM 127* 129*   POTASSIUM 3.5* 4.3   CHLORIDE 91* 95*   CO2 21 18*   GLUCOSE 117* 122*   BUN 18 20   CREATININE 1.47* 1.47*   CALCIUM 9.0 8.2*         Recent Labs     06/14/24  1548   ASTSGOT 24   ALTSGPT 10   TBILIRUBIN 0.5   ALKPHOSPHAT 81   GLOBULIN 3.9*       Radiology:  DX-CHEST-PORTABLE (1 VIEW)   Final Result      Mild, diffuse interstitial prominence.        X-ray of his left knee show the cemented total knee components are intact without any bone loss or loosening or significant polyethylene wear.  Patellectomy  me has been performed.  A large effusion is present.    Assessment/Plan:  1.  Infected left total knee arthroplasty, 4 days of symptoms    2.  Nonhealing skin lesion right foot and ankle    Plan will be for I&D of his left knee with complete synovectomy debridement and exchange of polyethylene liner.  He is only had symptoms for few days syllable tubing this like it is an acute infection we will try to preserve his implant with intravenous antibiotics for 6 weeks followed by course of oral antibiotics.  He understands that there is a chance that this infection will recur he could ultimately require a two-stage revision.    Will also need to make sure his wound on the right ankle was addressed and heals up cassettes a very likely source of this seeded bacterial infection.    He understands the need to be in the hospital until all this can get sorted out to get an infectious disease doctor on the case.  Surgery is planned for this morning.

## 2024-06-15 NOTE — OR NURSING
0905: To PACU from OR via bed, sleeping, respirations spontaneous and non-labored with chin lift.   0910: rouses briefly, denies pain, no longer requires chin lift, returns to sleep  0920: Rouses to name, DB+C.  0930: O2 increased to 8LPM per oxymask for SpO2 89-90%. Pt rouses to name, c/o pain but returns to sleep so not medicated at this time.  0935: sleeping  0941: more awake, medicated po for 8/10 surgical pain.  0947: Xrays in progress  0951: Awake/alert, medicated IV for severe pain  1001: Medicated further for persistent pain  1010: No change  1020: Sleeping  1027: Rouses easily, pain decreasing. No change in surgical site assessment.Meets criteria to transfer to /floor.   1035:Transferred on O2 tank @ 546L

## 2024-06-15 NOTE — ANESTHESIA POSTPROCEDURE EVALUATION
Patient: Zachary Moore    Procedure Summary       Date: 06/15/24 Room / Location:  OR  / SURGERY [facility]    Anesthesia Start: 0731 Anesthesia Stop: 0910    Procedure: REVISION, TOTAL ARTHROPLASTY, KNEE, ALL COMPONENTS (Left: Knee) Diagnosis: (Infected left total knee arthroplasty)    Surgeons: Fritz Boggs M.D. Responsible Provider: Iván Rodriguez M.D.    Anesthesia Type: general, peripheral nerve block ASA Status: 2            Final Anesthesia Type: general, peripheral nerve block  Last vitals  BP   Blood Pressure : 133/58, NIBP: 148/60    Temp   36 °C (96.8 °F)    Pulse   81   Resp   12    SpO2   91 %      Anesthesia Post Evaluation    Patient location during evaluation: PACU  Patient participation: complete - patient participated  Level of consciousness: sleepy but conscious    Airway patency: patent  Anesthetic complications: no  Cardiovascular status: hemodynamically stable  Respiratory status: acceptable  Hydration status: balanced    PONV: none          There were no known notable events for this encounter.     Nurse Pain Score: 0 (NPRS)

## 2024-06-15 NOTE — PROGRESS NOTES
Assumed care from ER transport. Patient awake and alert, unable to ambulate to bed. Vancomycin was running per order. Patient was febrile, Tylenol administered. Patient stated he received pain medication in the ER. Reviewed plan of care for the night and verified the surgery schedule in the morning. Call light within reach.

## 2024-06-15 NOTE — ASSESSMENT & PLAN NOTE
Monitor renal functions and avoid nephrotoxic medications  Give fluid resuscitation as he has recently bumped up his kidney function

## 2024-06-15 NOTE — CARE PLAN
The patient is Stable - Low risk of patient condition declining or worsening    Shift Goals  Clinical Goals: Pain management, IV antibiotics, NPO at midnight  Patient Goals: Pain management, Comfort, Rest    Progress made toward(s) clinical / shift goals:  Patient has made no complaints of pain overnight. He has been compliant with NPO status and understands the plan for surgical intervention. He uses the call light appropriately to ask for assistance.    Patient is not progressing towards the following goals:

## 2024-06-15 NOTE — CONSULTS
Patient with history of remote knee replacement with recent swelling and pain.  He was seen in [place] [facility] with joint aspiration and cultures positive for MSSA.  He is now status post washout of the knee tibial poly exchange.    --- Continue cefazolin 2 g every 8 hours, stopped vancomycin, linezolid and Unasyn  --- Blood cultures ordered  --- Follow-up OR cultures    ID will see the patient in the a.m.    Kayce Dimas MD

## 2024-06-15 NOTE — ANESTHESIA PREPROCEDURE EVALUATION
Case: [id number] Date/Time: 06/15/24 0715    Procedure: REVISION, TOTAL ARTHROPLASTY, KNEE, ALL COMPONENTS (Left)    Location: [place] [id number] / SURGERY [facility]    Surgeons: Fritz Boggs M.D.            Relevant Problems   CARDIAC   (positive) Hypertension         (positive) UTE (acute kidney injury)/hypotension, hyponatremia, s/p femoral ORIF, anemia/GI bleed/odynophagia (HCC)   (positive) Stage 3a chronic kidney disease      Other   (positive) Alcohol use disorder, moderate, dependence (HCC)   (positive) Arthritis   (positive) Pyogenic arthritis of left knee joint (HCC)     Anes H&P:  PAST MEDICAL HISTORY:   74 y.o. male who presents for Procedure(s) (LRB):  REVISION, TOTAL ARTHROPLASTY, KNEE, ALL COMPONENTS (Left).  He has current and past medical problems significant for:    Past Medical History:   Diagnosis Date    Arthritis     knees and spine    Cancer (HCC)     skin cancer ongoing    Hypertension        SMOKING/ALCOHOL/RECREATIONAL DRUG USE:  Social History     Tobacco Use    Smoking status: Never    Smokeless tobacco: Never   Vaping Use    Vaping status: Never Used   Substance Use Topics    Alcohol use: Yes     Comment: daily    Drug use: No     Social History     Substance and Sexual Activity   Drug Use No       PAST SURGICAL HISTORY:  Past Surgical History:   Procedure Laterality Date    FUSION, SPINE, LUMBAR, PLIF  1/28/2014    Performed by Elder Chopra M.D. at SURGERY [facility]    KNEE ARTHROPLASTY TOTAL  2013    Right knee-Dr. Boggs    KNEE ARTHROPLASTY TOTAL  2009    left knee--Dr. Snider    SHOULDER SURGERY      right-sided       ALLERGIES:   No Known Allergies    MEDICATIONS:  No current facility-administered medications on file prior to encounter.     Current Outpatient Medications on File Prior to Encounter   Medication Sig Dispense Refill    hydrALAZINE (APRESOLINE) 25 MG Tab Take 25 mg by mouth 2 times a day.      omeprazole (PRILOSEC) 40 MG delayed-release capsule Take 40 mg by  mouth every day.      sulfamethoxazole-trimethoprim (BACTRIM DS) 800-160 MG tablet Take 1 Tablet by mouth 2 times a day for 10 days. 20 Tablet 0    cephALEXin (KEFLEX) 500 MG Cap Take 1 Capsule by mouth 4 times a day for 7 days. 28 Capsule 0    DULoxetine (CYMBALTA) 30 MG Cap DR Particles Take 30 mg by mouth 2 times a day.      celecoxib (CELEBREX) 200 MG Cap Take 200 mg by mouth every day.      gabapentin (NEURONTIN) 600 MG tablet Take 600 mg by mouth 4 times a day.      amLODIPine (NORVASC) 10 MG Tab Take 1 Tablet by mouth every day. 30 Tablet     buprenorphine (SUBUTEX) 8 MG SL Tab Place 8 mg under the tongue in the morning, at noon, and at bedtime.         LABS:  Lab Results   Component Value Date/Time    HEMOGLOBIN 11.2 (L) 06/15/2024 0136    HEMATOCRIT 35.7 (L) 06/15/2024 0136    WBC 10.8 06/15/2024 0136     Lab Results   Component Value Date/Time    SODIUM 129 (L) 06/15/2024 0136    POTASSIUM 4.3 06/15/2024 0136    CHLORIDE 95 (L) 06/15/2024 0136    CO2 18 (L) 06/15/2024 0136    GLUCOSE 122 (H) 06/15/2024 0136    BUN 20 06/15/2024 0136    CALCIUM 8.2 (L) 06/15/2024 0136         PREVIOUS ANESTHETICS: See EMR  __________________________________________      Physical Exam    Airway   Mallampati: II  TM distance: >3 FB  Neck ROM: full       Cardiovascular - normal exam  Rhythm: regular  Rate: normal  (-) murmur     Dental - normal exam  (+) lower dentures           Pulmonary - normal exam  Breath sounds clear to auscultation     Abdominal    Neurological - normal exam                   Anesthesia Plan    ASA 2       Plan - general and peripheral nerve block     Peripheral nerve block will be post-op pain control  Airway plan will be ETT          Induction: intravenous    Postoperative Plan: Postoperative administration of opioids is intended.    Pertinent diagnostic labs and testing reviewed    Informed Consent:    Anesthetic plan and risks discussed with patient.    Use of blood products discussed with: patient  whom consented to blood products.

## 2024-06-15 NOTE — CARE PLAN
The patient is Watcher - Medium risk of patient condition declining or worsening    Shift Goals  Clinical Goals: Pain management 4/10 with intervention  Patient Goals: rest comfortably, pain management    Progress made toward(s) clinical / shift goals:  Medicate per MAR as needed for pain, assist with ambulation    Patient is not progressing towards the following goals:

## 2024-06-15 NOTE — OR NURSING
0934 PT TO PACU FOR PRE OP PROCESS, ASSUME CARE OF THIS PT    0711 Patient allergies and NPO status verified, home medication reconciliation completed and belongings secured. Patient verbalizes understanding of pain scale, expected course of stay and plan of care. Surgical site verified with patient. IV access established. Sequentials placed R on leg.

## 2024-06-15 NOTE — ANESTHESIA TIME REPORT
Anesthesia Start and Stop Event Times       Date Time Event    6/15/2024 0724 Ready for Procedure     0731 Anesthesia Start     0910 Anesthesia Stop          Responsible Staff  06/15/24      Name Role Begin End    Iván Rodriguez M.D. Anesth 0731 0910          Overtime Reason:  no overtime (within assigned shift)    Comments:

## 2024-06-15 NOTE — PROGRESS NOTES
Hospital Medicine Daily Progress Note    Date of Service  6/15/2024    Chief Complaint  Zachary Moore is a 74 y.o. 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    Hospital Course  Patient had washout and poly exchange on 6/15/2024.  Infectious disease consulted    Interval Problem Update  Patient very groggy, seen shortly after returning from PACU, oxy mask had dislodged and he is only saturating 83% on room air while asleep but is with very shallow respirations, he has no evidence of alcohol withdrawal as yet but needs to be monitored carefully.    ID made aware of consultation, initiated Lovenox for DVT prophylaxis.    I have discussed this patient's plan of care and discharge plan at IDT rounds today with Case Management, Nursing, Nursing leadership, and other members of the IDT team.    Consultants/Specialty  infectious disease and orthopedics    Code Status  Full Code    Disposition  The patient is not medically cleared for discharge to home or a post-acute facility.  Anticipate discharge to: skilled nursing facility    I have placed the appropriate orders for post-discharge needs.    Review of Systems  Review of Systems   Gastrointestinal:  Negative for nausea and vomiting.   Musculoskeletal:  Positive for joint pain.        Physical Exam  Temp:  [36 °C (96.8 °F)-38.7 °C (101.7 °F)] 36.5 °C (97.7 °F)  Pulse:  [72-85] 76  Resp:  [10-20] 18  BP: (108-134)/(55-72) 119/55  SpO2:  [86 %-94 %] 91 %    Physical Exam  Vitals and nursing note reviewed.   Constitutional:       General: He is not in acute distress.     Appearance: Normal appearance. He is not ill-appearing or toxic-appearing.   HENT:      Head: Normocephalic and atraumatic.      Nose: Nose normal.      Mouth/Throat:      Mouth: Mucous membranes are moist.   Eyes:      General:         Right eye: No discharge.         Left eye: No  discharge.      Pupils: Pupils are equal, round, and reactive to light.   Cardiovascular:      Rate and Rhythm: Normal rate and regular rhythm.   Pulmonary:      Comments: Shallow respiration 2/2 post op, RA 83%  Abdominal:      General: There is no distension.      Tenderness: There is no abdominal tenderness.   Musculoskeletal:      Comments: In post op wrap   Neurological:      Comments: Groggy-         Fluids    Intake/Output Summary (Last 24 hours) at 6/15/2024 1528  Last data filed at 6/15/2024 1400  Gross per 24 hour   Intake 842.55 ml   Output --   Net 842.55 ml       Laboratory  Recent Labs     06/14/24  1548 06/15/24  0136   WBC 14.9* 10.8   RBC 4.18* 3.79*   HEMOGLOBIN 12.2* 11.2*   HEMATOCRIT 36.5* 35.7*   MCV 87.3 94.2   MCH 29.2 29.6   MCHC 33.4 31.4*   RDW 44.7 47.9   PLATELETCT 178 163*   MPV 10.7 9.5     Recent Labs     06/14/24  1548 06/15/24  0136   SODIUM 127* 129*   POTASSIUM 3.5* 4.3   CHLORIDE 91* 95*   CO2 21 18*   GLUCOSE 117* 122*   BUN 18 20   CREATININE 1.47* 1.47*   CALCIUM 9.0 8.2*                   Imaging  DX-KNEE 2- LEFT   Final Result      Left knee arthroplasty within normal limits      DX-CHEST-PORTABLE (1 VIEW)   Final Result      Mild, diffuse interstitial prominence.           Assessment/Plan  * Pyogenic arthritis of left knee joint (HCC)- (present on admission)  Assessment & Plan  Cultures 6/13 was MSSA  ID to see and recommend course of treatment    Leukocytosis- (present on admission)  Assessment & Plan  Resolved    DNR (do not resuscitate)- (present on admission)  Assessment & Plan  Discussed advance directives with the patient he wishes to be DO NOT RESUSCITATE CODE STATUS.    Stage 3a chronic kidney disease- (present on admission)  Assessment & Plan  Monitor renal functions and avoid nephrotoxic medications  Give fluid resuscitation as he has recently bumped up his kidney function    Hypokalemia- (present on admission)  Assessment & Plan  Resolved    Normochromic  normocytic anemia- (present on admission)  Assessment & Plan  Monitor H&H if drops below 7 or 21 transfuse    Alcohol use disorder, moderate, dependence (HCC)- (present on admission)  Assessment & Plan  Daily alcohol intake, no evidence of withdrawal as yet but patient just out of PACU and anesthesia    Hyponatremia- (present on admission)  Assessment & Plan  Trending up    Vitamin D deficiency disease- (present on admission)  Assessment & Plan  Vitamin D supplementation    Hypertension- (present on admission)  Assessment & Plan  Optimize blood pressure management keep systolic blood pressure less than 140 diastolic under 90  Norvasc 10 mg daily, hydralazine 25 mg twice daily, labetalol as needed         VTE prophylaxis:    enoxaparin ppx      I have performed a physical exam and reviewed and updated ROS and Plan today (6/15/2024). In review of yesterday's note (6/14/2024), there are no changes except as documented above.

## 2024-06-16 PROBLEM — F11.20 UNCOMPLICATED OPIOID DEPENDENCE (HCC): Status: ACTIVE | Noted: 2024-06-16

## 2024-06-16 LAB
BACTERIA UR CULT: NORMAL
BASOPHILS # BLD AUTO: 0 % (ref 0–1.8)
BASOPHILS # BLD: 0 K/UL (ref 0–0.12)
EOSINOPHIL # BLD AUTO: 0 K/UL (ref 0–0.51)
EOSINOPHIL NFR BLD: 0 % (ref 0–6.9)
ERYTHROCYTE [DISTWIDTH] IN BLOOD BY AUTOMATED COUNT: 45.4 FL (ref 35.9–50)
HCT VFR BLD AUTO: 24.2 % (ref 42–52)
HGB BLD-MCNC: 8.1 G/DL (ref 14–18)
IMM GRANULOCYTES # BLD AUTO: 0.06 K/UL (ref 0–0.11)
IMM GRANULOCYTES NFR BLD AUTO: 0.7 % (ref 0–0.9)
LYMPHOCYTES # BLD AUTO: 0.22 K/UL (ref 1–4.8)
LYMPHOCYTES NFR BLD: 2.4 % (ref 22–41)
MCH RBC QN AUTO: 29.5 PG (ref 27–33)
MCHC RBC AUTO-ENTMCNC: 33.5 G/DL (ref 32.3–36.5)
MCV RBC AUTO: 88 FL (ref 81.4–97.8)
MONOCYTES # BLD AUTO: 0.52 K/UL (ref 0–0.85)
MONOCYTES NFR BLD AUTO: 5.7 % (ref 0–13.4)
MYCOBACTERIUM SPEC CULT: NORMAL
NEUTROPHILS # BLD AUTO: 8.28 K/UL (ref 1.82–7.42)
NEUTROPHILS NFR BLD: 91.2 % (ref 44–72)
NRBC # BLD AUTO: 0 K/UL
NRBC BLD-RTO: 0 /100 WBC (ref 0–0.2)
PLATELET # BLD AUTO: 143 K/UL (ref 164–446)
PMV BLD AUTO: 10.6 FL (ref 9–12.9)
RBC # BLD AUTO: 2.75 M/UL (ref 4.7–6.1)
RHODAMINE-AURAMINE STN SPEC: NORMAL
SIGNIFICANT IND 70042: NORMAL
SITE SITE: NORMAL
SOURCE SOURCE: NORMAL
WBC # BLD AUTO: 9.1 K/UL (ref 4.8–10.8)

## 2024-06-16 PROCEDURE — 700105 HCHG RX REV CODE 258: Performed by: INTERNAL MEDICINE

## 2024-06-16 PROCEDURE — 99223 1ST HOSP IP/OBS HIGH 75: CPT | Performed by: INTERNAL MEDICINE

## 2024-06-16 PROCEDURE — 700102 HCHG RX REV CODE 250 W/ 637 OVERRIDE(OP)

## 2024-06-16 PROCEDURE — 94760 N-INVAS EAR/PLS OXIMETRY 1: CPT

## 2024-06-16 PROCEDURE — 97535 SELF CARE MNGMENT TRAINING: CPT

## 2024-06-16 PROCEDURE — 99233 SBSQ HOSP IP/OBS HIGH 50: CPT | Performed by: INTERNAL MEDICINE

## 2024-06-16 PROCEDURE — 97163 PT EVAL HIGH COMPLEX 45 MIN: CPT

## 2024-06-16 PROCEDURE — A9270 NON-COVERED ITEM OR SERVICE: HCPCS | Performed by: INTERNAL MEDICINE

## 2024-06-16 PROCEDURE — 700111 HCHG RX REV CODE 636 W/ 250 OVERRIDE (IP): Performed by: INTERNAL MEDICINE

## 2024-06-16 PROCEDURE — 700102 HCHG RX REV CODE 250 W/ 637 OVERRIDE(OP): Performed by: HOSPITALIST

## 2024-06-16 PROCEDURE — 36415 COLL VENOUS BLD VENIPUNCTURE: CPT

## 2024-06-16 PROCEDURE — 700111 HCHG RX REV CODE 636 W/ 250 OVERRIDE (IP)

## 2024-06-16 PROCEDURE — A9270 NON-COVERED ITEM OR SERVICE: HCPCS | Performed by: HOSPITALIST

## 2024-06-16 PROCEDURE — 770001 HCHG ROOM/CARE - MED/SURG/GYN PRIV*

## 2024-06-16 PROCEDURE — 700102 HCHG RX REV CODE 250 W/ 637 OVERRIDE(OP): Performed by: INTERNAL MEDICINE

## 2024-06-16 PROCEDURE — 85025 COMPLETE CBC W/AUTO DIFF WBC: CPT

## 2024-06-16 PROCEDURE — A9270 NON-COVERED ITEM OR SERVICE: HCPCS

## 2024-06-16 RX ORDER — BUPRENORPHINE 8 MG/1
8 TABLET SUBLINGUAL 3 TIMES DAILY
Status: DISCONTINUED | OUTPATIENT
Start: 2024-06-16 | End: 2024-06-21 | Stop reason: HOSPADM

## 2024-06-16 RX ADMIN — GABAPENTIN 600 MG: 300 CAPSULE ORAL at 14:23

## 2024-06-16 RX ADMIN — DULOXETINE HYDROCHLORIDE 30 MG: 30 CAPSULE, DELAYED RELEASE ORAL at 05:15

## 2024-06-16 RX ADMIN — ACETAMINOPHEN 1000 MG: 500 TABLET, FILM COATED ORAL at 05:15

## 2024-06-16 RX ADMIN — BUPRENORPHINE HCL 8 MG: 8 TABLET SUBLINGUAL at 11:29

## 2024-06-16 RX ADMIN — GABAPENTIN 600 MG: 300 CAPSULE ORAL at 21:11

## 2024-06-16 RX ADMIN — ENOXAPARIN SODIUM 40 MG: 100 INJECTION SUBCUTANEOUS at 19:37

## 2024-06-16 RX ADMIN — GABAPENTIN 600 MG: 300 CAPSULE ORAL at 09:56

## 2024-06-16 RX ADMIN — OMEPRAZOLE 40 MG: 20 CAPSULE, DELAYED RELEASE ORAL at 05:15

## 2024-06-16 RX ADMIN — ACETAMINOPHEN 1000 MG: 500 TABLET, FILM COATED ORAL at 18:02

## 2024-06-16 RX ADMIN — POTASSIUM CHLORIDE 20 MEQ: 1500 TABLET, EXTENDED RELEASE ORAL at 05:16

## 2024-06-16 RX ADMIN — AMLODIPINE BESYLATE 10 MG: 5 TABLET ORAL at 05:19

## 2024-06-16 RX ADMIN — DEXAMETHASONE SODIUM PHOSPHATE 4 MG: 4 INJECTION, SOLUTION INTRAMUSCULAR; INTRAVENOUS at 05:21

## 2024-06-16 RX ADMIN — CEFAZOLIN 2 G: 2 INJECTION, POWDER, FOR SOLUTION INTRAMUSCULAR; INTRAVENOUS at 14:31

## 2024-06-16 RX ADMIN — CEFAZOLIN 2 G: 2 INJECTION, POWDER, FOR SOLUTION INTRAMUSCULAR; INTRAVENOUS at 21:15

## 2024-06-16 RX ADMIN — CELECOXIB 200 MG: 200 CAPSULE ORAL at 05:23

## 2024-06-16 RX ADMIN — HYDRALAZINE HYDROCHLORIDE 25 MG: 25 TABLET ORAL at 05:15

## 2024-06-16 RX ADMIN — ACETAMINOPHEN 1000 MG: 500 TABLET, FILM COATED ORAL at 11:28

## 2024-06-16 RX ADMIN — CEFAZOLIN 2 G: 2 INJECTION, POWDER, FOR SOLUTION INTRAMUSCULAR; INTRAVENOUS at 05:32

## 2024-06-16 RX ADMIN — HYDRALAZINE HYDROCHLORIDE 25 MG: 25 TABLET ORAL at 18:02

## 2024-06-16 RX ADMIN — BUPRENORPHINE HCL 8 MG: 8 TABLET SUBLINGUAL at 22:00

## 2024-06-16 RX ADMIN — DOCUSATE SODIUM 100 MG: 100 CAPSULE, LIQUID FILLED ORAL at 05:15

## 2024-06-16 RX ADMIN — DULOXETINE HYDROCHLORIDE 30 MG: 30 CAPSULE, DELAYED RELEASE ORAL at 18:02

## 2024-06-16 RX ADMIN — ACETAMINOPHEN 1000 MG: 500 TABLET, FILM COATED ORAL at 23:39

## 2024-06-16 RX ADMIN — BUPRENORPHINE HCL 8 MG: 8 TABLET SUBLINGUAL at 18:02

## 2024-06-16 ASSESSMENT — COGNITIVE AND FUNCTIONAL STATUS - GENERAL
STANDING UP FROM CHAIR USING ARMS: A LITTLE
MOBILITY SCORE: 21
SUGGESTED CMS G CODE MODIFIER DAILY ACTIVITY: CH
DAILY ACTIVITIY SCORE: 24
CLIMB 3 TO 5 STEPS WITH RAILING: A LITTLE
MOBILITY SCORE: 24
WALKING IN HOSPITAL ROOM: A LITTLE
SUGGESTED CMS G CODE MODIFIER MOBILITY: CH
SUGGESTED CMS G CODE MODIFIER MOBILITY: CJ

## 2024-06-16 ASSESSMENT — ENCOUNTER SYMPTOMS
BLURRED VISION: 0
MYALGIAS: 0
CONSTIPATION: 0
DIARRHEA: 0
FEVER: 0
NAUSEA: 0
SHORTNESS OF BREATH: 0
SPUTUM PRODUCTION: 0
COUGH: 0
DOUBLE VISION: 0
DIAPHORESIS: 1
WEAKNESS: 0
ABDOMINAL PAIN: 0
NERVOUS/ANXIOUS: 0
CHILLS: 0
VOMITING: 0

## 2024-06-16 ASSESSMENT — PAIN DESCRIPTION - PAIN TYPE
TYPE: SURGICAL PAIN
TYPE: SURGICAL PAIN

## 2024-06-16 ASSESSMENT — GAIT ASSESSMENTS
GAIT LEVEL OF ASSIST: SUPERVISED
ASSISTIVE DEVICE: FRONT WHEEL WALKER
DISTANCE (FEET): 90
DEVIATION: STEP TO;DECREASED HEEL STRIKE;DECREASED TOE OFF

## 2024-06-16 NOTE — CONSULTS
Consults  INFECTIOUS DISEASES INPATIENT CONSULT NOTE     Date of Service: 6/16/2024    Consult Requested By: Elva Gorman M.D.    Reason for Consultation: Prosthetic joint infection, left knee    History of Present Illness:   Zachary Moore is a 74 y.o.  admitted 6/14/2024. Pt has a past medical history of left knee replacement approximately 15 years ago with acute onset swelling redness and pain.  He was seen in [facility] with reported aspiration of the joint and high white cells with cultures positive for Staph aureus.  Admitted for I&D of the left knee.  Patient went to the OR on 6/15 for I&D with complete synovectomy and revision of left total knee arthroplasty-tibial poly exchange only.  Per op note there was gross purulence and 3 cultures were sent.  The polyethylene was removed and postdebridement was carried out.  Multiple debridements remove necrotic purulent tissue.    Review Of Systems:  Review of Systems   Constitutional:  Negative for chills, fever and malaise/fatigue.   HENT:  Negative for hearing loss.    Eyes:  Negative for blurred vision and double vision.   Respiratory:  Negative for cough, sputum production and shortness of breath.    Cardiovascular:  Positive for leg swelling. Negative for chest pain.   Gastrointestinal:  Negative for abdominal pain, constipation, diarrhea, nausea and vomiting.   Genitourinary:  Negative for dysuria.   Musculoskeletal:  Positive for joint pain. Negative for myalgias.   Skin:  Negative for rash.   Neurological:  Negative for weakness.   Psychiatric/Behavioral:  The patient is not nervous/anxious.        PMH:   Past Medical History:   Diagnosis Date    Arthritis     knees and spine    Cancer (HCC)     skin cancer ongoing    Hypertension        PSH:  Past Surgical History:   Procedure Laterality Date    FUSION, SPINE, LUMBAR, PLIF  1/28/2014    Performed by Elder Chopra M.D. at SURGERY [facility]    KNEE ARTHROPLASTY TOTAL  2013     Right knee-Dr. Boggs    KNEE ARTHROPLASTY TOTAL  2009    left knee--Dr. Snider    SHOULDER SURGERY      right-sided       FAMILY HX:  No family history on file.  Reviewed family history. No pertinent family history.     SOCIAL HX:  Social History     Socioeconomic History    Marital status:      Spouse name: Not on file    Number of children: Not on file    Years of education: Not on file    Highest education level: Not on file   Occupational History    Not on file   Tobacco Use    Smoking status: Never    Smokeless tobacco: Never   Vaping Use    Vaping status: Never Used   Substance and Sexual Activity    Alcohol use: Yes     Comment: daily    Drug use: No    Sexual activity: Yes     Partners: Female     Birth control/protection: Post-Menopausal   Other Topics Concern    Not on file   Social History Narrative    Not on file     Social Determinants of Health     Financial Resource Strain: Not on file   Food Insecurity: No Food Insecurity (6/14/2024)    Hunger Vital Sign     Worried About Running Out of Food in the Last Year: Never true     Ran Out of Food in the Last Year: Never true   Transportation Needs: No Transportation Needs (6/14/2024)    PRAPARE - Transportation     Lack of Transportation (Medical): No     Lack of Transportation (Non-Medical): No   Physical Activity: Not on file   Stress: Not on file   Social Connections: Not on file   Intimate Partner Violence: Not At Risk (6/14/2024)    Humiliation, Afraid, Rape, and Kick questionnaire     Fear of Current or Ex-Partner: No     Emotionally Abused: No     Physically Abused: No     Sexually Abused: No   Housing Stability: Low Risk  (6/14/2024)    Housing Stability Vital Sign     Unable to Pay for Housing in the Last Year: No     Number of Places Lived in the Last Year: 1     Unstable Housing in the Last Year: No     Social History     Tobacco Use   Smoking Status Never   Smokeless Tobacco Never     Social History     Substance and Sexual Activity    Alcohol Use Yes    Comment: daily       Allergies/Intolerances:  No Known Allergies    History reviewed with the patient and /or family member, chart & primary care team    Other Current Medications:    Current Facility-Administered Medications:     Pharmacy Consult Request ...Pain Management Review 1 Each, 1 Each, Other, PHARMACY TO DOSE, KRISTEL Carranza.A.-C.    ondansetron (Zofran) syringe/vial injection 4 mg, 4 mg, Intravenous, Q4HRS PRN, KRISTEL Carranza.A.-C.    dexamethasone (Decadron) injection 4 mg, 4 mg, Intravenous, Once PRN, KRISTEL Carranza.A.-C.    diphenhydrAMINE (Benadryl) injection 25 mg, 25 mg, Intravenous, Q6HRS PRN, KRISTEL Carranza.A.-C.    haloperidol lactate (Haldol) injection 1 mg, 1 mg, Intravenous, Q6HRS PRN, KRISTEL Carranza.A.-C.    scopolamine (Transderm-Scop) patch 1 Patch, 1 Patch, Transdermal, Q72HRS PRN, KRISTEL Carranza.A.-C.    docusate sodium (Colace) capsule 100 mg, 100 mg, Oral, BID, KRISTEL Carranza.A.-C., 100 mg at 06/16/24 0515    senna-docusate (Pericolace Or Senokot S) 8.6-50 MG per tablet 1 Tablet, 1 Tablet, Oral, Nightly, KRISTEL Carranza.A.-C., 1 Tablet at 06/15/24 2027    senna-docusate (Pericolace Or Senokot S) 8.6-50 MG per tablet 1 Tablet, 1 Tablet, Oral, Q24HRS PRN, KRISTEL Carranza.A.-C.    polyethylene glycol/lytes (Miralax) Packet 1 Packet, 1 Packet, Oral, BID PRN, KRISTEL Carranza.A.-C.    magnesium hydroxide (Milk Of Magnesia) suspension 30 mL, 30 mL, Oral, QDAY PRN, SOREN CarranzaAJonathan-MARY.    bisacodyl (Dulcolax) suppository 10 mg, 10 mg, Rectal, Q24HRS PRN, SOREN CarranzaA.-MARY.    sodium phosphate (Fleet) enema 133 mL, 1 Each, Rectal, Once PRN, SOREN CarranzaAJonathan-CJonathan    enoxaparin (Lovenox) inj 40 mg, 40 mg, Subcutaneous, DAILY AT 1800, Carlos MCLEAN  ROMEL Balbuena    acetaminophen (Tylenol) tablet 1,000 mg, 1,000 mg, Oral, Q6HRS, 1,000 mg at 06/16/24 0515 **FOLLOWED BY** [START ON 6/20/2024] acetaminophen (Tylenol) tablet 1,000 mg, 1,000 mg, Oral, Q6HRS PRN, Carlos Balbuena P.A.-C.    oxyCODONE immediate-release (Roxicodone) tablet 5 mg, 5 mg, Oral, Q3HRS PRN **OR** oxyCODONE immediate release (Roxicodone) tablet 10 mg, 10 mg, Oral, Q3HRS PRN **OR** HYDROmorphone (Dilaudid) injection 0.5 mg, 0.5 mg, Intravenous, Q3HRS PRN, Carlos Balbuena P.A.-C.    ceFAZolin (Ancef) 2 g in  mL IVPB, 2 g, Intravenous, Q8HRS, Stopped at 06/16/24 0602 **AND** [DISCONTINUED] MD Alert...Vancomycin per Pharmacy, 1 Each, Other, PHARMACY TO DOSE, Carlos Balbuena P.A.-C.    amLODIPine (Norvasc) tablet 10 mg, 10 mg, Oral, DAILY, Rosalba Thorne M.D., 10 mg at 06/16/24 0519    celecoxib (CeleBREX) capsule 200 mg, 200 mg, Oral, DAILY, Rosalba Thorne M.D., 200 mg at 06/16/24 0523    DULoxetine (Cymbalta) capsule 30 mg, 30 mg, Oral, BID, Rosalba Thorne M.D., 30 mg at 06/16/24 0515    gabapentin (Neurontin) capsule 600 mg, 600 mg, Oral, TID, Rosalba Thorne M.D., 600 mg at 06/15/24 2028    hydrALAZINE (Apresoline) tablet 25 mg, 25 mg, Oral, BID, Rosalba Thorne M.D., 25 mg at 06/16/24 0515    omeprazole (PriLOSEC) capsule 40 mg, 40 mg, Oral, DAILY, Rosalba Thorne M.D., 40 mg at 06/16/24 0515    acetaminophen (Tylenol) tablet 650 mg, 650 mg, Oral, Q6HRS PRN, Rosalba Thorne M.D., 650 mg at 06/14/24 2022    Notify provider if pain remains uncontrolled, , , CONTINUOUS **AND** Use the Numeric Rating Scale (NRS), Shane-Baker Faces (WBF), or FLACC on regular floors and Critical-Care Pain Observation Tool (CPOT) on ICUs/Trauma to assess pain, , , CONTINUOUS **AND** Pulse Ox, , , CONTINUOUS **AND** Pharmacy Consult Request ...Pain Management Review 1 Each, 1 Each, Other, PHARMACY TO DOSE **AND** If patient difficult to arouse and/or has  "respiratory depression (respiratory rate of 10 or less), stop any opiates that are currently infusing and call a Rapid Response., , , CONTINUOUS, Rosalba Thorne M.D.    oxyCODONE immediate-release (Roxicodone) tablet 2.5 mg, 2.5 mg, Oral, Q3HRS PRN **OR** oxyCODONE immediate-release (Roxicodone) tablet 5 mg, 5 mg, Oral, Q3HRS PRN **OR** HYDROmorphone (Dilaudid) injection 0.25 mg, 0.25 mg, Intravenous, Q3HRS PRN, Rosalba Thorne M.D.    labetalol (Normodyne/Trandate) injection 10 mg, 10 mg, Intravenous, Q4HRS PRN, Rosalba Thorne M.D.    senna-docusate (Pericolace Or Senokot S) 8.6-50 MG per tablet 2 Tablet, 2 Tablet, Oral, Q EVENING **AND** polyethylene glycol/lytes (Miralax) Packet 1 Packet, 1 Packet, Oral, QDAY PRN, Rosalba Thorne M.D.    ondansetron (Zofran) syringe/vial injection 4 mg, 4 mg, Intravenous, Q8HRS PRN, Rosalba Thorne M.D.    ondansetron (Zofran ODT) dispertab 4 mg, 4 mg, Oral, Q8HRS PRN, Rosalba Thorne M.D.    potassium chloride SA (Kdur) tablet 20 mEq, 20 mEq, Oral, DAILY, Rosalba Thorne M.D., 20 mEq at 24 0516  [unfilled]    Most Recent Vital Signs:  /54   Pulse 65   Temp 36.4 °C (97.6 °F) (Temporal)   Resp 16   Ht 1.753 m (5' 9.02\")   Wt 77 kg (169 lb 12.1 oz)   SpO2 92%   BMI 25.06 kg/m²   Temp  Av.7 °C (98 °F)  Min: 36 °C (96.8 °F)  Max: 38.7 °C (101.7 °F)    Physical Exam:  Physical Exam  Constitutional:       Appearance: Normal appearance.   HENT:      Head: Normocephalic and atraumatic.      Right Ear: External ear normal.      Left Ear: External ear normal.      Nose: Nose normal.      Mouth/Throat:      Mouth: Mucous membranes are moist.      Pharynx: Oropharynx is clear.   Eyes:      Extraocular Movements: Extraocular movements intact.      Conjunctiva/sclera: Conjunctivae normal.      Pupils: Pupils are equal, round, and reactive to light.   Cardiovascular:      Rate and Rhythm: Normal rate and regular rhythm.      Heart sounds: Normal heart sounds. "   Pulmonary:      Effort: Pulmonary effort is normal.      Breath sounds: Normal breath sounds.   Abdominal:      General: Abdomen is flat. Bowel sounds are normal.      Palpations: Abdomen is soft.   Musculoskeletal:         General: Swelling and tenderness present.      Cervical back: Normal range of motion and neck supple.      Comments: Knee with brace in place   Skin:     General: Skin is warm and dry.   Neurological:      General: No focal deficit present.      Mental Status: He is alert and oriented to person, place, and time.   Psychiatric:         Mood and Affect: Mood normal.         Behavior: Behavior normal.           Pertinent Lab Results:  Recent Labs     06/14/24  1548 06/15/24  0136 06/16/24  0102   WBC 14.9* 10.8 9.1      Recent Labs     06/14/24  1548 06/15/24  0136 06/16/24  0102   HEMOGLOBIN 12.2* 11.2* 8.1*   HEMATOCRIT 36.5* 35.7* 24.2*   MCV 87.3 94.2 88.0   MCH 29.2 29.6 29.5   PLATELETCT 178 163* 143*         Recent Labs     06/14/24  1548 06/15/24  0136   SODIUM 127* 129*   POTASSIUM 3.5* 4.3   CHLORIDE 91* 95*   CO2 21 18*   CREATININE 1.47* 1.47*        Recent Labs     06/14/24  1548   ALBUMIN 4.0        Pertinent Micro:  Results       Procedure Component Value Units Date/Time    BLOOD CULTURE [[id number]] Collected: 06/15/24 1400    Order Status: Completed Specimen: Blood from Peripheral Updated: 06/16/24 0722     Significant Indicator NEG     Source BLD     Site PERIPHERAL     Culture Result No Growth  Note: Blood cultures are incubated for 5 days and  are monitored continuously.Positive blood cultures  are called to the RN and reported as soon as  they are identified.  Blood culture testing and Gram stain, if indicated, are  performed at [facility], [address] [place] [address].  Positive blood cultures are  sent to [facility], [address] [place] [address], for organism identification and  susceptibility testing.      Narrative:       Left Hand    BLOOD CULTURE [[id number]] Collected: 06/15/24 1400    Order Status: Completed Specimen: Blood from Peripheral Updated: 06/16/24 0722     Significant Indicator NEG     Source BLD     Site PERIPHERAL     Culture Result No Growth  Note: Blood cultures are incubated for 5 days and  are monitored continuously.Positive blood cultures  are called to the RN and reported as soon as  they are identified.  Blood culture testing and Gram stain, if indicated, are  performed at [facility], [address] [place] [address].  Positive blood cultures are  sent to [facility], [address] [place] [address], for organism identification and  susceptibility testing.      Narrative:      Right Hand    Fungal Culture [[id number]] Collected: 06/15/24 0805    Order Status: Completed Specimen: Tissue Updated: 06/15/24 2214     Significant Indicator NEG     Source TISS     Site knee, Synovial     Culture Result Culture in progress.     Fungal Smear Results No fungal elements seen.    Narrative:      3 - Knee Synovial  Surgery Specimen    CULTURE TISSUE W/ GRM STAIN [[id number]] Collected: 06/15/24 0805    Order Status: No result Specimen: Tissue Updated: 06/15/24 2214     Significant Indicator NEG     Source TISS     Site knee, Synovial     Culture Result -     Gram Stain Result Rare WBCs.  No organisms seen.      Narrative:      3 - Knee Synovial  Surgery Specimen    Anaerobic Culture [[id number]] Collected: 06/15/24 0805    Order Status: No result Specimen: Tissue Updated: 06/15/24 2214     Significant Indicator NEG     Source TISS     Site knee, Synovial     Culture Result -    Narrative:      3 - Knee Synovial  Surgery Specimen    Anaerobic Culture [[id number]] Collected: 06/15/24 0805    Order Status: No result Specimen: Tissue Updated: 06/15/24 2214     Significant Indicator NEG     Source TISS     Site Knee Synovial     Culture Result -    Narrative:      Previous comment  was modified by JORGE LUIS at 13:16 on 06/15/24.  1 - Kenee Synovial  1 - Knee Synovial  Surgery Specimen    AFB Culture [[id number]] Collected: 06/15/24 0805    Order Status: No result Specimen: Tissue Updated: 06/15/24 2214     Significant Indicator NEG     Source TISS     Site knee, Synovial     Culture Result -     AFB Smear Results -    Narrative:      3 - Knee Synovial  Surgery Specimen    AFB Culture [[id number]] Collected: 06/15/24 0805    Order Status: No result Specimen: Tissue Updated: 06/15/24 2214     Significant Indicator NEG     Source TISS     Site Knee Synovial     Culture Result -     AFB Smear Results -    Narrative:      Previous comment was modified by JORGE LUIS at 13:16 on 06/15/24.  1 - Kenee Synovial  1 - Knee Synovial  Surgery Specimen    Fungal Culture [[id number]] Collected: 06/15/24 0805    Order Status: Completed Specimen: Tissue Updated: 06/15/24 2214     Significant Indicator NEG     Source TISS     Site Knee Synovial     Culture Result Culture in progress.     Fungal Smear Results No fungal elements seen.    Narrative:      Previous comment was modified by JORGE LUIS at 13:16 on 06/15/24.  1 - Kenee Synovial  1 - Knee Synovial  Surgery Specimen    CULTURE TISSUE W/ GRM STAIN [[id number]] Collected: 06/15/24 0805    Order Status: No result Specimen: Tissue Updated: 06/15/24 2214     Significant Indicator NEG     Source TISS     Site Knee Synovial     Culture Result -     Gram Stain Result Rare WBCs.  No organisms seen.      Narrative:      Previous comment was modified by JORGE LUIS at 13:16 on 06/15/24.  1 - Kenee Synovial  1 - Knee Synovial  Surgery Specimen    Urine Culture (New) [[id number]] Collected: 06/14/24 1606    Order Status: Completed Specimen: Urine Updated: 06/15/24 2125     Significant Indicator NEG     Source UR     Site -     Culture Result No growth at 24 hours.    Narrative:      Indication for culture:->Emergency Room Patient    Fungal Smear [[id number]] Collected: 06/15/24 0805    Order  Status: Completed Specimen: Tissue Updated: 06/15/24 2019     Significant Indicator NEG     Source TISS     Site knee, Synovial     Fungal Smear Results No fungal elements seen.    Narrative:      2 - Knee Synovial  Surgery Specimen    GRAM STAIN [[id number]] Collected: 06/15/24 0805    Order Status: Completed Specimen: Tissue Updated: 06/15/24 2019     Significant Indicator .     Source TISS     Site knee, Synovial     Gram Stain Result Rare WBCs.  No organisms seen.      Narrative:      2 - Knee Synovial  Surgery Specimen    AFB Culture [[id number]] Collected: 06/15/24 0805    Order Status: No result Specimen: Tissue Updated: 06/15/24 2019     Significant Indicator NEG     Source TISS     Site knee, Synovial     Culture Result -     AFB Smear Results -    Narrative:      2 - Knee Synovial  Surgery Specimen    Fungal Culture [[id number]] Collected: 06/15/24 0805    Order Status: No result Specimen: Tissue Updated: 06/15/24 2019     Significant Indicator NEG     Source TISS     Site knee, Synovial     Culture Result -     Fungal Smear Results No fungal elements seen.    Narrative:      2 - Knee Synovial  Surgery Specimen    CULTURE TISSUE W/ GRM STAIN [[id number]] Collected: 06/15/24 0805    Order Status: No result Specimen: Tissue Updated: 06/15/24 2019     Significant Indicator NEG     Source TISS     Site knee, Synovial     Culture Result -     Gram Stain Result Rare WBCs.  No organisms seen.      Narrative:      2 - Knee Synovial  Surgery Specimen    Anaerobic Culture [[id number]] Collected: 06/15/24 0805    Order Status: No result Specimen: Tissue Updated: 06/15/24 2019     Significant Indicator NEG     Source TISS     Site knee, Synovial     Culture Result -    Narrative:      2 - Knee Synovial  Surgery Specimen    Fungal Smear [[id number]] Collected: 06/15/24 0805    Order Status: Completed Specimen: Tissue Updated: 06/15/24 2019     Significant Indicator NEG     Source TISS     Site Knee Synovial     Fungal Smear  Results No fungal elements seen.    Narrative:      Previous comment was modified by JORGE LUIS at 13:16 on 06/15/24.  1 - Kenee Synovial  1 - Knee Synovial  Surgery Specimen    Fungal Smear [[id number]] Collected: 06/15/24 0805    Order Status: Completed Specimen: Tissue Updated: 06/15/24 2019     Significant Indicator NEG     Source TISS     Site knee, Synovial     Fungal Smear Results No fungal elements seen.    Narrative:      3 - Knee Synovial  Surgery Specimen    GRAM STAIN [[id number]] Collected: 06/15/24 0805    Order Status: Completed Specimen: Tissue Updated: 06/15/24 2019     Significant Indicator .     Source TISS     Site knee, Synovial     Gram Stain Result Rare WBCs.  No organisms seen.      Narrative:      3 - Knee Synovial  Surgery Specimen    GRAM STAIN [[id number]] Collected: 06/15/24 0805    Order Status: Completed Specimen: Tissue Updated: 06/15/24 2019     Significant Indicator .     Source TISS     Site Knee Synovial     Gram Stain Result Rare WBCs.  No organisms seen.      Narrative:      Previous comment was modified by JORGE LUIS at 13:16 on 06/15/24.  1 - Kenee Synovial  1 - Knee Synovial  Surgery Specimen    Blood Culture - Draw one from central line and one from peripheral site [[id number]] Collected: 06/14/24 1548    Order Status: Completed Specimen: Blood from Line Updated: 06/15/24 0802     Significant Indicator NEG     Source BLD     Site LINE     Culture Result No Growth  Note: Blood cultures are incubated for 5 days and  are monitored continuously.Positive blood cultures  are called to the RN and reported as soon as  they are identified.  Blood culture testing and Gram stain, if indicated, are  performed at [facility], [address] [place] [address].  Positive blood cultures are  sent to [facility], [address] [place] [address], for organism identification and  susceptibility testing.      Narrative:      Right AC    Blood Culture - Draw one  "from central line and one from peripheral site [[id number]] Collected: 06/14/24 1548    Order Status: Completed Specimen: Blood from Peripheral Updated: 06/15/24 0802     Significant Indicator NEG     Source BLD     Site PERIPHERAL     Culture Result No Growth  Note: Blood cultures are incubated for 5 days and  are monitored continuously.Positive blood cultures  are called to the RN and reported as soon as  they are identified.  Blood culture testing and Gram stain, if indicated, are  performed at [facility], [address] [place] [address].  Positive blood cultures are  sent to [facility], [address] [place] [address], for organism identification and  susceptibility testing.      Narrative:      Left AC    FLUID CULTURE [[id number]]     Order Status: Sent Specimen: Body Fluid from Synovial Fluid     Urinalysis [[id number]]  (Abnormal) Collected: 06/14/24 1606    Order Status: Completed Specimen: Urine Updated: 06/14/24 1650     Color Yellow     Character Clear     Specific Gravity 1.015     Ph 6.0     Glucose Negative mg/dL      Ketones Negative mg/dL      Protein 30 mg/dL      Bilirubin Negative     Nitrite Positive     Leukocyte Esterase Negative     Occult Blood Trace     Micro Urine Req Microscopic    BLOOD CULTURE [[id number]] Collected: 06/14/24 0000    Order Status: Canceled Specimen: Other from Peripheral     BLOOD CULTURE [[id number]] Collected: 06/14/24 0000    Order Status: Canceled Specimen: Other from Peripheral           No results found for: \"BLOODCULTU\", \"BLDCULT\", \"BCHOLD\"     Studies:  DX-KNEE 2- LEFT    Result Date: 6/15/2024  6/15/2024 9:50 AM HISTORY/REASON FOR EXAM:  Post-Op - To include entire implant.. Left knee arthroplasty TECHNIQUE/EXAM DESCRIPTION AND NUMBER OF VIEWS:  2 views of the LEFT knee. COMPARISON: Left leg x-ray 1/24/2024 FINDINGS: There is a left knee arthroplasty present. The arthroplasty appears within normal limits. There are " no fracture. There are skin staples.     Left knee arthroplasty within normal limits    DX-CHEST-PORTABLE (1 VIEW)    Result Date: 6/14/2024 6/14/2024 3:56 PM HISTORY/REASON FOR EXAM:  Sepsis; sepsis. TECHNIQUE/EXAM DESCRIPTION AND NUMBER OF VIEWS: Single portable view of the chest. COMPARISON: 11/8/2021 FINDINGS: Right-sided central venous catheter has been removed. The cardiomediastinal silhouette is prominent but is accentuated by AP technique. There is also diffuse interstitial prominence which may be chronic. There is no focal area of consolidation, definite pleural effusion, or pneumothorax.     Mild, diffuse interstitial prominence.    DX-KNEE COMPLETE 4+ LEFT    Result Date: 6/13/2024  Three-view x-rays of the left knee including AP, lateral and sunrise interpreted by me today show no obvious signs of fracture or dislocation.  There is a cemented total knee arthroplasty in place with adequate interfaces and alignment.  There is only a small portion of the patella remaining in the knee.  Moderate soft tissue edema.      ASSESSMENT/PLAN:     74 y.o.  admitted 6/14/2024. Pt has a past medical history of left knee replacement approximately 15 years ago with acute onset swelling redness and pain.  He underwent aspiration of the joint with elevated WBCs and cultures positive for MSSA.  Admitted for I&D of the left knee.  Patient went to the OR on 6/15 for I&D with complete synovectomy and revision of left total knee arthroplasty-tibial poly exchange only.  Per op note there was gross purulence and 3 cultures were sent.  The polyethylene was removed and postdebridement was carried out.  Multiple debridements remove necrotic purulent tissue.    Problem List    Leukocytosis on arrival, improved  Thrombocytopenia, ongoing  Left knee prosthetic joint infection  -Synovial fluid cultures from 6/13 +MSSA  -Status post I&D, liner exchange, hardware was left in place  -ESR on 6/15 - 85  Staph aureus infection  Chronic  kidney disease    Assessment:      -Notes were reviewed from primary team, radiology, ED, surgery, specialists, etc.   -Reviewed labs to date, microbiology for current admit and prior  -Imaging was independently reviewed and interpreted    Plan:    Recommendations for antibiotics:   --- Continue cefazolin 2 g every 8 hours  --- Considered adding rifampin but patient is on omeprazole and this combination is not recommended given the rifampin will decrease the omeprazole.  Will discuss with patient to see if he can hold omeprazole or time the medication administration    Recommendations for further/ongoing work-up, monitoring of clinical status and drug toxicity:   --- Follow-up OR cultures, no growth to date   --- Follow-up blood cultures, no growth to date  --- Monitor labs  --- Wound care      Dispo: Awaiting culture results and work-up as above.  Patient is at risk for infectious complications including death, sepsis and loss of limb function.  Anticipate discharge to home on home continuous infusion IV antibiotics.  Orders will be given to  tomorrow.    PICC: Okay to place PICC line if blood cultures remain negative on 6/17      Plan of care discussed with SIOBHAN Gorman M.D.. Will continue to follow    Kayce Dimas M.D.

## 2024-06-16 NOTE — PROGRESS NOTES
"S:  Feeling well, up walking with pT    O: Patient has good pain control, good mobility, and operated leg is neurovascularly intact.    Dressing is clean, dry, and intact    Blood pressure 112/58, pulse 68, temperature 36.6 °C (97.8 °F), temperature source Temporal, resp. rate 16, height 1.753 m (5' 9.02\"), weight 77 kg (169 lb 12.1 oz), SpO2 (!) 86%.    Recent Labs     06/14/24  1548 06/15/24  0136 06/16/24  0102   WBC 14.9* 10.8 9.1   RBC 4.18* 3.79* 2.75*   HEMOGLOBIN 12.2* 11.2* 8.1*   HEMATOCRIT 36.5* 35.7* 24.2*   MCV 87.3 94.2 88.0   MCH 29.2 29.6 29.5   MCHC 33.4 31.4* 33.5   RDW 44.7 47.9 45.4   PLATELETCT 178 163* 143*   MPV 10.7 9.5 10.6         Intake/Output Summary (Last 24 hours) at 6/16/2024 1030  Last data filed at 6/16/2024 0953  Gross per 24 hour   Intake 752.5 ml   Output --   Net 752.5 ml             A:  Stale following I and D with poly exchange left TKA  Patient Active Problem List    Diagnosis Date Noted    Infection and inflammatory reaction due to internal left knee prosthesis, initial encounter (Formerly [facility]) 06/14/2024    Pyogenic arthritis of left knee joint (Formerly [facility]) 06/14/2024    Hypokalemia 06/14/2024    Stage 3a chronic kidney disease 06/14/2024    DNR (do not resuscitate) 06/14/2024    Leukocytosis 06/14/2024    Occult blood positive stool 11/07/2021    Normochromic normocytic anemia 11/06/2021    Sepsis (Former[facility]) 11/03/2021    Dehydration 11/03/2021    Hyponatremia 11/02/2021    UTE (acute kidney injury)/hypotension, hyponatremia, s/p femoral ORIF, anemia/GI bleed/odynophagia (Formerly [facility]) 11/02/2021    Acute cystitis with hematuria 11/02/2021    Mass of urinary bladder 11/02/2021    Sepsis due to Escherichia coli with acute renal failure without septic shock ([facility]) 11/02/2021    Encephalopathy acute 11/02/2021    Alcohol use disorder, moderate, dependence (Formerly [facility]) 10/27/2021    Urinary tract infection associated with indwelling urethral catheter ([facility]) 10/27/2021    Chronic pain 10/12/2021    Peripheral " neuropathy 10/12/2021    Jeana-prosthetic femoral shaft fracture 10/12/2021    Degeneration of lumbar or lumbosacral intervertebral disc 01/28/2014    Spinal stenosis of lumbar region 12/06/2013    Vitamin D deficiency disease 10/22/2013    Elevated liver enzymes 10/22/2013    Foraminal stenosis of lumbar region 09/24/2013    Hypertension 12/20/2012    Bilateral knee pain 12/20/2012    Arthritis 12/20/2012         PLAN: Appreciate medicine and ID care.  He is candidate for 6 weeks IV antibiotics and long term suppressive antibiotics given high risk for future skin infections.  Have ordered wound care for right ankle.

## 2024-06-16 NOTE — CARE PLAN
The patient is Stable - Low risk of patient condition declining or worsening    Shift Goals  Clinical Goals: Pain management, IV antibiotics, Walk, Void, No falls  Patient Goals: Pain management, Comfort, Rest    Progress made toward(s) clinical / shift goals:  Patient has made no complaints of excessive pain or discomfort post surgery. He has voided and walked 50 feet. He is steady with a front wheeled walker. He has been free of signs and symptoms of infection and he presents in much better well being than prior to surgery. He has been compliant with all aspects of care and uses the call light appropriately to ask for assistance.    Patient is not progressing towards the following goals:

## 2024-06-16 NOTE — PROGRESS NOTES
Hospital Medicine Daily Progress Note    Date of Service  6/16/2024    Chief Complaint  Zachary Moore is a 74 y.o. 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    Hospital Course  Patient had washout and poly exchange on 6/15/2024.  Infectious disease consulted    Interval Problem Update  Patient doing very well with physical therapy, pain is well-controlled, he would like to resume his buprenorphine, was cautioned about possible precipitated withdrawal since he has not had it since Friday and has had interval full agonist treatment.  After first dose he had mild diaphoresis but otherwise says he is fine, spouse is at bedside.    ID is recommending continuous infusion antibiotics via PICC line, blood cultures will need to be negative for 48 hours before PICC line can be placed.    I have discussed this patient's plan of care and discharge plan at IDT rounds today with Case Management, Nursing, Nursing leadership, and other members of the IDT team.    Consultants/Specialty  infectious disease and orthopedics    Code Status  Full Code    Disposition  The patient is medically cleared for discharge to home or a post-acute facility.  Anticipate discharge to: home with organized home healthcare and close outpatient follow-up  Just needs PICC line after blood cultures negative for 48 hours    I have placed the appropriate orders for post-discharge needs.    Review of Systems  Review of Systems   Constitutional:  Positive for diaphoresis (Transient resolved).   Gastrointestinal:  Negative for nausea and vomiting.   Musculoskeletal:  Positive for joint pain.   Neurological:  Negative for weakness.        Physical Exam  Temp:  [36 °C (96.8 °F)-36.9 °C (98.4 °F)] 36.7 °C (98.1 °F)  Pulse:  [63-76] 76  Resp:  [16-17] 17  BP: (102-115)/(54-59) 114/59  SpO2:  [86 %-94 %] 90 %    Physical Exam  Vitals and nursing  note reviewed.   Constitutional:       General: He is not in acute distress.     Appearance: Normal appearance. He is not ill-appearing or toxic-appearing.   HENT:      Head: Normocephalic and atraumatic.      Nose: Nose normal.      Mouth/Throat:      Mouth: Mucous membranes are moist.   Eyes:      General:         Right eye: No discharge.         Left eye: No discharge.      Pupils: Pupils are equal, round, and reactive to light.   Cardiovascular:      Rate and Rhythm: Normal rate and regular rhythm.   Pulmonary:      Effort: Pulmonary effort is normal.      Breath sounds: Normal breath sounds.   Abdominal:      General: There is no distension.      Tenderness: There is no abdominal tenderness.   Musculoskeletal:      Comments: In post op wrap   Skin:     General: Skin is warm and dry.   Neurological:      General: No focal deficit present.      Mental Status: He is oriented to person, place, and time.   Psychiatric:         Mood and Affect: Mood normal.         Behavior: Behavior normal.         Fluids    Intake/Output Summary (Last 24 hours) at 6/16/2024 1554  Last data filed at 6/16/2024 1254  Gross per 24 hour   Intake 480 ml   Output --   Net 480 ml       Laboratory  Recent Labs     06/14/24  1548 06/15/24  0136 06/16/24  0102   WBC 14.9* 10.8 9.1   RBC 4.18* 3.79* 2.75*   HEMOGLOBIN 12.2* 11.2* 8.1*   HEMATOCRIT 36.5* 35.7* 24.2*   MCV 87.3 94.2 88.0   MCH 29.2 29.6 29.5   MCHC 33.4 31.4* 33.5   RDW 44.7 47.9 45.4   PLATELETCT 178 163* 143*   MPV 10.7 9.5 10.6     Recent Labs     06/14/24  1548 06/15/24  0136   SODIUM 127* 129*   POTASSIUM 3.5* 4.3   CHLORIDE 91* 95*   CO2 21 18*   GLUCOSE 117* 122*   BUN 18 20   CREATININE 1.47* 1.47*   CALCIUM 9.0 8.2*                   Imaging  DX-KNEE 2- LEFT   Final Result      Left knee arthroplasty within normal limits      DX-CHEST-PORTABLE (1 VIEW)   Final Result      Mild, diffuse interstitial prominence.           Assessment/Plan  * Pyogenic arthritis of left knee  joint (HCC)- (present on admission)  Assessment & Plan  Cultures 6/13 was MSSA  ID to see and recommend course of treatment    Uncomplicated opioid dependence (HCC)- (present on admission)  Assessment & Plan  Resume buprenorphine    Leukocytosis- (present on admission)  Assessment & Plan  Resolved    DNR (do not resuscitate)- (present on admission)  Assessment & Plan  Discussed advance directives with the patient he wishes to be DO NOT RESUSCITATE CODE STATUS.    Stage 3a chronic kidney disease- (present on admission)  Assessment & Plan  Monitor renal functions and avoid nephrotoxic medications  Give fluid resuscitation as he has recently bumped up his kidney function    Hypokalemia- (present on admission)  Assessment & Plan  Resolved    Normochromic normocytic anemia- (present on admission)  Assessment & Plan  Monitor H&H if drops below 7 or 21 transfuse    Alcohol use disorder, moderate, dependence (HCC)- (present on admission)  Assessment & Plan  Daily alcohol intake, no evidence of withdrawal as yet but patient just out of PACU and anesthesia    Hyponatremia- (present on admission)  Assessment & Plan  Trending up    Vitamin D deficiency disease- (present on admission)  Assessment & Plan  Vitamin D supplementation    Hypertension- (present on admission)  Assessment & Plan  Optimize blood pressure management keep systolic blood pressure less than 140 diastolic under 90  Norvasc 10 mg daily, hydralazine 25 mg twice daily, labetalol as needed         VTE prophylaxis:    enoxaparin ppx      I have performed a physical exam and reviewed and updated ROS and Plan today (6/16/2024). In review of yesterday's note (6/15/2024), there are no changes except as documented above.

## 2024-06-16 NOTE — PROGRESS NOTES
Assumed care, patient awake and alert with wife present at bedside. He was in good spirits and able to give an accurate account of his current hospital course. He had strength and agility of movement in the left foot with an easily palpable pedal pulse. He made no complaints of excessive pain or discomfort. Reviewed plan of care for the night and reminded him to use the call light to ask for assistance.

## 2024-06-16 NOTE — THERAPY
Physical Therapy   Initial Evaluation     Patient Name: Zachary Moore  Age:  74 y.o., Sex:  male  Medical Record #: [id number]  Today's Date: 6/16/2024     Precautions  Precautions: Fall Risk;Weight Bearing As Tolerated Left Lower Extremity  Comments: L LE WBAT with knee immobilizer at all times, can be off in bed (per verbal clearification with Dr. Boggs)    Assessment     Zachary Moore is a 74 y.o. 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  He underwent Irrigation and debridement with complete synovectomy and revision Left  Total Knee Arthroplasty-tibial poly exchange only with Dr. Boggs on 6/15/24.  He is currently able to WBAT L LE with knee immobilizer.  Dr. Boggs clarified that it would be OK for immobilizer to be off in bed if desired, ut on at all times out of bed.      Pt is evaluated in the acute setting.  Currently, gait quality and balance are improved with use of the FWW - otherwise pt is SBA or better with all functional mobility.  He is receptive to LE exercise that can be completed with knee immobilizer in place including ankle pumps, quad and glute isometrics and SLR - without pain.  He also demo'd good technique with stairs with rail.  PT can follow up while acute, progress towards functional goals - anticipate outpatient PT follow up when cleared by surgeon and home discharge, pt already owns a FWW.    Plan    Physical Therapy Initial Treatment Plan   Treatment Plan : Gait Training, Neuro Re-Education / Balance, Stair Training, Therapeutic Activities, Therapeutic Exercise, Equipment  Treatment Frequency: 4 Times per Week  Duration: Until Therapy Goals Met    DC Equipment Recommendations:  (Owns needed DME, including FWW in good condition)  Discharge Recommendations: Recommend outpatient physical therapy services to address higher level deficits       Subjective    Pt is  agreeable to participate.     Objective       06/16/24 1040    Services   Is patient using  services for this encounter? No   Initial Contact Note    Initial Contact Note Order Received and Verified, Physical Therapy Evaluation in Progress with Full Report to Follow.   Precautions   Precautions Fall Risk;Weight Bearing As Tolerated Left Lower Extremity   Comments L LE WBAT with knee immobilizer at all times, can be off in bed (per verbal clearification with Dr. Boggs)   Vitals   Room Air Oximetry 94  (with walking activity, 90% on room air at rest/sitting.  redonned NC at 2L/min and was 94% or higher for at rest/sitting.  Pt has IS next to him and using as directed.)   Pain 0 - 10 Group   Therapist Pain Assessment Post Activity Pain Same as Prior to Activity  (Pt reports minimal pain that is controlled at the left knee)   Prior Living Situation   Prior Services Home-Independent   Housing / Facility 1 Story House   Steps Into Home 2  (Pt reports a walker will fit onto porch like steps to enter home)   Steps In Home 1  (There is a 1 step level change to one part of his house)   Bathroom Set up Walk In Shower;Grab Bars;Built-In Shower Chair   Equipment Owned Front-Wheel Walker;4-Wheel Walker;Single Point Cane   Lives with - Patient's Self Care Capacity Spouse   Comments Pt was not using AD prior to admission, but reports his FWW is in good shape and easy to retrieve, he states he will make graham eit is in the car when he discharges from hospital.   Prior Level of Functional Mobility   Bed Mobility Independent   Transfer Status Independent   Ambulation Independent   Ambulation Distance community   Assistive Devices Used None   Stairs Independent   History of Falls   History of Falls Yes   Date of Last Fall   (Pt reports a fall over ~3 years ago resulting in R LE fracture, no recent falls.)   Cognition    Comments Able to make needs know, provides PLOF   Passive ROM Lower Body   Passive ROM Lower  Body X   Comments L knee immobilizer, R ankle stiffness/prior surgery/fusion?   Active ROM Lower Body    Active ROM Lower Body  X   Comments Funcitonal for bed mobiltiy, trasnfers and gait - left knee immobilizer in place   Strength Lower Body   Lower Body Strength  WDL   Coordination Lower Body    Coordination Lower Body  WDL   Comments observed functionally   Vision   Vision Comments Reading glasses   Other Treatments   Other Treatments Provided TKA exercises with immobilizer: ankle pumps x 1 min. every hour when awake, quad and glute isometrics x 10 with 5 sec holds 3x/day and SLR L LE x 10 for 3x/day. Hand out given.  Pt wants to work on heel slides to the R LE in bed and recliner chair, this was with good quality.  Also reviewed tips including ambulation with walker/immobilzer and (right now) with staff multiple times day and up to recliner chair for all meals.  Pt has learned he may be admitted for a little while due to IV abx.   Balance Assessment   Sitting Balance (Static) Good   Sitting Balance (Dynamic) Good   Standing Balance (Static) Fair +   Standing Balance (Dynamic) Fair +   Weight Shift Sitting Good   Weight Shift Standing Fair   Comments with FWW and L knee immobilizer   Bed Mobility    Comments Bed moiblity not seen today   Gait Analysis   Gait Level Of Assist Supervised   Assistive Device Front Wheel Walker   Distance (Feet) 90   # of Times Distance was Traveled 2  (no rest break, but performed 5 stairs at turn around point)   Deviation Step To;Decreased Heel Strike;Decreased Toe Off   # of Stairs Climbed 5   Level of Assist with Stairs Standby Assist  (and rails, good technique with step to (no cues needed for technique))   Weight Bearing Status WBAT L LE with immobilizer   Functional Mobility   Sit to Stand Standby Assist   Bed, Chair, Wheelchair Transfer Standby Assist   Transfer Method Stand Step   Mobility transfer review>gait>stairs>ther ex with immobilizer>remains up in chair   6 Clicks  Assessment - How much HELP from from another person do you currently need... (If the patient hasn't done an activity recently, how much help from another person do you think he/she would need if he/she tried?)   Turning from your back to your side while in a flat bed without using bedrails? 4   Moving from lying on your back to sitting on the side of a flat bed without using bedrails? 4   Moving to and from a bed to a chair (including a wheelchair)? 4   Standing up from a chair using your arms (e.g., wheelchair, or bedside chair)? 3   Walking in hospital room? 3   Climbing 3-5 steps with a railing? 3   6 clicks Mobility Score 21   Activity Tolerance   Sitting in Chair more than 1 hour   Standing 14 minutes   Edema / Skin Assessment   Edema / Skin  X   Comments R ankle with gauze/kerlex in place.   Patient / Family Goals    Patient / Family Goal #1 To go home when able   Short Term Goals    Short Term Goal # 1 In 6 visits, patient will ambulate 200' safely with FWW or LRAD, no verbal cues, normalizing gait pattern as able with immobilizer and Mod I or better to increase functional mobility.   Short Term Goal # 2 In 6 visits, patient will perform 2 porch like steps with FWW or LRAD and knee immobilizer and step to pattern safely and mod I or better, in order to enter/exit the home setting   Short Term Goal # 3 Pt will teach back recommend TKA exercises with knee immobilizer to demonstrate independent HEP.   Education Group   Education Provided Role of Physical Therapist   Role of Physical Therapist Patient Response Patient;Acceptance;Explanation;Verbal Demonstration   Physical Therapy Initial Treatment Plan    Treatment Plan  Gait Training;Neuro Re-Education / Balance;Stair Training;Therapeutic Activities;Therapeutic Exercise;Equipment   Treatment Frequency 4 Times per Week   Duration Until Therapy Goals Met   Problem List    Problems Impaired Ambulation;Impaired Balance;Decreased Activity Tolerance   Anticipated  Discharge Equipment and Recommendations   DC Equipment Recommendations   (Owns needed DME, including FWW in good condition)   Discharge Recommendations Recommend outpatient physical therapy services to address higher level deficits   Interdisciplinary Plan of Care Collaboration   IDT Collaboration with  Nursing   Patient Position at End of Therapy Seated;Call Light within Reach;Tray Table within Reach;Phone within Reach   Collaboration Comments Rn aware of session   Session Information   Date / Session Number  6/16 - 1(1/4, 6/22)

## 2024-06-16 NOTE — PROGRESS NOTES
Received report from night shift nurse. Assumed pt care at 0715. Pt is A&Ox4, resting comfortably in bed. Pt on 2L, NC. No signs of SOB/respiratory distress. Labs, VS, medications reviewed.  Fall precautions in place. Bed at lowest position. Call light and personal belongings within reach. Plan of care discussed, no further concerns at this time.

## 2024-06-16 NOTE — CARE PLAN
The patient is Stable - Low risk of patient condition declining or worsening    Shift Goals  Clinical Goals: Pt without falls and injury; Pt with no new onset pain >5/10 after interventions; Up for meals.  Patient Goals: Pain management, Comfort, Rest    Progress made toward(s) clinical / shift goals:  Pt without falls and injury; Pt with no new onset pain >5/10 after interventions; Up for meals.    Patient is not progressing towards the following goals:

## 2024-06-17 PROBLEM — R78.81 MSSA BACTEREMIA: Status: ACTIVE | Noted: 2024-06-17

## 2024-06-17 PROBLEM — B95.61 MSSA BACTEREMIA: Status: ACTIVE | Noted: 2024-06-17

## 2024-06-17 LAB
BACTERIA TISS AEROBE CULT: ABNORMAL
GRAM STN SPEC: ABNORMAL
SIGNIFICANT IND 70042: ABNORMAL
SITE SITE: ABNORMAL
SOURCE SOURCE: ABNORMAL

## 2024-06-17 PROCEDURE — 99233 SBSQ HOSP IP/OBS HIGH 50: CPT | Performed by: INTERNAL MEDICINE

## 2024-06-17 PROCEDURE — 97535 SELF CARE MNGMENT TRAINING: CPT

## 2024-06-17 PROCEDURE — 700102 HCHG RX REV CODE 250 W/ 637 OVERRIDE(OP): Performed by: HOSPITALIST

## 2024-06-17 PROCEDURE — 700102 HCHG RX REV CODE 250 W/ 637 OVERRIDE(OP)

## 2024-06-17 PROCEDURE — A9270 NON-COVERED ITEM OR SERVICE: HCPCS | Performed by: HOSPITALIST

## 2024-06-17 PROCEDURE — 97166 OT EVAL MOD COMPLEX 45 MIN: CPT

## 2024-06-17 PROCEDURE — 700105 HCHG RX REV CODE 258: Performed by: INTERNAL MEDICINE

## 2024-06-17 PROCEDURE — 700102 HCHG RX REV CODE 250 W/ 637 OVERRIDE(OP): Performed by: INTERNAL MEDICINE

## 2024-06-17 PROCEDURE — A9270 NON-COVERED ITEM OR SERVICE: HCPCS | Performed by: INTERNAL MEDICINE

## 2024-06-17 PROCEDURE — 87040 BLOOD CULTURE FOR BACTERIA: CPT | Mod: 91

## 2024-06-17 PROCEDURE — 36415 COLL VENOUS BLD VENIPUNCTURE: CPT

## 2024-06-17 PROCEDURE — 97602 WOUND(S) CARE NON-SELECTIVE: CPT

## 2024-06-17 PROCEDURE — 700111 HCHG RX REV CODE 636 W/ 250 OVERRIDE (IP): Performed by: INTERNAL MEDICINE

## 2024-06-17 PROCEDURE — 700111 HCHG RX REV CODE 636 W/ 250 OVERRIDE (IP): Mod: JZ

## 2024-06-17 PROCEDURE — A9270 NON-COVERED ITEM OR SERVICE: HCPCS

## 2024-06-17 PROCEDURE — 94760 N-INVAS EAR/PLS OXIMETRY 1: CPT

## 2024-06-17 PROCEDURE — 770001 HCHG ROOM/CARE - MED/SURG/GYN PRIV*

## 2024-06-17 RX ORDER — FAMOTIDINE 20 MG/1
20 TABLET, FILM COATED ORAL DAILY
Status: DISCONTINUED | OUTPATIENT
Start: 2024-06-17 | End: 2024-06-21 | Stop reason: HOSPADM

## 2024-06-17 RX ORDER — RIFAMPIN 300 MG/1
300 CAPSULE ORAL 2 TIMES DAILY
Status: DISCONTINUED | OUTPATIENT
Start: 2024-06-17 | End: 2024-06-21 | Stop reason: HOSPADM

## 2024-06-17 RX ADMIN — AMLODIPINE BESYLATE 10 MG: 5 TABLET ORAL at 05:10

## 2024-06-17 RX ADMIN — GABAPENTIN 600 MG: 300 CAPSULE ORAL at 16:50

## 2024-06-17 RX ADMIN — BUPRENORPHINE HCL 8 MG: 8 TABLET SUBLINGUAL at 08:11

## 2024-06-17 RX ADMIN — BUPRENORPHINE HCL 8 MG: 8 TABLET SUBLINGUAL at 21:51

## 2024-06-17 RX ADMIN — DULOXETINE HYDROCHLORIDE 30 MG: 30 CAPSULE, DELAYED RELEASE ORAL at 16:50

## 2024-06-17 RX ADMIN — HYDRALAZINE HYDROCHLORIDE 25 MG: 25 TABLET ORAL at 05:09

## 2024-06-17 RX ADMIN — CEFAZOLIN 2 G: 2 INJECTION, POWDER, FOR SOLUTION INTRAMUSCULAR; INTRAVENOUS at 12:31

## 2024-06-17 RX ADMIN — RIFAMPIN 300 MG: 300 CAPSULE ORAL at 16:58

## 2024-06-17 RX ADMIN — RIFAMPIN 300 MG: 300 CAPSULE ORAL at 12:26

## 2024-06-17 RX ADMIN — FAMOTIDINE 20 MG: 20 TABLET ORAL at 12:25

## 2024-06-17 RX ADMIN — POTASSIUM CHLORIDE 20 MEQ: 1500 TABLET, EXTENDED RELEASE ORAL at 05:09

## 2024-06-17 RX ADMIN — CEFAZOLIN 2 G: 2 INJECTION, POWDER, FOR SOLUTION INTRAMUSCULAR; INTRAVENOUS at 21:49

## 2024-06-17 RX ADMIN — BUPRENORPHINE HCL 8 MG: 8 TABLET SUBLINGUAL at 16:49

## 2024-06-17 RX ADMIN — ACETAMINOPHEN 1000 MG: 500 TABLET, FILM COATED ORAL at 12:25

## 2024-06-17 RX ADMIN — DULOXETINE HYDROCHLORIDE 30 MG: 30 CAPSULE, DELAYED RELEASE ORAL at 05:09

## 2024-06-17 RX ADMIN — ACETAMINOPHEN 1000 MG: 500 TABLET, FILM COATED ORAL at 23:02

## 2024-06-17 RX ADMIN — CEFAZOLIN 2 G: 2 INJECTION, POWDER, FOR SOLUTION INTRAMUSCULAR; INTRAVENOUS at 05:13

## 2024-06-17 RX ADMIN — GABAPENTIN 600 MG: 300 CAPSULE ORAL at 08:10

## 2024-06-17 RX ADMIN — ACETAMINOPHEN 1000 MG: 500 TABLET, FILM COATED ORAL at 16:50

## 2024-06-17 RX ADMIN — GABAPENTIN 600 MG: 300 CAPSULE ORAL at 21:50

## 2024-06-17 RX ADMIN — OMEPRAZOLE 40 MG: 20 CAPSULE, DELAYED RELEASE ORAL at 05:09

## 2024-06-17 RX ADMIN — HYDRALAZINE HYDROCHLORIDE 25 MG: 25 TABLET ORAL at 16:54

## 2024-06-17 RX ADMIN — CELECOXIB 200 MG: 200 CAPSULE ORAL at 05:10

## 2024-06-17 RX ADMIN — ACETAMINOPHEN 1000 MG: 500 TABLET, FILM COATED ORAL at 05:10

## 2024-06-17 RX ADMIN — ENOXAPARIN SODIUM 40 MG: 100 INJECTION SUBCUTANEOUS at 16:58

## 2024-06-17 ASSESSMENT — ENCOUNTER SYMPTOMS
SHORTNESS OF BREATH: 0
MYALGIAS: 1
NAUSEA: 0
COUGH: 0
ABDOMINAL PAIN: 0
VOMITING: 0
DIARRHEA: 0
DIAPHORESIS: 0
NERVOUS/ANXIOUS: 0
CONSTIPATION: 0
WEAKNESS: 0

## 2024-06-17 ASSESSMENT — COGNITIVE AND FUNCTIONAL STATUS - GENERAL
DRESSING REGULAR LOWER BODY CLOTHING: A LITTLE
DAILY ACTIVITIY SCORE: 19
HELP NEEDED FOR BATHING: A LOT
PERSONAL GROOMING: A LITTLE
TOILETING: A LITTLE
SUGGESTED CMS G CODE MODIFIER DAILY ACTIVITY: CK

## 2024-06-17 ASSESSMENT — PAIN DESCRIPTION - PAIN TYPE
TYPE: SURGICAL PAIN

## 2024-06-17 ASSESSMENT — ACTIVITIES OF DAILY LIVING (ADL): TOILETING: INDEPENDENT

## 2024-06-17 ASSESSMENT — GAIT ASSESSMENTS: DISTANCE (FEET): 200

## 2024-06-17 NOTE — CARE PLAN
The patient is Stable - Low risk of patient condition declining or worsening    Shift Goals  Clinical Goals: pt will not fall during this shift  Patient Goals: Pain management, Comfort, Rest    Progress made toward(s) clinical / shift goals:  Pt has been calling on the call light when he needs assistance. No pain or nausea at this time. Wound dressing to right ankle changed this evening. Bed in low position. Call light within reach. Educated pt on fall precautions.     Patient is not progressing towards the following goals:

## 2024-06-17 NOTE — PROGRESS NOTES
Infectious Disease Progress Note    Author: Kayce Dimas M.D. Date & Time of service: 2024  9:09 AM    Chief Complaint:  Prosthetic joint infection, left knee     Interval History:      Review of Systems:  Review of Systems   Respiratory:  Negative for cough and shortness of breath.    Gastrointestinal:  Negative for abdominal pain, constipation, diarrhea, nausea and vomiting.   Genitourinary:  Negative for dysuria.   Musculoskeletal:  Positive for joint pain and myalgias.   Neurological:  Negative for weakness.   Psychiatric/Behavioral:  The patient is not nervous/anxious.        Hemodynamics:  Temp (24hrs), Av.7 °C (98.1 °F), Min:36.7 °C (98 °F), Max:36.8 °C (98.3 °F)  Temperature: 36.7 °C (98 °F)  Pulse  Av.4  Min: 63  Max: 85   Blood Pressure : (!) 142/69       Physical Exam:  Physical Exam  Cardiovascular:      Rate and Rhythm: Normal rate and regular rhythm.      Heart sounds: Normal heart sounds.   Pulmonary:      Effort: Pulmonary effort is normal.      Breath sounds: Normal breath sounds.   Abdominal:      General: Abdomen is flat. Bowel sounds are normal.      Palpations: Abdomen is soft.   Musculoskeletal:         General: Tenderness and signs of injury present.      Left lower leg: Edema present.   Skin:     General: Skin is warm and dry.   Neurological:      General: No focal deficit present.      Mental Status: He is oriented to person, place, and time.   Psychiatric:         Mood and Affect: Mood normal.         Behavior: Behavior normal.         Meds:    Current Facility-Administered Medications:     buprenorphine    Pharmacy Consult Request    ondansetron    dexamethasone    diphenhydrAMINE    haloperidol lactate    scopolamine    docusate sodium    senna-docusate    senna-docusate    polyethylene glycol/lytes    magnesium hydroxide    bisacodyl    sodium phosphate    enoxaparin (LOVENOX) injection    acetaminophen **FOLLOWED BY** [START ON 2024] acetaminophen    ceFAZolin  **AND** [DISCONTINUED] MD Alert...Vancomycin per Pharmacy    amLODIPine    celecoxib    DULoxetine    gabapentin    hydrALAZINE    omeprazole    acetaminophen    Notify provider if pain remains uncontrolled **AND** Use the Numeric Rating Scale (NRS), Shane-Baker Faces (WBF), or FLACC on regular floors and Critical-Care Pain Observation Tool (CPOT) on ICUs/Trauma to assess pain **AND** Pulse Ox **AND** Pharmacy Consult Request **AND** If patient difficult to arouse and/or has respiratory depression (respiratory rate of 10 or less), stop any opiates that are currently infusing and call a Rapid Response.    labetalol    senna-docusate **AND** polyethylene glycol/lytes    ondansetron    ondansetron    potassium chloride SA    Labs:  Recent Labs     06/14/24  1548 06/15/24  0136 06/16/24  0102   WBC 14.9* 10.8 9.1   RBC 4.18* 3.79* 2.75*   HEMOGLOBIN 12.2* 11.2* 8.1*   HEMATOCRIT 36.5* 35.7* 24.2*   MCV 87.3 94.2 88.0   MCH 29.2 29.6 29.5   RDW 44.7 47.9 45.4   PLATELETCT 178 163* 143*   MPV 10.7 9.5 10.6   NEUTSPOLYS 87.00*  --  91.20*   LYMPHOCYTES 5.70*  --  2.40*   MONOCYTES 6.30  --  5.70   EOSINOPHILS 0.10  --  0.00   BASOPHILS 0.20  --  0.00     Recent Labs     06/14/24  1548 06/15/24  0136   SODIUM 127* 129*   POTASSIUM 3.5* 4.3   CHLORIDE 91* 95*   CO2 21 18*   GLUCOSE 117* 122*   BUN 18 20     Recent Labs     06/14/24  1548 06/15/24  0136   ALBUMIN 4.0  --    TBILIRUBIN 0.5  --    ALKPHOSPHAT 81  --    TOTPROTEIN 7.9  --    ALTSGPT 10  --    ASTSGOT 24  --    CREATININE 1.47* 1.47*       Imaging:  DX-KNEE 2- LEFT    Result Date: 6/15/2024  6/15/2024 9:50 AM HISTORY/REASON FOR EXAM:  Post-Op - To include entire implant.. Left knee arthroplasty TECHNIQUE/EXAM DESCRIPTION AND NUMBER OF VIEWS:  2 views of the LEFT knee. COMPARISON: Left leg x-ray 1/24/2024 FINDINGS: There is a left knee arthroplasty present. The arthroplasty appears within normal limits. There are no fracture. There are skin staples.     Left knee  arthroplasty within normal limits    DX-CHEST-PORTABLE (1 VIEW)    Result Date: 6/14/2024 6/14/2024 3:56 PM HISTORY/REASON FOR EXAM:  Sepsis; sepsis. TECHNIQUE/EXAM DESCRIPTION AND NUMBER OF VIEWS: Single portable view of the chest. COMPARISON: 11/8/2021 FINDINGS: Right-sided central venous catheter has been removed. The cardiomediastinal silhouette is prominent but is accentuated by AP technique. There is also diffuse interstitial prominence which may be chronic. There is no focal area of consolidation, definite pleural effusion, or pneumothorax.     Mild, diffuse interstitial prominence.    DX-KNEE COMPLETE 4+ LEFT    Result Date: 6/13/2024  Three-view x-rays of the left knee including AP, lateral and sunrise interpreted by me today show no obvious signs of fracture or dislocation.  There is a cemented total knee arthroplasty in place with adequate interfaces and alignment.  There is only a small portion of the patella remaining in the knee.  Moderate soft tissue edema.      Micro:  Results       Procedure Component Value Units Date/Time    Fungal Culture [[id number]] Collected: 06/15/24 0805    Order Status: Completed Specimen: Tissue Updated: 06/17/24 0849     Significant Indicator NEG     Source TISS     Site knee, Synovial     Culture Result Culture in progress.     Fungal Smear Results No fungal elements seen.    Narrative:      3 - Knee Synovial  Surgery Specimen    CULTURE TISSUE W/ GRM STAIN [[id number]]  (Abnormal) Collected: 06/15/24 0805    Order Status: Completed Specimen: Tissue Updated: 06/17/24 0849     Significant Indicator POS     Source TISS     Site knee, Synovial     Culture Result -     Gram Stain Result Rare WBCs.  No organisms seen.       Culture Result Staphylococcus aureus  Light growth  Methicillin sensitive by screening method  [place] count unreliable due to antimicrobial inhibition.  See previous culture for sensitivity report.      Narrative:      3 - Knee Synovial  Surgery Specimen     Anaerobic Culture [[id number]] Collected: 06/15/24 0805    Order Status: Completed Specimen: Tissue Updated: 06/17/24 0849     Significant Indicator NEG     Source TISS     Site knee, Synovial     Culture Result Culture in progress.    Narrative:      3 - Knee Synovial  Surgery Specimen    AFB Culture [[id number]] Collected: 06/15/24 0805    Order Status: Completed Specimen: Tissue Updated: 06/17/24 0849     Significant Indicator NEG     Source TISS     Site Knee Synovial     Culture Result Culture in progress.     AFB Smear Results No acid fast bacilli seen.    Narrative:      Previous comment was modified by JORGE LUIS at 13:16 on 06/15/24.  1 - [place] Synovial  1 - Knee Synovial  Surgery Specimen    Fungal Culture [[id number]] Collected: 06/15/24 0805    Order Status: Completed Specimen: Tissue Updated: 06/17/24 0849     Significant Indicator NEG     Source TISS     Site Knee Synovial     Culture Result Culture in progress.     Fungal Smear Results No fungal elements seen.    Narrative:      Previous comment was modified by JORGE LUIS at 13:16 on 06/15/24.  1 - Kenee Synovial  1 - Knee Synovial  Surgery Specimen    CULTURE TISSUE W/ GRM STAIN [[id number]]  (Abnormal) Collected: 06/15/24 0805    Order Status: Completed Specimen: Tissue Updated: 06/17/24 0849     Significant Indicator POS     Source TISS     Site Knee Synovial     Culture Result -     Gram Stain Result Rare WBCs.  No organisms seen.       Culture Result Staphylococcus aureus  Light growth  Methicillin sensitive by screening method  [place] count unreliable due to antimicrobial inhibition.  See previous culture for sensitivity report.      Narrative:      Previous comment was modified by JORGE LUIS at 13:16 on 06/15/24.  1 - Kenee Synovial  1 - Knee Synovial  Surgery Specimen    Anaerobic Culture [[id number]] Collected: 06/15/24 0805    Order Status: Completed Specimen: Tissue Updated: 06/17/24 0849     Significant Indicator NEG     Source TISS     Site Knee Synovial      Culture Result Culture in progress.    Narrative:      Previous comment was modified by JORGE LUIS at 13:16 on 06/15/24.  1 - Kenee Synovial  1 - Knee Synovial  Surgery Specimen    AFB Culture [[id number]] Collected: 06/15/24 0805    Order Status: Completed Specimen: Tissue Updated: 06/17/24 0849     Significant Indicator NEG     Source TISS     Site knee, Synovial     Culture Result Culture in progress.     AFB Smear Results No acid fast bacilli seen.    Narrative:      2 - Knee Synovial  Surgery Specimen    Fungal Culture [[id number]] Collected: 06/15/24 0805    Order Status: No result Specimen: Tissue Updated: 06/17/24 0849     Significant Indicator NEG     Source TISS     Site knee, Synovial     Culture Result -     Fungal Smear Results No fungal elements seen.    Narrative:      2 - Knee Synovial  Surgery Specimen    CULTURE TISSUE W/ GRM STAIN [[id number]]  (Abnormal) Collected: 06/15/24 0805    Order Status: Completed Specimen: Tissue Updated: 06/17/24 0849     Significant Indicator POS     Source TISS     Site knee, Synovial     Culture Result -     Gram Stain Result Rare WBCs.  No organisms seen.       Culture Result Staphylococcus aureus  Light growth  Methicillin sensitive by screening method  [place] count unreliable due to antimicrobial inhibition.  See previous culture for sensitivity report.      Narrative:      2 - Knee Synovial  Surgery Specimen    Anaerobic Culture [[id number]] Collected: 06/15/24 0805    Order Status: Completed Specimen: Tissue Updated: 06/17/24 0849     Significant Indicator NEG     Source TISS     Site knee, Synovial     Culture Result Culture in progress.    Narrative:      2 - Knee Synovial  Surgery Specimen    AFB Culture [[id number]] Collected: 06/15/24 0805    Order Status: Completed Specimen: Tissue Updated: 06/17/24 0849     Significant Indicator NEG     Source TISS     Site knee, Synovial     Culture Result Culture in progress.     AFB Smear Results No acid fast bacilli seen.     Narrative:      3 - Knee Synovial  Surgery Specimen    Fungal Smear [[id number]] Collected: 06/15/24 0805    Order Status: Completed Specimen: Tissue Updated: 06/16/24 1728     Significant Indicator NEG     Source TISS     Site knee, Synovial     Fungal Smear Results No fungal elements seen.    Narrative:      2 - Knee Synovial  Surgery Specimen    Fungal Smear [[id number]] Collected: 06/15/24 0805    Order Status: Completed Specimen: Tissue Updated: 06/16/24 1728     Significant Indicator NEG     Source TISS     Site knee, Synovial     Fungal Smear Results No fungal elements seen.    Narrative:      3 - Knee Synovial  Surgery Specimen    GRAM STAIN [[id number]] Collected: 06/15/24 0805    Order Status: Completed Specimen: Tissue Updated: 06/16/24 1728     Significant Indicator .     Source TISS     Site knee, Synovial     Gram Stain Result Rare WBCs.  No organisms seen.      Narrative:      3 - Knee Synovial  Surgery Specimen    GRAM STAIN [[id number]] Collected: 06/15/24 0805    Order Status: Completed Specimen: Tissue Updated: 06/16/24 1728     Significant Indicator .     Source TISS     Site knee, Synovial     Gram Stain Result Rare WBCs.  No organisms seen.      Narrative:      2 - Knee Synovial  Surgery Specimen    Acid Fast Stain [[id number]] Collected: 06/15/24 0805    Order Status: Completed Specimen: Tissue Updated: 06/16/24 1728     Significant Indicator NEG     Source TISS     Site knee, Synovial     AFB Smear Results No acid fast bacilli seen.    Narrative:      2 - Knee Synovial  Surgery Specimen    Acid Fast Stain [[id number]] Collected: 06/15/24 0805    Order Status: Completed Specimen: Tissue Updated: 06/16/24 1728     Significant Indicator NEG     Source TISS     Site knee, Synovial     AFB Smear Results No acid fast bacilli seen.    Narrative:      3 - Knee Synovial  Surgery Specimen    Fungal Smear [[id number]] Collected: 06/15/24 0805    Order Status: Completed Specimen: Tissue Updated: 06/16/24 1728      Significant Indicator NEG     Source TISS     Site Knee Synovial     Fungal Smear Results No fungal elements seen.    Narrative:      Previous comment was modified by JORGE LUIS at 13:16 on 06/15/24.  1 - Kenee Synovial  1 - Knee Synovial  Surgery Specimen    GRAM STAIN [[id number]] Collected: 06/15/24 0805    Order Status: Completed Specimen: Tissue Updated: 06/16/24 1728     Significant Indicator .     Source TISS     Site Knee Synovial     Gram Stain Result Rare WBCs.  No organisms seen.      Narrative:      Previous comment was modified by JORGE LUIS at 13:16 on 06/15/24.  1 - Kenee Synovial  1 - Knee Synovial  Surgery Specimen    Acid Fast Stain [[id number]] Collected: 06/15/24 0805    Order Status: Completed Specimen: Tissue Updated: 06/16/24 1728     Significant Indicator NEG     Source TISS     Site Knee Synovial     AFB Smear Results No acid fast bacilli seen.    Narrative:      Previous comment was modified by JORGE LUIS at 13:16 on 06/15/24.  1 - Kenee Synovial  1 - Knee Synovial  Surgery Specimen    Urine Culture (New) [[id number]] Collected: 06/14/24 1606    Order Status: Completed Specimen: Urine Updated: 06/16/24 1636     Significant Indicator NEG     Source UR     Site -     Culture Result No growth at 48 hours.    Narrative:      Indication for culture:->Emergency Room Patient    BLOOD CULTURE [[id number]] Collected: 06/15/24 1400    Order Status: Completed Specimen: Blood from Peripheral Updated: 06/16/24 0722     Significant Indicator NEG     Source BLD     Site PERIPHERAL     Culture Result No Growth  Note: Blood cultures are incubated for 5 days and  are monitored continuously.Positive blood cultures  are called to the RN and reported as soon as  they are identified.  Blood culture testing and Gram stain, if indicated, are  performed at [facility], [address] [place] [address].  Positive blood cultures are  sent to [facility], [address] [place] [address]  [place], for organism identification and  susceptibility testing.      Narrative:      Left Hand    BLOOD CULTURE [[id number]] Collected: 06/15/24 1400    Order Status: Completed Specimen: Blood from Peripheral Updated: 06/16/24 0722     Significant Indicator NEG     Source BLD     Site PERIPHERAL     Culture Result No Growth  Note: Blood cultures are incubated for 5 days and  are monitored continuously.Positive blood cultures  are called to the RN and reported as soon as  they are identified.  Blood culture testing and Gram stain, if indicated, are  performed at [facility], [facility]  [address] [facility].[place], [place].  Positive blood cultures are  sent to [facility], [address] [place] [address], for organism identification and  susceptibility testing.      Narrative:      Right Hand    Blood Culture - Draw one from central line and one from peripheral site [[id number]] Collected: 06/14/24 1548    Order Status: Completed Specimen: Blood from Line Updated: 06/15/24 0802     Significant Indicator NEG     Source BLD     Site LINE     Culture Result No Growth  Note: Blood cultures are incubated for 5 days and  are monitored continuously.Positive blood cultures  are called to the RN and reported as soon as  they are identified.  Blood culture testing and Gram stain, if indicated, are  performed at [facility], [facility]  [place], [place].  Positive blood cultures are  sent to [facility], [address] [place] [address], for organism identification and  susceptibility testing.      Narrative:      Right AC    Blood Culture - Draw one from central line and one from peripheral site [[id number]] Collected: 06/14/24 1548    Order Status: Completed Specimen: Blood from Peripheral Updated: 06/15/24 0802     Significant Indicator NEG     Source BLD     Site PERIPHERAL     Culture Result No Growth  Note: Blood cultures are incubated for 5  days and  are monitored continuously.Positive blood cultures  are called to the RN and reported as soon as  they are identified.  Blood culture testing and Gram stain, if indicated, are  performed at [facility], [address] [place] [address].  Positive blood cultures are  sent to [facility], [address] [place] [address], for organism identification and  susceptibility testing.      Narrative:      Left AC    Urinalysis [[id number]]  (Abnormal) Collected: 06/14/24 1606    Order Status: Completed Specimen: Urine Updated: 06/14/24 1650     Color Yellow     Character Clear     Specific Gravity 1.015     Ph 6.0     Glucose Negative mg/dL      Ketones Negative mg/dL      Protein 30 mg/dL      Bilirubin Negative     Nitrite Positive     Leukocyte Esterase Negative     Occult Blood Trace     Micro Urine Req Microscopic    BLOOD CULTURE [[id number]] Collected: 06/14/24 0000    Order Status: Canceled Specimen: Other from Peripheral     BLOOD CULTURE [[id number]] Collected: 06/14/24 0000    Order Status: Canceled Specimen: Other from Peripheral     FLUID CULTURE [[id number]] Collected: 06/14/24 0000    Order Status: Canceled Specimen: Other from Synovial Fluid             Assessment:  Active Hospital Problems    Diagnosis     *Pyogenic arthritis of left knee joint (HCC) [M00.9]     Uncomplicated opioid dependence (HCC) [F11.20]     Hypokalemia [E87.6]     Stage 3a chronic kidney disease [N18.31]     DNR (do not resuscitate) [Z66]     Leukocytosis [D72.829]     Normochromic normocytic anemia [D64.9]     Hyponatremia [E87.1]     Alcohol use disorder, moderate, dependence (HCC) [F10.20]     Vitamin D deficiency disease [E55.9]     Hypertension [I10]      Interval 24 hours:      AF, O2 3 L NC   Labs reviewed to assess for clinical status, drug toxicity and organ function  Micro reviewed    Patient sitting up in chair and with no complaints.  Continued on antibiotics as  below.    ASSESSMENT/PLAN:      74 y.o.  admitted 6/14/2024. Pt has a past medical history of left knee replacement approximately 15 years ago with acute onset swelling redness and pain.  He underwent aspiration of the joint with elevated WBCs and cultures positive for MSSA.  Admitted for I&D of the left knee.  Patient went to the OR on 6/15 for I&D with complete synovectomy and revision of left total knee arthroplasty-tibial poly exchange only.  Per op note there was gross purulence and 3 cultures were sent.  The polyethylene was removed and postdebridement was carried out.  Multiple debridements remove necrotic purulent tissue.     Problem List     Leukocytosis on arrival, improved  Thrombocytopenia, ongoing  Left knee prosthetic joint infection  -Synovial fluid cultures from 6/13 +MSSA  -Status post I&D, liner exchange, hardware was left in place  -OR cx +MSSA  -ESR on 6/15 - 85  Staph aureus infection  Chronic kidney disease     Plan:     Recommendations for antibiotics:   --- Continue cefazolin 2 g every 8 hours + p.o. rifampin 300 mg twice daily-will recommend a 6-week antibiotic course, end 7/27/24   THEN  --- On 7/27/2024 start either doxycycline 100 mg twice daily (preferred) or Augmentin 875/125 twice daily for at least another 6 months and anticipate extending to a year or longer if tolerating given retained hardware  --- Patient prefers home infusion antibiotics if possible.  Orders written and left in the 's office on the desk.  Patient does live in [place], [place] which may be a barrier to discharge  --- Rifampin does have an interaction with the patient's omeprazole (decreases effectiveness) so after discussion with pharmacy and the patient will stop omeprazole and change to famotidine 20 mg daily to continue treatment for his acid reflux.  Once he is off rifampin okay to resume omeprazole.      Recommendations for further/ongoing work-up, monitoring of clinical status and drug  toxicity:   --- Follow-up blood cultures, no growth to date  --- Monitor labs  --- Wound care      Dispo: Awaiting culture results and work-up as above.  Patient is at risk for infectious complications including death, sepsis and loss of limb function.  Anticipate discharge to home on home continuous infusion IV antibiotics.  Orders written and left in .  If the patient does not achieve insurance approval for home infusion will need to pursue outpatient infusion at a center near his home in [place].  In that scenario would likely need PCP to write the orders.     PICC: Okay to place PICC line - ordered         Plan of care discussed with IM Elva Gorman M.D and ID pharmacy. Will continue to follow peripherally.      Kayce Dimas M.D.

## 2024-06-17 NOTE — PROGRESS NOTES
"S: Patient doing well, mobilizing without difficulty.    O: Patient has good pain control, good mobility, and operated leg is neurovascularly intact.    Dressing is clean, dry, and intact    Blood pressure (!) 142/69, pulse 63, temperature 36.7 °C (98 °F), temperature source Temporal, resp. rate 18, height 1.753 m (5' 9.02\"), weight 77 kg (169 lb 12.1 oz), SpO2 97%.    Recent Labs     06/14/24  1548 06/15/24  0136 06/16/24  0102   WBC 14.9* 10.8 9.1   RBC 4.18* 3.79* 2.75*   HEMOGLOBIN 12.2* 11.2* 8.1*   HEMATOCRIT 36.5* 35.7* 24.2*   MCV 87.3 94.2 88.0   MCH 29.2 29.6 29.5   MCHC 33.4 31.4* 33.5   RDW 44.7 47.9 45.4   PLATELETCT 178 163* 143*   MPV 10.7 9.5 10.6         Intake/Output Summary (Last 24 hours) at 6/17/2024 0736  Last data filed at 6/16/2024 2200  Gross per 24 hour   Intake 540 ml   Output --   Net 540 ml             A:  Stable and comfortable following I&D and poly exchange of infected left total knee.  Cultures growing methicillin sensitive Staph aureus  Patient Active Problem List    Diagnosis Date Noted    Uncomplicated opioid dependence (formerly [facility]) 06/16/2024    Infection and inflammatory reaction due to internal left knee prosthesis, initial encounter (formerly [facility]) 06/14/2024    Pyogenic arthritis of left knee joint (formerly [facility]) 06/14/2024    Hypokalemia 06/14/2024    Stage 3a chronic kidney disease 06/14/2024    DNR (do not resuscitate) 06/14/2024    Leukocytosis 06/14/2024    Occult blood positive stool 11/07/2021    Normochromic normocytic anemia 11/06/2021    Sepsis (formerly [facility]) 11/03/2021    Dehydration 11/03/2021    Hyponatremia 11/02/2021    UTE (acute kidney injury)/hypotension, hyponatremia, s/p femoral ORIF, anemia/GI bleed/odynophagia (formerly [facility]) 11/02/2021    Acute cystitis with hematuria 11/02/2021    Mass of urinary bladder 11/02/2021    Sepsis due to Escherichia coli with acute renal failure without septic shock (HCC) 11/02/2021    Encephalopathy acute 11/02/2021    Alcohol use disorder, moderate, dependence (HCC) " 10/27/2021    Urinary tract infection associated with indwelling urethral catheter (HCC) 10/27/2021    Chronic pain 10/12/2021    Peripheral neuropathy 10/12/2021    Jeana-prosthetic femoral shaft fracture 10/12/2021    Degeneration of lumbar or lumbosacral intervertebral disc 01/28/2014    Spinal stenosis of lumbar region 12/06/2013    Vitamin D deficiency disease 10/22/2013    Elevated liver enzymes 10/22/2013    Foraminal stenosis of lumbar region 09/24/2013    Hypertension 12/20/2012    Bilateral knee pain 12/20/2012    Arthritis 12/20/2012         PLAN: Continue with plan for long-term intravenous antibiotics.  Mobilize patient as tolerated.

## 2024-06-18 ENCOUNTER — APPOINTMENT (OUTPATIENT)
Dept: CARDIOLOGY | Facility: MEDICAL CENTER | Age: 74
DRG: 486 | End: 2024-06-18
Attending: INTERNAL MEDICINE
Payer: MEDICARE

## 2024-06-18 LAB
ALBUMIN SERPL BCP-MCNC: 2.7 G/DL (ref 3.2–4.9)
ALBUMIN/GLOB SERPL: 0.8 G/DL
ALP SERPL-CCNC: 53 U/L (ref 30–99)
ALT SERPL-CCNC: <5 U/L (ref 2–50)
ANION GAP SERPL CALC-SCNC: 10 MMOL/L (ref 7–16)
AST SERPL-CCNC: 10 U/L (ref 12–45)
BACTERIA SPEC ANAEROBE CULT: NORMAL
BILIRUB SERPL-MCNC: 0.3 MG/DL (ref 0.1–1.5)
BUN SERPL-MCNC: 28 MG/DL (ref 8–22)
CALCIUM ALBUM COR SERPL-MCNC: 9 MG/DL (ref 8.5–10.5)
CALCIUM SERPL-MCNC: 8 MG/DL (ref 8.4–10.2)
CHLORIDE SERPL-SCNC: 104 MMOL/L (ref 96–112)
CO2 SERPL-SCNC: 20 MMOL/L (ref 20–33)
CREAT SERPL-MCNC: 1.22 MG/DL (ref 0.5–1.4)
ERYTHROCYTE [DISTWIDTH] IN BLOOD BY AUTOMATED COUNT: 46.4 FL (ref 35.9–50)
GFR SERPLBLD CREATININE-BSD FMLA CKD-EPI: 62 ML/MIN/1.73 M 2
GLOBULIN SER CALC-MCNC: 3.3 G/DL (ref 1.9–3.5)
GLUCOSE SERPL-MCNC: 75 MG/DL (ref 65–99)
HCT VFR BLD AUTO: 26.4 % (ref 42–52)
HGB BLD-MCNC: 8.6 G/DL (ref 14–18)
LV EJECT FRACT  99904: 55
LV EJECT FRACT MOD 2C 99903: 48.19
LV EJECT FRACT MOD 4C 99902: 40.45
LV EJECT FRACT MOD BP 99901: 45.54
MAGNESIUM SERPL-MCNC: 2.6 MG/DL (ref 1.5–2.5)
MCH RBC QN AUTO: 29.6 PG (ref 27–33)
MCHC RBC AUTO-ENTMCNC: 32.6 G/DL (ref 32.3–36.5)
MCV RBC AUTO: 90.7 FL (ref 81.4–97.8)
PLATELET # BLD AUTO: 215 K/UL (ref 164–446)
PMV BLD AUTO: 10.3 FL (ref 9–12.9)
POTASSIUM SERPL-SCNC: 4.7 MMOL/L (ref 3.6–5.5)
PROT SERPL-MCNC: 6 G/DL (ref 6–8.2)
RBC # BLD AUTO: 2.91 M/UL (ref 4.7–6.1)
SIGNIFICANT IND 70042: NORMAL
SITE SITE: NORMAL
SODIUM SERPL-SCNC: 134 MMOL/L (ref 135–145)
SOURCE SOURCE: NORMAL
WBC # BLD AUTO: 5.8 K/UL (ref 4.8–10.8)

## 2024-06-18 PROCEDURE — 36415 COLL VENOUS BLD VENIPUNCTURE: CPT

## 2024-06-18 PROCEDURE — 700117 HCHG RX CONTRAST REV CODE 255: Performed by: INTERNAL MEDICINE

## 2024-06-18 PROCEDURE — 700111 HCHG RX REV CODE 636 W/ 250 OVERRIDE (IP): Performed by: INTERNAL MEDICINE

## 2024-06-18 PROCEDURE — 770001 HCHG ROOM/CARE - MED/SURG/GYN PRIV*

## 2024-06-18 PROCEDURE — 80053 COMPREHEN METABOLIC PANEL: CPT

## 2024-06-18 PROCEDURE — 93306 TTE W/DOPPLER COMPLETE: CPT

## 2024-06-18 PROCEDURE — A9270 NON-COVERED ITEM OR SERVICE: HCPCS | Performed by: INTERNAL MEDICINE

## 2024-06-18 PROCEDURE — 94760 N-INVAS EAR/PLS OXIMETRY 1: CPT

## 2024-06-18 PROCEDURE — 97116 GAIT TRAINING THERAPY: CPT

## 2024-06-18 PROCEDURE — 700102 HCHG RX REV CODE 250 W/ 637 OVERRIDE(OP): Performed by: HOSPITALIST

## 2024-06-18 PROCEDURE — 93306 TTE W/DOPPLER COMPLETE: CPT | Mod: 26 | Performed by: INTERNAL MEDICINE

## 2024-06-18 PROCEDURE — A9270 NON-COVERED ITEM OR SERVICE: HCPCS

## 2024-06-18 PROCEDURE — A9270 NON-COVERED ITEM OR SERVICE: HCPCS | Performed by: HOSPITALIST

## 2024-06-18 PROCEDURE — 700102 HCHG RX REV CODE 250 W/ 637 OVERRIDE(OP): Performed by: INTERNAL MEDICINE

## 2024-06-18 PROCEDURE — 700105 HCHG RX REV CODE 258: Performed by: INTERNAL MEDICINE

## 2024-06-18 PROCEDURE — 83735 ASSAY OF MAGNESIUM: CPT

## 2024-06-18 PROCEDURE — 85027 COMPLETE CBC AUTOMATED: CPT

## 2024-06-18 PROCEDURE — 99233 SBSQ HOSP IP/OBS HIGH 50: CPT | Performed by: INTERNAL MEDICINE

## 2024-06-18 PROCEDURE — 700102 HCHG RX REV CODE 250 W/ 637 OVERRIDE(OP)

## 2024-06-18 PROCEDURE — 97110 THERAPEUTIC EXERCISES: CPT

## 2024-06-18 RX ORDER — ENOXAPARIN SODIUM 100 MG/ML
30 INJECTION SUBCUTANEOUS DAILY
Status: DISCONTINUED | OUTPATIENT
Start: 2024-06-18 | End: 2024-06-21 | Stop reason: HOSPADM

## 2024-06-18 RX ADMIN — RIFAMPIN 300 MG: 300 CAPSULE ORAL at 17:24

## 2024-06-18 RX ADMIN — BUPRENORPHINE HCL 8 MG: 8 TABLET SUBLINGUAL at 20:54

## 2024-06-18 RX ADMIN — ACETAMINOPHEN 1000 MG: 500 TABLET, FILM COATED ORAL at 12:23

## 2024-06-18 RX ADMIN — BUPRENORPHINE HCL 8 MG: 8 TABLET SUBLINGUAL at 15:21

## 2024-06-18 RX ADMIN — GABAPENTIN 600 MG: 300 CAPSULE ORAL at 20:54

## 2024-06-18 RX ADMIN — GABAPENTIN 600 MG: 300 CAPSULE ORAL at 10:05

## 2024-06-18 RX ADMIN — RIFAMPIN 300 MG: 300 CAPSULE ORAL at 05:01

## 2024-06-18 RX ADMIN — AMLODIPINE BESYLATE 10 MG: 5 TABLET ORAL at 05:01

## 2024-06-18 RX ADMIN — CEFAZOLIN 2 G: 2 INJECTION, POWDER, FOR SOLUTION INTRAMUSCULAR; INTRAVENOUS at 15:22

## 2024-06-18 RX ADMIN — BUPRENORPHINE HCL 8 MG: 8 TABLET SUBLINGUAL at 10:05

## 2024-06-18 RX ADMIN — CEFAZOLIN 2 G: 2 INJECTION, POWDER, FOR SOLUTION INTRAMUSCULAR; INTRAVENOUS at 22:07

## 2024-06-18 RX ADMIN — ACETAMINOPHEN 1000 MG: 500 TABLET, FILM COATED ORAL at 23:07

## 2024-06-18 RX ADMIN — CEFAZOLIN 2 G: 2 INJECTION, POWDER, FOR SOLUTION INTRAMUSCULAR; INTRAVENOUS at 05:10

## 2024-06-18 RX ADMIN — DULOXETINE HYDROCHLORIDE 30 MG: 30 CAPSULE, DELAYED RELEASE ORAL at 05:01

## 2024-06-18 RX ADMIN — HYDRALAZINE HYDROCHLORIDE 25 MG: 25 TABLET ORAL at 17:24

## 2024-06-18 RX ADMIN — FAMOTIDINE 20 MG: 20 TABLET ORAL at 05:01

## 2024-06-18 RX ADMIN — HYDRALAZINE HYDROCHLORIDE 25 MG: 25 TABLET ORAL at 05:01

## 2024-06-18 RX ADMIN — ACETAMINOPHEN 1000 MG: 500 TABLET, FILM COATED ORAL at 17:24

## 2024-06-18 RX ADMIN — POTASSIUM CHLORIDE 20 MEQ: 1500 TABLET, EXTENDED RELEASE ORAL at 05:01

## 2024-06-18 RX ADMIN — HUMAN ALBUMIN MICROSPHERES AND PERFLUTREN 3 ML: 10; .22 INJECTION, SOLUTION INTRAVENOUS at 16:15

## 2024-06-18 RX ADMIN — GABAPENTIN 600 MG: 300 CAPSULE ORAL at 15:21

## 2024-06-18 RX ADMIN — ENOXAPARIN SODIUM 30 MG: 100 INJECTION SUBCUTANEOUS at 17:24

## 2024-06-18 RX ADMIN — DULOXETINE HYDROCHLORIDE 30 MG: 30 CAPSULE, DELAYED RELEASE ORAL at 17:24

## 2024-06-18 RX ADMIN — ACETAMINOPHEN 1000 MG: 500 TABLET, FILM COATED ORAL at 05:00

## 2024-06-18 RX ADMIN — CELECOXIB 200 MG: 200 CAPSULE ORAL at 05:01

## 2024-06-18 ASSESSMENT — ENCOUNTER SYMPTOMS
NAUSEA: 0
DIAPHORESIS: 0
WEAKNESS: 0
VOMITING: 0

## 2024-06-18 ASSESSMENT — GAIT ASSESSMENTS
DEVIATION: STEP TO
GAIT LEVEL OF ASSIST: SUPERVISED
ASSISTIVE DEVICE: FRONT WHEEL WALKER
DISTANCE (FEET): 250

## 2024-06-18 ASSESSMENT — FIBROSIS 4 INDEX: FIB4 SCORE: 1.644434374852436103

## 2024-06-18 ASSESSMENT — PAIN DESCRIPTION - PAIN TYPE
TYPE: SURGICAL PAIN

## 2024-06-18 ASSESSMENT — COGNITIVE AND FUNCTIONAL STATUS - GENERAL
SUGGESTED CMS G CODE MODIFIER MOBILITY: CJ
MOBILITY SCORE: 22
WALKING IN HOSPITAL ROOM: A LITTLE
CLIMB 3 TO 5 STEPS WITH RAILING: A LITTLE

## 2024-06-18 NOTE — PROGRESS NOTES
"Hospital Medicine Daily Progress Note    Date of Service  6/18/2024    Chief Complaint  As per chart review:  \"Zachary Moore is a 74 y.o. 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  Infectious disease consulted\"    Interval Problem Update  6/17: Patient continues to do well, discussed discharge options, he lives in [facility] so coming to [place] for daily infusions is not an option.    Unfortunately his blood cultures turned positive this afternoon therefore earliest date for PICC line is likely 6/20.     PATIENT SEEN BY PREVIOUS HOSPITALIST UNTIL 6/17 6/18: Patient seen at bedside this morning.  Overall the patient feels better.  There was concern for MSSA bacteremia.  ID following the patient.  For now patient to continue rifampin and cefazolin.  We will have to wait to place PICC line for repeat blood cultures to be negative for 48 hours.  Continue to monitor closely.      I have discussed this patient's plan of care and discharge plan at IDT rounds today with Case Management, Nursing, Nursing leadership, and other members of the IDT team.    Consultants/Specialty  infectious disease and orthopedics    Code Status  Full Code    Disposition  The patient is not medically cleared for discharge to home or a post-acute facility.    Blood cultures positive from 1400 hrs. on 6/15, reordered.      Review of Systems  Review of Systems   Constitutional:  Negative for diaphoresis.   Gastrointestinal:  Negative for nausea and vomiting.   Musculoskeletal:  Positive for joint pain (Controlled on current medication).   Neurological:  Negative for weakness.        Physical Exam  Temp:  [36.1 °C (97 °F)-36.6 °C (97.9 °F)] 36.3 °C (97.4 °F)  Pulse:  [61-73] 64  Resp:  [16-17] 16  BP: (109-130)/(50-77) 117/62  SpO2:  [81 " %-96 %] 95 %    Physical Exam  Vitals and nursing note reviewed.   Constitutional:       General: He is not in acute distress.     Appearance: Normal appearance. He is not ill-appearing or toxic-appearing.   HENT:      Head: Normocephalic and atraumatic.      Nose: Nose normal.      Mouth/Throat:      Mouth: Mucous membranes are moist.   Eyes:      General:         Right eye: No discharge.         Left eye: No discharge.      Pupils: Pupils are equal, round, and reactive to light.   Cardiovascular:      Rate and Rhythm: Normal rate and regular rhythm.   Pulmonary:      Effort: Pulmonary effort is normal.      Breath sounds: Normal breath sounds.   Abdominal:      General: There is no distension.      Tenderness: There is no abdominal tenderness.   Musculoskeletal:      Comments: In post op wrap   Skin:     General: Skin is warm and dry.   Neurological:      General: No focal deficit present.      Mental Status: He is oriented to person, place, and time.   Psychiatric:         Mood and Affect: Mood normal.         Behavior: Behavior normal.         Fluids    Intake/Output Summary (Last 24 hours) at 6/18/2024 1013  Last data filed at 6/18/2024 0556  Gross per 24 hour   Intake 360 ml   Output --   Net 360 ml       Laboratory  Recent Labs     06/16/24  0102 06/18/24  0506   WBC 9.1 5.8   RBC 2.75* 2.91*   HEMOGLOBIN 8.1* 8.6*   HEMATOCRIT 24.2* 26.4*   MCV 88.0 90.7   MCH 29.5 29.6   MCHC 33.5 32.6   RDW 45.4 46.4   PLATELETCT 143* 215   MPV 10.6 10.3     Recent Labs     06/18/24  0506   SODIUM 134*   POTASSIUM 4.7   CHLORIDE 104   CO2 20   GLUCOSE 75   BUN 28*   CREATININE 1.22   CALCIUM 8.0*                   Imaging  DX-KNEE 2- LEFT   Final Result      Left knee arthroplasty within normal limits      DX-CHEST-PORTABLE (1 VIEW)   Final Result      Mild, diffuse interstitial prominence.      EC-ECHOCARDIOGRAM COMPLETE W/O CONT    (Results Pending)        Assessment/Plan  * Pyogenic arthritis of left knee joint (HCC)-  (present on admission)  Assessment & Plan  Cultures 6/13 was MSSA  ID recommending continue his home infusion, checking into insurance coverage  Blood culture from 2:00 on the 17th just turned positive, therefore will recheck blood cultures-line can likely not be placed until 6/20 a.m. at the earliest if these blood cultures are negative  Outpatient infusion center not an option in [place] because patient lives in [place]. [place]  If he cannot have home infusion, can check to see if his primary care provider can order outpatient infusion through the hospital in [place].  Option 3 would be SNF but would like to avoid this if at all possible    6/18: ID following patient, we appreciate further recommendations    Positive blood culture- (present on admission)  Assessment & Plan  Patient tested 1 out of 2 sets with a positive blood culture.  ID is following the patient, we appreciate further recommendations.  We have repeated blood cultures.    Uncomplicated opioid dependence (HCC)- (present on admission)  Assessment & Plan  Pain controlled on chronic dose of buprenorphine    Leukocytosis- (present on admission)  Assessment & Plan  Resolved    Stage 3a chronic kidney disease- (present on admission)  Assessment & Plan  Monitor renal functions and avoid nephrotoxic medications  Give fluid resuscitation as he has recently bumped up his kidney function    Hypokalemia- (present on admission)  Assessment & Plan  Resolved    Normochromic normocytic anemia- (present on admission)  Assessment & Plan  Monitor H&H if drops below 7 or 21 transfuse    Alcohol use disorder, moderate, dependence (HCC)- (present on admission)  Assessment & Plan  Daily alcohol intake, no evidence of withdrawal    Hyponatremia- (present on admission)  Assessment & Plan  6/18: Mild  monitor    Vitamin D deficiency disease- (present on admission)  Assessment & Plan  Vitamin D supplementation    Hypertension- (present on admission)  Assessment & Plan  Continue  norvasc and hydralazine         VTE prophylaxis: Lovenox    I have performed a physical exam and reviewed and updated ROS and Plan today (6/18/2024). In review of yesterday's note (6/17/2024), there are no changes except as documented above.      I spend at least 51 minutes providing care for this patient.  This included face-to-face interview, physical examination.  Review of lab work including CBC, CMP.  Discussion with ID.  Discussion with multidisciplinary team including case management, nursing staff and pharmacy.  Creating plan of care, reviewing orders.

## 2024-06-18 NOTE — THERAPY
"Occupational Therapy   Initial Evaluation     Patient Name: Zachary Moore  Age:  74 y.o., Sex:  male  Medical Record #: [id number]  Today's Date: 6/18/2024     Precautions  Precautions: Fall Risk, Weight Bearing As Tolerated Left Lower Extremity  Comments: knee immobilizer on at all times, may remove in bed and for hygiene only    Assessment  Patient is 74 y.o. 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 H/o shoulder surgery; knee arthroplasty total (2009); knee arthroplasty total (2013); and fusion, spine, lumbar, plif.  Pt also with complex fracture to RLE per his report. At this time, pt is tolerating simple mobility, ADL's and ADL transfers well.  He demos good insight to his ROM restrictions to knee and how to approp use AE at home.  He will be able to complete daily routine with some assist from spouse as needed.  No further OT needs in this setting, appears approp for home when cleared medically.    Plan    Occupational Therapy Initial Treatment Plan   Duration: Evaluation only    DC Equipment Recommendations: None        Subjective    \"I think we've got this handled.  My wife is a saint for helping me so much.\"     Objective       06/17/24 1536   Prior Living Situation   Prior Services Home-Independent   Housing / Facility 1 Story House   Steps Into Home 2   Steps In Home 1   Bathroom Set up Walk In Shower;Grab Bars;Built-In Shower Chair   Equipment Owned Front-Wheel Walker;4-Wheel Walker;Single Point Cane;Raised Toilet Seat Without Arms;Tub / Shower Seat;Grab Bar(s) In Tub / Shower;Reacher   Lives with - Patient's Self Care Capacity Spouse   Comments Spouse home and able to assist.  Pt reports he is very familiar with AE and ADL's after complex femur fx in past and being NWB for a long time after that.   Prior Level of ADL Function   Self Feeding Independent "   Grooming / Hygiene Independent   Bathing Independent   Dressing Independent   Toileting Independent   Prior Level of IADL Function   Medication Management Independent   Laundry Independent   Kitchen Mobility Independent   Finances Independent   Home Management Requires Assist   Shopping Independent   Prior Level Of Mobility Independent Without Device in Community   Driving / Transportation Unable To Determine At This Time   Occupation (Pre-Hospital Vocational) Retired Due To Age   Leisure Interests Unable To Determine At This Time   History of Falls   History of Falls Yes   Precautions   Precautions Fall Risk;Weight Bearing As Tolerated Left Lower Extremity   Comments knee immobilizer on at all times, may remove in bed and for hygiene only   Vitals   Pulse Oximetry 94 %   O2 (LPM) 3   O2 Delivery Device Nasal Cannula   Pain 0 - 10 Group   Therapist Pain Assessment 0;During Activity;Nurse Notified  (pt denies pain multiple times in session)   Cognition    Cognition / Consciousness WDL   Level of Consciousness Alert   Comments pleasant, cooperative, motivated   Active ROM Upper Body   Active ROM Upper Body  WDL   Dominant Hand Right   Strength Upper Body   Upper Body Strength  WDL   Balance Assessment   Sitting Balance (Static) Good   Sitting Balance (Dynamic) Good   Standing Balance (Static) Fair +   Standing Balance (Dynamic) Fair +   Weight Shift Sitting Good   Weight Shift Standing Fair   Comments with FWW, knee immob on   Bed Mobility    Comments UIC pre and post   ADL Assessment   Eating Independent   Grooming Modified Independent;Seated   Upper Body Dressing Modified Independent   Lower Body Dressing Minimal Assist  (underwear, maxA socks.  Pt reports spouse will be able to assist at home)   Toileting Standby Assist   How much help from another person does the patient currently need...   Putting on and taking off regular lower body clothing? 3   Bathing (including washing, rinsing, and drying)? 2   Toileting,  which includes using a toilet, bedpan, or urinal? 3   Putting on and taking off regular upper body clothing? 4   Taking care of personal grooming such as brushing teeth? 3   Eating meals? 4   6 Clicks Daily Activity Score 19   Functional Mobility   Sit to Stand Standby Assist   Bed, Chair, Wheelchair Transfer Standby Assist   Toilet Transfers Standby Assist  (use of rail, standard toilet)   Transfer Method Stand Step   Mobility up from chair, toileting, walk in christopher   Distance (Feet) 200   # of Times Distance was Traveled 1   Comments with FWW   Visual Perception   Visual Perception  WDL   Edema / Skin Assessment   Comments R ankle dressed, pt just finished with wound care nurse prior to OT   Activity Tolerance   Sitting in Chair > 60  min   Standing 3 min, 9-10 min with walking   Patient / Family Goals   Patient / Family Goal #1 home   Education Group   Education Provided Role of Occupational Therapist;Activities of Daily Living;Adaptive Equipment   Role of Occupational Therapist Patient Response Patient;Acceptance;Explanation;Verbal Demonstration   ADL Patient Response Patient;Acceptance;Explanation;Verbal Demonstration   Adaptive Equipment Patient Response Patient;Acceptance;Explanation;Verbal Demonstration   Additional Comments extensive education related to mgmt of immobilizer during showering/shower transfers.  Pt currently with Large prevena vac in place and several layers of padding/Ace wrap under immob.  Rec pt get clarification from MD on when the under layers can be removed in order to shower, then advised pt to enter shower with immob on, get assist to remove it, sit with leg in extension, and bathe seated.  Advised not to stand on leg in shower while unsupported without immobilizer on.  Pt reports he is able to do this after lots of practive being NWB on RLE in the past

## 2024-06-18 NOTE — PROGRESS NOTES
Received report from day shift nurse. Assumed pt care at 1915. Pt is A&Ox4, resting comfortably in bed. Pt on 2lpm via nasal cannula. No signs of SOB/respiratory distress. Labs noted, VSS. Pt c/o no pain at this moment. Surgical dressing dry and intact. Fall precautions in place. Bed at lowest position. Call light and personal belongings within reach. Plan of care on going, no further concerns as of present.

## 2024-06-18 NOTE — ASSESSMENT & PLAN NOTE
Patient tested 1 out of 2 sets with a positive blood culture.  ID is following the patient, we appreciate further recommendations.  We have repeated blood cultures.  --Concern for MSSA bacteremia    6/19: ID following the patient, they have placed orders for outpatient infusion most likely at home with PICC line.  Pending placement, we appreciate further recommendations by case management.    6/20: Pending placement for continued antibiotics.  While hospitalized continue antibiotics as per ID recommendation.

## 2024-06-18 NOTE — THERAPY
Physical Therapy   Daily Treatment     Patient Name: Zachary Moore  Age:  74 y.o., Sex:  male  Medical Record #: [id number]  Today's Date: 6/18/2024     Precautions  Precautions: (P) Fall Risk;Weight Bearing As Tolerated Left Lower Extremity  Comments: (P) knee immobilizer on at all times, may remove in bed per verbal calification from Dr. Boggs    Assessment    Pt was agreeable to therapy txt session and was able to demonstrate safe upright mobility. Pt is now demonstrating SPV with all upright functional mobility with use of FWW. Pt was provided with education on use of immobilizer as per medical chart per Dr. Boggs he is able to take it off while in bed. Per orders pt is encouraged to perform ROM as well. Pt was able to complete supine theraputic exercises and provided with VC and TC for appropriate mechanics ( see flowsheet). Pt was able to ambulate with dec heel strike, step to pattern, and locked knee in extension secondary to immobilizer. Pt provided with VC and demo for future gait method of heel to toe. Pt states he has no concerns of mobility and feels safe to dc home once medically clear. States of no concerns to manage stairs. Pt will no longer benefit from further acute skilled PT at this time, anticipate pt to continue OP therapy services for best outcomes for TKA. Patient will not be actively followed for physical therapy services at this time, however may be seen if requested by physician for 1 more visit within 30 days to address any discharge or equipment needs.    Plan    Treatment Plan Status: (P) Modify Current Treatment Plan  Type of Treatment: Gait Training, Neuro Re-Education / Balance, Stair Training, Therapeutic Activities, Therapeutic Exercise, Equipment  Treatment Frequency: 4 Times per Week  Treatment Duration: (P) Discharge Needs Only    DC Equipment Recommendations: (P) None  Discharge Recommendations: (P) Recommend outpatient physical therapy services to address higher level  deficits    Objective       06/18/24 1615   Precautions   Precautions Fall Risk;Weight Bearing As Tolerated Left Lower Extremity   Comments knee immobilizer on at all times, may remove in bed per verbal calification from Dr. Boggs   Pain 0 - 10 Group   Location Knee;Leg   Description Sore   Therapist Pain Assessment Prior to Activity;During Activity;Post Activity;Nurse Notified;3   Cognition    Cognition / Consciousness WDL   Level of Consciousness Alert   Supine Lower Body Exercise   Supine Lower Body Exercises Yes   Straight Leg Raises 1 set of 10;Left   Heel Slide 1 set of 10;Left   Ankle Pumps Reciprocal;1 set of 10   Gluteal Isometrics Bilateral;1 set of 10   Quadriceps Isometrics 1 set of 10;Left   Comments per orders encouraged ROM with OOB   Balance   Sitting Balance (Static) Good   Sitting Balance (Dynamic) Good   Standing Balance (Static) Good   Standing Balance (Dynamic) Good   Weight Shift Sitting Good   Weight Shift Standing Good   Skilled Intervention Verbal Cuing   Comments w/fww use   Bed Mobility    Comments found [facility]   Gait Analysis   Gait Level Of Assist Supervised   Assistive Device Front Wheel Walker   Distance (Feet) 250   # of Times Distance was Traveled 1   Deviation Step To   Weight Bearing Status WBAT L LE with immobilizer   Skilled Intervention Verbal Cuing   Functional Mobility   Sit to Stand Supervised   Bed, Chair, Wheelchair Transfer Supervised   Transfer Method Stand Step   Mobility sit<>stand, ambulation, transfer back to bed   Skilled Intervention Verbal Cuing   6 Clicks Assessment - How much HELP from from another person do you currently need... (If the patient hasn't done an activity recently, how much help from another person do you think he/she would need if he/she tried?)   Turning from your back to your side while in a flat bed without using bedrails? 4   Moving from lying on your back to sitting on the side of a flat bed without using bedrails? 4   Moving to and from a bed  to a chair (including a wheelchair)? 4   Standing up from a chair using your arms (e.g., wheelchair, or bedside chair)? 4   Walking in hospital room? 3   Climbing 3-5 steps with a railing? 3   6 clicks Mobility Score 22   Activity Tolerance   Sitting in Chair functional   Sitting Edge of Bed NT   Standing 10 mins   Comments no adverse events noted   Patient / Family Goals    Patient / Family Goal #1 To go home when able   Short Term Goals    Short Term Goal # 1 In 6 visits, patient will ambulate 200' safely with FWW or LRAD, no verbal cues, normalizing gait pattern as able with immobilizer and Mod I or better to increase functional mobility.   Goal Outcome # 1 Progressing as expected   Short Term Goal # 2 In 6 visits, patient will perform 2 porch like steps with FWW or LRAD and knee immobilizer and step to pattern safely and mod I or better, in order to enter/exit the home setting   Goal Outcome # 2 Progressing as expected   Short Term Goal # 3 Pt will teach back recommend TKA exercises with knee immobilizer to demonstrate independent HEP.   Goal Outcome # 3 Progressing as expected   Education Group   Role of Physical Therapist Patient Response Patient;Acceptance;Explanation;Verbal Demonstration   Physical Therapy Treatment Plan   Physical Therapy Treatment Plan Modify Current Treatment Plan   Duration Discharge Needs Only   Anticipated Discharge Equipment and Recommendations   DC Equipment Recommendations None   Discharge Recommendations Recommend outpatient physical therapy services to address higher level deficits   Interdisciplinary Plan of Care Collaboration   IDT Collaboration with  Nursing   Patient Position at End of Therapy Seated;Call Light within Reach;Tray Table within Reach;Phone within Reach;Family / Friend in Room   Collaboration Comments aware of visit and recs   Session Information   Date / Session Number  6/18-2 (2/4, 6/22) d/c needs only   Priority 0

## 2024-06-18 NOTE — PROGRESS NOTES
0700 - Bedside report received from ALDEN Iverson. Alert and oriented X4, on RA in no acute respiratory distress. Daily plan of care discussed. Patient complains of no pain at this time. No other needs at this time. Call light and personal belongings within reach. Hourly rounding in place. Chart reviewed for recent labs, notes, and orders.

## 2024-06-18 NOTE — DISCHARGE PLANNING
Case Management Discharge Planning    Admission Date: 6/14/2024  GMLOS: 5  ALOS: 4    6-Clicks ADL Score: 19  6-Clicks Mobility Score: 21      Anticipated Discharge Dispo: Discharge Disposition: D/T to home under HHA care in anticipation of covered skilled care (06)  Discharge Address: [address] [place]  Discharge Contact Phone Number: [phone]    DME Needed: No    Action(s) Taken: Updated Provider/Nurse on Discharge Plan and DC Assessment Complete (See below)    Pt discussed in 1030 rounds. Pt is anticipated to need infusions upon discharge. Infusion orders received by DERIC [facility] Pending final cultures.    Escalations Completed: None    Medically Clear: No    Next Steps: LSW to follow    Barriers to Discharge: Medical clearance and Outpatient Infusion required    Is the patient up for discharge tomorrow: No          Care Transition Team Assessment    LSW met with pt at bedside to complete discharge planning assessment.     Pt reported he currently lives in a single story house in [facility] with his wife.   Pt's preferred pharmacy is [facility].   Pt reported being independent prior to admission, no DME at baseline.   Pt receives social security.     Information Source  Orientation Level: Oriented X4  Information Given By: Patient  Who is responsible for making decisions for patient? : Patient    Readmission Evaluation  Is this a readmission?: No    Elopement Risk  Legal Hold: No  Ambulatory or Self Mobile in Wheelchair: Yes  Disoriented: No  Psychiatric Symptoms: None  History of Wandering: No  Elopement this Admit: No  Vocalizing Wanting to Leave: No  Displays Behaviors, Body Language Wanting to Leave: No-Not at Risk for Elopement  Elopement Risk: Not at Risk for Elopement    Interdisciplinary Discharge Planning  Lives with - Patient's Self Care Capacity: Spouse  Patient or legal guardian wants to designate a caregiver: No  Support Systems: Family Member(s), Spouse / Significant  Other  Housing / Facility: [address] [facility]  Prior Services: Home-Independent    Discharge Preparedness  What is your plan after discharge?: Home health care  What are your discharge supports?: Spouse  Prior Functional Level: Ambulatory, Independent with Activities of Daily Living  Difficulity with ADLs: None  Difficulity with IADLs: None    Functional Assesment  Prior Functional Level: Ambulatory, Independent with Activities of Daily Living    Finances  Financial Barriers to Discharge: No  Prescription Coverage: Yes    Vision / Hearing Impairment  Vision Impairment : Yes  Right Eye Vision: Impaired, Wears Glasses  Left Eye Vision: Impaired, Wears Glasses  Hearing Impairment : No    Advance Directive  Advance Directive?: None    Domestic Abuse  Possible Abuse/Neglect Reported to:: Not Applicable    Psychological Assessment  History of Substance Abuse: None  History of Psychiatric Problems: No  Non-compliant with Treatment: No  Newly Diagnosed Illness: No    Discharge Risks or Barriers  Discharge risks or barriers?: No    Anticipated Discharge Information  Discharge Disposition: D/T to home under HHA care in anticipation of covered skilled care (06)  Discharge Address: [address] [place] [address]  Discharge Contact Phone Number: [phone]

## 2024-06-18 NOTE — CARE PLAN
The patient is Stable - Low risk of patient condition declining or worsening    Shift Goals  Clinical Goals: monitor pain level, free from fall  Patient Goals: sleep at least 8 hours    Progress made toward(s) clinical / shift goals:  Pt on scheduled Tylenol for pain control. Call light within reach. Fall precautions in placed. Hourly rounding and plan of care in progress.    Patient is not progressing towards the following goals:N/A

## 2024-06-18 NOTE — WOUND TEAM
"[facility]  Inpatient Services  Initial Wound and Skin Care Evaluation    Admission Date: 6/14/2024     Last order of IP CONSULT TO WOUND CARE was found on 6/16/2024 from Hospital Encounter on 6/14/2024     HPI, PMH, SH: Reviewed    Past Surgical History:   Procedure Laterality Date    KNEE REVISION TOTAL Left 6/15/2024    Procedure: REVISION, TOTAL ARTHROPLASTY, KNEE, ALL COMPONENTS;  Surgeon: Fritz Boggs M.D.;  Location: SURGERY [facility];  Service: Orthopedics    FUSION, SPINE, LUMBAR, PLIF  1/28/2014    Performed by Elder Chopra M.D. at SURGERY [facility]    KNEE ARTHROPLASTY TOTAL  2013    Right knee-Dr. Boggs    KNEE ARTHROPLASTY TOTAL  2009    left knee--Dr. Snider    SHOULDER SURGERY      right-sided     Social History     Tobacco Use    Smoking status: Never    Smokeless tobacco: Never   Substance Use Topics    Alcohol use: Yes     Comment: daily     Chief Complaint   Patient presents with    Sent by MD     Pt. Reports he was sent here by MD for L knee infection. Reports he is \"supposed to have surgery to clean it\". Pt. Reports chills.     Diagnosis: Septic arthritis (HCC) [M00.9]    Unit where seen by Wound Team: 2209/01     WOUND CONSULT RELATED TO:  RLE medial    WOUND TEAM PLAN OF CARE - Frequency of Follow-up:   Nursing to follow dressing orders written for wound care. Contact wound team if area fails to progress, deteriorates or with any questions/concerns if something comes up before next scheduled follow up (See below as to whether wound is following and frequency of wound follow up)   Weekly - RLE medial    WOUND HISTORY:   Pt has been seen at [facility] wound care clinic for RLE.        WOUND ASSESSMENT/LDA  Wound 06/07/24 Venous Ulcer Ankle Medial Right (Active)   Date First Assessed/Time First Assessed: 06/07/24 1300   Primary Wound Type: Venous Ulcer  Location: Ankle  Wound Orientation: Medial  Laterality: Right      Assessments 6/17/2024  3:00 PM   Wound Image    "   Site Assessment Pink;Red;Yellow   Periwound Assessment Hemosiderin Staining;Edema   Margins Defined edges   Closure Secondary intention   Drainage Amount Small   Drainage Description Serous   Treatments Cleansed;Nonselective debridement   Wound Cleansing Normal Saline Irrigation   Periwound Protectant Barrier Paste   Dressing Status Intact   Dressing Changed Changed   Dressing Cleansing/Solutions Not Applicable   Dressing Options Hydrofera Blue Ready;Silicone Adhesive Foam   Dressing Change/Treatment Frequency Every 72 hrs, and As Needed   NEXT Dressing Change/Treatment Date 06/20/24   NEXT Weekly Photo (Inpatient Only) 06/20/24   Wound Team Following Weekly   Non-staged Wound Description Partial thickness   Wound Length (cm) 1.5 cm proximal, mid 0.5, distal 1    Wound Width (cm) 1 cm  proximal, mid 0.5, distal 1    Wound Depth (cm) 0.2 cm proximal, mid 0.2, distal 0.3    Wound Surface Area (cm^2) 1.5 cm^2   Wound Volume (cm^3) 0.3 cm^3   Wound Healing % 7   Shape irregular   Wound Odor None   Pulses 2+;DP   Exposed Structures None        Vascular:    SHA:   No results found.    Lab Values:    Lab Results   Component Value Date/Time    WBC 9.1 06/16/2024 01:02 AM    RBC 2.75 (L) 06/16/2024 01:02 AM    HEMOGLOBIN 8.1 (L) 06/16/2024 01:02 AM    HEMATOCRIT 24.2 (L) 06/16/2024 01:02 AM    CREACTPROT 32.36 (H) 06/14/2024 03:48 PM    SEDRATEWES 85 (H) 06/15/2024 05:16 PM         Culture Results show:  No results found for this or any previous visit (from the past 720 hour(s)).    Pain Level/Medicated:  None, Tolerated without pain medication       INTERVENTIONS BY WOUND TEAM:  Chart and images reviewed. Discussed with bedside RN. All areas of concern (based on picture review, LDA review and discussion with bedside RN) have been thoroughly assessed. Documentation of areas based on significant findings. This RN in to assess patient. Performed standard wound care which includes appropriate positioning, dressing removal and  "non-selective debridement. Pictures and measurements obtained weekly if/when required.    Wound:  RLE   Preparation for Dressing removal: Removed without difficulty  Cleansed/Non-selectively Debrided with:  Normal Saline and Gauze  Jeana wound: Cleansed with Normal Saline and Gauze, Prepped with Barrier paste  Primary Dressing:  Hydrofera blue fenestrated   Secondary (Outer) Dressing: silicone adhesive foam, Tubi  E doubled    Advanced Wound Care Discharge Planning  Number of Clinicians necessary to complete wound care: 1  Is patient requiring IV pain medications for dressing changes:  No   Length of time for dressing change 40 min. (This does not include chart review, pre-medication time, set up, clean up or time spent charting.)    Interdisciplinary consultation: Patient, Bedside RN (Beth)    EVALUATION / RATIONALE FOR TREATMENT:     Date:  06/17/24  Wound Status:  Initial evaluation    Pt has 3 small stasis ulcers to RLE medial just above that ankle. Hydrofera Blue applied for the hydrophilic polyurethane foam which contains ethylene oxide used as a bactericidal, fungicidal, and sporicidal disinfectant. Hydrofera Blue also aids in maintaining a moist wound environment. The absorption properties of this dressing are important in collecting exudates and bacteria from the injured area. These harmful fluid secretions bind to the dressing removing it from the wound without the foam sticking to the wound causing more harm.  Foam fenestrated above the wounds so that drainage is able to be contained by outer foam. Tubi  E to provide compression.    Pt has ecchymosis to tip of L hallux r/t \"I stubbed it.\" This is JESSICA. 0.7 x 1 cm           Goals: Steady decrease in wound area and depth weekly.    NURSING PLAN OF CARE ORDERS:  Dressing changes: See Dressing Care orders    NUTRITION RECOMMENDATIONS   Wound Team Recommendations:  N/A    DIET ORDERS (From admission to next 24h)       Start     Ordered    06/15/24 1100  " Diet Order Diet: Regular  ALL MEALS        Question:  Diet:  Answer:  Regular    06/15/24 5864                    PREVENTATIVE INTERVENTIONS:    Q shift Evan - performed per nursing policy  Q shift pressure point assessments - performed per nursing policy    Surface/Positioning  Standard/trauma mattress - Currently in Place  Reposition q 2 hours - Currently in Place  pt performs    Containment/Moisture Prevention    Bathroom and urinal - Currently in Place    Mobilization      Ambulate  and with walker and no weight bearing on LLE      Anticipated discharge plans:  TBD        Vac Discharge Needs:  Vac Discharge plan is purely a recommendation from wound team and not a requirement for discharge unless otherwise stated by physician.  Not Applicable Pt not on a wound vac

## 2024-06-18 NOTE — PROGRESS NOTES
2152H Received a call from lab, blood culture positive for Staph Aureus    2202H Informed MD Dr Thorne no new order made

## 2024-06-18 NOTE — PROGRESS NOTES
Hospital Medicine Daily Progress Note    Date of Service  6/17/2024    Chief Complaint  Zachary Moore is a 74 y.o. 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    Hospital Course  Patient had washout and poly exchange on 6/15/2024.  Infectious disease consulted    Interval Problem Update  Patient continues to do well, discussed discharge options, he lives in [facility] so coming to [place] for daily infusions is not an option.    Unfortunately his blood cultures turned positive this afternoon therefore earliest date for PICC line is likely 6/20.       I have discussed this patient's plan of care and discharge plan at IDT rounds today with Case Management, Nursing, Nursing leadership, and other members of the IDT team.    Consultants/Specialty  infectious disease and orthopedics    Code Status  Full Code    Disposition  The patient is not medically cleared for discharge to home or a post-acute facility.  Anticipate discharge to: home with close outpatient follow-up  Blood cultures positive from 1400 hrs. on 6/15, reordered.    I have placed the appropriate orders for post-discharge needs.    Review of Systems  Review of Systems   Constitutional:  Negative for diaphoresis.   Gastrointestinal:  Negative for nausea and vomiting.   Musculoskeletal:  Positive for joint pain (Controlled on current medication).   Neurological:  Negative for weakness.        Physical Exam  Temp:  [36.1 °C (97 °F)-36.7 °C (98 °F)] 36.1 °C (97 °F)  Pulse:  [63-73] 73  Resp:  [17-18] 17  BP: (109-142)/(50-69) 123/65  SpO2:  [92 %-97 %] 92 %    Physical Exam  Vitals and nursing note reviewed.   Constitutional:       General: He is not in acute distress.     Appearance: Normal appearance. He is not ill-appearing or toxic-appearing.   HENT:      Head: Normocephalic and atraumatic.      Nose: Nose normal.      Mouth/Throat:       Mouth: Mucous membranes are moist.   Eyes:      General:         Right eye: No discharge.         Left eye: No discharge.      Pupils: Pupils are equal, round, and reactive to light.   Cardiovascular:      Rate and Rhythm: Normal rate and regular rhythm.   Pulmonary:      Effort: Pulmonary effort is normal.      Breath sounds: Normal breath sounds.   Abdominal:      General: There is no distension.      Tenderness: There is no abdominal tenderness.   Musculoskeletal:      Comments: In post op wrap   Skin:     General: Skin is warm and dry.   Neurological:      General: No focal deficit present.      Mental Status: He is oriented to person, place, and time.   Psychiatric:         Mood and Affect: Mood normal.         Behavior: Behavior normal.         Fluids    Intake/Output Summary (Last 24 hours) at 6/17/2024 1700  Last data filed at 6/17/2024 0900  Gross per 24 hour   Intake 400 ml   Output --   Net 400 ml       Laboratory  Recent Labs     06/15/24  0136 06/16/24  0102   WBC 10.8 9.1   RBC 3.79* 2.75*   HEMOGLOBIN 11.2* 8.1*   HEMATOCRIT 35.7* 24.2*   MCV 94.2 88.0   MCH 29.6 29.5   MCHC 31.4* 33.5   RDW 47.9 45.4   PLATELETCT 163* 143*   MPV 9.5 10.6     Recent Labs     06/15/24  0136   SODIUM 129*   POTASSIUM 4.3   CHLORIDE 95*   CO2 18*   GLUCOSE 122*   BUN 20   CREATININE 1.47*   CALCIUM 8.2*                   Imaging  DX-KNEE 2- LEFT   Final Result      Left knee arthroplasty within normal limits      DX-CHEST-PORTABLE (1 VIEW)   Final Result      Mild, diffuse interstitial prominence.           Assessment/Plan  * Pyogenic arthritis of left knee joint (HCC)- (present on admission)  Assessment & Plan  Cultures 6/13 was MSSA  ID recommending continue his home infusion, checking into insurance coverage  Blood culture from 2:00 on the 17th just turned positive, therefore will recheck blood cultures-line can likely not be placed until 6/20 a.m. at the earliest if these blood cultures are negative  Outpatient  infusion center not an option in [place] because patient lives in [facility] [place]  If he cannot have home infusion, can check to see if his primary care provider can order outpatient infusion through the hospital in [place].  Option 3 would be SNF but would like to avoid this if at all possible    Uncomplicated opioid dependence (HCC)- (present on admission)  Assessment & Plan  Pain controlled on chronic dose of buprenorphine    Leukocytosis- (present on admission)  Assessment & Plan  Resolved    DNR (do not resuscitate)- (present on admission)  Assessment & Plan  Discussed advance directives with the patient he wishes to be DO NOT RESUSCITATE CODE STATUS.    Stage 3a chronic kidney disease- (present on admission)  Assessment & Plan  Monitor renal functions and avoid nephrotoxic medications  Give fluid resuscitation as he has recently bumped up his kidney function    Hypokalemia- (present on admission)  Assessment & Plan  Resolved    Normochromic normocytic anemia- (present on admission)  Assessment & Plan  Monitor H&H if drops below 7 or 21 transfuse    Alcohol use disorder, moderate, dependence (HCC)- (present on admission)  Assessment & Plan  Daily alcohol intake, no evidence of withdrawal    Hyponatremia- (present on admission)  Assessment & Plan  Trending up    Vitamin D deficiency disease- (present on admission)  Assessment & Plan  Vitamin D supplementation    Hypertension- (present on admission)  Assessment & Plan  Optimize blood pressure management keep systolic blood pressure less than 140 diastolic under 90  Norvasc 10 mg daily, hydralazine 25 mg twice daily, labetalol as needed         VTE prophylaxis:    enoxaparin ppx      I have performed a physical exam and reviewed and updated ROS and Plan today (6/17/2024). In review of yesterday's note (6/16/2024), there are no changes except as documented above.

## 2024-06-18 NOTE — PROGRESS NOTES
ID Brief Note     Patient with late growth of MSSA from blood culture.  Repeat blood cultures have been ordered.  Will need blood cultures negative for least 48 hours prior to placing PICC line.  TTE pending.    --- Continue current antibiotics with cefazolin and rifampin  --- Follow-up repeat blood cultures    ID will follow.     Kayce Dimas MD

## 2024-06-19 ENCOUNTER — APPOINTMENT (OUTPATIENT)
Dept: RADIOLOGY | Facility: MEDICAL CENTER | Age: 74
DRG: 486 | End: 2024-06-19
Attending: INTERNAL MEDICINE
Payer: MEDICARE

## 2024-06-19 LAB
ALBUMIN SERPL BCP-MCNC: 3.1 G/DL (ref 3.2–4.9)
ALBUMIN/GLOB SERPL: 1 G/DL
ALP SERPL-CCNC: 66 U/L (ref 30–99)
ALT SERPL-CCNC: <5 U/L (ref 2–50)
ANION GAP SERPL CALC-SCNC: 11 MMOL/L (ref 7–16)
AST SERPL-CCNC: 15 U/L (ref 12–45)
BACTERIA BLD CULT: ABNORMAL
BACTERIA BLD CULT: ABNORMAL
BACTERIA BLD CULT: NORMAL
BACTERIA BLD CULT: NORMAL
BILIRUB SERPL-MCNC: 0.4 MG/DL (ref 0.1–1.5)
BUN SERPL-MCNC: 24 MG/DL (ref 8–22)
CALCIUM ALBUM COR SERPL-MCNC: 9.3 MG/DL (ref 8.5–10.5)
CALCIUM SERPL-MCNC: 8.6 MG/DL (ref 8.4–10.2)
CHLORIDE SERPL-SCNC: 103 MMOL/L (ref 96–112)
CO2 SERPL-SCNC: 21 MMOL/L (ref 20–33)
CREAT SERPL-MCNC: 1.13 MG/DL (ref 0.5–1.4)
ERYTHROCYTE [DISTWIDTH] IN BLOOD BY AUTOMATED COUNT: 45.6 FL (ref 35.9–50)
FUNGUS SPEC CULT: NORMAL
FUNGUS SPEC FUNGUS STN: NORMAL
GFR SERPLBLD CREATININE-BSD FMLA CKD-EPI: 68 ML/MIN/1.73 M 2
GLOBULIN SER CALC-MCNC: 3.2 G/DL (ref 1.9–3.5)
GLUCOSE SERPL-MCNC: 80 MG/DL (ref 65–99)
HCT VFR BLD AUTO: 28.7 % (ref 42–52)
HGB BLD-MCNC: 9.4 G/DL (ref 14–18)
MCH RBC QN AUTO: 29 PG (ref 27–33)
MCHC RBC AUTO-ENTMCNC: 32.8 G/DL (ref 32.3–36.5)
MCV RBC AUTO: 88.6 FL (ref 81.4–97.8)
PLATELET # BLD AUTO: 265 K/UL (ref 164–446)
PMV BLD AUTO: 9.8 FL (ref 9–12.9)
POTASSIUM SERPL-SCNC: 4.8 MMOL/L (ref 3.6–5.5)
PROT SERPL-MCNC: 6.3 G/DL (ref 6–8.2)
RBC # BLD AUTO: 3.24 M/UL (ref 4.7–6.1)
SIGNIFICANT IND 70042: ABNORMAL
SIGNIFICANT IND 70042: NORMAL
SITE SITE: ABNORMAL
SITE SITE: NORMAL
SODIUM SERPL-SCNC: 135 MMOL/L (ref 135–145)
SOURCE SOURCE: ABNORMAL
SOURCE SOURCE: NORMAL
WBC # BLD AUTO: 6.5 K/UL (ref 4.8–10.8)

## 2024-06-19 PROCEDURE — 99233 SBSQ HOSP IP/OBS HIGH 50: CPT | Performed by: INTERNAL MEDICINE

## 2024-06-19 PROCEDURE — 02HV33Z INSERTION OF INFUSION DEVICE INTO SUPERIOR VENA CAVA, PERCUTANEOUS APPROACH: ICD-10-PCS | Performed by: INTERNAL MEDICINE

## 2024-06-19 PROCEDURE — 700102 HCHG RX REV CODE 250 W/ 637 OVERRIDE(OP): Performed by: INTERNAL MEDICINE

## 2024-06-19 PROCEDURE — 700111 HCHG RX REV CODE 636 W/ 250 OVERRIDE (IP): Performed by: INTERNAL MEDICINE

## 2024-06-19 PROCEDURE — 36573 INSJ PICC RS&I 5 YR+: CPT

## 2024-06-19 PROCEDURE — 700102 HCHG RX REV CODE 250 W/ 637 OVERRIDE(OP)

## 2024-06-19 PROCEDURE — A9270 NON-COVERED ITEM OR SERVICE: HCPCS

## 2024-06-19 PROCEDURE — A9270 NON-COVERED ITEM OR SERVICE: HCPCS | Performed by: INTERNAL MEDICINE

## 2024-06-19 PROCEDURE — 80053 COMPREHEN METABOLIC PANEL: CPT

## 2024-06-19 PROCEDURE — 36415 COLL VENOUS BLD VENIPUNCTURE: CPT

## 2024-06-19 PROCEDURE — 770001 HCHG ROOM/CARE - MED/SURG/GYN PRIV*

## 2024-06-19 PROCEDURE — 700105 HCHG RX REV CODE 258: Performed by: INTERNAL MEDICINE

## 2024-06-19 PROCEDURE — A9270 NON-COVERED ITEM OR SERVICE: HCPCS | Performed by: HOSPITALIST

## 2024-06-19 PROCEDURE — 700102 HCHG RX REV CODE 250 W/ 637 OVERRIDE(OP): Performed by: HOSPITALIST

## 2024-06-19 PROCEDURE — 85027 COMPLETE CBC AUTOMATED: CPT

## 2024-06-19 RX ADMIN — CEFAZOLIN 2 G: 2 INJECTION, POWDER, FOR SOLUTION INTRAMUSCULAR; INTRAVENOUS at 14:15

## 2024-06-19 RX ADMIN — HYDRALAZINE HYDROCHLORIDE 25 MG: 25 TABLET ORAL at 05:48

## 2024-06-19 RX ADMIN — AMLODIPINE BESYLATE 10 MG: 5 TABLET ORAL at 05:47

## 2024-06-19 RX ADMIN — BUPRENORPHINE HCL 8 MG: 8 TABLET SUBLINGUAL at 09:28

## 2024-06-19 RX ADMIN — CEFAZOLIN 2 G: 2 INJECTION, POWDER, FOR SOLUTION INTRAMUSCULAR; INTRAVENOUS at 22:44

## 2024-06-19 RX ADMIN — FAMOTIDINE 20 MG: 20 TABLET ORAL at 05:47

## 2024-06-19 RX ADMIN — GABAPENTIN 600 MG: 300 CAPSULE ORAL at 09:28

## 2024-06-19 RX ADMIN — BUPRENORPHINE HCL 8 MG: 8 TABLET SUBLINGUAL at 15:23

## 2024-06-19 RX ADMIN — ENOXAPARIN SODIUM 30 MG: 100 INJECTION SUBCUTANEOUS at 17:09

## 2024-06-19 RX ADMIN — DULOXETINE HYDROCHLORIDE 30 MG: 30 CAPSULE, DELAYED RELEASE ORAL at 05:48

## 2024-06-19 RX ADMIN — RIFAMPIN 300 MG: 300 CAPSULE ORAL at 05:47

## 2024-06-19 RX ADMIN — RIFAMPIN 300 MG: 300 CAPSULE ORAL at 17:09

## 2024-06-19 RX ADMIN — GABAPENTIN 600 MG: 300 CAPSULE ORAL at 20:31

## 2024-06-19 RX ADMIN — CEFAZOLIN 2 G: 2 INJECTION, POWDER, FOR SOLUTION INTRAMUSCULAR; INTRAVENOUS at 05:57

## 2024-06-19 RX ADMIN — GABAPENTIN 600 MG: 300 CAPSULE ORAL at 15:23

## 2024-06-19 RX ADMIN — ACETAMINOPHEN 1000 MG: 500 TABLET, FILM COATED ORAL at 05:47

## 2024-06-19 RX ADMIN — BUPRENORPHINE HCL 8 MG: 8 TABLET SUBLINGUAL at 20:32

## 2024-06-19 RX ADMIN — CELECOXIB 200 MG: 200 CAPSULE ORAL at 05:47

## 2024-06-19 RX ADMIN — HYDRALAZINE HYDROCHLORIDE 25 MG: 25 TABLET ORAL at 17:09

## 2024-06-19 RX ADMIN — POTASSIUM CHLORIDE 20 MEQ: 1500 TABLET, EXTENDED RELEASE ORAL at 05:47

## 2024-06-19 RX ADMIN — ACETAMINOPHEN 1000 MG: 500 TABLET, FILM COATED ORAL at 17:09

## 2024-06-19 RX ADMIN — DULOXETINE HYDROCHLORIDE 30 MG: 30 CAPSULE, DELAYED RELEASE ORAL at 17:09

## 2024-06-19 ASSESSMENT — ENCOUNTER SYMPTOMS
DIARRHEA: 0
SHORTNESS OF BREATH: 0
MYALGIAS: 1
VOMITING: 0
WEAKNESS: 0
CONSTIPATION: 0
NERVOUS/ANXIOUS: 0
ABDOMINAL PAIN: 0
DIAPHORESIS: 0
COUGH: 0
NAUSEA: 0

## 2024-06-19 ASSESSMENT — PAIN DESCRIPTION - PAIN TYPE
TYPE: SURGICAL PAIN
TYPE: SURGICAL PAIN

## 2024-06-19 NOTE — PROGRESS NOTES
0700 - Bedside report received from ALDEN Mckeon. Patient resting with eyes closed, on RA in no acute respiratory distress. Daily plan of care discussed.  No other needs at this time. Call light and personal belongings within reach. Hourly rounding in place. Chart reviewed for recent labs, notes, and orders.

## 2024-06-19 NOTE — PROGRESS NOTES
"Hospital Medicine Daily Progress Note    Date of Service  6/19/2024    Chief Complaint  As per chart review:  \"Zachary Moore is a 74 y.o. 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  Infectious disease consulted\"    Interval Problem Update  6/17: Patient continues to do well, discussed discharge options, he lives in [facility] so coming to [place] for daily infusions is not an option.    Unfortunately his blood cultures turned positive this afternoon therefore earliest date for PICC line is likely 6/20.     PATIENT SEEN BY PREVIOUS HOSPITALIST UNTIL 6/17 6/18: Patient seen at bedside this morning.  Overall the patient feels better.  There was concern for MSSA bacteremia.  ID following the patient.  For now patient to continue rifampin and cefazolin.  We will have to wait to place PICC line for repeat blood cultures to be negative for 48 hours.  Continue to monitor closely.    6/19: Patient seen at bedside this morning.  I discussed case with ID and from their standpoint the patient can have a PICC line placed already today.  The patient is medically clear for discharge we are awaiting placement for outpatient or home infusion of antibiotics as per case management.  We appreciate further recommendations.  Continue to monitor closely.      I have discussed this patient's plan of care and discharge plan at IDT rounds today with Case Management, Nursing, Nursing leadership, and other members of the IDT team.    Consultants/Specialty  infectious disease and orthopedics    Code Status  Full Code    Disposition  The patient is medically cleared for discharge to home or a post-acute facility.    Blood cultures positive from 1400 hrs. on 6/15, reordered.      Review of Systems  Review of Systems   Constitutional:  " Positive for malaise/fatigue. Negative for diaphoresis.   Gastrointestinal:  Negative for nausea and vomiting.   Musculoskeletal:  Positive for joint pain (Controlled on current medication).   Neurological:  Negative for weakness.        Physical Exam  Temp:  [36.8 °C (98.2 °F)-37.3 °C (99.2 °F)] 37.1 °C (98.7 °F)  Pulse:  [60-76] 76  Resp:  [17] 17  BP: (122-145)/(45-62) 122/45  SpO2:  [92 %] 92 %    Physical Exam  Vitals and nursing note reviewed.   Constitutional:       General: He is not in acute distress.     Appearance: He is ill-appearing. He is not toxic-appearing.   HENT:      Head: Normocephalic and atraumatic.      Nose: Nose normal.      Mouth/Throat:      Mouth: Mucous membranes are moist.   Eyes:      General:         Right eye: No discharge.         Left eye: No discharge.      Pupils: Pupils are equal, round, and reactive to light.   Cardiovascular:      Rate and Rhythm: Normal rate and regular rhythm.   Pulmonary:      Effort: Pulmonary effort is normal.      Breath sounds: Normal breath sounds.   Abdominal:      General: There is no distension.      Tenderness: There is no abdominal tenderness.   Musculoskeletal:         General: Tenderness present.      Comments: In post op wrap   Skin:     General: Skin is warm and dry.   Neurological:      General: No focal deficit present.      Mental Status: He is oriented to person, place, and time.   Psychiatric:         Mood and Affect: Mood normal.         Behavior: Behavior normal.         Fluids    Intake/Output Summary (Last 24 hours) at 6/19/2024 1204  Last data filed at 6/19/2024 0900  Gross per 24 hour   Intake 440 ml   Output 1275 ml   Net -835 ml       Laboratory  Recent Labs     06/18/24  0506 06/19/24  0311   WBC 5.8 6.5   RBC 2.91* 3.24*   HEMOGLOBIN 8.6* 9.4*   HEMATOCRIT 26.4* 28.7*   MCV 90.7 88.6   MCH 29.6 29.0   MCHC 32.6 32.8   RDW 46.4 45.6   PLATELETCT 215 265   MPV 10.3 9.8     Recent Labs     06/18/24  0506 06/19/24  0311   SODIUM  134* 135   POTASSIUM 4.7 4.8   CHLORIDE 104 103   CO2 20 21   GLUCOSE 75 80   BUN 28* 24*   CREATININE 1.22 1.13   CALCIUM 8.0* 8.6                   Imaging  EC-ECHOCARDIOGRAM COMPLETE W/ CONT   Final Result      DX-KNEE 2- LEFT   Final Result      Left knee arthroplasty within normal limits      DX-CHEST-PORTABLE (1 VIEW)   Final Result      Mild, diffuse interstitial prominence.      IR-PICC LINE PLACEMENT W/ GUIDANCE > AGE 5    (Results Pending)        Assessment/Plan  * Pyogenic arthritis of left knee joint (HCC)- (present on admission)  Assessment & Plan  Cultures 6/13 was MSSA  ID recommending continue his home infusion, checking into insurance coverage  Blood culture from 2:00 on the 17th just turned positive, therefore will recheck blood cultures-line can likely not be placed until 6/20 a.m. at the earliest if these blood cultures are negative  Outpatient infusion center not an option in [place] because patient lives in [place]. [place]  If he cannot have home infusion, can check to see if his primary care provider can order outpatient infusion through the hospital in [place].  Option 3 would be SNF but would like to avoid this if at all possible    6/18: ID following patient, we appreciate further recommendations    6/19: ID following the patient, they have placed orders for outpatient infusion most likely at home with PICC line.  Pending placement, we appreciate further recommendations by case management.    Positive blood culture- (present on admission)  Assessment & Plan  Patient tested 1 out of 2 sets with a positive blood culture.  ID is following the patient, we appreciate further recommendations.  We have repeated blood cultures.  --Concern for MSSA bacteremia    6/19: ID following the patient, they have placed orders for outpatient infusion most likely at home with PICC line.  Pending placement, we appreciate further recommendations by case management.    Uncomplicated opioid dependence (HCC)- (present on  admission)  Assessment & Plan  Pain controlled on chronic dose of buprenorphine    Leukocytosis- (present on admission)  Assessment & Plan  Resolved    Stage 3a chronic kidney disease- (present on admission)  Assessment & Plan  Monitor renal functions and avoid nephrotoxic medications  Give fluid resuscitation as he has recently bumped up his kidney function    Hypokalemia- (present on admission)  Assessment & Plan  Resolved    Normochromic normocytic anemia- (present on admission)  Assessment & Plan  Monitor H&H if drops below 7 or 21 transfuse    Alcohol use disorder, moderate, dependence (HCC)- (present on admission)  Assessment & Plan  Daily alcohol intake, no evidence of withdrawal    Hyponatremia- (present on admission)  Assessment & Plan  6/18: Mild  monitor    Vitamin D deficiency disease- (present on admission)  Assessment & Plan  Vitamin D supplementation    Hypertension- (present on admission)  Assessment & Plan  Continue norvasc and hydralazine         VTE prophylaxis: Lovenox    I have performed a physical exam and reviewed and updated ROS and Plan today (6/19/2024). In review of yesterday's note (6/18/2024), there are no changes except as documented above.      I spend at least 52 minutes providing care for this patient.  This included face-to-face interview, physical examination.  Review of lab work including CBC, CMP.  Discussion with ID regarding plan of care. Discussion with orthopedic surgery.  Discussion with multidisciplinary team including case management, nursing staff and pharmacy.  Creating plan of care, reviewing orders.

## 2024-06-19 NOTE — DISCHARGE PLANNING
Received Choice Form at: 0973  Agency/Facility Name: [facility]  Sent Referral per Choice Form at: 0871

## 2024-06-19 NOTE — PROGRESS NOTES
BSSR received from day shift RN. Patient sitting in recliner in no apparent distress with c/o minor pain. Acceptable level at this time.  A&O X4. Plan of care discussed with patient. Bed in low position with bed brakes applied and call light in reach. Patient states no needs at this time.

## 2024-06-19 NOTE — DISCHARGE PLANNING
Received Choice Form at: 8253  Agency/Facility Name: [facility]  Sent Referral per Choice Form at: 0915

## 2024-06-19 NOTE — PROGRESS NOTES
Infectious Disease Progress Note    Author: Kayce Dimas M.D. Date & Time of service: 2024  8:33 AM    Chief Complaint:  Prosthetic joint infection, left knee      Interval History:   AF, O2 3 L NC, sitting up in chair and with no complaints.     Review of Systems:  Review of Systems   Respiratory:  Negative for cough and shortness of breath.    Gastrointestinal:  Negative for abdominal pain, constipation, diarrhea, nausea and vomiting.   Genitourinary:  Negative for dysuria.   Musculoskeletal:  Positive for joint pain and myalgias.   Neurological:  Negative for weakness.   Psychiatric/Behavioral:  The patient is not nervous/anxious.        Hemodynamics:  Temp (24hrs), Av.8 °C (98.3 °F), Min:36.5 °C (97.7 °F), Max:37.3 °C (99.2 °F)  Temperature: 37.3 °C (99.2 °F)  Pulse  Av  Min: 60  Max: 85   Blood Pressure : (!) 145/62       Physical Exam:  Physical Exam  Cardiovascular:      Rate and Rhythm: Normal rate and regular rhythm.      Heart sounds: Normal heart sounds.   Pulmonary:      Effort: Pulmonary effort is normal.      Breath sounds: Normal breath sounds.   Abdominal:      General: Abdomen is flat. Bowel sounds are normal.      Palpations: Abdomen is soft.   Musculoskeletal:         General: Tenderness and signs of injury present.      Left lower leg: Edema present.   Skin:     General: Skin is warm and dry.   Neurological:      General: No focal deficit present.      Mental Status: He is oriented to person, place, and time.   Psychiatric:         Mood and Affect: Mood normal.         Behavior: Behavior normal.         Meds:    Current Facility-Administered Medications:     enoxaparin (LOVENOX) injection    famotidine    riFAMPin    buprenorphine    Pharmacy Consult Request    ondansetron    dexamethasone    diphenhydrAMINE    haloperidol lactate    scopolamine    docusate sodium    senna-docusate    senna-docusate    polyethylene glycol/lytes    magnesium hydroxide    bisacodyl     sodium phosphate    acetaminophen **FOLLOWED BY** [START ON 6/20/2024] acetaminophen    ceFAZolin **AND** [DISCONTINUED] MD Alert...Vancomycin per Pharmacy    amLODIPine    celecoxib    DULoxetine    gabapentin    hydrALAZINE    acetaminophen    Notify provider if pain remains uncontrolled **AND** Use the Numeric Rating Scale (NRS), Shane-Baker Faces (WBF), or FLACC on regular floors and Critical-Care Pain Observation Tool (CPOT) on ICUs/Trauma to assess pain **AND** Pulse Ox **AND** Pharmacy Consult Request **AND** If patient difficult to arouse and/or has respiratory depression (respiratory rate of 10 or less), stop any opiates that are currently infusing and call a Rapid Response.    labetalol    ondansetron    ondansetron    potassium chloride SA    Labs:  Recent Labs     06/18/24  0506 06/19/24  0311   WBC 5.8 6.5   RBC 2.91* 3.24*   HEMOGLOBIN 8.6* 9.4*   HEMATOCRIT 26.4* 28.7*   MCV 90.7 88.6   MCH 29.6 29.0   RDW 46.4 45.6   PLATELETCT 215 265   MPV 10.3 9.8     Recent Labs     06/18/24  0506 06/19/24  0311   SODIUM 134* 135   POTASSIUM 4.7 4.8   CHLORIDE 104 103   CO2 20 21   GLUCOSE 75 80   BUN 28* 24*     Recent Labs     06/18/24  0506 06/19/24  0311   ALBUMIN 2.7* 3.1*   TBILIRUBIN 0.3 0.4   ALKPHOSPHAT 53 66   TOTPROTEIN 6.0 6.3   ALTSGPT <5 <5   ASTSGOT 10* 15   CREATININE 1.22 1.13       Imaging:  EC-ECHOCARDIOGRAM COMPLETE W/ CONT    Result Date: 6/18/2024  Transthoracic Echo Report Echocardiography Laboratory CONCLUSIONS Normal left ventricular systolic function. Mild tricuspid regurgitation. Thickening of the aortic valve non-coronary cusp without stenosis or regurgitation. No valvular vegetations. RAMA ROUSE Exam Date:         06/18/2024                    14:56 Exam Location:     Inpatient Priority:          Routine Ordering Physician:        CATA NEWBERRY Referring Physician: Sonographer:               Estelle Bernard RDCS                            (AE,FE,  PE),                            RVT,RDMS Age:    74     Gender:    M MRN:    [id number] :    1950 BSA:    1.92   Ht (in):    69     Wt (lb):    169 Exam Type:     Complete, Contrast Indications:     Acute/Subacute Bacterial Endocarditis, Bacteremia ICD Codes:       421 CPT Codes:       64404 BP:   123    /   65     HR:   62 Technical Quality:       Fair MEASUREMENTS  (Male / Female) Normal Values 2D ECHO LV Diastolic Diameter PLAX        4.2 cm                4.2 - 5.9 / 3.9 - 5.3 cm LV Systolic Diameter PLAX         2.7 cm                2.1 - 4.0 cm IVS Diastolic Thickness           1 cm                  LVPW Diastolic Thickness          1.2 cm                LVOT Diameter                     1.7 cm                Estimated LV Ejection Fraction    55 %                  LV Ejection Fraction MOD BP       45.5 %                >= 55  % LV Ejection Fraction MOD 4C       40.4 %                LV Ejection Fraction MOD 2C       48.2 %                IVC Diameter                      1.7 cm                DOPPLER AV Peak Velocity                  1.6 m/s               AV Peak Gradient                  9.6 mmHg              AV Mean Gradient                  6.7 mmHg              LVOT Peak Velocity                0.97 m/s              AV Area Cont Eq vti               1.8 cm2               MV Velocity Time Integral         37.6 cm               Mitral E Point Velocity           1.3 m/s               Mitral E to A Ratio               1.6                   MV Pressure Half Time             75.6 ms               MV Area PHT                       2.9 cm2               MV Deceleration Time              261 ms                TR Peak Velocity                  300 cm/s              PV Peak Velocity                  1.5 m/s               PV Peak Gradient                  9.1 mmHg              PV Mean Gradient                  4.8 mmHg              * Indicates values subject to auto-interpretation LV EF:  55    % FINDINGS Left  Ventricle 3 mL of contrast was used to enhance visualization of the endocardial border. Normal left ventricular chamber size. Normal left ventricular wall thickness. Normal left ventricular systolic function. The left ventricular ejection fraction is visually estimated to be 55%. Normal regional wall motion. Normal diastolic function. Right Ventricle The right ventricle is not well visualized. Right Atrium Normal right atrial size. Normal inferior vena cava size and inspiratory collapse. Left Atrium Normal left atrial size. Left atrial volume index is 32 ml/m2. Mitral Valve Structurally normal mitral valve without significant stenosis or regurgitation. Aortic Valve Thickening of the aortic valve non-coronary cusp without stenosis or regurgitation. No mobile mass Tricuspid Valve Structurally normal tricuspid valve. No tricuspid stenosis. Mild tricuspid regurgitation. Right atrial pressure is estimated to be 3 mmHg. Right ventricular systolic pressure is estimated to be  37 mmHg. Pulmonic Valve Structurally normal pulmonic valve. No pulmonic stenosis. Trace pulmonic insufficiency. Pericardium Normal pericardium without effusion. Aorta Normal aortic root for body surface area. Carlos Prasad MD (Electronically Signed) Final Date:     18 June 2024                 16:34    DX-KNEE 2- LEFT    Result Date: 6/15/2024  6/15/2024 9:50 AM HISTORY/REASON FOR EXAM:  Post-Op - To include entire implant.. Left knee arthroplasty TECHNIQUE/EXAM DESCRIPTION AND NUMBER OF VIEWS:  2 views of the LEFT knee. COMPARISON: Left leg x-ray 1/24/2024 FINDINGS: There is a left knee arthroplasty present. The arthroplasty appears within normal limits. There are no fracture. There are skin staples.     Left knee arthroplasty within normal limits    DX-CHEST-PORTABLE (1 VIEW)    Result Date: 6/14/2024 6/14/2024 3:56 PM HISTORY/REASON FOR EXAM:  Sepsis; sepsis. TECHNIQUE/EXAM DESCRIPTION AND NUMBER OF VIEWS: Single portable view of the chest.  COMPARISON: 11/8/2021 FINDINGS: Right-sided central venous catheter has been removed. The cardiomediastinal silhouette is prominent but is accentuated by AP technique. There is also diffuse interstitial prominence which may be chronic. There is no focal area of consolidation, definite pleural effusion, or pneumothorax.     Mild, diffuse interstitial prominence.    DX-KNEE COMPLETE 4+ LEFT    Result Date: 6/13/2024  Three-view x-rays of the left knee including AP, lateral and sunrise interpreted by me today show no obvious signs of fracture or dislocation.  There is a cemented total knee arthroplasty in place with adequate interfaces and alignment.  There is only a small portion of the patella remaining in the knee.  Moderate soft tissue edema.      Micro:  Results       Procedure Component Value Units Date/Time    AFB Culture [[id number]] Collected: 06/15/24 0805    Order Status: Completed Specimen: Tissue Updated: 06/19/24 0757     Significant Indicator NEG     Source TISS     Site knee, Synovial     Culture Result Culture in progress.     AFB Smear Results No acid fast bacilli seen.    Narrative:      3 - Knee Synovial  Surgery Specimen    Fungal Culture [[id number]] Collected: 06/15/24 0805    Order Status: Completed Specimen: Tissue Updated: 06/19/24 0757     Significant Indicator NEG     Source TISS     Site knee, Synovial     Culture Result No fungal growth to date.     Fungal Smear Results No fungal elements seen.    Narrative:      3 - Knee Synovial  Surgery Specimen    CULTURE TISSUE W/ GRM STAIN [[id number]]  (Abnormal) Collected: 06/15/24 0805    Order Status: Completed Specimen: Tissue Updated: 06/19/24 0757     Significant Indicator POS     Source TISS     Site knee, Synovial     Culture Result -     Gram Stain Result Rare WBCs.  No organisms seen.       Culture Result Staphylococcus aureus  Light growth  Methicillin sensitive by screening method  [place] count unreliable due to antimicrobial  inhibition.  See previous culture for sensitivity report.      Narrative:      3 - Knee Synovial  Surgery Specimen    Anaerobic Culture [[id number]] Collected: 06/15/24 0805    Order Status: Completed Specimen: Tissue Updated: 06/19/24 0757     Significant Indicator NEG     Source TISS     Site knee, Synovial     Culture Result No Anaerobes isolated.    Narrative:      3 - Knee Synovial  Surgery Specimen    Anaerobic Culture [[id number]] Collected: 06/15/24 0805    Order Status: Completed Specimen: Tissue Updated: 06/19/24 0757     Significant Indicator NEG     Source TISS     Site Knee Synovial     Culture Result No Anaerobes isolated.    Narrative:      Previous comment was modified by JORGE LUIS at 13:16 on 06/15/24.  1 - [place]ene[place] Synovial  1 - Knee Synovial  Surgery Specimen    AFB Culture [[id number]] Collected: 06/15/24 0805    Order Status: Completed Specimen: Tissue Updated: 06/19/24 0757     Significant Indicator NEG     Source TISS     Site knee, Synovial     Culture Result Culture in progress.     AFB Smear Results No acid fast bacilli seen.    Narrative:      2 - Knee Synovial  Surgery Specimen    Fungal Culture [[id number]] Collected: 06/15/24 0805    Order Status: Completed Specimen: Tissue Updated: 06/19/24 0757     Significant Indicator NEG     Source TISS     Site knee, Synovial     Culture Result No fungal growth to date.     Fungal Smear Results No fungal elements seen.    Narrative:      2 - Knee Synovial  Surgery Specimen    CULTURE TISSUE W/ GRM STAIN [[id number]]  (Abnormal) Collected: 06/15/24 0805    Order Status: Completed Specimen: Tissue Updated: 06/19/24 0757     Significant Indicator POS     Source TISS     Site knee, Synovial     Culture Result -     Gram Stain Result Rare WBCs.  No organisms seen.       Culture Result Staphylococcus aureus  Light growth  Methicillin sensitive by screening method  [place] count unreliable due to antimicrobial inhibition.  See previous culture for sensitivity  report.      Narrative:      2 - Knee Synovial  Surgery Specimen    Anaerobic Culture [[id number]] Collected: 06/15/24 0805    Order Status: Completed Specimen: Tissue Updated: 06/19/24 0757     Significant Indicator NEG     Source TISS     Site knee, Synovial     Culture Result No Anaerobes isolated.    Narrative:      2 - Knee Synovial  Surgery Specimen    CULTURE TISSUE W/ GRM STAIN [[id number]]  (Abnormal) Collected: 06/15/24 0805    Order Status: Completed Specimen: Tissue Updated: 06/19/24 0757     Significant Indicator POS     Source TISS     Site Knee Synovial     Culture Result -     Gram Stain Result Rare WBCs.  No organisms seen.       Culture Result Staphylococcus aureus  Light growth  Methicillin sensitive by screening method  [place] count unreliable due to antimicrobial inhibition.  See previous culture for sensitivity report.      Narrative:      Previous comment was modified by JORGE LUIS at 13:16 on 06/15/24.  1 - Kenee Synovial  1 - Knee Synovial  Surgery Specimen    AFB Culture [[id number]] Collected: 06/15/24 0805    Order Status: Completed Specimen: Tissue Updated: 06/19/24 0757     Significant Indicator NEG     Source TISS     Site Knee Synovial     Culture Result Culture in progress.     AFB Smear Results No acid fast bacilli seen.    Narrative:      Previous comment was modified by JORGE LUIS at 13:16 on 06/15/24.  1 - Kenee Synovial  1 - Knee Synovial  Surgery Specimen    Fungal Culture [[id number]] Collected: 06/15/24 0805    Order Status: Completed Specimen: Tissue Updated: 06/19/24 0757     Significant Indicator NEG     Source TISS     Site Knee Synovial     Culture Result No fungal growth to date.     Fungal Smear Results No fungal elements seen.    Narrative:      Previous comment was modified by JORGE LUIS at 13:16 on 06/15/24.  1 - Kenee Synovial  1 - Knee Synovial  Surgery Specimen    BLOOD CULTURE [[id number]] Collected: 06/17/24 1938    Order Status: Completed Specimen: Blood from Peripheral Updated:  06/19/24 0743     Significant Indicator NEG     Source BLD     Site PERIPHERAL     Culture Result No Growth  Note: Blood cultures are incubated for 5 days and  are monitored continuously.Positive blood cultures  are called to the RN and reported as soon as  they are identified.  Blood culture testing and Gram stain, if indicated, are  performed at [facility], [address] [place] [address].  Positive blood cultures are  sent to [facility], [address] [place] [address], for organism identification and  susceptibility testing.      Narrative:      Right Forearm/Arm    BLOOD CULTURE [[id number]] Collected: 06/17/24 1938    Order Status: Completed Specimen: Blood from Peripheral Updated: 06/19/24 0743     Significant Indicator NEG     Source BLD     Site PERIPHERAL     Culture Result No Growth  Note: Blood cultures are incubated for 5 days and  are monitored continuously.Positive blood cultures  are called to the RN and reported as soon as  they are identified.  Blood culture testing and Gram stain, if indicated, are  performed at [facility], [address] [place] [address].  Positive blood cultures are  sent to [facility], [address] [place] [address], for organism identification and  susceptibility testing.      Narrative:      Left Forearm/Arm    BLOOD CULTURE [[id number]]  (Abnormal)  (Susceptibility) Collected: 06/15/24 1400    Order Status: Completed Specimen: Blood from Peripheral Updated: 06/19/24 0725     Significant Indicator POS     Source BLD     Site PERIPHERAL     Culture Result Growth detected by Bactec instrument. 06/17/2024  15:44  Staphylococcus aureus (methicillin sensitive)  detected by PCR.        Staphylococcus aureus    Narrative:      CALL  [facility] tel. [phone],  CALLED  [facility] tel. [phone] 06/17/2024, 15:51, RB PERF. RESULTS CALLED  TO:Julita NogueraRN  Right Hand    Susceptibility        Staphylococcus aureus (1)       Antibiotic Interpretation Microscan   Method Status    Clindamycin Sensitive 0.5 mcg/mL ALFREDA Final    Cefazolin Sensitive <=8 mcg/mL ALFREDA Final    Cefepime Sensitive <=4 mcg/mL ALFREDA Final    Daptomycin Sensitive 1 mcg/mL ALFREDA Final    Erythromycin Sensitive <=0.25 mcg/mL ALFREDA Final    Ampicillin/sulbactam Sensitive <=8/4 mcg/mL ALFREDA Final    Vancomycin Sensitive 1 mcg/mL ALFREDA Final    Linezolid Sensitive 2 mcg/mL ALFREDA Final    Oxacillin Sensitive <=0.25 mcg/mL ALFREDA Final    Trimeth/Sulfa Sensitive <=0.5/9.5 mcg/mL ALFREDA Final    Tetracycline Resistant >8 mcg/mL ALFREDA Final                       Fungal Smear [[id number]] Collected: 06/15/24 0805    Order Status: Completed Specimen: Tissue Updated: 06/16/24 1728     Significant Indicator NEG     Source TISS     Site knee, Synovial     Fungal Smear Results No fungal elements seen.    Narrative:      2 - Knee Synovial  Surgery Specimen    Fungal Smear [[id number]] Collected: 06/15/24 0805    Order Status: Completed Specimen: Tissue Updated: 06/16/24 1728     Significant Indicator NEG     Source TISS     Site knee, Synovial     Fungal Smear Results No fungal elements seen.    Narrative:      3 - Knee Synovial  Surgery Specimen    GRAM STAIN [[id number]] Collected: 06/15/24 0805    Order Status: Completed Specimen: Tissue Updated: 06/16/24 1728     Significant Indicator .     Source TISS     Site knee, Synovial     Gram Stain Result Rare WBCs.  No organisms seen.      Narrative:      3 - Knee Synovial  Surgery Specimen    GRAM STAIN [[id number]] Collected: 06/15/24 0805    Order Status: Completed Specimen: Tissue Updated: 06/16/24 1728     Significant Indicator .     Source TISS     Site knee, Synovial     Gram Stain Result Rare WBCs.  No organisms seen.      Narrative:      2 - Knee Synovial  Surgery Specimen    Acid Fast Stain [[id number]] Collected: 06/15/24 0805    Order Status: Completed Specimen: Tissue Updated: 06/16/24 1728     Significant Indicator  NEG     Source TISS     Site knee, Synovial     AFB Smear Results No acid fast bacilli seen.    Narrative:      2 - Knee Synovial  Surgery Specimen    Acid Fast Stain [[id number]] Collected: 06/15/24 0805    Order Status: Completed Specimen: Tissue Updated: 06/16/24 1728     Significant Indicator NEG     Source TISS     Site knee, Synovial     AFB Smear Results No acid fast bacilli seen.    Narrative:      3 - Knee Synovial  Surgery Specimen    Fungal Smear [[id number]] Collected: 06/15/24 0805    Order Status: Completed Specimen: Tissue Updated: 06/16/24 1728     Significant Indicator NEG     Source TISS     Site Knee Synovial     Fungal Smear Results No fungal elements seen.    Narrative:      Previous comment was modified by JORGE LUIS at 13:16 on 06/15/24.  1 - Kenee Synovial  1 - Knee Synovial  Surgery Specimen    GRAM STAIN [[id number]] Collected: 06/15/24 0805    Order Status: Completed Specimen: Tissue Updated: 06/16/24 1728     Significant Indicator .     Source TISS     Site Knee Synovial     Gram Stain Result Rare WBCs.  No organisms seen.      Narrative:      Previous comment was modified by JORGE LUIS at 13:16 on 06/15/24.  1 - Kenee Synovial  1 - Knee Synovial  Surgery Specimen    Acid Fast Stain [[id number]] Collected: 06/15/24 0805    Order Status: Completed Specimen: Tissue Updated: 06/16/24 1728     Significant Indicator NEG     Source TISS     Site Knee Synovial     AFB Smear Results No acid fast bacilli seen.    Narrative:      Previous comment was modified by JORGE LUIS at 13:16 on 06/15/24.  1 - Kenee Synovial  1 - Knee Synovial  Surgery Specimen    Urine Culture (New) [[id number]] Collected: 06/14/24 1606    Order Status: Completed Specimen: Urine Updated: 06/16/24 1636     Significant Indicator NEG     Source UR     Site -     Culture Result No growth at 48 hours.    Narrative:      Indication for culture:->Emergency Room Patient    BLOOD CULTURE [[id number]] Collected: 06/15/24 1400    Order Status: Completed  Specimen: Blood from Peripheral Updated: 06/16/24 0722     Significant Indicator NEG     Source BLD     Site PERIPHERAL     Culture Result No Growth  Note: Blood cultures are incubated for 5 days and  are monitored continuously.Positive blood cultures  are called to the RN and reported as soon as  they are identified.  Blood culture testing and Gram stain, if indicated, are  performed at [facility], [address] [place] [address].  Positive blood cultures are  sent to [facility], [address] [place] [address], for organism identification and  susceptibility testing.      Narrative:      Left Hand    Blood Culture - Draw one from central line and one from peripheral site [[id number]] Collected: 06/14/24 1548    Order Status: Completed Specimen: Blood from Line Updated: 06/15/24 0802     Significant Indicator NEG     Source BLD     Site LINE     Culture Result No Growth  Note: Blood cultures are incubated for 5 days and  are monitored continuously.Positive blood cultures  are called to the RN and reported as soon as  they are identified.  Blood culture testing and Gram stain, if indicated, are  performed at [facility], [address] [place] [address].[place], [place].  Positive blood cultures are  sent to [facility], [address] [place] [address], for organism identification and  susceptibility testing.      Narrative:      Right AC    Blood Culture - Draw one from central line and one from peripheral site [[id number]] Collected: 06/14/24 1548    Order Status: Completed Specimen: Blood from Peripheral Updated: 06/15/24 0802     Significant Indicator NEG     Source BLD     Site PERIPHERAL     Culture Result No Growth  Note: Blood cultures are incubated for 5 days and  are monitored continuously.Positive blood cultures  are called to the RN and reported as soon as  they are identified.  Blood culture testing and Gram stain, if  indicated, are  performed at [facility], [address] [place] [address].  Positive blood cultures are  sent to [facility], [address] [place] [address], for organism identification and  susceptibility testing.      Narrative:      Left AC    Urinalysis [[id number]]  (Abnormal) Collected: 06/14/24 1606    Order Status: Completed Specimen: Urine Updated: 06/14/24 1650     Color Yellow     Character Clear     Specific Gravity 1.015     Ph 6.0     Glucose Negative mg/dL      Ketones Negative mg/dL      Protein 30 mg/dL      Bilirubin Negative     Nitrite Positive     Leukocyte Esterase Negative     Occult Blood Trace     Micro Urine Req Microscopic    BLOOD CULTURE [[id number]] Collected: 06/14/24 0000    Order Status: Canceled Specimen: Other from Peripheral     BLOOD CULTURE [[id number]] Collected: 06/14/24 0000    Order Status: Canceled Specimen: Other from Peripheral     FLUID CULTURE [[id number]] Collected: 06/14/24 0000    Order Status: Canceled Specimen: Other from Synovial Fluid             Assessment:  Active Hospital Problems    Diagnosis     *Pyogenic arthritis of left knee joint (HCC) [M00.9]     Positive blood culture [R78.81]     Uncomplicated opioid dependence (HCC) [F11.20]     Hypokalemia [E87.6]     Stage 3a chronic kidney disease [N18.31]     Leukocytosis [D72.829]     Normochromic normocytic anemia [D64.9]     Hyponatremia [E87.1]     Alcohol use disorder, moderate, dependence (HCC) [F10.20]     Vitamin D deficiency disease [E55.9]     Hypertension [I10]      Interval 24 hours:      AF, O2 RA   Labs reviewed to assess for clinical status, drug toxicity and organ function  Imaging personally reviewed both images and report.   Micro reviewed    Patient overall doing well, minimal pain in left knee.  Continued on antibiotics as below.    ASSESSMENT/PLAN:      74 y.o.  admitted 6/14/2024. Pt has a past medical history of left knee replacement  approximately 15 years ago with acute onset swelling redness and pain.  He underwent aspiration of the joint with elevated WBCs and cultures positive for MSSA.  Admitted for I&D of the left knee.  Patient went to the OR on 6/15 for I&D with complete synovectomy and revision of left total knee arthroplasty-tibial poly exchange only.  Per op note there was gross purulence and 3 cultures were sent.  The polyethylene was removed and postdebridement was carried out.  Multiple debridements remove necrotic purulent tissue.     Problem List     Leukocytosis on arrival, improved  Thrombocytopenia, ongoing  Bacteremia, source left knee   - Blood cultures on 6/15 1/2 +MSSA  - Blood cultures on 6/17 are no growth to date   -TTE on 6/18 reports thickening of the atrial valve, no vegetations noted, no significant valve disease, EF 55%  Left knee prosthetic joint infection  -Synovial fluid cultures from 6/13 +MSSA  -Status post I&D, liner exchange, hardware was left in place  -OR cx +MSSA  -ESR on 6/15 - 85  Staph aureus infection  Chronic kidney disease  GERD/acid reflux  -Transition to his omeprazole to Pepcid to prevent interactions with rifampin, patient tolerating well with no worsening symptoms     Plan:     Recommendations for antibiotics:   --- Continue cefazolin 2 g every 8 hours + p.o. rifampin 300 mg twice daily-will recommend a 6-week antibiotic course, end 7/27/24   THEN  --- On 7/27/2024 start either doxycycline 100 mg twice daily (preferred) or Augmentin 875/125 twice daily for at least another 6 months and anticipate extending to a year or longer if tolerating given retained hardware  --- Patient prefers home infusion antibiotics if possible.  Orders written and left in the 's office on the desk.  Patient does live in [place], [place] which may be a barrier to discharge  --- Rifampin does have an interaction with the patient's omeprazole (decreases effectiveness) so after discussion with pharmacy  and the patient will stop omeprazole and change to famotidine 20 mg daily to continue treatment for his acid reflux.  Once he is off rifampin okay to resume omeprazole.      Recommendations for further/ongoing work-up, monitoring of clinical status and drug toxicity:   --- Follow-up repeat blood cultures, no growth to date  --- Monitor labs  --- Wound care  --- Please place referral order for outpatient ID follow-up    Follow-up in ID clinic prior to stopping antibiotics, okay to be seen by ELEAZAR Daley.      Dispo: Awaiting culture results and work-up as above.  Patient is at risk for infectious complications including death, sepsis and loss of limb function.  Anticipate discharge to home on home continuous infusion IV antibiotics.  Orders written and left in .  If the patient does not achieve insurance approval for home infusion will need to pursue outpatient infusion at a center near his home in [place].  In that scenario would likely need PCP to write the orders.     PICC: Okay to place PICC line on 6/19 - ordered         Plan of care discussed with Dr. Conor Saenz. Will continue to follow peripherally.      Kayce Dimas M.D.

## 2024-06-19 NOTE — PROCEDURES
Vascular Access Team     Date of Insertion: 6/19/2024  Arm Circumference: 31  Internal length: 40  External Length: 0  Vein Occupancy %: 37   Reason for PICC: IV ABX  Labs: WBC 6.5, , BUN 24, Cr 1.13, GFR 68     Consents confirmed, vessel patency confirmed with ultrasound. Risks and benefits of procedure explained to patient and education regarding central line associated bloodstream infections provided. Questions answered.      PICC placed in LUE per licensed provider order with ultrasound guidance.  4 Fr, 1 lumen PICC placed in BASILIC vein after 1 attempt(s). 2 mL of 1% lidocaine injected intradermally at the insertion site. A 21 gauge microintroducer needle was visualized entering the vein and modified Seldinger technique was used to obtain access to the vein. 40 cm catheter inserted and brisk blood return was observed from each lumen upon aspiration. Line secured at the 0 cm marker. TCS stylet removed and observed to be fully intact. Each lumen flushed using pulsatile method without resistance with 10 mL 0.9% normal saline. PICC line secured with Biopatch and Tegaderm.     PICC tip placement location is confirmed by nurse to be in the Superior Vena Cava (SVC) utilizing 3CG technology. PICC line is appropriate for use at this time. Patient tolerated procedure well, without complications.  Patient condition relayed to primary RN or ordering physician via this post procedure note in the EMR.      Ultrasound images uploaded to PACS and viewable in the EMR - yes  Ultrasound imaged printed and placed in paper chart - no     BARD Power PICC ref # [id number], Lot # [id number], Expiration Date 2025-06-30

## 2024-06-19 NOTE — CARE PLAN
The patient is Stable - Low risk of patient condition declining or worsening    Shift Goals  Clinical Goals: pain control <4/10 post interventions, free from falls this shift  Patient Goals: rest and update on POC    Progress made toward(s) clinical / shift goals:  pain managed without PRN, Calls appropriately for assistance out of bed.      Patient is not progressing towards the following goals: n/a

## 2024-06-19 NOTE — DISCHARGE PLANNING
Case Management Discharge Planning    Admission Date: 6/14/2024  GMLOS: 5  ALOS: 5    6-Clicks ADL Score: 19  6-Clicks Mobility Score: 22      Anticipated Discharge Dispo: Discharge Disposition: D/T to home under [facility] care in anticipation of covered skilled care (06)  Discharge Address: [address]  Discharge Contact Phone Number: [phone]    DME Needed: No    Action(s) Taken: Updated Provider/Nurse on Discharge Plan, Choice obtained, and Referral(s) sent    Received home health orders from  Met with pt at bedside to discuss infusions. Pt is requesting home infusions. Obtained choice for [facility] and [facility].   Choice forms faxed to [facility].     2862-  Received call from Kalyn with [facility]. Kalyn requested infusion order to be faxed.   LSW faxed infusion order.     Escalations Completed: None    Medically Clear: Yes    Next Steps: LSW to follow    Barriers to Discharge: Outpatient Infusion required    Is the patient up for discharge tomorrow: No

## 2024-06-19 NOTE — DISCHARGE PLANNING
ADOLFO received fax requesting face to face notes. Printed and faxed to [phone]    CM JASE notified

## 2024-06-19 NOTE — CARE PLAN
The patient is Stable - Low risk of patient condition declining or worsening    Shift Goals  Clinical Goals: Control pain at 4/10 or less post intervention, PICC line placement    Patient Goals: Rest and update on POC    Progress made toward(s) clinical / shift goals:  PICC placed and infusing well. Pain managed without PRNs    Patient is not progressing towards the following goals: n/a

## 2024-06-20 LAB
BACTERIA BLD CULT: NORMAL
SIGNIFICANT IND 70042: NORMAL
SITE SITE: NORMAL
SOURCE SOURCE: NORMAL

## 2024-06-20 PROCEDURE — 99233 SBSQ HOSP IP/OBS HIGH 50: CPT | Performed by: INTERNAL MEDICINE

## 2024-06-20 PROCEDURE — 770001 HCHG ROOM/CARE - MED/SURG/GYN PRIV*

## 2024-06-20 PROCEDURE — A9270 NON-COVERED ITEM OR SERVICE: HCPCS | Performed by: HOSPITALIST

## 2024-06-20 PROCEDURE — A9270 NON-COVERED ITEM OR SERVICE: HCPCS | Performed by: INTERNAL MEDICINE

## 2024-06-20 PROCEDURE — 700105 HCHG RX REV CODE 258: Performed by: INTERNAL MEDICINE

## 2024-06-20 PROCEDURE — 700102 HCHG RX REV CODE 250 W/ 637 OVERRIDE(OP): Performed by: HOSPITALIST

## 2024-06-20 PROCEDURE — 700102 HCHG RX REV CODE 250 W/ 637 OVERRIDE(OP)

## 2024-06-20 PROCEDURE — 700102 HCHG RX REV CODE 250 W/ 637 OVERRIDE(OP): Performed by: INTERNAL MEDICINE

## 2024-06-20 PROCEDURE — 700111 HCHG RX REV CODE 636 W/ 250 OVERRIDE (IP): Performed by: INTERNAL MEDICINE

## 2024-06-20 PROCEDURE — 94760 N-INVAS EAR/PLS OXIMETRY 1: CPT

## 2024-06-20 PROCEDURE — A9270 NON-COVERED ITEM OR SERVICE: HCPCS

## 2024-06-20 RX ORDER — RIFAMPIN 300 MG/1
300 CAPSULE ORAL 2 TIMES DAILY
Qty: 74 CAPSULE | Refills: 0 | Status: ACTIVE | OUTPATIENT
Start: 2024-06-20 | End: 2024-07-27

## 2024-06-20 RX ORDER — FAMOTIDINE 20 MG/1
20 TABLET, FILM COATED ORAL DAILY
Qty: 30 TABLET | Refills: 0 | Status: SHIPPED | OUTPATIENT
Start: 2024-06-21

## 2024-06-20 RX ORDER — POLYETHYLENE GLYCOL 3350 17 G/17G
17 POWDER, FOR SOLUTION ORAL 2 TIMES DAILY PRN
Qty: 10 PACKET | Refills: 0 | Status: SHIPPED | OUTPATIENT
Start: 2024-06-20

## 2024-06-20 RX ADMIN — CEFAZOLIN 2 G: 2 INJECTION, POWDER, FOR SOLUTION INTRAMUSCULAR; INTRAVENOUS at 05:47

## 2024-06-20 RX ADMIN — CELECOXIB 200 MG: 200 CAPSULE ORAL at 05:42

## 2024-06-20 RX ADMIN — HYDRALAZINE HYDROCHLORIDE 25 MG: 25 TABLET ORAL at 16:41

## 2024-06-20 RX ADMIN — CEFAZOLIN 2 G: 2 INJECTION, POWDER, FOR SOLUTION INTRAMUSCULAR; INTRAVENOUS at 22:03

## 2024-06-20 RX ADMIN — BUPRENORPHINE HCL 8 MG: 8 TABLET SUBLINGUAL at 09:45

## 2024-06-20 RX ADMIN — HYDRALAZINE HYDROCHLORIDE 25 MG: 25 TABLET ORAL at 05:41

## 2024-06-20 RX ADMIN — DOCUSATE SODIUM 100 MG: 100 CAPSULE, LIQUID FILLED ORAL at 16:41

## 2024-06-20 RX ADMIN — GABAPENTIN 600 MG: 300 CAPSULE ORAL at 09:45

## 2024-06-20 RX ADMIN — BUPRENORPHINE HCL 8 MG: 8 TABLET SUBLINGUAL at 20:18

## 2024-06-20 RX ADMIN — RIFAMPIN 300 MG: 300 CAPSULE ORAL at 16:41

## 2024-06-20 RX ADMIN — RIFAMPIN 300 MG: 300 CAPSULE ORAL at 05:42

## 2024-06-20 RX ADMIN — DULOXETINE HYDROCHLORIDE 30 MG: 30 CAPSULE, DELAYED RELEASE ORAL at 16:41

## 2024-06-20 RX ADMIN — FAMOTIDINE 20 MG: 20 TABLET ORAL at 05:42

## 2024-06-20 RX ADMIN — POTASSIUM CHLORIDE 20 MEQ: 1500 TABLET, EXTENDED RELEASE ORAL at 05:42

## 2024-06-20 RX ADMIN — ENOXAPARIN SODIUM 30 MG: 100 INJECTION SUBCUTANEOUS at 18:00

## 2024-06-20 RX ADMIN — CEFAZOLIN 2 G: 2 INJECTION, POWDER, FOR SOLUTION INTRAMUSCULAR; INTRAVENOUS at 16:41

## 2024-06-20 RX ADMIN — ACETAMINOPHEN 1000 MG: 500 TABLET, FILM COATED ORAL at 05:43

## 2024-06-20 RX ADMIN — DULOXETINE HYDROCHLORIDE 30 MG: 30 CAPSULE, DELAYED RELEASE ORAL at 05:42

## 2024-06-20 RX ADMIN — ACETAMINOPHEN 1000 MG: 500 TABLET, FILM COATED ORAL at 01:08

## 2024-06-20 RX ADMIN — CEFAZOLIN 2 G: 2 INJECTION, POWDER, FOR SOLUTION INTRAMUSCULAR; INTRAVENOUS at 17:00

## 2024-06-20 RX ADMIN — AMLODIPINE BESYLATE 10 MG: 5 TABLET ORAL at 05:43

## 2024-06-20 RX ADMIN — GABAPENTIN 600 MG: 300 CAPSULE ORAL at 20:18

## 2024-06-20 ASSESSMENT — PAIN DESCRIPTION - PAIN TYPE
TYPE: SURGICAL PAIN
TYPE: SURGICAL PAIN

## 2024-06-20 ASSESSMENT — ENCOUNTER SYMPTOMS
NAUSEA: 0
WEAKNESS: 0
DIAPHORESIS: 0
VOMITING: 0

## 2024-06-20 ASSESSMENT — PATIENT HEALTH QUESTIONNAIRE - PHQ9
1. LITTLE INTEREST OR PLEASURE IN DOING THINGS: NOT AT ALL
SUM OF ALL RESPONSES TO PHQ9 QUESTIONS 1 AND 2: 0
2. FEELING DOWN, DEPRESSED, IRRITABLE, OR HOPELESS: NOT AT ALL

## 2024-06-20 NOTE — DISCHARGE PLANNING
Case Management Discharge Planning    Admission Date: 6/14/2024  GMLOS: 5  ALOS: 6    6-Clicks ADL Score: 19  6-Clicks Mobility Score: 22      Anticipated Discharge Dispo: Discharge Disposition: D/T to home under [facility] care in anticipation of covered skilled care (06)  Discharge Address: [address]  Discharge Contact Phone Number: [phone]    DME Needed: No    Action(s) Taken: Updated Provider/Nurse on Discharge Plan    LSW was notified [facility] was requesting a separate face to face for pt's home health order. Requested separate face to face from Dr. White face to face, sent out to [facility].     4020-  LSW contacted pt's PCP office to follow up on request for Dr. Carrillo to follow pt for his antibiotics.   Spoke with Dr. Carrillo's MAMargarita. Per Margarita, Dr. Carrillo is able to follow pt. Margarita requested for infusion orders and latest ID note to be faxed to [phone].     LSW spoke with Kalyn from [facility]. Kalyn is able to teach pt today at 1400, and had spoken with [facility] who reported they would accept patient. Kalyn stated she will see if her pharmacist is able to start making the antibiotics, and would follow up with LSW.     Dr ness.     1215-  Spoke with Martine at [facility]. Martine confirmed pt's acceptance.     1327-  Faxed requested documents to pt's PCP office.     1445-  Left voicemail for Kalyn from [facility] requesting an update.     1500-  LSW spoke with Kalyn who reported they will be able to have pt's antibiotics and supplies ready and can deliver it to him at the hospital around [phone].   Discussed with , plan to dc pt tomorrow morning.     Escalations Completed: None    Medically Clear: Yes    Next Steps: LSW to follow    Barriers to Discharge: Outpatient Infusion required    Is the patient up for discharge tomorrow: No

## 2024-06-20 NOTE — PROGRESS NOTES
0700 - Bedside report received from ALDEN Adhikari. Alert and oriented X4, on RA in no acute respiratory distress. Daily plan of care discussed. Patient complains of no pain at this time. No other needs at this time. Call light and personal belongings within reach. Hourly rounding in place. Chart reviewed for recent labs, notes, and orders.

## 2024-06-20 NOTE — FACE TO FACE
Face to Face Supporting Documentation - Home Health    The encounter with this patient was in whole or in part the primary reason for home health admission.    Date of encounter:   Patient:                    MRN:                       YOB: 2025  Zachary Moore  [id number]  1950     Home health to see patient for:  Skilled Nursing care for assessment, interventions & education, Physical Therapy evaluation and treatment, Occupational therapy evaluation and treatment, and Comment: Also patient with PICC line will require line care and home infusion for antibiotics    Skilled need for:  Surgical Aftercare Pyogenic arthritis of the left knee    Skilled nursing interventions to include:  Home IV infusion therapy and Line/Drain/Airway education and care    Homebound status evidenced by:  Needs the assistance of another person in order to leave the home. Leaving home requires a considerable and taxing effort. There is a normal inability to leave the home.    Community Physician to provide follow up care: Gerhard Carrillo M.D.     Optional Interventions? Yes, Details: as per PT/OT recommendations      I certify the face to face encounter for this home health care referral meets the CMS requirements and the encounter/clinical assessment with the patient was, in whole, or in part, for the medical condition(s) listed above, which is the primary reason for home health care. Based on my clinical findings: the service(s) are medically necessary, support the need for home health care, and the homebound criteria are met.  I certify that this patient has had a face to face encounter by myself.  Kevin Robertson M.D. - NPI: [id number]

## 2024-06-20 NOTE — PROGRESS NOTES
Report received from Chelsey RUANO, assumed care of pt at 1915.  POC and medications reviewed with pt. Pt verbalized understanding.   AOx4  C/o nothing at this time.  Denies pain, SOB, or dizziness at this time.   Safety measures in place.  Hourly rounding in place.

## 2024-06-20 NOTE — DISCHARGE SUMMARY
"Discharge Summary    CHIEF COMPLAINT ON ADMISSION  Chief Complaint   Patient presents with    Sent by MD     Pt. Reports he was sent here by MD for L knee infection. Reports he is \"supposed to have surgery to clean it\". Pt. Reports chills.       Reason for Admission  Sent by MD     Admission Date  6/14/2024    CODE STATUS  Full Code    HPI & HOSPITAL COURSE  As per chart review:  \"74 y.o. male who presented 6/14/2024 with abnormal findings of arthrocentesis on a joint aspirate done yesterday.  Patient went to the [facility] yesterday where he was evaluated and fluid was obtained from the left knee because of pain and swelling.  The fluid shows an infection and thus he was told to come to the emergency room today to be admitted for antibiotics and he is planned to have surgery tomorrow.  He will be n.p.o. at midnight.  Will start him on Unasyn and Zyvox.  Will continue at this point with pain management.  Will monitor renal functions and avoid nephrotoxic medications and hydrate him.  Sodium was also low at 127 so he will need hydration and monitoring of sodium levels.\"    Patient was admitted for further management and care.  ID and orthopedic surgery were consulted and they followed the patient.  Antibiotics were managed by ID.    Patient underwent \"rrigation and debridement with complete synovectomy and revision Left  Total Knee Arthroplasty-tibial poly exchange only\" by orthopedic surgery while hospitalized.    ID follow-up cultures.  It seems the patient also tested positive for bacteremia for MSSA.  The patient continued to improve while hospitalized.  The patient is now on room air as well, nursing did a home O2 evaluation and did not require any further oxygen upon discharge.    As per IDs recommendation:  \"Recommendations for antibiotics:   --- Continue cefazolin 2 g every 8 hours + p.o. rifampin 300 mg twice daily-will recommend a 6-week antibiotic course, end 7/27/24   THEN  --- On 7/27/2024 start either " "doxycycline 100 mg twice daily (preferred) or Augmentin 875/125 twice daily for at least another 6 months and anticipate extending to a year or longer if tolerating given retained hardware\"      Patient has been accepted to antibiotic infusion as an outpatient at his house.  Home health has also been accepted.  PICC line has been placed.    I have ordered the rifampin to end on 7/27.  The patient will require close follow-up with PCP and ID as an outpatient.  ID will continue following the patient and they will order once appropriate the doxycycline or Augmentin starting on 7/27/2024.  It whether it would be ID or PCP that we will monitor the patient's infection and at some point will require doxycycline or Augmentin as per IDs recommendation.    The patient seen at bedside this morning.  Overall he feels much better and would like to go home.  Will discharge patient home.  The patient require close follow-up with PCP, ID and orthopedic surgery as an outpatient.    Of note, it seems patient's BP has been in the higher range yesterday and today, however most of the hospitalization was WNL. Patient will resume his BP medications, however he will require close follow up with PCP to see if he would warrant another BP medication. The patient states he has a way to check his BP and he was advised if it remained high to seek medical attention and he understands.    Therefore, he is discharged in fair and stable condition to home with organized home healthcare and close outpatient follow-up.    The patient met 2-midnight criteria for an inpatient stay at the time of discharge.    Discharge Date  06/21/2024    FOLLOW UP ITEMS POST DISCHARGE  The patient will require antibiotic treatment as an outpatient and will require close follow-up with PCP, ID and orthopedic surgery as an outpatient.    DISCHARGE DIAGNOSES  Principal Problem:    Pyogenic arthritis of left knee joint (HCC) (POA: Yes)  Active Problems:    Positive blood " culture (POA: Yes)    Hypertension (POA: Yes)    Vitamin D deficiency disease (POA: Yes)      Overview: IMO load March 2020    Hyponatremia (POA: Yes)    Alcohol use disorder, moderate, dependence (HCC) (POA: Yes)    Normochromic normocytic anemia (POA: Yes)    Hypokalemia (POA: Yes)    Stage 3a chronic kidney disease (POA: Yes)    Leukocytosis (POA: Yes)    Uncomplicated opioid dependence (HCC) (POA: Yes)  Resolved Problems:    * No resolved hospital problems. *      FOLLOW UP  Future Appointments   Date Time Provider Department Center   6/27/2024  2:30 PM Georgette Post R.N. [address]   7/17/2024  2:00 PM Fritz Boggs M.D. [facility]  [address] [place] [id number] [phone]        Gerhard Carrillo M.D.  [address] [facility] [address]  [phone]    Schedule an appointment as soon as possible for a visit      ANSHUL Escoto  [address] [place] [id number]  [phone]    Schedule an appointment as soon as possible for a visit      Fritz Boggs M.D.  [address] [facility] [address]  [phone]    Schedule an appointment as soon as possible for a visit        MEDICATIONS ON DISCHARGE     Medication List        START taking these medications        Instructions   famotidine 20 MG Tabs  Start taking on: June 21, 2024  Commonly known as: Pepcid   Take 1 Tablet by mouth every day.  Dose: 20 mg     NS SOLN 100 mL with ceFAZolin 2 g SOLR 2 g   Infuse 2 g into a venous catheter every 8 hours for 37 days.  Dose: 2 g     polyethylene glycol/lytes Pack  Commonly known as: Miralax   Take 1 Packet by mouth 2 times a day as needed (constipation).  Dose: 17 g     riFAMPin 300 MG Caps  Commonly known as: Rifadine   Take 1 Capsule by mouth 2 times a day for 37 days.  Dose: 300 mg            CONTINUE taking these medications        Instructions   amLODIPine 10 MG Tabs  Commonly known as: Norvasc   Take 1 Tablet by mouth every  day.  Dose: 10 mg     buprenorphine 8 MG Subl  Commonly known as: Subutex   Place 8 mg under the tongue in the morning, at noon, and at bedtime.  Dose: 8 mg     celecoxib 200 MG Caps  Commonly known as: CeleBREX   Take 200 mg by mouth every day.  Dose: 200 mg     DULoxetine 30 MG Cpep  Commonly known as: Cymbalta   Take 30 mg by mouth 2 times a day.  Dose: 30 mg     gabapentin 600 MG tablet  Commonly known as: Neurontin   Take 600 mg by mouth 4 times a day.  Dose: 600 mg     hydrALAZINE 25 MG Tabs  Commonly known as: Apresoline   Take 25 mg by mouth 2 times a day.  Dose: 25 mg            STOP taking these medications      cephALEXin 500 MG Caps  Commonly known as: Keflex     omeprazole 40 MG delayed-release capsule  Commonly known as: PriLOSEC     sulfamethoxazole-trimethoprim 800-160 MG tablet  Commonly known as: Bactrim DS              Allergies  No Known Allergies    DIET  Orders Placed This Encounter   Procedures    Diet Order Diet: Regular     Standing Status:   Standing     Number of Occurrences:   1     Order Specific Question:   Diet:     Answer:   Regular [1]       ACTIVITY  As tolerated.  Weight bearing as tolerated    CONSULTATIONS  Orthopedic Surgery  ID    PROCEDURES  Procedure: Irrigation and debridement with complete synovectomy and revision Left  Total Knee Arthroplasty-tibial poly exchange only     PICC line placement      IR-PICC LINE PLACEMENT W/ GUIDANCE > AGE 5   Final Result                  Ultrasound-guided PICC placement performed by qualified nursing staff as    above.          EC-ECHOCARDIOGRAM COMPLETE W/ CONT   Final Result      DX-KNEE 2- LEFT   Final Result      Left knee arthroplasty within normal limits      DX-CHEST-PORTABLE (1 VIEW)   Final Result      Mild, diffuse interstitial prominence.           LABORATORY  Lab Results   Component Value Date    SODIUM 135 06/19/2024    POTASSIUM 4.8 06/19/2024    CHLORIDE 103 06/19/2024    CO2 21 06/19/2024    GLUCOSE 80 06/19/2024    BUN 24  (H) 06/19/2024    CREATININE 1.13 06/19/2024        Lab Results   Component Value Date    WBC 6.5 06/19/2024    HEMOGLOBIN 9.4 (L) 06/19/2024    HEMATOCRIT 28.7 (L) 06/19/2024    PLATELETCT 265 06/19/2024        Total time of the discharge process exceeds 31 minutes.

## 2024-06-20 NOTE — DISCHARGE PLANNING
ADOLFO received fax stating info that was sent yesterday does not qualify for  processing.  added face to face and ADOLFO resent request at 1156    CM SW notified

## 2024-06-20 NOTE — CARE PLAN
The patient is Stable - Low risk of patient condition declining or worsening    Shift Goals  Clinical Goals: free from falls this shift, ambulate  Patient Goals: rest and update on POC    Progress made toward(s) clinical / shift goals:  Calls appropriately for assistance out of bed. Patient ambulates frequently    Patient is not progressing towards the following goals: n/a

## 2024-06-20 NOTE — CARE PLAN
The patient is Stable - Low risk of patient condition declining or worsening    Shift Goals  Clinical Goals: pain managed to 4/10 or less, pt to remain free from falls throughout shift.  Patient Goals: sleep comfortably    Progress made toward(s) clinical / shift goals:  Pt pain below 4/10 throughout shift, remained free from falls with fall precautions including pt education, call light within reach, bed alarm activated.    Patient is not progressing towards the following goals:

## 2024-06-20 NOTE — PROGRESS NOTES
"Hospital Medicine Daily Progress Note    Date of Service  6/20/2024    Chief Complaint  As per chart review:  \"Zachary Moore is a 74 y.o. 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  Infectious disease consulted\"    Interval Problem Update  6/17: Patient continues to do well, discussed discharge options, he lives in [facility] so coming to [place] for daily infusions is not an option.    Unfortunately his blood cultures turned positive this afternoon therefore earliest date for PICC line is likely 6/20.     PATIENT SEEN BY PREVIOUS HOSPITALIST UNTIL 6/17 6/18: Patient seen at bedside this morning.  Overall the patient feels better.  There was concern for MSSA bacteremia.  ID following the patient.  For now patient to continue rifampin and cefazolin.  We will have to wait to place PICC line for repeat blood cultures to be negative for 48 hours.  Continue to monitor closely.    6/19: Patient seen at bedside this morning.  I discussed case with ID and from their standpoint the patient can have a PICC line placed already today.  The patient is medically clear for discharge we are awaiting placement for outpatient or home infusion of antibiotics as per case management.  We appreciate further recommendations.  Continue to monitor closely.    6/20: Patient seen at bedside this morning.  No overnight events reported.  Patient remains on room air.  As per nursing the patient does not require oxygen upon discharge as he did a home O2 evaluation.  Patient is medically clear for discharge, awaiting placement for outpatient or home infusion antibiotics.  We appreciate further recommendations by case management.  Continue to monitor closely.      I have discussed this patient's plan of care and discharge plan at IDT " rounds today with Case Management, Nursing, Nursing leadership, and other members of the IDT team.    Consultants/Specialty  infectious disease and orthopedics    Code Status  Full Code    Disposition  The patient is medically cleared for discharge to home or a post-acute facility.          Review of Systems  Review of Systems   Constitutional:  Positive for malaise/fatigue. Negative for diaphoresis.   Gastrointestinal:  Negative for nausea and vomiting.   Musculoskeletal:  Positive for joint pain (Controlled on current medication).   Neurological:  Negative for weakness.        Physical Exam  Temp:  [36.4 °C (97.6 °F)-37.7 °C (99.8 °F)] 37 °C (98.6 °F)  Pulse:  [67-75] 70  Resp:  [17] 17  BP: (146-168)/(65-70) 146/67  SpO2:  [92 %-95 %] 95 %    Physical Exam  Vitals and nursing note reviewed.   Constitutional:       General: He is not in acute distress.     Appearance: He is ill-appearing. He is not toxic-appearing.   HENT:      Head: Normocephalic and atraumatic.      Nose: Nose normal.      Mouth/Throat:      Mouth: Mucous membranes are moist.   Eyes:      General:         Right eye: No discharge.         Left eye: No discharge.      Pupils: Pupils are equal, round, and reactive to light.   Cardiovascular:      Rate and Rhythm: Normal rate and regular rhythm.   Pulmonary:      Effort: Pulmonary effort is normal.      Breath sounds: Normal breath sounds.   Abdominal:      General: There is no distension.      Tenderness: There is no abdominal tenderness.   Musculoskeletal:         General: Tenderness present.      Comments: In post op wrap   Skin:     General: Skin is warm and dry.   Neurological:      General: No focal deficit present.      Mental Status: He is oriented to person, place, and time.   Psychiatric:         Mood and Affect: Mood normal.         Behavior: Behavior normal.         Fluids    Intake/Output Summary (Last 24 hours) at 6/20/2024 1333  Last data filed at 6/20/2024 0600  Gross per 24 hour    Intake --   Output 1360 ml   Net -1360 ml       Laboratory  Recent Labs     06/18/24  0506 06/19/24  0311   WBC 5.8 6.5   RBC 2.91* 3.24*   HEMOGLOBIN 8.6* 9.4*   HEMATOCRIT 26.4* 28.7*   MCV 90.7 88.6   MCH 29.6 29.0   MCHC 32.6 32.8   RDW 46.4 45.6   PLATELETCT 215 265   MPV 10.3 9.8     Recent Labs     06/18/24  0506 06/19/24  0311   SODIUM 134* 135   POTASSIUM 4.7 4.8   CHLORIDE 104 103   CO2 20 21   GLUCOSE 75 80   BUN 28* 24*   CREATININE 1.22 1.13   CALCIUM 8.0* 8.6                   Imaging  IR-PICC LINE PLACEMENT W/ GUIDANCE > AGE 5   Final Result                  Ultrasound-guided PICC placement performed by qualified nursing staff as    above.          EC-ECHOCARDIOGRAM COMPLETE W/ CONT   Final Result      DX-KNEE 2- LEFT   Final Result      Left knee arthroplasty within normal limits      DX-CHEST-PORTABLE (1 VIEW)   Final Result      Mild, diffuse interstitial prominence.           Assessment/Plan  * Pyogenic arthritis of left knee joint (HCC)- (present on admission)  Assessment & Plan  Cultures 6/13 was MSSA  ID recommending continue his home infusion, checking into insurance coverage  Blood culture from 2:00 on the 17th just turned positive, therefore will recheck blood cultures-line can likely not be placed until 6/20 a.m. at the earliest if these blood cultures are negative  Outpatient infusion center not an option in [place] because patient lives in [facility]  If he cannot have home infusion, can check to see if his primary care provider can order outpatient infusion through the hospital in [place].  Option 3 would be SNF but would like to avoid this if at all possible    6/18: ID following patient, we appreciate further recommendations    6/19: ID following the patient, they have placed orders for outpatient infusion most likely at home with PICC line.  Pending placement, we appreciate further recommendations by case management.    6/20: Pending placement for continued antibiotics.  While  hospitalized continue antibiotics as per ID recommendation.    Positive blood culture- (present on admission)  Assessment & Plan  Patient tested 1 out of 2 sets with a positive blood culture.  ID is following the patient, we appreciate further recommendations.  We have repeated blood cultures.  --Concern for MSSA bacteremia    6/19: ID following the patient, they have placed orders for outpatient infusion most likely at home with PICC line.  Pending placement, we appreciate further recommendations by case management.    6/20: Pending placement for continued antibiotics.  While hospitalized continue antibiotics as per ID recommendation.    Uncomplicated opioid dependence (HCC)- (present on admission)  Assessment & Plan  Pain controlled on chronic dose of buprenorphine    Leukocytosis- (present on admission)  Assessment & Plan  Resolved    Stage 3a chronic kidney disease- (present on admission)  Assessment & Plan  Monitor renal functions and avoid nephrotoxic medications  Give fluid resuscitation as he has recently bumped up his kidney function    Hypokalemia- (present on admission)  Assessment & Plan  Resolved    Normochromic normocytic anemia- (present on admission)  Assessment & Plan  Monitor H&H if drops below 7 or 21 transfuse    Alcohol use disorder, moderate, dependence (HCC)- (present on admission)  Assessment & Plan  Daily alcohol intake, no evidence of withdrawal    Hyponatremia- (present on admission)  Assessment & Plan  6/18: Mild  monitor    Vitamin D deficiency disease- (present on admission)  Assessment & Plan  Vitamin D supplementation    Hypertension- (present on admission)  Assessment & Plan  Continue norvasc and hydralazine         VTE prophylaxis: Lovenox    I have performed a physical exam and reviewed and updated ROS and Plan today (6/20/2024). In review of yesterday's note (6/19/2024), there are no changes except as documented above.      I spend at least 51 minutes providing care for this  patient.  This included face-to-face interview, physical examination.  Review of lab work including CBC, and CMP.   Discussion with multidisciplinary team including case management, nursing staff and pharmacy.  Creating plan of care, reviewing orders.

## 2024-06-20 NOTE — DISCHARGE PLANNING
Per [facility] DPA resent HH request as we needed to add face to face to DME request. Sent [phone]

## 2024-06-21 ENCOUNTER — APPOINTMENT (OUTPATIENT)
Dept: WOUND CARE | Facility: MEDICAL CENTER | Age: 74
End: 2024-06-21
Payer: MEDICARE

## 2024-06-21 VITALS
SYSTOLIC BLOOD PRESSURE: 145 MMHG | BODY MASS INDEX: 24.54 KG/M2 | OXYGEN SATURATION: 94 % | DIASTOLIC BLOOD PRESSURE: 62 MMHG | HEIGHT: 69 IN | WEIGHT: 165.7 LBS | HEART RATE: 86 BPM | TEMPERATURE: 98.9 F | RESPIRATION RATE: 18 BRPM

## 2024-06-21 PROCEDURE — A9270 NON-COVERED ITEM OR SERVICE: HCPCS | Performed by: HOSPITALIST

## 2024-06-21 PROCEDURE — A9270 NON-COVERED ITEM OR SERVICE: HCPCS | Performed by: INTERNAL MEDICINE

## 2024-06-21 PROCEDURE — 700111 HCHG RX REV CODE 636 W/ 250 OVERRIDE (IP): Performed by: INTERNAL MEDICINE

## 2024-06-21 PROCEDURE — 700102 HCHG RX REV CODE 250 W/ 637 OVERRIDE(OP): Performed by: INTERNAL MEDICINE

## 2024-06-21 PROCEDURE — 94760 N-INVAS EAR/PLS OXIMETRY 1: CPT

## 2024-06-21 PROCEDURE — 700105 HCHG RX REV CODE 258: Performed by: INTERNAL MEDICINE

## 2024-06-21 PROCEDURE — 99239 HOSP IP/OBS DSCHRG MGMT >30: CPT | Performed by: INTERNAL MEDICINE

## 2024-06-21 PROCEDURE — 700102 HCHG RX REV CODE 250 W/ 637 OVERRIDE(OP): Performed by: HOSPITALIST

## 2024-06-21 PROCEDURE — A9270 NON-COVERED ITEM OR SERVICE: HCPCS

## 2024-06-21 PROCEDURE — 700102 HCHG RX REV CODE 250 W/ 637 OVERRIDE(OP)

## 2024-06-21 RX ADMIN — FAMOTIDINE 20 MG: 20 TABLET ORAL at 05:46

## 2024-06-21 RX ADMIN — CEFAZOLIN 2 G: 2 INJECTION, POWDER, FOR SOLUTION INTRAMUSCULAR; INTRAVENOUS at 05:45

## 2024-06-21 RX ADMIN — CELECOXIB 200 MG: 200 CAPSULE ORAL at 05:46

## 2024-06-21 RX ADMIN — DOCUSATE SODIUM 100 MG: 100 CAPSULE, LIQUID FILLED ORAL at 05:46

## 2024-06-21 RX ADMIN — HYDRALAZINE HYDROCHLORIDE 25 MG: 25 TABLET ORAL at 05:46

## 2024-06-21 RX ADMIN — BUPRENORPHINE HCL 8 MG: 8 TABLET SUBLINGUAL at 08:15

## 2024-06-21 RX ADMIN — GABAPENTIN 600 MG: 300 CAPSULE ORAL at 08:15

## 2024-06-21 RX ADMIN — AMLODIPINE BESYLATE 10 MG: 5 TABLET ORAL at 05:45

## 2024-06-21 RX ADMIN — ACETAMINOPHEN 1000 MG: 500 TABLET, FILM COATED ORAL at 03:11

## 2024-06-21 RX ADMIN — CEFAZOLIN 2 G: 2 INJECTION, POWDER, FOR SOLUTION INTRAMUSCULAR; INTRAVENOUS at 14:29

## 2024-06-21 RX ADMIN — DULOXETINE HYDROCHLORIDE 30 MG: 30 CAPSULE, DELAYED RELEASE ORAL at 05:46

## 2024-06-21 RX ADMIN — BUPRENORPHINE HCL 8 MG: 8 TABLET SUBLINGUAL at 14:22

## 2024-06-21 RX ADMIN — GABAPENTIN 600 MG: 300 CAPSULE ORAL at 14:22

## 2024-06-21 RX ADMIN — RIFAMPIN 300 MG: 300 CAPSULE ORAL at 05:46

## 2024-06-21 ASSESSMENT — PAIN DESCRIPTION - PAIN TYPE: TYPE: SURGICAL PAIN

## 2024-06-21 NOTE — PROGRESS NOTES
Pt discharged to home via personal vehicle with spouse. Discharge paperwork reviewed and signed. Prescribed home medications reviewed with pt per discharge summary. PIV removed. PICC remains in place to L upper arm, flushed with NS prior to dc. VSS. Pt escorted off unit via wheelchair with hospital staff.

## 2024-06-21 NOTE — PROGRESS NOTES
Assumed care of pt at 1915, bedside report with ALDEN Barbosa. Pt is AAOx 4, resting comfortably in bed with NADN.    Pt using call light appropriately and to get up with assistance. Discussed plan of care for the night with patient, bed in lowest position, call light in reach, personal belongings in reach. No complaints at this time.

## 2024-06-22 LAB
BACTERIA BLD CULT: NORMAL
BACTERIA BLD CULT: NORMAL
SIGNIFICANT IND 70042: NORMAL
SIGNIFICANT IND 70042: NORMAL
SITE SITE: NORMAL
SITE SITE: NORMAL
SOURCE SOURCE: NORMAL
SOURCE SOURCE: NORMAL

## 2024-06-26 NOTE — DISCHARGE PLANNING
Agency/Facility Name: [facility]  Outcome: DPA placed call to follow up on referral request sent 6/19/24. Called main number asked for new referral request and no answer or mail box to leave voice mail. Will call back again.

## 2024-06-27 ENCOUNTER — APPOINTMENT (OUTPATIENT)
Dept: WOUND CARE | Facility: MEDICAL CENTER | Age: 74
End: 2024-06-27
Payer: MEDICARE

## 2024-07-16 LAB
FUNGUS SPEC CULT: NORMAL
FUNGUS SPEC FUNGUS STN: NORMAL
SIGNIFICANT IND 70042: NORMAL
SITE SITE: NORMAL
SOURCE SOURCE: NORMAL

## 2024-07-25 ENCOUNTER — OFFICE VISIT (OUTPATIENT)
Dept: INFECTIOUS DISEASES | Facility: MEDICAL CENTER | Age: 74
End: 2024-07-25
Attending: NURSE PRACTITIONER
Payer: MEDICARE

## 2024-07-25 VITALS
TEMPERATURE: 98.4 F | SYSTOLIC BLOOD PRESSURE: 112 MMHG | HEIGHT: 69 IN | BODY MASS INDEX: 23.85 KG/M2 | WEIGHT: 161 LBS | DIASTOLIC BLOOD PRESSURE: 62 MMHG | OXYGEN SATURATION: 98 % | RESPIRATION RATE: 18 BRPM | HEART RATE: 69 BPM

## 2024-07-25 DIAGNOSIS — B95.61 MSSA BACTEREMIA: ICD-10-CM

## 2024-07-25 DIAGNOSIS — A49.01 MSSA (METHICILLIN SUSCEPTIBLE STAPHYLOCOCCUS AUREUS) INFECTION: ICD-10-CM

## 2024-07-25 DIAGNOSIS — Z96.652 HX OF TOTAL KNEE ARTHROPLASTY, LEFT: ICD-10-CM

## 2024-07-25 DIAGNOSIS — T84.7XXD INFECTED HARDWARE IN LEFT LOWER EXTREMITY, SUBSEQUENT ENCOUNTER: ICD-10-CM

## 2024-07-25 DIAGNOSIS — R78.81 MSSA BACTEREMIA: ICD-10-CM

## 2024-07-25 PROCEDURE — 99212 OFFICE O/P EST SF 10 MIN: CPT | Performed by: NURSE PRACTITIONER

## 2024-07-25 PROCEDURE — 3078F DIAST BP <80 MM HG: CPT | Performed by: NURSE PRACTITIONER

## 2024-07-25 PROCEDURE — 99214 OFFICE O/P EST MOD 30 MIN: CPT | Performed by: NURSE PRACTITIONER

## 2024-07-25 PROCEDURE — 3074F SYST BP LT 130 MM HG: CPT | Performed by: NURSE PRACTITIONER

## 2024-07-25 RX ORDER — AMOXICILLIN AND CLAVULANATE POTASSIUM 875; 125 MG/1; MG/1
1 TABLET, FILM COATED ORAL 2 TIMES DAILY
Qty: 60 TABLET | Refills: 0 | Status: SHIPPED | OUTPATIENT
Start: 2024-07-28

## 2024-07-25 ASSESSMENT — ENCOUNTER SYMPTOMS
BLURRED VISION: 0
PALPITATIONS: 0
MYALGIAS: 0
HEADACHES: 0
DOUBLE VISION: 0
CHILLS: 0
NERVOUS/ANXIOUS: 0
FOCAL WEAKNESS: 0
WEIGHT LOSS: 0
VOMITING: 0
FEVER: 0
NAUSEA: 0
ABDOMINAL PAIN: 0
COUGH: 0
BRUISES/BLEEDS EASILY: 0
DIARRHEA: 0
SPUTUM PRODUCTION: 0
WHEEZING: 0
CONSTIPATION: 0
DIZZINESS: 0
SHORTNESS OF BREATH: 0

## 2024-07-25 ASSESSMENT — FIBROSIS 4 INDEX: FIB4 SCORE: 2

## 2024-07-29 LAB
MYCOBACTERIUM SPEC CULT: NORMAL
RHODAMINE-AURAMINE STN SPEC: NORMAL
SIGNIFICANT IND 70042: NORMAL
SITE SITE: NORMAL
SOURCE SOURCE: NORMAL

## 2024-07-30 ENCOUNTER — TELEPHONE (OUTPATIENT)
Dept: INFECTIOUS DISEASES | Facility: MEDICAL CENTER | Age: 74
End: 2024-07-30
Payer: MEDICARE

## 2024-07-30 DIAGNOSIS — D69.6 THROMBOCYTOPENIA (HCC): ICD-10-CM

## 2024-08-06 ASSESSMENT — ENCOUNTER SYMPTOMS
NERVOUS/ANXIOUS: 0
NAUSEA: 0
SPUTUM PRODUCTION: 0
WHEEZING: 0
CHILLS: 0
VOMITING: 0
BLURRED VISION: 0
WEIGHT LOSS: 0
FEVER: 0
PALPITATIONS: 0
HEADACHES: 0
DIARRHEA: 0
COUGH: 0
CONSTIPATION: 0
DIZZINESS: 0
FOCAL WEAKNESS: 0
ABDOMINAL PAIN: 0
DOUBLE VISION: 0
SHORTNESS OF BREATH: 0
MYALGIAS: 0
BRUISES/BLEEDS EASILY: 0

## 2024-08-06 NOTE — PROGRESS NOTES
INFECTIOUS  DISEASE  OUTPATIENT CLINIC  NOTE   Subjective   Primary care provider: Gerhard Carrillo M.D..     Reason for Follow Up:   Follow-up for   1. MSSA (methicillin susceptible Staphylococcus aureus) infection  CBC WITH DIFFERENTIAL    Comp Metabolic Panel      2. Hx of total knee arthroplasty, left        3. 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   Zachary Moore is a 74 y.o.  admitted 6/14/2024. Pt has a past medical history of left knee replacement approximately 15 years ago with acute onset swelling redness and pain.  He was seen in [facility] with reported aspiration of the joint and high white cells with cultures positive for Staph aureus.  Admitted for I&D of the left knee.  Patient went to the OR on 6/15 for I&D with complete synovectomy and revision of left total knee arthroplasty-tibial poly exchange only.  Per op note there was gross purulence and 3 cultures were sent.  The polyethylene was removed and postdebridement was carried out.  Multiple debridements remove necrotic purulent tissue. -OR cx +MSSA.  Blood cultures on 6/15 1/2 +MSSA.  Blood cultures on 6/17 are no growth to date. TTE on 6/18 reports thickening of the atrial valve, no vegetations noted, no significant valve disease, EF 55%. Left knee prosthetic joint infection. Synovial fluid cultures from 6/13 +MSSA. IV cefazolin 2 g every 8 hours + p.o. rifampin 300 mg twice daily-will recommend a 6-week antibiotic course, end 7/27/24   THEN on 7/27/2024 start either doxycycline 100 mg twice daily (preferred) or Augmentin 875/125 twice daily for at least another 6 months and anticipate extending to a year or longer if tolerating given retained hardware    07/25/24- Today Patient reports feeling well. Pt stating that the wound is healing well.  Wound pictures  reviewed in EPIC. Denies drainage, pungent odor, redness, pain. Denies feeling generally ill, fevers/chills, general malaise, headache, n/v/d.  Patient has been tolerating IV cefazolin and p.o. rifampin with no complaints of side effects.  Most recent labs reviewed from hospital admission.  Will need lab work faxed to [facility] from outside facility.  After completion of IV and oral antibiotic therapy will transition to 1 year of p.o. Augmentin 875/125 mg twice daily due to retained infected hardware.  There is a possibility we may go to lifelong suppression due to MSSA infected hardware.  Repeat labs ordered CBC/CMP 2 weeks after starting oral antibiotic therapy.  Left knee surgical wound healing appropriately with no surrounding erythema, swelling or drainage.  No open wounds.    -Addendum: Home health has not been drawing weekly labs.  Repeat labs today CBC/CMP for close monitoring.    8/7/24-patient following up while on p.o. Augmentin 875/125 mg twice daily.  Currently on a 1 year course due to retained infected hardware with MSSA with possibly transitioning to lifelong suppression.  Most recent labs from 7/29/2024 reviewed and scanned in the media tab, WBC 6.2, stable anemia 10.8/34.8, creatinine 1.2 phon and mildly decreased GFR 58.5, mild hyponatremia 134, thrombocytopenia 63.  An additional lab was completed on 8/2/2024 scanned in the media tab which shows WBC 9.3, stable anemia 10.3/32.5, platelets 243.  Patient reports he has been tolerating Augmentin with no complaints of side effects.  Today he denies any nausea, vomiting, fever, chills, constipation, diarrhea or rashes.  Repeat labs CBC/CMP once a month, standing order 3 count.  Follow-up in 3 months.    Current Antimicrobials: Augmentin 875/125 twice daily for a year to possibly lifelong suppression due to retained infected hardware  Previous Antimicrobials: Cephalexin, rifampin    Other Current Medications:  Home Medications    Medication Sig Taking?  Last Dose Authorizing Provider   amoxicillin-clavulanate (AUGMENTIN) 875-125 MG Tab Take 1 Tablet by mouth 2 times a day. Yes  ANSHUL Escoto   famotidine (PEPCID) 20 MG Tab Take 1 Tablet by mouth every day. Yes Taking Kevin Robertson M.D.   polyethylene glycol/lytes (MIRALAX) Pack Take 1 Packet by mouth 2 times a day as needed (constipation). Yes Taking Kevin Robertson M.D.   hydrALAZINE (APRESOLINE) 25 MG Tab Take 25 mg by mouth 2 times a day. Yes Taking Physician Outpatient   DULoxetine (CYMBALTA) 30 MG Cap DR Particles Take 30 mg by mouth 2 times a day. Yes Taking Physician Outpatient   celecoxib (CELEBREX) 200 MG Cap Take 200 mg by mouth every day. Yes Taking Physician Outpatient   gabapentin (NEURONTIN) 600 MG tablet Take 600 mg by mouth 4 times a day. Yes Taking Physician Outpatient   amLODIPine (NORVASC) 10 MG Tab Take 1 Tablet by mouth every day. Yes Taking Nick Sesay M.D.   buprenorphine (SUBUTEX) 8 MG SL Tab Place 8 mg under the tongue in the morning, at noon, and at bedtime. Yes Taking Physician Outpatient        PMH:  Past Medical History:   Diagnosis Date    Arthritis     knees and spine    Cancer (HCC)     skin cancer ongoing    Hypertension      Past Surgical History:   Procedure Laterality Date    KNEE REVISION TOTAL Left 6/15/2024    Procedure: REVISION, TOTAL ARTHROPLASTY, KNEE, ALL COMPONENTS;  Surgeon: Fritz Boggs M.D.;  Location: SURGERY [facility];  Service: Orthopedics    FUSION, SPINE, LUMBAR, PLIF  1/28/2014    Performed by Elder Chopra M.D. at SURGERY [place] [facility]    KNEE ARTHROPLASTY TOTAL  2013    Right knee-Dr. Boggs    KNEE ARTHROPLASTY TOTAL  2009    left knee--Dr. Snider    SHOULDER SURGERY      right-sided     History reviewed. No pertinent family history.  Social History     Socioeconomic History    Marital status:      Spouse name: Not on file    Number of children: Not on file    Years of education: Not on file    Highest  education level: Not on file   Occupational History    Not on file   Tobacco Use    Smoking status: Never    Smokeless tobacco: Never   Vaping Use    Vaping status: Never Used   Substance and Sexual Activity    Alcohol use: Yes     Comment: daily    Drug use: No    Sexual activity: Yes     Partners: Female     Birth control/protection: Post-Menopausal   Other Topics Concern    Not on file   Social History Narrative    Not on file     Social Determinants of Health     Financial Resource Strain: Not on file   Food Insecurity: No Food Insecurity (6/14/2024)    Hunger Vital Sign     Worried About Running Out of Food in the Last Year: Never true     Ran Out of Food in the Last Year: Never true   Transportation Needs: No Transportation Needs (6/14/2024)    PRAPARE - Transportation     Lack of Transportation (Medical): No     Lack of Transportation (Non-Medical): No   Physical Activity: Not on file   Stress: Not on file   Social Connections: Not on file   Intimate Partner Violence: Not At Risk (6/14/2024)    Humiliation, Afraid, Rape, and Kick questionnaire     Fear of Current or Ex-Partner: No     Emotionally Abused: No     Physically Abused: No     Sexually Abused: No   Housing Stability: Low Risk  (6/14/2024)    Housing Stability Vital Sign     Unable to Pay for Housing in the Last Year: No     Number of Places Lived in the Last Year: 1     Unstable Housing in the Last Year: No           Allergies/Intolerances:  No Known Allergies    ROS:   Review of Systems   Constitutional:  Negative for chills, fever, malaise/fatigue and weight loss.   HENT:  Negative for congestion and hearing loss.    Eyes:  Negative for blurred vision and double vision.   Respiratory:  Negative for cough, sputum production, shortness of breath and wheezing.    Cardiovascular:  Negative for chest pain and palpitations.   Gastrointestinal:  Negative for abdominal pain, constipation, diarrhea, nausea and vomiting.   Genitourinary:  Negative for  "dysuria.   Musculoskeletal:  Positive for joint pain (Left knee). Negative for myalgias.   Skin:  Negative for itching and rash.   Neurological:  Negative for dizziness, focal weakness and headaches.   Endo/Heme/Allergies:  Does not bruise/bleed easily.   Psychiatric/Behavioral:  The patient is not nervous/anxious.       ROS was reviewed and were negative except as above.    Objective    Most Recent Vital Signs:  /62 (BP Location: Left arm, Patient Position: Sitting, BP Cuff Size: Adult)   Pulse 82   Temp 36.8 °C (98.3 °F) (Temporal)   Resp 18   Ht 1.753 m (5' 9.02\")   Wt 73 kg (161 lb)   SpO2 96%   BMI 23.76 kg/m²     Physical Exam:  Physical Exam  Vitals reviewed.   Constitutional:       Appearance: Normal appearance.   HENT:      Head: Normocephalic and atraumatic.      Nose: Nose normal.      Mouth/Throat:      Mouth: Mucous membranes are moist.   Eyes:      Pupils: Pupils are equal, round, and reactive to light.   Cardiovascular:      Rate and Rhythm: Normal rate and regular rhythm.   Pulmonary:      Effort: Pulmonary effort is normal. No respiratory distress.      Breath sounds: Normal breath sounds. No stridor. No wheezing, rhonchi or rales.   Chest:      Chest wall: No tenderness.   Abdominal:      Palpations: Abdomen is soft.   Musculoskeletal:         General: No tenderness.      Cervical back: Normal range of motion and neck supple.      Comments: Left knee surgical wound fully healed with no surrounding erythema or drainage.  Mild warmth to left knee compared to right.  Mild swelling of left knee with appropriate healing   Skin:     General: Skin is warm and dry.      Coloration: Skin is not jaundiced.      Findings: No erythema or rash.   Neurological:      Mental Status: He is alert and oriented to person, place, and time.      Motor: No weakness.   Psychiatric:         Mood and Affect: Mood normal.         Behavior: Behavior normal.          Pertinent Lab/Imaging " Results:  [unfilled]  @CMP@  WBC   Date/Time Value Ref Range Status   06/19/2024 03:11 AM 6.5 4.8 - 10.8 K/uL Final     RBC   Date/Time Value Ref Range Status   06/19/2024 03:11 AM 3.24 (L) 4.70 - 6.10 M/uL Final     Hemoglobin   Date/Time Value Ref Range Status   06/19/2024 03:11 AM 9.4 (L) 14.0 - 18.0 g/dL Final     Hematocrit   Date/Time Value Ref Range Status   06/19/2024 03:11 AM 28.7 (L) 42.0 - 52.0 % Final     MCV   Date/Time Value Ref Range Status   06/19/2024 03:11 AM 88.6 81.4 - 97.8 fL Final     MCH   Date/Time Value Ref Range Status   06/19/2024 03:11 AM 29.0 27.0 - 33.0 pg Final     MCHC   Date/Time Value Ref Range Status   06/19/2024 03:11 AM 32.8 32.3 - 36.5 g/dL Final     MPV   Date/Time Value Ref Range Status   06/19/2024 03:11 AM 9.8 9.0 - 12.9 fL Final      Sodium   Date/Time Value Ref Range Status   06/19/2024 03:11  135 - 145 mmol/L Final     Potassium   Date/Time Value Ref Range Status   06/19/2024 03:11 AM 4.8 3.6 - 5.5 mmol/L Final     Chloride   Date/Time Value Ref Range Status   06/19/2024 03:11  96 - 112 mmol/L Final     Co2   Date/Time Value Ref Range Status   06/19/2024 03:11 AM 21 20 - 33 mmol/L Final     Glucose   Date/Time Value Ref Range Status   06/19/2024 03:11 AM 80 65 - 99 mg/dL Final     Bun   Date/Time Value Ref Range Status   06/19/2024 03:11 AM 24 (H) 8 - 22 mg/dL Final     Creatinine   Date/Time Value Ref Range Status   06/19/2024 03:11 AM 1.13 0.50 - 1.40 mg/dL Final     Bun-Creatinine Ratio   Date/Time Value Ref Range Status   10/26/2021 04:41 AM 25.0  Final     Alkaline Phosphatase   Date/Time Value Ref Range Status   06/19/2024 03:11 AM 66 30 - 99 U/L Final     AST(SGOT)   Date/Time Value Ref Range Status   06/19/2024 03:11 AM 15 12 - 45 U/L Final     ALT(SGPT)   Date/Time Value Ref Range Status   06/19/2024 03:11 AM <5 2 - 50 U/L Final     Total Bilirubin   Date/Time Value Ref Range Status   06/19/2024 03:11 AM 0.4 0.1 - 1.5 mg/dL Final      No results found  "for: \"CPKTOTAL\"       No results found for: \"BLOODCULTU\", \"BLDCULT\", \"BCHOLD\"    No results found for: \"BLOODCULTU\", \"BLDCULT\", \"BCHOLD\"       BLOOD CULTURE  Order: [id number]   Status: Final result       Visible to patient: Yes (not seen)       Next appt: Today at 10:00 AM in Infectious Diseases (Wily Daley, A.P.R.N.)    Specimen Information: Peripheral; Blood   0 Result Notes      Component 1 mo ago   Significant Indicator POS Positive (POS)   Source BLD   Site PERIPHERAL   Culture Result  Abnormal   Growth detected by Bactec instrument. 06/17/2024  15:44  Staphylococcus aureus (methicillin sensitive)  detected by PCR.    Culture Result Staphylococcus aureus Abnormal    Resulting Agency V        Susceptibility     Staphylococcus aureus     ALFREDA     Ampicillin/sulbactam <=8/4 mcg/mL Sensitive     Cefazolin <=8 mcg/mL Sensitive     Cefepime <=4 mcg/mL Sensitive     Clindamycin 0.5 mcg/mL Sensitive     Daptomycin 1 mcg/mL Sensitive     Erythromycin <=0.25 mcg/mL Sensitive     Linezolid 2 mcg/mL Sensitive     Oxacillin <=0.25 mcg/mL Sensitive     Tetracycline >8 mcg/mL Resistant     Trimeth/Sulfa <=0.5/9.5 m... Sensitive     Vancomycin 1 mcg/mL Sensitive                  Narrative  Performed by: DAWNA  CALL  [facility] tel. [phone],  CALLED  [facility] tel. [phone] 06/17/2024, 15:51, RB PERF. RESULTS CALLED  TO:[id number] Fajardo,RN  Right Hand      Specimen Collected: 06/15/24  2:00 PM Last Resulted: 06/19/24  7:25 AM      ntains abnormal data FLUID CULTURE W/GRAM STAIN  Order: [id number] - Reflex for Order [id number]   Status: Final result       Visible to patient: Yes (not seen)       Next appt: Today at 10:00 AM in Infectious Diseases (Wily Daley, A.P.R.N.)    Specimen Information: Synovial   0 Result Notes      Component 1 mo ago   Significant Indicator POS Positive (POS)   Source SYNO   Site L knee   Culture Result - Abnormal    Gram Stain Result  Abnormal   Many WBCs.  Few Gram positive cocci.    Culture Result  Abnormal "   Staphylococcus aureus  Light growth  Methicillin sensitive by screening method    Resulting Agency M        Susceptibility     Staphylococcus aureus     ALFREDA     Ampicillin/sulbactam <=8/4 mcg/mL Sensitive     Cefazolin <=8 mcg/mL Sensitive     Cefepime <=4 mcg/mL Sensitive     Clindamycin 0.5 mcg/mL Sensitive     Daptomycin 1 mcg/mL Sensitive     Erythromycin <=0.25 mcg/mL Sensitive     Linezolid 4 mcg/mL Sensitive     Oxacillin <=0.25 mcg/mL Sensitive     Tetracycline >8 mcg/mL Resistant     Trimeth/Sulfa <=0.5/9.5 m... Sensitive     Vancomycin 2 mcg/mL Sensitive                  Narrative  Performed by: M  CALL  doctor  [facility] tel. [phone], Please call Pos result on synovial, it  will be confirmed by culture tomorrow.  CALLED  [facility] tel. [phone] 06/15/2024, 10:36, CALL CANCELLED - RESULTS  FAXED      Specimen Collected: 06/13/24  3:50 PM Last Resulted: 06/15/24  2:13 PM        Impression/Assessment      1. MSSA (methicillin susceptible Staphylococcus aureus) infection  CBC WITH DIFFERENTIAL    Comp Metabolic Panel      2. Hx of total knee arthroplasty, left        3. 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  admitted 6/14/2024. Pt has a past medical history of left knee replacement approximately 15 years ago with acute onset swelling redness and pain. OR on 6/15 for I&D with complete synovectomy and revision of left total knee arthroplasty-tibial poly exchange only.  Per op note there was gross purulence and 3 cultures were sent.  The polyethylene was removed and postdebridement was carried out.  Multiple debridements remove necrotic purulent tissue. -OR cx +MSSA.  Blood cultures on 6/15 1/2 +MSSA.  Blood cultures on 6/17 are no growth to date. TTE on 6/18 reports thickening of the atrial valve, no vegetations noted, no significant valve disease, EF 55%. Left knee prosthetic joint infection.  Synovial fluid cultures from 6/13 +MSSA. IV cefazolin 2 g every 8 hours + p.o. rifampin 300 mg twice daily-will recommend a 6-week antibiotic course, end 7/27/24 THEN on 7/27/2024 start either doxycycline 100 mg twice daily (preferred) or Augmentin 875/125 twice daily for at least another 6 months and anticipate extending to a year or longer if tolerating given retained hardware    8/7/24-patient following up while on p.o. Augmentin 875/125 mg twice daily.  Currently on a 1 year course due to retained infected hardware with MSSA with possibly transitioning to lifelong suppression.  Most recent labs from 7/29/2024 reviewed and scanned in the media tab, WBC 6.2, stable anemia 10.8/34.8, creatinine 1.2 phon and mildly decreased GFR 58.5, mild hyponatremia 134.  In thrombocytopenia 63.  An additional lab was completed on 8/2/2024 scanned in the media tab which shows WBC 9.3, stable anemia 10.3/32.5, platelets 243.  Patient reports he has been tolerating Augmentin with no complaints of side effects.  Today he denies any nausea, vomiting, fever, chills, constipation, diarrhea or rashes.  Repeat labs CBC/CMP once a month, standing order 3 count.  Follow-up in 3 months.     -Patient is possibly moving to [place].  Will follow-up with ID clinic based upon possible move date.  PLAN:   - Continue Augmentin 875/125 twice daily for a year to possibly lifelong suppression due to retained infected hardware  - once a month, standing order 3 count   - Medication education provided and S/S of side effects discussed   - Recommend routine follow up with orthopedic surgery  - Continue po probiotic  - Education provided on S/S of infection and when to report to ER/ call 911         Return visit:1 months. Follow up with primary care physician for chronic medical problems      I have performed a physical exam,  updated ROS and plan today. I have reviewed previous images, labs, and provider notes.      CRYSTAL Escoto.    All Patients  should seek medical re-evaluation or report to the ER for new, increasing or worsening symptoms. In some circumstances medical conditions can change from the initial evaluation and may require emergent medical re-evaluation. This includes but is not limited to chest pain, shortness of breath, atypical abdominal pain, atypical headache, ALOC, fever >101, low blood pressure, high respiratory rate (above 30), low oxygen saturation (below 90%), acute delirium, abnormal bleeding, inability to tolerate any intake, weakness on one side of the body, any worsened or concerning conditions.    Please note that this dictation was created using voice recognition software. I have worked with technical experts from [facility] to optimize the interface.  I have made every reasonable attempt to correct obvious errors, but there may be errors of grammar and possibly content that I did not discover before finalizing the note.

## 2024-08-07 ENCOUNTER — OFFICE VISIT (OUTPATIENT)
Dept: INFECTIOUS DISEASES | Facility: MEDICAL CENTER | Age: 74
End: 2024-08-07
Attending: NURSE PRACTITIONER
Payer: MEDICARE

## 2024-08-07 VITALS
DIASTOLIC BLOOD PRESSURE: 62 MMHG | BODY MASS INDEX: 23.85 KG/M2 | SYSTOLIC BLOOD PRESSURE: 122 MMHG | HEART RATE: 82 BPM | RESPIRATION RATE: 18 BRPM | TEMPERATURE: 98.3 F | WEIGHT: 161 LBS | OXYGEN SATURATION: 96 % | HEIGHT: 69 IN

## 2024-08-07 DIAGNOSIS — A49.01 MSSA (METHICILLIN SUSCEPTIBLE STAPHYLOCOCCUS AUREUS) INFECTION: ICD-10-CM

## 2024-08-07 DIAGNOSIS — Z96.652 HX OF TOTAL KNEE ARTHROPLASTY, LEFT: ICD-10-CM

## 2024-08-07 DIAGNOSIS — T84.7XXD INFECTED HARDWARE IN LEFT LOWER EXTREMITY, SUBSEQUENT ENCOUNTER: ICD-10-CM

## 2024-08-07 PROCEDURE — 3074F SYST BP LT 130 MM HG: CPT | Performed by: NURSE PRACTITIONER

## 2024-08-07 PROCEDURE — 99212 OFFICE O/P EST SF 10 MIN: CPT | Performed by: NURSE PRACTITIONER

## 2024-08-07 PROCEDURE — 3078F DIAST BP <80 MM HG: CPT | Performed by: NURSE PRACTITIONER

## 2024-08-07 PROCEDURE — 99213 OFFICE O/P EST LOW 20 MIN: CPT | Performed by: NURSE PRACTITIONER

## 2024-08-07 RX ORDER — OMEPRAZOLE 40 MG/1
40 CAPSULE, DELAYED RELEASE ORAL
COMMUNITY
Start: 2024-07-26 | End: 2024-08-07

## 2024-08-07 ASSESSMENT — FIBROSIS 4 INDEX: FIB4 SCORE: 2

## 2024-08-14 ENCOUNTER — HOSPITAL ENCOUNTER (OUTPATIENT)
Facility: MEDICAL CENTER | Age: 74
End: 2024-08-14
Attending: ORTHOPAEDIC SURGERY
Payer: MEDICARE

## 2024-08-14 LAB
A PREV+VAG DNA SNV QL NAA+NON-PROBE: NOT DETECTED
APPEARANCE FLD: NORMAL
B FRAGILIS DNA SNV QL NAA+NON-PROBE: NOT DETECTED
BLACTX-M ISLT/SPM QL: NORMAL
BLAIMP ISLT/SPM QL: NORMAL
BLAKPC ISLT/SPM QL: NORMAL
BLAOXA-48-LIKE ISLT/SPM QL: NORMAL
BLAVIM ISLT/SPM QL: NORMAL
BODY FLD TYPE: NORMAL
C ALBICANS DNA SNV QL NAA+NON-PROBE: NOT DETECTED
C AVID+GRANUL DNA SNV QL NAA+NON-PROBE: NOT DETECTED
C PERFRINGENS SNV QL NAA+NON-PROBE: NOT DETECTED
CANDIDA DNA SNV QL NAA+NON-PROBE: NOT DETECTED
CITROBAC SP DNA SNV QL NAA+NON-PROBE: NOT DETECTED
COLOR FLD: NORMAL
CRYSTALS FLD MICRO: NORMAL
E CLOAC COMP DNA SNV QL NAA+NON-PROBE: NOT DETECTED
E COLI DNA SNV QL NAA+NON-PROBE: NOT DETECTED
E FAECALIS DNA SNV QL NAA+NON-PROBE: NOT DETECTED
E FAECIUM DNA SNV QL NAA+NON-PROBE: NOT DETECTED
F MAGNA DNA SNV QL NAA+NON-PROBE: NOT DETECTED
GP B STREP DNA SNV QL NAA+NON-PROBE: NOT DETECTED
GRAM STN SPEC: NORMAL
HAEM INFLU DNA SNV QL NAA+NON-PROBE: NOT DETECTED
K KINGAE DNA SNV QL NAA+NON-PROBE: NOT DETECTED
K. AEROGENES DNA SNV QL NAA+NON-PROBE: NOT DETECTED
K. PNEUMON GROUP DNA SNV QL NAA+NON-PRB: NOT DETECTED
LYMPHOCYTES NFR FLD: 3 %
M. MORGANII DNA SNV QL NAA+NON-PROBE: NOT DETECTED
MECA+MECC+MREJ ISLT/SPM QL: NORMAL
MONOS+MACROS NFR FLD MANUAL: 7 %
N GONORRHOEA DNA SNV QL NAA+NON-PROBE: NOT DETECTED
NDM (CARBAPENEMASE), PCR L739821A: NORMAL
NEUTROPHILS NFR FLD: 90 %
NUC CELL # FLD: 9031 CELLS/UL
P AERUGINOSA DNA SNV QL NAA+NON-PROBE: NOT DETECTED
P ANAEROBIUS DNA SNV QL NAA+NON-PROBE: NOT DETECTED
P MICRA DNA SNV QL NAA+NON-PROBE: NOT DETECTED
PEPTONIPHILUS SP DNA SNV QL NAA+NON-PRB: NOT DETECTED
PROTEUS SP DNA SNV QL NAA+NON-PROBE: NOT DETECTED
RBC # FLD: NORMAL CELLS/UL
S AUREUS DNA SNV QL NAA+NON-PROBE: NOT DETECTED
S LUGDUNENSIS DNA SNV QL NAA+NON-PROBE: NOT DETECTED
S MARCESCENS DNA SNV QL NAA+NON-PROBE: NOT DETECTED
S PNEUM DNA SNV QL NAA+NON-PROBE: NOT DETECTED
S PYO DNA SNV QL NAA+NON-PROBE: NOT DETECTED
SALMONELLA DNA SNV QL NAA+NON-PROBE: NOT DETECTED
SIGNIFICANT IND 70042: NORMAL
SITE SITE: NORMAL
SOURCE SOURCE: NORMAL
STREPTOCOCCUS DNA SNV QL NAA+NON-PROBE: NOT DETECTED
VANA+VANB ISLT/SPM QL: NORMAL

## 2024-08-14 PROCEDURE — 89060 EXAM SYNOVIAL FLUID CRYSTALS: CPT

## 2024-08-14 PROCEDURE — 87205 SMEAR GRAM STAIN: CPT

## 2024-08-14 PROCEDURE — 89051 BODY FLUID CELL COUNT: CPT

## 2024-08-14 PROCEDURE — 87070 CULTURE OTHR SPECIMN AEROBIC: CPT

## 2024-08-14 PROCEDURE — 87999 UNLISTED MICROBIOLOGY PX: CPT

## 2024-08-14 PROCEDURE — 87075 CULTR BACTERIA EXCEPT BLOOD: CPT

## 2024-08-17 LAB
BACTERIA FLD AEROBE CULT: NORMAL
GRAM STN SPEC: NORMAL
SIGNIFICANT IND 70042: NORMAL
SITE SITE: NORMAL
SOURCE SOURCE: NORMAL

## 2024-08-28 LAB
BACTERIA SPEC ANAEROBE CULT: NORMAL
SIGNIFICANT IND 70042: NORMAL
SITE SITE: NORMAL
SOURCE SOURCE: NORMAL

## 2024-09-11 ENCOUNTER — OFFICE VISIT (OUTPATIENT)
Dept: INFECTIOUS DISEASES | Facility: MEDICAL CENTER | Age: 74
End: 2024-09-11
Attending: NURSE PRACTITIONER
Payer: MEDICARE

## 2024-09-11 VITALS
HEIGHT: 69 IN | TEMPERATURE: 99.3 F | DIASTOLIC BLOOD PRESSURE: 64 MMHG | SYSTOLIC BLOOD PRESSURE: 132 MMHG | HEART RATE: 82 BPM | RESPIRATION RATE: 18 BRPM | OXYGEN SATURATION: 92 % | WEIGHT: 161 LBS | BODY MASS INDEX: 23.85 KG/M2

## 2024-09-11 DIAGNOSIS — T84.7XXD INFECTED HARDWARE IN LEFT LOWER EXTREMITY, SUBSEQUENT ENCOUNTER: ICD-10-CM

## 2024-09-11 DIAGNOSIS — A49.01 MSSA (METHICILLIN SUSCEPTIBLE STAPHYLOCOCCUS AUREUS) INFECTION: ICD-10-CM

## 2024-09-11 DIAGNOSIS — Z96.652 HX OF TOTAL KNEE ARTHROPLASTY, LEFT: ICD-10-CM

## 2024-09-11 PROCEDURE — 3075F SYST BP GE 130 - 139MM HG: CPT | Performed by: NURSE PRACTITIONER

## 2024-09-11 PROCEDURE — 99213 OFFICE O/P EST LOW 20 MIN: CPT | Performed by: NURSE PRACTITIONER

## 2024-09-11 PROCEDURE — 99212 OFFICE O/P EST SF 10 MIN: CPT | Performed by: NURSE PRACTITIONER

## 2024-09-11 PROCEDURE — 3078F DIAST BP <80 MM HG: CPT | Performed by: NURSE PRACTITIONER

## 2024-09-11 ASSESSMENT — ENCOUNTER SYMPTOMS
CHILLS: 0
FEVER: 0
ABDOMINAL PAIN: 0
BLURRED VISION: 0
NAUSEA: 0
WEIGHT LOSS: 0
DIARRHEA: 0
SPUTUM PRODUCTION: 0
PALPITATIONS: 0
DIZZINESS: 0
FOCAL WEAKNESS: 0
SHORTNESS OF BREATH: 0
BRUISES/BLEEDS EASILY: 0
WHEEZING: 0
NERVOUS/ANXIOUS: 0
MYALGIAS: 0
VOMITING: 0
COUGH: 0
HEADACHES: 0
DOUBLE VISION: 0
CONSTIPATION: 0

## 2024-09-11 ASSESSMENT — FIBROSIS 4 INDEX: FIB4 SCORE: 2

## 2024-09-11 NOTE — PROGRESS NOTES
INFECTIOUS  DISEASE  OUTPATIENT CLINIC  NOTE   Subjective   Primary care provider: Gerhard Carrillo M.D..     Reason for Follow Up:   Follow-up for   1. MSSA (methicillin susceptible Staphylococcus aureus) infection        2. Hx of total knee arthroplasty, left        3. 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   Zachary Moore is a 74 y.o.  admitted 6/14/2024. Pt has a past medical history of left knee replacement approximately 15 years ago with acute onset swelling redness and pain.  He was seen in [facility] with reported aspiration of the joint and high white cells with cultures positive for Staph aureus.  Admitted for I&D of the left knee.  Patient went to the OR on 6/15 for I&D with complete synovectomy and revision of left total knee arthroplasty-tibial poly exchange only.  Per op note there was gross purulence and 3 cultures were sent.  The polyethylene was removed and postdebridement was carried out.  Multiple debridements remove necrotic purulent tissue. -OR cx +MSSA.  Blood cultures on 6/15 1/2 +MSSA.  Blood cultures on 6/17 are no growth to date. TTE on 6/18 reports thickening of the atrial valve, no vegetations noted, no significant valve disease, EF 55%. Left knee prosthetic joint infection. Synovial fluid cultures from 6/13 +MSSA. IV cefazolin 2 g every 8 hours + p.o. rifampin 300 mg twice daily-will recommend a 6-week antibiotic course, end 7/27/24   THEN on 7/27/2024 start either doxycycline 100 mg twice daily (preferred) or Augmentin 875/125 twice daily for at least another 6 months and anticipate extending to a year or longer if tolerating given retained hardware    07/25/24- Today Patient reports feeling well. Pt stating that the wound is healing well.  Wound pictures reviewed in EPIC. Denies drainage, pungent  odor, redness, pain. Denies feeling generally ill, fevers/chills, general malaise, headache, n/v/d.  Patient has been tolerating IV cefazolin and p.o. rifampin with no complaints of side effects.  Most recent labs reviewed from hospital admission.  Will need lab work faxed to [facility] from outside facility.  After completion of IV and oral antibiotic therapy will transition to 1 year of p.o. Augmentin 875/125 mg twice daily due to retained infected hardware.  There is a possibility we may go to lifelong suppression due to MSSA infected hardware.  Repeat labs ordered CBC/CMP 2 weeks after starting oral antibiotic therapy.  Left knee surgical wound healing appropriately with no surrounding erythema, swelling or drainage.  No open wounds.    -Addendum: Home health has not been drawing weekly labs.  Repeat labs today CBC/CMP for close monitoring.    8/7/24-patient following up while on p.o. Augmentin 875/125 mg twice daily.  Currently on a 1 year course due to retained infected hardware with MSSA with possibly transitioning to lifelong suppression.  Most recent labs from 7/29/2024 reviewed and scanned in the media tab, WBC 6.2, stable anemia 10.8/34.8, creatinine 1.2 phon and mildly decreased GFR 58.5, mild hyponatremia 134, thrombocytopenia 63.  An additional lab was completed on 8/2/2024 scanned in the media tab which shows WBC 9.3, stable anemia 10.3/32.5, platelets 243.  Patient reports he has been tolerating Augmentin with no complaints of side effects.  Today he denies any nausea, vomiting, fever, chills, constipation, diarrhea or rashes.  Repeat labs CBC/CMP once a month, standing order 3 count.  Follow-up in 3 months.    9/11/24-patient following up while on chronic suppression with p.o. Augmentin 875/125 mg twice daily.  Tentative 1 year course of oral antibiotic therapy which would possibly end on 6/15/2025 with the possibility of extending to lifelong suppression.  Patient underwent a synovial fluid aspiration  with orthopedic surgery on 8/14/2024.  Bloody fluid.  Polys 90.  No signs of active infection.  PCR testing negative for any bacterial growth. Pt moving to [place] and will establish with new PCP there.  Medication refilled for 180 days. IF patient is unable to get Augmentin in [place] her can narrow antibiotics to PO Amoxicillin 500 mg BID. Plan for lifelong suppression due to poly exchange and continues to have retained hardware     Current Antimicrobials: Augmentin 875/125 twice daily for a year then decrease to Augmentin 500/125 mg 1 tab twice daily for lifelong suppression   Previous Antimicrobials: Cephalexin, rifampin    Other Current Medications:  Home Medications    Medication Sig Taking? Last Dose Authorizing Provider   amoxicillin-clavulanate (AUGMENTIN) 875-125 MG Tab Take 1 Tablet by mouth 2 times a day. Yes  ANSHUL Escoto   famotidine (PEPCID) 20 MG Tab Take 1 Tablet by mouth every day. Yes Taking Kevin Robertson M.D.   polyethylene glycol/lytes (MIRALAX) Pack Take 1 Packet by mouth 2 times a day as needed (constipation). Yes Taking Kevin Robertson M.D.   hydrALAZINE (APRESOLINE) 25 MG Tab Take 25 mg by mouth 2 times a day. Yes Taking Physician Outpatient   DULoxetine (CYMBALTA) 30 MG Cap DR Particles Take 30 mg by mouth 2 times a day. Yes Taking Physician Outpatient   celecoxib (CELEBREX) 200 MG Cap Take 200 mg by mouth every day. Yes Taking Physician Outpatient   gabapentin (NEURONTIN) 600 MG tablet Take 600 mg by mouth 4 times a day. Yes Taking Physician Outpatient   amLODIPine (NORVASC) 10 MG Tab Take 1 Tablet by mouth every day. Yes Taking Nick Sesay M.D.   buprenorphine (SUBUTEX) 8 MG SL Tab Place 8 mg under the tongue in the morning, at noon, and at bedtime. Yes Taking Physician Outpatient        PMH:  Past Medical History:   Diagnosis Date    Arthritis     knees and spine    Cancer (HCC)     skin cancer ongoing    Hypertension      Past Surgical History:    Procedure Laterality Date    KNEE REVISION TOTAL Left 6/15/2024    Procedure: REVISION, TOTAL ARTHROPLASTY, KNEE, ALL COMPONENTS;  Surgeon: Fritz Boggs M.D.;  Location: SURGERY [facility];  Service: Orthopedics    FUSION, SPINE, LUMBAR, PLIF  1/28/2014    Performed by Elder Chopra M.D. at SURGERY [facility]    KNEE ARTHROPLASTY TOTAL  2013    Right knee-Dr. Boggs    KNEE ARTHROPLASTY TOTAL  2009    left knee--Dr. Snider    SHOULDER SURGERY      right-sided     History reviewed. No pertinent family history.  Social History     Socioeconomic History    Marital status:      Spouse name: Not on file    Number of children: Not on file    Years of education: Not on file    Highest education level: Not on file   Occupational History    Not on file   Tobacco Use    Smoking status: Never    Smokeless tobacco: Never   Vaping Use    Vaping status: Never Used   Substance and Sexual Activity    Alcohol use: Yes     Comment: daily    Drug use: No    Sexual activity: Yes     Partners: Female     Birth control/protection: Post-Menopausal   Other Topics Concern    Not on file   Social History Narrative    Not on file     Social Determinants of Health     Financial Resource Strain: Not on file   Food Insecurity: No Food Insecurity (6/14/2024)    Hunger Vital Sign     Worried About Running Out of Food in the Last Year: Never true     Ran Out of Food in the Last Year: Never true   Transportation Needs: No Transportation Needs (6/14/2024)    PRAPARE - Transportation     Lack of Transportation (Medical): No     Lack of Transportation (Non-Medical): No   Physical Activity: Not on file   Stress: Not on file   Social Connections: Not on file   Intimate Partner Violence: Not At Risk (6/14/2024)    Humiliation, Afraid, Rape, and Kick questionnaire     Fear of Current or Ex-Partner: No     Emotionally Abused: No     Physically Abused: No     Sexually Abused: No   Housing Stability: Low Risk  (6/14/2024)    Housing  "Stability Vital Sign     Unable to Pay for Housing in the Last Year: No     Number of Places Lived in the Last Year: 1     Unstable Housing in the Last Year: No           Allergies/Intolerances:  No Known Allergies    ROS:   Review of Systems   Constitutional:  Negative for chills, fever, malaise/fatigue and weight loss.   HENT:  Negative for congestion and hearing loss.    Eyes:  Negative for blurred vision and double vision.   Respiratory:  Negative for cough, sputum production, shortness of breath and wheezing.    Cardiovascular:  Negative for chest pain and palpitations.   Gastrointestinal:  Negative for abdominal pain, constipation, diarrhea, nausea and vomiting.   Genitourinary:  Negative for dysuria.   Musculoskeletal:  Positive for joint pain (Left knee). Negative for myalgias.   Skin:  Negative for itching and rash.   Neurological:  Negative for dizziness, focal weakness and headaches.   Endo/Heme/Allergies:  Does not bruise/bleed easily.   Psychiatric/Behavioral:  The patient is not nervous/anxious.       ROS was reviewed and were negative except as above.    Objective    Most Recent Vital Signs:  /64 (BP Location: Left arm, Patient Position: Sitting, BP Cuff Size: Adult)   Pulse 82   Temp 37.4 °C (99.3 °F) (Temporal)   Resp 18   Ht 1.753 m (5' 9\")   Wt 73 kg (161 lb)   SpO2 92%   BMI 23.78 kg/m²     Physical Exam:  Physical Exam  Vitals reviewed.   Constitutional:       Appearance: Normal appearance.   HENT:      Head: Normocephalic and atraumatic.      Nose: Nose normal.      Mouth/Throat:      Mouth: Mucous membranes are moist.   Eyes:      Pupils: Pupils are equal, round, and reactive to light.   Cardiovascular:      Rate and Rhythm: Normal rate and regular rhythm.   Pulmonary:      Effort: Pulmonary effort is normal. No respiratory distress.      Breath sounds: Normal breath sounds. No stridor. No wheezing, rhonchi or rales.   Chest:      Chest wall: No tenderness.   Abdominal:      " Palpations: Abdomen is soft.   Musculoskeletal:         General: No tenderness.      Cervical back: Normal range of motion and neck supple.      Comments: Left knee surgical wound fully healed with no surrounding erythema or drainage.  Mild warmth to left knee compared to right.  Mild swelling of left knee with appropriate healing   Skin:     General: Skin is warm and dry.      Coloration: Skin is not jaundiced.      Findings: No erythema or rash.   Neurological:      Mental Status: He is alert and oriented to person, place, and time.      Motor: No weakness.   Psychiatric:         Mood and Affect: Mood normal.         Behavior: Behavior normal.          Pertinent Lab/Imaging Results:  [unfilled]  @CMP@  WBC   Date/Time Value Ref Range Status   06/19/2024 03:11 AM 6.5 4.8 - 10.8 K/uL Final     RBC   Date/Time Value Ref Range Status   06/19/2024 03:11 AM 3.24 (L) 4.70 - 6.10 M/uL Final     Hemoglobin   Date/Time Value Ref Range Status   06/19/2024 03:11 AM 9.4 (L) 14.0 - 18.0 g/dL Final     Hematocrit   Date/Time Value Ref Range Status   06/19/2024 03:11 AM 28.7 (L) 42.0 - 52.0 % Final     MCV   Date/Time Value Ref Range Status   06/19/2024 03:11 AM 88.6 81.4 - 97.8 fL Final     MCH   Date/Time Value Ref Range Status   06/19/2024 03:11 AM 29.0 27.0 - 33.0 pg Final     MCHC   Date/Time Value Ref Range Status   06/19/2024 03:11 AM 32.8 32.3 - 36.5 g/dL Final     MPV   Date/Time Value Ref Range Status   06/19/2024 03:11 AM 9.8 9.0 - 12.9 fL Final      Sodium   Date/Time Value Ref Range Status   06/19/2024 03:11  135 - 145 mmol/L Final     Potassium   Date/Time Value Ref Range Status   06/19/2024 03:11 AM 4.8 3.6 - 5.5 mmol/L Final     Chloride   Date/Time Value Ref Range Status   06/19/2024 03:11  96 - 112 mmol/L Final     Co2   Date/Time Value Ref Range Status   06/19/2024 03:11 AM 21 20 - 33 mmol/L Final     Glucose   Date/Time Value Ref Range Status   06/19/2024 03:11 AM 80 65 - 99 mg/dL Final     Bun  "  Date/Time Value Ref Range Status   06/19/2024 03:11 AM 24 (H) 8 - 22 mg/dL Final     Creatinine   Date/Time Value Ref Range Status   06/19/2024 03:11 AM 1.13 0.50 - 1.40 mg/dL Final     Bun-Creatinine Ratio   Date/Time Value Ref Range Status   10/26/2021 04:41 AM 25.0  Final     Alkaline Phosphatase   Date/Time Value Ref Range Status   06/19/2024 03:11 AM 66 30 - 99 U/L Final     AST(SGOT)   Date/Time Value Ref Range Status   06/19/2024 03:11 AM 15 12 - 45 U/L Final     ALT(SGPT)   Date/Time Value Ref Range Status   06/19/2024 03:11 AM <5 2 - 50 U/L Final     Total Bilirubin   Date/Time Value Ref Range Status   06/19/2024 03:11 AM 0.4 0.1 - 1.5 mg/dL Final      No results found for: \"CPKTOTAL\"       No results found for: \"BLOODCULTU\", \"BLDCULT\", \"BCHOLD\"    No results found for: \"BLOODCULTU\", \"BLDCULT\", \"BCHOLD\"       BLOOD CULTURE  Order: [id number]   Status: Final result       Visible to patient: Yes (not seen)       Next appt: Today at 10:00 AM in Infectious Diseases (Wily Daley A.P.R.N.)    Specimen Information: Peripheral; Blood   0 Result Notes      Component 1 mo ago   Significant Indicator POS Positive (POS)   Source BLD   Site PERIPHERAL   Culture Result  Abnormal   Growth detected by Bactec instrument. 06/17/2024  15:44  Staphylococcus aureus (methicillin sensitive)  detected by PCR.    Culture Result Staphylococcus aureus Abnormal    Resulting Agency V        Susceptibility     Staphylococcus aureus     ALFREDA     Ampicillin/sulbactam <=8/4 mcg/mL Sensitive     Cefazolin <=8 mcg/mL Sensitive     Cefepime <=4 mcg/mL Sensitive     Clindamycin 0.5 mcg/mL Sensitive     Daptomycin 1 mcg/mL Sensitive     Erythromycin <=0.25 mcg/mL Sensitive     Linezolid 2 mcg/mL Sensitive     Oxacillin <=0.25 mcg/mL Sensitive     Tetracycline >8 mcg/mL Resistant     Trimeth/Sulfa <=0.5/9.5 m... Sensitive     Vancomycin 1 mcg/mL Sensitive                  Narrative  Performed by: DAWNA Mackey  [facility] tel. [phone],  CALLED  " [facility] tel. [phone] 06/17/2024, 15:51, RB PERF. RESULTS CALLED  TO:[id number],JulitaRN  Right Hand      Specimen Collected: 06/15/24  2:00 PM Last Resulted: 06/19/24  7:25 AM      ntains abnormal data FLUID CULTURE W/GRAM STAIN  Order: [id number] - Reflex for Order [id number]   Status: Final result       Visible to patient: Yes (not seen)       Next appt: Today at 10:00 AM in Infectious Diseases (Wily Daley, A.P.R.N.)    Specimen Information: Synovial   0 Result Notes      Component 1 mo ago   Significant Indicator POS Positive (POS)   Source SYNO   Site L knee   Culture Result - Abnormal    Gram Stain Result  Abnormal   Many WBCs.  Few Gram positive cocci.    Culture Result  Abnormal   Staphylococcus aureus  Light growth  Methicillin sensitive by screening method    Resulting Agency M        Susceptibility     Staphylococcus aureus     ALFREDA     Ampicillin/sulbactam <=8/4 mcg/mL Sensitive     Cefazolin <=8 mcg/mL Sensitive     Cefepime <=4 mcg/mL Sensitive     Clindamycin 0.5 mcg/mL Sensitive     Daptomycin 1 mcg/mL Sensitive     Erythromycin <=0.25 mcg/mL Sensitive     Linezolid 4 mcg/mL Sensitive     Oxacillin <=0.25 mcg/mL Sensitive     Tetracycline >8 mcg/mL Resistant     Trimeth/Sulfa <=0.5/9.5 m... Sensitive     Vancomycin 2 mcg/mL Sensitive                  Narrative  Performed by: WILBER  CALL  doctor  S[facility] tel. [phone], Please call Pos result on synovial, it  will be confirmed by culture tomorrow.  CALLED  [facility] tel. [phone] 06/15/2024, 10:36, CALL CANCELLED - RESULTS  FAXED      Specimen Collected: 06/13/24  3:50 PM Last Resulted: 06/15/24  2:13 PM         08/14/24 15:15   Anaerococcus prevotii/vaginalis, PCR Not Detected   Bacteroides fragilis, PCR Not Detected   Candida albicans, PCR Not Detected   Candida spp, PCR Not Detected   IMP (Carbapenemase), PCR N/A   KPC (Carbapenemase), PCR N/A   NDM (Carbapenemase), PCR N/A   Oxa-48-like (Carbapenemase), PCR N/A   VIM (Carbapenemase), PCR N/A   CTX-M (ESBL), PCR  N/A   mecA/C and MREJ (MRSA), PCR N/A   Noe/B (Vancomycin Resistance), PCR N/A   Citrobacter, PCR Not Detected   Clostridium perfringens, PCR Not Detected   Cutibacterium avidum/granulosum, PCR Not Detected   Enterobacter cloacae complex, PCR Not Detected   Enterococcus faecalis, PCR Not Detected   Enterococcus faecium, PCR Not Detected   Escherichia coli, PCR Not Detected   Finegoldia Magna, PCR Not Detected   Haemophilus influenzae, PCR Not Detected   Kingella kingae, PCR Not Detected   Klebsiella aerogenes, PCR Not Detected   Klebsiella pneumoniae group, PCR Not Detected   Morganella morganii, PCR Not Detected   Neisseria gonorrhoeae, PCR Not Detected   Parvimonas Micra, PCR Not Detected   Peptoniphilus, PCR Not Detected   Peptostreptococcus anaerobius, PCR Not Detected   Proteus spp, PCR Not Detected   Pseudomonas aeruginosa, PCR Not Detected   Salmonella spp, PCR Not Detected   Serratia marcescens, PCR Not Detected   Significant Indicator NEG  NEG  .   Site Left knee  Left knee  Left knee   Source SYNO  SYNO  SYNO   Staphylococcus aureus, PCR Not Detected   Staphylococcus lugdunensis, PCR Not Detected   Streptococcus spp, PCR Not Detected   Streptococcus pneumoniae, PCR Not Detected   Streptococcus agalactiae, PCR Not Detected   Streptococcus pyogenes, PCR Not Detected     Impression/Assessment      1. MSSA (methicillin susceptible Staphylococcus aureus) infection        2. Hx of total knee arthroplasty, left        3. 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  admitted 6/14/2024. Pt has a past medical history of left knee replacement approximately 15 years ago with acute onset swelling redness and pain. OR on 6/15 for I&D with complete synovectomy and revision of left total knee arthroplasty-tibial poly exchange only.  Per op note there was gross purulence and 3 cultures were sent.  The polyethylene was  removed and postdebridement was carried out.  Multiple debridements remove necrotic purulent tissue. -OR cx +MSSA.  Blood cultures on 6/15 1/2 +MSSA.  Blood cultures on 6/17 are no growth to date. TTE on 6/18 reports thickening of the atrial valve, no vegetations noted, no significant valve disease, EF 55%. Left knee prosthetic joint infection. Synovial fluid cultures from 6/13 +MSSA. IV cefazolin 2 g every 8 hours + p.o. rifampin 300 mg twice daily-will recommend a 6-week antibiotic course, end 7/27/24 THEN on 7/27/2024 start either doxycycline 100 mg twice daily (preferred) or Augmentin 875/125 twice daily for at least another 6 months and anticipate extending to a year or longer if tolerating given retained hardware    9/11/24-patient following up while on chronic suppression with p.o. Augmentin 875/125 mg twice daily.  Tentative 1 year course of oral antibiotic therapy which would possibly end on 6/15/2025 with the possibility of extending to lifelong suppression.  Patient underwent a synovial fluid aspiration with orthopedic surgery on 8/14/2024.  Bloody fluid.  Polys 90.  No signs of active infection.  PCR testing negative for any bacterial growth. Pt moving to [place] and will establish with new PCP there.  Medication refilled for 180 days. IF patient is unable to get Augmentin in [place] her can narrow antibiotics to PO Amoxicillin 500 mg BID. Plan for lifelong suppression due to poly exchange and continues to have retained hardware     -Patient is possibly moving to [place].  Will follow-up with ID clinic based upon possible move date.  PLAN:   - Continue Augmentin 875/125 twice daily for 1  year ( 6/15/25) for lifelong suppression due to retained infected hardware. After 1 year can decrease to Augmetin 500/125 mg 1 tab twice daily  or  Amoxicillin 500 mg 1 tab twice daily for lifelong suppression   - once a month, standing order 3 count   - Medication education provided and S/S of side effects discussed   -  Recommend routine follow up with orthopedic surgery  - Continue po probiotic  - Education provided on S/S of infection and when to report to ER/ call 911         Return visit: PRN. Follow up with primary care physician for chronic medical problems      I have performed a physical exam,  updated ROS and plan today. I have reviewed previous images, labs, and provider notes.      CRYSTAL Escoto.    All Patients should seek medical re-evaluation or report to the ER for new, increasing or worsening symptoms. In some circumstances medical conditions can change from the initial evaluation and may require emergent medical re-evaluation. This includes but is not limited to chest pain, shortness of breath, atypical abdominal pain, atypical headache, ALOC, fever >101, low blood pressure, high respiratory rate (above 30), low oxygen saturation (below 90%), acute delirium, abnormal bleeding, inability to tolerate any intake, weakness on one side of the body, any worsened or concerning conditions.    Please note that this dictation was created using voice recognition software. I have worked with technical experts from [employer] to optimize the interface.  I have made every reasonable attempt to correct obvious errors, but there may be errors of grammar and possibly content that I did not discover before finalizing the note.

## 2024-10-02 NOTE — PROGRESS NOTES
CLINICAL PHARMACY NOTE: MEDS TO BEDS    Total # of Prescriptions Filled: 2   The following medications were delivered to the patient:  Oxycodone  methocarbamol    Additional Documentation:     covid-19 surge in effect

## (undated) DEVICE — PAD PREP 24 X 48 CUFFED - (100/CA)

## (undated) DEVICE — SET LEADWIRE 5 LEAD BEDSIDE DISPOSABLE ECG (1SET OF 5/EA)

## (undated) DEVICE — SUTURE 2-0 MONOCRYL PLUS UNDYED CT-1 1 X 36 (36EA/BX)"

## (undated) DEVICE — BLADE 90X18X1.27MM SAW SAGITTAL

## (undated) DEVICE — SUTURE GENERAL

## (undated) DEVICE — CANISTER SUCTION RIGID RED 1500CC (40EA/CA)

## (undated) DEVICE — ELECTRODE DUAL RETURN W/ CORD - (50/PK)

## (undated) DEVICE — TUBING CLEARLINK DUO-VENT - C-FLO (48EA/CA)

## (undated) DEVICE — BLADE SAGITTAL DUAL 25MM

## (undated) DEVICE — Device

## (undated) DEVICE — SET EXTENSION WITH 2 PORTS (48EA/CA) ***PART #2C8610 IS A SUBSTITUTE*****

## (undated) DEVICE — TOWEL STOP TIMEOUT SAFETY FLAG (40EA/CA)

## (undated) DEVICE — SUCTION INSTRUMENT YANKAUER OPEN TIP W/O VENT (50EA/CA)

## (undated) DEVICE — LENS/HOOD FOR SPACESUIT - (32/PK) PEEL AWAY FACE

## (undated) DEVICE — PAD LAP STERILE 18 X 18 - (5/PK 40PK/CA)

## (undated) DEVICE — SUCTION INSTRUMENT YANKAUER BULBOUS TIP W/O VENT (50EA/CA)

## (undated) DEVICE — PACK TOTAL KNEE  (1/CA)

## (undated) DEVICE — HANDPIECE 10FT INTPLS SCT PLS IRRIGATION HAND CONTROL SET (6/PK)

## (undated) DEVICE — STOCKINETTE IMPERVIOUS 12X48 - STERILELF (10/CA)"

## (undated) DEVICE — SUTURE 3-0 MONOCRYL PLUS PS-1 - 27 INCH (36/BX)

## (undated) DEVICE — SYSTEM NEGATIVE PRESSURE PREVENA PLUS PEEL PLACE L35CM (1EA)

## (undated) DEVICE — SENSOR OXIMETER ADULT SPO2 RD SET (20EA/BX)

## (undated) DEVICE — HUMID-VENT HEAT AND MOISTURE EXCHANGE- (50/BX)

## (undated) DEVICE — PACK TOTAL HIP - (1/CA)

## (undated) DEVICE — PAD UNIVERSAL MULTI USE (1/EA)

## (undated) DEVICE — TRAY SPINAL ANESTHESIA NON-SAFETY (10/CA)

## (undated) DEVICE — SLEEVE, VASO, THIGH, MED

## (undated) DEVICE — GLOVE BIOGEL PI INDICATOR SZ 8.5 SURGICAL PF LF - (50PR/BX 4BX/CA)

## (undated) DEVICE — LACTATED RINGERS INJ 1000 ML - (14EA/CA 60CA/PF)

## (undated) DEVICE — BLADE SAGITTAL SYSTEM 18MM

## (undated) DEVICE — GLOVE BIOGEL SZ 8.5 SURGICAL PF LTX - (50PR/BX 4BX/CA)

## (undated) DEVICE — SODIUM CHL IRRIGATION 0.9% 1000ML (12EA/CA)